# Patient Record
Sex: MALE | Race: BLACK OR AFRICAN AMERICAN | NOT HISPANIC OR LATINO | URBAN - METROPOLITAN AREA
[De-identification: names, ages, dates, MRNs, and addresses within clinical notes are randomized per-mention and may not be internally consistent; named-entity substitution may affect disease eponyms.]

---

## 2018-11-09 ENCOUNTER — INPATIENT (INPATIENT)
Facility: HOSPITAL | Age: 53
LOS: 9 days | Discharge: NURSING, REHAB OR LONG-TERM CARE | DRG: 64 | End: 2018-11-19
Attending: INTERNAL MEDICINE | Admitting: INTERNAL MEDICINE
Payer: MEDICAID

## 2018-11-09 VITALS
HEART RATE: 87 BPM | SYSTOLIC BLOOD PRESSURE: 121 MMHG | DIASTOLIC BLOOD PRESSURE: 76 MMHG | OXYGEN SATURATION: 97 % | TEMPERATURE: 98 F | HEIGHT: 75 IN | WEIGHT: 229.94 LBS | RESPIRATION RATE: 24 BRPM

## 2018-11-09 DIAGNOSIS — E87.1 HYPO-OSMOLALITY AND HYPONATREMIA: ICD-10-CM

## 2018-11-09 DIAGNOSIS — I63.9 CEREBRAL INFARCTION, UNSPECIFIED: ICD-10-CM

## 2018-11-09 DIAGNOSIS — E13.9 OTHER SPECIFIED DIABETES MELLITUS WITHOUT COMPLICATIONS: ICD-10-CM

## 2018-11-09 DIAGNOSIS — Z29.9 ENCOUNTER FOR PROPHYLACTIC MEASURES, UNSPECIFIED: ICD-10-CM

## 2018-11-09 DIAGNOSIS — I10 ESSENTIAL (PRIMARY) HYPERTENSION: ICD-10-CM

## 2018-11-09 DIAGNOSIS — N17.9 ACUTE KIDNEY FAILURE, UNSPECIFIED: ICD-10-CM

## 2018-11-09 DIAGNOSIS — Z90.49 ACQUIRED ABSENCE OF OTHER SPECIFIED PARTS OF DIGESTIVE TRACT: Chronic | ICD-10-CM

## 2018-11-09 DIAGNOSIS — E78.5 HYPERLIPIDEMIA, UNSPECIFIED: ICD-10-CM

## 2018-11-09 DIAGNOSIS — I25.10 ATHEROSCLEROTIC HEART DISEASE OF NATIVE CORONARY ARTERY WITHOUT ANGINA PECTORIS: ICD-10-CM

## 2018-11-09 DIAGNOSIS — Z95.1 PRESENCE OF AORTOCORONARY BYPASS GRAFT: Chronic | ICD-10-CM

## 2018-11-09 LAB
ALBUMIN SERPL ELPH-MCNC: 3.6 G/DL — SIGNIFICANT CHANGE UP (ref 3.5–5)
ALP SERPL-CCNC: 108 U/L — SIGNIFICANT CHANGE UP (ref 40–120)
ALT FLD-CCNC: 24 U/L DA — SIGNIFICANT CHANGE UP (ref 10–60)
ANION GAP SERPL CALC-SCNC: 8 MMOL/L — SIGNIFICANT CHANGE UP (ref 5–17)
ANION GAP SERPL CALC-SCNC: 9 MMOL/L — SIGNIFICANT CHANGE UP (ref 5–17)
APPEARANCE UR: CLEAR — SIGNIFICANT CHANGE UP
APTT BLD: 100.8 SEC — HIGH (ref 27.5–36.3)
APTT BLD: 26.6 SEC — LOW (ref 27.5–36.3)
AST SERPL-CCNC: 18 U/L — SIGNIFICANT CHANGE UP (ref 10–40)
BACTERIA # UR AUTO: ABNORMAL /HPF
BASOPHILS # BLD AUTO: 0.1 K/UL — SIGNIFICANT CHANGE UP (ref 0–0.2)
BASOPHILS NFR BLD AUTO: 1.2 % — SIGNIFICANT CHANGE UP (ref 0–2)
BILIRUB SERPL-MCNC: 0.6 MG/DL — SIGNIFICANT CHANGE UP (ref 0.2–1.2)
BILIRUB UR-MCNC: NEGATIVE — SIGNIFICANT CHANGE UP
BUN SERPL-MCNC: 55 MG/DL — HIGH (ref 7–18)
BUN SERPL-MCNC: 55 MG/DL — HIGH (ref 7–18)
CALCIUM SERPL-MCNC: 9.3 MG/DL — SIGNIFICANT CHANGE UP (ref 8.4–10.5)
CALCIUM SERPL-MCNC: 9.3 MG/DL — SIGNIFICANT CHANGE UP (ref 8.4–10.5)
CHLORIDE SERPL-SCNC: 85 MMOL/L — LOW (ref 96–108)
CHLORIDE SERPL-SCNC: 89 MMOL/L — LOW (ref 96–108)
CO2 SERPL-SCNC: 28 MMOL/L — SIGNIFICANT CHANGE UP (ref 22–31)
CO2 SERPL-SCNC: 29 MMOL/L — SIGNIFICANT CHANGE UP (ref 22–31)
COLOR SPEC: YELLOW — SIGNIFICANT CHANGE UP
CREAT ?TM UR-MCNC: 79 MG/DL — SIGNIFICANT CHANGE UP
CREAT SERPL-MCNC: 2.4 MG/DL — HIGH (ref 0.5–1.3)
CREAT SERPL-MCNC: 2.43 MG/DL — HIGH (ref 0.5–1.3)
DIFF PNL FLD: NEGATIVE — SIGNIFICANT CHANGE UP
EOSINOPHIL # BLD AUTO: 0 K/UL — SIGNIFICANT CHANGE UP (ref 0–0.5)
EOSINOPHIL NFR BLD AUTO: 0.4 % — SIGNIFICANT CHANGE UP (ref 0–6)
GLUCOSE BLDC GLUCOMTR-MCNC: 242 MG/DL — HIGH (ref 70–99)
GLUCOSE BLDC GLUCOMTR-MCNC: 246 MG/DL — HIGH (ref 70–99)
GLUCOSE BLDC GLUCOMTR-MCNC: 266 MG/DL — HIGH (ref 70–99)
GLUCOSE SERPL-MCNC: 345 MG/DL — HIGH (ref 70–99)
GLUCOSE SERPL-MCNC: 390 MG/DL — HIGH (ref 70–99)
GLUCOSE UR QL: 100 MG/DL
HCT VFR BLD CALC: 46.8 % — SIGNIFICANT CHANGE UP (ref 39–50)
HGB BLD-MCNC: 15.9 G/DL — SIGNIFICANT CHANGE UP (ref 13–17)
INR BLD: 1.15 RATIO — SIGNIFICANT CHANGE UP (ref 0.88–1.16)
KETONES UR-MCNC: NEGATIVE — SIGNIFICANT CHANGE UP
LEUKOCYTE ESTERASE UR-ACNC: NEGATIVE — SIGNIFICANT CHANGE UP
LYMPHOCYTES # BLD AUTO: 1.3 K/UL — SIGNIFICANT CHANGE UP (ref 1–3.3)
LYMPHOCYTES # BLD AUTO: 19.3 % — SIGNIFICANT CHANGE UP (ref 13–44)
MAGNESIUM SERPL-MCNC: 2.8 MG/DL — HIGH (ref 1.6–2.6)
MCHC RBC-ENTMCNC: 30 PG — SIGNIFICANT CHANGE UP (ref 27–34)
MCHC RBC-ENTMCNC: 33.9 GM/DL — SIGNIFICANT CHANGE UP (ref 32–36)
MCV RBC AUTO: 88.4 FL — SIGNIFICANT CHANGE UP (ref 80–100)
MONOCYTES # BLD AUTO: 0.6 K/UL — SIGNIFICANT CHANGE UP (ref 0–0.9)
MONOCYTES NFR BLD AUTO: 9.3 % — SIGNIFICANT CHANGE UP (ref 2–14)
NEUTROPHILS # BLD AUTO: 4.8 K/UL — SIGNIFICANT CHANGE UP (ref 1.8–7.4)
NEUTROPHILS NFR BLD AUTO: 69.8 % — SIGNIFICANT CHANGE UP (ref 43–77)
NITRITE UR-MCNC: NEGATIVE — SIGNIFICANT CHANGE UP
OSMOLALITY SERPL: 303 MOS/KG — HIGH (ref 275–300)
OSMOLALITY UR: 461 MOS/KG — SIGNIFICANT CHANGE UP (ref 50–1200)
PH UR: 7 — SIGNIFICANT CHANGE UP (ref 5–8)
PHOSPHATE SERPL-MCNC: 3.4 MG/DL — SIGNIFICANT CHANGE UP (ref 2.5–4.5)
PLATELET # BLD AUTO: 282 K/UL — SIGNIFICANT CHANGE UP (ref 150–400)
POTASSIUM SERPL-MCNC: 5.7 MMOL/L — HIGH (ref 3.5–5.3)
POTASSIUM SERPL-MCNC: 6.2 MMOL/L — CRITICAL HIGH (ref 3.5–5.3)
POTASSIUM SERPL-SCNC: 5.7 MMOL/L — HIGH (ref 3.5–5.3)
POTASSIUM SERPL-SCNC: 6.2 MMOL/L — CRITICAL HIGH (ref 3.5–5.3)
PROT ?TM UR-MCNC: 14 MG/DL — HIGH (ref 0–12)
PROT SERPL-MCNC: 8.8 G/DL — HIGH (ref 6–8.3)
PROT UR-MCNC: 15
PROTHROM AB SERPL-ACNC: 12.8 SEC — SIGNIFICANT CHANGE UP (ref 10–12.9)
RBC # BLD: 5.29 M/UL — SIGNIFICANT CHANGE UP (ref 4.2–5.8)
RBC # FLD: 11.5 % — SIGNIFICANT CHANGE UP (ref 10.3–14.5)
RBC CASTS # UR COMP ASSIST: SIGNIFICANT CHANGE UP /HPF (ref 0–2)
SODIUM SERPL-SCNC: 123 MMOL/L — LOW (ref 135–145)
SODIUM SERPL-SCNC: 125 MMOL/L — LOW (ref 135–145)
SP GR SPEC: 1 — LOW (ref 1.01–1.02)
TROPONIN I SERPL-MCNC: 0.02 NG/ML — SIGNIFICANT CHANGE UP (ref 0–0.04)
TROPONIN I SERPL-MCNC: 0.02 NG/ML — SIGNIFICANT CHANGE UP (ref 0–0.04)
TSH SERPL-MCNC: 0.46 UU/ML — SIGNIFICANT CHANGE UP (ref 0.34–4.82)
UROBILINOGEN FLD QL: NEGATIVE — SIGNIFICANT CHANGE UP
WBC # BLD: 6.9 K/UL — SIGNIFICANT CHANGE UP (ref 3.8–10.5)
WBC # FLD AUTO: 6.9 K/UL — SIGNIFICANT CHANGE UP (ref 3.8–10.5)
WBC UR QL: SIGNIFICANT CHANGE UP /HPF (ref 0–5)

## 2018-11-09 PROCEDURE — 99223 1ST HOSP IP/OBS HIGH 75: CPT

## 2018-11-09 PROCEDURE — 70450 CT HEAD/BRAIN W/O DYE: CPT | Mod: 26

## 2018-11-09 PROCEDURE — 71045 X-RAY EXAM CHEST 1 VIEW: CPT | Mod: 26

## 2018-11-09 PROCEDURE — 99285 EMERGENCY DEPT VISIT HI MDM: CPT

## 2018-11-09 RX ORDER — PANTOPRAZOLE SODIUM 20 MG/1
40 TABLET, DELAYED RELEASE ORAL DAILY
Qty: 0 | Refills: 0 | Status: DISCONTINUED | OUTPATIENT
Start: 2018-11-09 | End: 2018-11-12

## 2018-11-09 RX ORDER — CHLORPROMAZINE HCL 10 MG
10 TABLET ORAL EVERY 8 HOURS
Qty: 0 | Refills: 0 | Status: DISCONTINUED | OUTPATIENT
Start: 2018-11-09 | End: 2018-11-09

## 2018-11-09 RX ORDER — DEXTROSE 50 % IN WATER 50 %
50 SYRINGE (ML) INTRAVENOUS ONCE
Qty: 0 | Refills: 0 | Status: DISCONTINUED | OUTPATIENT
Start: 2018-11-09 | End: 2018-11-10

## 2018-11-09 RX ORDER — INSULIN HUMAN 100 [IU]/ML
10 INJECTION, SOLUTION SUBCUTANEOUS ONCE
Qty: 0 | Refills: 0 | Status: COMPLETED | OUTPATIENT
Start: 2018-11-09 | End: 2018-11-09

## 2018-11-09 RX ORDER — INSULIN LISPRO 100/ML
VIAL (ML) SUBCUTANEOUS
Qty: 0 | Refills: 0 | Status: DISCONTINUED | OUTPATIENT
Start: 2018-11-09 | End: 2018-11-10

## 2018-11-09 RX ORDER — CHLORPROMAZINE HCL 10 MG
10 TABLET ORAL EVERY 8 HOURS
Qty: 0 | Refills: 0 | Status: DISCONTINUED | OUTPATIENT
Start: 2018-11-09 | End: 2018-11-19

## 2018-11-09 RX ORDER — ATORVASTATIN CALCIUM 80 MG/1
40 TABLET, FILM COATED ORAL AT BEDTIME
Qty: 0 | Refills: 0 | Status: DISCONTINUED | OUTPATIENT
Start: 2018-11-09 | End: 2018-11-19

## 2018-11-09 RX ORDER — CALCIUM GLUCONATE 100 MG/ML
1 VIAL (ML) INTRAVENOUS ONCE
Qty: 0 | Refills: 0 | Status: COMPLETED | OUTPATIENT
Start: 2018-11-09 | End: 2018-11-09

## 2018-11-09 RX ORDER — INSULIN GLARGINE 100 [IU]/ML
43 INJECTION, SOLUTION SUBCUTANEOUS AT BEDTIME
Qty: 0 | Refills: 0 | Status: DISCONTINUED | OUTPATIENT
Start: 2018-11-09 | End: 2018-11-18

## 2018-11-09 RX ORDER — HEPARIN SODIUM 5000 [USP'U]/ML
5000 INJECTION INTRAVENOUS; SUBCUTANEOUS EVERY 8 HOURS
Qty: 0 | Refills: 0 | Status: DISCONTINUED | OUTPATIENT
Start: 2018-11-09 | End: 2018-11-09

## 2018-11-09 RX ORDER — HEPARIN SODIUM 5000 [USP'U]/ML
INJECTION INTRAVENOUS; SUBCUTANEOUS
Qty: 25000 | Refills: 0 | Status: DISCONTINUED | OUTPATIENT
Start: 2018-11-09 | End: 2018-11-11

## 2018-11-09 RX ORDER — SODIUM CHLORIDE 9 MG/ML
500 INJECTION INTRAMUSCULAR; INTRAVENOUS; SUBCUTANEOUS ONCE
Qty: 0 | Refills: 0 | Status: COMPLETED | OUTPATIENT
Start: 2018-11-09 | End: 2018-11-09

## 2018-11-09 RX ORDER — SODIUM CHLORIDE 9 MG/ML
1000 INJECTION INTRAMUSCULAR; INTRAVENOUS; SUBCUTANEOUS
Qty: 0 | Refills: 0 | Status: DISCONTINUED | OUTPATIENT
Start: 2018-11-09 | End: 2018-11-10

## 2018-11-09 RX ORDER — METOPROLOL TARTRATE 50 MG
25 TABLET ORAL
Qty: 0 | Refills: 0 | Status: DISCONTINUED | OUTPATIENT
Start: 2018-11-09 | End: 2018-11-19

## 2018-11-09 RX ORDER — ASPIRIN/CALCIUM CARB/MAGNESIUM 324 MG
81 TABLET ORAL DAILY
Qty: 0 | Refills: 0 | Status: DISCONTINUED | OUTPATIENT
Start: 2018-11-09 | End: 2018-11-19

## 2018-11-09 RX ORDER — SODIUM POLYSTYRENE SULFONATE 4.1 MEQ/G
30 POWDER, FOR SUSPENSION ORAL ONCE
Qty: 0 | Refills: 0 | Status: COMPLETED | OUTPATIENT
Start: 2018-11-09 | End: 2018-11-09

## 2018-11-09 RX ADMIN — SODIUM POLYSTYRENE SULFONATE 30 GRAM(S): 4.1 POWDER, FOR SUSPENSION ORAL at 20:31

## 2018-11-09 RX ADMIN — Medication 200 GRAM(S): at 20:25

## 2018-11-09 RX ADMIN — INSULIN GLARGINE 43 UNIT(S): 100 INJECTION, SOLUTION SUBCUTANEOUS at 23:35

## 2018-11-09 RX ADMIN — HEPARIN SODIUM 1800 UNIT(S)/HR: 5000 INJECTION INTRAVENOUS; SUBCUTANEOUS at 16:23

## 2018-11-09 RX ADMIN — ATORVASTATIN CALCIUM 40 MILLIGRAM(S): 80 TABLET, FILM COATED ORAL at 22:10

## 2018-11-09 RX ADMIN — Medication 25 MILLIGRAM(S): at 17:21

## 2018-11-09 RX ADMIN — Medication 3: at 17:21

## 2018-11-09 RX ADMIN — PANTOPRAZOLE SODIUM 40 MILLIGRAM(S): 20 TABLET, DELAYED RELEASE ORAL at 15:22

## 2018-11-09 RX ADMIN — SODIUM CHLORIDE 500 MILLILITER(S): 9 INJECTION INTRAMUSCULAR; INTRAVENOUS; SUBCUTANEOUS at 12:18

## 2018-11-09 RX ADMIN — HEPARIN SODIUM 1600 UNIT(S)/HR: 5000 INJECTION INTRAVENOUS; SUBCUTANEOUS at 23:33

## 2018-11-09 RX ADMIN — Medication 10 MILLIGRAM(S): at 20:26

## 2018-11-09 RX ADMIN — SODIUM CHLORIDE 500 MILLILITER(S): 9 INJECTION INTRAMUSCULAR; INTRAVENOUS; SUBCUTANEOUS at 16:11

## 2018-11-09 RX ADMIN — INSULIN HUMAN 10 UNIT(S): 100 INJECTION, SOLUTION SUBCUTANEOUS at 20:26

## 2018-11-09 NOTE — CONSULT NOTE ADULT - SUBJECTIVE AND OBJECTIVE BOX
Patient is a 53y old  Male who presents with a chief complaint of weakness, slurred speech (09 Nov 2018 15:11)      HPI: Left [facility] where he had been admitted with slurre dspeech and weakness; readmitted because of difficulty walking; reviewed MRI reports from [place] obtained by Dr. Pickett. 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 He has a uncontrollable hiccup.    PAST MEDICAL & SURGICAL HISTORY:  Cerebrovascular accident (CVA), unspecified mechanism  Hyperlipidemia, unspecified hyperlipidemia type  Coronary artery disease involving native heart without angina pectoris, unspecified vessel or lesion type  Hypertension, unspecified type  Diabetes mellitus of other type without complication  History of appendectomy  S/P CABG x 1      FAMILY HISTORY:  No pertinent family history in first degree relatives        Social Hx:  Current every day smoker, no drug or alcohol use    MEDICATIONS  (STANDING):  aspirin enteric coated 81 milliGRAM(s) Oral daily  atorvastatin 40 milliGRAM(s) Oral at bedtime  heparin  Infusion.  Unit(s)/Hr (18 mL/Hr) IV Continuous <Continuous>  insulin glargine Injectable (LANTUS) 43 Unit(s) SubCutaneous at bedtime  insulin lispro (HumaLOG) corrective regimen sliding scale   SubCutaneous three times a day before meals  metoprolol tartrate 25 milliGRAM(s) Oral two times a day  pantoprazole  Injectable 40 milliGRAM(s) IV Push daily  sodium chloride 0.9%. 1000 milliLiter(s) (75 mL/Hr) IV Continuous <Continuous>       Allergies    No Known Allergies    Intolerances        ROS: Pertinent positives in HPI, all other ROS were reviewed and are negative.      Vital Signs Last 24 Hrs  T(C): 36.9 (09 Nov 2018 17:10), Max: 36.9 (09 Nov 2018 17:10)  T(F): 98.5 (09 Nov 2018 17:10), Max: 98.5 (09 Nov 2018 17:10)  HR: 79 (09 Nov 2018 17:10) (77 - 87)  BP: 141/69 (09 Nov 2018 17:10) (121/76 - 154/78)  BP(mean): --  RR: 18 (09 Nov 2018 17:10) (18 - 24)  SpO2: 96% (09 Nov 2018 17:10) (95% - 100%)        Constitutional: awake and alert.  HEENT: PERRLA, EOMI,   Neck: Supple.  Respiratory: Breath sounds are clear bilaterally  Cardiovascular: S1 and S2, regular / irregular rhythm  Gastrointestinal: soft, nontender  Extremities:  no edema  Vascular: Caritid Bruit - no  Musculoskeletal: no joint swelling/tenderness, no abnormal movements  Skin: No rashes    Neurological exam:  HF: A x O x 3. Appropriately interactive, normal affect. Speech fluent, No Aphasia or paraphasic errors. Naming /repetition intact   CN: CHRISTY, left NICHO, VFF, facial sensation normal, no NLFD, tongue midline, Palate moves equally, SCM equal bilaterally  Motor: No pronator drift, Strength 5/5 in all 4 ext, normal bulk and tone, no tremor, rigidity or bradykinesia.    Sens: Intact to light touch / PP/ VS/ JS  upper extremities; lost vibration and position below knees   Reflexes: Symmetric and normal . BJ 2+, BR 2+, KJ 2+, AJ 0, downgoing toes b/l  Coord:  RIGHT  FNFA, dysmetria, RIGHT DYSDIADOKOKINESIA    Gait/Balance:UNABLE TO WALK    NIHSS: 5  1A: Level of consciousness       0= Alert; keenly responsive  1B: Ask month and age       0= Both questions right  1C: "Blink eyes" and "Squeeze Hands"       0= Performs both  2: Horizontal EOMs      +2= Forzed gaze palsy: cannot be overcome  3: Visual fields       0= No visual loss  4: Facial palsy (use grimace if obtunded)       0= Normal symmetry  5A: Left arm motor drift (count out loud and use fingers to show count)       0= No drift x 10 seconds  5B: Right arm motor drift       0= No drift x 10 seconds  6A: Left leg motor drift       0= No drift x 10 seconds  6B: Right leg motor drift     0= No drift x 10 seconds  7: Limb ataxia (FNF/heel-shin)       +2= Ataxia in 2 limbs  8: Sensation       0= Normal, no sensory loss  9: Language/aphasia- describe the scene (on norma); name the items; read the sentences (on norma)       0= Normal, no aphasia  10: Dysarthria- read the words        +1= mild-moderate dysarthria: slurring but can be understood   11: Extinction/inattention       0= No abnormality          MRS 4      Labs:                        15.9   6.9   )-----------( 282      ( 09 Nov 2018 12:24 )             46.8     11-09    x   |  x   |  x   ----------------------------<  x   x    |  x   |  2.40<H>    Ca    9.3      09 Nov 2018 12:24  Phos  3.4     11-09  Mg     2.8     11-09    TPro  8.8<H>  /  Alb  3.6  /  TBili  0.6  /  DBili  x   /  AST  18  /  ALT  24  /  AlkPhos  108  11-09        PT/INR - ( 09 Nov 2018 12:24 )   PT: 12.8 sec;   INR: 1.15 ratio         PTT - ( 09 Nov 2018 12:24 )  PTT:26.6 sec    Radiology report:  - CT head:  < from: CT Head No Cont (11.09.18 @ 12:10) >    EXAM:  CT BRAIN                            PROCEDURE DATE:  11/09/2018      INTERPRETATION:  CLINICAL STATEMENT: Weakness 6 days    TECHNIQUE: CT of the head was performed without IV contrast.    COMPARISON: None.    FINDINGS:  There are areas of low attenuation in the periventricular white matter   likely related to mild chronic microvascular ischemic changes.    There is no acute intracranial hemorrhage, parenchymal mass, mass effect   or midline shift. . There is no hydrocephalus.    Small 1 x 1 cm age-indeterminate infarct left occipital lobe noted    Focal hypodensity inferior left basal ganglia region noted likely   representing focal dilated perivascular space.    The cranium is intact. Left lamina papyracea fracture noted likely  chronic mild soft tissue swelling noted posteriorly.    Small retention cyst/polyp ethmoid sinus.    IMPRESSION:  Small age-indeterminate infarct left occipital lobe. MRI exam could be   obtained for further evaluation as clinically warranted.    No acute intracranial hemorrhage.      ERIKA GARCIA M.D., ATTENDING RADIOLOGIST  This document has been electronically signed. Nov 9 2018 12:23PM          < end of copied text >    - MRI brain  - MRA head/carotids (rEVIEWED)  -

## 2018-11-09 NOTE — H&P ADULT - NSHPLABSRESULTS_GEN_ALL_CORE
CBC Full  -  ( 09 Nov 2018 12:24 )  WBC Count : 6.9 K/uL  Hemoglobin : 15.9 g/dL  Hematocrit : 46.8 %  Platelet Count - Automated : 282 K/uL  Mean Cell Volume : 88.4 fl  Mean Cell Hemoglobin : 30.0 pg  Mean Cell Hemoglobin Concentration : 33.9 gm/dL  Auto Neutrophil # : 4.8 K/uL  Auto Lymphocyte # : 1.3 K/uL  Auto Monocyte # : 0.6 K/uL  Auto Eosinophil # : 0.0 K/uL  Auto Basophil # : 0.1 K/uL  Auto Neutrophil % : 69.8 %  Auto Lymphocyte % : 19.3 %  Auto Monocyte % : 9.3 %  Auto Eosinophil % : 0.4 %  Auto Basophil % : 1.2 %    11-09    123<L>  |  85<L>  |  55<H>  ----------------------------<  390<H>  5.7<H>   |  29  |  2.43<H>    Ca    9.3      09 Nov 2018 12:24    TPro  8.8<H>  /  Alb  3.6  /  TBili  0.6  /  DBili  x   /  AST  18  /  ALT  24  /  AlkPhos  108  11-09    PT/INR - ( 09 Nov 2018 12:24 )   PT: 12.8 sec;   INR: 1.15 ratio       PTT - ( 09 Nov 2018 12:24 )  PTT:26.6 sec    RADIOLOGY & ADDITIONAL STUDIES (The following images were personally reviewed): CBC Full  -  ( 09 Nov 2018 12:24 )  WBC Count : 6.9 K/uL  Hemoglobin : 15.9 g/dL  Hematocrit : 46.8 %  Platelet Count - Automated : 282 K/uL  Mean Cell Volume : 88.4 fl  Mean Cell Hemoglobin : 30.0 pg  Mean Cell Hemoglobin Concentration : 33.9 gm/dL  Auto Neutrophil # : 4.8 K/uL  Auto Lymphocyte # : 1.3 K/uL  Auto Monocyte # : 0.6 K/uL  Auto Eosinophil # : 0.0 K/uL  Auto Basophil # : 0.1 K/uL  Auto Neutrophil % : 69.8 %  Auto Lymphocyte % : 19.3 %  Auto Monocyte % : 9.3 %  Auto Eosinophil % : 0.4 %  Auto Basophil % : 1.2 %    11-09    123<L>  |  85<L>  |  55<H>  ----------------------------<  390<H>  5.7<H>   |  29  |  2.43<H>    Ca    9.3      09 Nov 2018 12:24    TPro  8.8<H>  /  Alb  3.6  /  TBili  0.6  /  DBili  x   /  AST  18  /  ALT  24  /  AlkPhos  108  11-09    PT/INR - ( 09 Nov 2018 12:24 )   PT: 12.8 sec;   INR: 1.15 ratio       PTT - ( 09 Nov 2018 12:24 )  PTT:26.6 sec    RADIOLOGY & ADDITIONAL STUDIES (The following images were personally reviewed):      EXAM:  CT BRAIN                            PROCEDURE DATE:  11/09/2018          INTERPRETATION:  CLINICAL STATEMENT: Weakness 6 days    TECHNIQUE: CT of the head was performed without IV contrast.    COMPARISON: None.    FINDINGS:  There are areas of low attenuation in the periventricular white matter   likely related to mild chronic microvascular ischemic changes.    There is no acute intracranial hemorrhage, parenchymal mass, mass effect   or midline shift. . There is no hydrocephalus.    Small 1 x 1 cm age-indeterminate infarct left occipital lobe noted    Focal hypodensity inferior left basal ganglia region noted likely   representing focal dilated perivascular space.    The cranium is intact. Left lamina papyracea fracture noted likely   chronic mild soft tissue swelling noted posteriorly.    Small retention cyst/polyp ethmoid sinus.    IMPRESSION:  Small age-indeterminate infarct left occipital lobe. MRI exam could be   obtained for further evaluation as clinically warranted.    No acute intracranial hemorrhage.

## 2018-11-09 NOTE — H&P ADULT - PROBLEM SELECTOR PLAN 2
no kidney issues in the past as per patient, unknown creatinine baseline  Cr 2.5 on admission, likely 2/2 hypovolemia given poor PO intake  s/p 500ml NS bolus in ED  c/w light IVF, f/u repeat BMP  f/u nephrology - Dr. Goldberg

## 2018-11-09 NOTE — ED PROVIDER NOTE - CLINICAL SIGNS
Patient non-verbal at time of visit. No family able to provide information. Unable to fully assess at this time.
Brudzinski's sign negative/Kernig's sign negative

## 2018-11-09 NOTE — PROGRESS NOTE ADULT - SUBJECTIVE AND OBJECTIVE BOX
HPI:  Pt is 52 yo M with PMH of HTN, HLD, DM, CABG, s/p stroke 6 days ago at [facility] in [place] at which time he left AMA due to having to deal with rent issues, presents to ED due to worsening weakness. Pt states that at prior hospital admission, he had presented with left sided upper and lower extremity numbness. Pt denies cp, sob, abd pain, fever, chills. Pt admits to nausea and vomiting. Per patient's brother, at baseline patient able to ambulate but noted that patient has been unable to walk unassisted since coming home yesterday night and speech is slurred. (09 Nov 2018 14:06)      Patient is a 53y old  Male who presents with a chief complaint of weakness, slurred speech (09 Nov 2018 14:06)      INTERVAL HPI/OVERNIGHT EVENTS:  T(C): 36.6 (11-09-18 @ 11:20), Max: 36.6 (11-09-18 @ 11:20)  HR: 77 (11-09-18 @ 12:19) (77 - 87)  BP: 128/53 (11-09-18 @ 12:19) (121/76 - 128/53)  RR: 19 (11-09-18 @ 12:19) (19 - 24)  SpO2: 95% (11-09-18 @ 12:19) (95% - 97%)  Wt(kg): --  I&O's Summary      REVIEW OF SYSTEMS: denies fever, chills, SOB, palpitations, chest pain, abdominal pain, nausea, vomitting, diarrhea, constipation, dizziness    MEDICATIONS  (STANDING):  heparin  Injectable 5000 Unit(s) SubCutaneous every 8 hours  insulin glargine Injectable (LANTUS) 43 Unit(s) SubCutaneous at bedtime  insulin lispro (HumaLOG) corrective regimen sliding scale   SubCutaneous three times a day before meals  pantoprazole  Injectable 40 milliGRAM(s) IV Push daily    MEDICATIONS  (PRN):  chlorproMAZINE    Tablet 10 milliGRAM(s) Oral every 8 hours PRN hiccups      PHYSICAL EXAM:  GENERAL: NAD, well-groomed, well-developed  HEAD:  Atraumatic, Normocephalic  EYES: EOMI, PERRLA, conjunctiva and sclera clear  ENMT: No tonsillar erythema, exudates, or enlargement; Moist mucous membranes, Good dentition, No lesions  NECK: Supple, No JVD, Normal thyroid  NERVOUS SYSTEM:  Alert & Oriented X3, Good concentration; Motor Strength 5/5 B/L upper and lower extremities; DTRs 2+ intact and symmetric  CHEST/LUNG: Clear to percussion bilaterally; No rales, rhonchi, wheezing, or rubs  HEART: Regular rate and rhythm; No murmurs, rubs, or gallops  ABDOMEN: Soft, Nontender, Nondistended; Bowel sounds present  EXTREMITIES:  2+ Peripheral Pulses, No clubbing, cyanosis, or edema  LYMPH: No lymphadenopathy noted  SKIN: No rashes or lesions  LABS:                        15.9   6.9   )-----------( 282      ( 09 Nov 2018 12:24 )             46.8     11-09    123<L>  |  85<L>  |  55<H>  ----------------------------<  390<H>  5.7<H>   |  29  |  2.43<H>    Ca    9.3      09 Nov 2018 12:24    TPro  8.8<H>  /  Alb  3.6  /  TBili  0.6  /  DBili  x   /  AST  18  /  ALT  24  /  AlkPhos  108  11-09    PT/INR - ( 09 Nov 2018 12:24 )   PT: 12.8 sec;   INR: 1.15 ratio         PTT - ( 09 Nov 2018 12:24 )  PTT:26.6 sec    CAPILLARY BLOOD GLUCOSE

## 2018-11-09 NOTE — ED ADULT TRIAGE NOTE - CHIEF COMPLAINT QUOTE
Patient states he 'signed out' of a hospital recently because he had business to take care of.  Admits to having a stroke as reason for recent hospitalization

## 2018-11-09 NOTE — H&P ADULT - PROBLEM SELECTOR PLAN 3
measured Na on admission 123, however correcting for hyperglycemia true Na likely around 126, likely 2/2 poor PO intake last 6 days as per patient, with vomiting 2/2 hiccups  c/w thorazine, s/p 500ml NS bolus, c/w light IVF  f/u repeat Na

## 2018-11-09 NOTE — H&P ADULT - ASSESSMENT
Patient admitted for concern of acute CVA Patient admitted for concern of embolic CVA along with MALCOM/ARF, hyponatremia on admission to [facility]. Per previous MRI, MRA head and neck obtained from [place], patient had medullary stroke, however no occipital stroke. On admission, patient found to have new occipital stroke, therefore embolic stroke more likely. Patient to be continued on telemetry and placed on heparin drip for concern for Afib, patient to undergo TTE with bubble study to evaluate for possible PFO.

## 2018-11-09 NOTE — ED ADULT NURSE NOTE - OBJECTIVE STATEMENT
Pt was at a hospital in [place] yesterday dx with a stroke signed out AMA c/o chest burning hiccups for 8 days and vomiting, speech slow and slightly gargled

## 2018-11-09 NOTE — ED PROVIDER NOTE - PMH
Cerebrovascular accident (CVA), unspecified mechanism    Coronary artery disease involving native heart without angina pectoris, unspecified vessel or lesion type    Diabetes mellitus of other type without complication    Hyperlipidemia, unspecified hyperlipidemia type    Hypertension, unspecified type

## 2018-11-09 NOTE — CONSULT NOTE ADULT - SUBJECTIVE AND OBJECTIVE BOX
53 yr old male with H/O HTN, DM2, CHF. recently admitted at in [place] with found to have a medullary stroke. signed out AMA and presented to ED with inability to walk and slurring of speech since last night. Ct head showed a new occipital stroke. He admits to poor intake. He continued to take Lisinopril 20mg a day. Denies any NSSIAD's. He is not aware of having kidney disease  in the past. Renal consulted for acute renal failure, hyponatremia and hyperkalemia. On heparin gtt for concerns of embolic stroke    PAST MEDICAL & SURGICAL HISTORY:  Cerebrovascular accident (CVA), unspecified mechanism  Hyperlipidemia, unspecified hyperlipidemia type  Coronary artery disease involving native heart without angina pectoris, unspecified vessel or lesion type  Hypertension, unspecified type  Diabetes mellitus of other type without complication  History of appendectomy  S/P CABG x 1    No Known Allergies    Home Medications Reviewed  Hospital Medications:   MEDICATIONS  (STANDING):  aspirin enteric coated 81 milliGRAM(s) Oral daily  atorvastatin 40 milliGRAM(s) Oral at bedtime  dextrose 50% Injectable 50 milliLiter(s) IV Push once  heparin  Infusion.  Unit(s)/Hr (18 mL/Hr) IV Continuous <Continuous>  insulin glargine Injectable (LANTUS) 43 Unit(s) SubCutaneous at bedtime  insulin lispro (HumaLOG) corrective regimen sliding scale   SubCutaneous three times a day before meals  metoprolol tartrate 25 milliGRAM(s) Oral two times a day  pantoprazole  Injectable 40 milliGRAM(s) IV Push daily  sodium chloride 0.9%. 1000 milliLiter(s) (75 mL/Hr) IV Continuous <Continuous>    SOCIAL HISTORY:  Denies ETOh,Smoking,   FAMILY HISTORY:  No pertinent family history in first degree relatives        VITALS:  T(F): 98.5 (11-09-18 @ 17:10), Max: 98.5 (11-09-18 @ 17:10)  HR: 79 (11-09-18 @ 17:10)  BP: 141/69 (11-09-18 @ 17:10)  RR: 18 (11-09-18 @ 17:10)  SpO2: 96% (11-09-18 @ 17:10)  Wt(kg): --    11-09 @ 07:01  -  11-09 @ 20:32  --------------------------------------------------------  IN: 240 mL / OUT: 0 mL / NET: 240 mL      Height (cm): 190.5 (11-09 @ 11:20)  Weight (kg): 104.3 (11-09 @ 11:20)  BMI (kg/m2): 28.7 (11-09 @ 11:20)  BSA (m2): 2.33 (11-09 @ 11:20)  PHYSICAL EXAM:  Constitutional: NAD  HEENT: anicteric sclera, oropharynx clear.  Neck: No JVD  Respiratory: CTAB, no wheezes, rales or rhonchi  Cardiovascular: S1, S2, RRR  Gastrointestinal: BS+, soft, NT/ND  Extremities: No cyanosis or clubbing. No peripheral edema  Neurological: no focal deficits    : No CVA tenderness. No mayers.   Skin: No rashes      LABS:  11-09    125<L>  |  89<L>  |  55<H>  ----------------------------<  345<H>  6.2<HH>   |  28  |  2.40<H>    Ca    9.3      09 Nov 2018 18:11  Phos  3.4     11-09  Mg     2.8     11-09    TPro  8.8<H>  /  Alb  3.6  /  TBili  0.6  /  DBili      /  AST  18  /  ALT  24  /  AlkPhos  108  11-09    Creatinine Trend: 2.40 <--, 2.43 <--                        15.9   6.9   )-----------( 282      ( 09 Nov 2018 12:24 )             46.8     Urine Studies:      RADIOLOGY & ADDITIONAL STUDIES:

## 2018-11-09 NOTE — H&P ADULT - PROBLEM SELECTOR PLAN 8
IMPROVE VTE Individual Risk Assessment          RISK                                                          Points  [  ] Previous VTE                                                3  [ x ] Thrombophilia                                             2  [  ] Lower limb paralysis                                   2        (unable to hold up >15 seconds)    [  ] Current Cancer                                             2         (within 6 months)  [ x ] Immobilization > 24 hrs                              1  [  ] ICU/CCU stay > 24 hours                             1  [  ] Age > 60                                                         1    IMPROVE VTE Score: 3    c/w heparin drip for full anticoagulation given above explanation

## 2018-11-09 NOTE — ED PROVIDER NOTE - OBJECTIVE STATEMENT
Patient brought in by his brother s/p stroke 6 days prior to arrive. endorses that he presented at that time with left sided upper and lower extremity numbness. denies cp, sob, abd pain, fever, chills. endorses n, v. Patient endorses that he left the [facility] [place] against medical advice due to having to deal with his rent. Per patient;s brother, at baseline patient able to ambulate but noted that patient has been unable to walk unassisted since coming home yesterday night and speech is slurred.

## 2018-11-09 NOTE — H&P ADULT - PROBLEM SELECTOR PLAN 1
c/w aspirin, statin, thorazine  c/w telemetry monitoring  c/w heparin drip  f/u TTE with bubble study  f/u neurology consult - Dr. Fraire

## 2018-11-09 NOTE — H&P ADULT - NSHPSOCIALHISTORY_GEN_ALL_CORE
denies alcohol use, admits to smoking approx 2.5 packs per day of cigarettes, marijiuana use, denies other illicit drug use

## 2018-11-09 NOTE — H&P ADULT - NSHPPHYSICALEXAM_GEN_ALL_CORE
Vital Signs Last 24 Hrs  T(C): 36.6 (09 Nov 2018 11:20), Max: 36.6 (09 Nov 2018 11:20)  T(F): 97.9 (09 Nov 2018 11:20), Max: 97.9 (09 Nov 2018 11:20)  HR: 77 (09 Nov 2018 12:19) (77 - 87)  BP: 128/53 (09 Nov 2018 12:19) (121/76 - 128/53)  RR: 19 (09 Nov 2018 12:19) (19 - 24)  SpO2: 95% (09 Nov 2018 12:19) (95% - 97%)  _______________________________________  PHYSICAL EXAM:  GENERAL: NAD  HEENT: Normocephalic;  conjunctivae and sclerae clear; moist mucous membranes;   NECK : supple  CHEST/LUNG: Clear to auscultation bilaterally with good air entry   HEART: S1 S2  regular; no murmurs, gallops or rubs  ABDOMEN: Soft, Nontender, Nondistended; Bowel sounds present  EXTREMITIES: no cyanosis; no edema; no calf tenderness  NERVOUS SYSTEM:  Awake and alert; Oriented  to place, person and time Vital Signs Last 24 Hrs  T(C): 36.6 (09 Nov 2018 11:20), Max: 36.6 (09 Nov 2018 11:20)  T(F): 97.9 (09 Nov 2018 11:20), Max: 97.9 (09 Nov 2018 11:20)  HR: 77 (09 Nov 2018 12:19) (77 - 87)  BP: 128/53 (09 Nov 2018 12:19) (121/76 - 128/53)  RR: 19 (09 Nov 2018 12:19) (19 - 24)  SpO2: 95% (09 Nov 2018 12:19) (95% - 97%)  _______________________________________  PHYSICAL EXAM:  GENERAL: NAD, lying on belly with covers placed over him  HEENT: Normocephalic;  conjunctivae and sclerae clear; moist mucous membranes;   NECK : supple  CHEST/LUNG: Clear to auscultation bilaterally with good air entry   HEART: S1 S2  regular; no murmurs, gallops or rubs  ABDOMEN: Soft, Nontender, Nondistended; Bowel sounds present  EXTREMITIES: no cyanosis; no edema; no calf tenderness  NERVOUS SYSTEM:  Awake and alert; Oriented  to place, person and time; bilateral LE weakness, right hand  decreased strength from left, hyperreflexia on bilateral upper and lower extremities, continuous hiccups, no other focal deficits noted

## 2018-11-09 NOTE — SWALLOW BEDSIDE ASSESSMENT ADULT - SLP PERTINENT HISTORY OF CURRENT PROBLEM
54 yo M with PMH of HTN, HLD, DM, CABG, s/p stroke 6 days ago at [facility] in [place] at which time he left AMA due to having to deal with rent issues, presents to ED due to worsening weakness. Pt states that at prior hospital admission, he had presented with left sided upper and lower extremity numbness. Pt denies cp, sob, abd pain, fever, chills. Pt admits to nausea and vomiting. Per patient's brother, at baseline patient able to ambulate but noted that patient has been unable to walk unassisted since coming home yesterday night and speech is slurred.

## 2018-11-09 NOTE — ED PROVIDER NOTE - MEDICAL DECISION MAKING DETAILS
Patient presenting with s/p stroke, left prior hospital against medical advice presenting with weakness and persistent difficulty ambulating. vital normal. will obtain lab, ekg, cxr, ct head. given patient unable to ambulate, likely will need pt/ot eval and rehab. stroke 6 day prior to arrival, outside tpa window,.

## 2018-11-09 NOTE — SWALLOW BEDSIDE ASSESSMENT ADULT - COMMENTS
AA+Ox3, HOB elevated to 45°. Mild facial weakness on right.  Notably, Pt p/w hiccups, which he stated has been occurring for  ~9 days.   PATIENT RECEIVED WRITTEN TEACHING ON: 1. Signs and symptoms of stroke.2. What to do if they are having a stroke.3. Activation of 911.4. Modifying risk factors.5. Discharge medications.6. The importance of compliance and the need for follow up.

## 2018-11-10 LAB
ANION GAP SERPL CALC-SCNC: 6 MMOL/L — SIGNIFICANT CHANGE UP (ref 5–17)
ANION GAP SERPL CALC-SCNC: 7 MMOL/L — SIGNIFICANT CHANGE UP (ref 5–17)
ANION GAP SERPL CALC-SCNC: 8 MMOL/L — SIGNIFICANT CHANGE UP (ref 5–17)
ANION GAP SERPL CALC-SCNC: 9 MMOL/L — SIGNIFICANT CHANGE UP (ref 5–17)
ANION GAP SERPL CALC-SCNC: 9 MMOL/L — SIGNIFICANT CHANGE UP (ref 5–17)
APTT BLD: 124.5 SEC — CRITICAL HIGH (ref 27.5–36.3)
APTT BLD: 185.6 SEC — CRITICAL HIGH (ref 27.5–36.3)
APTT BLD: >200 SEC — CRITICAL HIGH (ref 27.5–36.3)
BUN SERPL-MCNC: 38 MG/DL — HIGH (ref 7–18)
BUN SERPL-MCNC: 41 MG/DL — HIGH (ref 7–18)
BUN SERPL-MCNC: 44 MG/DL — HIGH (ref 7–18)
BUN SERPL-MCNC: 48 MG/DL — HIGH (ref 7–18)
BUN SERPL-MCNC: 48 MG/DL — HIGH (ref 7–18)
CALCIUM SERPL-MCNC: 6.3 MG/DL — CRITICAL LOW (ref 8.4–10.5)
CALCIUM SERPL-MCNC: 7 MG/DL — LOW (ref 8.4–10.5)
CALCIUM SERPL-MCNC: 8.8 MG/DL — SIGNIFICANT CHANGE UP (ref 8.4–10.5)
CALCIUM SERPL-MCNC: 9.1 MG/DL — SIGNIFICANT CHANGE UP (ref 8.4–10.5)
CALCIUM SERPL-MCNC: 9.1 MG/DL — SIGNIFICANT CHANGE UP (ref 8.4–10.5)
CHLORIDE SERPL-SCNC: 104 MMOL/L — SIGNIFICANT CHANGE UP (ref 96–108)
CHLORIDE SERPL-SCNC: 106 MMOL/L — SIGNIFICANT CHANGE UP (ref 96–108)
CHLORIDE SERPL-SCNC: 88 MMOL/L — LOW (ref 96–108)
CHLORIDE SERPL-SCNC: 91 MMOL/L — LOW (ref 96–108)
CHLORIDE SERPL-SCNC: 92 MMOL/L — LOW (ref 96–108)
CHOLEST SERPL-MCNC: 124 MG/DL — SIGNIFICANT CHANGE UP (ref 10–199)
CO2 SERPL-SCNC: 23 MMOL/L — SIGNIFICANT CHANGE UP (ref 22–31)
CO2 SERPL-SCNC: 23 MMOL/L — SIGNIFICANT CHANGE UP (ref 22–31)
CO2 SERPL-SCNC: 30 MMOL/L — SIGNIFICANT CHANGE UP (ref 22–31)
CO2 SERPL-SCNC: 31 MMOL/L — SIGNIFICANT CHANGE UP (ref 22–31)
CO2 SERPL-SCNC: 32 MMOL/L — HIGH (ref 22–31)
CREAT SERPL-MCNC: 1.19 MG/DL — SIGNIFICANT CHANGE UP (ref 0.5–1.3)
CREAT SERPL-MCNC: 1.25 MG/DL — SIGNIFICANT CHANGE UP (ref 0.5–1.3)
CREAT SERPL-MCNC: 1.74 MG/DL — HIGH (ref 0.5–1.3)
CREAT SERPL-MCNC: 1.91 MG/DL — HIGH (ref 0.5–1.3)
CREAT SERPL-MCNC: 1.93 MG/DL — HIGH (ref 0.5–1.3)
GLUCOSE BLDC GLUCOMTR-MCNC: 181 MG/DL — HIGH (ref 70–99)
GLUCOSE BLDC GLUCOMTR-MCNC: 203 MG/DL — HIGH (ref 70–99)
GLUCOSE BLDC GLUCOMTR-MCNC: 323 MG/DL — HIGH (ref 70–99)
GLUCOSE BLDC GLUCOMTR-MCNC: 397 MG/DL — HIGH (ref 70–99)
GLUCOSE SERPL-MCNC: 148 MG/DL — HIGH (ref 70–99)
GLUCOSE SERPL-MCNC: 156 MG/DL — HIGH (ref 70–99)
GLUCOSE SERPL-MCNC: 162 MG/DL — HIGH (ref 70–99)
GLUCOSE SERPL-MCNC: 207 MG/DL — HIGH (ref 70–99)
GLUCOSE SERPL-MCNC: 302 MG/DL — HIGH (ref 70–99)
HBA1C BLD-MCNC: 7.7 % — HIGH (ref 4–5.6)
HCT VFR BLD CALC: 36.2 % — LOW (ref 39–50)
HCT VFR BLD CALC: 47.7 % — SIGNIFICANT CHANGE UP (ref 39–50)
HDLC SERPL-MCNC: 44 MG/DL — SIGNIFICANT CHANGE UP
HGB BLD-MCNC: 12.7 G/DL — LOW (ref 13–17)
HGB BLD-MCNC: 16.4 G/DL — SIGNIFICANT CHANGE UP (ref 13–17)
LIPID PNL WITH DIRECT LDL SERPL: 71 MG/DL — SIGNIFICANT CHANGE UP
MAGNESIUM SERPL-MCNC: 2.5 MG/DL — SIGNIFICANT CHANGE UP (ref 1.6–2.6)
MCHC RBC-ENTMCNC: 30.5 PG — SIGNIFICANT CHANGE UP (ref 27–34)
MCHC RBC-ENTMCNC: 30.9 PG — SIGNIFICANT CHANGE UP (ref 27–34)
MCHC RBC-ENTMCNC: 34.3 GM/DL — SIGNIFICANT CHANGE UP (ref 32–36)
MCHC RBC-ENTMCNC: 35.1 GM/DL — SIGNIFICANT CHANGE UP (ref 32–36)
MCV RBC AUTO: 88.1 FL — SIGNIFICANT CHANGE UP (ref 80–100)
MCV RBC AUTO: 89 FL — SIGNIFICANT CHANGE UP (ref 80–100)
OSMOLALITY UR: 517 MOS/KG — SIGNIFICANT CHANGE UP (ref 50–1200)
PHOSPHATE SERPL-MCNC: 4 MG/DL — SIGNIFICANT CHANGE UP (ref 2.5–4.5)
PLATELET # BLD AUTO: 217 K/UL — SIGNIFICANT CHANGE UP (ref 150–400)
PLATELET # BLD AUTO: 312 K/UL — SIGNIFICANT CHANGE UP (ref 150–400)
POTASSIUM SERPL-MCNC: 3.3 MMOL/L — LOW (ref 3.5–5.3)
POTASSIUM SERPL-MCNC: 3.6 MMOL/L — SIGNIFICANT CHANGE UP (ref 3.5–5.3)
POTASSIUM SERPL-MCNC: 4.6 MMOL/L — SIGNIFICANT CHANGE UP (ref 3.5–5.3)
POTASSIUM SERPL-MCNC: 4.6 MMOL/L — SIGNIFICANT CHANGE UP (ref 3.5–5.3)
POTASSIUM SERPL-MCNC: 5.3 MMOL/L — SIGNIFICANT CHANGE UP (ref 3.5–5.3)
POTASSIUM SERPL-SCNC: 3.3 MMOL/L — LOW (ref 3.5–5.3)
POTASSIUM SERPL-SCNC: 3.6 MMOL/L — SIGNIFICANT CHANGE UP (ref 3.5–5.3)
POTASSIUM SERPL-SCNC: 4.6 MMOL/L — SIGNIFICANT CHANGE UP (ref 3.5–5.3)
POTASSIUM SERPL-SCNC: 4.6 MMOL/L — SIGNIFICANT CHANGE UP (ref 3.5–5.3)
POTASSIUM SERPL-SCNC: 5.3 MMOL/L — SIGNIFICANT CHANGE UP (ref 3.5–5.3)
RBC # BLD: 4.11 M/UL — LOW (ref 4.2–5.8)
RBC # BLD: 5.36 M/UL — SIGNIFICANT CHANGE UP (ref 4.2–5.8)
RBC # FLD: 11.4 % — SIGNIFICANT CHANGE UP (ref 10.3–14.5)
RBC # FLD: 11.4 % — SIGNIFICANT CHANGE UP (ref 10.3–14.5)
SODIUM SERPL-SCNC: 126 MMOL/L — LOW (ref 135–145)
SODIUM SERPL-SCNC: 129 MMOL/L — LOW (ref 135–145)
SODIUM SERPL-SCNC: 130 MMOL/L — LOW (ref 135–145)
SODIUM SERPL-SCNC: 136 MMOL/L — SIGNIFICANT CHANGE UP (ref 135–145)
SODIUM SERPL-SCNC: 138 MMOL/L — SIGNIFICANT CHANGE UP (ref 135–145)
SODIUM UR-SCNC: 75 MMOL/L — SIGNIFICANT CHANGE UP (ref 40–220)
TOTAL CHOLESTEROL/HDL RATIO MEASUREMENT: 2.8 RATIO — LOW (ref 3.4–9.6)
TRIGL SERPL-MCNC: 47 MG/DL — SIGNIFICANT CHANGE UP (ref 10–149)
TSH SERPL-MCNC: 0.55 UU/ML — SIGNIFICANT CHANGE UP (ref 0.34–4.82)
VIT B12 SERPL-MCNC: 703 PG/ML — SIGNIFICANT CHANGE UP (ref 232–1245)
WBC # BLD: 5.2 K/UL — SIGNIFICANT CHANGE UP (ref 3.8–10.5)
WBC # BLD: 6 K/UL — SIGNIFICANT CHANGE UP (ref 3.8–10.5)
WBC # FLD AUTO: 5.2 K/UL — SIGNIFICANT CHANGE UP (ref 3.8–10.5)
WBC # FLD AUTO: 6 K/UL — SIGNIFICANT CHANGE UP (ref 3.8–10.5)

## 2018-11-10 PROCEDURE — 93880 EXTRACRANIAL BILAT STUDY: CPT | Mod: 26

## 2018-11-10 RX ORDER — INSULIN LISPRO 100/ML
VIAL (ML) SUBCUTANEOUS
Qty: 0 | Refills: 0 | Status: DISCONTINUED | OUTPATIENT
Start: 2018-11-10 | End: 2018-11-19

## 2018-11-10 RX ORDER — SODIUM CHLORIDE 9 MG/ML
1000 INJECTION INTRAMUSCULAR; INTRAVENOUS; SUBCUTANEOUS
Qty: 0 | Refills: 0 | Status: DISCONTINUED | OUTPATIENT
Start: 2018-11-10 | End: 2018-11-10

## 2018-11-10 RX ORDER — ACETAMINOPHEN 500 MG
650 TABLET ORAL EVERY 6 HOURS
Qty: 0 | Refills: 0 | Status: DISCONTINUED | OUTPATIENT
Start: 2018-11-10 | End: 2018-11-19

## 2018-11-10 RX ORDER — INSULIN LISPRO 100/ML
4 VIAL (ML) SUBCUTANEOUS
Qty: 0 | Refills: 0 | Status: DISCONTINUED | OUTPATIENT
Start: 2018-11-10 | End: 2018-11-18

## 2018-11-10 RX ORDER — INSULIN LISPRO 100/ML
VIAL (ML) SUBCUTANEOUS AT BEDTIME
Qty: 0 | Refills: 0 | Status: DISCONTINUED | OUTPATIENT
Start: 2018-11-10 | End: 2018-11-19

## 2018-11-10 RX ORDER — SODIUM CHLORIDE 9 MG/ML
500 INJECTION INTRAMUSCULAR; INTRAVENOUS; SUBCUTANEOUS ONCE
Qty: 0 | Refills: 0 | Status: DISCONTINUED | OUTPATIENT
Start: 2018-11-10 | End: 2018-11-10

## 2018-11-10 RX ADMIN — Medication 10: at 17:02

## 2018-11-10 RX ADMIN — Medication 81 MILLIGRAM(S): at 12:10

## 2018-11-10 RX ADMIN — HEPARIN SODIUM 800 UNIT(S)/HR: 5000 INJECTION INTRAVENOUS; SUBCUTANEOUS at 23:18

## 2018-11-10 RX ADMIN — SODIUM CHLORIDE 60 MILLILITER(S): 9 INJECTION INTRAMUSCULAR; INTRAVENOUS; SUBCUTANEOUS at 09:32

## 2018-11-10 RX ADMIN — Medication 10 MILLIGRAM(S): at 14:15

## 2018-11-10 RX ADMIN — ATORVASTATIN CALCIUM 40 MILLIGRAM(S): 80 TABLET, FILM COATED ORAL at 22:02

## 2018-11-10 RX ADMIN — Medication 25 MILLIGRAM(S): at 17:02

## 2018-11-10 RX ADMIN — Medication 650 MILLIGRAM(S): at 23:00

## 2018-11-10 RX ADMIN — INSULIN GLARGINE 43 UNIT(S): 100 INJECTION, SOLUTION SUBCUTANEOUS at 22:01

## 2018-11-10 RX ADMIN — HEPARIN SODIUM 1000 UNIT(S)/HR: 5000 INJECTION INTRAVENOUS; SUBCUTANEOUS at 16:00

## 2018-11-10 RX ADMIN — HEPARIN SODIUM 0 UNIT(S)/HR: 5000 INJECTION INTRAVENOUS; SUBCUTANEOUS at 07:10

## 2018-11-10 RX ADMIN — HEPARIN SODIUM 0 UNIT(S)/HR: 5000 INJECTION INTRAVENOUS; SUBCUTANEOUS at 14:53

## 2018-11-10 RX ADMIN — Medication 4: at 12:10

## 2018-11-10 RX ADMIN — Medication 10 MILLIGRAM(S): at 05:33

## 2018-11-10 RX ADMIN — Medication 650 MILLIGRAM(S): at 22:06

## 2018-11-10 RX ADMIN — Medication 25 MILLIGRAM(S): at 05:33

## 2018-11-10 RX ADMIN — HEPARIN SODIUM 1300 UNIT(S)/HR: 5000 INJECTION INTRAVENOUS; SUBCUTANEOUS at 08:26

## 2018-11-10 RX ADMIN — PANTOPRAZOLE SODIUM 40 MILLIGRAM(S): 20 TABLET, DELAYED RELEASE ORAL at 12:09

## 2018-11-10 RX ADMIN — Medication 4 UNIT(S): at 17:03

## 2018-11-10 RX ADMIN — Medication 1: at 08:29

## 2018-11-10 NOTE — CHART NOTE - NSCHARTNOTEFT_GEN_A_CORE
at the time of admission pt's corrected sodium was 128 (Na was 123 with glucose level of 390 ) and now its 137 (Na 136 with glucose of 148), will stop the fluids and repeat BMP in the morning.

## 2018-11-10 NOTE — PHYSICAL THERAPY INITIAL EVALUATION ADULT - ADDITIONAL COMMENTS
Patient lives in a private house with 3 flight of steps, independent in indoor/outdoor ambulation, stairs negotiation, transfers and ADLs w/o device.

## 2018-11-10 NOTE — PROGRESS NOTE ADULT - SUBJECTIVE AND OBJECTIVE BOX
HPI:  Pt is 52 yo M with PMH of HTN, HLD, DM, CABG, s/p stroke 6 days ago at [facility] in [place] at which time he left AMA due to having to deal with rent issues, presents to ED due to worsening weakness. Pt states that at prior hospital admission, he had presented with left sided upper and lower extremity numbness. Pt denies cp, sob, abd pain, fever, chills. Pt admits to nausea and vomiting. Per patient's brother, at baseline patient able to ambulate but noted that patient has been unable to walk unassisted since coming home yesterday night and speech is slurred. (2018 14:06)      Patient is a 53y old  Male who presents with a chief complaint of weakness, slurred speech (10 Nov 2018 11:48)      INTERVAL HPI/OVERNIGHT EVENTS:  T(C): 36.9 (11-10-18 @ 12:11), Max: 36.9 (18 @ 17:10)  HR: 69 (11-10-18 @ 12:11) (67 - 81)  BP: 140/84 (11-10-18 @ 12:11) (101/69 - 154/78)  RR: 17 (11-10-18 @ 12:11) (17 - 18)  SpO2: 100% (11-10-18 @ 12:11) (95% - 100%)  Wt(kg): --  I&O's Summary    2018 07:01  -  10 Nov 2018 07:00  --------------------------------------------------------  IN: 240 mL / OUT: 0 mL / NET: 240 mL        REVIEW OF SYSTEMS: denies fever, chills, SOB, palpitations, chest pain, abdominal pain, nausea, vomitting, diarrhea, constipation, dizziness    MEDICATIONS  (STANDING):  aspirin enteric coated 81 milliGRAM(s) Oral daily  atorvastatin 40 milliGRAM(s) Oral at bedtime  dextrose 50% Injectable 50 milliLiter(s) IV Push once  heparin  Infusion.  Unit(s)/Hr (18 mL/Hr) IV Continuous <Continuous>  insulin glargine Injectable (LANTUS) 43 Unit(s) SubCutaneous at bedtime  insulin lispro (HumaLOG) corrective regimen sliding scale   SubCutaneous three times a day before meals  metoprolol tartrate 25 milliGRAM(s) Oral two times a day  pantoprazole  Injectable 40 milliGRAM(s) IV Push daily    MEDICATIONS  (PRN):  chlorproMAZINE    Tablet 10 milliGRAM(s) Oral every 8 hours PRN hiccups      PHYSICAL EXAM:  GENERAL: NAD, well-groomed, well-developed  HEAD:  Atraumatic, Normocephalic  EYES: EOMI, PERRLA, conjunctiva and sclera clear  ENMT: No tonsillar erythema, exudates, or enlargement; Moist mucous membranes, Good dentition, No lesions  NECK: Supple, No JVD, Normal thyroid  NERVOUS SYSTEM:  Alert & Oriented X3, Good concentration; Motor Strength 5/5 B/L upper and lower extremities; DTRs 2+ intact and symmetric  CHEST/LUNG: Clear to percussion bilaterally; No rales, rhonchi, wheezing, or rubs  HEART: Regular rate and rhythm; No murmurs, rubs, or gallops  ABDOMEN: Soft, Nontender, Nondistended; Bowel sounds present  EXTREMITIES:  2+ Peripheral Pulses, No clubbing, cyanosis, or edema  LYMPH: No lymphadenopathy noted  SKIN: No rashes or lesions  LABS:                        16.4   6.0   )-----------( 312      ( 10 Nov 2018 06:39 )             47.7     11-10    129<L>  |  91<L>  |  48<H>  ----------------------------<  156<H>  4.6   |  30  |  1.74<H>    Ca    9.1      10 Nov 2018 06:39  Phos  4.0     11-10  Mg     2.5     11-10    TPro  8.8<H>  /  Alb  3.6  /  TBili  0.6  /  DBili  x   /  AST  18  /  ALT  24  /  AlkPhos  108  11-09    PT/INR - ( 2018 12:24 )   PT: 12.8 sec;   INR: 1.15 ratio         PTT - ( 10 Nov 2018 06:39 )  PTT:>200.0 sec  Urinalysis Basic - ( 2018 22:19 )    Color: Yellow / Appearance: Clear / S.005 / pH: x  Gluc: x / Ketone: Negative  / Bili: Negative / Urobili: Negative   Blood: x / Protein: 15 / Nitrite: Negative   Leuk Esterase: Negative / RBC: 0-2 /HPF / WBC 0-2 /HPF   Sq Epi: x / Non Sq Epi: x / Bacteria: Trace /HPF      CAPILLARY BLOOD GLUCOSE      POCT Blood Glucose.: 323 mg/dL (10 Nov 2018 11:58)  POCT Blood Glucose.: 181 mg/dL (10 Nov 2018 08:00)  POCT Blood Glucose.: 242 mg/dL (2018 23:25)  POCT Blood Glucose.: 246 mg/dL (2018 21:25)  POCT Blood Glucose.: 266 mg/dL (2018 16:51)        Urinalysis Basic - ( 2018 22:19 )    Color: Yellow / Appearance: Clear / S.005 / pH: x  Gluc: x / Ketone: Negative  / Bili: Negative / Urobili: Negative   Blood: x / Protein: 15 / Nitrite: Negative   Leuk Esterase: Negative / RBC: 0-2 /HPF / WBC 0-2 /HPF   Sq Epi: x / Non Sq Epi: x / Bacteria: Trace /HPF

## 2018-11-10 NOTE — PROGRESS NOTE ADULT - SUBJECTIVE AND OBJECTIVE BOX
no events overnight    No Known Allergies    Hospital Medications:   MEDICATIONS  (STANDING):  aspirin enteric coated 81 milliGRAM(s) Oral daily  atorvastatin 40 milliGRAM(s) Oral at bedtime  dextrose 50% Injectable 50 milliLiter(s) IV Push once  heparin  Infusion.  Unit(s)/Hr (18 mL/Hr) IV Continuous <Continuous>  insulin glargine Injectable (LANTUS) 43 Unit(s) SubCutaneous at bedtime  insulin lispro (HumaLOG) corrective regimen sliding scale   SubCutaneous three times a day before meals  metoprolol tartrate 25 milliGRAM(s) Oral two times a day  pantoprazole  Injectable 40 milliGRAM(s) IV Push daily  sodium chloride 0.9%. 1000 milliLiter(s) (60 mL/Hr) IV Continuous <Continuous>        VITALS:  T(F): 97.2 (11-10-18 @ 08:28), Max: 98.5 (18 @ 17:10)  HR: 67 (11-10-18 @ 08:28)  BP: 122/74 (11-10-18 @ 08:28)  RR: 17 (11-10-18 @ 08:28)  SpO2: 95% (11-10-18 @ 08:28)  Wt(kg): --     @ 07:01  -  11-10 @ 07:00  --------------------------------------------------------  IN: 240 mL / OUT: 0 mL / NET: 240 mL        PHYSICAL EXAM:  Constitutional: NAD  HEENT: anicteric sclera, oropharynx clear.  Neck: No JVD  Respiratory: CTAB, no wheezes, rales or rhonchi  Cardiovascular: S1, S2, RRR  Gastrointestinal: BS+, soft, NT/ND  Extremities:  No peripheral edema  Neurological: no focal deficits  : No CVA tenderness. No mayers.       LABS:  11-10    129<L>  |  91<L>  |  48<H>  ----------------------------<  156<H>  4.6   |  30  |  1.74<H>    Ca    9.1      10 Nov 2018 06:39  Phos  4.0     11-10  Mg     2.5     11-10    TPro  8.8<H>  /  Alb  3.6  /  TBili  0.6  /  DBili      /  AST  18  /  ALT  24  /  AlkPhos  108      Creatinine Trend: 1.74 <--, 1.25 <--, 1.19 <--, 2.40 <--, 2.43 <--                        16.4   6.0   )-----------( 312      ( 10 Nov 2018 06:39 )             47.7     Urine Studies:  Urinalysis Basic - ( 2018 22:19 )    Color: Yellow / Appearance: Clear / S.005 / pH:   Gluc:  / Ketone: Negative  / Bili: Negative / Urobili: Negative   Blood:  / Protein: 15 / Nitrite: Negative   Leuk Esterase: Negative / RBC: 0-2 /HPF / WBC 0-2 /HPF   Sq Epi:  / Non Sq Epi:  / Bacteria: Trace /HPF      Osmolality, Random Urine: 461 mos/kg ( @ 22:21)  Creatinine, Random Urine: 79 mg/dL ( @ 22:20)  Sodium, Random Urine: 66 mmol/L ( @ 22:20)    RADIOLOGY & ADDITIONAL STUDIES:

## 2018-11-10 NOTE — PHYSICAL THERAPY INITIAL EVALUATION ADULT - CRITERIA FOR SKILLED THERAPEUTIC INTERVENTIONS
risk reduction/prevention/anticipated discharge recommendation/rehab potential/impairments found/functional limitations in following categories

## 2018-11-11 DIAGNOSIS — T14.8XXA OTHER INJURY OF UNSPECIFIED BODY REGION, INITIAL ENCOUNTER: ICD-10-CM

## 2018-11-11 LAB
24R-OH-CALCIDIOL SERPL-MCNC: 9.4 NG/ML — LOW (ref 30–80)
ALBUMIN SERPL ELPH-MCNC: 3.3 G/DL — LOW (ref 3.5–5)
ALP SERPL-CCNC: 100 U/L — SIGNIFICANT CHANGE UP (ref 40–120)
ALT FLD-CCNC: 23 U/L DA — SIGNIFICANT CHANGE UP (ref 10–60)
ANION GAP SERPL CALC-SCNC: 7 MMOL/L — SIGNIFICANT CHANGE UP (ref 5–17)
ANION GAP SERPL CALC-SCNC: 7 MMOL/L — SIGNIFICANT CHANGE UP (ref 5–17)
APTT BLD: 87.1 SEC — HIGH (ref 27.5–36.3)
APTT BLD: 88.5 SEC — HIGH (ref 27.5–36.3)
AST SERPL-CCNC: 16 U/L — SIGNIFICANT CHANGE UP (ref 10–40)
BASOPHILS # BLD AUTO: 0.1 K/UL — SIGNIFICANT CHANGE UP (ref 0–0.2)
BASOPHILS NFR BLD AUTO: 2.1 % — HIGH (ref 0–2)
BILIRUB DIRECT SERPL-MCNC: 0.1 MG/DL — SIGNIFICANT CHANGE UP (ref 0–0.2)
BILIRUB INDIRECT FLD-MCNC: 0.3 MG/DL — SIGNIFICANT CHANGE UP (ref 0.2–1)
BILIRUB SERPL-MCNC: 0.4 MG/DL — SIGNIFICANT CHANGE UP (ref 0.2–1.2)
BUN SERPL-MCNC: 41 MG/DL — HIGH (ref 7–18)
BUN SERPL-MCNC: 46 MG/DL — HIGH (ref 7–18)
CALCIUM SERPL-MCNC: 9 MG/DL — SIGNIFICANT CHANGE UP (ref 8.4–10.5)
CALCIUM SERPL-MCNC: 9.4 MG/DL — SIGNIFICANT CHANGE UP (ref 8.4–10.5)
CHLORIDE SERPL-SCNC: 91 MMOL/L — LOW (ref 96–108)
CHLORIDE SERPL-SCNC: 95 MMOL/L — LOW (ref 96–108)
CO2 SERPL-SCNC: 29 MMOL/L — SIGNIFICANT CHANGE UP (ref 22–31)
CO2 SERPL-SCNC: 30 MMOL/L — SIGNIFICANT CHANGE UP (ref 22–31)
CREAT SERPL-MCNC: 1.73 MG/DL — HIGH (ref 0.5–1.3)
CREAT SERPL-MCNC: 1.8 MG/DL — HIGH (ref 0.5–1.3)
CULTURE RESULTS: SIGNIFICANT CHANGE UP
EOSINOPHIL # BLD AUTO: 0.1 K/UL — SIGNIFICANT CHANGE UP (ref 0–0.5)
EOSINOPHIL NFR BLD AUTO: 1.7 % — SIGNIFICANT CHANGE UP (ref 0–6)
GLUCOSE BLDC GLUCOMTR-MCNC: 154 MG/DL — HIGH (ref 70–99)
GLUCOSE BLDC GLUCOMTR-MCNC: 233 MG/DL — HIGH (ref 70–99)
GLUCOSE BLDC GLUCOMTR-MCNC: 240 MG/DL — HIGH (ref 70–99)
GLUCOSE BLDC GLUCOMTR-MCNC: 316 MG/DL — HIGH (ref 70–99)
GLUCOSE SERPL-MCNC: 114 MG/DL — HIGH (ref 70–99)
GLUCOSE SERPL-MCNC: 248 MG/DL — HIGH (ref 70–99)
HCT VFR BLD CALC: 41.4 % — SIGNIFICANT CHANGE UP (ref 39–50)
HCT VFR BLD CALC: 43.6 % — SIGNIFICANT CHANGE UP (ref 39–50)
HCT VFR BLD CALC: 45.9 % — SIGNIFICANT CHANGE UP (ref 39–50)
HGB BLD-MCNC: 14.2 G/DL — SIGNIFICANT CHANGE UP (ref 13–17)
HGB BLD-MCNC: 15 G/DL — SIGNIFICANT CHANGE UP (ref 13–17)
HGB BLD-MCNC: 16.1 G/DL — SIGNIFICANT CHANGE UP (ref 13–17)
INR BLD: 1.11 RATIO — SIGNIFICANT CHANGE UP (ref 0.88–1.16)
LYMPHOCYTES # BLD AUTO: 1.9 K/UL — SIGNIFICANT CHANGE UP (ref 1–3.3)
LYMPHOCYTES # BLD AUTO: 29.2 % — SIGNIFICANT CHANGE UP (ref 13–44)
MAGNESIUM SERPL-MCNC: 2.2 MG/DL — SIGNIFICANT CHANGE UP (ref 1.6–2.6)
MCHC RBC-ENTMCNC: 30.3 PG — SIGNIFICANT CHANGE UP (ref 27–34)
MCHC RBC-ENTMCNC: 30.5 PG — SIGNIFICANT CHANGE UP (ref 27–34)
MCHC RBC-ENTMCNC: 31 PG — SIGNIFICANT CHANGE UP (ref 27–34)
MCHC RBC-ENTMCNC: 34.2 GM/DL — SIGNIFICANT CHANGE UP (ref 32–36)
MCHC RBC-ENTMCNC: 34.4 GM/DL — SIGNIFICANT CHANGE UP (ref 32–36)
MCHC RBC-ENTMCNC: 35.1 GM/DL — SIGNIFICANT CHANGE UP (ref 32–36)
MCV RBC AUTO: 88.3 FL — SIGNIFICANT CHANGE UP (ref 80–100)
MCV RBC AUTO: 88.6 FL — SIGNIFICANT CHANGE UP (ref 80–100)
MCV RBC AUTO: 88.7 FL — SIGNIFICANT CHANGE UP (ref 80–100)
MONOCYTES # BLD AUTO: 0.7 K/UL — SIGNIFICANT CHANGE UP (ref 0–0.9)
MONOCYTES NFR BLD AUTO: 11.6 % — SIGNIFICANT CHANGE UP (ref 2–14)
NEUTROPHILS # BLD AUTO: 3.6 K/UL — SIGNIFICANT CHANGE UP (ref 1.8–7.4)
NEUTROPHILS NFR BLD AUTO: 55.4 % — SIGNIFICANT CHANGE UP (ref 43–77)
PHOSPHATE SERPL-MCNC: 3.6 MG/DL — SIGNIFICANT CHANGE UP (ref 2.5–4.5)
PLATELET # BLD AUTO: 241 K/UL — SIGNIFICANT CHANGE UP (ref 150–400)
PLATELET # BLD AUTO: 278 K/UL — SIGNIFICANT CHANGE UP (ref 150–400)
PLATELET # BLD AUTO: 281 K/UL — SIGNIFICANT CHANGE UP (ref 150–400)
POTASSIUM SERPL-MCNC: 4.4 MMOL/L — SIGNIFICANT CHANGE UP (ref 3.5–5.3)
POTASSIUM SERPL-MCNC: 5 MMOL/L — SIGNIFICANT CHANGE UP (ref 3.5–5.3)
POTASSIUM SERPL-SCNC: 4.4 MMOL/L — SIGNIFICANT CHANGE UP (ref 3.5–5.3)
POTASSIUM SERPL-SCNC: 5 MMOL/L — SIGNIFICANT CHANGE UP (ref 3.5–5.3)
PROT SERPL-MCNC: 7.8 G/DL — SIGNIFICANT CHANGE UP (ref 6–8.3)
PROTHROM AB SERPL-ACNC: 12.4 SEC — SIGNIFICANT CHANGE UP (ref 10–12.9)
RBC # BLD: 4.68 M/UL — SIGNIFICANT CHANGE UP (ref 4.2–5.8)
RBC # BLD: 4.92 M/UL — SIGNIFICANT CHANGE UP (ref 4.2–5.8)
RBC # BLD: 5.2 M/UL — SIGNIFICANT CHANGE UP (ref 4.2–5.8)
RBC # FLD: 11.5 % — SIGNIFICANT CHANGE UP (ref 10.3–14.5)
RBC # FLD: 11.5 % — SIGNIFICANT CHANGE UP (ref 10.3–14.5)
RBC # FLD: 11.6 % — SIGNIFICANT CHANGE UP (ref 10.3–14.5)
SODIUM SERPL-SCNC: 128 MMOL/L — LOW (ref 135–145)
SODIUM SERPL-SCNC: 131 MMOL/L — LOW (ref 135–145)
SPECIMEN SOURCE: SIGNIFICANT CHANGE UP
WBC # BLD: 6.4 K/UL — SIGNIFICANT CHANGE UP (ref 3.8–10.5)
WBC # BLD: 6.4 K/UL — SIGNIFICANT CHANGE UP (ref 3.8–10.5)
WBC # BLD: 7.1 K/UL — SIGNIFICANT CHANGE UP (ref 3.8–10.5)
WBC # FLD AUTO: 6.4 K/UL — SIGNIFICANT CHANGE UP (ref 3.8–10.5)
WBC # FLD AUTO: 6.4 K/UL — SIGNIFICANT CHANGE UP (ref 3.8–10.5)
WBC # FLD AUTO: 7.1 K/UL — SIGNIFICANT CHANGE UP (ref 3.8–10.5)

## 2018-11-11 PROCEDURE — 73700 CT LOWER EXTREMITY W/O DYE: CPT | Mod: 26,LT,RT

## 2018-11-11 PROCEDURE — 76775 US EXAM ABDO BACK WALL LIM: CPT | Mod: 26

## 2018-11-11 PROCEDURE — 71250 CT THORAX DX C-: CPT | Mod: 26

## 2018-11-11 PROCEDURE — 70450 CT HEAD/BRAIN W/O DYE: CPT | Mod: 26

## 2018-11-11 RX ORDER — SODIUM CHLORIDE 9 MG/ML
1000 INJECTION INTRAMUSCULAR; INTRAVENOUS; SUBCUTANEOUS
Qty: 0 | Refills: 0 | Status: DISCONTINUED | OUTPATIENT
Start: 2018-11-11 | End: 2018-11-11

## 2018-11-11 RX ORDER — OXYCODONE AND ACETAMINOPHEN 5; 325 MG/1; MG/1
1 TABLET ORAL EVERY 8 HOURS
Qty: 0 | Refills: 0 | Status: DISCONTINUED | OUTPATIENT
Start: 2018-11-11 | End: 2018-11-15

## 2018-11-11 RX ADMIN — Medication 25 MILLIGRAM(S): at 05:45

## 2018-11-11 RX ADMIN — ATORVASTATIN CALCIUM 40 MILLIGRAM(S): 80 TABLET, FILM COATED ORAL at 22:09

## 2018-11-11 RX ADMIN — HEPARIN SODIUM 800 UNIT(S)/HR: 5000 INJECTION INTRAVENOUS; SUBCUTANEOUS at 06:34

## 2018-11-11 RX ADMIN — Medication 2: at 17:10

## 2018-11-11 RX ADMIN — Medication 10 MILLIGRAM(S): at 22:11

## 2018-11-11 RX ADMIN — Medication 25 MILLIGRAM(S): at 17:14

## 2018-11-11 RX ADMIN — PANTOPRAZOLE SODIUM 40 MILLIGRAM(S): 20 TABLET, DELAYED RELEASE ORAL at 12:04

## 2018-11-11 RX ADMIN — Medication 10 MILLIGRAM(S): at 05:45

## 2018-11-11 RX ADMIN — Medication 8: at 12:04

## 2018-11-11 RX ADMIN — OXYCODONE AND ACETAMINOPHEN 1 TABLET(S): 5; 325 TABLET ORAL at 09:02

## 2018-11-11 RX ADMIN — Medication 4: at 09:02

## 2018-11-11 RX ADMIN — Medication 81 MILLIGRAM(S): at 12:52

## 2018-11-11 RX ADMIN — INSULIN GLARGINE 43 UNIT(S): 100 INJECTION, SOLUTION SUBCUTANEOUS at 22:09

## 2018-11-11 RX ADMIN — OXYCODONE AND ACETAMINOPHEN 1 TABLET(S): 5; 325 TABLET ORAL at 17:10

## 2018-11-11 RX ADMIN — Medication 4 UNIT(S): at 12:52

## 2018-11-11 RX ADMIN — Medication 4 UNIT(S): at 09:02

## 2018-11-11 NOTE — PROGRESS NOTE ADULT - SUBJECTIVE AND OBJECTIVE BOX
PGY 1 Note discussed with supervising resident and primary attending    Patient is a 53y old  Male who presents with a chief complaint of weakness, slurred speech (2018 11:56)      INTERVAL HPI/OVERNIGHT EVENTS:  Patient seen at the bedside. complaining of acute severe right hip pain.     MEDICATIONS  (STANDING):  aspirin enteric coated 81 milliGRAM(s) Oral daily  atorvastatin 40 milliGRAM(s) Oral at bedtime  insulin glargine Injectable (LANTUS) 43 Unit(s) SubCutaneous at bedtime  insulin lispro (HumaLOG) corrective regimen sliding scale   SubCutaneous three times a day before meals  insulin lispro (HumaLOG) corrective regimen sliding scale   SubCutaneous at bedtime  insulin lispro Injectable (HumaLOG) 4 Unit(s) SubCutaneous three times a day before meals  metoprolol tartrate 25 milliGRAM(s) Oral two times a day  pantoprazole  Injectable 40 milliGRAM(s) IV Push daily    MEDICATIONS  (PRN):  acetaminophen   Tablet .. 650 milliGRAM(s) Oral every 6 hours PRN Mild Pain (1 - 3)  chlorproMAZINE    Tablet 10 milliGRAM(s) Oral every 8 hours PRN hiccups  oxyCODONE    5 mG/acetaminophen 325 mG 1 Tablet(s) Oral every 8 hours PRN Severe Pain (7 - 10)      __________________________________________________  REVIEW OF SYSTEMS:    CONSTITUTIONAL: No fever,   EYES: no acute visual disturbances  NECK: No pain or stiffness  RESPIRATORY: No cough; No shortness of breath  CARDIOVASCULAR: No chest pain, no palpitations  GASTROINTESTINAL: No pain. No nausea or vomiting; No diarrhea   NEUROLOGICAL: No headache or numbness, no tremors  MUSCULOSKELETAL: No joint pain, no muscle pain  GENITOURINARY: no dysuria, no frequency, no hesitancy  PSYCHIATRY: no depression , no anxiety  ALL OTHER  ROS negative        Vital Signs Last 24 Hrs  T(C): 36.4 (2018 08:36), Max: 37.1 (2018 05:24)  T(F): 97.5 (2018 08:36), Max: 98.8 (2018 05:24)  HR: 66 (2018 09:46) (66 - 81)  BP: 136/82 (2018 09:46) (123/79 - 159/71)  BP(mean): --  RR: 17 (2018 09:46) (17 - 19)  SpO2: 100% (2018 09:46) (96% - 100%)    ________________________________________________  PHYSICAL EXAM:  GENERAL: NAD  HEENT: Normocephalic;  conjunctivae and sclerae clear; moist mucous membranes;   NECK : supple  CHEST/LUNG: Clear to auscultation bilaterally with good air entry   HEART: S1 S2  regular; no murmurs, gallops or rubs  ABDOMEN: Soft, Nontender, Nondistended; Bowel sounds present  EXTREMITIES: no cyanosis; no edema; no calf tenderness, tender lump on right hip   SKIN: warm and dry; no rash  NERVOUS SYSTEM:  Awake and alert; Oriented  to place, person and time ; no new deficits    _________________________________________________  LABS:                        16.1   6.4   )-----------( 278      ( 2018 09:43 )             45.9         128<L>  |  91<L>  |  46<H>  ----------------------------<  248<H>  4.4   |  30  |  1.73<H>    Ca    9.0      2018 05:49  Phos  3.6       Mg     2.2         TPro  7.8  /  Alb  3.3<L>  /  TBili  0.4  /  DBili  0.1  /  AST  16  /  ALT  23  /  AlkPhos  100      PT/INR - ( 2018 09:43 )   PT: 12.4 sec;   INR: 1.11 ratio         PTT - ( 2018 09:43 )  PTT:87.1 sec  Urinalysis Basic - ( 2018 22:19 )    Color: Yellow / Appearance: Clear / S.005 / pH: x  Gluc: x / Ketone: Negative  / Bili: Negative / Urobili: Negative   Blood: x / Protein: 15 / Nitrite: Negative   Leuk Esterase: Negative / RBC: 0-2 /HPF / WBC 0-2 /HPF   Sq Epi: x / Non Sq Epi: x / Bacteria: Trace /HPF      CAPILLARY BLOOD GLUCOSE      POCT Blood Glucose.: 316 mg/dL (2018 11:47)  POCT Blood Glucose.: 233 mg/dL (2018 08:06)  POCT Blood Glucose.: 203 mg/dL (10 Nov 2018 21:17)  POCT Blood Glucose.: 397 mg/dL (10 Nov 2018 16:32)        RADIOLOGY & ADDITIONAL TESTS:    Imaging Personally Reviewed:  YES/NO    Consultant(s) Notes Reviewed:   YES/ No    Care Discussed with Consultants :     Plan of care was discussed with patient and /or primary care giver; all questions and concerns were addressed and care was aligned with patient's wishes.

## 2018-11-11 NOTE — CONSULT NOTE ADULT - PROBLEM SELECTOR RECOMMENDATION 9
53yMale with Right gluteus miguel intramuscular hematoma.  - Pain control  - PT-WBAT to RLE  - Orthopedically stable  - Follow-Up with Dr. Mooney in at [phone]

## 2018-11-11 NOTE — PROGRESS NOTE ADULT - PROBLEM SELECTOR PLAN 4
c/o right hip pain  CT hip: intramuscular hematoma  Held heparin drip  CBC repeated: stable HB  Ortho consulted  monitor size of hematoma

## 2018-11-11 NOTE — CONSULT NOTE ADULT - SUBJECTIVE AND OBJECTIVE BOX
M[facility] EPR[facility]  MRN-[id number]    Diagnosis:  R Hip hematoma    Pt is 52 yo M with PMH of HTN, HLD, DM, CABG, s/p stroke 6 days ago at [facility] in [place] at which time he left AMA due to having to deal with rent issues, presents to ED due to worsening weakness. Pt states that at prior hospital admission, he had presented with left sided upper and lower extremity numbness. Pt denies cp, sob, abd pain, fever, chills. Pt admits to nausea and vomiting. Per patient's brother, at baseline patient able to ambulate but noted that patient has been unable to walk unassisted since coming home yesterday night and speech is slurred.    Pt was found to have a new stroke upon admission to [facility]. Today morning pt felt a "swelling and pain" over the right buttock. Pt denies any trauma/falls. Denies any hip pain or pain with Hip motion. Prior to the stroke pt was ambulating independently. Pt denies any numbness/tingling to the R Hip or distally.   Pain is controlled- comes on only when laying on that side.   Denies CP/SOB, dyspnea,N/V/D, palpitations.     Vital Signs Last 24 Hrs  T(C): 36.4 (2018 08:36), Max: 37.1 (2018 05:24)  T(F): 97.5 (2018 08:36), Max: 98.8 (2018 05:24)  HR: 66 (2018 09:46) (66 - 81)  BP: 136/82 (2018 09:46) (123/79 - 159/71)  BP(mean): --  RR: 17 (2018 09:46) (17 - 19)  SpO2: 100% (2018 09:46) (96% - 100%)  I&O's Detail    10 Nov 2018 07:01  -  2018 07:00  --------------------------------------------------------  IN:  Total IN: 0 mL    OUT:    Voided: 500 mL  Total OUT: 500 mL    Total NET: -500 mL          Physical Exam:    General: AAOx3, NAD, resting comfortably in bed.    R Hip/buttock: (+) demarcated swelling noted over R buttock region, Skin intact, (+) TTP,  Skin is pink and warm. Nearly FAROM of the R hip without pain. Able to SLR without pain. Compartments soft/compressible.   Lower extremity:  No calf tenderness, calves are soft. 2+pulses. NVI. (+) EHL/FHL/ADF/APF.  Good capillary refill. Warm, well-perfused.                           16.1   6.4   )-----------( 278      ( 2018 09:43 )             45.9         128<L>  |  91<L>  |  46<H>  ----------------------------<  248<H>  4.4   |  30  |  1.73<H>    Ca    9.0      2018 05:49  Phos  3.6       Mg     2.2         TPro  7.8  /  Alb  3.3<L>  /  TBili  0.4  /  DBili  0.1  /  AST  16  /  ALT  23  /  AlkPhos  100      PT/INR - ( 2018 09:43 )   PT: 12.4 sec;   INR: 1.11 ratio         PTT - ( 2018 09:43 )  PTT:87.1 sec  Urinalysis Basic - ( 2018 22:19 )    Color: Yellow / Appearance: Clear / S.005 / pH: x  Gluc: x / Ketone: Negative  / Bili: Negative / Urobili: Negative   Blood: x / Protein: 15 / Nitrite: Negative   Leuk Esterase: Negative / RBC: 0-2 /HPF / WBC 0-2 /HPF   Sq Epi: x / Non Sq Epi: x / Bacteria: Trace /HPF    CT of B/L Hip: Right gluteus miguel intramuscular hematoma.        Impression:  53yMale with Right gluteus miguel intramuscular hematoma.    Plan:  -  D/w - Stable hip- Hematoma will resorb into the tissue, continue with pain control and medical care  -  Anticoagulation as per Medical team  -  WBAT to RLE  - Follow-Up with Dr. Mooney at [phone]

## 2018-11-11 NOTE — PROGRESS NOTE ADULT - PROBLEM SELECTOR PLAN 1
c/w aspirin, statin, thorazine  c/w telemetry monitoring  D/c heparin drip as patient has developed right Intramuscular hematoma.   f/u TTE with bubble study  f/u neurology   CBC repeat: Hb stable   Ortho consulted for the hematoma  CT head : negative for hemorraghic stroke

## 2018-11-11 NOTE — PROGRESS NOTE ADULT - SUBJECTIVE AND OBJECTIVE BOX
HPI:  Pt is 52 yo M with PMH of HTN, HLD, DM, CABG, s/p stroke 6 days ago at [facility] in [place] at which time he left AMA due to having to deal with rent issues, presents to ED due to worsening weakness. Pt states that at prior hospital admission, he had presented with left sided upper and lower extremity numbness. Pt denies cp, sob, abd pain, fever, chills. Pt admits to nausea and vomiting. Per patient's brother, at baseline patient able to ambulate but noted that patient has been unable to walk unassisted since coming home yesterday night and speech is slurred. (2018 14:06)      Patient is a 53y old  Male who presents with a chief complaint of weakness, slurred speech (10 Nov 2018 13:51)      INTERVAL HPI/OVERNIGHT EVENTS:  T(C): 36.4 (18 @ 08:36), Max: 37.1 (18 @ 05:24)  HR: 66 (18 @ 09:46) (66 - 81)  BP: 136/82 (18 @ 09:46) (123/79 - 159/71)  RR: 17 (18 @ 09:46) (17 - 19)  SpO2: 100% (18 @ 09:46) (96% - 100%)  Wt(kg): --  I&O's Summary    10 Nov 2018 07:01  -  2018 07:00  --------------------------------------------------------  IN: 0 mL / OUT: 500 mL / NET: -500 mL        REVIEW OF SYSTEMS: denies fever, chills, SOB, palpitations, chest pain, abdominal pain, nausea, vomitting, diarrhea, constipation, dizziness    MEDICATIONS  (STANDING):  aspirin enteric coated 81 milliGRAM(s) Oral daily  atorvastatin 40 milliGRAM(s) Oral at bedtime  insulin glargine Injectable (LANTUS) 43 Unit(s) SubCutaneous at bedtime  insulin lispro (HumaLOG) corrective regimen sliding scale   SubCutaneous three times a day before meals  insulin lispro (HumaLOG) corrective regimen sliding scale   SubCutaneous at bedtime  insulin lispro Injectable (HumaLOG) 4 Unit(s) SubCutaneous three times a day before meals  metoprolol tartrate 25 milliGRAM(s) Oral two times a day  pantoprazole  Injectable 40 milliGRAM(s) IV Push daily    MEDICATIONS  (PRN):  acetaminophen   Tablet .. 650 milliGRAM(s) Oral every 6 hours PRN Mild Pain (1 - 3)  chlorproMAZINE    Tablet 10 milliGRAM(s) Oral every 8 hours PRN hiccups  oxyCODONE    5 mG/acetaminophen 325 mG 1 Tablet(s) Oral every 8 hours PRN Severe Pain (7 - 10)      PHYSICAL EXAM:  GENERAL: NAD, well-groomed, well-developed  HEAD:  Atraumatic, Normocephalic  EYES: EOMI, PERRLA, conjunctiva and sclera clear  ENMT: No tonsillar erythema, exudates, or enlargement; Moist mucous membranes, Good dentition, No lesions  NECK: Supple, No JVD, Normal thyroid  NERVOUS SYSTEM:  Alert & Oriented X3, Good concentration; Motor Strength 5/5 B/L upper and lower extremities; DTRs 2+ intact and symmetric  CHEST/LUNG: Clear to percussion bilaterally; No rales, rhonchi, wheezing, or rubs  HEART: Regular rate and rhythm; No murmurs, rubs, or gallops  ABDOMEN: Soft, Nontender, Nondistended; Bowel sounds present  EXTREMITIES:  2+ Peripheral Pulses, No clubbing, cyanosis, or edema  LYMPH: No lymphadenopathy noted  SKIN: No rashes or lesions  LABS:                        16.1   6.4   )-----------( 278      ( 2018 09:43 )             45.9         128<L>  |  91<L>  |  46<H>  ----------------------------<  248<H>  4.4   |  30  |  1.73<H>    Ca    9.0      2018 05:49  Phos  3.6       Mg     2.2         TPro  7.8  /  Alb  3.3<L>  /  TBili  0.4  /  DBili  0.1  /  AST  16  /  ALT  23  /  AlkPhos  100  11-11    PT/INR - ( 2018 09:43 )   PT: 12.4 sec;   INR: 1.11 ratio         PTT - ( 2018 09:43 )  PTT:87.1 sec  Urinalysis Basic - ( 2018 22:19 )    Color: Yellow / Appearance: Clear / S.005 / pH: x  Gluc: x / Ketone: Negative  / Bili: Negative / Urobili: Negative   Blood: x / Protein: 15 / Nitrite: Negative   Leuk Esterase: Negative / RBC: 0-2 /HPF / WBC 0-2 /HPF   Sq Epi: x / Non Sq Epi: x / Bacteria: Trace /HPF      CAPILLARY BLOOD GLUCOSE      POCT Blood Glucose.: 316 mg/dL (2018 11:47)  POCT Blood Glucose.: 233 mg/dL (2018 08:06)  POCT Blood Glucose.: 203 mg/dL (10 Nov 2018 21:17)  POCT Blood Glucose.: 397 mg/dL (10 Nov 2018 16:32)  POCT Blood Glucose.: 323 mg/dL (10 Nov 2018 11:58)        Urinalysis Basic - ( 2018 22:19 )    Color: Yellow / Appearance: Clear / S.005 / pH: x  Gluc: x / Ketone: Negative  / Bili: Negative / Urobili: Negative   Blood: x / Protein: 15 / Nitrite: Negative   Leuk Esterase: Negative / RBC: 0-2 /HPF / WBC 0-2 /HPF   Sq Epi: x / Non Sq Epi: x / Bacteria: Trace /HPF

## 2018-11-12 DIAGNOSIS — I63.9 CEREBRAL INFARCTION, UNSPECIFIED: ICD-10-CM

## 2018-11-12 DIAGNOSIS — I25.10 ATHEROSCLEROTIC HEART DISEASE OF NATIVE CORONARY ARTERY WITHOUT ANGINA PECTORIS: ICD-10-CM

## 2018-11-12 LAB
ALBUMIN SERPL ELPH-MCNC: 3.6 G/DL — SIGNIFICANT CHANGE UP (ref 3.5–5)
ALP SERPL-CCNC: 97 U/L — SIGNIFICANT CHANGE UP (ref 40–120)
ALT FLD-CCNC: 22 U/L DA — SIGNIFICANT CHANGE UP (ref 10–60)
ANION GAP SERPL CALC-SCNC: 8 MMOL/L — SIGNIFICANT CHANGE UP (ref 5–17)
AST SERPL-CCNC: 16 U/L — SIGNIFICANT CHANGE UP (ref 10–40)
BASOPHILS # BLD AUTO: 0.1 K/UL — SIGNIFICANT CHANGE UP (ref 0–0.2)
BASOPHILS NFR BLD AUTO: 1.2 % — SIGNIFICANT CHANGE UP (ref 0–2)
BILIRUB SERPL-MCNC: 0.4 MG/DL — SIGNIFICANT CHANGE UP (ref 0.2–1.2)
BUN SERPL-MCNC: 36 MG/DL — HIGH (ref 7–18)
CALCIUM SERPL-MCNC: 9.5 MG/DL — SIGNIFICANT CHANGE UP (ref 8.4–10.5)
CHLORIDE SERPL-SCNC: 93 MMOL/L — LOW (ref 96–108)
CO2 SERPL-SCNC: 28 MMOL/L — SIGNIFICANT CHANGE UP (ref 22–31)
CREAT SERPL-MCNC: 1.53 MG/DL — HIGH (ref 0.5–1.3)
EOSINOPHIL # BLD AUTO: 0.2 K/UL — SIGNIFICANT CHANGE UP (ref 0–0.5)
EOSINOPHIL NFR BLD AUTO: 2.6 % — SIGNIFICANT CHANGE UP (ref 0–6)
GLUCOSE BLDC GLUCOMTR-MCNC: 163 MG/DL — HIGH (ref 70–99)
GLUCOSE BLDC GLUCOMTR-MCNC: 190 MG/DL — HIGH (ref 70–99)
GLUCOSE BLDC GLUCOMTR-MCNC: 215 MG/DL — HIGH (ref 70–99)
GLUCOSE BLDC GLUCOMTR-MCNC: 361 MG/DL — HIGH (ref 70–99)
GLUCOSE SERPL-MCNC: 181 MG/DL — HIGH (ref 70–99)
HBA1C BLD-MCNC: 8 % — HIGH (ref 4–5.6)
HCT VFR BLD CALC: 41.9 % — SIGNIFICANT CHANGE UP (ref 39–50)
HCT VFR BLD CALC: 42 % — SIGNIFICANT CHANGE UP (ref 39–50)
HGB BLD-MCNC: 14.6 G/DL — SIGNIFICANT CHANGE UP (ref 13–17)
HGB BLD-MCNC: 14.6 G/DL — SIGNIFICANT CHANGE UP (ref 13–17)
LYMPHOCYTES # BLD AUTO: 2.1 K/UL — SIGNIFICANT CHANGE UP (ref 1–3.3)
LYMPHOCYTES # BLD AUTO: 32 % — SIGNIFICANT CHANGE UP (ref 13–44)
MAGNESIUM SERPL-MCNC: 2.1 MG/DL — SIGNIFICANT CHANGE UP (ref 1.6–2.6)
MCHC RBC-ENTMCNC: 30.6 PG — SIGNIFICANT CHANGE UP (ref 27–34)
MCHC RBC-ENTMCNC: 30.8 PG — SIGNIFICANT CHANGE UP (ref 27–34)
MCHC RBC-ENTMCNC: 34.8 GM/DL — SIGNIFICANT CHANGE UP (ref 32–36)
MCHC RBC-ENTMCNC: 35 GM/DL — SIGNIFICANT CHANGE UP (ref 32–36)
MCV RBC AUTO: 88 FL — SIGNIFICANT CHANGE UP (ref 80–100)
MCV RBC AUTO: 88.2 FL — SIGNIFICANT CHANGE UP (ref 80–100)
MONOCYTES # BLD AUTO: 0.8 K/UL — SIGNIFICANT CHANGE UP (ref 0–0.9)
MONOCYTES NFR BLD AUTO: 11.3 % — SIGNIFICANT CHANGE UP (ref 2–14)
NEUTROPHILS # BLD AUTO: 3.5 K/UL — SIGNIFICANT CHANGE UP (ref 1.8–7.4)
NEUTROPHILS NFR BLD AUTO: 52.9 % — SIGNIFICANT CHANGE UP (ref 43–77)
PHOSPHATE SERPL-MCNC: 3.3 MG/DL — SIGNIFICANT CHANGE UP (ref 2.5–4.5)
PLATELET # BLD AUTO: 250 K/UL — SIGNIFICANT CHANGE UP (ref 150–400)
PLATELET # BLD AUTO: 264 K/UL — SIGNIFICANT CHANGE UP (ref 150–400)
POTASSIUM SERPL-MCNC: 4.4 MMOL/L — SIGNIFICANT CHANGE UP (ref 3.5–5.3)
POTASSIUM SERPL-SCNC: 4.4 MMOL/L — SIGNIFICANT CHANGE UP (ref 3.5–5.3)
PROT SERPL-MCNC: 8.3 G/DL — SIGNIFICANT CHANGE UP (ref 6–8.3)
RBC # BLD: 4.76 M/UL — SIGNIFICANT CHANGE UP (ref 4.2–5.8)
RBC # BLD: 4.77 M/UL — SIGNIFICANT CHANGE UP (ref 4.2–5.8)
RBC # FLD: 11.2 % — SIGNIFICANT CHANGE UP (ref 10.3–14.5)
RBC # FLD: 11.4 % — SIGNIFICANT CHANGE UP (ref 10.3–14.5)
SODIUM SERPL-SCNC: 129 MMOL/L — LOW (ref 135–145)
WBC # BLD: 6.7 K/UL — SIGNIFICANT CHANGE UP (ref 3.8–10.5)
WBC # BLD: 8 K/UL — SIGNIFICANT CHANGE UP (ref 3.8–10.5)
WBC # FLD AUTO: 6.7 K/UL — SIGNIFICANT CHANGE UP (ref 3.8–10.5)
WBC # FLD AUTO: 8 K/UL — SIGNIFICANT CHANGE UP (ref 3.8–10.5)

## 2018-11-12 PROCEDURE — 99232 SBSQ HOSP IP/OBS MODERATE 35: CPT

## 2018-11-12 RX ORDER — PANTOPRAZOLE SODIUM 20 MG/1
40 TABLET, DELAYED RELEASE ORAL
Qty: 0 | Refills: 0 | Status: DISCONTINUED | OUTPATIENT
Start: 2018-11-12 | End: 2018-11-19

## 2018-11-12 RX ADMIN — Medication 4 UNIT(S): at 12:09

## 2018-11-12 RX ADMIN — OXYCODONE AND ACETAMINOPHEN 1 TABLET(S): 5; 325 TABLET ORAL at 21:51

## 2018-11-12 RX ADMIN — Medication 4 UNIT(S): at 08:21

## 2018-11-12 RX ADMIN — Medication 2: at 08:20

## 2018-11-12 RX ADMIN — OXYCODONE AND ACETAMINOPHEN 1 TABLET(S): 5; 325 TABLET ORAL at 22:20

## 2018-11-12 RX ADMIN — Medication 25 MILLIGRAM(S): at 17:18

## 2018-11-12 RX ADMIN — Medication 25 MILLIGRAM(S): at 05:47

## 2018-11-12 RX ADMIN — Medication 4 UNIT(S): at 17:18

## 2018-11-12 RX ADMIN — Medication 10: at 17:17

## 2018-11-12 RX ADMIN — Medication 81 MILLIGRAM(S): at 11:27

## 2018-11-12 RX ADMIN — Medication 2: at 12:09

## 2018-11-12 RX ADMIN — INSULIN GLARGINE 43 UNIT(S): 100 INJECTION, SOLUTION SUBCUTANEOUS at 21:44

## 2018-11-12 RX ADMIN — ATORVASTATIN CALCIUM 40 MILLIGRAM(S): 80 TABLET, FILM COATED ORAL at 21:45

## 2018-11-12 NOTE — CONSULT NOTE ADULT - PROBLEM SELECTOR RECOMMENDATION 9
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  concerns of PFO but could also be atherosclerotic disease    EKG NSR 83 w RBBB  no sings of arrythmia on telemetry   will get echo and if needed will do ABRAHAN

## 2018-11-12 NOTE — CONSULT NOTE ADULT - SUBJECTIVE AND OBJECTIVE BOX
HPI:  Pt is 52 yo M with PMH of HTN, HLD, DM, CABG, s/p stroke 6 days ago at [facility] in [place] at which time he left AMA due to having to deal with rent issues, presents to ED due to worsening weakness. Pt states that at prior hospital admission, he had presented with left sided upper and lower extremity numbness. Pt denies cp, sob, abd pain, fever, chills. Pt admits to nausea and vomiting. 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    MEDICATIONS  (STANDING):  aspirin enteric coated 81 milliGRAM(s) Oral daily  atorvastatin 40 milliGRAM(s) Oral at bedtime  insulin glargine Injectable (LANTUS) 43 Unit(s) SubCutaneous at bedtime  insulin lispro (HumaLOG) corrective regimen sliding scale   SubCutaneous three times a day before meals  insulin lispro (HumaLOG) corrective regimen sliding scale   SubCutaneous at bedtime  insulin lispro Injectable (HumaLOG) 4 Unit(s) SubCutaneous three times a day before meals  metoprolol tartrate 25 milliGRAM(s) Oral two times a day  pantoprazole    Tablet 40 milliGRAM(s) Oral before breakfast    MEDICATIONS  (PRN):  acetaminophen   Tablet .. 650 milliGRAM(s) Oral every 6 hours PRN Mild Pain (1 - 3)  chlorproMAZINE    Tablet 10 milliGRAM(s) Oral every 8 hours PRN hiccups  oxyCODONE    5 mG/acetaminophen 325 mG 1 Tablet(s) Oral every 8 hours PRN Severe Pain (7 - 10)    PAST MEDICAL & SURGICAL HISTORY:  Cerebrovascular accident (CVA), unspecified mechanism  Hyperlipidemia, unspecified hyperlipidemia type  Coronary artery disease involving native heart without angina pectoris, unspecified vessel or lesion type  Hypertension, unspecified type  Diabetes mellitus of other type without complication  History of appendectomy  S/P CABG x 1    SOCIAL HISTORY:  Smoker:  2.5 PPD  ETOH use:  NO                Ilicit Drug use:  nilson       REVIEW OF SYSTEMS:    CONSTITUTIONAL: No weakness, fevers or chills  RESPIRATORY: No cough, wheezing, hemoptysis; No shortness of breath  CARDIOVASCULAR: No chest pain or palpitations  GASTROINTESTINAL: No abdominal or epigastric pain. No nausea, vomiting, or hematemesis; No diarrhea or constipation. No melena or hematochezia.  NEUROLOGICAL: right sided weakness       All other review of systems is negative unless indicated above.    Vital Signs Last 24 Hrs  T(C): 36.6 (12 Nov 2018 11:27), Max: 36.6 (12 Nov 2018 00:51)  T(F): 97.9 (12 Nov 2018 11:27), Max: 97.9 (12 Nov 2018 11:27)  HR: 81 (12 Nov 2018 12:39) (66 - 81)  BP: 136/88 (12 Nov 2018 12:39) (120/81 - 136/88)  BP(mean): --  RR: 18 (12 Nov 2018 11:27) (18 - 19)  SpO2: 100% (12 Nov 2018 12:39) (97% - 100%)    General:  No acute Distress  Neck: Supple, NO JVD  Cardiac: S1 S2, No M/R/G  Pulmonary: CTAB, Breathing unlabored, No Rhonchi/Rales/Wheezing  Abdomen: Soft, Non -tender, +BS x 4 quads  Extremities: No Rashes, No edema  Neuro: AAO x 3, No focal deficits        Telemetry: NSR  EKG: NSR @ 83 w/ RBBB  CHADSVAS: N/A                          14.6   6.7   )-----------( 250      ( 12 Nov 2018 05:47 )             41.9     11-12    129<L>  |  93<L>  |  36<H>  ----------------------------<  181<H>  4.4   |  28  |  1.53<H>    Ca    9.5      12 Nov 2018 05:47  Phos  3.3     11-12  Mg     2.1     11-12    TPro  8.3  /  Alb  3.6  /  TBili  0.4  /  DBili  x   /  AST  16  /  ALT  22  /  AlkPhos  97  11-12

## 2018-11-12 NOTE — SPEECH LANGUAGE PATHOLOGY EVALUATION - SLP PERTINENT HISTORY OF CURRENT PROBLEM
54 yo M with PMH of HTN, HLD, DM, CABG, s/p stroke 6 days ago at [facility] in [place] at which time he left AMA due to having to deal with rent issues, presents to ED due to worsening weakness. Pt states that at prior hospital admission, he had presented with left sided upper and lower extremity numbness. Pt denies cp, sob, abd pain, fever, chills. Pt admits to nausea and vomiting. Per patient's brother, at baseline patient able to ambulate but noted that patient has been unable to walk unassisted since coming home yesterday night and speech is slurred.

## 2018-11-12 NOTE — PROGRESS NOTE ADULT - PROBLEM SELECTOR PLAN 1
c/w aspirin, statin, thorazine  c/w telemetry monitoring: non significant   f/u TTE with bubble study  f/u neurology : for AC, DC heparin drip after patient had hematoma and for the risk of hemorrhagic stroke with elevated PTT   Repeat CT head : negative for hemorraghic stroke  Cardio consulted   Pt had MRI and MRA at [facility], report in chart.

## 2018-11-12 NOTE — PROGRESS NOTE ADULT - SUBJECTIVE AND OBJECTIVE BOX
HPI:  Pt is 54 yo M with PMH of HTN, HLD, DM, CABG, s/p stroke 6 days ago at [facility] in [place] at which time he left AMA due to having to deal with rent issues, presents to ED due to worsening weakness. Pt states that at prior hospital admission, he had presented with left sided upper and lower extremity numbness. Pt denies cp, sob, abd pain, fever, chills. Pt admits to nausea and vomiting. Per patient's brother, at baseline patient able to ambulate but noted that patient has been unable to walk unassisted since coming home yesterday night and speech is slurred. (09 Nov 2018 14:06)      Patient is a 53y old  Male who presents with a chief complaint of weakness, slurred speech (11 Nov 2018 12:57)      INTERVAL HPI/OVERNIGHT EVENTS:  T(C): 36.6 (11-12-18 @ 04:35), Max: 36.6 (11-11-18 @ 13:25)  HR: 79 (11-12-18 @ 04:35) (66 - 81)  BP: 123/80 (11-12-18 @ 04:35) (121/81 - 135/87)  RR: 18 (11-12-18 @ 04:35) (18 - 19)  SpO2: 100% (11-12-18 @ 04:35) (97% - 100%)  Wt(kg): --  I&O's Summary    11 Nov 2018 07:01  -  12 Nov 2018 07:00  --------------------------------------------------------  IN: 800 mL / OUT: 700 mL / NET: 100 mL        REVIEW OF SYSTEMS: denies fever, chills, SOB, palpitations, chest pain, abdominal pain, nausea, vomitting, diarrhea, constipation, dizziness    MEDICATIONS  (STANDING):  aspirin enteric coated 81 milliGRAM(s) Oral daily  atorvastatin 40 milliGRAM(s) Oral at bedtime  insulin glargine Injectable (LANTUS) 43 Unit(s) SubCutaneous at bedtime  insulin lispro (HumaLOG) corrective regimen sliding scale   SubCutaneous three times a day before meals  insulin lispro (HumaLOG) corrective regimen sliding scale   SubCutaneous at bedtime  insulin lispro Injectable (HumaLOG) 4 Unit(s) SubCutaneous three times a day before meals  metoprolol tartrate 25 milliGRAM(s) Oral two times a day  pantoprazole    Tablet 40 milliGRAM(s) Oral before breakfast    MEDICATIONS  (PRN):  acetaminophen   Tablet .. 650 milliGRAM(s) Oral every 6 hours PRN Mild Pain (1 - 3)  chlorproMAZINE    Tablet 10 milliGRAM(s) Oral every 8 hours PRN hiccups  oxyCODONE    5 mG/acetaminophen 325 mG 1 Tablet(s) Oral every 8 hours PRN Severe Pain (7 - 10)      PHYSICAL EXAM:  GENERAL: NAD, well-groomed, well-developed  HEAD:  Atraumatic, Normocephalic  EYES: EOMI, PERRLA, conjunctiva and sclera clear  ENMT: No tonsillar erythema, exudates, or enlargement; Moist mucous membranes, Good dentition, No lesions  NECK: Supple, No JVD, Normal thyroid  NERVOUS SYSTEM:  Alert & Oriented X3, Good concentration; Motor Strength 5/5 B/L upper and lower extremities; DTRs 2+ intact and symmetric  CHEST/LUNG: Clear to percussion bilaterally; No rales, rhonchi, wheezing, or rubs  HEART: Regular rate and rhythm; No murmurs, rubs, or gallops  ABDOMEN: Soft, Nontender, Nondistended; Bowel sounds present  EXTREMITIES:  2+ Peripheral Pulses, No clubbing, cyanosis, or edema  LYMPH: No lymphadenopathy noted  SKIN: No rashes or lesions  LABS:                        14.6   6.7   )-----------( 250      ( 12 Nov 2018 05:47 )             41.9     11-12    129<L>  |  93<L>  |  36<H>  ----------------------------<  181<H>  4.4   |  28  |  1.53<H>    Ca    9.5      12 Nov 2018 05:47  Phos  3.3     11-12  Mg     2.1     11-12    TPro  8.3  /  Alb  3.6  /  TBili  0.4  /  DBili  x   /  AST  16  /  ALT  22  /  AlkPhos  97  11-12    PT/INR - ( 11 Nov 2018 09:43 )   PT: 12.4 sec;   INR: 1.11 ratio         PTT - ( 11 Nov 2018 09:43 )  PTT:87.1 sec    CAPILLARY BLOOD GLUCOSE      POCT Blood Glucose.: 163 mg/dL (12 Nov 2018 08:17)  POCT Blood Glucose.: 240 mg/dL (11 Nov 2018 21:23)  POCT Blood Glucose.: 154 mg/dL (11 Nov 2018 16:47)  POCT Blood Glucose.: 316 mg/dL (11 Nov 2018 11:47)

## 2018-11-12 NOTE — PROGRESS NOTE ADULT - SUBJECTIVE AND OBJECTIVE BOX
No events overnight.    No Known Allergies    Hospital Medications:   MEDICATIONS  (STANDING):  aspirin enteric coated 81 milliGRAM(s) Oral daily  atorvastatin 40 milliGRAM(s) Oral at bedtime  insulin glargine Injectable (LANTUS) 43 Unit(s) SubCutaneous at bedtime  insulin lispro (HumaLOG) corrective regimen sliding scale   SubCutaneous three times a day before meals  insulin lispro (HumaLOG) corrective regimen sliding scale   SubCutaneous at bedtime  insulin lispro Injectable (HumaLOG) 4 Unit(s) SubCutaneous three times a day before meals  metoprolol tartrate 25 milliGRAM(s) Oral two times a day  pantoprazole    Tablet 40 milliGRAM(s) Oral before breakfast      VITALS:  T(F): 97.8 (18 @ 14:13), Max: 97.9 (18 @ 11:27)  HR: 92 (18 @ 14:13)  BP: 129/78 (18 @ 14:13)  RR: 18 (18 @ 14:13)  SpO2: 98% (18 @ 14:13)  Wt(kg): --     @ 07:01  -   @ 07:00  --------------------------------------------------------  IN: 800 mL / OUT: 700 mL / NET: 100 mL        PHYSICAL EXAM:  Constitutional: NAD  HEENT: anicteric sclera, oropharynx clear.  Neck: No JVD  Respiratory: CTAB, no wheezes, rales or rhonchi  Cardiovascular: S1, S2, RRR  Gastrointestinal: BS+, soft, NT/ND  Extremities: No peripheral edema  Neurological: A/O x 3, no focal deficits  Psychiatric: Normal mood, normal affect  : No CVA tenderness. No mayers.       LABS:      129<L>  |  93<L>  |  36<H>  ----------------------------<  181<H>  4.4   |  28  |  1.53<H>    Ca    9.5      2018 05:47  Phos  3.3     1112  Mg     2.1         TPro  8.3  /  Alb  3.6  /  TBili  0.4  /  DBili      /  AST  16  /  ALT  22  /  AlkPhos  97      Creatinine Trend: 1.53 <--, 1.80 <--, 1.73 <--, 1.93 <--, 1.91 <--, 1.74 <--, 1.25 <--, 1.19 <--, 2.40 <--, 2.43 <--                        14.6   6.7   )-----------( 250      ( 2018 05:47 )             41.9     Urine Studies:  Urinalysis Basic - ( 2018 22:19 )    Color: Yellow / Appearance: Clear / S.005 / pH:   Gluc:  / Ketone: Negative  / Bili: Negative / Urobili: Negative   Blood:  / Protein: 15 / Nitrite: Negative   Leuk Esterase: Negative / RBC: 0-2 /HPF / WBC 0-2 /HPF   Sq Epi:  / Non Sq Epi:  / Bacteria: Trace /HPF      Osmolality, Random Urine: 517 mos/kg (11-10 @ 13:46)  Sodium, Random Urine: 75 mmol/L (11-10 @ 13:46)  Osmolality, Random Urine: 461 mos/kg ( @ 22:21)  Creatinine, Random Urine: 79 mg/dL ( @ 22:20)  Sodium, Random Urine: 66 mmol/L ( @ 22:20)    RADIOLOGY & ADDITIONAL STUDIES:

## 2018-11-12 NOTE — SPEECH LANGUAGE PATHOLOGY EVALUATION - SLP DIAGNOSIS
Pt p/w acute dysphonia, c/b vocal hoarseness, raspiness, breathiness, & difficulty controlling vocal volume, possibly 2/2 acute CVA. Mild dysarthria noted.

## 2018-11-12 NOTE — PROGRESS NOTE ADULT - PROBLEM SELECTOR PLAN 4
c/o right hip pain: less pain today   CT hip: intramuscular hematoma  Held heparin drip  CBC repeated: stable HB  Ortho consulted: no intervention   monitor size of hematoma: size decreasing

## 2018-11-12 NOTE — PROGRESS NOTE ADULT - SUBJECTIVE AND OBJECTIVE BOX
PGY 1 Note discussed with supervising resident and primary attending    Patient is a 53y old  Male who presents with a chief complaint of weakness, slurred speech (12 Nov 2018 10:40)      INTERVAL HPI/OVERNIGHT EVENTS:   Patient is seen at the bedside. no new complains. feeling better then yesterday     MEDICATIONS  (STANDING):  aspirin enteric coated 81 milliGRAM(s) Oral daily  atorvastatin 40 milliGRAM(s) Oral at bedtime  insulin glargine Injectable (LANTUS) 43 Unit(s) SubCutaneous at bedtime  insulin lispro (HumaLOG) corrective regimen sliding scale   SubCutaneous three times a day before meals  insulin lispro (HumaLOG) corrective regimen sliding scale   SubCutaneous at bedtime  insulin lispro Injectable (HumaLOG) 4 Unit(s) SubCutaneous three times a day before meals  metoprolol tartrate 25 milliGRAM(s) Oral two times a day  pantoprazole    Tablet 40 milliGRAM(s) Oral before breakfast    MEDICATIONS  (PRN):  acetaminophen   Tablet .. 650 milliGRAM(s) Oral every 6 hours PRN Mild Pain (1 - 3)  chlorproMAZINE    Tablet 10 milliGRAM(s) Oral every 8 hours PRN hiccups  oxyCODONE    5 mG/acetaminophen 325 mG 1 Tablet(s) Oral every 8 hours PRN Severe Pain (7 - 10)      __________________________________________________  REVIEW OF SYSTEMS:    CONSTITUTIONAL: No fever,   EYES: no acute visual disturbances  NECK: No pain or stiffness  RESPIRATORY: No cough; No shortness of breath  CARDIOVASCULAR: No chest pain, no palpitations  GASTROINTESTINAL: No pain. No nausea or vomiting; No diarrhea   NEUROLOGICAL: No headache or numbness, no tremors  MUSCULOSKELETAL: No joint pain, no muscle pain  GENITOURINARY: no dysuria, no frequency, no hesitancy  PSYCHIATRY: no depression , no anxiety  ALL OTHER  ROS negative        Vital Signs Last 24 Hrs  T(C): 36.6 (12 Nov 2018 11:27), Max: 36.6 (11 Nov 2018 13:25)  T(F): 97.9 (12 Nov 2018 11:27), Max: 97.9 (12 Nov 2018 11:27)  HR: 74 (12 Nov 2018 11:27) (66 - 81)  BP: 120/81 (12 Nov 2018 11:27) (120/81 - 135/87)  BP(mean): --  RR: 18 (12 Nov 2018 11:27) (18 - 19)  SpO2: 100% (12 Nov 2018 11:27) (97% - 100%)    ________________________________________________  PHYSICAL EXAM:  GENERAL: NAD  HEENT: Normocephalic;  conjunctivae and sclerae clear; moist mucous membranes;   NECK : supple  CHEST/LUNG: Clear to auscultation bilaterally with good air entry   HEART: S1 S2  regular; no murmurs, gallops or rubs  ABDOMEN: Soft, Nontender, Nondistended; Bowel sounds present  EXTREMITIES: no cyanosis; no edema; no calf tenderness  SKIN: warm and dry; no rash  NERVOUS SYSTEM:  Awake and alert; Oriented  to place, person and time ; no new deficits    _________________________________________________  LABS:                        14.6   6.7   )-----------( 250      ( 12 Nov 2018 05:47 )             41.9     11-12    129<L>  |  93<L>  |  36<H>  ----------------------------<  181<H>  4.4   |  28  |  1.53<H>    Ca    9.5      12 Nov 2018 05:47  Phos  3.3     11-12  Mg     2.1     11-12    TPro  8.3  /  Alb  3.6  /  TBili  0.4  /  DBili  x   /  AST  16  /  ALT  22  /  AlkPhos  97  11-12    PT/INR - ( 11 Nov 2018 09:43 )   PT: 12.4 sec;   INR: 1.11 ratio         PTT - ( 11 Nov 2018 09:43 )  PTT:87.1 sec    CAPILLARY BLOOD GLUCOSE      POCT Blood Glucose.: 190 mg/dL (12 Nov 2018 11:38)  POCT Blood Glucose.: 163 mg/dL (12 Nov 2018 08:17)  POCT Blood Glucose.: 240 mg/dL (11 Nov 2018 21:23)  POCT Blood Glucose.: 154 mg/dL (11 Nov 2018 16:47)        RADIOLOGY & ADDITIONAL TESTS:        Care Discussed with Consultants :     Plan of care was discussed with patient and /or primary care giver; all questions and concerns were addressed and care was aligned with patient's wishes.

## 2018-11-12 NOTE — PROGRESS NOTE ADULT - SUBJECTIVE AND OBJECTIVE BOX
Neurology Follow up note    Name  MADISON PERRY    Subjective:  patient found to have intramuscular hematoma, PTT was as high as >200  patient denies any new neurological c/o    Review of Systems:  Constitutional:      no fever  Respiratory:                     no cough           MEDICATIONS  (STANDING):  aspirin enteric coated 81 milliGRAM(s) Oral daily  atorvastatin 40 milliGRAM(s) Oral at bedtime  insulin glargine Injectable (LANTUS) 43 Unit(s) SubCutaneous at bedtime  insulin lispro (HumaLOG) corrective regimen sliding scale   SubCutaneous three times a day before meals  insulin lispro (HumaLOG) corrective regimen sliding scale   SubCutaneous at bedtime  insulin lispro Injectable (HumaLOG) 4 Unit(s) SubCutaneous three times a day before meals  metoprolol tartrate 25 milliGRAM(s) Oral two times a day  pantoprazole    Tablet 40 milliGRAM(s) Oral before breakfast    MEDICATIONS  (PRN):  acetaminophen   Tablet .. 650 milliGRAM(s) Oral every 6 hours PRN Mild Pain (1 - 3)  chlorproMAZINE    Tablet 10 milliGRAM(s) Oral every 8 hours PRN hiccups  oxyCODONE    5 mG/acetaminophen 325 mG 1 Tablet(s) Oral every 8 hours PRN Severe Pain (7 - 10)      Allergies    No Known Allergies    Intolerances        Objective:   Vital Signs Last 24 Hrs  T(C): 36.6 (12 Nov 2018 14:13), Max: 36.6 (12 Nov 2018 00:51)  T(F): 97.8 (12 Nov 2018 14:13), Max: 97.9 (12 Nov 2018 11:27)  HR: 92 (12 Nov 2018 14:13) (66 - 92)  BP: 129/78 (12 Nov 2018 14:13) (120/81 - 136/88)  BP(mean): --  RR: 18 (12 Nov 2018 14:13) (18 - 19)  SpO2: 98% (12 Nov 2018 14:13) (97% - 100%)    General Exam:   General appearance: No acute distress                 Cardiovascular: Pedal dorsalis pulses intact bilaterally    Neurological Exam:  Mental Status: Orientated to self, date and place.  Attention intact.  Mild dysarthria (1). No aphasia or neglect.       Cranial Nerves: CN I - not tested.  PERRL, EOMI, VFF, no nystagmus or diplopia.  No APD.  Fundi not visualized bilaterally.  CN V1-3 intact to light touch.  + NLF (1).  Hearing intact to finger rub bilaterally.      Motor:   Tone: normal.                  Strength: intact throughout                Dysmeria: None to finger-nose-finger or heel-shin-heel    Sensation: intact to light touch    Other: NIHSS 2, MRS 0    11-12    129<L>  |  93<L>  |  36<H>  ----------------------------<  181<H>  4.4   |  28  |  1.53<H>    Ca    9.5      12 Nov 2018 05:47  Phos  3.3     11-12  Mg     2.1     11-12    TPro  8.3  /  Alb  3.6  /  TBili  0.4  /  DBili  x   /  AST  16  /  ALT  22  /  AlkPhos  97  11-12    11-12    129<L>  |  93<L>  |  36<H>  ----------------------------<  181<H>  4.4   |  28  |  1.53<H>    Ca    9.5      12 Nov 2018 05:47  Phos  3.3     11-12  Mg     2.1     11-12    TPro  8.3  /  Alb  3.6  /  TBili  0.4  /  DBili  x   /  AST  16  /  ALT  22  /  AlkPhos  97  11-12    LIVER FUNCTIONS - ( 12 Nov 2018 05:47 )  Alb: 3.6 g/dL / Pro: 8.3 g/dL / ALK PHOS: 97 U/L / ALT: 22 U/L DA / AST: 16 U/L / GGT: x             Radiology    ortho note apprecaited

## 2018-11-13 ENCOUNTER — TRANSCRIPTION ENCOUNTER (OUTPATIENT)
Age: 53
End: 2018-11-13

## 2018-11-13 LAB
ALBUMIN SERPL ELPH-MCNC: 3.3 G/DL — LOW (ref 3.5–5)
ALP SERPL-CCNC: 105 U/L — SIGNIFICANT CHANGE UP (ref 40–120)
ALT FLD-CCNC: 23 U/L DA — SIGNIFICANT CHANGE UP (ref 10–60)
ANION GAP SERPL CALC-SCNC: 8 MMOL/L — SIGNIFICANT CHANGE UP (ref 5–17)
AST SERPL-CCNC: 19 U/L — SIGNIFICANT CHANGE UP (ref 10–40)
BASOPHILS # BLD AUTO: 0.1 K/UL — SIGNIFICANT CHANGE UP (ref 0–0.2)
BASOPHILS NFR BLD AUTO: 1.3 % — SIGNIFICANT CHANGE UP (ref 0–2)
BILIRUB SERPL-MCNC: 0.4 MG/DL — SIGNIFICANT CHANGE UP (ref 0.2–1.2)
BUN SERPL-MCNC: 34 MG/DL — HIGH (ref 7–18)
CALCIUM SERPL-MCNC: 9.5 MG/DL — SIGNIFICANT CHANGE UP (ref 8.4–10.5)
CHLORIDE SERPL-SCNC: 95 MMOL/L — LOW (ref 96–108)
CO2 SERPL-SCNC: 27 MMOL/L — SIGNIFICANT CHANGE UP (ref 22–31)
CREAT SERPL-MCNC: 1.45 MG/DL — HIGH (ref 0.5–1.3)
EOSINOPHIL # BLD AUTO: 0.2 K/UL — SIGNIFICANT CHANGE UP (ref 0–0.5)
EOSINOPHIL NFR BLD AUTO: 2.9 % — SIGNIFICANT CHANGE UP (ref 0–6)
GLUCOSE BLDC GLUCOMTR-MCNC: 214 MG/DL — HIGH (ref 70–99)
GLUCOSE BLDC GLUCOMTR-MCNC: 228 MG/DL — HIGH (ref 70–99)
GLUCOSE BLDC GLUCOMTR-MCNC: 249 MG/DL — HIGH (ref 70–99)
GLUCOSE BLDC GLUCOMTR-MCNC: 330 MG/DL — HIGH (ref 70–99)
GLUCOSE SERPL-MCNC: 231 MG/DL — HIGH (ref 70–99)
HCT VFR BLD CALC: 42.7 % — SIGNIFICANT CHANGE UP (ref 39–50)
HCYS SERPL-MCNC: 14.1 UMOL/L — SIGNIFICANT CHANGE UP (ref 5–15)
HGB BLD-MCNC: 14.5 G/DL — SIGNIFICANT CHANGE UP (ref 13–17)
LYMPHOCYTES # BLD AUTO: 2 K/UL — SIGNIFICANT CHANGE UP (ref 1–3.3)
LYMPHOCYTES # BLD AUTO: 29.8 % — SIGNIFICANT CHANGE UP (ref 13–44)
MAGNESIUM SERPL-MCNC: 2.1 MG/DL — SIGNIFICANT CHANGE UP (ref 1.6–2.6)
MCHC RBC-ENTMCNC: 30.5 PG — SIGNIFICANT CHANGE UP (ref 27–34)
MCHC RBC-ENTMCNC: 34 GM/DL — SIGNIFICANT CHANGE UP (ref 32–36)
MCV RBC AUTO: 89.8 FL — SIGNIFICANT CHANGE UP (ref 80–100)
MONOCYTES # BLD AUTO: 0.7 K/UL — SIGNIFICANT CHANGE UP (ref 0–0.9)
MONOCYTES NFR BLD AUTO: 10.8 % — SIGNIFICANT CHANGE UP (ref 2–14)
NEUTROPHILS # BLD AUTO: 3.8 K/UL — SIGNIFICANT CHANGE UP (ref 1.8–7.4)
NEUTROPHILS NFR BLD AUTO: 55.3 % — SIGNIFICANT CHANGE UP (ref 43–77)
PHOSPHATE SERPL-MCNC: 3.7 MG/DL — SIGNIFICANT CHANGE UP (ref 2.5–4.5)
PLATELET # BLD AUTO: 241 K/UL — SIGNIFICANT CHANGE UP (ref 150–400)
POTASSIUM SERPL-MCNC: 4.8 MMOL/L — SIGNIFICANT CHANGE UP (ref 3.5–5.3)
POTASSIUM SERPL-SCNC: 4.8 MMOL/L — SIGNIFICANT CHANGE UP (ref 3.5–5.3)
PROT SERPL-MCNC: 8.1 G/DL — SIGNIFICANT CHANGE UP (ref 6–8.3)
RBC # BLD: 4.75 M/UL — SIGNIFICANT CHANGE UP (ref 4.2–5.8)
RBC # FLD: 11.5 % — SIGNIFICANT CHANGE UP (ref 10.3–14.5)
SODIUM SERPL-SCNC: 130 MMOL/L — LOW (ref 135–145)
WBC # BLD: 6.8 K/UL — SIGNIFICANT CHANGE UP (ref 3.8–10.5)
WBC # FLD AUTO: 6.8 K/UL — SIGNIFICANT CHANGE UP (ref 3.8–10.5)

## 2018-11-13 PROCEDURE — 93970 EXTREMITY STUDY: CPT | Mod: 26

## 2018-11-13 PROCEDURE — G0452: CPT | Mod: 26

## 2018-11-13 RX ORDER — SENNA PLUS 8.6 MG/1
2 TABLET ORAL AT BEDTIME
Qty: 0 | Refills: 0 | Status: DISCONTINUED | OUTPATIENT
Start: 2018-11-13 | End: 2018-11-19

## 2018-11-13 RX ORDER — POLYETHYLENE GLYCOL 3350 17 G/17G
17 POWDER, FOR SOLUTION ORAL DAILY
Qty: 0 | Refills: 0 | Status: DISCONTINUED | OUTPATIENT
Start: 2018-11-13 | End: 2018-11-19

## 2018-11-13 RX ORDER — ENOXAPARIN SODIUM 100 MG/ML
40 INJECTION SUBCUTANEOUS DAILY
Qty: 0 | Refills: 0 | Status: DISCONTINUED | OUTPATIENT
Start: 2018-11-13 | End: 2018-11-14

## 2018-11-13 RX ORDER — DOCUSATE SODIUM 100 MG
100 CAPSULE ORAL
Qty: 0 | Refills: 0 | Status: DISCONTINUED | OUTPATIENT
Start: 2018-11-13 | End: 2018-11-19

## 2018-11-13 RX ADMIN — ENOXAPARIN SODIUM 40 MILLIGRAM(S): 100 INJECTION SUBCUTANEOUS at 13:01

## 2018-11-13 RX ADMIN — INSULIN GLARGINE 43 UNIT(S): 100 INJECTION, SOLUTION SUBCUTANEOUS at 21:46

## 2018-11-13 RX ADMIN — Medication 100 MILLIGRAM(S): at 17:46

## 2018-11-13 RX ADMIN — ATORVASTATIN CALCIUM 40 MILLIGRAM(S): 80 TABLET, FILM COATED ORAL at 21:33

## 2018-11-13 RX ADMIN — Medication 4 UNIT(S): at 17:42

## 2018-11-13 RX ADMIN — PANTOPRAZOLE SODIUM 40 MILLIGRAM(S): 20 TABLET, DELAYED RELEASE ORAL at 05:48

## 2018-11-13 RX ADMIN — Medication 4: at 17:41

## 2018-11-13 RX ADMIN — Medication 4 UNIT(S): at 08:29

## 2018-11-13 RX ADMIN — Medication 4: at 13:01

## 2018-11-13 RX ADMIN — SENNA PLUS 2 TABLET(S): 8.6 TABLET ORAL at 21:33

## 2018-11-13 RX ADMIN — Medication 25 MILLIGRAM(S): at 17:53

## 2018-11-13 RX ADMIN — Medication 81 MILLIGRAM(S): at 13:00

## 2018-11-13 RX ADMIN — Medication 2: at 21:47

## 2018-11-13 RX ADMIN — Medication 4: at 08:29

## 2018-11-13 RX ADMIN — Medication 4 UNIT(S): at 13:02

## 2018-11-13 RX ADMIN — OXYCODONE AND ACETAMINOPHEN 1 TABLET(S): 5; 325 TABLET ORAL at 21:33

## 2018-11-13 RX ADMIN — POLYETHYLENE GLYCOL 3350 17 GRAM(S): 17 POWDER, FOR SOLUTION ORAL at 13:03

## 2018-11-13 RX ADMIN — Medication 25 MILLIGRAM(S): at 05:48

## 2018-11-13 RX ADMIN — OXYCODONE AND ACETAMINOPHEN 1 TABLET(S): 5; 325 TABLET ORAL at 22:05

## 2018-11-13 NOTE — DISCHARGE NOTE ADULT - CARE PLAN
Principal Discharge DX:	CVA (cerebral vascular accident)  Secondary Diagnosis:	CHF (congestive heart failure)  Secondary Diagnosis:	Diabetes mellitus of other type without complication  Secondary Diagnosis:	Hematoma  Secondary Diagnosis:	Hyponatremia  Secondary Diagnosis:	Hyperlipidemia, unspecified hyperlipidemia type  Secondary Diagnosis:	MALCOM (acute kidney injury) Principal Discharge DX:	CVA (cerebral vascular accident)  Goal:	improve functional quality of life  Assessment and plan of treatment:	you came with weakness and slurred speech . You were diagnosed with CVA. You underwent Transesophageal echocardiogram which didn't showed any heart mass or emboli. You don't need loop recorder as per the cardiologist as your stroke was most likely from atherosclerosis of the brain vessels. You will be discharged on aspirin, plavix and statin. keep taking the prescribed medications   Follow up with the neurologist Dr Fraire  in 1-2 weeks for further recommendations  Secondary Diagnosis:	CHF (congestive heart failure)  Goal:	improve functional quality of life  Assessment and plan of treatment:	You have history of CHF. keep taking the prescribed medications.   Follow up with the cardiologist as outpatient in 1-2 weeks  Secondary Diagnosis:	Diabetes mellitus of other type without complication  Goal:	HbA1c<7%  Assessment and plan of treatment:	You have history of Diabetes. Your HbA1c is 8.2 . 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 Now your hyponatremia has resolved. You are advised to restrict your fluid intake and Follow up with your Primary Care Doctor in 1-2 weeks  Secondary Diagnosis:	Hyperlipidemia, unspecified hyperlipidemia type  Goal:	prevent stroke and other cardiovascular events  Assessment and plan of treatment:	You have history of hyperlipidemia , likely cause of your stroke . keep taking the statins and Follow up with your Primary Care Doctor in 1-2 weeks  Secondary Diagnosis:	MALCOM (acute kidney injury)  Goal:	Keep yourself hydrated  Assessment and plan of treatment:	You were diagnosed with MALCOM. Your Serum creatinine has improved. Keep yourself hydrated. Follow up with your Primary Care Doctor in 1 week for repeat BMP.  Secondary Diagnosis:	Shortness of breath  Goal:	improvement of symptoms  Assessment and plan of treatment:	You have history of shortness of breath on walking likely from COPD from smoking.  Your oxygen saturation on ambulation was down to 84 %.You will be benefitted from oxygen use .Avoid smoking .

## 2018-11-13 NOTE — PROGRESS NOTE ADULT - PROBLEM SELECTOR PLAN 1
c/w aspirin, statin, thorazine  c/w telemetry monitoring: non significant   f/u TTE with bubble study: pending results   f/u neurology   Cardio consulted   Pt had MRI and MRA at [facility], report in chart.  Hypercoagulable work up for possible cause of stroke  LE doppler ordered  Dr Chavira consulted

## 2018-11-13 NOTE — DISCHARGE NOTE ADULT - NS AS DC STROKE ED MATERIALS
Stroke Education Booklet/Prescribed Medications/Stroke Warning Signs and Symptoms/Risk Factors for Stroke/Call 911 for Stroke/Need for Followup After Discharge

## 2018-11-13 NOTE — DISCHARGE NOTE ADULT - ADDITIONAL INSTRUCTIONS
Follow up with neurologist Dr Fraire in 1-2 weeks  Follow up with the cardiologist in 1-2 weeks  Follow up with Pulmonologist in 1-2 weeks  Follow up with your Primary Care Doctor in 1-2 weeks

## 2018-11-13 NOTE — PROGRESS NOTE ADULT - SUBJECTIVE AND OBJECTIVE BOX
no events overnnight    No Known Allergies    Hospital Medications:   MEDICATIONS  (STANDING):  aspirin enteric coated 81 milliGRAM(s) Oral daily  atorvastatin 40 milliGRAM(s) Oral at bedtime  docusate sodium 100 milliGRAM(s) Oral two times a day  enoxaparin Injectable 40 milliGRAM(s) SubCutaneous daily  insulin glargine Injectable (LANTUS) 43 Unit(s) SubCutaneous at bedtime  insulin lispro (HumaLOG) corrective regimen sliding scale   SubCutaneous three times a day before meals  insulin lispro (HumaLOG) corrective regimen sliding scale   SubCutaneous at bedtime  insulin lispro Injectable (HumaLOG) 4 Unit(s) SubCutaneous three times a day before meals  metoprolol tartrate 25 milliGRAM(s) Oral two times a day  pantoprazole    Tablet 40 milliGRAM(s) Oral before breakfast  polyethylene glycol 3350 17 Gram(s) Oral daily  senna 2 Tablet(s) Oral at bedtime        VITALS:  T(F): 98 (18 @ 13:57), Max: 99 (18 @ 18:26)  HR: 77 (18 @ 13:57)  BP: 113/64 (18 @ 13:57)  RR: 18 (18 @ 13:57)  SpO2: 100% (18 @ 13:57)  Wt(kg): --      PHYSICAL EXAM:  Constitutional: NAD  HEENT: anicteric sclera, oropharynx clear, MMM  Neck: No JVD  Respiratory: CTAB, no wheezes, rales or rhonchi  Cardiovascular: S1, S2, RRR  Gastrointestinal: BS+, soft, NT/ND  Extremities: No cyanosis or clubbing. No peripheral edema  Neurological: A/O x 3, no focal deficits  Psychiatric: Normal mood, normal affect  : No CVA tenderness. No mayers.       LABS:      130<L>  |  95<L>  |  34<H>  ----------------------------<  231<H>  4.8   |  27  |  1.45<H>    Ca    9.5      2018 07:15  Phos  3.7       Mg     2.1         TPro  8.1  /  Alb  3.3<L>  /  TBili  0.4  /  DBili      /  AST  19  /  ALT  23  /  AlkPhos  105      Creatinine Trend: 1.45 <--, 1.53 <--, 1.80 <--, 1.73 <--, 1.93 <--, 1.91 <--, 1.74 <--, 1.25 <--, 1.19 <--, 2.40 <--, 2.43 <--                        14.5   6.8   )-----------( 241      ( 2018 07:15 )             42.7     Urine Studies:  Urinalysis Basic - ( 2018 22:19 )    Color: Yellow / Appearance: Clear / S.005 / pH:   Gluc:  / Ketone: Negative  / Bili: Negative / Urobili: Negative   Blood:  / Protein: 15 / Nitrite: Negative   Leuk Esterase: Negative / RBC: 0-2 /HPF / WBC 0-2 /HPF   Sq Epi:  / Non Sq Epi:  / Bacteria: Trace /HPF      Osmolality, Random Urine: 517 mos/kg (11-10 @ 13:46)  Sodium, Random Urine: 75 mmol/L (11-10 @ 13:46)  Osmolality, Random Urine: 461 mos/kg ( @ 22:21)  Creatinine, Random Urine: 79 mg/dL ( @ 22:20)  Sodium, Random Urine: 66 mmol/L ( @ 22:20)    RADIOLOGY & ADDITIONAL STUDIES:

## 2018-11-13 NOTE — DISCHARGE NOTE ADULT - PLAN OF CARE
improve functional quality of life you came with weakness and slurred speech . You were diagnosed with CVA. You underwent Transesophageal echocardiogram which didn't showed any heart mass or emboli. You don't need loop recorder as per the cardiologist as your stroke was most likely from atherosclerosis of the brain vessels. You will be discharged on aspirin, plavix and statin. keep taking the prescribed medications   Follow up with the neurologist Dr Fraire  in 1-2 weeks for further recommendations You have history of CHF. keep taking the prescribed medications.   Follow up with the cardiologist as outpatient in 1-2 weeks HbA1c<7% You have history of Diabetes. Your HbA1c is 8.2 . 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 Now your hyponatremia has resolved. You are advised to restrict your fluid intake and Follow up with your Primary Care Doctor in 1-2 weeks prevent stroke and other cardiovascular events You have history of hyperlipidemia , likely cause of your stroke . keep taking the statins and Follow up with your Primary Care Doctor in 1-2 weeks Keep yourself hydrated You were diagnosed with MALCOM. Your Serum creatinine has improved. Keep yourself hydrated. Follow up with your Primary Care Doctor in 1 week for repeat BMP. improvement of symptoms You have history of shortness of breath on walking likely from COPD from smoking.  Your oxygen saturation on ambulation was down to 84 %.You will be benefitted from oxygen use .Avoid smoking .

## 2018-11-13 NOTE — DISCHARGE NOTE ADULT - CARE PROVIDER_API CALL
Deon Fraire), Clinical Neurophysiology; Neurology  [address] [address] [place] [address]  Phone: [phone]  Fax: [phone]

## 2018-11-13 NOTE — PROGRESS NOTE ADULT - PROBLEM SELECTOR PLAN 1
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  concerns of PFO but could also be atherosclerotic disease    EKG NSR 83 w RBBB  no sings of arrythmia on telemetry   will get echo and if needed will do ABRAHAN.

## 2018-11-13 NOTE — DISCHARGE NOTE ADULT - HOSPITAL COURSE
52 yo M with PMH of HTN, HLD, DM, CABG, s/p stroke 6 days ago at [facility] in [place] at which time he left AMA due to having to deal with rent issues, presents to ED due to worsening weakness. Pt states that at prior hospital admission, he had presented with left sided upper and lower extremity numbness. Pt denies cp, sob, abd pain, fever, chills. Pt admits to nausea and vomiting. Per patient's brother, at baseline patient able to ambulate but noted that patient has been unable to walk unassisted since coming home yesterday night and speech is slurred. 52 yo M with PMH of HTN, HLD, DM, CABG, s/p stroke 6 days ago at [facility] in [place] at which time he left AMA due to having to deal with rent issues, presents to ED due to worsening weakness. Pt states that at prior hospital admission, he had presented with left sided upper and lower extremity numbness. Pt denies cp, sob, abd pain, fever, chills. Pt admits to nausea and vomiting. Per patient's brother, at baseline patient able to ambulate but noted that patient has been unable to walk unassisted since coming home yesterday night and speech is slurred.    For Cerebrovascular accident (CVA), unspecified mechanism,  c/w aspirin, statin, thorazine, c/w telemetry monitoring: non significant, f/u TTE with bubble study: No PFO.   Initially started on heparin drip for multiple strokes, DC later due to elevated PTT, and hematoma. Cardio consulted: stroke most likely due to severe atherosclerotic disease. Hypercoagulable workup was done, due to stroke at young age. Pt had MRI and MRA at [facility], report in chart.  LE doppler: negative.      For  MALCOM (acute kidney injury), BMP monitored, Nephro follow up, US kidney: no hydronephrosis.   For Hyponatremia, fluid intake restricted, Nephro follow up : SIADH, BMP monitored.   For  Hematoma: patient developed hematoma in the setting of heparin drip. CT hip was done which confirmed intramuscular hematoma. Pain meds given, Heparin drip stopped..  Hematoma resolving in size, Pain is decreased.   For Diabetes mellitus of other type without complication.  Plan: c/w lantus 43 at bedtime, sliding scale, A1c: 7.7.   For Hyperlipidemia, unspecified hyperlipidemia type. Plan: c/w statin.  For Hypertension, unspecified type, holding lisinopril for now 2/2 MALCOM, c/w metoprolol with parameters, continue to monitor BP.   For Coronary artery disease involving native heart without angina pectoris, unspecified vessel or lesion type.  Plan: c/w aspirin, statin.       Patient is medically stable to be discharged. Case discussed with the attending doctor 52 yo M with PMH of HTN, HLD, DM, CABG, s/p stroke 6 days ago at [facility] in [place] at which time he left AMA due to having to deal with rent issues, presents to ED due to worsening weakness. Pt states that at prior hospital admission, he had presented with left sided upper and lower extremity numbness. Pt denies cp, sob, abd pain, fever, chills. Pt admits to nausea and vomiting. Per patient's brother, at baseline patient able to ambulate but noted that patient has been unable to walk unassisted since coming home yesterday night and speech is slurred.    For Cerebrovascular accident (CVA), unspecified mechanism,  c/w aspirin, statin, thorazine, c/w telemetry monitoring: non significant, f/u TTE with bubble study: No PFO.   Initially started on heparin drip for multiple strokes, DC later due to elevated PTT, and hematoma. Cardio consulted: stroke most likely due to severe atherosclerotic disease. Hypercoagulable workup was done, due to stroke at young age. It was negative. Pt had MRI and MRA at [facility], report in chart. Patient also had ABRAHAN. and is recommended Loop recorder in OP setting   LE doppler: negative.      For  MALCOM (acute kidney injury), BMP monitored, Nephro follow up, US kidney: no hydronephrosis.   For Hyponatremia, fluid intake restricted, Nephro follow up : SIADH, BMP monitored.   For  Hematoma: patient developed hematoma in the setting of heparin drip. CT hip was done which confirmed intramuscular hematoma. Pain meds given, Heparin drip stopped..  Hematoma resolving in size, Pain is decreased.   For Diabetes mellitus of other type without complication.  Plan: c/w lantus 43 at bedtime, sliding scale, A1c: 7.7.   For Hyperlipidemia, unspecified hyperlipidemia type. Plan: c/w statin.  For Hypertension, unspecified type, holding lisinopril for now 2/2 MALCOM, c/w metoprolol with parameters, continue to monitor BP.   For Coronary artery disease involving native heart without angina pectoris, unspecified vessel or lesion type.  Plan: c/w aspirin, statin.       Patient is medically stable to be discharged. Case discussed with the attending doctor 54 yo M with PMH of HTN, HLD, DM, CABG, s/p stroke 6 days ago at [facility] in [place] at which time he left AMA due to having to deal with rent issues, presents to ED due to worsening weakness. Pt states that at prior hospital admission, he had presented with left sided upper and lower extremity numbness. Pt denies cp, sob, abd pain, fever, chills. Pt admits to nausea and vomiting. Per patient's brother, at baseline patient able to ambulate but noted that patient has been unable to walk unassisted since coming home yesterday night and speech is slurred.    For Cerebrovascular accident (CVA), unspecified mechanism,  c/w aspirin, statin, plavix, thorazine,non significant, f/u TTE with bubble study: No PFO.    Initially started on heparin drip for multiple strokes, DC later due to elevated PTT, and hematoma. Cardio consulted: stroke most likely due to severe atherosclerotic disease. Hypercoagulable workup was done, due to stroke at young age. It was negative. Pt had MRI and MRA at [facility], report in chart. Patient also had ABRAHAN which was negative . No need of loop recorder as per cadiologist. Pt to f/u with neurologist Dr Fraire in 1-2 weeks  LE doppler: negative.      For  MALCOM (acute kidney injury),S. Creatinine trending down . Pt will need to Follow up with your Primary Care Doctor in 1 week for bmp.  Pt oxygen sat on ambulation is around 85%. Pt need to follow up with the Pulmonologist for further recommendations.       Patient is medically stable to be discharged to rehab as per attending Dr Don

## 2018-11-13 NOTE — DISCHARGE NOTE ADULT - MEDICATION SUMMARY - MEDICATIONS TO CHANGE
I will SWITCH the dose or number of times a day I take the medications listed below when I get home from the hospital:    Lipitor 20 mg oral tablet  -- 1 tab(s) by mouth once a day

## 2018-11-13 NOTE — DISCHARGE NOTE ADULT - PATIENT PORTAL LINK FT
You can access the [facility], offered by [facility], by registering with the following website: [email or web address]

## 2018-11-13 NOTE — DISCHARGE NOTE ADULT - SECONDARY DIAGNOSIS.
CHF (congestive heart failure) Diabetes mellitus of other type without complication Hematoma Hyponatremia Hyperlipidemia, unspecified hyperlipidemia type MALCOM (acute kidney injury) Shortness of breath

## 2018-11-13 NOTE — DISCHARGE NOTE ADULT - MEDICATION SUMMARY - MEDICATIONS TO TAKE
I will START or STAY ON the medications listed below when I get home from the hospital:    nasal canula oxygen   -- 1.5 liter(s) into nose once a day   -- Indication: For Shortness of breath    aspirin 81 mg oral delayed release tablet  -- 1 tab(s) by mouth once a day  -- Indication: For CVA (cerebral vascular accident)    acetaminophen 325 mg oral tablet  -- 2 tab(s) by mouth every 6 hours, As needed, Mild Pain (1 - 3)  -- Indication: For pain    lisinopril 2.5 mg oral tablet  -- 1 tab(s) by mouth once a day  -- Indication: For Hypertension, unspecified type    metFORMIN 1000 mg oral tablet  -- 1 tab(s) by mouth 2 times a day  -- Indication: For Diabetes mellitus of other type without complication    chlorproMAZINE 10 mg oral tablet  -- 1 tab(s) by mouth every 8 hours, As needed, hiccups  -- Indication: For Hiccups    atorvastatin 40 mg oral tablet  -- 1 tab(s) by mouth once a day (at bedtime)  -- Indication: For CVA (cerebral vascular accident)    clopidogrel 75 mg oral tablet  -- 1 tab(s) by mouth once a day for 6 weeks  -- Indication: For CVA (cerebral vascular accident)    metoprolol tartrate 25 mg oral tablet  -- 1 tab(s) by mouth 2 times a day  -- Indication: For Hypertension, unspecified type    Symbicort 160 mcg-4.5 mcg/inh inhalation aerosol  -- 1 puff(s) inhaled once a day   -- Check with your doctor before becoming pregnant.  For inhalation only.  Rinse mouth thoroughly after use.    -- Indication: For Shortness of breath    polyethylene glycol 3350 oral powder for reconstitution  -- 17 gram(s) by mouth once a day, As Needed  -- Indication: For Constipation    docusate sodium 100 mg oral capsule  -- 1 cap(s) by mouth 2 times a day  -- Indication: For Constipation    senna oral tablet  -- 2 tab(s) by mouth once a day (at bedtime)  -- Indication: For Constipation    pantoprazole 40 mg oral delayed release tablet  -- 1 tab(s) by mouth once a day (before a meal)  -- Indication: For GERD    nicotine 21 mg/24 hr transdermal film, extended release  -- 1 patch by transdermal patch once a day  -- Indication: For Smoking cessation

## 2018-11-13 NOTE — PROGRESS NOTE ADULT - SUBJECTIVE AND OBJECTIVE BOX
PGY 1 Note discussed with supervising resident and primary attending    Patient is a 53y old  Male who presents with a chief complaint of weakness, slurred speech (13 Nov 2018 11:33)      INTERVAL HPI/OVERNIGHT EVENTS:   Patient is seen at the bedside.. No new complains     MEDICATIONS  (STANDING):  aspirin enteric coated 81 milliGRAM(s) Oral daily  atorvastatin 40 milliGRAM(s) Oral at bedtime  docusate sodium 100 milliGRAM(s) Oral two times a day  enoxaparin Injectable 40 milliGRAM(s) SubCutaneous daily  insulin glargine Injectable (LANTUS) 43 Unit(s) SubCutaneous at bedtime  insulin lispro (HumaLOG) corrective regimen sliding scale   SubCutaneous three times a day before meals  insulin lispro (HumaLOG) corrective regimen sliding scale   SubCutaneous at bedtime  insulin lispro Injectable (HumaLOG) 4 Unit(s) SubCutaneous three times a day before meals  metoprolol tartrate 25 milliGRAM(s) Oral two times a day  pantoprazole    Tablet 40 milliGRAM(s) Oral before breakfast  polyethylene glycol 3350 17 Gram(s) Oral daily  senna 2 Tablet(s) Oral at bedtime    MEDICATIONS  (PRN):  acetaminophen   Tablet .. 650 milliGRAM(s) Oral every 6 hours PRN Mild Pain (1 - 3)  chlorproMAZINE    Tablet 10 milliGRAM(s) Oral every 8 hours PRN hiccups  oxyCODONE    5 mG/acetaminophen 325 mG 1 Tablet(s) Oral every 8 hours PRN Severe Pain (7 - 10)      __________________________________________________  REVIEW OF SYSTEMS:    CONSTITUTIONAL: No fever,   EYES: no acute visual disturbances  NECK: No pain or stiffness  RESPIRATORY: No cough; No shortness of breath  CARDIOVASCULAR: No chest pain, no palpitations  GASTROINTESTINAL: No pain. No nausea or vomiting; No diarrhea   NEUROLOGICAL: No headache or numbness, no tremors  MUSCULOSKELETAL: No joint pain, no muscle pain  GENITOURINARY: no dysuria, no frequency, no hesitancy  PSYCHIATRY: no depression , no anxiety  ALL OTHER  ROS negative        Vital Signs Last 24 Hrs  T(C): 36.6 (13 Nov 2018 11:15), Max: 37.2 (12 Nov 2018 18:26)  T(F): 97.8 (13 Nov 2018 11:15), Max: 99 (12 Nov 2018 18:26)  HR: 85 (13 Nov 2018 11:15) (73 - 92)  BP: 133/78 (13 Nov 2018 11:15) (111/89 - 133/78)  BP(mean): --  RR: 18 (13 Nov 2018 11:15) (17 - 18)  SpO2: 100% (13 Nov 2018 11:15) (98% - 100%)    ________________________________________________  PHYSICAL EXAM:  GENERAL: NAD  HEENT: Normocephalic;  conjunctivae and sclerae clear; moist mucous membranes;   NECK : supple  CHEST/LUNG: Clear to auscultation bilaterally with good air entry   HEART: S1 S2  regular; no murmurs, gallops or rubs  ABDOMEN: Soft, Nontender, Nondistended; Bowel sounds present  EXTREMITIES: no cyanosis; no edema; no calf tenderness, right sided weakness   SKIN: warm and dry; no rash  NERVOUS SYSTEM:  Awake and alert; Oriented  to place, person and time ; no new deficits    _________________________________________________  LABS:                        14.5   6.8   )-----------( 241      ( 13 Nov 2018 07:15 )             42.7     11-13    130<L>  |  95<L>  |  34<H>  ----------------------------<  231<H>  4.8   |  27  |  1.45<H>    Ca    9.5      13 Nov 2018 07:15  Phos  3.7     11-13  Mg     2.1     11-13    TPro  8.1  /  Alb  3.3<L>  /  TBili  0.4  /  DBili  x   /  AST  19  /  ALT  23  /  AlkPhos  105  11-13        CAPILLARY BLOOD GLUCOSE      POCT Blood Glucose.: 249 mg/dL (13 Nov 2018 12:03)  POCT Blood Glucose.: 214 mg/dL (13 Nov 2018 08:08)  POCT Blood Glucose.: 215 mg/dL (12 Nov 2018 21:24)  POCT Blood Glucose.: 361 mg/dL (12 Nov 2018 16:40)        RADIOLOGY & ADDITIONAL TESTS:    Imaging Personally Reviewed:  YES/NO    Consultant(s) Notes Reviewed:   YES/ No    Care Discussed with Consultants :     Plan of care was discussed with patient and /or primary care giver; all questions and concerns were addressed and care was aligned with patient's wishes.

## 2018-11-14 DIAGNOSIS — I50.9 HEART FAILURE, UNSPECIFIED: ICD-10-CM

## 2018-11-14 LAB
ALBUMIN SERPL ELPH-MCNC: 3.2 G/DL — LOW (ref 3.5–5)
ALP SERPL-CCNC: 118 U/L — SIGNIFICANT CHANGE UP (ref 40–120)
ALT FLD-CCNC: 26 U/L DA — SIGNIFICANT CHANGE UP (ref 10–60)
ANA TITR SER: NEGATIVE — SIGNIFICANT CHANGE UP
ANION GAP SERPL CALC-SCNC: 8 MMOL/L — SIGNIFICANT CHANGE UP (ref 5–17)
AST SERPL-CCNC: 20 U/L — SIGNIFICANT CHANGE UP (ref 10–40)
BASOPHILS # BLD AUTO: 0.1 K/UL — SIGNIFICANT CHANGE UP (ref 0–0.2)
BASOPHILS NFR BLD AUTO: 1.3 % — SIGNIFICANT CHANGE UP (ref 0–2)
BILIRUB SERPL-MCNC: 0.3 MG/DL — SIGNIFICANT CHANGE UP (ref 0.2–1.2)
BUN SERPL-MCNC: 32 MG/DL — HIGH (ref 7–18)
CALCIUM SERPL-MCNC: 9.2 MG/DL — SIGNIFICANT CHANGE UP (ref 8.4–10.5)
CHLORIDE SERPL-SCNC: 93 MMOL/L — LOW (ref 96–108)
CO2 SERPL-SCNC: 29 MMOL/L — SIGNIFICANT CHANGE UP (ref 22–31)
CREAT SERPL-MCNC: 1.33 MG/DL — HIGH (ref 0.5–1.3)
DRVVT SCREEN TO CONFIRM RATIO: SIGNIFICANT CHANGE UP
EOSINOPHIL # BLD AUTO: 0.2 K/UL — SIGNIFICANT CHANGE UP (ref 0–0.5)
EOSINOPHIL NFR BLD AUTO: 2.8 % — SIGNIFICANT CHANGE UP (ref 0–6)
GLUCOSE BLDC GLUCOMTR-MCNC: 188 MG/DL — HIGH (ref 70–99)
GLUCOSE BLDC GLUCOMTR-MCNC: 220 MG/DL — HIGH (ref 70–99)
GLUCOSE BLDC GLUCOMTR-MCNC: 249 MG/DL — HIGH (ref 70–99)
GLUCOSE BLDC GLUCOMTR-MCNC: 281 MG/DL — HIGH (ref 70–99)
GLUCOSE SERPL-MCNC: 239 MG/DL — HIGH (ref 70–99)
HCT VFR BLD CALC: 38.5 % — LOW (ref 39–50)
HGB BLD-MCNC: 13.6 G/DL — SIGNIFICANT CHANGE UP (ref 13–17)
LA NT DPL PPP QL: 31 SEC — SIGNIFICANT CHANGE UP
LYMPHOCYTES # BLD AUTO: 1.9 K/UL — SIGNIFICANT CHANGE UP (ref 1–3.3)
LYMPHOCYTES # BLD AUTO: 27.7 % — SIGNIFICANT CHANGE UP (ref 13–44)
MAGNESIUM SERPL-MCNC: 2 MG/DL — SIGNIFICANT CHANGE UP (ref 1.6–2.6)
MCHC RBC-ENTMCNC: 31.2 PG — SIGNIFICANT CHANGE UP (ref 27–34)
MCHC RBC-ENTMCNC: 35.4 GM/DL — SIGNIFICANT CHANGE UP (ref 32–36)
MCV RBC AUTO: 88.1 FL — SIGNIFICANT CHANGE UP (ref 80–100)
MONOCYTES # BLD AUTO: 0.7 K/UL — SIGNIFICANT CHANGE UP (ref 0–0.9)
MONOCYTES NFR BLD AUTO: 9.8 % — SIGNIFICANT CHANGE UP (ref 2–14)
NEUTROPHILS # BLD AUTO: 4 K/UL — SIGNIFICANT CHANGE UP (ref 1.8–7.4)
NEUTROPHILS NFR BLD AUTO: 58.4 % — SIGNIFICANT CHANGE UP (ref 43–77)
NORMALIZED SCT PPP-RTO: 0.99 RATIO — SIGNIFICANT CHANGE UP (ref 0–1.16)
NORMALIZED SCT PPP-RTO: SIGNIFICANT CHANGE UP
PHOSPHATE SERPL-MCNC: 3.4 MG/DL — SIGNIFICANT CHANGE UP (ref 2.5–4.5)
PLATELET # BLD AUTO: 235 K/UL — SIGNIFICANT CHANGE UP (ref 150–400)
POTASSIUM SERPL-MCNC: 4.5 MMOL/L — SIGNIFICANT CHANGE UP (ref 3.5–5.3)
POTASSIUM SERPL-SCNC: 4.5 MMOL/L — SIGNIFICANT CHANGE UP (ref 3.5–5.3)
PROT SERPL-MCNC: 7.9 G/DL — SIGNIFICANT CHANGE UP (ref 6–8.3)
RBC # BLD: 4.37 M/UL — SIGNIFICANT CHANGE UP (ref 4.2–5.8)
RBC # FLD: 11.1 % — SIGNIFICANT CHANGE UP (ref 10.3–14.5)
SODIUM SERPL-SCNC: 130 MMOL/L — LOW (ref 135–145)
WBC # BLD: 6.9 K/UL — SIGNIFICANT CHANGE UP (ref 3.8–10.5)
WBC # FLD AUTO: 6.9 K/UL — SIGNIFICANT CHANGE UP (ref 3.8–10.5)

## 2018-11-14 RX ORDER — LISINOPRIL 2.5 MG/1
2.5 TABLET ORAL DAILY
Qty: 0 | Refills: 0 | Status: DISCONTINUED | OUTPATIENT
Start: 2018-11-14 | End: 2018-11-19

## 2018-11-14 RX ORDER — APIXABAN 2.5 MG/1
5 TABLET, FILM COATED ORAL EVERY 12 HOURS
Qty: 0 | Refills: 0 | Status: DISCONTINUED | OUTPATIENT
Start: 2018-11-14 | End: 2018-11-15

## 2018-11-14 RX ADMIN — Medication 100 MILLIGRAM(S): at 17:11

## 2018-11-14 RX ADMIN — POLYETHYLENE GLYCOL 3350 17 GRAM(S): 17 POWDER, FOR SOLUTION ORAL at 11:20

## 2018-11-14 RX ADMIN — OXYCODONE AND ACETAMINOPHEN 1 TABLET(S): 5; 325 TABLET ORAL at 21:23

## 2018-11-14 RX ADMIN — Medication 4 UNIT(S): at 08:54

## 2018-11-14 RX ADMIN — INSULIN GLARGINE 43 UNIT(S): 100 INJECTION, SOLUTION SUBCUTANEOUS at 21:25

## 2018-11-14 RX ADMIN — Medication 25 MILLIGRAM(S): at 06:00

## 2018-11-14 RX ADMIN — Medication 81 MILLIGRAM(S): at 11:20

## 2018-11-14 RX ADMIN — PANTOPRAZOLE SODIUM 40 MILLIGRAM(S): 20 TABLET, DELAYED RELEASE ORAL at 06:01

## 2018-11-14 RX ADMIN — Medication 4: at 08:54

## 2018-11-14 RX ADMIN — SENNA PLUS 2 TABLET(S): 8.6 TABLET ORAL at 21:23

## 2018-11-14 RX ADMIN — Medication 2: at 12:28

## 2018-11-14 RX ADMIN — APIXABAN 5 MILLIGRAM(S): 2.5 TABLET, FILM COATED ORAL at 18:47

## 2018-11-14 RX ADMIN — ENOXAPARIN SODIUM 40 MILLIGRAM(S): 100 INJECTION SUBCUTANEOUS at 11:20

## 2018-11-14 RX ADMIN — Medication 25 MILLIGRAM(S): at 17:11

## 2018-11-14 RX ADMIN — Medication 4 UNIT(S): at 12:28

## 2018-11-14 RX ADMIN — Medication 6: at 17:10

## 2018-11-14 RX ADMIN — ATORVASTATIN CALCIUM 40 MILLIGRAM(S): 80 TABLET, FILM COATED ORAL at 21:23

## 2018-11-14 RX ADMIN — Medication 4 UNIT(S): at 17:10

## 2018-11-14 RX ADMIN — Medication 100 MILLIGRAM(S): at 06:00

## 2018-11-14 NOTE — PROGRESS NOTE ADULT - PROBLEM SELECTOR PLAN 1
new HF with EF: 30-35% and G2DD with no significant valvular disease, no PFO  c/w metoprolol   recommend ACE-I, monitor Cr if increase more than 30% of baseline, discontinue   pt might need cath 4-6 weeks after CVA   repeat echo in 3-6 after medical optimization to asses EF

## 2018-11-14 NOTE — PROGRESS NOTE ADULT - SUBJECTIVE AND OBJECTIVE BOX
No events overnight    No Known Allergies    Hospital Medications:   MEDICATIONS  (STANDING):  apixaban 5 milliGRAM(s) Oral every 12 hours  aspirin enteric coated 81 milliGRAM(s) Oral daily  atorvastatin 40 milliGRAM(s) Oral at bedtime  docusate sodium 100 milliGRAM(s) Oral two times a day  insulin glargine Injectable (LANTUS) 43 Unit(s) SubCutaneous at bedtime  insulin lispro (HumaLOG) corrective regimen sliding scale   SubCutaneous three times a day before meals  insulin lispro (HumaLOG) corrective regimen sliding scale   SubCutaneous at bedtime  insulin lispro Injectable (HumaLOG) 4 Unit(s) SubCutaneous three times a day before meals  lisinopril 2.5 milliGRAM(s) Oral daily  metoprolol tartrate 25 milliGRAM(s) Oral two times a day  pantoprazole    Tablet 40 milliGRAM(s) Oral before breakfast  polyethylene glycol 3350 17 Gram(s) Oral daily  senna 2 Tablet(s) Oral at bedtime        VITALS:  T(F): 98.1 (18 @ 14:24), Max: 98.8 (18 @ 02:11)  HR: 80 (18 @ 14:24)  BP: 117/77 (18 @ 14:24)  RR: 18 (18 @ 14:24)  SpO2: 100% (18 @ 14:24)  Wt(kg): --     @ 07:01  -   @ 07:00  --------------------------------------------------------  IN: 240 mL / OUT: 850 mL / NET: -610 mL        PHYSICAL EXAM:  Constitutional: NAD  HEENT: anicteric sclera, oropharynx clear.  Neck: No JVD  Respiratory: CTAB, no wheezes, rales or rhonchi  Cardiovascular: S1, S2, RRR  Gastrointestinal: BS+, soft, NT/ND  Extremities: No cyanosis or clubbing. No peripheral edema  Neurological:  no focal deficits  : No CVA tenderness. No mayers.       LABS:      130<L>  |  93<L>  |  32<H>  ----------------------------<  239<H>  4.5   |  29  |  1.33<H>    Ca    9.2      2018 06:16  Phos  3.4       Mg     2.0         TPro  7.9  /  Alb  3.2<L>  /  TBili  0.3  /  DBili      /  AST  20  /  ALT  26  /  AlkPhos  118      Creatinine Trend: 1.33 <--, 1.45 <--, 1.53 <--, 1.80 <--, 1.73 <--, 1.93 <--, 1.91 <--, 1.74 <--, 1.25 <--, 1.19 <--, 2.40 <--, 2.43 <--                        13.6   6.9   )-----------( 235      ( 2018 06:16 )             38.5     Urine Studies:  Urinalysis Basic - ( 2018 22:19 )    Color: Yellow / Appearance: Clear / S.005 / pH:   Gluc:  / Ketone: Negative  / Bili: Negative / Urobili: Negative   Blood:  / Protein: 15 / Nitrite: Negative   Leuk Esterase: Negative / RBC: 0-2 /HPF / WBC 0-2 /HPF   Sq Epi:  / Non Sq Epi:  / Bacteria: Trace /HPF      Osmolality, Random Urine: 517 mos/kg (11-10 @ 13:46)  Sodium, Random Urine: 75 mmol/L (11-10 @ 13:46)  Osmolality, Random Urine: 461 mos/kg ( @ 22:21)  Creatinine, Random Urine: 79 mg/dL ( @ 22:20)  Sodium, Random Urine: 66 mmol/L ( @ 22:20)    RADIOLOGY & ADDITIONAL STUDIES:

## 2018-11-14 NOTE — PROGRESS NOTE ADULT - PROBLEM SELECTOR PLAN 2
New diagnosis, not in acute exacerbation   ECHO: severe LV dysfunction, grade II dysfunction   Lisinopril 2.5 mg started , c/w metoprolol   Monitor Creatinine : if elevated more then 30 % will DC ACEI   Cardio follow up New diagnosis, not in acute exacerbation   ECHO: severe LV dysfunction, grade II dysfunction   Lisinopril 2.5 mg started , c/w metoprolol   Monitor Creatinine : if elevated more then 30 % will DC ACEI   Cardio follow up  cannot get CAth untill 4 weeks due to acute stroke

## 2018-11-14 NOTE — PROGRESS NOTE ADULT - PROBLEM SELECTOR PLAN 1
c/w aspirin, statin, thorazine  DC tele   f/u TTE with bubble study: No PFO, severe LV dysfunction, with grade II DD.   f/u neurology : AC started: Eliquis   Cardio follow up   Pt had MRI and MRA at [facility], report in chart.  Hypercoagulable work up for possible cause of stroke  LE doppler : negative   Dr Chavira consulted

## 2018-11-14 NOTE — PROGRESS NOTE ADULT - SUBJECTIVE AND OBJECTIVE BOX
PGY 1 Note discussed with supervising resident and primary attending    Patient is a 53y old  Male who presents with a chief complaint of weakness, slurred speech (14 Nov 2018 12:36)      INTERVAL HPI/OVERNIGHT EVENTS:     Patient is seen at the bedside. No new complains     MEDICATIONS  (STANDING):  apixaban 5 milliGRAM(s) Oral every 12 hours  aspirin enteric coated 81 milliGRAM(s) Oral daily  atorvastatin 40 milliGRAM(s) Oral at bedtime  docusate sodium 100 milliGRAM(s) Oral two times a day  insulin glargine Injectable (LANTUS) 43 Unit(s) SubCutaneous at bedtime  insulin lispro (HumaLOG) corrective regimen sliding scale   SubCutaneous three times a day before meals  insulin lispro (HumaLOG) corrective regimen sliding scale   SubCutaneous at bedtime  insulin lispro Injectable (HumaLOG) 4 Unit(s) SubCutaneous three times a day before meals  lisinopril 2.5 milliGRAM(s) Oral daily  metoprolol tartrate 25 milliGRAM(s) Oral two times a day  pantoprazole    Tablet 40 milliGRAM(s) Oral before breakfast  polyethylene glycol 3350 17 Gram(s) Oral daily  senna 2 Tablet(s) Oral at bedtime    MEDICATIONS  (PRN):  acetaminophen   Tablet .. 650 milliGRAM(s) Oral every 6 hours PRN Mild Pain (1 - 3)  chlorproMAZINE    Tablet 10 milliGRAM(s) Oral every 8 hours PRN hiccups  oxyCODONE    5 mG/acetaminophen 325 mG 1 Tablet(s) Oral every 8 hours PRN Severe Pain (7 - 10)      __________________________________________________  REVIEW OF SYSTEMS:    CONSTITUTIONAL: No fever,   EYES: no acute visual disturbances  NECK: No pain or stiffness  RESPIRATORY: No cough; No shortness of breath  CARDIOVASCULAR: No chest pain, no palpitations  GASTROINTESTINAL: No pain. No nausea or vomiting; No diarrhea   NEUROLOGICAL: No headache or numbness, no tremors  MUSCULOSKELETAL: No joint pain, no muscle pain  GENITOURINARY: no dysuria, no frequency, no hesitancy  PSYCHIATRY: no depression , no anxiety  ALL OTHER  ROS negative        Vital Signs Last 24 Hrs  T(C): 36.7 (14 Nov 2018 14:24), Max: 37.1 (14 Nov 2018 02:11)  T(F): 98.1 (14 Nov 2018 14:24), Max: 98.8 (14 Nov 2018 02:11)  HR: 80 (14 Nov 2018 14:24) (80 - 86)  BP: 117/77 (14 Nov 2018 14:24) (116/77 - 132/85)  BP(mean): --  RR: 18 (14 Nov 2018 14:24) (18 - 18)  SpO2: 100% (14 Nov 2018 14:24) (98% - 100%)    ________________________________________________  PHYSICAL EXAM:  GENERAL: NAD  HEENT: Normocephalic;  conjunctivae and sclerae clear; moist mucous membranes;   NECK : supple  CHEST/LUNG: Clear to auscultation bilaterally with good air entry   HEART: S1 S2  regular; no murmurs, gallops or rubs  ABDOMEN: Soft, Nontender, Nondistended; Bowel sounds present  EXTREMITIES: Right side weakness   SKIN: warm and dry; no rash  NERVOUS SYSTEM:  Awake and alert; Oriented  to place, person and time ; no new deficits    _________________________________________________  LABS:                        13.6   6.9   )-----------( 235      ( 14 Nov 2018 06:16 )             38.5     11-14    130<L>  |  93<L>  |  32<H>  ----------------------------<  239<H>  4.5   |  29  |  1.33<H>    Ca    9.2      14 Nov 2018 06:16  Phos  3.4     11-14  Mg     2.0     11-14    TPro  7.9  /  Alb  3.2<L>  /  TBili  0.3  /  DBili  x   /  AST  20  /  ALT  26  /  AlkPhos  118  11-14        CAPILLARY BLOOD GLUCOSE      POCT Blood Glucose.: 188 mg/dL (14 Nov 2018 12:17)  POCT Blood Glucose.: 220 mg/dL (14 Nov 2018 08:47)  POCT Blood Glucose.: 330 mg/dL (13 Nov 2018 21:14)  POCT Blood Glucose.: 228 mg/dL (13 Nov 2018 16:45)        RADIOLOGY & ADDITIONAL TESTS:    Imaging Personally Reviewed:  YES/NO    Consultant(s) Notes Reviewed:   YES/ No    Care Discussed with Consultants :     Plan of care was discussed with patient and /or primary care giver; all questions and concerns were addressed and care was aligned with patient's wishes.

## 2018-11-14 NOTE — PROGRESS NOTE ADULT - PROBLEM SELECTOR PLAN 8
lisinopril restarted in the presence of CHF   c/w metoprolol with parameters  continue to monitor BP

## 2018-11-14 NOTE — PROGRESS NOTE ADULT - SUBJECTIVE AND OBJECTIVE BOX
HPI:  Pt is 54 yo M with PMH of HTN, HLD, DM, CABG, s/p stroke 6 days ago at [facility] in [place] at which time he left AMA due to having to deal with rent issues, presents to ED due to worsening weakness. Pt states that at prior hospital admission, he had presented with left sided upper and lower extremity numbness. Pt denies cp, sob, abd pain, fever, chills. Pt admits to nausea and vomiting. Per patient's brother, at baseline patient able to ambulate but noted that patient has been unable to walk unassisted since coming home yesterday night and speech is slurred. (09 Nov 2018 14:06)      Patient is a 53y old  Male who presents with a chief complaint of weakness, slurred speech (13 Nov 2018 15:00)      INTERVAL HPI/OVERNIGHT EVENTS:  T(C): 37 (11-14-18 @ 05:13), Max: 37.1 (11-14-18 @ 02:11)  HR: 86 (11-14-18 @ 05:13) (77 - 86)  BP: 119/92 (11-14-18 @ 05:13) (113/64 - 132/85)  RR: 18 (11-14-18 @ 05:13) (18 - 18)  SpO2: 100% (11-14-18 @ 05:13) (98% - 100%)  Wt(kg): --  I&O's Summary    13 Nov 2018 07:01  -  14 Nov 2018 07:00  --------------------------------------------------------  IN: 240 mL / OUT: 850 mL / NET: -610 mL        REVIEW OF SYSTEMS: denies fever, chills, SOB, palpitations, chest pain, abdominal pain, nausea, vomitting, diarrhea, constipation, dizziness    MEDICATIONS  (STANDING):  aspirin enteric coated 81 milliGRAM(s) Oral daily  atorvastatin 40 milliGRAM(s) Oral at bedtime  docusate sodium 100 milliGRAM(s) Oral two times a day  enoxaparin Injectable 40 milliGRAM(s) SubCutaneous daily  insulin glargine Injectable (LANTUS) 43 Unit(s) SubCutaneous at bedtime  insulin lispro (HumaLOG) corrective regimen sliding scale   SubCutaneous three times a day before meals  insulin lispro (HumaLOG) corrective regimen sliding scale   SubCutaneous at bedtime  insulin lispro Injectable (HumaLOG) 4 Unit(s) SubCutaneous three times a day before meals  lisinopril 2.5 milliGRAM(s) Oral daily  metoprolol tartrate 25 milliGRAM(s) Oral two times a day  pantoprazole    Tablet 40 milliGRAM(s) Oral before breakfast  polyethylene glycol 3350 17 Gram(s) Oral daily  senna 2 Tablet(s) Oral at bedtime    MEDICATIONS  (PRN):  acetaminophen   Tablet .. 650 milliGRAM(s) Oral every 6 hours PRN Mild Pain (1 - 3)  chlorproMAZINE    Tablet 10 milliGRAM(s) Oral every 8 hours PRN hiccups  oxyCODONE    5 mG/acetaminophen 325 mG 1 Tablet(s) Oral every 8 hours PRN Severe Pain (7 - 10)      PHYSICAL EXAM:  GENERAL: NAD, well-groomed, well-developed  HEAD:  Atraumatic, Normocephalic  EYES: EOMI, PERRLA, conjunctiva and sclera clear  ENMT: No tonsillar erythema, exudates, or enlargement; Moist mucous membranes, Good dentition, No lesions  NECK: Supple, No JVD, Normal thyroid  NERVOUS SYSTEM:  Alert & Oriented X3, Good concentration; Motor Strength 5/5 B/L upper and lower extremities; DTRs 2+ intact and symmetric  CHEST/LUNG: Clear to percussion bilaterally; No rales, rhonchi, wheezing, or rubs  HEART: Regular rate and rhythm; No murmurs, rubs, or gallops  ABDOMEN: Soft, Nontender, Nondistended; Bowel sounds present  EXTREMITIES:  2+ Peripheral Pulses, No clubbing, cyanosis, or edema  LYMPH: No lymphadenopathy noted  SKIN: No rashes or lesions  LABS:                        13.6   6.9   )-----------( 235      ( 14 Nov 2018 06:16 )             38.5     11-14    130<L>  |  93<L>  |  32<H>  ----------------------------<  239<H>  4.5   |  29  |  1.33<H>    Ca    9.2      14 Nov 2018 06:16  Phos  3.4     11-14  Mg     2.0     11-14    TPro  7.9  /  Alb  3.2<L>  /  TBili  0.3  /  DBili  x   /  AST  20  /  ALT  26  /  AlkPhos  118  11-14        CAPILLARY BLOOD GLUCOSE      POCT Blood Glucose.: 188 mg/dL (14 Nov 2018 12:17)  POCT Blood Glucose.: 220 mg/dL (14 Nov 2018 08:47)  POCT Blood Glucose.: 330 mg/dL (13 Nov 2018 21:14)  POCT Blood Glucose.: 228 mg/dL (13 Nov 2018 16:45)

## 2018-11-14 NOTE — PROGRESS NOTE ADULT - PROBLEM SELECTOR PLAN 2
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

## 2018-11-15 LAB
ALBUMIN SERPL ELPH-MCNC: 3.1 G/DL — LOW (ref 3.5–5)
ALP SERPL-CCNC: 126 U/L — HIGH (ref 40–120)
ALT FLD-CCNC: 27 U/L DA — SIGNIFICANT CHANGE UP (ref 10–60)
ANION GAP SERPL CALC-SCNC: 8 MMOL/L — SIGNIFICANT CHANGE UP (ref 5–17)
AST SERPL-CCNC: 18 U/L — SIGNIFICANT CHANGE UP (ref 10–40)
B2 GLYCOPROT1 AB SER QL: POSITIVE
BASOPHILS # BLD AUTO: 0.1 K/UL — SIGNIFICANT CHANGE UP (ref 0–0.2)
BASOPHILS NFR BLD AUTO: 1.5 % — SIGNIFICANT CHANGE UP (ref 0–2)
BILIRUB SERPL-MCNC: 0.3 MG/DL — SIGNIFICANT CHANGE UP (ref 0.2–1.2)
BUN SERPL-MCNC: 31 MG/DL — HIGH (ref 7–18)
CALCIUM SERPL-MCNC: 9.2 MG/DL — SIGNIFICANT CHANGE UP (ref 8.4–10.5)
CARDIOLIPIN AB SER-ACNC: NEGATIVE — SIGNIFICANT CHANGE UP
CHLORIDE SERPL-SCNC: 97 MMOL/L — SIGNIFICANT CHANGE UP (ref 96–108)
CO2 SERPL-SCNC: 26 MMOL/L — SIGNIFICANT CHANGE UP (ref 22–31)
CREAT SERPL-MCNC: 1.48 MG/DL — HIGH (ref 0.5–1.3)
EOSINOPHIL # BLD AUTO: 0.2 K/UL — SIGNIFICANT CHANGE UP (ref 0–0.5)
EOSINOPHIL NFR BLD AUTO: 3 % — SIGNIFICANT CHANGE UP (ref 0–6)
GLUCOSE BLDC GLUCOMTR-MCNC: 159 MG/DL — HIGH (ref 70–99)
GLUCOSE BLDC GLUCOMTR-MCNC: 205 MG/DL — HIGH (ref 70–99)
GLUCOSE BLDC GLUCOMTR-MCNC: 263 MG/DL — HIGH (ref 70–99)
GLUCOSE BLDC GLUCOMTR-MCNC: 282 MG/DL — HIGH (ref 70–99)
GLUCOSE SERPL-MCNC: 260 MG/DL — HIGH (ref 70–99)
HBA1C BLD-MCNC: 8.2 % — HIGH (ref 4–5.6)
HCT VFR BLD CALC: 36.3 % — LOW (ref 39–50)
HGB BLD-MCNC: 12.7 G/DL — LOW (ref 13–17)
LYMPHOCYTES # BLD AUTO: 2.5 K/UL — SIGNIFICANT CHANGE UP (ref 1–3.3)
LYMPHOCYTES # BLD AUTO: 38.3 % — SIGNIFICANT CHANGE UP (ref 13–44)
MAGNESIUM SERPL-MCNC: 2 MG/DL — SIGNIFICANT CHANGE UP (ref 1.6–2.6)
MCHC RBC-ENTMCNC: 31.3 PG — SIGNIFICANT CHANGE UP (ref 27–34)
MCHC RBC-ENTMCNC: 35 GM/DL — SIGNIFICANT CHANGE UP (ref 32–36)
MCV RBC AUTO: 89.3 FL — SIGNIFICANT CHANGE UP (ref 80–100)
MONOCYTES # BLD AUTO: 0.7 K/UL — SIGNIFICANT CHANGE UP (ref 0–0.9)
MONOCYTES NFR BLD AUTO: 11.5 % — SIGNIFICANT CHANGE UP (ref 2–14)
NEUTROPHILS # BLD AUTO: 2.9 K/UL — SIGNIFICANT CHANGE UP (ref 1.8–7.4)
NEUTROPHILS NFR BLD AUTO: 45.7 % — SIGNIFICANT CHANGE UP (ref 43–77)
PHOSPHATE SERPL-MCNC: 3.4 MG/DL — SIGNIFICANT CHANGE UP (ref 2.5–4.5)
PLATELET # BLD AUTO: 218 K/UL — SIGNIFICANT CHANGE UP (ref 150–400)
POTASSIUM SERPL-MCNC: 4.7 MMOL/L — SIGNIFICANT CHANGE UP (ref 3.5–5.3)
POTASSIUM SERPL-SCNC: 4.7 MMOL/L — SIGNIFICANT CHANGE UP (ref 3.5–5.3)
PROT SERPL-MCNC: 7.7 G/DL — SIGNIFICANT CHANGE UP (ref 6–8.3)
RBC # BLD: 4.07 M/UL — LOW (ref 4.2–5.8)
RBC # FLD: 11.5 % — SIGNIFICANT CHANGE UP (ref 10.3–14.5)
SODIUM SERPL-SCNC: 131 MMOL/L — LOW (ref 135–145)
WBC # BLD: 6.4 K/UL — SIGNIFICANT CHANGE UP (ref 3.8–10.5)
WBC # FLD AUTO: 6.4 K/UL — SIGNIFICANT CHANGE UP (ref 3.8–10.5)

## 2018-11-15 PROCEDURE — 99232 SBSQ HOSP IP/OBS MODERATE 35: CPT

## 2018-11-15 RX ORDER — TRAMADOL HYDROCHLORIDE 50 MG/1
25 TABLET ORAL ONCE
Qty: 0 | Refills: 0 | Status: DISCONTINUED | OUTPATIENT
Start: 2018-11-15 | End: 2018-11-15

## 2018-11-15 RX ORDER — MULTIVIT WITH MIN/MFOLATE/K2 340-15/3 G
150 POWDER (GRAM) ORAL ONCE
Qty: 0 | Refills: 0 | Status: COMPLETED | OUTPATIENT
Start: 2018-11-15 | End: 2018-11-15

## 2018-11-15 RX ADMIN — Medication 25 MILLIGRAM(S): at 18:00

## 2018-11-15 RX ADMIN — Medication 4 UNIT(S): at 08:35

## 2018-11-15 RX ADMIN — INSULIN GLARGINE 43 UNIT(S): 100 INJECTION, SOLUTION SUBCUTANEOUS at 21:17

## 2018-11-15 RX ADMIN — LISINOPRIL 2.5 MILLIGRAM(S): 2.5 TABLET ORAL at 05:50

## 2018-11-15 RX ADMIN — Medication 81 MILLIGRAM(S): at 12:33

## 2018-11-15 RX ADMIN — Medication 100 MILLIGRAM(S): at 05:50

## 2018-11-15 RX ADMIN — Medication 4: at 12:33

## 2018-11-15 RX ADMIN — APIXABAN 5 MILLIGRAM(S): 2.5 TABLET, FILM COATED ORAL at 05:50

## 2018-11-15 RX ADMIN — PANTOPRAZOLE SODIUM 40 MILLIGRAM(S): 20 TABLET, DELAYED RELEASE ORAL at 05:51

## 2018-11-15 RX ADMIN — Medication 25 MILLIGRAM(S): at 05:50

## 2018-11-15 RX ADMIN — Medication 4 UNIT(S): at 18:00

## 2018-11-15 RX ADMIN — Medication 100 MILLIGRAM(S): at 18:01

## 2018-11-15 RX ADMIN — TRAMADOL HYDROCHLORIDE 25 MILLIGRAM(S): 50 TABLET ORAL at 22:51

## 2018-11-15 RX ADMIN — Medication 6: at 08:34

## 2018-11-15 RX ADMIN — SENNA PLUS 2 TABLET(S): 8.6 TABLET ORAL at 21:17

## 2018-11-15 RX ADMIN — TRAMADOL HYDROCHLORIDE 25 MILLIGRAM(S): 50 TABLET ORAL at 23:20

## 2018-11-15 RX ADMIN — Medication 6: at 17:59

## 2018-11-15 RX ADMIN — Medication 4 UNIT(S): at 12:34

## 2018-11-15 RX ADMIN — ATORVASTATIN CALCIUM 40 MILLIGRAM(S): 80 TABLET, FILM COATED ORAL at 21:17

## 2018-11-15 RX ADMIN — POLYETHYLENE GLYCOL 3350 17 GRAM(S): 17 POWDER, FOR SOLUTION ORAL at 12:34

## 2018-11-15 RX ADMIN — Medication 150 MILLILITER(S): at 14:43

## 2018-11-15 NOTE — PROGRESS NOTE ADULT - SUBJECTIVE AND OBJECTIVE BOX
HPI:  Pt is 54 yo M with PMH of HTN, HLD, DM, CABG, s/p stroke 6 days ago at [facility] in [place] at which time he left AMA due to having to deal with rent issues, presents to ED due to worsening weakness. Pt states that at prior hospital admission, he had presented with left sided upper and lower extremity numbness. Pt denies cp, sob, abd pain, fever, chills. Pt admits to nausea and vomiting. Per patient's brother, at baseline patient able to ambulate but noted that patient has been unable to walk unassisted since coming home yesterday night and speech is slurred. (09 Nov 2018 14:06)      Patient is a 53y old  Male who presents with a chief complaint of weakness, slurred speech (15 Nov 2018 11:59)      INTERVAL HPI/OVERNIGHT EVENTS:  T(C): 36.4 (11-15-18 @ 05:19), Max: 36.8 (11-14-18 @ 21:25)  HR: 79 (11-15-18 @ 10:23) (77 - 88)  BP: 120/69 (11-15-18 @ 10:23) (117/72 - 140/75)  RR: 17 (11-15-18 @ 05:19) (17 - 18)  SpO2: 100% (11-15-18 @ 10:23) (100% - 100%)  Wt(kg): --  I&O's Summary    14 Nov 2018 07:01  -  15 Nov 2018 07:00  --------------------------------------------------------  IN: 0 mL / OUT: 1600 mL / NET: -1600 mL        REVIEW OF SYSTEMS: denies fever, chills, SOB, palpitations, chest pain, abdominal pain, nausea, vomitting, diarrhea, constipation, dizziness    MEDICATIONS  (STANDING):  apixaban 5 milliGRAM(s) Oral every 12 hours  aspirin enteric coated 81 milliGRAM(s) Oral daily  atorvastatin 40 milliGRAM(s) Oral at bedtime  docusate sodium 100 milliGRAM(s) Oral two times a day  insulin glargine Injectable (LANTUS) 43 Unit(s) SubCutaneous at bedtime  insulin lispro (HumaLOG) corrective regimen sliding scale   SubCutaneous three times a day before meals  insulin lispro (HumaLOG) corrective regimen sliding scale   SubCutaneous at bedtime  insulin lispro Injectable (HumaLOG) 4 Unit(s) SubCutaneous three times a day before meals  lisinopril 2.5 milliGRAM(s) Oral daily  magnesium citrate Solution 150 milliLiter(s) Oral once  metoprolol tartrate 25 milliGRAM(s) Oral two times a day  pantoprazole    Tablet 40 milliGRAM(s) Oral before breakfast  polyethylene glycol 3350 17 Gram(s) Oral daily  senna 2 Tablet(s) Oral at bedtime    MEDICATIONS  (PRN):  acetaminophen   Tablet .. 650 milliGRAM(s) Oral every 6 hours PRN Mild Pain (1 - 3)  chlorproMAZINE    Tablet 10 milliGRAM(s) Oral every 8 hours PRN hiccups  oxyCODONE    5 mG/acetaminophen 325 mG 1 Tablet(s) Oral every 8 hours PRN Severe Pain (7 - 10)      PHYSICAL EXAM:  GENERAL: NAD, well-groomed, well-developed  HEAD:  Atraumatic, Normocephalic  EYES: EOMI, PERRLA, conjunctiva and sclera clear  ENMT: No tonsillar erythema, exudates, or enlargement; Moist mucous membranes, Good dentition, No lesions  NECK: Supple, No JVD, Normal thyroid  NERVOUS SYSTEM:  Alert & Oriented X3, Good concentration; Motor Strength 5/5 B/L upper and lower extremities; DTRs 2+ intact and symmetric  CHEST/LUNG: Clear to percussion bilaterally; No rales, rhonchi, wheezing, or rubs  HEART: Regular rate and rhythm; No murmurs, rubs, or gallops  ABDOMEN: Soft, Nontender, Nondistended; Bowel sounds present  EXTREMITIES:  2+ Peripheral Pulses, No clubbing, cyanosis, or edema  LYMPH: No lymphadenopathy noted  SKIN: No rashes or lesions  LABS:                        12.7   6.4   )-----------( 218      ( 15 Nov 2018 06:14 )             36.3     11-15    131<L>  |  97  |  31<H>  ----------------------------<  260<H>  4.7   |  26  |  1.48<H>    Ca    9.2      15 Nov 2018 06:14  Phos  3.4     11-15  Mg     2.0     11-15    TPro  7.7  /  Alb  3.1<L>  /  TBili  0.3  /  DBili  x   /  AST  18  /  ALT  27  /  AlkPhos  126<H>  11-15        CAPILLARY BLOOD GLUCOSE      POCT Blood Glucose.: 205 mg/dL (15 Nov 2018 11:56)  POCT Blood Glucose.: 263 mg/dL (15 Nov 2018 08:10)  POCT Blood Glucose.: 249 mg/dL (14 Nov 2018 21:15)  POCT Blood Glucose.: 281 mg/dL (14 Nov 2018 16:58)

## 2018-11-15 NOTE — CONSULT NOTE ADULT - SUBJECTIVE AND OBJECTIVE BOX
Patient is a 53y old  Male who presents with a chief complaint of weakness, slurred speech (15 Nov 2018 15:07)      HPI:  Pt is 54 yo M with PMH of HTN, HLD, DM, CABG, s/p stroke 6 days ago at [facility] in [place] at which time he left AMA due to having to deal with rent issues, presents to ED due to worsening weakness. Pt states that at prior hospital admission, he had presented with left sided upper and lower extremity numbness. Pt denies cp, sob, abd pain, fever, chills. Pt admits to nausea and vomiting. Per patient's brother, at baseline patient able to ambulate but noted that patient has been unable to walk unassisted since coming home yesterday night and speech is slurred. (09 Nov 2018 14:06) Echo showed EF 30-35%.  No Afib, no PFO.  he was put on heparin and developed hematoma at gluteus with supertherapeutic PTT.       ROS:  Negative except for:    PAST MEDICAL & SURGICAL HISTORY:  Cerebrovascular accident (CVA), unspecified mechanism  Hyperlipidemia, unspecified hyperlipidemia type  Coronary artery disease involving native heart without angina pectoris, unspecified vessel or lesion type  Hypertension, unspecified type  Diabetes mellitus of other type without complication  History of appendectomy  S/P CABG x 1      SOCIAL HISTORY:    FAMILY HISTORY:  No pertinent family history in first degree relatives      MEDICATIONS  (STANDING):  aspirin enteric coated 81 milliGRAM(s) Oral daily  atorvastatin 40 milliGRAM(s) Oral at bedtime  docusate sodium 100 milliGRAM(s) Oral two times a day  insulin glargine Injectable (LANTUS) 43 Unit(s) SubCutaneous at bedtime  insulin lispro (HumaLOG) corrective regimen sliding scale   SubCutaneous three times a day before meals  insulin lispro (HumaLOG) corrective regimen sliding scale   SubCutaneous at bedtime  insulin lispro Injectable (HumaLOG) 4 Unit(s) SubCutaneous three times a day before meals  lisinopril 2.5 milliGRAM(s) Oral daily  metoprolol tartrate 25 milliGRAM(s) Oral two times a day  pantoprazole    Tablet 40 milliGRAM(s) Oral before breakfast  polyethylene glycol 3350 17 Gram(s) Oral daily  senna 2 Tablet(s) Oral at bedtime    MEDICATIONS  (PRN):  acetaminophen   Tablet .. 650 milliGRAM(s) Oral every 6 hours PRN Mild Pain (1 - 3)  chlorproMAZINE    Tablet 10 milliGRAM(s) Oral every 8 hours PRN hiccups  oxyCODONE    5 mG/acetaminophen 325 mG 1 Tablet(s) Oral every 8 hours PRN Severe Pain (7 - 10)      Allergies    No Known Allergies    Intolerances        Vital Signs Last 24 Hrs  T(C): 36.4 (15 Nov 2018 05:19), Max: 36.8 (14 Nov 2018 21:25)  T(F): 97.6 (15 Nov 2018 05:19), Max: 98.2 (14 Nov 2018 21:25)  HR: 89 (15 Nov 2018 18:03) (77 - 89)  BP: 118/68 (15 Nov 2018 18:03) (117/72 - 132/74)  BP(mean): --  RR: 17 (15 Nov 2018 05:19) (17 - 18)  SpO2: 100% (15 Nov 2018 10:23) (100% - 100%)    PHYSICAL EXAM  General: adult in NAD  HEENT: clear oropharynx, anicteric sclera, pink conjunctiva  Neck: supple  CV: normal S1/S2 with no murmur rubs or gallops  Lungs: positive air movement b/l ant lungs,clear to auscultation, no wheezes, no rales  Abdomen: soft non-tender non-distended, no hepatosplenomegaly  Ext: no clubbing cyanosis or edema  Skin: no rashes and no petechiae  Neuro: alert and oriented X 4, no focal deficits      LABS:                          12.7   6.4   )-----------( 218      ( 15 Nov 2018 06:14 )             36.3         Mean Cell Volume : 89.3 fl  Mean Cell Hemoglobin : 31.3 pg  Mean Cell Hemoglobin Concentration : 35.0 gm/dL  Auto Neutrophil # : 2.9 K/uL  Auto Lymphocyte # : 2.5 K/uL  Auto Monocyte # : 0.7 K/uL  Auto Eosinophil # : 0.2 K/uL  Auto Basophil # : 0.1 K/uL  Auto Neutrophil % : 45.7 %  Auto Lymphocyte % : 38.3 %  Auto Monocyte % : 11.5 %  Auto Eosinophil % : 3.0 %  Auto Basophil % : 1.5 %      Serial CBC's  11-15 @ 06:14  Hct-36.3 / Hgb-12.7 / Plat-218 / RBC-4.07 / WBC-6.4  Serial CBC's  11-14 @ 06:16  Hct-38.5 / Hgb-13.6 / Plat-235 / RBC-4.37 / WBC-6.9  Serial CBC's  11-13 @ 07:15  Hct-42.7 / Hgb-14.5 / Plat-241 / RBC-4.75 / WBC-6.8  Serial CBC's  11-12 @ 17:00  Hct-42.0 / Hgb-14.6 / Plat-264 / RBC-4.77 / WBC-8.0  Serial CBC's  11-12 @ 05:47  Hct-41.9 / Hgb-14.6 / Plat-250 / RBC-4.76 / WBC-6.7      11-15    131<L>  |  97  |  31<H>  ----------------------------<  260<H>  4.7   |  26  |  1.48<H>    Ca    9.2      15 Nov 2018 06:14  Phos  3.4     11-15  Mg     2.0     11-15    TPro  7.7  /  Alb  3.1<L>  /  TBili  0.3  /  DBili  x   /  AST  18  /  ALT  27  /  AlkPhos  126<H>  11-15          Vitamin B12, Serum: 703 pg/mL (11-10 @ 09:17)              BLOOD SMEAR INTERPRETATION:       RADIOLOGY & ADDITIONAL STUDIES:< from: CT Head No Cont (11.09.18 @ 12:10) >    TECHNIQUE: CT of the head was performed without IV contrast.    COMPARISON: None.    FINDINGS:  There are areas of low attenuation in the periventricular white matter   likely related to mild chronic microvascular ischemic changes.    There is no acute intracranial hemorrhage, parenchymal mass, mass effect   or midline shift. . There is no hydrocephalus.    Small 1 x 1 cm age-indeterminate infarct left occipital lobe noted    Focal hypodensity inferior left basal ganglia region noted likely   representing focal dilated perivascular space.    The cranium is intact. Left lamina papyracea fracture noted likely  chronic mild soft tissue swelling noted posteriorly.    Small retention cyst/polyp ethmoid sinus.    IMPRESSION:  Small age-indeterminate infarct left occipital lobe. MRI exam could be   obtained for further evaluation as clinically warranted.    No acute intracranial hemorrhage.    < end of copied text >

## 2018-11-15 NOTE — DIETITIAN INITIAL EVALUATION ADULT. - OTHER INFO
Pt visited. Pt seen for LOS= 7 days. Pt Reports Good appetite. H/O DM x ~30 yrs. Pt takes insulin at Home. Per  pt he is compliant  with  insulin and  Checking blood sugars  and  Diet. Weight is stable.  Pt   agreed to try  Extra snacks d/t  increased Nutrient Needs Pt is 6 feet  3 inches.. NKFA

## 2018-11-15 NOTE — PROGRESS NOTE ADULT - SUBJECTIVE AND OBJECTIVE BOX
no events overnight    No Known Allergies    Hospital Medications:   MEDICATIONS  (STANDING):  apixaban 5 milliGRAM(s) Oral every 12 hours  aspirin enteric coated 81 milliGRAM(s) Oral daily  atorvastatin 40 milliGRAM(s) Oral at bedtime  docusate sodium 100 milliGRAM(s) Oral two times a day  insulin glargine Injectable (LANTUS) 43 Unit(s) SubCutaneous at bedtime  insulin lispro (HumaLOG) corrective regimen sliding scale   SubCutaneous three times a day before meals  insulin lispro (HumaLOG) corrective regimen sliding scale   SubCutaneous at bedtime  insulin lispro Injectable (HumaLOG) 4 Unit(s) SubCutaneous three times a day before meals  lisinopril 2.5 milliGRAM(s) Oral daily  metoprolol tartrate 25 milliGRAM(s) Oral two times a day  pantoprazole    Tablet 40 milliGRAM(s) Oral before breakfast  polyethylene glycol 3350 17 Gram(s) Oral daily  senna 2 Tablet(s) Oral at bedtime        VITALS:  T(F): 97.6 (11-15-18 @ 05:19), Max: 98.2 (18 @ 21:25)  HR: 79 (11-15-18 @ 10:23)  BP: 120/69 (11-15-18 @ 10:23)  RR: 17 (11-15-18 @ 05:19)  SpO2: 100% (11-15-18 @ 10:23)  Wt(kg): --     @ 07:01  -  11-15 @ 07:00  --------------------------------------------------------  IN: 0 mL / OUT: 1600 mL / NET: -1600 mL        PHYSICAL EXAM:  Constitutional: NAD  HEENT: anicteric sclera, oropharynx clear.  Neck: No JVD  Respiratory: CTAB, no wheezes, rales or rhonchi  Cardiovascular: S1, S2, RRR  Gastrointestinal: BS+, soft, NT/ND  Extremities: No peripheral edema  Neurological: A/O x 3, no focal deficits  Psychiatric: Normal mood, normal affect  : No CVA tenderness. No mayers.       LABS:  11-15    131<L>  |  97  |  31<H>  ----------------------------<  260<H>  4.7   |  26  |  1.48<H>    Ca    9.2      15 Nov 2018 06:14  Phos  3.4     11-15  Mg     2.0     11-15    TPro  7.7  /  Alb  3.1<L>  /  TBili  0.3  /  DBili      /  AST  18  /  ALT  27  /  AlkPhos  126<H>  11-15    Creatinine Trend: 1.48 <--, 1.33 <--, 1.45 <--, 1.53 <--, 1.80 <--, 1.73 <--, 1.93 <--, 1.91 <--, 1.74 <--, 1.25 <--, 1.19 <--, 2.40 <--, 2.43 <--                        12.7   6.4   )-----------( 218      ( 15 Nov 2018 06:14 )             36.3     Urine Studies:  Urinalysis Basic - ( 2018 22:19 )    Color: Yellow / Appearance: Clear / S.005 / pH:   Gluc:  / Ketone: Negative  / Bili: Negative / Urobili: Negative   Blood:  / Protein: 15 / Nitrite: Negative   Leuk Esterase: Negative / RBC: 0-2 /HPF / WBC 0-2 /HPF   Sq Epi:  / Non Sq Epi:  / Bacteria: Trace /HPF      Osmolality, Random Urine: 517 mos/kg (11-10 @ 13:46)  Sodium, Random Urine: 75 mmol/L (11-10 @ 13:46)  Osmolality, Random Urine: 461 mos/kg ( @ 22:21)  Creatinine, Random Urine: 79 mg/dL ( @ 22:20)  Sodium, Random Urine: 66 mmol/L ( @ 22:20)    RADIOLOGY & ADDITIONAL STUDIES:

## 2018-11-15 NOTE — PROGRESS NOTE ADULT - SUBJECTIVE AND OBJECTIVE BOX
PGY 1 Note discussed with supervising resident and primary attending    Patient is a 53y old  Male who presents with a chief complaint of weakness, slurred speech (15 Nov 2018 11:45)      INTERVAL HPI/OVERNIGHT EVENTS:  Patient seen at  the bedside. No new complains.     MEDICATIONS  (STANDING):  apixaban 5 milliGRAM(s) Oral every 12 hours  aspirin enteric coated 81 milliGRAM(s) Oral daily  atorvastatin 40 milliGRAM(s) Oral at bedtime  docusate sodium 100 milliGRAM(s) Oral two times a day  insulin glargine Injectable (LANTUS) 43 Unit(s) SubCutaneous at bedtime  insulin lispro (HumaLOG) corrective regimen sliding scale   SubCutaneous three times a day before meals  insulin lispro (HumaLOG) corrective regimen sliding scale   SubCutaneous at bedtime  insulin lispro Injectable (HumaLOG) 4 Unit(s) SubCutaneous three times a day before meals  lisinopril 2.5 milliGRAM(s) Oral daily  magnesium citrate Solution 150 milliLiter(s) Oral once  metoprolol tartrate 25 milliGRAM(s) Oral two times a day  pantoprazole    Tablet 40 milliGRAM(s) Oral before breakfast  polyethylene glycol 3350 17 Gram(s) Oral daily  senna 2 Tablet(s) Oral at bedtime    MEDICATIONS  (PRN):  acetaminophen   Tablet .. 650 milliGRAM(s) Oral every 6 hours PRN Mild Pain (1 - 3)  chlorproMAZINE    Tablet 10 milliGRAM(s) Oral every 8 hours PRN hiccups  oxyCODONE    5 mG/acetaminophen 325 mG 1 Tablet(s) Oral every 8 hours PRN Severe Pain (7 - 10)      __________________________________________________  REVIEW OF SYSTEMS:    CONSTITUTIONAL: No fever,   EYES: no acute visual disturbances  NECK: No pain or stiffness  RESPIRATORY: No cough; No shortness of breath  CARDIOVASCULAR: No chest pain, no palpitations  GASTROINTESTINAL: No pain. No nausea or vomiting; No diarrhea   NEUROLOGICAL: No headache or numbness, no tremors  MUSCULOSKELETAL: No joint pain, no muscle pain  GENITOURINARY: no dysuria, no frequency, no hesitancy  PSYCHIATRY: no depression , no anxiety  ALL OTHER  ROS negative        Vital Signs Last 24 Hrs  T(C): 36.4 (15 Nov 2018 05:19), Max: 36.8 (14 Nov 2018 21:25)  T(F): 97.6 (15 Nov 2018 05:19), Max: 98.2 (14 Nov 2018 21:25)  HR: 79 (15 Nov 2018 10:23) (77 - 88)  BP: 120/69 (15 Nov 2018 10:23) (117/72 - 140/75)  BP(mean): --  RR: 17 (15 Nov 2018 05:19) (17 - 18)  SpO2: 100% (15 Nov 2018 10:23) (100% - 100%)    ________________________________________________  PHYSICAL EXAM:  GENERAL: NAD  HEENT: Normocephalic;  conjunctivae and sclerae clear; moist mucous membranes;   NECK : supple  CHEST/LUNG: Clear to auscultation bilaterally with good air entry   HEART: S1 S2  regular; no murmurs, gallops or rubs  ABDOMEN: Soft, Nontender, Nondistended; Bowel sounds present  EXTREMITIEs: right side weakness   SKIN: warm and dry; no rash  NERVOUS SYSTEM:  Awake and alert; Oriented  to place, person and time ; no new deficits    _________________________________________________  LABS:                        12.7   6.4   )-----------( 218      ( 15 Nov 2018 06:14 )             36.3     11-15    131<L>  |  97  |  31<H>  ----------------------------<  260<H>  4.7   |  26  |  1.48<H>    Ca    9.2      15 Nov 2018 06:14  Phos  3.4     11-15  Mg     2.0     11-15    TPro  7.7  /  Alb  3.1<L>  /  TBili  0.3  /  DBili  x   /  AST  18  /  ALT  27  /  AlkPhos  126<H>  11-15        CAPILLARY BLOOD GLUCOSE      POCT Blood Glucose.: 205 mg/dL (15 Nov 2018 11:56)  POCT Blood Glucose.: 263 mg/dL (15 Nov 2018 08:10)  POCT Blood Glucose.: 249 mg/dL (14 Nov 2018 21:15)  POCT Blood Glucose.: 281 mg/dL (14 Nov 2018 16:58)  POCT Blood Glucose.: 188 mg/dL (14 Nov 2018 12:17)        RADIOLOGY & ADDITIONAL TESTS:    Care Discussed with Consultants :     Plan of care was discussed with patient and /or primary care giver; all questions and concerns were addressed and care was aligned with patient's wishes.

## 2018-11-15 NOTE — PROGRESS NOTE ADULT - SUBJECTIVE AND OBJECTIVE BOX
Neurology Follow up note    Name  MADISON PERRY    Subjective:  right hip pain improved  no headache  no new neuro c/o    Review of Systems:  Constitutional:      no fever  Respiratory:   no cough                           MEDICATIONS  (STANDING):  aspirin enteric coated 81 milliGRAM(s) Oral daily  atorvastatin 40 milliGRAM(s) Oral at bedtime  docusate sodium 100 milliGRAM(s) Oral two times a day  insulin glargine Injectable (LANTUS) 43 Unit(s) SubCutaneous at bedtime  insulin lispro (HumaLOG) corrective regimen sliding scale   SubCutaneous three times a day before meals  insulin lispro (HumaLOG) corrective regimen sliding scale   SubCutaneous at bedtime  insulin lispro Injectable (HumaLOG) 4 Unit(s) SubCutaneous three times a day before meals  lisinopril 2.5 milliGRAM(s) Oral daily  metoprolol tartrate 25 milliGRAM(s) Oral two times a day  pantoprazole    Tablet 40 milliGRAM(s) Oral before breakfast  polyethylene glycol 3350 17 Gram(s) Oral daily  senna 2 Tablet(s) Oral at bedtime    MEDICATIONS  (PRN):  acetaminophen   Tablet .. 650 milliGRAM(s) Oral every 6 hours PRN Mild Pain (1 - 3)  chlorproMAZINE    Tablet 10 milliGRAM(s) Oral every 8 hours PRN hiccups  oxyCODONE    5 mG/acetaminophen 325 mG 1 Tablet(s) Oral every 8 hours PRN Severe Pain (7 - 10)      Allergies    No Known Allergies    Intolerances        Objective:   Vital Signs Last 24 Hrs  T(C): 36.4 (15 Nov 2018 05:19), Max: 36.8 (14 Nov 2018 21:25)  T(F): 97.6 (15 Nov 2018 05:19), Max: 98.2 (14 Nov 2018 21:25)  HR: 79 (15 Nov 2018 10:23) (77 - 88)  BP: 120/69 (15 Nov 2018 10:23) (117/72 - 140/75)  BP(mean): --  RR: 17 (15 Nov 2018 05:19) (17 - 18)  SpO2: 100% (15 Nov 2018 10:23) (100% - 100%)    General Exam:   General appearance: No acute distress                 Cardiovascular: Pedal dorsalis pulses intact bilaterally    Neurological Exam:  Mental Status: Orientated to self, date and place.  Attention intact.  No dysarthria, aphasia or neglect.     Cranial Nerves: CN I - not tested.  PERRL, EOMI, VFF, no nystagmus or diplopia.  No APD.  Fundi not visualized bilaterally.  CN V1-3 intact to light touch.  No facial asymmetry.      Motor:   Tone: normal.                  Strength: intact throughout  Pronator drift: none                 Dysmeria: None to finger-nose-finger or heel-shin-heel    Sensation: intact to light touch,    Other: NIHSS 0    11-15    131<L>  |  97  |  31<H>  ----------------------------<  260<H>  4.7   |  26  |  1.48<H>    Ca    9.2      15 Nov 2018 06:14  Phos  3.4     11-15  Mg     2.0     11-15    TPro  7.7  /  Alb  3.1<L>  /  TBili  0.3  /  DBili  x   /  AST  18  /  ALT  27  /  AlkPhos  126<H>  11-15    11-15    131<L>  |  97  |  31<H>  ----------------------------<  260<H>  4.7   |  26  |  1.48<H>    Ca    9.2      15 Nov 2018 06:14  Phos  3.4     11-15  Mg     2.0     11-15    TPro  7.7  /  Alb  3.1<L>  /  TBili  0.3  /  DBili  x   /  AST  18  /  ALT  27  /  AlkPhos  126<H>  11-15    LIVER FUNCTIONS - ( 15 Nov 2018 06:14 )  Alb: 3.1 g/dL / Pro: 7.7 g/dL / ALK PHOS: 126 U/L / ALT: 27 U/L DA / AST: 18 U/L / GGT: x             Radiology      tele: NSR  MRA head and neck : intercranial artherosclerosis

## 2018-11-15 NOTE — PROGRESS NOTE ADULT - PROBLEM SELECTOR PLAN 2
New diagnosis, not in acute exacerbation   ECHO: severe LV dysfunction, grade II dysfunction   Lisinopril 2.5 mg started , c/w metoprolol   Monitor Creatinine : if elevated more then 30 % will DC ACEI   Creatinine: 1.33--> 1.48  Cardio follow up  cannot get CAth untill 4 weeks due to acute stroke

## 2018-11-15 NOTE — CONSULT NOTE ADULT - PROBLEM SELECTOR RECOMMENDATION 9
embolic  cryptogenic? 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  there is no intracardiac thrombus either in echo   he does not have afib.  so far there is no indication for AC  will put him on ASA and plavix for 3 weeks, followed by ASA alone.  agree to have ABRAHAN, which can show any atheroma in aorta.  Loop recorder also can be done.

## 2018-11-15 NOTE — PROGRESS NOTE ADULT - PROBLEM SELECTOR PLAN 1
c/w aspirin, statin, thorazine  DC tele   f/u TTE with bubble study: No PFO, severe LV dysfunction, with grade II DD.   AC as per attending.   Cardio follow up   Pt had MRI and MRA at [facility], report in chart.  Hypercoagulable work : negative, B2 glucoprotein positive due to Kidney injury   LE doppler : negative   Dr Chavira consulted    Stroke is most likely due to multiple risk factors, HTN, DM, smoking c/w aspirin, statin, thorazine  DC tele   f/u TTE with bubble study: No PFO, severe LV dysfunction, with grade II DD.   AC discontinued   Neuro follow up: ABRAHAN, recommended if negative, loop recorder  to find cause of cardio embolic cause   Cardio follow up   Pt had MRI and MRA at [facility], report in chart.  Hypercoagulable work : negative, B2 glucoprotein positive due to Kidney injury   LE doppler : negative   Dr Chavira consulted    Stroke is most likely due to multiple risk factors, HTN, DM, smoking

## 2018-11-15 NOTE — PROGRESS NOTE ADULT - SUBJECTIVE AND OBJECTIVE BOX
INTERVAL HPI/OVERNIGHT EVENTS:    no acute events     MEDICATIONS  (STANDING):  apixaban 5 milliGRAM(s) Oral every 12 hours  aspirin enteric coated 81 milliGRAM(s) Oral daily  atorvastatin 40 milliGRAM(s) Oral at bedtime  docusate sodium 100 milliGRAM(s) Oral two times a day  insulin glargine Injectable (LANTUS) 43 Unit(s) SubCutaneous at bedtime  insulin lispro (HumaLOG) corrective regimen sliding scale   SubCutaneous three times a day before meals  insulin lispro (HumaLOG) corrective regimen sliding scale   SubCutaneous at bedtime  insulin lispro Injectable (HumaLOG) 4 Unit(s) SubCutaneous three times a day before meals  lisinopril 2.5 milliGRAM(s) Oral daily  metoprolol tartrate 25 milliGRAM(s) Oral two times a day  pantoprazole    Tablet 40 milliGRAM(s) Oral before breakfast  polyethylene glycol 3350 17 Gram(s) Oral daily  senna 2 Tablet(s) Oral at bedtime    MEDICATIONS  (PRN):  acetaminophen   Tablet .. 650 milliGRAM(s) Oral every 6 hours PRN Mild Pain (1 - 3)  chlorproMAZINE    Tablet 10 milliGRAM(s) Oral every 8 hours PRN hiccups  oxyCODONE    5 mG/acetaminophen 325 mG 1 Tablet(s) Oral every 8 hours PRN Severe Pain (7 - 10)      Allergies    No Known Allergies    Intolerances        Vital Signs Last 24 Hrs  T(C): 36.4 (15 Nov 2018 05:19), Max: 36.8 (14 Nov 2018 21:25)  T(F): 97.6 (15 Nov 2018 05:19), Max: 98.2 (14 Nov 2018 21:25)  HR: 77 (15 Nov 2018 05:19) (77 - 88)  BP: 132/74 (15 Nov 2018 05:19) (117/77 - 140/75)  BP(mean): --  RR: 17 (15 Nov 2018 05:19) (17 - 18)  SpO2: 100% (15 Nov 2018 05:19) (100% - 100%)    PHYSICAL EXAM:    GENERAL: NAD, well-groomed, well-developed  HEENT: Supple, No JVD, Normal thyroid  NERVOUS SYSTEM:  Alert & Oriented X3  CHEST/LUNG: Clear to percussion bilaterally; No rales, rhonchi, wheezing, or rubs  HEART: Regular rate and rhythm; No murmurs, rubs, or gallops  ABDOMEN: Soft, Nontender, Nondistended; Bowel sounds present  EXTREMITIES:  2+ Peripheral Pulses, No clubbing, cyanosis, or edema  SKIN: no rashes      LABS:                        12.7   6.4   )-----------( 218      ( 15 Nov 2018 06:14 )             36.3     11-15    131<L>  |  97  |  31<H>  ----------------------------<  260<H>  4.7   |  26  |  1.48<H>    Ca    9.2      15 Nov 2018 06:14  Phos  3.4     11-15  Mg     2.0     11-15    TPro  7.7  /  Alb  3.1<L>  /  TBili  0.3  /  DBili  x   /  AST  18  /  ALT  27  /  AlkPhos  126<H>  11-15        CAPILLARY BLOOD GLUCOSE      POCT Blood Glucose.: 263 mg/dL (15 Nov 2018 08:10)  POCT Blood Glucose.: 249 mg/dL (14 Nov 2018 21:15)  POCT Blood Glucose.: 281 mg/dL (14 Nov 2018 16:58)  POCT Blood Glucose.: 188 mg/dL (14 Nov 2018 12:17)      RADIOLOGY & ADDITIONAL TESTS:

## 2018-11-16 DIAGNOSIS — F32.4 MAJOR DEPRESSIVE DISORDER, SINGLE EPISODE, IN PARTIAL REMISSION: ICD-10-CM

## 2018-11-16 DIAGNOSIS — F32.9 MAJOR DEPRESSIVE DISORDER, SINGLE EPISODE, UNSPECIFIED: ICD-10-CM

## 2018-11-16 LAB
ALBUMIN SERPL ELPH-MCNC: 3.1 G/DL — LOW (ref 3.5–5)
ALP SERPL-CCNC: 120 U/L — SIGNIFICANT CHANGE UP (ref 40–120)
ALT FLD-CCNC: 30 U/L DA — SIGNIFICANT CHANGE UP (ref 10–60)
ANION GAP SERPL CALC-SCNC: 5 MMOL/L — SIGNIFICANT CHANGE UP (ref 5–17)
APTT BLD: 25.4 SEC — LOW (ref 27.5–36.3)
AST SERPL-CCNC: 18 U/L — SIGNIFICANT CHANGE UP (ref 10–40)
BASOPHILS # BLD AUTO: 0.1 K/UL — SIGNIFICANT CHANGE UP (ref 0–0.2)
BASOPHILS NFR BLD AUTO: 1.4 % — SIGNIFICANT CHANGE UP (ref 0–2)
BILIRUB SERPL-MCNC: 0.2 MG/DL — SIGNIFICANT CHANGE UP (ref 0.2–1.2)
BUN SERPL-MCNC: 29 MG/DL — HIGH (ref 7–18)
CALCIUM SERPL-MCNC: 9.1 MG/DL — SIGNIFICANT CHANGE UP (ref 8.4–10.5)
CHLORIDE SERPL-SCNC: 97 MMOL/L — SIGNIFICANT CHANGE UP (ref 96–108)
CO2 SERPL-SCNC: 30 MMOL/L — SIGNIFICANT CHANGE UP (ref 22–31)
CREAT SERPL-MCNC: 1.27 MG/DL — SIGNIFICANT CHANGE UP (ref 0.5–1.3)
DNA PLOIDY SPEC FC-IMP: SIGNIFICANT CHANGE UP
EOSINOPHIL # BLD AUTO: 0.1 K/UL — SIGNIFICANT CHANGE UP (ref 0–0.5)
EOSINOPHIL NFR BLD AUTO: 2.5 % — SIGNIFICANT CHANGE UP (ref 0–6)
GLUCOSE BLDC GLUCOMTR-MCNC: 238 MG/DL — HIGH (ref 70–99)
GLUCOSE BLDC GLUCOMTR-MCNC: 252 MG/DL — HIGH (ref 70–99)
GLUCOSE BLDC GLUCOMTR-MCNC: 256 MG/DL — HIGH (ref 70–99)
GLUCOSE BLDC GLUCOMTR-MCNC: 274 MG/DL — HIGH (ref 70–99)
GLUCOSE SERPL-MCNC: 271 MG/DL — HIGH (ref 70–99)
HCT VFR BLD CALC: 35.8 % — LOW (ref 39–50)
HGB BLD-MCNC: 12.3 G/DL — LOW (ref 13–17)
INR BLD: 0.98 RATIO — SIGNIFICANT CHANGE UP (ref 0.88–1.16)
LYMPHOCYTES # BLD AUTO: 1.9 K/UL — SIGNIFICANT CHANGE UP (ref 1–3.3)
LYMPHOCYTES # BLD AUTO: 33 % — SIGNIFICANT CHANGE UP (ref 13–44)
MAGNESIUM SERPL-MCNC: 2.2 MG/DL — SIGNIFICANT CHANGE UP (ref 1.6–2.6)
MCHC RBC-ENTMCNC: 30.4 PG — SIGNIFICANT CHANGE UP (ref 27–34)
MCHC RBC-ENTMCNC: 34.4 GM/DL — SIGNIFICANT CHANGE UP (ref 32–36)
MCV RBC AUTO: 88.3 FL — SIGNIFICANT CHANGE UP (ref 80–100)
MONOCYTES # BLD AUTO: 0.5 K/UL — SIGNIFICANT CHANGE UP (ref 0–0.9)
MONOCYTES NFR BLD AUTO: 9.5 % — SIGNIFICANT CHANGE UP (ref 2–14)
MTHFR GENE INTERPRETATION: SIGNIFICANT CHANGE UP
NEUTROPHILS # BLD AUTO: 3.1 K/UL — SIGNIFICANT CHANGE UP (ref 1.8–7.4)
NEUTROPHILS NFR BLD AUTO: 53.5 % — SIGNIFICANT CHANGE UP (ref 43–77)
PHOSPHATE SERPL-MCNC: 2.7 MG/DL — SIGNIFICANT CHANGE UP (ref 2.5–4.5)
PLATELET # BLD AUTO: 220 K/UL — SIGNIFICANT CHANGE UP (ref 150–400)
POTASSIUM SERPL-MCNC: 4.9 MMOL/L — SIGNIFICANT CHANGE UP (ref 3.5–5.3)
POTASSIUM SERPL-SCNC: 4.9 MMOL/L — SIGNIFICANT CHANGE UP (ref 3.5–5.3)
PROT SERPL-MCNC: 7.4 G/DL — SIGNIFICANT CHANGE UP (ref 6–8.3)
PROTHROM AB SERPL-ACNC: 10.9 SEC — SIGNIFICANT CHANGE UP (ref 10–12.9)
PTR INTERPRETATION: SIGNIFICANT CHANGE UP
RBC # BLD: 4.05 M/UL — LOW (ref 4.2–5.8)
RBC # FLD: 11.5 % — SIGNIFICANT CHANGE UP (ref 10.3–14.5)
SODIUM SERPL-SCNC: 132 MMOL/L — LOW (ref 135–145)
WBC # BLD: 5.8 K/UL — SIGNIFICANT CHANGE UP (ref 3.8–10.5)
WBC # FLD AUTO: 5.8 K/UL — SIGNIFICANT CHANGE UP (ref 3.8–10.5)

## 2018-11-16 PROCEDURE — 90792 PSYCH DIAG EVAL W/MED SRVCS: CPT

## 2018-11-16 PROCEDURE — 74018 RADEX ABDOMEN 1 VIEW: CPT | Mod: 26

## 2018-11-16 RX ORDER — NICOTINE POLACRILEX 2 MG
1 GUM BUCCAL DAILY
Qty: 0 | Refills: 0 | Status: DISCONTINUED | OUTPATIENT
Start: 2018-11-16 | End: 2018-11-18

## 2018-11-16 RX ORDER — HEPARIN SODIUM 5000 [USP'U]/ML
5000 INJECTION INTRAVENOUS; SUBCUTANEOUS EVERY 8 HOURS
Qty: 0 | Refills: 0 | Status: DISCONTINUED | OUTPATIENT
Start: 2018-11-16 | End: 2018-11-19

## 2018-11-16 RX ADMIN — Medication 4 UNIT(S): at 12:45

## 2018-11-16 RX ADMIN — Medication 4 UNIT(S): at 17:50

## 2018-11-16 RX ADMIN — HEPARIN SODIUM 5000 UNIT(S): 5000 INJECTION INTRAVENOUS; SUBCUTANEOUS at 14:10

## 2018-11-16 RX ADMIN — Medication 1: at 21:36

## 2018-11-16 RX ADMIN — ATORVASTATIN CALCIUM 40 MILLIGRAM(S): 80 TABLET, FILM COATED ORAL at 21:36

## 2018-11-16 RX ADMIN — SENNA PLUS 2 TABLET(S): 8.6 TABLET ORAL at 21:37

## 2018-11-16 RX ADMIN — INSULIN GLARGINE 43 UNIT(S): 100 INJECTION, SOLUTION SUBCUTANEOUS at 21:36

## 2018-11-16 RX ADMIN — Medication 4: at 17:49

## 2018-11-16 RX ADMIN — POLYETHYLENE GLYCOL 3350 17 GRAM(S): 17 POWDER, FOR SOLUTION ORAL at 12:45

## 2018-11-16 RX ADMIN — Medication 1 PATCH: at 12:48

## 2018-11-16 RX ADMIN — LISINOPRIL 2.5 MILLIGRAM(S): 2.5 TABLET ORAL at 05:46

## 2018-11-16 RX ADMIN — Medication 6: at 12:45

## 2018-11-16 RX ADMIN — PANTOPRAZOLE SODIUM 40 MILLIGRAM(S): 20 TABLET, DELAYED RELEASE ORAL at 05:47

## 2018-11-16 RX ADMIN — Medication 4 UNIT(S): at 08:53

## 2018-11-16 RX ADMIN — Medication 1 PATCH: at 19:56

## 2018-11-16 RX ADMIN — HEPARIN SODIUM 5000 UNIT(S): 5000 INJECTION INTRAVENOUS; SUBCUTANEOUS at 21:37

## 2018-11-16 RX ADMIN — Medication 100 MILLIGRAM(S): at 17:58

## 2018-11-16 RX ADMIN — Medication 100 MILLIGRAM(S): at 05:46

## 2018-11-16 RX ADMIN — Medication 25 MILLIGRAM(S): at 05:46

## 2018-11-16 RX ADMIN — Medication 25 MILLIGRAM(S): at 17:58

## 2018-11-16 RX ADMIN — Medication 81 MILLIGRAM(S): at 12:45

## 2018-11-16 RX ADMIN — Medication 6: at 08:53

## 2018-11-16 NOTE — BEHAVIORAL HEALTH ASSESSMENT NOTE - NSBHCONSULTMEDS_PSY_A_CORE FT
waiting for callback to figure out prior regimen. Pt does not want to start new one since that used to work well waiting for callback to figure out prior regimen. Pt does not want to start new one since that used to work well  will see pt again if callback is received with useful info

## 2018-11-16 NOTE — BEHAVIORAL HEALTH ASSESSMENT NOTE - SUMMARY
Pt is 54 yo M with PMH of HTN, HLD, DM, CABG, s/p stroke 6 days before admission at [facility] in [place] at which time he left AMA due to having to deal with rent issues, presented to this ED due to worsening weakness consulted for depression. Pt with labile affect tearful at times somewhat dramatic in his presentation. Would benefit from being back to his meds that he reports used to keep him stable Pt is 52 yo M with PMH of HTN, HLD, DM, CABG, s/p stroke 6 days before admission at [facility] in [place] at which time he left AMA due to having to deal with rent issues, presented to this ED due to worsening weakness consulted for depression. Pt with labile affect tearful at times somewhat dramatic in his presentation. Would benefit from being back to his meds that he reports used to keep him stable. He gave phone # for prior clinic which was called on [phone] on 11/16 7pm, on call person was paged however no callback received so far about prior regimen. Pt said he preferred waiting to figure out what it used to be given that the regimen was helpful, as opposed to starting a new one. Pt denied Si/Hi was not noted to be a risk to self/others

## 2018-11-16 NOTE — PROGRESS NOTE ADULT - PROBLEM SELECTOR PLAN 1
c/w aspirin, statin, thorazine  DC tele   f/u TTE with bubble study: No PFO, severe LV dysfunction, with grade II DD.   AC discontinued   Neuro follow up: ABRAHAN, recommended if negative, loop recorder  to find cause of cardio embolic cause   ABRAHAN on Monday then DC plan to acute rehab   Cardio follow up   Pt had MRI and MRA at [facility], report in chart.  Hypercoagulable work : negative, B2 glucoprotein positive due to Kidney injury   LE doppler : negative     Aspirin and Plavix for 3 weeks after ABRAHAN and then continue with Aspirin     Stroke is most likely due to multiple risk factors, HTN, DM, smoking

## 2018-11-16 NOTE — PROGRESS NOTE ADULT - SUBJECTIVE AND OBJECTIVE BOX
PGY 1 Note discussed with supervising resident and primary attending    Patient is a 53y old  Male who presents with a chief complaint of weakness, slurred speech (16 Nov 2018 11:13)      INTERVAL HPI/OVERNIGHT EVENTS:     Patient is seen at the bedside. Patient was scheduled for ABRAHAN today but it is cancelled as he ate this morning.   Also he had a BM .     MEDICATIONS  (STANDING):  aspirin enteric coated 81 milliGRAM(s) Oral daily  atorvastatin 40 milliGRAM(s) Oral at bedtime  docusate sodium 100 milliGRAM(s) Oral two times a day  heparin  Injectable 5000 Unit(s) SubCutaneous every 8 hours  insulin glargine Injectable (LANTUS) 43 Unit(s) SubCutaneous at bedtime  insulin lispro (HumaLOG) corrective regimen sliding scale   SubCutaneous three times a day before meals  insulin lispro (HumaLOG) corrective regimen sliding scale   SubCutaneous at bedtime  insulin lispro Injectable (HumaLOG) 4 Unit(s) SubCutaneous three times a day before meals  lisinopril 2.5 milliGRAM(s) Oral daily  metoprolol tartrate 25 milliGRAM(s) Oral two times a day  nicotine - 21 mG/24Hr(s) Patch 1 patch Transdermal daily  pantoprazole    Tablet 40 milliGRAM(s) Oral before breakfast  polyethylene glycol 3350 17 Gram(s) Oral daily  senna 2 Tablet(s) Oral at bedtime    MEDICATIONS  (PRN):  acetaminophen   Tablet .. 650 milliGRAM(s) Oral every 6 hours PRN Mild Pain (1 - 3)  chlorproMAZINE    Tablet 10 milliGRAM(s) Oral every 8 hours PRN hiccups      __________________________________________________  REVIEW OF SYSTEMS:    CONSTITUTIONAL: No fever,   EYES: no acute visual disturbances  NECK: No pain or stiffness  RESPIRATORY: No cough; No shortness of breath  CARDIOVASCULAR: No chest pain, no palpitations  GASTROINTESTINAL: No pain. No nausea or vomiting; No diarrhea   NEUROLOGICAL: No headache or numbness, no tremors  MUSCULOSKELETAL: No joint pain, no muscle pain  GENITOURINARY: no dysuria, no frequency, no hesitancy  PSYCHIATRY: no depression , no anxiety  ALL OTHER  ROS negative        Vital Signs Last 24 Hrs  T(C): 37.1 (16 Nov 2018 04:54), Max: 37.1 (16 Nov 2018 04:54)  T(F): 98.7 (16 Nov 2018 04:54), Max: 98.7 (16 Nov 2018 04:54)  HR: 72 (16 Nov 2018 10:17) (70 - 89)  BP: 131/71 (16 Nov 2018 10:17) (113/68 - 141/80)  BP(mean): --  RR: 17 (16 Nov 2018 04:54) (16 - 17)  SpO2: 100% (16 Nov 2018 10:17) (100% - 100%)    ________________________________________________  PHYSICAL EXAM:  GENERAL: NAD  HEENT: Normocephalic;  conjunctivae and sclerae clear; moist mucous membranes;   NECK : supple  CHEST/LUNG: Clear to auscultation bilaterally with good air entry   HEART: S1 S2  regular; no murmurs, gallops or rubs  ABDOMEN: Soft, Nontender, Nondistended; Bowel sounds present  EXTREMITIES: no cyanosis; no edema; no calf tenderness  SKIN: warm and dry; no rash  NERVOUS SYSTEM:  Awake and alert; Oriented  to place, person and time ; no new deficits    _________________________________________________  LABS:                        12.3   5.8   )-----------( 220      ( 16 Nov 2018 05:49 )             35.8     11-16    132<L>  |  97  |  29<H>  ----------------------------<  271<H>  4.9   |  30  |  1.27    Ca    9.1      16 Nov 2018 05:49  Phos  2.7     11-16  Mg     2.2     11-16    TPro  7.4  /  Alb  3.1<L>  /  TBili  0.2  /  DBili  x   /  AST  18  /  ALT  30  /  AlkPhos  120  11-16    PT/INR - ( 16 Nov 2018 05:49 )   PT: 10.9 sec;   INR: 0.98 ratio         PTT - ( 16 Nov 2018 05:49 )  PTT:25.4 sec    CAPILLARY BLOOD GLUCOSE      POCT Blood Glucose.: 274 mg/dL (16 Nov 2018 11:55)  POCT Blood Glucose.: 256 mg/dL (16 Nov 2018 08:18)  POCT Blood Glucose.: 159 mg/dL (15 Nov 2018 21:11)  POCT Blood Glucose.: 282 mg/dL (15 Nov 2018 17:16)              Care Discussed with Consultants :     Plan of care was discussed with patient and /or primary care giver; all questions and concerns were addressed and care was aligned with patient's wishes.

## 2018-11-16 NOTE — BEHAVIORAL HEALTH ASSESSMENT NOTE - HPI (INCLUDE ILLNESS QUALITY, SEVERITY, DURATION, TIMING, CONTEXT, MODIFYING FACTORS, ASSOCIATED SIGNS AND SYMPTOMS)
Pt is 54 yo M with PMH of HTN, HLD, DM, CABG, s/p stroke 6 days before admission at [facility] in [place] at which time he left AMA due to having to deal with rent issues, presented to this ED due to worsening weakness consulted for depression Pt is 54 yo M with PMH of HTN, HLD, DM, CABG, s/p stroke 6 days before admission at [facility] in [place] at which time he left AMA due to having to deal with rent issues, presented to this ED due to worsening weakness consulted for depression.  Patient seen in private room, Pt is 52 yo M with PMH of HTN, HLD, DM, CABG, s/p stroke 6 days before admission at [facility] in [place] at which time he left AMA due to having to deal with rent issues, presented to this ED due to worsening weakness consulted for depression.  Patient seen in private room, reports feeling weak, tired and bothered that he can't walk. feels well taken care of, PT staff member was "the best person I've ever met....he should be put on a pedestal!" no issues with sleep. Feels depressed earnest about having the stroke. Denies SI/HI/significant anxiety/sxs of porfirio/psychosis. starts crying and reports he is a chronic shoplifter and to feel ashamed of himself. Wants to change and this may be the opportunity to do so.     Estranged from family, denies availability of collateral.

## 2018-11-16 NOTE — BEHAVIORAL HEALTH ASSESSMENT NOTE - RISK ASSESSMENT
chronically elevated given h/o psychiatric illness and being on meds chronically elevated given h/o psychiatric illness and being on meds, little family support, mx arrests for shoplifting, chronic marihuana  acute risk is medical problems  protective factors are spiritual, future oriented "I want to become a better person", denies access to guns, denies active legal issues

## 2018-11-16 NOTE — BEHAVIORAL HEALTH ASSESSMENT NOTE - NSBHCONSULTRECOMMENDOTHER_PSY_A_CORE FT
consider switching metoprolol - known to cause depression consider switching metoprolol - known to cause depression  clergy consult - pt would benefit

## 2018-11-16 NOTE — PROGRESS NOTE ADULT - SUBJECTIVE AND OBJECTIVE BOX
HPI:  Pt is 52 yo M with PMH of HTN, HLD, DM, CABG, s/p stroke 6 days ago at [facility] in [place] at which time he left AMA due to having to deal with rent issues, presents to ED due to worsening weakness. Pt states that at prior hospital admission, he had presented with left sided upper and lower extremity numbness. Pt denies cp, sob, abd pain, fever, chills. Pt admits to nausea and vomiting. Per patient's brother, at baseline patient able to ambulate but noted that patient has been unable to walk unassisted since coming home yesterday night and speech is slurred. (09 Nov 2018 14:06)      Patient is a 53y old  Male who presents with a chief complaint of weakness, slurred speech (16 Nov 2018 12:50)      INTERVAL HPI/OVERNIGHT EVENTS:  T(C): 37.1 (11-16-18 @ 04:54), Max: 37.1 (11-16-18 @ 04:54)  HR: 72 (11-16-18 @ 10:17) (70 - 89)  BP: 131/71 (11-16-18 @ 10:17) (113/68 - 141/80)  RR: 17 (11-16-18 @ 04:54) (16 - 17)  SpO2: 100% (11-16-18 @ 10:17) (100% - 100%)  Wt(kg): --  I&O's Summary      REVIEW OF SYSTEMS: denies fever, chills, SOB, palpitations, chest pain, abdominal pain, nausea, vomitting, diarrhea, constipation, dizziness    MEDICATIONS  (STANDING):  aspirin enteric coated 81 milliGRAM(s) Oral daily  atorvastatin 40 milliGRAM(s) Oral at bedtime  docusate sodium 100 milliGRAM(s) Oral two times a day  heparin  Injectable 5000 Unit(s) SubCutaneous every 8 hours  insulin glargine Injectable (LANTUS) 43 Unit(s) SubCutaneous at bedtime  insulin lispro (HumaLOG) corrective regimen sliding scale   SubCutaneous three times a day before meals  insulin lispro (HumaLOG) corrective regimen sliding scale   SubCutaneous at bedtime  insulin lispro Injectable (HumaLOG) 4 Unit(s) SubCutaneous three times a day before meals  lisinopril 2.5 milliGRAM(s) Oral daily  metoprolol tartrate 25 milliGRAM(s) Oral two times a day  nicotine - 21 mG/24Hr(s) Patch 1 patch Transdermal daily  pantoprazole    Tablet 40 milliGRAM(s) Oral before breakfast  polyethylene glycol 3350 17 Gram(s) Oral daily  senna 2 Tablet(s) Oral at bedtime  sodium biphosphate Rectal Enema 1 Enema Rectal once    MEDICATIONS  (PRN):  acetaminophen   Tablet .. 650 milliGRAM(s) Oral every 6 hours PRN Mild Pain (1 - 3)  chlorproMAZINE    Tablet 10 milliGRAM(s) Oral every 8 hours PRN hiccups      PHYSICAL EXAM:  GENERAL: NAD, well-groomed, well-developed  HEAD:  Atraumatic, Normocephalic  EYES: EOMI, PERRLA, conjunctiva and sclera clear  ENMT: No tonsillar erythema, exudates, or enlargement; Moist mucous membranes, Good dentition, No lesions  NECK: Supple, No JVD, Normal thyroid  NERVOUS SYSTEM:  Alert & Oriented X3, Good concentration; Motor Strength 5/5 B/L upper and lower extremities; DTRs 2+ intact and symmetric  CHEST/LUNG: Clear to percussion bilaterally; No rales, rhonchi, wheezing, or rubs  HEART: Regular rate and rhythm; No murmurs, rubs, or gallops  ABDOMEN: Soft, Nontender, Nondistended; Bowel sounds present  EXTREMITIES:  2+ Peripheral Pulses, No clubbing, cyanosis, or edema  LYMPH: No lymphadenopathy noted  SKIN: No rashes or lesions  LABS:                        12.3   5.8   )-----------( 220      ( 16 Nov 2018 05:49 )             35.8     11-16    132<L>  |  97  |  29<H>  ----------------------------<  271<H>  4.9   |  30  |  1.27    Ca    9.1      16 Nov 2018 05:49  Phos  2.7     11-16  Mg     2.2     11-16    TPro  7.4  /  Alb  3.1<L>  /  TBili  0.2  /  DBili  x   /  AST  18  /  ALT  30  /  AlkPhos  120  11-16    PT/INR - ( 16 Nov 2018 05:49 )   PT: 10.9 sec;   INR: 0.98 ratio         PTT - ( 16 Nov 2018 05:49 )  PTT:25.4 sec    CAPILLARY BLOOD GLUCOSE      POCT Blood Glucose.: 274 mg/dL (16 Nov 2018 11:55)  POCT Blood Glucose.: 256 mg/dL (16 Nov 2018 08:18)  POCT Blood Glucose.: 159 mg/dL (15 Nov 2018 21:11)  POCT Blood Glucose.: 282 mg/dL (15 Nov 2018 17:16)

## 2018-11-16 NOTE — PROGRESS NOTE ADULT - PROBLEM SELECTOR PLAN 2
New diagnosis, not in acute exacerbation   ECHO: severe LV dysfunction, grade II dysfunction   Lisinopril 2.5 mg started , c/w metoprolol   Monitor Creatinine : if elevated more then 30 % will DC ACEI   Creatinine: 1.33--> 1.48-->1.27  Cardio follow up  Cardiac cath in 4 weeks ( cannot happen in 4 weeks of stroke )

## 2018-11-16 NOTE — PROGRESS NOTE ADULT - SUBJECTIVE AND OBJECTIVE BOX
Patient is a 53y Male with   MALCOM    RESOLVED     NO  ACUTE  EVENTS  OVERNIGHT   FEELS  OK     No Known Allergies    Hospital Medications:   MEDICATIONS  (STANDING):  aspirin enteric coated 81 milliGRAM(s) Oral daily  atorvastatin 40 milliGRAM(s) Oral at bedtime  docusate sodium 100 milliGRAM(s) Oral two times a day  heparin  Injectable 5000 Unit(s) SubCutaneous every 8 hours  insulin glargine Injectable (LANTUS) 43 Unit(s) SubCutaneous at bedtime  insulin lispro (HumaLOG) corrective regimen sliding scale   SubCutaneous three times a day before meals  insulin lispro (HumaLOG) corrective regimen sliding scale   SubCutaneous at bedtime  insulin lispro Injectable (HumaLOG) 4 Unit(s) SubCutaneous three times a day before meals  lisinopril 2.5 milliGRAM(s) Oral daily  metoprolol tartrate 25 milliGRAM(s) Oral two times a day  pantoprazole    Tablet 40 milliGRAM(s) Oral before breakfast  polyethylene glycol 3350 17 Gram(s) Oral daily  senna 2 Tablet(s) Oral at bedtime    SYSTEM   REVIEW  HAS NO   FEVER  CHILLS  GETS  SOB  EASILY   NO CH  PAIN  / PALPITATIONS   APPETITE IS  GOOD, NO  N/V  OR  ABD  PAIN  VOIDING  WELL   NO LEG  SWELLING  FEELS  WEAK  ON  HIS   R  SIDE   LEG  MORE  THAN   ARM    VITALS:  T(F): 98.7 (18 @ 04:54), Max: 98.7 (18 @ 04:54)  HR: 72 (18 @ 10:17)  BP: 131/71 (18 @ 10:17)  RR: 17 (18 @ 04:54)  SpO2: 100% (18 @ 10:17)  Wt(kg): --      PHYSICAL EXAM:  Constitutional: NAD  HEENT: CONJ  PINK  Neck: No JVD  Respiratory: CTAB, no wheezes, rales or rhonchi  Cardiovascular: S1, S2, RRR  Gastrointestinal: BS+, soft, NT/ND  Extremities:  No peripheral edema  Neurological: A/O x 3,   : . No mayers.         LABS:      132<L>  |  97  |  29<H>  ----------------------------<  271<H>  4.9   |  30  |  1.27    Ca    9.1      2018 05:49  Phos  2.7       Mg     2.2         TPro  7.4  /  Alb  3.1<L>  /  TBili  0.2  /  DBili      /  AST  18  /  ALT  30  /  AlkPhos  120      Creatinine Trend: 1.27 <--, 1.48 <--, 1.33 <--, 1.45 <--, 1.53 <--, 1.80 <--, 1.73 <--, 1.93 <--, 1.91 <--, 1.74 <--, 1.25 <--, 1.19 <--, 2.40 <--, 2.43 <--                        12.3   5.8   )-----------( 220      ( 2018 05:49 )             35.8     Urine Studies:  Urinalysis Basic - ( 2018 22:19 )    Color: Yellow / Appearance: Clear / S.005 / pH:   Gluc:  / Ketone: Negative  / Bili: Negative / Urobili: Negative   Blood:  / Protein: 15 / Nitrite: Negative   Leuk Esterase: Negative / RBC: 0-2 /HPF / WBC 0-2 /HPF   Sq Epi:  / Non Sq Epi:  / Bacteria: Trace /HPF      Osmolality, Random Urine: 517 mos/kg (11-10 @ 13:46)  Sodium, Random Urine: 75 mmol/L (11-10 @ 13:46)  Osmolality, Random Urine: 461 mos/kg ( @ 22:21)  Creatinine, Random Urine: 79 mg/dL ( @ 22:20)  Sodium, Random Urine: 66 mmol/L ( @ 22:20)    RADIOLOGY & ADDITIONAL STUDIES:

## 2018-11-17 DIAGNOSIS — F12.21 CANNABIS DEPENDENCE, IN REMISSION: ICD-10-CM

## 2018-11-17 LAB
ALBUMIN SERPL ELPH-MCNC: 3.1 G/DL — LOW (ref 3.5–5)
ALP SERPL-CCNC: 138 U/L — HIGH (ref 40–120)
ALT FLD-CCNC: 33 U/L DA — SIGNIFICANT CHANGE UP (ref 10–60)
ANION GAP SERPL CALC-SCNC: 7 MMOL/L — SIGNIFICANT CHANGE UP (ref 5–17)
AST SERPL-CCNC: 20 U/L — SIGNIFICANT CHANGE UP (ref 10–40)
BASOPHILS # BLD AUTO: 0.1 K/UL — SIGNIFICANT CHANGE UP (ref 0–0.2)
BASOPHILS NFR BLD AUTO: 1.1 % — SIGNIFICANT CHANGE UP (ref 0–2)
BILIRUB SERPL-MCNC: 0.3 MG/DL — SIGNIFICANT CHANGE UP (ref 0.2–1.2)
BUN SERPL-MCNC: 34 MG/DL — HIGH (ref 7–18)
CALCIUM SERPL-MCNC: 9.1 MG/DL — SIGNIFICANT CHANGE UP (ref 8.4–10.5)
CHLORIDE SERPL-SCNC: 100 MMOL/L — SIGNIFICANT CHANGE UP (ref 96–108)
CO2 SERPL-SCNC: 28 MMOL/L — SIGNIFICANT CHANGE UP (ref 22–31)
CREAT SERPL-MCNC: 1.43 MG/DL — HIGH (ref 0.5–1.3)
EOSINOPHIL # BLD AUTO: 0.1 K/UL — SIGNIFICANT CHANGE UP (ref 0–0.5)
EOSINOPHIL NFR BLD AUTO: 1.5 % — SIGNIFICANT CHANGE UP (ref 0–6)
GLUCOSE BLDC GLUCOMTR-MCNC: 199 MG/DL — HIGH (ref 70–99)
GLUCOSE BLDC GLUCOMTR-MCNC: 214 MG/DL — HIGH (ref 70–99)
GLUCOSE BLDC GLUCOMTR-MCNC: 222 MG/DL — HIGH (ref 70–99)
GLUCOSE BLDC GLUCOMTR-MCNC: 263 MG/DL — HIGH (ref 70–99)
GLUCOSE SERPL-MCNC: 288 MG/DL — HIGH (ref 70–99)
HCT VFR BLD CALC: 33.8 % — LOW (ref 39–50)
HGB BLD-MCNC: 11.8 G/DL — LOW (ref 13–17)
LYMPHOCYTES # BLD AUTO: 1.8 K/UL — SIGNIFICANT CHANGE UP (ref 1–3.3)
LYMPHOCYTES # BLD AUTO: 28.7 % — SIGNIFICANT CHANGE UP (ref 13–44)
MAGNESIUM SERPL-MCNC: 2 MG/DL — SIGNIFICANT CHANGE UP (ref 1.6–2.6)
MCHC RBC-ENTMCNC: 30.9 PG — SIGNIFICANT CHANGE UP (ref 27–34)
MCHC RBC-ENTMCNC: 34.7 GM/DL — SIGNIFICANT CHANGE UP (ref 32–36)
MCV RBC AUTO: 89.1 FL — SIGNIFICANT CHANGE UP (ref 80–100)
MONOCYTES # BLD AUTO: 0.6 K/UL — SIGNIFICANT CHANGE UP (ref 0–0.9)
MONOCYTES NFR BLD AUTO: 9.1 % — SIGNIFICANT CHANGE UP (ref 2–14)
NEUTROPHILS # BLD AUTO: 3.7 K/UL — SIGNIFICANT CHANGE UP (ref 1.8–7.4)
NEUTROPHILS NFR BLD AUTO: 59.6 % — SIGNIFICANT CHANGE UP (ref 43–77)
PHOSPHATE SERPL-MCNC: 3.1 MG/DL — SIGNIFICANT CHANGE UP (ref 2.5–4.5)
PLATELET # BLD AUTO: 210 K/UL — SIGNIFICANT CHANGE UP (ref 150–400)
POTASSIUM SERPL-MCNC: 4.6 MMOL/L — SIGNIFICANT CHANGE UP (ref 3.5–5.3)
POTASSIUM SERPL-SCNC: 4.6 MMOL/L — SIGNIFICANT CHANGE UP (ref 3.5–5.3)
PROT SERPL-MCNC: 7.3 G/DL — SIGNIFICANT CHANGE UP (ref 6–8.3)
RBC # BLD: 3.8 M/UL — LOW (ref 4.2–5.8)
RBC # FLD: 11.6 % — SIGNIFICANT CHANGE UP (ref 10.3–14.5)
SODIUM SERPL-SCNC: 135 MMOL/L — SIGNIFICANT CHANGE UP (ref 135–145)
WBC # BLD: 6.2 K/UL — SIGNIFICANT CHANGE UP (ref 3.8–10.5)
WBC # FLD AUTO: 6.2 K/UL — SIGNIFICANT CHANGE UP (ref 3.8–10.5)

## 2018-11-17 RX ADMIN — Medication 1 PATCH: at 12:13

## 2018-11-17 RX ADMIN — Medication 1 PATCH: at 07:03

## 2018-11-17 RX ADMIN — Medication 2: at 12:12

## 2018-11-17 RX ADMIN — HEPARIN SODIUM 5000 UNIT(S): 5000 INJECTION INTRAVENOUS; SUBCUTANEOUS at 15:10

## 2018-11-17 RX ADMIN — HEPARIN SODIUM 5000 UNIT(S): 5000 INJECTION INTRAVENOUS; SUBCUTANEOUS at 05:30

## 2018-11-17 RX ADMIN — INSULIN GLARGINE 43 UNIT(S): 100 INJECTION, SOLUTION SUBCUTANEOUS at 22:19

## 2018-11-17 RX ADMIN — Medication 6: at 08:32

## 2018-11-17 RX ADMIN — ATORVASTATIN CALCIUM 40 MILLIGRAM(S): 80 TABLET, FILM COATED ORAL at 22:21

## 2018-11-17 RX ADMIN — Medication 1 PATCH: at 12:11

## 2018-11-17 RX ADMIN — HEPARIN SODIUM 5000 UNIT(S): 5000 INJECTION INTRAVENOUS; SUBCUTANEOUS at 22:21

## 2018-11-17 RX ADMIN — PANTOPRAZOLE SODIUM 40 MILLIGRAM(S): 20 TABLET, DELAYED RELEASE ORAL at 05:31

## 2018-11-17 RX ADMIN — Medication 1 PATCH: at 19:20

## 2018-11-17 RX ADMIN — Medication 25 MILLIGRAM(S): at 05:30

## 2018-11-17 RX ADMIN — LISINOPRIL 2.5 MILLIGRAM(S): 2.5 TABLET ORAL at 05:30

## 2018-11-17 RX ADMIN — Medication 4 UNIT(S): at 08:32

## 2018-11-17 RX ADMIN — Medication 4: at 17:18

## 2018-11-17 RX ADMIN — Medication 25 MILLIGRAM(S): at 17:18

## 2018-11-17 RX ADMIN — POLYETHYLENE GLYCOL 3350 17 GRAM(S): 17 POWDER, FOR SOLUTION ORAL at 12:13

## 2018-11-17 RX ADMIN — Medication 4 UNIT(S): at 17:18

## 2018-11-17 RX ADMIN — Medication 81 MILLIGRAM(S): at 12:11

## 2018-11-17 RX ADMIN — Medication 4 UNIT(S): at 12:13

## 2018-11-17 RX ADMIN — SENNA PLUS 2 TABLET(S): 8.6 TABLET ORAL at 22:21

## 2018-11-17 NOTE — PROGRESS NOTE ADULT - SUBJECTIVE AND OBJECTIVE BOX
Patient is a 53y Male with  MALCOM  ON  CKD     FEELS  WELL   NO  ACUTE  EVENTS  OVERNIGHT    No Known Allergies    Hospital Medications:   MEDICATIONS  (STANDING):  aspirin enteric coated 81 milliGRAM(s) Oral daily  atorvastatin 40 milliGRAM(s) Oral at bedtime  docusate sodium 100 milliGRAM(s) Oral two times a day  heparin  Injectable 5000 Unit(s) SubCutaneous every 8 hours  insulin glargine Injectable (LANTUS) 43 Unit(s) SubCutaneous at bedtime  insulin lispro (HumaLOG) corrective regimen sliding scale   SubCutaneous three times a day before meals  insulin lispro (HumaLOG) corrective regimen sliding scale   SubCutaneous at bedtime  insulin lispro Injectable (HumaLOG) 4 Unit(s) SubCutaneous three times a day before meals  lisinopril 2.5 milliGRAM(s) Oral daily  metoprolol tartrate 25 milliGRAM(s) Oral two times a day  nicotine - 21 mG/24Hr(s) Patch 1 patch Transdermal daily  pantoprazole    Tablet 40 milliGRAM(s) Oral before breakfast  polyethylene glycol 3350 17 Gram(s) Oral daily  senna 2 Tablet(s) Oral at bedtime  sodium biphosphate Rectal Enema 1 Enema Rectal once    SYSTEM  REVIEW  FEELS  WEAK  HAS NO   FEVER  CHILLS   NO   SOB  OR  COUGH   NO CH  PAIN  / PALPITATIONS   APPETITE IS  GOOD, NO  N/V  OR  ABD  PAIN  VOIDING  WELL   NO LEG  SWELLING    VITALS:  T(F): 98.9 (11-17-18 @ 05:08), Max: 99.6 (11-16-18 @ 21:20)  HR: 81 (11-17-18 @ 05:08)  BP: 126/70 (11-17-18 @ 05:08)  RR: 17 (11-17-18 @ 05:08)  SpO2: 100% (11-17-18 @ 05:08)  Wt(kg): --    11-16 @ 07:01  -  11-17 @ 07:00  --------------------------------------------------------  IN: 400 mL / OUT: 700 mL / NET: -300 mL        PHYSICAL EXAM:  Constitutional: NAD  HEENT: anicteric sclera,CONJ  PINK  Neck: No JVD  Respiratory: CTAB, no wheezes, rales or rhonchi  Cardiovascular: S1, S2, RRR  Gastrointestinal: BS+, soft, NT/ND  Extremities:  No peripheral edema  Neurological: A/O x 3, HAS  R  SIDED  WEAKNESS  : . No mayers.         LABS:  11-17    135  |  100  |  34<H>  ----------------------------<  288<H>  4.6   |  28  |  1.43<H>    Ca    9.1      17 Nov 2018 05:46  Phos  3.1     11-17  Mg     2.0     11-17    TPro  7.3  /  Alb  3.1<L>  /  TBili  0.3  /  DBili      /  AST  20  /  ALT  33  /  AlkPhos  138<H>  11-17    Creatinine Trend: 1.43 <--, 1.27 <--, 1.48 <--, 1.33 <--, 1.45 <--, 1.53 <--, 1.80 <--, 1.73 <--, 1.93 <--, 1.91 <--                        11.8   6.2   )-----------( 210      ( 17 Nov 2018 05:46 )             33.8     Urine Studies:    Osmolality, Random Urine: 517 mos/kg (11-10 @ 13:46)  Sodium, Random Urine: 75 mmol/L (11-10 @ 13:46)    RADIOLOGY & ADDITIONAL STUDIES:

## 2018-11-17 NOTE — PROGRESS NOTE ADULT - SUBJECTIVE AND OBJECTIVE BOX
HPI:  Pt is 52 yo M with PMH of HTN, HLD, DM, CABG, s/p stroke 6 days ago at [facility] in [place] at which time he left AMA due to having to deal with rent issues, presents to ED due to worsening weakness. Pt states that at prior hospital admission, he had presented with left sided upper and lower extremity numbness. Pt denies cp, sob, abd pain, fever, chills. Pt admits to nausea and vomiting. Per patient's brother, at baseline patient able to ambulate but noted that patient has been unable to walk unassisted since coming home yesterday night and speech is slurred. (09 Nov 2018 14:06)      Patient is a 53y old  Male who presents with a chief complaint of weakness, slurred speech (17 Nov 2018 13:37)      INTERVAL HPI/OVERNIGHT EVENTS:  T(C): 37 (11-17-18 @ 13:58), Max: 37.6 (11-16-18 @ 21:20)  HR: 71 (11-17-18 @ 13:58) (71 - 88)  BP: 118/73 (11-17-18 @ 13:58) (115/73 - 144/73)  RR: 18 (11-17-18 @ 13:58) (17 - 18)  SpO2: 100% (11-17-18 @ 13:58) (100% - 100%)  Wt(kg): --  I&O's Summary    16 Nov 2018 07:01  -  17 Nov 2018 07:00  --------------------------------------------------------  IN: 400 mL / OUT: 700 mL / NET: -300 mL        REVIEW OF SYSTEMS: denies fever, chills, SOB, palpitations, chest pain, abdominal pain, nausea, vomitting, diarrhea, constipation, dizziness    MEDICATIONS  (STANDING):  aspirin enteric coated 81 milliGRAM(s) Oral daily  atorvastatin 40 milliGRAM(s) Oral at bedtime  docusate sodium 100 milliGRAM(s) Oral two times a day  heparin  Injectable 5000 Unit(s) SubCutaneous every 8 hours  insulin glargine Injectable (LANTUS) 43 Unit(s) SubCutaneous at bedtime  insulin lispro (HumaLOG) corrective regimen sliding scale   SubCutaneous three times a day before meals  insulin lispro (HumaLOG) corrective regimen sliding scale   SubCutaneous at bedtime  insulin lispro Injectable (HumaLOG) 4 Unit(s) SubCutaneous three times a day before meals  lisinopril 2.5 milliGRAM(s) Oral daily  metoprolol tartrate 25 milliGRAM(s) Oral two times a day  nicotine - 21 mG/24Hr(s) Patch 1 patch Transdermal daily  pantoprazole    Tablet 40 milliGRAM(s) Oral before breakfast  polyethylene glycol 3350 17 Gram(s) Oral daily  senna 2 Tablet(s) Oral at bedtime  sodium biphosphate Rectal Enema 1 Enema Rectal once    MEDICATIONS  (PRN):  acetaminophen   Tablet .. 650 milliGRAM(s) Oral every 6 hours PRN Mild Pain (1 - 3)  chlorproMAZINE    Tablet 10 milliGRAM(s) Oral every 8 hours PRN hiccups      PHYSICAL EXAM:  GENERAL: NAD, well-groomed, well-developed  HEAD:  Atraumatic, Normocephalic  EYES: EOMI, PERRLA, conjunctiva and sclera clear  ENMT: No tonsillar erythema, exudates, or enlargement; Moist mucous membranes, Good dentition, No lesions  NECK: Supple, No JVD, Normal thyroid  NERVOUS SYSTEM:  Alert & Oriented X3, Good concentration; Motor Strength 5/5 B/L upper and lower extremities; DTRs 2+ intact and symmetric  CHEST/LUNG: Clear to percussion bilaterally; No rales, rhonchi, wheezing, or rubs  HEART: Regular rate and rhythm; No murmurs, rubs, or gallops  ABDOMEN: Soft, Nontender, Nondistended; Bowel sounds present  EXTREMITIES:  2+ Peripheral Pulses, No clubbing, cyanosis, or edema  LYMPH: No lymphadenopathy noted  SKIN: No rashes or lesions  LABS:                        11.8   6.2   )-----------( 210      ( 17 Nov 2018 05:46 )             33.8     11-17    135  |  100  |  34<H>  ----------------------------<  288<H>  4.6   |  28  |  1.43<H>    Ca    9.1      17 Nov 2018 05:46  Phos  3.1     11-17  Mg     2.0     11-17    TPro  7.3  /  Alb  3.1<L>  /  TBili  0.3  /  DBili  x   /  AST  20  /  ALT  33  /  AlkPhos  138<H>  11-17    PT/INR - ( 16 Nov 2018 05:49 )   PT: 10.9 sec;   INR: 0.98 ratio         PTT - ( 16 Nov 2018 05:49 )  PTT:25.4 sec    CAPILLARY BLOOD GLUCOSE      POCT Blood Glucose.: 214 mg/dL (17 Nov 2018 16:46)  POCT Blood Glucose.: 199 mg/dL (17 Nov 2018 12:10)  POCT Blood Glucose.: 263 mg/dL (17 Nov 2018 08:11)  POCT Blood Glucose.: 252 mg/dL (16 Nov 2018 21:07)

## 2018-11-18 LAB
ALBUMIN SERPL ELPH-MCNC: 3.2 G/DL — LOW (ref 3.5–5)
ALP SERPL-CCNC: 118 U/L — SIGNIFICANT CHANGE UP (ref 40–120)
ALT FLD-CCNC: 41 U/L DA — SIGNIFICANT CHANGE UP (ref 10–60)
ANION GAP SERPL CALC-SCNC: 6 MMOL/L — SIGNIFICANT CHANGE UP (ref 5–17)
AST SERPL-CCNC: 25 U/L — SIGNIFICANT CHANGE UP (ref 10–40)
BASOPHILS # BLD AUTO: 0.1 K/UL — SIGNIFICANT CHANGE UP (ref 0–0.2)
BASOPHILS NFR BLD AUTO: 1.3 % — SIGNIFICANT CHANGE UP (ref 0–2)
BILIRUB SERPL-MCNC: 0.3 MG/DL — SIGNIFICANT CHANGE UP (ref 0.2–1.2)
BUN SERPL-MCNC: 33 MG/DL — HIGH (ref 7–18)
CALCIUM SERPL-MCNC: 9.4 MG/DL — SIGNIFICANT CHANGE UP (ref 8.4–10.5)
CHLORIDE SERPL-SCNC: 100 MMOL/L — SIGNIFICANT CHANGE UP (ref 96–108)
CO2 SERPL-SCNC: 29 MMOL/L — SIGNIFICANT CHANGE UP (ref 22–31)
CREAT SERPL-MCNC: 1.34 MG/DL — HIGH (ref 0.5–1.3)
EOSINOPHIL # BLD AUTO: 0.2 K/UL — SIGNIFICANT CHANGE UP (ref 0–0.5)
EOSINOPHIL NFR BLD AUTO: 3.1 % — SIGNIFICANT CHANGE UP (ref 0–6)
GLUCOSE BLDC GLUCOMTR-MCNC: 196 MG/DL — HIGH (ref 70–99)
GLUCOSE BLDC GLUCOMTR-MCNC: 219 MG/DL — HIGH (ref 70–99)
GLUCOSE BLDC GLUCOMTR-MCNC: 230 MG/DL — HIGH (ref 70–99)
GLUCOSE BLDC GLUCOMTR-MCNC: 267 MG/DL — HIGH (ref 70–99)
GLUCOSE SERPL-MCNC: 251 MG/DL — HIGH (ref 70–99)
HCT VFR BLD CALC: 36.5 % — LOW (ref 39–50)
HGB BLD-MCNC: 12.3 G/DL — LOW (ref 13–17)
LYMPHOCYTES # BLD AUTO: 1.8 K/UL — SIGNIFICANT CHANGE UP (ref 1–3.3)
LYMPHOCYTES # BLD AUTO: 35.4 % — SIGNIFICANT CHANGE UP (ref 13–44)
MAGNESIUM SERPL-MCNC: 2.1 MG/DL — SIGNIFICANT CHANGE UP (ref 1.6–2.6)
MCHC RBC-ENTMCNC: 30.6 PG — SIGNIFICANT CHANGE UP (ref 27–34)
MCHC RBC-ENTMCNC: 33.7 GM/DL — SIGNIFICANT CHANGE UP (ref 32–36)
MCV RBC AUTO: 90.6 FL — SIGNIFICANT CHANGE UP (ref 80–100)
MONOCYTES # BLD AUTO: 0.4 K/UL — SIGNIFICANT CHANGE UP (ref 0–0.9)
MONOCYTES NFR BLD AUTO: 8.5 % — SIGNIFICANT CHANGE UP (ref 2–14)
NEUTROPHILS # BLD AUTO: 2.7 K/UL — SIGNIFICANT CHANGE UP (ref 1.8–7.4)
NEUTROPHILS NFR BLD AUTO: 51.8 % — SIGNIFICANT CHANGE UP (ref 43–77)
PHOSPHATE SERPL-MCNC: 3.8 MG/DL — SIGNIFICANT CHANGE UP (ref 2.5–4.5)
PLATELET # BLD AUTO: 205 K/UL — SIGNIFICANT CHANGE UP (ref 150–400)
POTASSIUM SERPL-MCNC: 4.5 MMOL/L — SIGNIFICANT CHANGE UP (ref 3.5–5.3)
POTASSIUM SERPL-SCNC: 4.5 MMOL/L — SIGNIFICANT CHANGE UP (ref 3.5–5.3)
PROT SERPL-MCNC: 7.6 G/DL — SIGNIFICANT CHANGE UP (ref 6–8.3)
RBC # BLD: 4.03 M/UL — LOW (ref 4.2–5.8)
RBC # FLD: 11.4 % — SIGNIFICANT CHANGE UP (ref 10.3–14.5)
SODIUM SERPL-SCNC: 135 MMOL/L — SIGNIFICANT CHANGE UP (ref 135–145)
WBC # BLD: 5.2 K/UL — SIGNIFICANT CHANGE UP (ref 3.8–10.5)
WBC # FLD AUTO: 5.2 K/UL — SIGNIFICANT CHANGE UP (ref 3.8–10.5)

## 2018-11-18 RX ORDER — INSULIN GLARGINE 100 [IU]/ML
24 INJECTION, SOLUTION SUBCUTANEOUS ONCE
Qty: 0 | Refills: 0 | Status: COMPLETED | OUTPATIENT
Start: 2018-11-18 | End: 2018-11-18

## 2018-11-18 RX ORDER — NICOTINE POLACRILEX 2 MG
1 GUM BUCCAL DAILY
Qty: 0 | Refills: 0 | Status: DISCONTINUED | OUTPATIENT
Start: 2018-11-18 | End: 2018-11-19

## 2018-11-18 RX ORDER — INSULIN GLARGINE 100 [IU]/ML
48 INJECTION, SOLUTION SUBCUTANEOUS AT BEDTIME
Qty: 0 | Refills: 0 | Status: DISCONTINUED | OUTPATIENT
Start: 2018-11-18 | End: 2018-11-19

## 2018-11-18 RX ORDER — INSULIN LISPRO 100/ML
6 VIAL (ML) SUBCUTANEOUS
Qty: 0 | Refills: 0 | Status: DISCONTINUED | OUTPATIENT
Start: 2018-11-18 | End: 2018-11-19

## 2018-11-18 RX ADMIN — Medication 4 UNIT(S): at 08:30

## 2018-11-18 RX ADMIN — Medication 4 UNIT(S): at 11:55

## 2018-11-18 RX ADMIN — Medication 25 MILLIGRAM(S): at 17:37

## 2018-11-18 RX ADMIN — Medication 1 PATCH: at 20:01

## 2018-11-18 RX ADMIN — Medication 6: at 08:30

## 2018-11-18 RX ADMIN — Medication 6 UNIT(S): at 17:35

## 2018-11-18 RX ADMIN — HEPARIN SODIUM 5000 UNIT(S): 5000 INJECTION INTRAVENOUS; SUBCUTANEOUS at 13:10

## 2018-11-18 RX ADMIN — POLYETHYLENE GLYCOL 3350 17 GRAM(S): 17 POWDER, FOR SOLUTION ORAL at 11:56

## 2018-11-18 RX ADMIN — Medication 25 MILLIGRAM(S): at 06:31

## 2018-11-18 RX ADMIN — Medication 1 PATCH: at 07:34

## 2018-11-18 RX ADMIN — Medication 1 PATCH: at 11:55

## 2018-11-18 RX ADMIN — HEPARIN SODIUM 5000 UNIT(S): 5000 INJECTION INTRAVENOUS; SUBCUTANEOUS at 22:07

## 2018-11-18 RX ADMIN — Medication 4: at 11:54

## 2018-11-18 RX ADMIN — HEPARIN SODIUM 5000 UNIT(S): 5000 INJECTION INTRAVENOUS; SUBCUTANEOUS at 06:31

## 2018-11-18 RX ADMIN — PANTOPRAZOLE SODIUM 40 MILLIGRAM(S): 20 TABLET, DELAYED RELEASE ORAL at 06:31

## 2018-11-18 RX ADMIN — Medication 1 PATCH: at 20:45

## 2018-11-18 RX ADMIN — Medication 1 PATCH: at 11:56

## 2018-11-18 RX ADMIN — Medication 81 MILLIGRAM(S): at 11:55

## 2018-11-18 RX ADMIN — INSULIN GLARGINE 24 UNIT(S): 100 INJECTION, SOLUTION SUBCUTANEOUS at 22:07

## 2018-11-18 RX ADMIN — Medication 100 MILLIGRAM(S): at 06:31

## 2018-11-18 RX ADMIN — ATORVASTATIN CALCIUM 40 MILLIGRAM(S): 80 TABLET, FILM COATED ORAL at 22:07

## 2018-11-18 RX ADMIN — LISINOPRIL 2.5 MILLIGRAM(S): 2.5 TABLET ORAL at 06:31

## 2018-11-18 RX ADMIN — Medication 2: at 17:35

## 2018-11-18 NOTE — PROGRESS NOTE ADULT - PROBLEM SELECTOR PROBLEM 1
Cerebrovascular accident (CVA), unspecified mechanism
Heart failure
Heart failure
CVA (cerebral vascular accident)

## 2018-11-18 NOTE — PROGRESS NOTE ADULT - SUBJECTIVE AND OBJECTIVE BOX
Patient is a 53y Male with  ACUTE ON  CHR  RENAL  FAILURE  NO  ACUTE  EVENTS OVERNIGHT    No Known Allergies    Hospital Medications:   MEDICATIONS  (STANDING):  aspirin enteric coated 81 milliGRAM(s) Oral daily  atorvastatin 40 milliGRAM(s) Oral at bedtime  docusate sodium 100 milliGRAM(s) Oral two times a day  heparin  Injectable 5000 Unit(s) SubCutaneous every 8 hours  insulin glargine Injectable (LANTUS) 43 Unit(s) SubCutaneous at bedtime  insulin lispro (HumaLOG) corrective regimen sliding scale   SubCutaneous three times a day before meals  insulin lispro (HumaLOG) corrective regimen sliding scale   SubCutaneous at bedtime  insulin lispro Injectable (HumaLOG) 4 Unit(s) SubCutaneous three times a day before meals  lisinopril 2.5 milliGRAM(s) Oral daily  metoprolol tartrate 25 milliGRAM(s) Oral two times a day  nicotine - 21 mG/24Hr(s) Patch 1 patch Transdermal daily  pantoprazole    Tablet 40 milliGRAM(s) Oral before breakfast  polyethylene glycol 3350 17 Gram(s) Oral daily  senna 2 Tablet(s) Oral at bedtime    SYSTEM  REVIEW  WEAKNESS IS  BETTER  TODAY  HAS NO   FEVER  CHILLS   NO   SOB  OR  COUGH   NO CH  PAIN  / PALPITATIONS   APPETITE IS  GOOD, NO  N/V  OR  ABD  PAIN  VOIDING  WELL   NO LEG  SWELLING      VITALS:  T(F): 98 (11-18-18 @ 05:55), Max: 98.6 (11-17-18 @ 13:58)  HR: 73 (11-18-18 @ 05:55)  BP: 124/71 (11-18-18 @ 05:55)  RR: 18 (11-18-18 @ 05:55)  SpO2: 100% (11-18-18 @ 05:55)  Wt(kg): --    11-17 @ 07:01  -  11-18 @ 07:00  --------------------------------------------------------  IN: 0 mL / OUT: 750 mL / NET: -750 mL        PHYSICAL EXAM:  Constitutional: NAD  HEENT: anicteric sclera, CONJ PINK  Neck: No JVD  Respiratory: CTAB, no wheezes, rales or rhonchi  Cardiovascular: S1, S2, RRR  Gastrointestinal: BS+, soft, NT/ND  Extremities: No peripheral edema  Neurological: A/O x 3,  MILD  WEAKNESS  R  UPPER  AND LOWER EXT  : No mayers.     LABS:  11-18    135  |  100  |  33<H>  ----------------------------<  251<H>  4.5   |  29  |  1.34<H>    Ca    9.4      18 Nov 2018 05:53  Phos  3.8     11-18  Mg     2.1     11-18    TPro  7.6  /  Alb  3.2<L>  /  TBili  0.3  /  DBili      /  AST  25  /  ALT  41  /  AlkPhos  118  11-18    Creatinine Trend: 1.34 <--, 1.43 <--, 1.27 <--, 1.48 <--, 1.33 <--, 1.45 <--, 1.53 <--, 1.80 <--                        12.3   5.2   )-----------( 205      ( 18 Nov 2018 05:53 )             36.5     Urine Studies:      RADIOLOGY & ADDITIONAL STUDIES:

## 2018-11-18 NOTE — PROGRESS NOTE ADULT - PROBLEM SELECTOR PROBLEM 3
Hypertension, unspecified type
Hypertension, unspecified type
Hyponatremia
MALCOM (acute kidney injury)
CAD (coronary artery disease)

## 2018-11-18 NOTE — PROGRESS NOTE ADULT - PROBLEM SELECTOR PLAN 6
c/w lantus 43 at bedtime, sliding scale  A1c: 8.2 c/w lantus 43 at bedtime, sliding scale  A1c: 8.2  Dr Jurado consulted c/w lantus increased from 43 to 48  at bedtime, sliding scale  Humalog increased from 4 to 6 TID   A1c: 8.2  Dr Jurado consulted

## 2018-11-18 NOTE — PROGRESS NOTE ADULT - SUBJECTIVE AND OBJECTIVE BOX
HPI:  Pt is 54 yo M with PMH of HTN, HLD, DM, CABG, s/p stroke 6 days ago at [facility] in [place] at which time he left AMA due to having to deal with rent issues, presents to ED due to worsening weakness. Pt states that at prior hospital admission, he had presented with left sided upper and lower extremity numbness. Pt denies cp, sob, abd pain, fever, chills. Pt admits to nausea and vomiting. Per patient's brother, at baseline patient able to ambulate but noted that patient has been unable to walk unassisted since coming home yesterday night and speech is slurred. (09 Nov 2018 14:06)      Patient is a 53y old  Male who presents with a chief complaint of weakness, slurred speech (17 Nov 2018 17:46)      INTERVAL HPI/OVERNIGHT EVENTS:  T(C): 36.7 (11-18-18 @ 05:55), Max: 37 (11-17-18 @ 13:58)  HR: 73 (11-18-18 @ 05:55) (71 - 85)  BP: 124/71 (11-18-18 @ 05:55) (109/65 - 124/71)  RR: 18 (11-18-18 @ 05:55) (18 - 18)  SpO2: 100% (11-18-18 @ 05:55) (100% - 100%)  Wt(kg): --  I&O's Summary    17 Nov 2018 07:01  -  18 Nov 2018 07:00  --------------------------------------------------------  IN: 0 mL / OUT: 750 mL / NET: -750 mL        REVIEW OF SYSTEMS: denies fever, chills, SOB, palpitations, chest pain, abdominal pain, nausea, vomitting, diarrhea, constipation, dizziness    MEDICATIONS  (STANDING):  aspirin enteric coated 81 milliGRAM(s) Oral daily  atorvastatin 40 milliGRAM(s) Oral at bedtime  docusate sodium 100 milliGRAM(s) Oral two times a day  heparin  Injectable 5000 Unit(s) SubCutaneous every 8 hours  insulin glargine Injectable (LANTUS) 43 Unit(s) SubCutaneous at bedtime  insulin lispro (HumaLOG) corrective regimen sliding scale   SubCutaneous three times a day before meals  insulin lispro (HumaLOG) corrective regimen sliding scale   SubCutaneous at bedtime  insulin lispro Injectable (HumaLOG) 4 Unit(s) SubCutaneous three times a day before meals  lisinopril 2.5 milliGRAM(s) Oral daily  metoprolol tartrate 25 milliGRAM(s) Oral two times a day  nicotine - 21 mG/24Hr(s) Patch 1 patch Transdermal daily  pantoprazole    Tablet 40 milliGRAM(s) Oral before breakfast  polyethylene glycol 3350 17 Gram(s) Oral daily  senna 2 Tablet(s) Oral at bedtime    MEDICATIONS  (PRN):  acetaminophen   Tablet .. 650 milliGRAM(s) Oral every 6 hours PRN Mild Pain (1 - 3)  chlorproMAZINE    Tablet 10 milliGRAM(s) Oral every 8 hours PRN hiccups      PHYSICAL EXAM:  GENERAL: NAD, well-groomed, well-developed  HEAD:  Atraumatic, Normocephalic  EYES: EOMI, PERRLA, conjunctiva and sclera clear  ENMT: No tonsillar erythema, exudates, or enlargement; Moist mucous membranes, Good dentition, No lesions  NECK: Supple, No JVD, Normal thyroid  NERVOUS SYSTEM:  Alert & Oriented X3, Good concentration; Motor Strength 5/5 B/L upper and lower extremities; DTRs 2+ intact and symmetric  CHEST/LUNG: Clear to percussion bilaterally; No rales, rhonchi, wheezing, or rubs  HEART: Regular rate and rhythm; No murmurs, rubs, or gallops  ABDOMEN: Soft, Nontender, Nondistended; Bowel sounds present  EXTREMITIES:  2+ Peripheral Pulses, No clubbing, cyanosis, or edema  LYMPH: No lymphadenopathy noted  SKIN: No rashes or lesions  LABS:                        12.3   5.2   )-----------( 205      ( 18 Nov 2018 05:53 )             36.5     11-18    135  |  100  |  33<H>  ----------------------------<  251<H>  4.5   |  29  |  1.34<H>    Ca    9.4      18 Nov 2018 05:53  Phos  3.8     11-18  Mg     2.1     11-18    TPro  7.6  /  Alb  3.2<L>  /  TBili  0.3  /  DBili  x   /  AST  25  /  ALT  41  /  AlkPhos  118  11-18        CAPILLARY BLOOD GLUCOSE      POCT Blood Glucose.: 267 mg/dL (18 Nov 2018 08:08)  POCT Blood Glucose.: 222 mg/dL (17 Nov 2018 21:50)  POCT Blood Glucose.: 214 mg/dL (17 Nov 2018 16:46)  POCT Blood Glucose.: 199 mg/dL (17 Nov 2018 12:10)

## 2018-11-18 NOTE — PROGRESS NOTE ADULT - PROBLEM SELECTOR PLAN 2
New diagnosis, not in acute exacerbation   ECHO: severe LV dysfunction, grade II dysfunction   Lisinopril 2.5 mg started , c/w metoprolol   Monitor Creatinine : if elevated more then 30 % will DC ACEI   Cardio follow up  Cardiac cath in 4 weeks ( cannot happen in 4 weeks of stroke )

## 2018-11-18 NOTE — CONSULT NOTE ADULT - ASSESSMENT
#MALCOM ( sec to poor oral intake) likely has underlying CKD  # hyperkalemia sec to ARF, uncontrolled diabetes and Lisinopril in setting of poor intake  # hyponatremia ( component hyponatremia- elevated blood sugars.0 corrected . improving  # multiple strokes? embolic on heparin gtt  #HTN  # uncontrolled DM2    recs:  gentle hydration 75cc/hr  permissive HTN  currently getting a cocktail for hyperkalemia- rpt BMP later tonight  obtain renal US, urinalysis and urine prot/creatinine ratio    many thanks for the consult. Will follow closely with you
1.diabetes mellitus  uncontrolled aic 8.2  issues with dietary compliance and adequate f/up  now motivated to improve  continue lantus/humalog  may have to reduce lantus and increase humalog  pt for procedure in am  will be npo  give 30 units lantus tonight  start iv dextrose in am if am sugar less than 150  follow trend  education  op optimisation
52 yo M with PMH of HTN, HLD, DM, CABG, s/p stroke 6 days ago at [facility] in [place] at which time he left AMA due to having to deal with rent issues, presents to ED due to worsening weakness. Pt states that at prior hospital admission, he had presented with left sided upper and lower extremity numbness. Pt denies cp, sob, abd pain, fever, chills. Pt admits to nausea and vomiting. 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
53yMale with Right gluteus miguel intramuscular hematoma.
A/P:    - No IV tpa given because BEYOND WINDOW  - HEPARIN TO PREVENT RECURRENT EMBOLIC CVA AS IT APPEARS HE HAS SUFFERED TWO SEPARATE TERRITORIAL ISCHEMIC INFARCTIONS LIKELY FROM CARDIOGENIC EMBOLI  - Statin  - Dysphagia screen  - DVT prophylaxis not needed as he is on heparin infusion  - MRI/A brain-carotids done  - PT eval  - Speech/swallow eval    Total Critical Care Time spent: 60 minutes
53 year old male with DM and HTN presented with left sided weakness and numbness and slurred speech.  CT and MRI showed separate infarcts.  Echo showed LVEF 30-35%.  No PFO.

## 2018-11-18 NOTE — PROGRESS NOTE ADULT - PROBLEM SELECTOR PLAN 1
c/w aspirin, statin, thorazine  f/u TTE with bubble study: No PFO, severe LV dysfunction, with grade II DD.   AC discontinued   Neuro follow up: ABRAHAN, recommended if negative, loop recorder  to find cause of cardio embolic cause   ABRAHAN on Monday then DC plan to acute rehab , if ABRAHAN is negative patient will need Loop recorder on patient basis as per neuro recommendation   Cardio follow up   Pt had MRI and MRA at [facility], report in chart.  Hypercoagulable work : negative, B2 glucoprotein positive due to Kidney injury   LE doppler : negative     Aspirin and Plavix for 3 weeks after ABRAHAN and then continue with Aspirin     Stroke is most likely due to multiple risk factors, HTN, DM, smoking

## 2018-11-18 NOTE — PROGRESS NOTE ADULT - PROBLEM SELECTOR PLAN 3
BP controlled   c/w metoprolol and  ACE-I due to new HF
BP controlled   c/w metoprolol, consider adding ACE-I due to new HF
Creatinine:  1.27  Resolved   Monitor BMP  Nephro follow up
Creatinine:  1.33--> 1.48(most likely due to starting lisinopril)   Monitor BMP  Nephro follow up   US kidney: no hydronephrosis
Creatinine: 1.53--> 1.45--> 1.33  Monitor BMP  Nephro follow up   US kidney: no hydronephrosis
Na: 128  -fluid intake restricted,  Nephro follow up   BMP monitored
Na: 129  -fluid intake restricted,  Nephro follow up   BMP monitored
Na: 130  -fluid intake restricted,  Nephro follow up : SIADH  BMP monitored
Resolved   Monitor BMP  Nephro follow up
c/w ASA, statins and metoprolol.

## 2018-11-18 NOTE — CONSULT NOTE ADULT - SUBJECTIVE AND OBJECTIVE BOX
53 yr b/m with h/o diabetes/admitted with left sided weakness/uncontrolled diabetes  h/o diabetes for 25 years on insulin lantus/humalog  claims compliance  ?recent wt change  active smoker  HPI:  Pt is 54 yo M with PMH of HTN, HLD, DM, CABG, s/p stroke 6 days ago at [facility] in [place] at which time he left AMA due to having to deal with rent issues, presents to ED due to worsening weakness. Pt states that at prior hospital admission, he had presented with left sided upper and lower extremity numbness. Pt denies cp, sob, abd pain, fever, chills. Pt admits to nausea and vomiting. Per patient's brother, at baseline patient able to ambulate but noted that patient has been unable to walk unassisted since coming home yesterday night and speech is slurred. (09 Nov 2018 14:06)      PAST MEDICAL & SURGICAL HISTORY:  Cerebrovascular accident (CVA), unspecified mechanism  Hyperlipidemia, unspecified hyperlipidemia type  Coronary artery disease involving native heart without angina pectoris, unspecified vessel or lesion type  Hypertension, unspecified type  Diabetes mellitus of other type without complication  History of appendectomy  S/P CABG x 1      No Known Allergies      acetaminophen   Tablet .. 650 milliGRAM(s) Oral every 6 hours PRN  aspirin enteric coated 81 milliGRAM(s) Oral daily  atorvastatin 40 milliGRAM(s) Oral at bedtime  chlorproMAZINE    Tablet 10 milliGRAM(s) Oral every 8 hours PRN  docusate sodium 100 milliGRAM(s) Oral two times a day  heparin  Injectable 5000 Unit(s) SubCutaneous every 8 hours  insulin glargine Injectable (LANTUS) 48 Unit(s) SubCutaneous at bedtime  insulin lispro (HumaLOG) corrective regimen sliding scale   SubCutaneous three times a day before meals  insulin lispro (HumaLOG) corrective regimen sliding scale   SubCutaneous at bedtime  insulin lispro Injectable (HumaLOG) 6 Unit(s) SubCutaneous three times a day before meals  lisinopril 2.5 milliGRAM(s) Oral daily  metoprolol tartrate 25 milliGRAM(s) Oral two times a day  nicotine - 21 mG/24Hr(s) Patch 1 patch Transdermal daily  pantoprazole    Tablet 40 milliGRAM(s) Oral before breakfast  polyethylene glycol 3350 17 Gram(s) Oral daily  senna 2 Tablet(s) Oral at bedtime      Social Hx:    FAMILY HISTORY:  No pertinent family history in first degree relatives      REVIEW OF SYSTEMS:  bouchra diet  no abd pain  no vomiting  no cp    CONSTITUTIONAL: No weakness, fevers or chills  EYES/ENT: No visual changes;  No vertigo or throat pain   NECK: No pain or stiffness  RESPIRATORY: No cough, wheezing, hemoptysis; No shortness of breath  CARDIOVASCULAR: No chest pain or palpitations  GASTROINTESTINAL: No abdominal or epigastric pain. No nausea, vomiting, or hematemesis; No diarrhea or constipation. No melena or hematochezia.  GENITOURINARY: No dysuria, frequency or hematuria  NEUROLOGICAL: No numbness or weakness  SKIN: No itching, burning, rashes, or lesions   All other review of systems is negative unless indicated above.  PHYSICAL EXAM:    Vital Signs Last 24 Hrs  T(C): 36.7 (18 Nov 2018 05:55), Max: 36.7 (18 Nov 2018 05:55)  T(F): 98 (18 Nov 2018 05:55), Max: 98 (18 Nov 2018 05:55)  HR: 73 (18 Nov 2018 05:55) (73 - 85)  BP: 124/71 (18 Nov 2018 05:55) (109/65 - 124/71)  BP(mean): --  RR: 18 (18 Nov 2018 05:55) (18 - 18)  SpO2: 100% (18 Nov 2018 05:55) (100% - 100%)    Constitutional:    HEENT:  awake alert/chronically ill  evidence of wt loss  lungs ae reduced  cardia reg  abd soft  neuro moves all limbs  details as per neuro    Neck:  [  ] Supple  [  ]Lymphadenopathy  [  ] JVD   [  ]No JVD  [  ] Masses   [  ] WNL    CHEST/Respiratory:  [  ] Rales      [  ] Rhonchi      [  ] Wheezing       [  ] Chest Tenderness  [  ]Clear to auscultation    Cardiovascular:  [  ]S1 S2  [  ] Reg  [  ] Irreg   [  ] Murmurs  [  ]Systolic [  ]Diastolic  [  ]No Murmur    Abdomen:  [  ]  Bowel Sounds   [  ] Soft           [  ] ABD Distention  [  ] Tenderness  [  ] Organomegaly                        [  ] Guarding Rigidity  [  ] No Guarding Rigidity  [  ] Rebound Tenderness [  ] No Rebound Tenderness    Extremities: [  ] Edema  [  ] No edema  [  ] Clubbing   [  ] Cyanosis  [  ] Palpable peripheral pulses                        [  ] No Tender Calf muscles  [  ] Tender Calf muscles    Neurological:  [  ] Alert  [  ] Awake  [  ] Oriented  x                              [  ] Confused    Skin:  [  ] Thrombophlebitis  [  ] Rashes  [  ] Dry  [  ] Ulcers    Ortho:  [  ] Joint Swelling  [  ] Joint erythema [  ] DJD [  ] Increased temp. to touch      LABS/DIAGNOSTIC TESTS                          12.3   5.2   )-----------( 205      ( 18 Nov 2018 05:53 )             36.5     WBC Count: 5.2 K/uL (11-18 @ 05:53)  WBC Count: 6.2 K/uL (11-17 @ 05:46)  WBC Count: 5.8 K/uL (11-16 @ 05:49)      11-18    135  |  100  |  33<H>  ----------------------------<  251<H>  4.5   |  29  |  1.34<H>    Ca    9.4      18 Nov 2018 05:53  Phos  3.8     11-18  Mg     2.1     11-18    TPro  7.6  /  Alb  3.2<L>  /  TBili  0.3  /  DBili  x   /  AST  25  /  ALT  41  /  AlkPhos  118  11-18          LIVER FUNCTIONS - ( 18 Nov 2018 05:53 )  Alb: 3.2 g/dL / Pro: 7.6 g/dL / ALK PHOS: 118 U/L / ALT: 41 U/L DA / AST: 25 U/L / GGT: x                 LACTATE:      CULTURES:   acetaminophen   Tablet .. 650 milliGRAM(s) Oral every 6 hours PRN  aspirin enteric coated 81 milliGRAM(s) Oral daily  atorvastatin 40 milliGRAM(s) Oral at bedtime  chlorproMAZINE    Tablet 10 milliGRAM(s) Oral every 8 hours PRN  docusate sodium 100 milliGRAM(s) Oral two times a day  heparin  Injectable 5000 Unit(s) SubCutaneous every 8 hours  insulin glargine Injectable (LANTUS) 48 Unit(s) SubCutaneous at bedtime  insulin lispro (HumaLOG) corrective regimen sliding scale   SubCutaneous three times a day before meals  insulin lispro (HumaLOG) corrective regimen sliding scale   SubCutaneous at bedtime  insulin lispro Injectable (HumaLOG) 6 Unit(s) SubCutaneous three times a day before meals  lisinopril 2.5 milliGRAM(s) Oral daily  metoprolol tartrate 25 milliGRAM(s) Oral two times a day  nicotine - 21 mG/24Hr(s) Patch 1 patch Transdermal daily  pantoprazole    Tablet 40 milliGRAM(s) Oral before breakfast  polyethylene glycol 3350 17 Gram(s) Oral daily  senna 2 Tablet(s) Oral at bedtime      RADIOLOGY    CXR:

## 2018-11-18 NOTE — PROGRESS NOTE ADULT - PROBLEM SELECTOR PROBLEM 2
CHF (congestive heart failure)
CVA (cerebral vascular accident)
CVA (cerebral vascular accident)
MALCOM (acute kidney injury)
Hypertension, unspecified type

## 2018-11-18 NOTE — PROGRESS NOTE ADULT - SUBJECTIVE AND OBJECTIVE BOX
PGY 1 Note discussed with supervising resident and primary attending    Patient is a 53y old  Male who presents with a chief complaint of weakness, slurred speech (18 Nov 2018 11:17)      INTERVAL HPI/OVERNIGHT EVENTS:   Patient is seen at the bedside, No new complains, explained the treatment plan to the patient     MEDICATIONS  (STANDING):  aspirin enteric coated 81 milliGRAM(s) Oral daily  atorvastatin 40 milliGRAM(s) Oral at bedtime  docusate sodium 100 milliGRAM(s) Oral two times a day  heparin  Injectable 5000 Unit(s) SubCutaneous every 8 hours  insulin glargine Injectable (LANTUS) 43 Unit(s) SubCutaneous at bedtime  insulin lispro (HumaLOG) corrective regimen sliding scale   SubCutaneous three times a day before meals  insulin lispro (HumaLOG) corrective regimen sliding scale   SubCutaneous at bedtime  insulin lispro Injectable (HumaLOG) 4 Unit(s) SubCutaneous three times a day before meals  lisinopril 2.5 milliGRAM(s) Oral daily  metoprolol tartrate 25 milliGRAM(s) Oral two times a day  nicotine - 21 mG/24Hr(s) Patch 1 patch Transdermal daily  pantoprazole    Tablet 40 milliGRAM(s) Oral before breakfast  polyethylene glycol 3350 17 Gram(s) Oral daily  senna 2 Tablet(s) Oral at bedtime    MEDICATIONS  (PRN):  acetaminophen   Tablet .. 650 milliGRAM(s) Oral every 6 hours PRN Mild Pain (1 - 3)  chlorproMAZINE    Tablet 10 milliGRAM(s) Oral every 8 hours PRN hiccups      __________________________________________________  REVIEW OF SYSTEMS:    CONSTITUTIONAL: No fever,   EYES: no acute visual disturbances  NECK: No pain or stiffness  RESPIRATORY: No cough; No shortness of breath  CARDIOVASCULAR: No chest pain, no palpitations  GASTROINTESTINAL: No pain. No nausea or vomiting; No diarrhea   NEUROLOGICAL: No headache or numbness, no tremors  MUSCULOSKELETAL: No joint pain, no muscle pain  GENITOURINARY: no dysuria, no frequency, no hesitancy  PSYCHIATRY: no depression , no anxiety  ALL OTHER  ROS negative        Vital Signs Last 24 Hrs  T(C): 36.7 (18 Nov 2018 05:55), Max: 37 (17 Nov 2018 13:58)  T(F): 98 (18 Nov 2018 05:55), Max: 98.6 (17 Nov 2018 13:58)  HR: 73 (18 Nov 2018 05:55) (71 - 85)  BP: 124/71 (18 Nov 2018 05:55) (109/65 - 124/71)  BP(mean): --  RR: 18 (18 Nov 2018 05:55) (18 - 18)  SpO2: 100% (18 Nov 2018 05:55) (100% - 100%)    ________________________________________________  PHYSICAL EXAM:  GENERAL: NAD  HEENT: Normocephalic;  conjunctivae and sclerae clear; moist mucous membranes;   NECK : supple  CHEST/LUNG: Clear to auscultation bilaterally with good air entry   HEART: S1 S2  regular; no murmurs, gallops or rubs  ABDOMEN: Soft, Nontender, Nondistended; Bowel sounds present  EXTREMITIES: no cyanosis; no edema; no calf tenderness, right sided weakness   SKIN: warm and dry; no rash  NERVOUS SYSTEM:  Awake and alert; Oriented  to place, person and time ; no new deficits    _________________________________________________  LABS:                        12.3   5.2   )-----------( 205      ( 18 Nov 2018 05:53 )             36.5     11-18    135  |  100  |  33<H>  ----------------------------<  251<H>  4.5   |  29  |  1.34<H>    Ca    9.4      18 Nov 2018 05:53  Phos  3.8     11-18  Mg     2.1     11-18    TPro  7.6  /  Alb  3.2<L>  /  TBili  0.3  /  DBili  x   /  AST  25  /  ALT  41  /  AlkPhos  118  11-18        CAPILLARY BLOOD GLUCOSE      POCT Blood Glucose.: 267 mg/dL (18 Nov 2018 08:08)  POCT Blood Glucose.: 222 mg/dL (17 Nov 2018 21:50)  POCT Blood Glucose.: 214 mg/dL (17 Nov 2018 16:46)  POCT Blood Glucose.: 199 mg/dL (17 Nov 2018 12:10)        RADIOLOGY & ADDITIONAL TESTS:    Care Discussed with Consultants :     Plan of care was discussed with patient and /or primary care giver; all questions and concerns were addressed and care was aligned with patient's wishes.

## 2018-11-19 VITALS
HEART RATE: 78 BPM | RESPIRATION RATE: 18 BRPM | DIASTOLIC BLOOD PRESSURE: 78 MMHG | TEMPERATURE: 98 F | OXYGEN SATURATION: 100 % | SYSTOLIC BLOOD PRESSURE: 148 MMHG

## 2018-11-19 LAB
ALBUMIN SERPL ELPH-MCNC: 3 G/DL — LOW (ref 3.5–5)
ALP SERPL-CCNC: 125 U/L — HIGH (ref 40–120)
ALT FLD-CCNC: 44 U/L DA — SIGNIFICANT CHANGE UP (ref 10–60)
ANION GAP SERPL CALC-SCNC: 7 MMOL/L — SIGNIFICANT CHANGE UP (ref 5–17)
APTT BLD: 26 SEC — LOW (ref 27.5–36.3)
AST SERPL-CCNC: 21 U/L — SIGNIFICANT CHANGE UP (ref 10–40)
BASOPHILS # BLD AUTO: 0 K/UL — SIGNIFICANT CHANGE UP (ref 0–0.2)
BASOPHILS NFR BLD AUTO: 0.7 % — SIGNIFICANT CHANGE UP (ref 0–2)
BILIRUB SERPL-MCNC: 0.3 MG/DL — SIGNIFICANT CHANGE UP (ref 0.2–1.2)
BUN SERPL-MCNC: 37 MG/DL — HIGH (ref 7–18)
CALCIUM SERPL-MCNC: 9 MG/DL — SIGNIFICANT CHANGE UP (ref 8.4–10.5)
CHLORIDE SERPL-SCNC: 103 MMOL/L — SIGNIFICANT CHANGE UP (ref 96–108)
CO2 SERPL-SCNC: 25 MMOL/L — SIGNIFICANT CHANGE UP (ref 22–31)
CREAT SERPL-MCNC: 1.39 MG/DL — HIGH (ref 0.5–1.3)
EOSINOPHIL # BLD AUTO: 0.2 K/UL — SIGNIFICANT CHANGE UP (ref 0–0.5)
EOSINOPHIL NFR BLD AUTO: 3.3 % — SIGNIFICANT CHANGE UP (ref 0–6)
GLUCOSE BLDC GLUCOMTR-MCNC: 184 MG/DL — HIGH (ref 70–99)
GLUCOSE BLDC GLUCOMTR-MCNC: 206 MG/DL — HIGH (ref 70–99)
GLUCOSE SERPL-MCNC: 228 MG/DL — HIGH (ref 70–99)
HCT VFR BLD CALC: 33.4 % — LOW (ref 39–50)
HGB BLD-MCNC: 11.4 G/DL — LOW (ref 13–17)
INR BLD: 0.97 RATIO — SIGNIFICANT CHANGE UP (ref 0.88–1.16)
LYMPHOCYTES # BLD AUTO: 1.7 K/UL — SIGNIFICANT CHANGE UP (ref 1–3.3)
LYMPHOCYTES # BLD AUTO: 28.2 % — SIGNIFICANT CHANGE UP (ref 13–44)
MAGNESIUM SERPL-MCNC: 2 MG/DL — SIGNIFICANT CHANGE UP (ref 1.6–2.6)
MCHC RBC-ENTMCNC: 30.6 PG — SIGNIFICANT CHANGE UP (ref 27–34)
MCHC RBC-ENTMCNC: 34.2 GM/DL — SIGNIFICANT CHANGE UP (ref 32–36)
MCV RBC AUTO: 89.4 FL — SIGNIFICANT CHANGE UP (ref 80–100)
MONOCYTES # BLD AUTO: 0.6 K/UL — SIGNIFICANT CHANGE UP (ref 0–0.9)
MONOCYTES NFR BLD AUTO: 10 % — SIGNIFICANT CHANGE UP (ref 2–14)
NEUTROPHILS # BLD AUTO: 3.4 K/UL — SIGNIFICANT CHANGE UP (ref 1.8–7.4)
NEUTROPHILS NFR BLD AUTO: 57.8 % — SIGNIFICANT CHANGE UP (ref 43–77)
PHOSPHATE SERPL-MCNC: 4.1 MG/DL — SIGNIFICANT CHANGE UP (ref 2.5–4.5)
PLATELET # BLD AUTO: 193 K/UL — SIGNIFICANT CHANGE UP (ref 150–400)
POTASSIUM SERPL-MCNC: 4.4 MMOL/L — SIGNIFICANT CHANGE UP (ref 3.5–5.3)
POTASSIUM SERPL-SCNC: 4.4 MMOL/L — SIGNIFICANT CHANGE UP (ref 3.5–5.3)
PROT SERPL-MCNC: 7.3 G/DL — SIGNIFICANT CHANGE UP (ref 6–8.3)
PROTHROM AB SERPL-ACNC: 10.8 SEC — SIGNIFICANT CHANGE UP (ref 10–12.9)
RBC # BLD: 3.73 M/UL — LOW (ref 4.2–5.8)
RBC # FLD: 11.5 % — SIGNIFICANT CHANGE UP (ref 10.3–14.5)
SODIUM SERPL-SCNC: 135 MMOL/L — SIGNIFICANT CHANGE UP (ref 135–145)
WBC # BLD: 5.9 K/UL — SIGNIFICANT CHANGE UP (ref 3.8–10.5)
WBC # FLD AUTO: 5.9 K/UL — SIGNIFICANT CHANGE UP (ref 3.8–10.5)

## 2018-11-19 PROCEDURE — 93320 DOPPLER ECHO COMPLETE: CPT | Mod: 26

## 2018-11-19 PROCEDURE — 93325 DOPPLER ECHO COLOR FLOW MAPG: CPT | Mod: 26

## 2018-11-19 PROCEDURE — 93312 ECHO TRANSESOPHAGEAL: CPT | Mod: 26

## 2018-11-19 RX ORDER — DOCUSATE SODIUM 100 MG
1 CAPSULE ORAL
Qty: 0 | Refills: 0 | DISCHARGE
Start: 2018-11-19

## 2018-11-19 RX ORDER — METOPROLOL TARTRATE 50 MG
1 TABLET ORAL
Qty: 0 | Refills: 0 | COMMUNITY
Start: 2018-11-19

## 2018-11-19 RX ORDER — METOPROLOL TARTRATE 50 MG
1 TABLET ORAL
Qty: 0 | Refills: 0 | COMMUNITY

## 2018-11-19 RX ORDER — LISINOPRIL 2.5 MG/1
1 TABLET ORAL
Qty: 0 | Refills: 0 | COMMUNITY
Start: 2018-11-19

## 2018-11-19 RX ORDER — SENNA PLUS 8.6 MG/1
2 TABLET ORAL
Qty: 0 | Refills: 0 | DISCHARGE
Start: 2018-11-19

## 2018-11-19 RX ORDER — ENOXAPARIN SODIUM 100 MG/ML
43 INJECTION SUBCUTANEOUS
Qty: 0 | Refills: 0 | COMMUNITY

## 2018-11-19 RX ORDER — POLYETHYLENE GLYCOL 3350 17 G/17G
17 POWDER, FOR SOLUTION ORAL
Qty: 0 | Refills: 0 | DISCHARGE
Start: 2018-11-19

## 2018-11-19 RX ORDER — CHLORPROMAZINE HCL 10 MG
1 TABLET ORAL
Qty: 0 | Refills: 0 | DISCHARGE
Start: 2018-11-19

## 2018-11-19 RX ORDER — CLOPIDOGREL BISULFATE 75 MG/1
1 TABLET, FILM COATED ORAL
Qty: 0 | Refills: 0 | COMMUNITY
Start: 2018-11-19

## 2018-11-19 RX ORDER — ASPIRIN/CALCIUM CARB/MAGNESIUM 324 MG
1 TABLET ORAL
Qty: 0 | Refills: 0 | DISCHARGE
Start: 2018-11-19

## 2018-11-19 RX ORDER — CLOPIDOGREL BISULFATE 75 MG/1
75 TABLET, FILM COATED ORAL DAILY
Qty: 0 | Refills: 0 | Status: DISCONTINUED | OUTPATIENT
Start: 2018-11-19 | End: 2018-11-19

## 2018-11-19 RX ORDER — ATORVASTATIN CALCIUM 80 MG/1
1 TABLET, FILM COATED ORAL
Qty: 0 | Refills: 0 | COMMUNITY
Start: 2018-11-19

## 2018-11-19 RX ORDER — ACETAMINOPHEN 500 MG
2 TABLET ORAL
Qty: 0 | Refills: 0 | DISCHARGE
Start: 2018-11-19

## 2018-11-19 RX ORDER — ATORVASTATIN CALCIUM 80 MG/1
1 TABLET, FILM COATED ORAL
Qty: 0 | Refills: 0 | COMMUNITY

## 2018-11-19 RX ORDER — ASPIRIN/CALCIUM CARB/MAGNESIUM 324 MG
1 TABLET ORAL
Qty: 0 | Refills: 0 | COMMUNITY

## 2018-11-19 RX ORDER — LISINOPRIL 2.5 MG/1
1 TABLET ORAL
Qty: 0 | Refills: 0 | COMMUNITY

## 2018-11-19 RX ORDER — BUDESONIDE AND FORMOTEROL FUMARATE DIHYDRATE 160; 4.5 UG/1; UG/1
1 AEROSOL RESPIRATORY (INHALATION)
Qty: 1 | Refills: 0
Start: 2018-11-19

## 2018-11-19 RX ORDER — PANTOPRAZOLE SODIUM 20 MG/1
1 TABLET, DELAYED RELEASE ORAL
Qty: 0 | Refills: 0 | DISCHARGE
Start: 2018-11-19

## 2018-11-19 RX ORDER — POLYETHYLENE GLYCOL 3350 17 G/17G
17 POWDER, FOR SOLUTION ORAL
Qty: 0 | Refills: 0 | COMMUNITY
Start: 2018-11-19

## 2018-11-19 RX ADMIN — Medication 25 MILLIGRAM(S): at 05:27

## 2018-11-19 RX ADMIN — Medication 81 MILLIGRAM(S): at 11:45

## 2018-11-19 RX ADMIN — Medication 2: at 11:45

## 2018-11-19 RX ADMIN — Medication 1 PATCH: at 11:45

## 2018-11-19 RX ADMIN — Medication 1 PATCH: at 07:47

## 2018-11-19 RX ADMIN — HEPARIN SODIUM 5000 UNIT(S): 5000 INJECTION INTRAVENOUS; SUBCUTANEOUS at 13:28

## 2018-11-19 RX ADMIN — PANTOPRAZOLE SODIUM 40 MILLIGRAM(S): 20 TABLET, DELAYED RELEASE ORAL at 06:09

## 2018-11-19 RX ADMIN — CLOPIDOGREL BISULFATE 75 MILLIGRAM(S): 75 TABLET, FILM COATED ORAL at 11:44

## 2018-11-19 RX ADMIN — Medication 6 UNIT(S): at 11:44

## 2018-11-19 RX ADMIN — LISINOPRIL 2.5 MILLIGRAM(S): 2.5 TABLET ORAL at 05:26

## 2018-11-19 NOTE — PROGRESS NOTE ADULT - REASON FOR ADMISSION
weakness, slurred speech

## 2018-11-19 NOTE — PROGRESS NOTE ADULT - ASSESSMENT
#MALCOM ( sec to poor oral intake) likely has underlying CKD  # hyperkalemia- resolved.  # hyponatremia  urine chemistries suggest SIADH.( UOSM 517, ). continue free water restriction.  # multiple strokes? embolic on heparin gtt  #HTN  # uncontrolled DM2    recs  renal US unremarkable.
_________________________________________________________________________________________  ========>>  M E D I C A L   A T T E N D I N G  (covering today)  F O L L O W  U P  N O T E  <<=========  -----------------------------------------------------------------------------------------------------    - Patient seen and examined by me approximately thirty minutes ago.  - In summary,  MADISON PERRY is a 53y year old man who originally presented with weakness  - Patient today overall doing ok, comfortable, eating OK.  feels weak, + mild dizziness     ==================>> REVIEW OF SYSTEM <<=================    GEN: no fever, no chills, no pain  RESP: no SOB, no cough, no sputum  CVS: no chest pain, no palpitations, no edema  GI: no abdominal pain, no nausea, no constipation, no diarrhea  : no dysuria, no frequency, no hematuria  Neuro: no headache, mild dizziness and generalized weakness   Derm : no itching, no rash    ==================>> PHYSICAL EXAM <<=================    GEN: A&O X 3 , NAD , comfortable  HEENT: NCAT, PERRL, MMM, hearing intact  Neck: supple , no JVD  CVS: S1S2 , regular , No M/R/G appreciated  PULM: CTA B/L,  no W/R/R appreciated  ABD.: soft. non tender, non distended,  bowel sounds present  Extrem: intact pulses , no edema   PSYCH : normal mood,  not anxious      ==================>> MEDICATIONS <<====================    MEDICATIONS  (STANDING):  aspirin enteric coated 81 milliGRAM(s) Oral daily  atorvastatin 40 milliGRAM(s) Oral at bedtime  docusate sodium 100 milliGRAM(s) Oral two times a day  enoxaparin Injectable 40 milliGRAM(s) SubCutaneous daily  insulin glargine Injectable (LANTUS) 43 Unit(s) SubCutaneous at bedtime  insulin lispro (HumaLOG) corrective regimen sliding scale   SubCutaneous three times a day before meals  insulin lispro (HumaLOG) corrective regimen sliding scale   SubCutaneous at bedtime  insulin lispro Injectable (HumaLOG) 4 Unit(s) SubCutaneous three times a day before meals  metoprolol tartrate 25 milliGRAM(s) Oral two times a day  pantoprazole    Tablet 40 milliGRAM(s) Oral before breakfast  polyethylene glycol 3350 17 Gram(s) Oral daily  senna 2 Tablet(s) Oral at bedtime    MEDICATIONS  (PRN):  acetaminophen   Tablet .. 650 milliGRAM(s) Oral every 6 hours PRN Mild Pain (1 - 3)  chlorproMAZINE    Tablet 10 milliGRAM(s) Oral every 8 hours PRN hiccups  oxyCODONE    5 mG/acetaminophen 325 mG 1 Tablet(s) Oral every 8 hours PRN Severe Pain (7 - 10)      ==================>> VITAL SIGNS <<==================    T(C): 36.7 (11-13-18 @ 13:57), Max: 37.2 (11-12-18 @ 18:26)  HR: 77 (11-13-18 @ 13:57) (73 - 92)  BP: 113/64 (11-13-18 @ 13:57) (111/89 - 133/78)  RR: 18 (11-13-18 @ 13:57) (17 - 18)  SpO2: 100% (11-13-18 @ 13:57) (98% - 100%)    POCT Blood Glucose.: 249 mg/dL (13 Nov 2018 12:03)  POCT Blood Glucose.: 214 mg/dL (13 Nov 2018 08:08)  POCT Blood Glucose.: 215 mg/dL (12 Nov 2018 21:24)  POCT Blood Glucose.: 361 mg/dL (12 Nov 2018 16:40)     ==================>> LAB AND IMAGING <<==================                        14.5   6.8   )-----------( 241      ( 13 Nov 2018 07:15 )             42.7        130<L>  |  95<L>  |  34<H>  ----------------------------<  231<H>  4.8   |  27  |  1.45<H>    Sodium:   130  <==, 129  <==, 131  <==, 128  <==, 130  <==, 126  <==, 129  <==    Creatinine:  1.45  <<==, 1.53  <<==, 1.80  <<==, 1.73  <<==, 1.93  <<==, 1.91  <<==    Ca    9.5      13 Nov 2018 07:15  Phos  3.7     11-13  Mg     2.1     11-13    TPro  8.1  /  Alb  3.3<L>  /  TBili  0.4  /  DBili  x   /  AST  19  /  ALT  23  /  AlkPhos  105  11-13     TSH:      0.55   (11-10-18)       ,     0.46   (11-09-18)           Lipid profile:  (11-10-18)     Total: 124     LDL  : 71     HDL  :44     TG   :47     HgA1C: 8.0  (11-12-18)        , 7.7  (11-10-18)            ___________________________________________________________________________________  ===============>>  A S S E S S M E N T   A N D   P L A N <<===============  ------------------------------------------------------------------------------------------    · Assessment		  Patient admitted for concern of embolic CVA along with MALCOM/ARF, hyponatremia on admission to [facility]. Per previous MRI, MRA head and neck obtained from [place], patient had medullary stroke, however no occipital stroke. On admission, patient found to have new occipital stroke, therefore embolic stroke more likely.     Problem/Plan - 1:  ·  Problem: Cerebrovascular accident (CVA), unspecified mechanism.  Plan: c/w aspirin, statin  c/w telemetry monitoring: non significant   f/u TTE with bubble study: pending results   f/u neurology   Cardio consulted and noted   Pt had MRI and MRA at [facility], report in chart.  Hypercoagulable work up for possible cause of stroke, pending   LE doppler ordered, to be done today   Dr Chavira consulted: to follow     Problem/Plan - 2:  ·  Problem: MALCOM   Creatinine: 1.53--> 1.45  Monitor BMP  Nephro follow up   US kidney: no hydronephrosis.   improving     Problem/Plan - 3:  ·  Problem: Hyponatremia, chronic   -fluid intake restricted,  Nephro follow up : SIADH  BMP monitored.   encouraged increased pO intake     Problem/Plan - 4:  ·  Problem: Diabetes mellitus of other type without complication.  Plan: c/w lantus 43 at bedtime, sliding scale  A1c: 7.7.     Problem/Plan - 5:  Problem: Hyperlipidemia, unspecified hyperlipidemia type. Plan: c/w statin.    Problem/Plan - 6:  ·  Problem: Hypertension, unspecified type.  Plan: holding lisinopril for now 2/2 MALCOM  c/w metoprolol with parameters  continue to monitor BP.     Problem/Plan - 7:  ·  Problem: Coronary artery disease involving native heart without angina pectoris, unspecified vessel or lesion type.  Plan: c/w aspirin, statin.   cardio f/u    -GI/DVT Prophylaxis.    --------------------------------------------  Case discussed with pt, staff  Education given on findings and plan of care  ___________________________  H. PRINCE Castellanos (covering today)  Pager: [phone]
#MALCOM ( sec to poor oral intake) likely has underlying CKD  # hyperkalemia sec to ARF, uncontrolled diabetes and Lisinopril in setting of poor intake. resolved post medical management  # hyponatremia ( component hyponatremia- stable  # multiple strokes? embolic on heparin gtt  #HTN  # uncontrolled DM2    recs  renal US pending   urinalysis with minimal proteinuria.  check urine NA and osmolality  free water restriction 800cc/day
#MALCOM ( sec to poor oral intake) likely has underlying CKD. stable renal function  # hyperkalemia- resolved.  # hyponatremia  urine chemistries suggest SIADH.( UOSM 517, ). continue free water restriction 800cc/hr. SNA improved 130>.131  # multiple strokes? hypercoagulable negative so far  #HTN-controlled.  # uncontrolled DM2  renal US unremarkable.
#MALCOM ( sec to poor oral intake) likely has underlying CKD. stable renal function  # hyperkalemia- resolved.  # hyponatremia  urine chemistries suggest SIADH.( UOSM 517, ). continue free water restriction.  # multiple strokes? hypercoagulable work-up ongoing  #HTN  # uncontrolled DM2  renal US unremarkable.
#MALCOM ( sec to poor oral intake) likely has underlying CKD. stable renal function  # hyperkalemia- resolved.  # hyponatremia  urine chemistries suggest SIADH.( UOSM 517, ). continue free water restriction. SNA improved.  # multiple strokes? hypercoagulable negative so far  #HTN-controlled.  # uncontrolled DM2  renal US unremarkable.
52 yo M with PMH of HTN, HLD, DM, CABG, s/p stroke 6 days ago at [facility] in [place] at which time he left AMA due to having to deal with rent issues, presents to ED due to worsening weakness. Pt states that at prior hospital admission, he had presented with left sided upper and lower extremity numbness. Pt denies cp, sob, abd pain, fever, chills. Pt admits to nausea and vomiting. 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
52 yo M with PMH of HTN, HLD, DM, CABG, s/p stroke 6 days ago at [facility] in [place] at which time he left AMA due to having to deal with rent issues, presents to ED due to worsening weakness. Pt states that at prior hospital admission, he had presented with left sided upper and lower extremity numbness. Pt denies cp, sob, abd pain, fever, chills. Pt admits to nausea and vomiting. 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
A/P     PT   WITH  ACUTE ON   CHR  RENAL  FAILURE    CR   IS  AT  1.3  TODAY     K  AND  BICARB  OK     BP IS  WELL  CONTROLLED   HAS NO  VOL  EXCESS    GOING  FOR ABRAHAN  TOMORROW   S/P  CVA     WILL  SIGN OFF THE  CASE  AS  NO  FURTHER  RECOMMENDATIONS   THANKS  FOR THE  CONSULT
A/P    PT  WITH  ACUTE  ON   CHR  RENAL  FAILURE-  RESOLVED    CR    AT HIS  BASELINE    S  NA ALSO  BETTER  135  TODAY  K  ALSO  GOOD  4.6   BP   IS  WELL CONTROLLED   HAS NO  VOL  EXCESS     ADMITTED   WITH   CVA   AWAITING   D/C  TO   REHAB
A/P    PT  WITH  MALCOM ,  SEC TO  PRERENAL  AZOTEMIA    CR  NORMAL   TODAY    S  NA ALSO  IMPROVING  SLOWLY   K   NORMAL  BP  IS  WELL  CONTROLLED   HAS NO  VOL  EXCESS    S/P   CVA  ,  H/O  MULTIPLE STROKES    FOR  D/C  TO  REHAB
Embolic stroke  neurologically ok to restart AC, pls make sure that the PTT<70 to minimize risk of intercranial hemorrhage  cw statin
Ischemic stroke  as per conversation with PMD, there is no concern for cardiac thrombus    Will recommend:  ABRAHAN, if -husam place loop recorder  asa 81 and lipitor 40  If there is no cardiac thrombus, then there is no neurological indication for AC
Patient admitted for concern of embolic CVA along with MALCOM/ARF, hyponatremia on admission to [facility]. Per previous MRI, MRA head and neck obtained from [place], patient had medullary stroke, however no occipital stroke. On admission, patient found to have new occipital stroke, therefore embolic stroke more likely.   Patient admitted with stroke
Patient admitted for concern of embolic CVA along with MALCOM/ARF, hyponatremia on admission to [facility]. Per previous MRI, MRA head and neck obtained from [place], patient had medullary stroke, however no occipital stroke. On admission, patient found to have new occipital stroke, therefore embolic stroke more likely.   Patient admitted with stroke
Patient admitted for concern of embolic CVA along with MALCOM/ARF, hyponatremia on admission to [facility]. Per previous MRI, MRA head and neck obtained from [place], patient had medullary stroke, however no occipital stroke. On admission, patient found to have new occipital stroke, therefore embolic stroke more likely.   Patient admitted with stroke
Patient admitted for concern of embolic CVA along with MALCOM/ARF, hyponatremia on admission to [facility]. Per previous MRI, MRA head and neck obtained from [place], patient had medullary stroke, however no occipital stroke. On admission, patient found to have new occipital stroke, therefore embolic stroke more likely. Patient to be continued on telemetry and placed on heparin drip for concern for Afib, patient to undergo TTE with bubble study to evaluate for possible PFO.
Patient admitted for concern of embolic CVA along with MALCOM/ARF, hyponatremia on admission to [facility]. Per previous MRI, MRA head and neck obtained from [place], patient had medullary stroke, however no occipital stroke. On admission, patient found to have new occipital stroke, therefore embolic stroke more likely. Patient to be continued on telemetry and placed on heparin drip for concern for Afib, patient to undergo TTE with bubble study to evaluate for possible PFO.
Patient admitted for concern of embolic CVA along with MALCOM/ARF, hyponatremia on admission to [facility]. Per previous MRI, MRA head and neck obtained from [place], patient had medullary stroke, however no occipital stroke. On admission, patient found to have new occipital stroke, therefore embolic stroke more likely.   Patient admitted with stroke
Patient admitted for concern of embolic CVA along with MALCOM/ARF, hyponatremia on admission to [facility]. Per previous MRI, MRA head and neck obtained from [place], patient had medullary stroke, however no occipital stroke. On admission, patient found to have new occipital stroke, therefore embolic stroke more likely. Patient to be continued on telemetry and placed on heparin drip for concern for Afib, patient to undergo TTE with bubble study to evaluate for possible PFO.
events noted pt started c/o pain in rt hip since early am.  no fall  pt was on iv heparin , i told resident to stop heparin, get ct hip , and ortho, cbc watch   pt seen and examined  vs stable afebrile  physical unchanged   pt denies any headache  no nausea or vomiting, denies weakness of side of body.  perrla  eomi  lungs clear, heart nml exam,  abd soft bs nml, non tender.  cns power faie  reflex ok  rt hip large hematoma  labs noted   na 128   creat 1.73.  ct hip hematoma,  ct chest acceptable  a/p as pts ms okay but was on iv heparin  and recently had cva  will get ct head urgent  d/w resident  off heparin.  neurology on case.  rt hip hematoma  i directly contacted dr sun ortho attending  watch NA  cad s/p cabg  texted cardiology
meghan nd examined   vs stable afebrile  not in any distress.  still sob no cp or palp.  physical unchanged  afebrile  d/w heme  and neurologist about AC  , they asked me , cardiology opinion will be ideal  i called card and as per him d/c AC  so will d/c eliquis  rehab  card, pulm , neuro outpt, rehab.
meghan nd examined  vs stable afebrile on bed comfortable  awake not in any distress except hiccups are still on.  no sob, no cp or palp  lungs a/e fair no added sounds   heart abd  etc benign.  no epigastric tenderness  creat better  na 129,  wbc nml, hgb went up   seen by nephro  cns  power reflexes nml  a/p s/p cva  ? medul  seen by neuro , was started on iv heparin thought of possible afib   ptt 2oo and as per protocol heparin was held for 1 hour then started on 13units from 16)  s/p cad s/p cabg  echo cardiology.  dm cont same   cxr abnormal h/o smoking  pulmonary   ct chest  na better    hiccups on clopromazine   will watch   pts apetite is good
meghan nd examined vs stable afebrile physical unchanged   on bed comfortable.  hip pain better  seen by orto attending  no intervention   hematoma has shrinking already  denies cp or sob or palp  na 129, creat 1.53   a/p medulla cva, echo carotid pending   had MRI and MRA at [facility], report in chart.  NEUROLOGY to f/u for cva and anticoagulation as pt has gluteal hematoma.  CT HEAD was negative.  mild tropnemia  Cardiology to f/u  h/o heavy smoking , DM, cad s/p cabg, cva   posiible  has severe atherosclerotic disease  as per PT  [facility] REHAB
seen and eaxmined vs stable afebrile physical unchanged   no complaints except chronic SOB.  no cp or palp  abd benign  labs noted   awaiting for ABRAHAN  axr fecal retention  on senna and colace   will give one dose of fleet enema.  more water for a few days
seen and examined  alert awake on bed no complaints , no cp or sob or palp.     cont same  awaiting for santana
seen and examined  feeling better still has sob on exertion  h/o copd, severe systolic dysfunction  pulse ox was don by resident drops to 82-84   needs O2  pulmonary to f/u   santana negative for clot  asa and plavix  on nicotin patch  out pt  cardiology/angiogram after 4 weeks, assess for aicd.  pulmonary out pt for copd/ PFT
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  has severe systolic dysfunction,   now has cva.  i d/w Cardiology for any angio and AICD  as per card as pt has acute cva, 4-6 weeks wait for angio.  and defibrillater/aicd no until he becomes compliant to meds.    Na 130   hematoma for hip is better.  cva  as per neuro needs anticoagulation, eliquis or xarelto  no iv drip.  pt to go for acute rehab, now problem , pt barely can walk , become sob with  a little walk. danilozina has copd and Syst CHF.  will d/w 
seen and examined in ER  was in [facility] [place] for not feelin anything on lt side ( upper and lower extre)last week  was in hosp was dx Acute CVA was going for tests later on he signed ama  then pt was home , not eating much cant get out from bed  seen by his brother and taken to hosp  no cp, no palp, no headche  dm over 25 years, htn, hld, cad s/p stent had walter( /penta) coronary surgery.  active smoker 2 1/2 ppd, no etoh or drugs  labs na 123  creat over 2,  ct head lt sided infarct  cxr neg  a/p cva, renal insuff, ckd vs luanne nephrologist, neurology.  pt is high risks for mi .sudden death, copd, cancer ( smoker)  watch Na
seen and examined vs stable afebrile physical unchanged   no complaints   labs noted    a/p cont same  out of bed as much as possible.  npo midnight  going for ABRAHAN in  am
54 yo M with PMH of HTN, HLD, DM, CABG, s/p stroke 6 days ago at [facility] in [place] at which time he left AMA due to having to deal with rent issues, presents to ED due to worsening weakness. Pt states that at prior hospital admission, he had presented with left sided upper and lower extremity numbness. Pt denies cp, sob, abd pain, fever, chills. Pt admits to nausea and vomiting. 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

## 2018-11-19 NOTE — PROGRESS NOTE ADULT - PROVIDER SPECIALTY LIST ADULT
Cardiology
Internal Medicine
Nephrology
Neurology
Neurology
Nephrology

## 2018-11-19 NOTE — CONSULT NOTE ADULT - REASON FOR ADMISSION
weakness, slurred speech

## 2018-11-19 NOTE — CHART NOTE - NSCHARTNOTEFT_GEN_A_CORE
Paged by RN about patient who was discharged to [facility] - NP at facility notified me that patient reports a Hx of allergy to Plavix including HA and rash - patient was given Plavix on day of discharge w/out event. At rehab facility patient c/o HA - no rash. Facility advised to discontinue Plavix and increase ASA to 325 mg daily and to monitor closely for allergic reaction. Attending notified.

## 2018-11-19 NOTE — PROGRESS NOTE ADULT - SUBJECTIVE AND OBJECTIVE BOX
HPI:  Pt is 54 yo M with PMH of HTN, HLD, DM, CABG, s/p stroke 6 days ago at [facility] in [place] at which time he left AMA due to having to deal with rent issues, presents to ED due to worsening weakness. Pt states that at prior hospital admission, he had presented with left sided upper and lower extremity numbness. Pt denies cp, sob, abd pain, fever, chills. Pt admits to nausea and vomiting. Per patient's brother, at baseline patient able to ambulate but noted that patient has been unable to walk unassisted since coming home yesterday night and speech is slurred. (09 Nov 2018 14:06)      Patient is a 53y old  Male who presents with a chief complaint of weakness, slurred speech (19 Nov 2018 11:33)      INTERVAL HPI/OVERNIGHT EVENTS:  T(C): 36.9 (11-19-18 @ 05:16), Max: 37 (11-18-18 @ 22:04)  HR: 83 (11-19-18 @ 05:16) (83 - 83)  BP: 115/70 (11-19-18 @ 05:16) (113/60 - 115/70)  RR: 18 (11-19-18 @ 05:16) (18 - 18)  SpO2: 100% (11-19-18 @ 05:16) (100% - 100%)  Wt(kg): --  I&O's Summary    18 Nov 2018 07:01  -  19 Nov 2018 07:00  --------------------------------------------------------  IN: 0 mL / OUT: 675 mL / NET: -675 mL        REVIEW OF SYSTEMS: denies fever, chills, SOB, palpitations, chest pain, abdominal pain, nausea, vomitting, diarrhea, constipation, dizziness    MEDICATIONS  (STANDING):  aspirin enteric coated 81 milliGRAM(s) Oral daily  atorvastatin 40 milliGRAM(s) Oral at bedtime  clopidogrel Tablet 75 milliGRAM(s) Oral daily  docusate sodium 100 milliGRAM(s) Oral two times a day  heparin  Injectable 5000 Unit(s) SubCutaneous every 8 hours  insulin glargine Injectable (LANTUS) 48 Unit(s) SubCutaneous at bedtime  insulin lispro (HumaLOG) corrective regimen sliding scale   SubCutaneous three times a day before meals  insulin lispro (HumaLOG) corrective regimen sliding scale   SubCutaneous at bedtime  insulin lispro Injectable (HumaLOG) 6 Unit(s) SubCutaneous three times a day before meals  lisinopril 2.5 milliGRAM(s) Oral daily  metoprolol tartrate 25 milliGRAM(s) Oral two times a day  nicotine - 21 mG/24Hr(s) Patch 1 patch Transdermal daily  pantoprazole    Tablet 40 milliGRAM(s) Oral before breakfast  polyethylene glycol 3350 17 Gram(s) Oral daily  senna 2 Tablet(s) Oral at bedtime    MEDICATIONS  (PRN):  acetaminophen   Tablet .. 650 milliGRAM(s) Oral every 6 hours PRN Mild Pain (1 - 3)  chlorproMAZINE    Tablet 10 milliGRAM(s) Oral every 8 hours PRN hiccups      PHYSICAL EXAM:  GENERAL: NAD, well-groomed, well-developed  HEAD:  Atraumatic, Normocephalic  EYES: EOMI, PERRLA, conjunctiva and sclera clear  ENMT: No tonsillar erythema, exudates, or enlargement; Moist mucous membranes, Good dentition, No lesions  NECK: Supple, No JVD, Normal thyroid  NERVOUS SYSTEM:  Alert & Oriented X3, Good concentration; Motor Strength 5/5 B/L upper and lower extremities; DTRs 2+ intact and symmetric  CHEST/LUNG: Clear to percussion bilaterally; No rales, rhonchi, wheezing, or rubs  HEART: Regular rate and rhythm; No murmurs, rubs, or gallops  ABDOMEN: Soft, Nontender, Nondistended; Bowel sounds present  EXTREMITIES:  2+ Peripheral Pulses, No clubbing, cyanosis, or edema  LYMPH: No lymphadenopathy noted  SKIN: No rashes or lesions  LABS:                        11.4   5.9   )-----------( 193      ( 19 Nov 2018 06:49 )             33.4     11-19    135  |  103  |  37<H>  ----------------------------<  228<H>  4.4   |  25  |  1.39<H>    Ca    9.0      19 Nov 2018 06:49  Phos  4.1     11-19  Mg     2.0     11-19    TPro  7.3  /  Alb  3.0<L>  /  TBili  0.3  /  DBili  x   /  AST  21  /  ALT  44  /  AlkPhos  125<H>  11-19    PT/INR - ( 19 Nov 2018 06:49 )   PT: 10.8 sec;   INR: 0.97 ratio         PTT - ( 19 Nov 2018 06:49 )  PTT:26.0 sec    CAPILLARY BLOOD GLUCOSE      POCT Blood Glucose.: 184 mg/dL (19 Nov 2018 11:28)  POCT Blood Glucose.: 206 mg/dL (19 Nov 2018 07:54)  POCT Blood Glucose.: 219 mg/dL (18 Nov 2018 21:33)  POCT Blood Glucose.: 196 mg/dL (18 Nov 2018 16:52)

## 2018-11-19 NOTE — CONSULT NOTE ADULT - SUBJECTIVE AND OBJECTIVE BOX
CHIEF COMPLAINT: Patient is a 53y old  Male who presents with a chief complaint of weakness, slurred speech (18 Nov 2018 18:25)      HPI:  Pt is 54 yo M with PMH of HTN, HLD, DM, CABG, s/p stroke 6 days ago at [facility] in [place] at which time he left AMA due to having to deal with rent issues, presents to ED due to worsening weakness. Pt states that at prior hospital admission, he had presented with left sided upper and lower extremity numbness. Pt denies cp, sob, abd pain, fever, chills. Pt admits to nausea and vomiting. Per patient's brother, at baseline patient able to ambulate but noted that patient has been unable to walk unassisted since coming home yesterday night and speech is slurred. (09 Nov 2018 14:06)   Patient seen and examined.     PAST MEDICAL & SURGICAL HISTORY:  Cerebrovascular accident (CVA), unspecified mechanism  Hyperlipidemia, unspecified hyperlipidemia type  Coronary artery disease involving native heart without angina pectoris, unspecified vessel or lesion type  Hypertension, unspecified type  Diabetes mellitus of other type without complication  History of appendectomy  S/P CABG x 1      Allergies    No Known Allergies    Intolerances        MEDICATIONS  (STANDING):  aspirin enteric coated 81 milliGRAM(s) Oral daily  atorvastatin 40 milliGRAM(s) Oral at bedtime  clopidogrel Tablet 75 milliGRAM(s) Oral daily  docusate sodium 100 milliGRAM(s) Oral two times a day  heparin  Injectable 5000 Unit(s) SubCutaneous every 8 hours  insulin glargine Injectable (LANTUS) 48 Unit(s) SubCutaneous at bedtime  insulin lispro (HumaLOG) corrective regimen sliding scale   SubCutaneous three times a day before meals  insulin lispro (HumaLOG) corrective regimen sliding scale   SubCutaneous at bedtime  insulin lispro Injectable (HumaLOG) 6 Unit(s) SubCutaneous three times a day before meals  lisinopril 2.5 milliGRAM(s) Oral daily  metoprolol tartrate 25 milliGRAM(s) Oral two times a day  nicotine - 21 mG/24Hr(s) Patch 1 patch Transdermal daily  pantoprazole    Tablet 40 milliGRAM(s) Oral before breakfast  polyethylene glycol 3350 17 Gram(s) Oral daily  senna 2 Tablet(s) Oral at bedtime      MEDICATIONS  (PRN):  acetaminophen   Tablet .. 650 milliGRAM(s) Oral every 6 hours PRN Mild Pain (1 - 3)  chlorproMAZINE    Tablet 10 milliGRAM(s) Oral every 8 hours PRN hiccups   Medications up to date at time of exam.    FAMILY HISTORY:  No pertinent family history in first degree relatives      SOCIAL HISTORY  Smoking History: [x   ] smoking/smoke exposure, [   ] former smoker, [  ] denies smoking  Living Condition: [x   ] apartment, [   ] private house  Work History:   Travel History: denies recent travel  Illicit Substance Use: denies  Alcohol Use: denies    REVIEW OF SYSTEMS:    CONSTITUTIONAL:  denies fevers, chills, sweats, weight loss    HEENT:  denies diplopia or blurred vision, sore throat or runny nose.    CARDIOVASCULAR:  denies pressure, squeezing, tightness, or heaviness about the chest; no palpitations.    RESPIRATORY:  denies SOB, cough, BRANCH, wheezing.    GASTROINTESTINAL:  denies abdominal pain, nausea, vomiting or diarrhea.    GENITOURINARY: denies dysuria, frequency or urgency.    NEUROLOGIC:  denies numbness, tingling, seizures or weakness.    PSYCHIATRIC:  denies disorder of thought or mood.    MSK: denies swelling, redness      PHYSICAL EXAMINATION:    GENERAL: The patient is a well-developed, well-nourished, in no apparent distress.     Vital Signs Last 24 Hrs  T(C): 36.9 (19 Nov 2018 05:16), Max: 37 (18 Nov 2018 22:04)  T(F): 98.5 (19 Nov 2018 05:16), Max: 98.6 (18 Nov 2018 22:04)  HR: 83 (19 Nov 2018 05:16) (83 - 83)  BP: 115/70 (19 Nov 2018 05:16) (113/60 - 115/70)  BP(mean): --  RR: 18 (19 Nov 2018 05:16) (18 - 18)  SpO2: 100% (19 Nov 2018 05:16) (100% - 100%)   (if applicable)    Chest Tube (if applicable)    HEENT: Head is normocephalic and atraumatic. .    NECK: Supple, no palpable adenopathy.    LUNGS: Clear to auscultation, no wheezing, rales, or rhonchi.    HEART: Regular rate and rhythm without murmur.    ABDOMEN: Soft, nontender, and nondistended.  No hepatosplenomegaly is noted.    EXTREMITIES: Without any cyanosis, clubbing, rash, lesions or edema.    NEUROLOGIC: Awake, alert.    SKIN: Warm, dry, good turgor.      LABS:                        11.4   5.9   )-----------( 193      ( 19 Nov 2018 06:49 )             33.4     11-19    135  |  103  |  37<H>  ----------------------------<  228<H>  4.4   |  25  |  1.39<H>    Ca    9.0      19 Nov 2018 06:49  Phos  4.1     11-19  Mg     2.0     11-19    TPro  7.3  /  Alb  3.0<L>  /  TBili  0.3  /  DBili  x   /  AST  21  /  ALT  44  /  AlkPhos  125<H>  11-19    PT/INR - ( 19 Nov 2018 06:49 )   PT: 10.8 sec;   INR: 0.97 ratio         PTT - ( 19 Nov 2018 06:49 )  PTT:26.0 sec        MICROBIOLOGY: (if applicable)    RADIOLOGY & ADDITIONAL STUDIES:  EKG:   CXR:  < from: CT Chest No Cont (11.11.18 @ 10:06) >    EXAM:  CT CHEST                            PROCEDURE DATE:  11/11/2018          INTERPRETATION:  Clinical Information: Smoking history. Dyspnea. Rule out   pneumonia. Rule out mass.    Comparison: None available    Procedure: Noncontrast CT of the chest with axial, sagittal, coronal, and   axial MIP reconstructions.    Findings:     Airways, pleura, lungs: Central airways patent. No pleural effusions. No   nodules or masses. No consolidation    Vasculature: Coronary artery calcification.  Mediastinum and vernon: Status post CABG.   Chest wall and lower neck: Unremarkable  Imaged upper abdomen: Unremarkable  Musculoskeletal: Degenerative disc disease    Impression: Clear lungs.                PANCHO VOGEL M.D., ATTENDING RADIOLOGIST  This document has been electronically signed. Nov 11 2018 10:12AM                < end of copied text >    ECHO:    IMPRESSION: 53y Male PAST MEDICAL & SURGICAL HISTORY:  Cerebrovascular accident (CVA), unspecified mechanism  Hyperlipidemia, unspecified hyperlipidemia type  Coronary artery disease involving native heart without angina pectoris, unspecified vessel or lesion type  Hypertension, unspecified type  Diabetes mellitus of other type without complication  History of appendectomy  S/P CABG x 1     Pt is 54 yo M with PMH of HTN, HLD, DM, CABG, s/p stroke 6 days ago at [facility] in [place] at which time he left AMA due to having to deal with rent issues, presents to ED due to worsening weakness. Pt states that at prior hospital admission, he had presented with left sided upper and lower extremity numbness. Pt denies cp, sob, abd pain, fever, chills. Pt admits to nausea and vomiting. Per patient's brother, at baseline patient able to ambulate but noted that patient has been unable to walk unassisted since coming home yesterday night and speech is slurred. (09 Nov 2018 14:06)    --    54 y/o male with above pmhx presents with weakness, after being partially worked up for CVA at [facility]. After CVA work up, was deemed to not have cardioembolic stroke, however was started on eliquis for presence of beta 2 glycoprotein antibody. As per documentation, telemetry monitoring was unrevealing and patient was in NSR. He is currently off telemetry. At present time he is stable for discharge with the exception that patient c/o SOB while ambulating. 3 PPD x 30 years, .    Patient reports SOB after CABG as his physical activity significantly declined after, w/ decreased exercise tolerance    +CVA  +3PPD x 30 years    SUGGESTION:     - clear documentation of patient's o2 saturation during activity, at present time patient is lying down comfortably in bed, not in any acute distress, not SOB.    - documentation in chart shows o2 saturation consistently 100% on RA.   - con't with bronchodilators, start symbicort.   - DVT and GI prophylaxis.    - out patient f/u with pulmonary CHIEF COMPLAINT: Patient is a 53y old  Male who presents with a chief complaint of weakness, slurred speech (18 Nov 2018 18:25)      HPI:  Pt is 52 yo M with PMH of HTN, HLD, DM, CABG, s/p stroke 6 days ago at [facility] in [place] at which time he left AMA due to having to deal with rent issues, presents to ED due to worsening weakness. Pt states that at prior hospital admission, he had presented with left sided upper and lower extremity numbness. Pt denies cp, sob, abd pain, fever, chills. Pt admits to nausea and vomiting. Per patient's brother, at baseline patient able to ambulate but noted that patient has been unable to walk unassisted since coming home yesterday night and speech is slurred. (09 Nov 2018 14:06)   Patient seen and examined.     PAST MEDICAL & SURGICAL HISTORY:  Cerebrovascular accident (CVA), unspecified mechanism  Hyperlipidemia, unspecified hyperlipidemia type  Coronary artery disease involving native heart without angina pectoris, unspecified vessel or lesion type  Hypertension, unspecified type  Diabetes mellitus of other type without complication  History of appendectomy  S/P CABG x 1      Allergies    No Known Allergies    Intolerances        MEDICATIONS  (STANDING):  aspirin enteric coated 81 milliGRAM(s) Oral daily  atorvastatin 40 milliGRAM(s) Oral at bedtime  clopidogrel Tablet 75 milliGRAM(s) Oral daily  docusate sodium 100 milliGRAM(s) Oral two times a day  heparin  Injectable 5000 Unit(s) SubCutaneous every 8 hours  insulin glargine Injectable (LANTUS) 48 Unit(s) SubCutaneous at bedtime  insulin lispro (HumaLOG) corrective regimen sliding scale   SubCutaneous three times a day before meals  insulin lispro (HumaLOG) corrective regimen sliding scale   SubCutaneous at bedtime  insulin lispro Injectable (HumaLOG) 6 Unit(s) SubCutaneous three times a day before meals  lisinopril 2.5 milliGRAM(s) Oral daily  metoprolol tartrate 25 milliGRAM(s) Oral two times a day  nicotine - 21 mG/24Hr(s) Patch 1 patch Transdermal daily  pantoprazole    Tablet 40 milliGRAM(s) Oral before breakfast  polyethylene glycol 3350 17 Gram(s) Oral daily  senna 2 Tablet(s) Oral at bedtime      MEDICATIONS  (PRN):  acetaminophen   Tablet .. 650 milliGRAM(s) Oral every 6 hours PRN Mild Pain (1 - 3)  chlorproMAZINE    Tablet 10 milliGRAM(s) Oral every 8 hours PRN hiccups   Medications up to date at time of exam.    FAMILY HISTORY:  No pertinent family history in first degree relatives      SOCIAL HISTORY  Smoking History: [x   ] smoking/smoke exposure, [   ] former smoker, [  ] denies smoking  Living Condition: [x   ] apartment, [   ] private house  Work History:   Travel History: denies recent travel  Illicit Substance Use: denies  Alcohol Use: denies    REVIEW OF SYSTEMS:    CONSTITUTIONAL:  denies fevers, chills, sweats, weight loss    HEENT:  denies diplopia or blurred vision, sore throat or runny nose.    CARDIOVASCULAR:  denies pressure, squeezing, tightness, or heaviness about the chest; no palpitations.    RESPIRATORY:  denies SOB, cough, BRANCH, wheezing.    GASTROINTESTINAL:  denies abdominal pain, nausea, vomiting or diarrhea.    GENITOURINARY: denies dysuria, frequency or urgency.    NEUROLOGIC:  denies numbness, tingling, seizures or weakness.    PSYCHIATRIC:  denies disorder of thought or mood.    MSK: denies swelling, redness      PHYSICAL EXAMINATION:    GENERAL: The patient is a well-developed, well-nourished, in no apparent distress.     Vital Signs Last 24 Hrs  T(C): 36.9 (19 Nov 2018 05:16), Max: 37 (18 Nov 2018 22:04)  T(F): 98.5 (19 Nov 2018 05:16), Max: 98.6 (18 Nov 2018 22:04)  HR: 83 (19 Nov 2018 05:16) (83 - 83)  BP: 115/70 (19 Nov 2018 05:16) (113/60 - 115/70)  BP(mean): --  RR: 18 (19 Nov 2018 05:16) (18 - 18)  SpO2: 100% (19 Nov 2018 05:16) (100% - 100%)   (if applicable)    Chest Tube (if applicable)    HEENT: Head is normocephalic and atraumatic. .    NECK: Supple, no palpable adenopathy.    LUNGS: Clear to auscultation, no wheezing, rales, or rhonchi.    HEART: Regular rate and rhythm without murmur.    ABDOMEN: Soft, nontender, and nondistended.  No hepatosplenomegaly is noted.    EXTREMITIES: Without any cyanosis, clubbing, rash, lesions or edema.    NEUROLOGIC: Awake, alert.    SKIN: Warm, dry, good turgor.      LABS:                        11.4   5.9   )-----------( 193      ( 19 Nov 2018 06:49 )             33.4     11-19    135  |  103  |  37<H>  ----------------------------<  228<H>  4.4   |  25  |  1.39<H>    Ca    9.0      19 Nov 2018 06:49  Phos  4.1     11-19  Mg     2.0     11-19    TPro  7.3  /  Alb  3.0<L>  /  TBili  0.3  /  DBili  x   /  AST  21  /  ALT  44  /  AlkPhos  125<H>  11-19    PT/INR - ( 19 Nov 2018 06:49 )   PT: 10.8 sec;   INR: 0.97 ratio         PTT - ( 19 Nov 2018 06:49 )  PTT:26.0 sec        MICROBIOLOGY: (if applicable)    RADIOLOGY & ADDITIONAL STUDIES:  EKG:   CXR:  < from: CT Chest No Cont (11.11.18 @ 10:06) >    EXAM:  CT CHEST                            PROCEDURE DATE:  11/11/2018          INTERPRETATION:  Clinical Information: Smoking history. Dyspnea. Rule out   pneumonia. Rule out mass.    Comparison: None available    Procedure: Noncontrast CT of the chest with axial, sagittal, coronal, and   axial MIP reconstructions.    Findings:     Airways, pleura, lungs: Central airways patent. No pleural effusions. No   nodules or masses. No consolidation    Vasculature: Coronary artery calcification.  Mediastinum and vernon: Status post CABG.   Chest wall and lower neck: Unremarkable  Imaged upper abdomen: Unremarkable  Musculoskeletal: Degenerative disc disease    Impression: Clear lungs.                PANCHO VOGEL M.D., ATTENDING RADIOLOGIST  This document has been electronically signed. Nov 11 2018 10:12AM                < end of copied text >    ECHO:    IMPRESSION: 53y Male PAST MEDICAL & SURGICAL HISTORY:  Cerebrovascular accident (CVA), unspecified mechanism  Hyperlipidemia, unspecified hyperlipidemia type  Coronary artery disease involving native heart without angina pectoris, unspecified vessel or lesion type  Hypertension, unspecified type  Diabetes mellitus of other type without complication  History of appendectomy  S/P CABG x 1     Pt is 52 yo M with PMH of HTN, HLD, DM, CABG, s/p stroke 6 days ago at [facility] in [place] at which time he left AMA due to having to deal with rent issues, presents to ED due to worsening weakness. Pt states that at prior hospital admission, he had presented with left sided upper and lower extremity numbness. Pt denies cp, sob, abd pain, fever, chills. Pt admits to nausea and vomiting. Per patient's brother, at baseline patient able to ambulate but noted that patient has been unable to walk unassisted since coming home yesterday night and speech is slurred. (09 Nov 2018 14:06)    --    52 y/o male with above pmhx presents with weakness, after being partially worked up for CVA at [facility]. After CVA work up, was deemed to not have cardioembolic stroke, however was started on eliquis for presence of beta 2 glycoprotein antibody. As per documentation, telemetry monitoring was unrevealing and patient was in NSR. He is currently off telemetry. At present time he is stable for discharge with the exception that patient c/o SOB while ambulating. 3 PPD x 30 years, .    Patient reports SOB after CABG as his physical activity significantly declined after, w/ decreased exercise tolerance    +CVA  +3PPD x 30 years    SUGGESTION:     - patient w/ hypoxia when ambulating, with o2 saturation at 82%, he has clinical COPD, would benefit from home o2, would start at 1.5 L and titrate up as needed.    - con't with bronchodilators, start symbicort.   - DVT and GI prophylaxis.    - out patient f/u with pulmonary    Agree with above assessment and plan as transcribed.

## 2018-11-21 LAB
CORTICOSTEROID BINDING GLOBULIN RESULT: 2.7 MG/DL — SIGNIFICANT CHANGE UP
CORTIS F/TOTAL MFR SERPL: 7.2 % — SIGNIFICANT CHANGE UP
CORTIS SERPL-MCNC: 15 UG/DL — SIGNIFICANT CHANGE UP
CORTISOL, FREE RESULT: 1.1 UG/DL — SIGNIFICANT CHANGE UP

## 2019-01-09 PROCEDURE — 80061 LIPID PANEL: CPT

## 2019-01-09 PROCEDURE — 86850 RBC ANTIBODY SCREEN: CPT

## 2019-01-09 PROCEDURE — 84484 ASSAY OF TROPONIN QUANT: CPT

## 2019-01-09 PROCEDURE — 83735 ASSAY OF MAGNESIUM: CPT

## 2019-01-09 PROCEDURE — 73700 CT LOWER EXTREMITY W/O DYE: CPT

## 2019-01-09 PROCEDURE — 93306 TTE W/DOPPLER COMPLETE: CPT

## 2019-01-09 PROCEDURE — 92610 EVALUATE SWALLOWING FUNCTION: CPT

## 2019-01-09 PROCEDURE — 84300 ASSAY OF URINE SODIUM: CPT

## 2019-01-09 PROCEDURE — 93880 EXTRACRANIAL BILAT STUDY: CPT

## 2019-01-09 PROCEDURE — 97530 THERAPEUTIC ACTIVITIES: CPT

## 2019-01-09 PROCEDURE — 84156 ASSAY OF PROTEIN URINE: CPT

## 2019-01-09 PROCEDURE — 86901 BLOOD TYPING SEROLOGIC RH(D): CPT

## 2019-01-09 PROCEDURE — 86146 BETA-2 GLYCOPROTEIN ANTIBODY: CPT

## 2019-01-09 PROCEDURE — 83090 ASSAY OF HOMOCYSTEINE: CPT

## 2019-01-09 PROCEDURE — 83036 HEMOGLOBIN GLYCOSYLATED A1C: CPT

## 2019-01-09 PROCEDURE — 81241 F5 GENE: CPT

## 2019-01-09 PROCEDURE — 80076 HEPATIC FUNCTION PANEL: CPT

## 2019-01-09 PROCEDURE — 71045 X-RAY EXAM CHEST 1 VIEW: CPT

## 2019-01-09 PROCEDURE — 92523 SPEECH SOUND LANG COMPREHEN: CPT

## 2019-01-09 PROCEDURE — 83880 ASSAY OF NATRIURETIC PEPTIDE: CPT

## 2019-01-09 PROCEDURE — 85610 PROTHROMBIN TIME: CPT

## 2019-01-09 PROCEDURE — 99285 EMERGENCY DEPT VISIT HI MDM: CPT | Mod: 25

## 2019-01-09 PROCEDURE — 82607 VITAMIN B-12: CPT

## 2019-01-09 PROCEDURE — 97162 PT EVAL MOD COMPLEX 30 MIN: CPT

## 2019-01-09 PROCEDURE — 81291 MTHFR GENE: CPT

## 2019-01-09 PROCEDURE — 93005 ELECTROCARDIOGRAM TRACING: CPT

## 2019-01-09 PROCEDURE — 71250 CT THORAX DX C-: CPT

## 2019-01-09 PROCEDURE — 83930 ASSAY OF BLOOD OSMOLALITY: CPT

## 2019-01-09 PROCEDURE — 85730 THROMBOPLASTIN TIME PARTIAL: CPT

## 2019-01-09 PROCEDURE — 74018 RADEX ABDOMEN 1 VIEW: CPT

## 2019-01-09 PROCEDURE — 87086 URINE CULTURE/COLONY COUNT: CPT

## 2019-01-09 PROCEDURE — 84100 ASSAY OF PHOSPHORUS: CPT

## 2019-01-09 PROCEDURE — 82570 ASSAY OF URINE CREATININE: CPT

## 2019-01-09 PROCEDURE — 92507 TX SP LANG VOICE COMM INDIV: CPT

## 2019-01-09 PROCEDURE — 80053 COMPREHEN METABOLIC PANEL: CPT

## 2019-01-09 PROCEDURE — 82306 VITAMIN D 25 HYDROXY: CPT

## 2019-01-09 PROCEDURE — 84443 ASSAY THYROID STIM HORMONE: CPT

## 2019-01-09 PROCEDURE — 83935 ASSAY OF URINE OSMOLALITY: CPT

## 2019-01-09 PROCEDURE — 85027 COMPLETE CBC AUTOMATED: CPT

## 2019-01-09 PROCEDURE — 81240 F2 GENE: CPT

## 2019-01-09 PROCEDURE — 93970 EXTREMITY STUDY: CPT

## 2019-01-09 PROCEDURE — 86038 ANTINUCLEAR ANTIBODIES: CPT

## 2019-01-09 PROCEDURE — 86900 BLOOD TYPING SEROLOGIC ABO: CPT

## 2019-01-09 PROCEDURE — 70450 CT HEAD/BRAIN W/O DYE: CPT

## 2019-01-09 PROCEDURE — 76775 US EXAM ABDO BACK WALL LIM: CPT

## 2019-01-09 PROCEDURE — 93312 ECHO TRANSESOPHAGEAL: CPT

## 2019-01-09 PROCEDURE — 93325 DOPPLER ECHO COLOR FLOW MAPG: CPT

## 2019-01-09 PROCEDURE — 97116 GAIT TRAINING THERAPY: CPT

## 2019-01-09 PROCEDURE — 81001 URINALYSIS AUTO W/SCOPE: CPT

## 2019-01-09 PROCEDURE — 80048 BASIC METABOLIC PNL TOTAL CA: CPT

## 2019-01-09 PROCEDURE — 93320 DOPPLER ECHO COMPLETE: CPT

## 2019-01-09 PROCEDURE — 86147 CARDIOLIPIN ANTIBODY EA IG: CPT

## 2019-01-09 PROCEDURE — 82962 GLUCOSE BLOOD TEST: CPT

## 2019-02-06 ENCOUNTER — INPATIENT (INPATIENT)
Facility: HOSPITAL | Age: 54
LOS: 5 days | Discharge: HOME | DRG: 65 | End: 2019-02-12
Attending: HOSPITALIST | Admitting: HOSPITALIST
Payer: MEDICAID

## 2019-02-06 VITALS
SYSTOLIC BLOOD PRESSURE: 118 MMHG | DIASTOLIC BLOOD PRESSURE: 77 MMHG | HEIGHT: 75 IN | RESPIRATION RATE: 18 BRPM | OXYGEN SATURATION: 97 % | TEMPERATURE: 98 F | HEART RATE: 77 BPM | WEIGHT: 210.1 LBS

## 2019-02-06 DIAGNOSIS — Z95.1 PRESENCE OF AORTOCORONARY BYPASS GRAFT: Chronic | ICD-10-CM

## 2019-02-06 DIAGNOSIS — Z90.49 ACQUIRED ABSENCE OF OTHER SPECIFIED PARTS OF DIGESTIVE TRACT: Chronic | ICD-10-CM

## 2019-02-06 LAB
ALBUMIN SERPL ELPH-MCNC: 3.3 G/DL — LOW (ref 3.5–5)
ALP SERPL-CCNC: 98 U/L — SIGNIFICANT CHANGE UP (ref 40–120)
ALT FLD-CCNC: 15 U/L DA — SIGNIFICANT CHANGE UP (ref 10–60)
ANION GAP SERPL CALC-SCNC: 6 MMOL/L — SIGNIFICANT CHANGE UP (ref 5–17)
AST SERPL-CCNC: 12 U/L — SIGNIFICANT CHANGE UP (ref 10–40)
BASE EXCESS BLDV CALC-SCNC: 2.9 MMOL/L — HIGH (ref -2–2)
BASOPHILS # BLD AUTO: 0 K/UL — SIGNIFICANT CHANGE UP (ref 0–0.2)
BASOPHILS NFR BLD AUTO: 0.9 % — SIGNIFICANT CHANGE UP (ref 0–2)
BILIRUB SERPL-MCNC: 0.3 MG/DL — SIGNIFICANT CHANGE UP (ref 0.2–1.2)
BUN SERPL-MCNC: 19 MG/DL — HIGH (ref 7–18)
CALCIUM SERPL-MCNC: 9.4 MG/DL — SIGNIFICANT CHANGE UP (ref 8.4–10.5)
CHLORIDE SERPL-SCNC: 104 MMOL/L — SIGNIFICANT CHANGE UP (ref 96–108)
CO2 SERPL-SCNC: 28 MMOL/L — SIGNIFICANT CHANGE UP (ref 22–31)
CREAT SERPL-MCNC: 2.03 MG/DL — HIGH (ref 0.5–1.3)
EOSINOPHIL # BLD AUTO: 0.1 K/UL — SIGNIFICANT CHANGE UP (ref 0–0.5)
EOSINOPHIL NFR BLD AUTO: 1.8 % — SIGNIFICANT CHANGE UP (ref 0–6)
GLUCOSE SERPL-MCNC: 291 MG/DL — HIGH (ref 70–99)
HCO3 BLDV-SCNC: 30 MMOL/L — HIGH (ref 21–29)
HCT VFR BLD CALC: 44.4 % — SIGNIFICANT CHANGE UP (ref 39–50)
HGB BLD-MCNC: 14.4 G/DL — SIGNIFICANT CHANGE UP (ref 13–17)
HOROWITZ INDEX BLDV+IHG-RTO: 21 — SIGNIFICANT CHANGE UP
LACTATE SERPL-SCNC: 2.3 MMOL/L — HIGH (ref 0.7–2)
LYMPHOCYTES # BLD AUTO: 1.5 K/UL — SIGNIFICANT CHANGE UP (ref 1–3.3)
LYMPHOCYTES # BLD AUTO: 31.8 % — SIGNIFICANT CHANGE UP (ref 13–44)
MCHC RBC-ENTMCNC: 29.5 PG — SIGNIFICANT CHANGE UP (ref 27–34)
MCHC RBC-ENTMCNC: 32.5 GM/DL — SIGNIFICANT CHANGE UP (ref 32–36)
MCV RBC AUTO: 90.7 FL — SIGNIFICANT CHANGE UP (ref 80–100)
MONOCYTES # BLD AUTO: 0.3 K/UL — SIGNIFICANT CHANGE UP (ref 0–0.9)
MONOCYTES NFR BLD AUTO: 7.2 % — SIGNIFICANT CHANGE UP (ref 2–14)
NEUTROPHILS # BLD AUTO: 2.7 K/UL — SIGNIFICANT CHANGE UP (ref 1.8–7.4)
NEUTROPHILS NFR BLD AUTO: 58.2 % — SIGNIFICANT CHANGE UP (ref 43–77)
PCO2 BLDV: 56 MMHG — HIGH (ref 35–50)
PH BLDV: 7.34 — LOW (ref 7.35–7.45)
PLATELET # BLD AUTO: 249 K/UL — SIGNIFICANT CHANGE UP (ref 150–400)
PO2 BLDV: 13 MMHG — LOW (ref 25–45)
POTASSIUM SERPL-MCNC: 4.7 MMOL/L — SIGNIFICANT CHANGE UP (ref 3.5–5.3)
POTASSIUM SERPL-SCNC: 4.7 MMOL/L — SIGNIFICANT CHANGE UP (ref 3.5–5.3)
PROT SERPL-MCNC: 8.5 G/DL — HIGH (ref 6–8.3)
RBC # BLD: 4.9 M/UL — SIGNIFICANT CHANGE UP (ref 4.2–5.8)
RBC # FLD: 12.2 % — SIGNIFICANT CHANGE UP (ref 10.3–14.5)
SAO2 % BLDV: 12 % — LOW (ref 67–88)
SODIUM SERPL-SCNC: 138 MMOL/L — SIGNIFICANT CHANGE UP (ref 135–145)
TROPONIN I SERPL-MCNC: 0.06 NG/ML — HIGH (ref 0–0.04)
WBC # BLD: 4.6 K/UL — SIGNIFICANT CHANGE UP (ref 3.8–10.5)
WBC # FLD AUTO: 4.6 K/UL — SIGNIFICANT CHANGE UP (ref 3.8–10.5)

## 2019-02-06 PROCEDURE — 71045 X-RAY EXAM CHEST 1 VIEW: CPT | Mod: 26

## 2019-02-06 PROCEDURE — 99223 1ST HOSP IP/OBS HIGH 75: CPT

## 2019-02-06 PROCEDURE — 99285 EMERGENCY DEPT VISIT HI MDM: CPT

## 2019-02-06 PROCEDURE — 74176 CT ABD & PELVIS W/O CONTRAST: CPT | Mod: 26

## 2019-02-06 PROCEDURE — 70450 CT HEAD/BRAIN W/O DYE: CPT | Mod: 26

## 2019-02-06 RX ORDER — CHLORPROMAZINE HCL 10 MG
10 TABLET ORAL EVERY 8 HOURS
Qty: 0 | Refills: 0 | Status: DISCONTINUED | OUTPATIENT
Start: 2019-02-06 | End: 2019-02-07

## 2019-02-06 RX ORDER — NICOTINE POLACRILEX 2 MG
1 GUM BUCCAL DAILY
Qty: 0 | Refills: 0 | Status: DISCONTINUED | OUTPATIENT
Start: 2019-02-06 | End: 2019-02-12

## 2019-02-06 RX ORDER — HEPARIN SODIUM 5000 [USP'U]/ML
5000 INJECTION INTRAVENOUS; SUBCUTANEOUS EVERY 8 HOURS
Qty: 0 | Refills: 0 | Status: DISCONTINUED | OUTPATIENT
Start: 2019-02-06 | End: 2019-02-12

## 2019-02-06 RX ORDER — POLYETHYLENE GLYCOL 3350 17 G/17G
17 POWDER, FOR SOLUTION ORAL DAILY
Qty: 0 | Refills: 0 | Status: DISCONTINUED | OUTPATIENT
Start: 2019-02-06 | End: 2019-02-12

## 2019-02-06 RX ORDER — ASPIRIN/CALCIUM CARB/MAGNESIUM 324 MG
81 TABLET ORAL DAILY
Qty: 0 | Refills: 0 | Status: DISCONTINUED | OUTPATIENT
Start: 2019-02-06 | End: 2019-02-09

## 2019-02-06 RX ORDER — ACETAMINOPHEN 500 MG
650 TABLET ORAL EVERY 6 HOURS
Qty: 0 | Refills: 0 | Status: DISCONTINUED | OUTPATIENT
Start: 2019-02-06 | End: 2019-02-12

## 2019-02-06 RX ORDER — LACTULOSE 10 G/15ML
10 SOLUTION ORAL ONCE
Qty: 0 | Refills: 0 | Status: COMPLETED | OUTPATIENT
Start: 2019-02-06 | End: 2019-02-06

## 2019-02-06 RX ORDER — SENNA PLUS 8.6 MG/1
2 TABLET ORAL AT BEDTIME
Qty: 0 | Refills: 0 | Status: DISCONTINUED | OUTPATIENT
Start: 2019-02-06 | End: 2019-02-12

## 2019-02-06 RX ORDER — ASPIRIN/CALCIUM CARB/MAGNESIUM 324 MG
325 TABLET ORAL ONCE
Qty: 0 | Refills: 0 | Status: COMPLETED | OUTPATIENT
Start: 2019-02-06 | End: 2019-02-06

## 2019-02-06 RX ORDER — IOHEXOL 300 MG/ML
30 INJECTION, SOLUTION INTRAVENOUS ONCE
Qty: 0 | Refills: 0 | Status: COMPLETED | OUTPATIENT
Start: 2019-02-06 | End: 2019-02-06

## 2019-02-06 RX ORDER — PANTOPRAZOLE SODIUM 20 MG/1
40 TABLET, DELAYED RELEASE ORAL
Qty: 0 | Refills: 0 | Status: DISCONTINUED | OUTPATIENT
Start: 2019-02-06 | End: 2019-02-12

## 2019-02-06 RX ORDER — SODIUM CHLORIDE 9 MG/ML
1000 INJECTION INTRAMUSCULAR; INTRAVENOUS; SUBCUTANEOUS ONCE
Qty: 0 | Refills: 0 | Status: COMPLETED | OUTPATIENT
Start: 2019-02-06 | End: 2019-02-06

## 2019-02-06 RX ORDER — METOPROLOL TARTRATE 50 MG
25 TABLET ORAL
Qty: 0 | Refills: 0 | Status: DISCONTINUED | OUTPATIENT
Start: 2019-02-06 | End: 2019-02-07

## 2019-02-06 RX ORDER — MULTIVIT WITH MIN/MFOLATE/K2 340-15/3 G
1 POWDER (GRAM) ORAL ONCE
Qty: 0 | Refills: 0 | Status: COMPLETED | OUTPATIENT
Start: 2019-02-06 | End: 2019-02-06

## 2019-02-06 RX ORDER — DOCUSATE SODIUM 100 MG
100 CAPSULE ORAL
Qty: 0 | Refills: 0 | Status: DISCONTINUED | OUTPATIENT
Start: 2019-02-06 | End: 2019-02-06

## 2019-02-06 RX ORDER — SENNA PLUS 8.6 MG/1
2 TABLET ORAL AT BEDTIME
Qty: 0 | Refills: 0 | Status: DISCONTINUED | OUTPATIENT
Start: 2019-02-06 | End: 2019-02-06

## 2019-02-06 RX ORDER — BUDESONIDE AND FORMOTEROL FUMARATE DIHYDRATE 160; 4.5 UG/1; UG/1
2 AEROSOL RESPIRATORY (INHALATION)
Qty: 0 | Refills: 0 | Status: DISCONTINUED | OUTPATIENT
Start: 2019-02-06 | End: 2019-02-12

## 2019-02-06 RX ORDER — INSULIN LISPRO 100/ML
VIAL (ML) SUBCUTANEOUS
Qty: 0 | Refills: 0 | Status: DISCONTINUED | OUTPATIENT
Start: 2019-02-06 | End: 2019-02-12

## 2019-02-06 RX ORDER — ATORVASTATIN CALCIUM 80 MG/1
40 TABLET, FILM COATED ORAL AT BEDTIME
Qty: 0 | Refills: 0 | Status: DISCONTINUED | OUTPATIENT
Start: 2019-02-06 | End: 2019-02-07

## 2019-02-06 RX ORDER — DOCUSATE SODIUM 100 MG
100 CAPSULE ORAL
Qty: 0 | Refills: 0 | Status: DISCONTINUED | OUTPATIENT
Start: 2019-02-06 | End: 2019-02-12

## 2019-02-06 RX ADMIN — Medication 1 BOTTLE: at 21:52

## 2019-02-06 RX ADMIN — Medication 325 MILLIGRAM(S): at 19:47

## 2019-02-06 RX ADMIN — IOHEXOL 30 MILLILITER(S): 300 INJECTION, SOLUTION INTRAVENOUS at 17:51

## 2019-02-06 RX ADMIN — SODIUM CHLORIDE 1000 MILLILITER(S): 9 INJECTION INTRAMUSCULAR; INTRAVENOUS; SUBCUTANEOUS at 17:51

## 2019-02-06 NOTE — H&P ADULT - NSHPPHYSICALEXAM_GEN_ALL_CORE
Vital Signs Last 24 Hrs  T(C): 37.2 (06 Feb 2019 20:10), Max: 37.2 (06 Feb 2019 20:10)  T(F): 99 (06 Feb 2019 20:10), Max: 99 (06 Feb 2019 20:10)  HR: 81 (06 Feb 2019 20:10) (77 - 81)  BP: 119/74 (06 Feb 2019 20:10) (118/77 - 119/74)  RR: 18 (06 Feb 2019 20:10) (18 - 18)  SpO2: 98% (06 Feb 2019 20:10) (97% - 98%)  ________________________________________________  PHYSICAL EXAM:  GENERAL: NAD  HEENT: Normocephalic;  conjunctivae and sclerae clear; moist mucous membranes;   NECK : supple, no JVD  CHEST/LUNG: Clear to auscultation bilaterally with good air entry   HEART: S1 S2  regular; no murmurs, gallops or rubs  ABDOMEN: Soft, Nontender, Nondistended; Bowel sounds present  EXTREMITIES: no cyanosis; no edema; no calf tenderness  NERVOUS SYSTEM:  Awake and alert; Oriented  to place, person and time

## 2019-02-06 NOTE — ED PROVIDER NOTE - GASTROINTESTINAL, MLM
Abdomen soft, non-tender, no guarding. Abdomen soft, non-tender, no guarding. no mass or hernia.  rlq surgical scar

## 2019-02-06 NOTE — H&P ADULT - PROBLEM SELECTOR PLAN 3
EF 30-35% on previous echo in November 2018  f/u repeat echo given weakness, some concern for worsening heart failure  c/w telemetry monitoring  f/u cardiology - Dr. Knowles

## 2019-02-06 NOTE — H&P ADULT - ATTENDING COMMENTS
Patient seen and examined ; case was discussed with the admitting resident    ROS: as in the HPI; all other ROS negative    SH and family history as above    Vital Signs Last 24 Hrs  T(C): 37 (06 Feb 2019 23:46), Max: 37.2 (06 Feb 2019 20:10)  T(F): 98.6 (06 Feb 2019 23:46), Max: 99 (06 Feb 2019 20:10)  HR: 70 (06 Feb 2019 23:46) (70 - 81)  BP: 132/89 (06 Feb 2019 23:46) (118/77 - 132/89)  BP(mean): --  RR: 17 (06 Feb 2019 23:46) (17 - 18)  SpO2: 99% (06 Feb 2019 23:46) (97% - 99%)    GEN: NAD  HEENT- normocephalic; mouth dry, b/l horizontal nystagmus   CVS- S1S2+  LUNGS- clear to auscultation; no wheezing  ABD: Soft , nontender, nondistended, Bowel sounds are present  EXTREMITY: no calf tenderness, no cyanosis, no edema  NEURO: AAOx3; mild impaired heel to shin on R, ok on L, impaired sensation to pin prick over LUE/LLE, decreased over C5/C6 on RUE. MST 5/5 in UE/LE except RLE 4/5 in hip flex.   PSYCH: normal affect and behavior  BACK: no swelling or mass;   VASCULAR: ++ distal peripheral pulses  SKIN: warm and dry.       Labs Reviewed:                         14.4   4.6   )-----------( 249      ( 06 Feb 2019 17:58 )             44.4     02-06    138  |  104  |  19<H>  ----------------------------<  291<H>  4.7   |  28  |  2.03<H>    Ca    9.4      06 Feb 2019 17:58    TPro  8.5<H>  /  Alb  3.3<L>  /  TBili  0.3  /  DBili  x   /  AST  12  /  ALT  15  /  AlkPhos  98  02-06    CARDIAC MARKERS ( 07 Feb 2019 00:08 )  0.069 ng/mL / x     / 95 U/L / x     / 1.4 ng/mL  CARDIAC MARKERS ( 06 Feb 2019 17:58 )  0.055 ng/mL / x     / x     / x     / x              BNP:   MEDICATIONS  (STANDING):  aspirin enteric coated 81 milliGRAM(s) Oral daily  atorvastatin 40 milliGRAM(s) Oral at bedtime  buDESOnide 160 MICROgram(s)/formoterol 4.5 MICROgram(s) Inhaler 2 Puff(s) Inhalation two times a day  docusate sodium 100 milliGRAM(s) Oral two times a day  heparin  Injectable 5000 Unit(s) SubCutaneous every 8 hours  insulin lispro (HumaLOG) corrective regimen sliding scale   SubCutaneous three times a day before meals  metoprolol tartrate 25 milliGRAM(s) Oral two times a day  nicotine - 21 mG/24Hr(s) Patch 1 patch Transdermal daily  pantoprazole    Tablet 40 milliGRAM(s) Oral before breakfast  senna 2 Tablet(s) Oral at bedtime    MEDICATIONS  (PRN):  acetaminophen   Tablet .. 650 milliGRAM(s) Oral every 6 hours PRN Moderate Pain (4 - 6)  chlorproMAZINE    Tablet 10 milliGRAM(s) Oral every 8 hours PRN hiccups  polyethylene glycol 3350 17 Gram(s) Oral daily PRN Constipation      CXR reviewed    EKG Reviewed    NIHSS 5  52 y/o M with sys/huber CHF, h/o multiple CVAs, COPD, admitted with generalized weakness, MALCOM, and abd pain likely 2/2 constipation.           Plan of care discussed with patient ;  all questions and concerns were addressed.  Discussed with Patient's family Patient seen and examined ; case was discussed with the admitting resident    ROS: as in the HPI; all other ROS negative    SH and family history as above    Vital Signs Last 24 Hrs  T(C): 37 (06 Feb 2019 23:46), Max: 37.2 (06 Feb 2019 20:10)  T(F): 98.6 (06 Feb 2019 23:46), Max: 99 (06 Feb 2019 20:10)  HR: 70 (06 Feb 2019 23:46) (70 - 81)  BP: 132/89 (06 Feb 2019 23:46) (118/77 - 132/89)  BP(mean): --  RR: 17 (06 Feb 2019 23:46) (17 - 18)  SpO2: 99% (06 Feb 2019 23:46) (97% - 99%)    GEN: NAD  HEENT- normocephalic; mouth dry, b/l horizontal nystagmus   CVS- S1S2+  LUNGS- clear to auscultation; no wheezing  ABD: Soft , nontender, nondistended, Bowel sounds are present  EXTREMITY: no calf tenderness, no cyanosis, no edema  NEURO: AAOx3; mild impaired heel to shin on R, ok on L, impaired sensation to pin prick over LUE/LLE, decreased over C5/C6 on RUE. MST 5/5 in UE/LE except RLE 4/5 in hip flex.   PSYCH: normal affect and behavior  BACK: no swelling or mass;   VASCULAR: ++ distal peripheral pulses  SKIN: warm and dry.       Labs Reviewed:                         14.4   4.6   )-----------( 249      ( 06 Feb 2019 17:58 )             44.4     02-06    138  |  104  |  19<H>  ----------------------------<  291<H>  4.7   |  28  |  2.03<H>    Ca    9.4      06 Feb 2019 17:58    TPro  8.5<H>  /  Alb  3.3<L>  /  TBili  0.3  /  DBili  x   /  AST  12  /  ALT  15  /  AlkPhos  98  02-06    CARDIAC MARKERS ( 07 Feb 2019 00:08 )  0.069 ng/mL / x     / 95 U/L / x     / 1.4 ng/mL  CARDIAC MARKERS ( 06 Feb 2019 17:58 )  0.055 ng/mL / x     / x     / x     / x        CXR reviewed    EKG Reviewed    NIHSS 5  52 y/o M with sys/huber CHF, h/o multiple CVAs, COPD, admitted with generalized weakness, MALCOM, and abd pain likely 2/2 constipation.     1. NSTEMI- patient with elevated troponin, gen weakness, EKG without ischemic changes and patient denies chest pain. Howevere patient had newly diagnosed LV dysfn on last visit with LHC deferred in setting of acute CVA. ASA, BB, trend troponin, atorvastatin. Monitor on tele, recheck Echo, request cardiology evaluation.    2. 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 Check orthostatics as patient feels light headed and has blurry vision when standing up. PT consult, request neurology consultation. Patient recommended to have ILR as an outpatient but he has not followed up.   3. Near syncope- generalized weakness, suspect some component of orthostasis, check orthostatic vs, hydrate   4. MALCOM on CKD- UA pending, suspect prerenal   5, Chronic sys/huber HF with EF 35%- holding ACEi in setting of MALCOM, cont BB, asa, statin, consider further optimization with hydralazine/isosorbide if applicable and BP allows.  6. constipation- bowel regimen   7. Lactic acidosis- no obvious source of infection but UA pending., repeat   8. Medical noncompliance- pt counseled   9. COPD- cont inhalers, patient had home O2 eval last time but is not on oxygen currently.     Plan of care discussed with patient ;  all questions and concerns were addressed.

## 2019-02-06 NOTE — ED PROVIDER NOTE - NEUROLOGICAL, MLM
Alert and oriented, no focal deficits, no motor or sensory deficits. attenuated sensory perception at left arm and leg. cn ii- xii intact, walking with walker states feeling off balance.

## 2019-02-06 NOTE — H&P ADULT - NSHPLABSRESULTS_GEN_ALL_CORE
CBC Full  -  ( 06 Feb 2019 17:58 )  WBC Count : 4.6 K/uL  Hemoglobin : 14.4 g/dL  Hematocrit : 44.4 %  Platelet Count - Automated : 249 K/uL  Mean Cell Volume : 90.7 fl  Mean Cell Hemoglobin : 29.5 pg  Mean Cell Hemoglobin Concentration : 32.5 gm/dL  Auto Neutrophil # : 2.7 K/uL  Auto Lymphocyte # : 1.5 K/uL  Auto Monocyte # : 0.3 K/uL  Auto Eosinophil # : 0.1 K/uL  Auto Basophil # : 0.0 K/uL  Auto Neutrophil % : 58.2 %  Auto Lymphocyte % : 31.8 %  Auto Monocyte % : 7.2 %  Auto Eosinophil % : 1.8 %  Auto Basophil % : 0.9 %    02-06    138  |  104  |  19<H>  ----------------------------<  291<H>  4.7   |  28  |  2.03<H>    Ca    9.4      06 Feb 2019 17:58    TPro  8.5<H>  /  Alb  3.3<L>  /  TBili  0.3  /  DBili  x   /  AST  12  /  ALT  15  /  AlkPhos  98  02-06    RADIOLOGY & ADDITIONAL STUDIES (The following images were personally reviewed):    CT Head: No acute intracranial hemorrhage. Small age indeterminate 1.0 cm lacunar infarct in the left occipital cortex. Small age indeterminate lacunar infarct in the left basal ganglia. If clinically indicated, brain MR may be pursued for further evaluation. Chronic small vessel ischemic disease.    CXR: No evidence for pulmonary consolidation, pleural effusion or pneumothorax. The trachea is midline. The cardiac silhouette is within normal limits. Stable median sternotomy wires.    CT Abdomen/Pelvis with oral contrast: no acute pathology, calcified gallstones in gallbladder

## 2019-02-06 NOTE — H&P ADULT - HISTORY OF PRESENT ILLNESS
54 yo M with PMH of HTN, HLD, DM, CABG, s/p stroke 3 months ago, presents to ED with complaints of constipation and worsening weakness for the past 7 days. Pt says he's able to eat, denies fever, chills, nausea, vomding diarrhea, palpitations, dysuria. Patient states he has had chronic weakness, dizziness, blurred vision, and has had balance problems since having the stroke. Patient is currently active smoker, lives alone. 52 yo M with PMH of HTN, HLD, DM, CABG, s/p stroke 3 months ago, presents to ED with complaints of constipation for the past 7 days along with worsening weakness. Pt states he has an abdominal discomfort secondary to bloating due to constipation. Otherwise, patient notes that he has worsening weakness, states he was at rehab after discharge, however signed out against medical advice due to legal issues that arose. Patient therefore did therapy on his own, however feels that it was not a good idea and not successful, stating his weakness has been gradually getting worse. Patient denies any acute worsening of weakness.  Patient states he's able to eat, denies fever, chills, nausea, vomtiing, diarrhea, dysuria, all other ROS negative. Patient still smokes cigarettes

## 2019-02-06 NOTE — H&P ADULT - PMH
Cerebrovascular accident (CVA), unspecified mechanism    Coronary artery disease involving native heart without angina pectoris, unspecified vessel or lesion type    Diabetes mellitus of other type without complication    Hyperlipidemia, unspecified hyperlipidemia type    Hypertension, unspecified type Cerebrovascular accident (CVA), unspecified mechanism    Congestive heart failure, NYHA class 3, chronic, combined    Coronary artery disease involving native heart without angina pectoris, unspecified vessel or lesion type    Diabetes mellitus of other type without complication    Hyperlipidemia, unspecified hyperlipidemia type    Hypertension, unspecified type

## 2019-02-06 NOTE — ED PROVIDER NOTE - MEDICAL DECISION MAKING DETAILS
53 yr old male with hx of HTN, HLD, DM, CABG, s/p stroke ~ 6 month ago presents to ed c/o constipation x 7 days.  pt states able to eat, no fever/chills/n/v/d/cp/palpitations/dysuria.  pt also states chronic weakness, dizziness, blurred vision and off balance since having cva.  active smoker, lives alone.    pt with constipation and hx of open appy will r/o sbo vs hernia.  weakness/blurred vision and off balance  likely from residual cva- will need rehab placement.  rpt ct head to r/o cva.  dysphagia pass.

## 2019-02-06 NOTE — H&P ADULT - PROBLEM SELECTOR PLAN 2
holding ACE given MALCOM  c/w very light IVF overnight  not currently in fluid overload  f/u BMP in AM  f/u nephrology - Dr. Phoenix

## 2019-02-06 NOTE — H&P ADULT - PROBLEM SELECTOR PLAN 1
2/2 another CVA event vs worsening weakness due to incomplete rehabilitation therapy  f/u neurology consult - Dr. Fraire  c/w telemetry monitoring  f/u repeat echo, cardiac enzymes  f/u cardiology consult - Dr. Knowles  f/u physical therapy consult

## 2019-02-06 NOTE — ED PROVIDER NOTE - PROGRESS NOTE DETAILS
[place]: aspirin given. ct abd/pelvis shows no sbo.  ct head indeterminate lacunar infarct.  spoke with [facility] and based on prior appears new but time line not consistent with acute cva. lab shows luanne increase from prior.  ekg RBBB unchanged.  admit to med for weakness will need rehab and luanne. stable- will sent ua

## 2019-02-06 NOTE — ED ADULT NURSE NOTE - OBJECTIVE STATEMENT
pt complains of lower back pain due to constipation which is a recurrent problem for him. pt has left sided weakness and stats that his last fall wa 2 days ago. pt denies loc.

## 2019-02-06 NOTE — H&P ADULT - PROBLEM SELECTOR PLAN 9
IMPROVE VTE Individual Risk Assessment          RISK                                                          Points  [  ] Previous VTE                                                3  [  ] Thrombophilia                                             2  [ x ] Lower limb paralysis                                   2        (unable to hold up >15 seconds)    [  ] Current Cancer                                             2         (within 6 months)  [ x ] Immobilization > 24 hrs                              1  [  ] ICU/CCU stay > 24 hours                             1  [  ] Age > 60                                                         1    IMPROVE VTE Score: 3    c/w heparin for vte prophylaxis

## 2019-02-06 NOTE — ED PROVIDER NOTE - OBJECTIVE STATEMENT
53 yr old male with hx of HTN, HLD, DM, CABG, s/p stroke 53 yr old male with hx of HTN, HLD, DM, CABG, s/p stroke ~ 6 month ago presents to ed c/o constipation x 7 days.  pt states able to eat, no fever/chills/n/v/d/cp/palpitations/dysuria.  pt also states chronic weakness, dizziness, blurred vision and off balance.  active smoker 53 yr old male with hx of HTN, HLD, DM, CABG, s/p stroke ~ 6 month ago presents to ed c/o constipation x 7 days.  pt states able to eat, no fever/chills/n/v/d/cp/palpitations/dysuria.  pt also states chronic weakness, dizziness, blurred vision and off balance since having cva.  active smoker, lives alone.

## 2019-02-07 ENCOUNTER — TRANSCRIPTION ENCOUNTER (OUTPATIENT)
Age: 54
End: 2019-02-07

## 2019-02-07 DIAGNOSIS — N17.9 ACUTE KIDNEY FAILURE, UNSPECIFIED: ICD-10-CM

## 2019-02-07 DIAGNOSIS — I63.9 CEREBRAL INFARCTION, UNSPECIFIED: ICD-10-CM

## 2019-02-07 DIAGNOSIS — E78.5 HYPERLIPIDEMIA, UNSPECIFIED: ICD-10-CM

## 2019-02-07 DIAGNOSIS — R53.1 WEAKNESS: ICD-10-CM

## 2019-02-07 DIAGNOSIS — I50.42 CHRONIC COMBINED SYSTOLIC (CONGESTIVE) AND DIASTOLIC (CONGESTIVE) HEART FAILURE: ICD-10-CM

## 2019-02-07 DIAGNOSIS — J44.9 CHRONIC OBSTRUCTIVE PULMONARY DISEASE, UNSPECIFIED: ICD-10-CM

## 2019-02-07 DIAGNOSIS — I10 ESSENTIAL (PRIMARY) HYPERTENSION: ICD-10-CM

## 2019-02-07 DIAGNOSIS — I25.10 ATHEROSCLEROTIC HEART DISEASE OF NATIVE CORONARY ARTERY WITHOUT ANGINA PECTORIS: ICD-10-CM

## 2019-02-07 DIAGNOSIS — E13.9 OTHER SPECIFIED DIABETES MELLITUS WITHOUT COMPLICATIONS: ICD-10-CM

## 2019-02-07 DIAGNOSIS — E55.9 VITAMIN D DEFICIENCY, UNSPECIFIED: ICD-10-CM

## 2019-02-07 DIAGNOSIS — Z29.9 ENCOUNTER FOR PROPHYLACTIC MEASURES, UNSPECIFIED: ICD-10-CM

## 2019-02-07 LAB
24R-OH-CALCIDIOL SERPL-MCNC: 9.4 NG/ML — LOW (ref 30–80)
ANION GAP SERPL CALC-SCNC: 5 MMOL/L — SIGNIFICANT CHANGE UP (ref 5–17)
BASOPHILS # BLD AUTO: 0 K/UL — SIGNIFICANT CHANGE UP (ref 0–0.2)
BASOPHILS NFR BLD AUTO: 0.8 % — SIGNIFICANT CHANGE UP (ref 0–2)
BUN SERPL-MCNC: 21 MG/DL — HIGH (ref 7–18)
CALCIUM SERPL-MCNC: 9.1 MG/DL — SIGNIFICANT CHANGE UP (ref 8.4–10.5)
CHLORIDE SERPL-SCNC: 104 MMOL/L — SIGNIFICANT CHANGE UP (ref 96–108)
CHOLEST SERPL-MCNC: 178 MG/DL — SIGNIFICANT CHANGE UP (ref 10–199)
CK MB BLD-MCNC: 1.4 % — SIGNIFICANT CHANGE UP (ref 0–3.5)
CK MB BLD-MCNC: 1.5 % — SIGNIFICANT CHANGE UP (ref 0–3.5)
CK MB CFR SERPL CALC: 1.2 NG/ML — SIGNIFICANT CHANGE UP (ref 0–3.6)
CK MB CFR SERPL CALC: 1.4 NG/ML — SIGNIFICANT CHANGE UP (ref 0–3.6)
CK SERPL-CCNC: 85 U/L — SIGNIFICANT CHANGE UP (ref 35–232)
CK SERPL-CCNC: 95 U/L — SIGNIFICANT CHANGE UP (ref 35–232)
CO2 SERPL-SCNC: 29 MMOL/L — SIGNIFICANT CHANGE UP (ref 22–31)
CREAT SERPL-MCNC: 1.58 MG/DL — HIGH (ref 0.5–1.3)
EOSINOPHIL # BLD AUTO: 0.1 K/UL — SIGNIFICANT CHANGE UP (ref 0–0.5)
EOSINOPHIL NFR BLD AUTO: 1.7 % — SIGNIFICANT CHANGE UP (ref 0–6)
GLUCOSE SERPL-MCNC: 228 MG/DL — HIGH (ref 70–99)
HBA1C BLD-MCNC: 12.2 % — HIGH (ref 4–5.6)
HCT VFR BLD CALC: 42.6 % — SIGNIFICANT CHANGE UP (ref 39–50)
HCV AB S/CO SERPL IA: 0.11 S/CO — SIGNIFICANT CHANGE UP
HCV AB SERPL-IMP: SIGNIFICANT CHANGE UP
HDLC SERPL-MCNC: 34 MG/DL — LOW
HGB BLD-MCNC: 14 G/DL — SIGNIFICANT CHANGE UP (ref 13–17)
LACTATE SERPL-SCNC: 1.7 MMOL/L — SIGNIFICANT CHANGE UP (ref 0.7–2)
LIPID PNL WITH DIRECT LDL SERPL: 115 MG/DL — SIGNIFICANT CHANGE UP
LYMPHOCYTES # BLD AUTO: 1.7 K/UL — SIGNIFICANT CHANGE UP (ref 1–3.3)
LYMPHOCYTES # BLD AUTO: 31.7 % — SIGNIFICANT CHANGE UP (ref 13–44)
MAGNESIUM SERPL-MCNC: 2.2 MG/DL — SIGNIFICANT CHANGE UP (ref 1.6–2.6)
MCHC RBC-ENTMCNC: 29.9 PG — SIGNIFICANT CHANGE UP (ref 27–34)
MCHC RBC-ENTMCNC: 32.8 GM/DL — SIGNIFICANT CHANGE UP (ref 32–36)
MCV RBC AUTO: 91.4 FL — SIGNIFICANT CHANGE UP (ref 80–100)
MONOCYTES # BLD AUTO: 0.3 K/UL — SIGNIFICANT CHANGE UP (ref 0–0.9)
MONOCYTES NFR BLD AUTO: 6.5 % — SIGNIFICANT CHANGE UP (ref 2–14)
NEUTROPHILS # BLD AUTO: 3.2 K/UL — SIGNIFICANT CHANGE UP (ref 1.8–7.4)
NEUTROPHILS NFR BLD AUTO: 59.3 % — SIGNIFICANT CHANGE UP (ref 43–77)
PHOSPHATE SERPL-MCNC: 3.1 MG/DL — SIGNIFICANT CHANGE UP (ref 2.5–4.5)
PLATELET # BLD AUTO: 212 K/UL — SIGNIFICANT CHANGE UP (ref 150–400)
POTASSIUM SERPL-MCNC: 4.4 MMOL/L — SIGNIFICANT CHANGE UP (ref 3.5–5.3)
POTASSIUM SERPL-SCNC: 4.4 MMOL/L — SIGNIFICANT CHANGE UP (ref 3.5–5.3)
RBC # BLD: 4.66 M/UL — SIGNIFICANT CHANGE UP (ref 4.2–5.8)
RBC # FLD: 12 % — SIGNIFICANT CHANGE UP (ref 10.3–14.5)
SODIUM SERPL-SCNC: 138 MMOL/L — SIGNIFICANT CHANGE UP (ref 135–145)
TOTAL CHOLESTEROL/HDL RATIO MEASUREMENT: 5.2 RATIO — SIGNIFICANT CHANGE UP (ref 3.4–9.6)
TRIGL SERPL-MCNC: 144 MG/DL — SIGNIFICANT CHANGE UP (ref 10–149)
TROPONIN I SERPL-MCNC: 0.05 NG/ML — HIGH (ref 0–0.04)
TROPONIN I SERPL-MCNC: 0.07 NG/ML — HIGH (ref 0–0.04)
TSH SERPL-MCNC: 0.54 UU/ML — SIGNIFICANT CHANGE UP (ref 0.34–4.82)
VIT B12 SERPL-MCNC: 440 PG/ML — SIGNIFICANT CHANGE UP (ref 232–1245)
WBC # BLD: 5.4 K/UL — SIGNIFICANT CHANGE UP (ref 3.8–10.5)
WBC # FLD AUTO: 5.4 K/UL — SIGNIFICANT CHANGE UP (ref 3.8–10.5)

## 2019-02-07 PROCEDURE — 70544 MR ANGIOGRAPHY HEAD W/O DYE: CPT | Mod: 26,59

## 2019-02-07 PROCEDURE — 70551 MRI BRAIN STEM W/O DYE: CPT | Mod: 26

## 2019-02-07 PROCEDURE — 99233 SBSQ HOSP IP/OBS HIGH 50: CPT | Mod: GC

## 2019-02-07 PROCEDURE — 99223 1ST HOSP IP/OBS HIGH 75: CPT

## 2019-02-07 RX ORDER — ERGOCALCIFEROL 1.25 MG/1
50000 CAPSULE ORAL
Qty: 0 | Refills: 0 | Status: DISCONTINUED | OUTPATIENT
Start: 2019-02-07 | End: 2019-02-12

## 2019-02-07 RX ORDER — INSULIN GLARGINE 100 [IU]/ML
20 INJECTION, SOLUTION SUBCUTANEOUS AT BEDTIME
Qty: 0 | Refills: 0 | Status: DISCONTINUED | OUTPATIENT
Start: 2019-02-07 | End: 2019-02-08

## 2019-02-07 RX ORDER — ACETAMINOPHEN 500 MG
1000 TABLET ORAL ONCE
Qty: 0 | Refills: 0 | Status: COMPLETED | OUTPATIENT
Start: 2019-02-07 | End: 2019-02-07

## 2019-02-07 RX ORDER — ATORVASTATIN CALCIUM 80 MG/1
80 TABLET, FILM COATED ORAL AT BEDTIME
Qty: 0 | Refills: 0 | Status: DISCONTINUED | OUTPATIENT
Start: 2019-02-07 | End: 2019-02-12

## 2019-02-07 RX ADMIN — Medication 650 MILLIGRAM(S): at 14:37

## 2019-02-07 RX ADMIN — Medication 81 MILLIGRAM(S): at 14:35

## 2019-02-07 RX ADMIN — PANTOPRAZOLE SODIUM 40 MILLIGRAM(S): 20 TABLET, DELAYED RELEASE ORAL at 06:18

## 2019-02-07 RX ADMIN — INSULIN GLARGINE 20 UNIT(S): 100 INJECTION, SOLUTION SUBCUTANEOUS at 21:13

## 2019-02-07 RX ADMIN — Medication 25 MILLIGRAM(S): at 17:07

## 2019-02-07 RX ADMIN — Medication 1000 MILLIGRAM(S): at 20:56

## 2019-02-07 RX ADMIN — Medication 100 MILLIGRAM(S): at 17:07

## 2019-02-07 RX ADMIN — SENNA PLUS 2 TABLET(S): 8.6 TABLET ORAL at 21:11

## 2019-02-07 RX ADMIN — ATORVASTATIN CALCIUM 80 MILLIGRAM(S): 80 TABLET, FILM COATED ORAL at 21:12

## 2019-02-07 RX ADMIN — BUDESONIDE AND FORMOTEROL FUMARATE DIHYDRATE 2 PUFF(S): 160; 4.5 AEROSOL RESPIRATORY (INHALATION) at 21:11

## 2019-02-07 RX ADMIN — HEPARIN SODIUM 5000 UNIT(S): 5000 INJECTION INTRAVENOUS; SUBCUTANEOUS at 21:12

## 2019-02-07 RX ADMIN — ERGOCALCIFEROL 50000 UNIT(S): 1.25 CAPSULE ORAL at 17:07

## 2019-02-07 RX ADMIN — Medication 4: at 13:30

## 2019-02-07 RX ADMIN — Medication 400 MILLIGRAM(S): at 20:26

## 2019-02-07 RX ADMIN — HEPARIN SODIUM 5000 UNIT(S): 5000 INJECTION INTRAVENOUS; SUBCUTANEOUS at 14:35

## 2019-02-07 RX ADMIN — Medication 2: at 08:24

## 2019-02-07 RX ADMIN — Medication 2: at 17:07

## 2019-02-07 RX ADMIN — Medication 650 MILLIGRAM(S): at 15:40

## 2019-02-07 RX ADMIN — HEPARIN SODIUM 5000 UNIT(S): 5000 INJECTION INTRAVENOUS; SUBCUTANEOUS at 06:19

## 2019-02-07 RX ADMIN — Medication 25 MILLIGRAM(S): at 06:19

## 2019-02-07 RX ADMIN — Medication 1 PATCH: at 14:34

## 2019-02-07 RX ADMIN — Medication 100 MILLIGRAM(S): at 06:18

## 2019-02-07 RX ADMIN — Medication 1 PATCH: at 20:20

## 2019-02-07 RX ADMIN — BUDESONIDE AND FORMOTEROL FUMARATE DIHYDRATE 2 PUFF(S): 160; 4.5 AEROSOL RESPIRATORY (INHALATION) at 14:30

## 2019-02-07 NOTE — PROGRESS NOTE ADULT - PROBLEM SELECTOR PLAN 2
trending down  holding ACE given MALCOM  not currently in fluid overload  f/u BMP  nephrology - Dr. Phoenix trending down  holding ACE given MALCOM  not currently in fluid overload  f/u BMP, UA and urine lytes  nephrology - Dr. Phoenix

## 2019-02-07 NOTE — PHYSICAL THERAPY INITIAL EVALUATION ADULT - ADDITIONAL COMMENTS
Prior to CVA patient IND with ambulation with no AD. Post ambulation has been ambulating with a RW (no longer has one), has a cane with him but states he cannot ambulate with the cane.

## 2019-02-07 NOTE — DISCHARGE NOTE ADULT - SECONDARY DIAGNOSIS.
MALCOM (acute kidney injury) Congestive heart failure, NYHA class 3, chronic, combined Coronary artery disease involving native heart without angina pectoris, unspecified vessel or lesion type COPD (chronic obstructive pulmonary disease) Diabetes mellitus of other type without complication Hyperlipidemia, unspecified hyperlipidemia type

## 2019-02-07 NOTE — PROGRESS NOTE ADULT - SUBJECTIVE AND OBJECTIVE BOX
PGY 1 Note discussed with supervising resident and primary attending    Patient is a 53y old  Male who presents with a chief complaint of constipation, worsening weakness (07 Feb 2019 10:47)      INTERVAL HPI/OVERNIGHT EVENTS: transferred to floor in AM, he complaints of weakness, blurring of vision on and off since 3 months and constipation.    MEDICATIONS  (STANDING):  aspirin enteric coated 81 milliGRAM(s) Oral daily  atorvastatin 40 milliGRAM(s) Oral at bedtime  buDESOnide 160 MICROgram(s)/formoterol 4.5 MICROgram(s) Inhaler 2 Puff(s) Inhalation two times a day  docusate sodium 100 milliGRAM(s) Oral two times a day  heparin  Injectable 5000 Unit(s) SubCutaneous every 8 hours  insulin lispro (HumaLOG) corrective regimen sliding scale   SubCutaneous three times a day before meals  metoprolol tartrate 25 milliGRAM(s) Oral two times a day  nicotine - 21 mG/24Hr(s) Patch 1 patch Transdermal daily  pantoprazole    Tablet 40 milliGRAM(s) Oral before breakfast  senna 2 Tablet(s) Oral at bedtime    MEDICATIONS  (PRN):  acetaminophen   Tablet .. 650 milliGRAM(s) Oral every 6 hours PRN Moderate Pain (4 - 6)  polyethylene glycol 3350 17 Gram(s) Oral daily PRN Constipation      __________________________________________________  REVIEW OF SYSTEMS:    CONSTITUTIONAL: No fever, weakness  EYES: no acute visual disturbances, + blurring of vision on and off  NECK: No pain or stiffness  RESPIRATORY: No cough; No shortness of breath  CARDIOVASCULAR: No chest pain, no palpitations  GASTROINTESTINAL: No pain. No nausea or vomiting; No diarrhea   NEUROLOGICAL: No headache or numbness, no tremors  MUSCULOSKELETAL: No joint pain, no muscle pain  GENITOURINARY: no dysuria, no frequency, no hesitancy  PSYCHIATRY: no depression , no anxiety  ALL OTHER  ROS negative        Vital Signs Last 24 Hrs  T(C): 36.9 (07 Feb 2019 12:07), Max: 37.2 (06 Feb 2019 20:10)  T(F): 98.4 (07 Feb 2019 12:07), Max: 99 (06 Feb 2019 20:10)  HR: 70 (07 Feb 2019 15:15) (67 - 82)  BP: 161/88 (07 Feb 2019 15:15) (118/77 - 162/77)  BP(mean): --  RR: 18 (07 Feb 2019 12:07) (17 - 18)  SpO2: 100% (07 Feb 2019 15:15) (97% - 100%)    ________________________________________________  PHYSICAL EXAM:  GENERAL: NAD  HEENT: Normocephalic;  conjunctivae and sclerae clear; moist mucous membranes;   NECK : supple  CHEST/LUNG: Clear to auscultation bilaterally with good air entry   HEART: S1 S2  regular; no murmurs, gallops or rubs  ABDOMEN: Soft, Nontender, Nondistended; Bowel sounds present  EXTREMITIES: no cyanosis; no edema; no calf tenderness  SKIN: warm and dry; no rash  NERVOUS SYSTEM:  Awake and alert; Oriented  to place, person and time ; no new deficits    _________________________________________________  LABS:                        14.0   5.4   )-----------( 212      ( 07 Feb 2019 07:19 )             42.6     02-07    138  |  104  |  21<H>  ----------------------------<  228<H>  4.4   |  29  |  1.58<H>    Ca    9.1      07 Feb 2019 07:19  Phos  3.1     02-07  Mg     2.2     02-07    TPro  8.5<H>  /  Alb  3.3<L>  /  TBili  0.3  /  DBili  x   /  AST  12  /  ALT  15  /  AlkPhos  98  02-06        CAPILLARY BLOOD GLUCOSE      POCT Blood Glucose.: 324 mg/dL (07 Feb 2019 12:53)  POCT Blood Glucose.: 248 mg/dL (07 Feb 2019 08:12)  POCT Blood Glucose.: 174 mg/dL (06 Feb 2019 23:15)        RADIOLOGY & ADDITIONAL TESTS:    < from: CT Head No Cont (02.06.19 @ 17:41) >  Impression: No acute intracranial hemorrhage.     Small age indeterminate 1.0 cm lacunar infarct in the left occipital   cortex. Small age indeterminate lacunar infarct in the left basal   ganglia. If clinically indicated, brain MR may be pursued for further   evaluation.    Chronic small vessel ischemic disease.      < from: MR Angio Head No Cont (02.07.19 @ 13:42) >    INTERPRETATION:  CLINICAL STATEMENT: Evaluate for stenosis. CVA    TECHNIQUE: MRA of the head was performed without gadolinium. 3-D MIP   images were obtained.    COMPARISON: None.    FINDINGS:  Limited exam due to motion.    Diffuse irregularity noted most compatible with atherosclerotic disease.    Suggestion of narrowing cavernous segments of bilateral internal carotid   arteries evaluation limited due to motion    The proximal anterior, middle and posterior cerebral arteries are patent.     Hypoplastic A1 segment left anterior cerebral artery.    Attenuation of distal left posterior cerebral artery noted.    The intracranial vertebral and basilar arteries are patent. Dominant left   vertebral artery. Focal stenosis distal right vertebral artery noted.    Evaluation for intracranial aneurysm limited due to motion.    IMPRESSION:  Limited exam due to motion.    Multiple intracranial stenoses as described above          Imaging Personally Reviewed:  YES    Consultant(s) Notes Reviewed:   YES    Care Discussed with Consultants : YES    Plan of care was discussed with patient and /or primary care giver; all questions and concerns were addressed and care was aligned with patient's wishes.

## 2019-02-07 NOTE — DISCHARGE NOTE ADULT - NS AS DC FOLLOWUP STROKE INST
Stroke (includes: TIA/SAH/ICH/Ischemic Stroke) Stroke (includes: TIA/SAH/ICH/Ischemic Stroke)/Heart Failure

## 2019-02-07 NOTE — CONSULT NOTE ADULT - SUBJECTIVE AND OBJECTIVE BOX
HPI:  54 yo M with PMH of HTN, HLD, DM, CABG, s/p stroke 3 months ago, presents to ED with complaints of constipation for the past 7 days along with worsening weakness. Pt states he has an abdominal discomfort secondary to bloating due to constipation. Otherwise, patient notes that he has worsening weakness, states he was at rehab after discharge, however signed out against medical advice due to legal issues that arose. Patient therefore did therapy on his own, however feels that it was not a good idea and not successful, stating his weakness has been gradually getting worse. Patient denies any acute worsening of weakness.  Patient states he's able to eat, denies fever, chills, nausea, vomtiing, diarrhea, dysuria, all other ROS negative. Patient still smokes cigarettes (06 Feb 2019 21:06)    Interval History:     MEDICATIONS  (STANDING):  aspirin enteric coated 81 milliGRAM(s) Oral daily  atorvastatin 40 milliGRAM(s) Oral at bedtime  buDESOnide 160 MICROgram(s)/formoterol 4.5 MICROgram(s) Inhaler 2 Puff(s) Inhalation two times a day  docusate sodium 100 milliGRAM(s) Oral two times a day  heparin  Injectable 5000 Unit(s) SubCutaneous every 8 hours  insulin lispro (HumaLOG) corrective regimen sliding scale   SubCutaneous three times a day before meals  metoprolol tartrate 25 milliGRAM(s) Oral two times a day  nicotine - 21 mG/24Hr(s) Patch 1 patch Transdermal daily  pantoprazole    Tablet 40 milliGRAM(s) Oral before breakfast  senna 2 Tablet(s) Oral at bedtime    MEDICATIONS  (PRN):  acetaminophen   Tablet .. 650 milliGRAM(s) Oral every 6 hours PRN Moderate Pain (4 - 6)  polyethylene glycol 3350 17 Gram(s) Oral daily PRN Constipation    PAST MEDICAL & SURGICAL HISTORY:  Congestive heart failure, NYHA class 3, chronic, combined  Cerebrovascular accident (CVA), unspecified mechanism  Hyperlipidemia, unspecified hyperlipidemia type  Coronary artery disease involving native heart without angina pectoris, unspecified vessel or lesion type  Hypertension, unspecified type  Diabetes mellitus of other type without complication  History of appendectomy  S/P CABG x 1    SOCIAL HISTORY:  Smoker:  YES / NO        PACK YEARS:                         WHEN QUIT?  ETOH use:  YES / NO               FREQUENCY / QUANTITY:  Ilicit Drug use:  YES / NO      REVIEW OF SYSTEMS:    CONSTITUTIONAL: No weakness, fevers or chills  RESPIRATORY: No cough, wheezing, hemoptysis; No shortness of breath  CARDIOVASCULAR: No chest pain or palpitations  GASTROINTESTINAL: No abdominal or epigastric pain. No nausea, vomiting, or hematemesis; No diarrhea or constipation. No melena or hematochezia.  NEUROLOGICAL: No numbness or weakness  All other review of systems is negative unless indicated above.    Vital Signs Last 24 Hrs  T(C): 37.1 (07 Feb 2019 10:08), Max: 37.2 (06 Feb 2019 20:10)  T(F): 98.7 (07 Feb 2019 10:08), Max: 99 (06 Feb 2019 20:10)  HR: 67 (07 Feb 2019 10:08) (67 - 82)  BP: 129/91 (07 Feb 2019 10:08) (118/77 - 155/88)  BP(mean): --  RR: 18 (07 Feb 2019 10:08) (17 - 18)  SpO2: 100% (07 Feb 2019 10:08) (97% - 100%)  GENERAL: NAD , weakness  HEENT: Normocephalic;  conjunctivae and sclerae clear; moist mucous membranes;   NECK : supple, no JVD  CHEST/LUNG: Clear to auscultation bilaterally with good air entry   HEART: S1 S2  regular; no murmurs, gallops or rubs  ABDOMEN: Soft, Nontender, Nondistended; Bowel sounds present  EXTREMITIES: no cyanosis; no edema; no calf tenderness  NERVOUS SYSTEM:  Awake and alert; Oriented  to place, person and time      Telemetry:  EKG:  CHADSVASC:                          14.0   5.4   )-----------( 212      ( 07 Feb 2019 07:19 )             42.6     02-07    138  |  104  |  21<H>  ----------------------------<  228<H>  4.4   |  29  |  1.58<H>    Ca    9.1      07 Feb 2019 07:19  Phos  3.1     02-07  Mg     2.2     02-07    TPro  8.5<H>  /  Alb  3.3<L>  /  TBili  0.3  /  DBili  x   /  AST  12  /  ALT  15  /  AlkPhos  98  02-06      Assessment/Plan    1) Congestive heart failure, NYHA class 3, chronic, combined.    - EF 30-35% on previous echo in November 2018  - c/w telemetry monitoring  - f/u echo (given weakness, some concern for worsening heart failure)      2) Coronary artery disease involving native heart without angina pectoris, unspecified vessel or lesion type.    - c/w aspirin.       3) Hypertension, unspecified type.    - c/w lopressor with parameters,   - hold ACE given MALCOM  - continue to monitor BP. HPI:  52 yo M with PMH of HTN, HLD, DM, CABG, s/p stroke 3 months ago, presents to ED with complaints of constipation for the past 7 days along with worsening weakness. Pt states he has an abdominal discomfort secondary to bloating due to constipation. Otherwise, patient notes that he has worsening weakness, states he was at rehab after discharge, however signed out against medical advice due to legal issues that arose. Patient therefore did therapy on his own, however feels that it was not a good idea and not successful, stating his weakness has been gradually getting worse. Patient denies any acute worsening of weakness.  Patient states he's able to eat, denies fever, chills, nausea, vomtiing, diarrhea, dysuria, all other ROS negative. Patient still smokes cigarettes (06 Feb 2019 21:06)    Interval History:     MEDICATIONS  (STANDING):  aspirin enteric coated 81 milliGRAM(s) Oral daily  atorvastatin 40 milliGRAM(s) Oral at bedtime  buDESOnide 160 MICROgram(s)/formoterol 4.5 MICROgram(s) Inhaler 2 Puff(s) Inhalation two times a day  docusate sodium 100 milliGRAM(s) Oral two times a day  heparin  Injectable 5000 Unit(s) SubCutaneous every 8 hours  insulin lispro (HumaLOG) corrective regimen sliding scale   SubCutaneous three times a day before meals  metoprolol tartrate 25 milliGRAM(s) Oral two times a day  nicotine - 21 mG/24Hr(s) Patch 1 patch Transdermal daily  pantoprazole    Tablet 40 milliGRAM(s) Oral before breakfast  senna 2 Tablet(s) Oral at bedtime    MEDICATIONS  (PRN):  acetaminophen   Tablet .. 650 milliGRAM(s) Oral every 6 hours PRN Moderate Pain (4 - 6)  polyethylene glycol 3350 17 Gram(s) Oral daily PRN Constipation    PAST MEDICAL & SURGICAL HISTORY:  Congestive heart failure, NYHA class 3, chronic, combined  Cerebrovascular accident (CVA), unspecified mechanism  Hyperlipidemia, unspecified hyperlipidemia type  Coronary artery disease involving native heart without angina pectoris, unspecified vessel or lesion type  Hypertension, unspecified type  Diabetes mellitus of other type without complication  History of appendectomy  S/P CABG x 1    SOCIAL HISTORY:  Smoker:  YES / NO        PACK YEARS:                         WHEN QUIT?  ETOH use:  YES / NO               FREQUENCY / QUANTITY:  Ilicit Drug use:  YES / NO      REVIEW OF SYSTEMS:    CONSTITUTIONAL: No weakness, fevers or chills  RESPIRATORY: No cough, wheezing, hemoptysis; No shortness of breath  CARDIOVASCULAR: No chest pain or palpitations  GASTROINTESTINAL: No abdominal or epigastric pain. No nausea, vomiting, or hematemesis; No diarrhea or constipation. No melena or hematochezia.  NEUROLOGICAL: No numbness or weakness  All other review of systems is negative unless indicated above.    Vital Signs Last 24 Hrs  T(C): 37.1 (07 Feb 2019 10:08), Max: 37.2 (06 Feb 2019 20:10)  T(F): 98.7 (07 Feb 2019 10:08), Max: 99 (06 Feb 2019 20:10)  HR: 67 (07 Feb 2019 10:08) (67 - 82)  BP: 129/91 (07 Feb 2019 10:08) (118/77 - 155/88)  BP(mean): --  RR: 18 (07 Feb 2019 10:08) (17 - 18)  SpO2: 100% (07 Feb 2019 10:08) (97% - 100%)  GENERAL: NAD , weakness  HEENT: Normocephalic;  conjunctivae and sclerae clear; moist mucous membranes;   NECK : supple, no JVD  CHEST/LUNG: Clear to auscultation bilaterally with good air entry   HEART: S1 S2  regular; no murmurs, gallops or rubs  ABDOMEN: Soft, Nontender, Nondistended; Bowel sounds present  EXTREMITIES: no cyanosis; no edema; no calf tenderness  NERVOUS SYSTEM:  Awake and alert; Oriented  to place, person and time      Telemetry:  EKG:  CHADSVASC:                          14.0   5.4   )-----------( 212      ( 07 Feb 2019 07:19 )             42.6     02-07    138  |  104  |  21<H>  ----------------------------<  228<H>  4.4   |  29  |  1.58<H>    Ca    9.1      07 Feb 2019 07:19  Phos  3.1     02-07  Mg     2.2     02-07    TPro  8.5<H>  /  Alb  3.3<L>  /  TBili  0.3  /  DBili  x   /  AST  12  /  ALT  15  /  AlkPhos  98  02-06      Assessment/Plan    1) Congestive heart failure, NYHA class 3, chronic, combined.    - EF 30-35% on previous echo in November 2018  - c/w telemetry monitoring  - no need for echo (pt had recent echo in Nov 2018)  - pt is non compliant with medications       2) Coronary artery disease involving native heart without angina pectoris, unspecified vessel or lesion type.    - c/w aspirin.       3) Hypertension, unspecified type.    - c/w lopressor with parameters,   - hold ACE given MALCOM  - continue to monitor BP.

## 2019-02-07 NOTE — DISCHARGE NOTE ADULT - NSTOBACCOHOTLINE_GEN_A_CS
[facility] Smokers Quitline ([id number]) [facility] Smokers Quitline ([id number])

## 2019-02-07 NOTE — CONSULT NOTE ADULT - SUBJECTIVE AND OBJECTIVE BOX
++++++++++++++++NOTE NOT COMPLETED++++++++++++++++++++++++++      Patient is a 53y old  Male who presents with a chief complaint of constipation, worsening weakness (06 Feb 2019 21:06)      HPI:  52 yo M with PMH of HTN, HLD, DM, CABG, s/p stroke 3 months ago, presents to ED with complaints of constipation for the past 7 days along with worsening weakness. Pt states he has an abdominal discomfort secondary to bloating due to constipation. Otherwise, patient notes that he has worsening weakness, states he was at rehab after discharge, however signed out against medical advice due to legal issues that arose. Patient therefore did therapy on his own, however feels that it was not a good idea and not successful, stating his weakness has been gradually getting worse. Patient denies any acute worsening of weakness.  Patient states he's able to eat, denies fever, chills, nausea, vomtiing, diarrhea, dysuria, all other ROS negative. Patient still smokes cigarettes (06 Feb 2019 21:06)           The patient was last know well at  The patient lives at home  The patient walks with walker    Neurological Review of Systems:  No difficulty with language.  No vision loss or double vision.  No dizziness, vertigo or new hearing loss.  No difficulty with speech or swallowing.  No focal weakness.  No focal sensory changes.  No numbness or tingling in the bilateral lower extremities.  No difficulty with balance.  No difficulty with ambulation.      MEDICATIONS  (STANDING):  aspirin enteric coated 81 milliGRAM(s) Oral daily  atorvastatin 40 milliGRAM(s) Oral at bedtime  buDESOnide 160 MICROgram(s)/formoterol 4.5 MICROgram(s) Inhaler 2 Puff(s) Inhalation two times a day  docusate sodium 100 milliGRAM(s) Oral two times a day  heparin  Injectable 5000 Unit(s) SubCutaneous every 8 hours  insulin lispro (HumaLOG) corrective regimen sliding scale   SubCutaneous three times a day before meals  metoprolol tartrate 25 milliGRAM(s) Oral two times a day  nicotine - 21 mG/24Hr(s) Patch 1 patch Transdermal daily  pantoprazole    Tablet 40 milliGRAM(s) Oral before breakfast  senna 2 Tablet(s) Oral at bedtime    MEDICATIONS  (PRN):  acetaminophen   Tablet .. 650 milliGRAM(s) Oral every 6 hours PRN Moderate Pain (4 - 6)  polyethylene glycol 3350 17 Gram(s) Oral daily PRN Constipation    Allergies    Plavix (Other)    Intolerances      PAST MEDICAL & SURGICAL HISTORY:  Congestive heart failure, NYHA class 3, chronic, combined  Cerebrovascular accident (CVA), unspecified mechanism  Hyperlipidemia, unspecified hyperlipidemia type  Coronary artery disease involving native heart without angina pectoris, unspecified vessel or lesion type  Hypertension, unspecified type  Diabetes mellitus of other type without complication  History of appendectomy  S/P CABG x 1    FAMILY HISTORY:  No pertinent family history in first degree relatives    SOCIAL HISTORY: non smoker/ former smoker/ active smoker    Review of Systems:  Constitutional: No generalized weakness. No fevers or chills.                    Eyes, Ears, Mouth, Throat: No vision loss   Respiratory: No shortness of breath or cough.                                Cardiovascular: No chest pain or palpitations  Gastrointestinal: No nausea or vomiting.                                         Genitourinary: No urinary incontinence or burning on urination.  Musculoskeletal: No joint pain.                                                           Dermatologic: No rash.  Neurological: as per HPI                                                                      Psychiatric: No behavioral problems.  Endocrine: No known hypoglycemia.               Hematologic/Lymphatic: No easy bleeding.    O:  Vital Signs Last 24 Hrs  T(C): 37 (07 Feb 2019 08:01), Max: 37.2 (06 Feb 2019 20:10)  T(F): 98.6 (07 Feb 2019 08:01), Max: 99 (06 Feb 2019 20:10)  HR: 72 (07 Feb 2019 08:01) (70 - 82)  BP: 155/88 (07 Feb 2019 08:01) (118/77 - 155/88)  BP(mean): --  RR: 18 (07 Feb 2019 08:01) (17 - 18)  SpO2: 100% (07 Feb 2019 08:01) (97% - 100%)    General Exam:   General appearance: No acute distress                 Cardiovascular: Pedal dorsalis pulses intact bilaterally    Neurological Exam:  NIH Stroke Scale (NIHSS):   1a. LOConscious:  0-alert 1-lethargy 2-obtund 3-coma:    _____  1b. LOC Questions:  0-both 1-one 2-none                       _____  1c. LOC Commands:  0-both 1-one 2-none                     _____  2.   Gaze:  0-nl 1-partial 2-conjugate                                _____  3.   Visual:  0-nl 1-part monserrat 2-full monserrat 3-bilat monserrat         _____  4.   Facial Palsy:  0-nl 1-minor 2-part 3-complete             _____  5.   Motor Arm:  0-nl 1-drift 2-effort 3-no effort         Left             _____                              4-no move UN-amputated                     Right  _____  6.   Motor Leg:                                                                 Left   _____                                                                                   Right _____  7.   Ataxia:  0-nl 1-one limb 2-two UN-amp                      _____  8.   Sensory:  0-nl 1-mild 2-severe                                  _____  9.   Language:  0-nl 1-mild 2-severe 3-mute                     _____  10.  Dysarthria:  0-nl 1-mild 2-severe 3-barrier                  _____  11.  Extinction/Inattention:  0-nl 1-mild 2-deep                 _____         TOTAL NIHSS       ________    Mental Status: Orientated to self, date and place.  Attention intact.  No dysarthria, aphasia or neglect.  Knowledge intact.  Registration intact.  Short and long term memory grossly intact.      Cranial Nerves: CN I - not tested.  PERRL, EOMI, VFF, no nystagmus or diplopia.  No APD.  Fundi not visualized bilaterally.  CN V1-3 intact to light touch and pinprick.  No facial asymmetry.  Hearing intact to finger rub bilaterally.  Tongue, uvula and palate midline.  Sternocleidomastoid and Trapezius intact bilaterally.    Motor:   Tone: normal.                  Strength intact throughout  No pronator drift bilaterally                      No dysmetria on finger-nose-finger or heel-shin-heel  No truncal ataxia.  No resting, postural or action tremor.  No myoclonus.    Sensation: intact to light touch, pinprick, vibration and proprioception    Deep Tendon Reflexes: 1+ bilateral biceps, triceps, brachioradialis, knee and ankle  Toes flexor bilaterally    Gait: normal and stable.  Rhomberg -husam.    Other:      LABS:                        14.0   5.4   )-----------( 212      ( 07 Feb 2019 07:19 )             42.6     02-07    138  |  104  |  21<H>  ----------------------------<  228<H>  4.4   |  29  |  1.58<H>    Ca    9.1      07 Feb 2019 07:19  Phos  3.1     02-07  Mg     2.2     02-07    TPro  8.5<H>  /  Alb  3.3<L>  /  TBili  0.3  /  DBili  x   /  AST  12  /  ALT  15  /  AlkPhos  98  02-06        LDL  HbA1C    RADIOLOGY & ADDITIONAL STUDIES:    < from: CT Head No Cont (02.06.19 @ 17:41) >  EXAM:  CT BRAIN                            PROCEDURE DATE:  02/06/2019          INTERPRETATION:  Head CT without IV contrast     Clinical Indication:Weakness and blurred vision     Technique: Axial CT images of the head are obtained from the skull base   to the vertex without IV contrast.    Comparison: .    Findings: There is no acute intracranial hemorrhage. There is a small age   indeterminate 1.0 cm lacunar infarct in the left occipital cortex. Small   age indeterminate lacunar infarct in the left basal ganglia. Mild patchy   hypodensities in the periventricular and subcortical white matter   bilaterally, compatible with chronic small vessel ischemic disease. There   is no space-occupying lesion, midline shift, or herniation. The   ventricles maintain normal size, shape, and location. The sulci are   symmetric and normal for age bilaterally. No extra-axial fluid   collection.  The visualized paranasal sinuses and orbits appear grossly   unremarkable.    Impression: No acute intracranial hemorrhage.     Small age indeterminate 1.0 cm lacunar infarct in the left occipital   cortex. Small age indeterminate lacunar infarct in the left basal   ganglia. If clinically indicated, brain MR may be pursued for further   evaluation.    Chronic small vessel ischemic disease.                ALEKS MINOR M.D., ATTENDING RADIOLOGIST  This document has been electronically signed. Feb 6 2019  5:56PM                < end of copied text > ++++++++++++++++NOTE NOT COMPLETED++++++++++++++++++++++++++      Patient is a 53y old  Male who presents with a chief complaint of constipation, worsening weakness (06 Feb 2019 21:06)      HPI:  54 yo M with PMH of HTN, HLD, DM, CABG, s/p stroke 3 months ago, presents to ED with complaints of constipation for the past 7 days along with worsening weakness. Pt states he has an abdominal discomfort secondary to bloating due to constipation. Otherwise, patient notes that he has worsening weakness, states he was at rehab after discharge, however signed out against medical advice due to legal issues that arose. Patient therefore did therapy on his own, however feels that it was not a good idea and not successful, stating his weakness has been gradually getting worse. Patient denies any acute worsening of weakness.  Patient states he's able to eat, denies fever, chills, nausea, vomtiing, diarrhea, dysuria, all other ROS negative. Patient still smokes cigarettes (06 Feb 2019 21:06)           The patient was last know well 10/27/18.  Reported stroke 10/28/19.  The patient lives at home  The patient walks with walker    Neurological Review of Systems:  No difficulty with language.  No vision loss.  (+) double vision.  No dizziness, vertigo or new hearing loss.  No difficulty with speech or swallowing.  No focal weakness.  No focal sensory changes.  No numbness or tingling in the bilateral lower extremities.  No difficulty with balance.  No difficulty with ambulation.      MEDICATIONS  (STANDING):  aspirin enteric coated 81 milliGRAM(s) Oral daily  atorvastatin 40 milliGRAM(s) Oral at bedtime  buDESOnide 160 MICROgram(s)/formoterol 4.5 MICROgram(s) Inhaler 2 Puff(s) Inhalation two times a day  docusate sodium 100 milliGRAM(s) Oral two times a day  heparin  Injectable 5000 Unit(s) SubCutaneous every 8 hours  insulin lispro (HumaLOG) corrective regimen sliding scale   SubCutaneous three times a day before meals  metoprolol tartrate 25 milliGRAM(s) Oral two times a day  nicotine - 21 mG/24Hr(s) Patch 1 patch Transdermal daily  pantoprazole    Tablet 40 milliGRAM(s) Oral before breakfast  senna 2 Tablet(s) Oral at bedtime    MEDICATIONS  (PRN):  acetaminophen   Tablet .. 650 milliGRAM(s) Oral every 6 hours PRN Moderate Pain (4 - 6)  polyethylene glycol 3350 17 Gram(s) Oral daily PRN Constipation    Allergies    Plavix (Other)    Intolerances      PAST MEDICAL & SURGICAL HISTORY:  Congestive heart failure, NYHA class 3, chronic, combined  Cerebrovascular accident (CVA), unspecified mechanism  Hyperlipidemia, unspecified hyperlipidemia type  Coronary artery disease involving native heart without angina pectoris, unspecified vessel or lesion type  Hypertension, unspecified type  Diabetes mellitus of other type without complication  History of appendectomy  S/P CABG x 1    FAMILY HISTORY:  No pertinent family history in first degree relatives    SOCIAL HISTORY: non smoker/ former smoker/ active smoker    Review of Systems:  Constitutional: No generalized weakness. No fevers or chills.                    Eyes, Ears, Mouth, Throat: No vision loss   Respiratory: No shortness of breath or cough.                                Cardiovascular: No chest pain or palpitations  Gastrointestinal: No nausea or vomiting.                                         Genitourinary: No urinary incontinence or burning on urination.  Musculoskeletal: No joint pain.                                                           Dermatologic: No rash.  Neurological: as per HPI                                                                      Psychiatric: No behavioral problems.  Endocrine: No known hypoglycemia.               Hematologic/Lymphatic: No easy bleeding.    O:  Vital Signs Last 24 Hrs  T(C): 37 (07 Feb 2019 08:01), Max: 37.2 (06 Feb 2019 20:10)  T(F): 98.6 (07 Feb 2019 08:01), Max: 99 (06 Feb 2019 20:10)  HR: 72 (07 Feb 2019 08:01) (70 - 82)  BP: 155/88 (07 Feb 2019 08:01) (118/77 - 155/88)  BP(mean): --  RR: 18 (07 Feb 2019 08:01) (17 - 18)  SpO2: 100% (07 Feb 2019 08:01) (97% - 100%)    General Exam:   General appearance: No acute distress                 Cardiovascular: Pedal dorsalis pulses intact bilaterally    Neurological Exam:  NIH Stroke Scale (NIHSS):   1a. LOConscious:  0-alert 1-lethargy 2-obtund 3-coma:    _____  1b. LOC Questions:  0-both 1-one 2-none                       _____  1c. LOC Commands:  0-both 1-one 2-none                     _____  2.   Gaze:  0-nl 1-partial 2-conjugate                                _____  3.   Visual:  0-nl 1-part monserrat 2-full monserrat 3-bilat monserrat         _____  4.   Facial Palsy:  0-nl 1-minor 2-part 3-complete             _____  5.   Motor Arm:  0-nl 1-drift 2-effort 3-no effort         Left             _____                              4-no move UN-amputated                     Right  _____  6.   Motor Leg:                                                                 Left   _____                                                                                   Right _____  7.   Ataxia:  0-nl 1-one limb 2-two UN-amp                      _____  8.   Sensory:  0-nl 1-mild 2-severe                                  _____  9.   Language:  0-nl 1-mild 2-severe 3-mute                     _____  10.  Dysarthria:  0-nl 1-mild 2-severe 3-barrier                  _____  11.  Extinction/Inattention:  0-nl 1-mild 2-deep                 _____         TOTAL NIHSS       ________    Mental Status: Orientated to self, date and place.  Attention intact.  No dysarthria, aphasia or neglect.  Knowledge intact.  Registration intact.  Short and long term memory grossly intact.      Cranial Nerves: CN I - not tested.  PERRL, EOMI, VFF, no nystagmus or diplopia.  No APD.  Fundi not visualized bilaterally.  CN V1-3 intact to light touch and pinprick.  No facial asymmetry.  Hearing intact to finger rub bilaterally.  Tongue, uvula and palate midline.  Sternocleidomastoid and Trapezius intact bilaterally.    Motor:   Tone: normal.                  Strength intact throughout  No pronator drift bilaterally                      No dysmetria on finger-nose-finger or heel-shin-heel  No truncal ataxia.  No resting, postural or action tremor.  No myoclonus.    Sensation: intact to light touch, pinprick, vibration and proprioception    Deep Tendon Reflexes: 1+ bilateral biceps, triceps, brachioradialis, knee and ankle  Toes flexor bilaterally    Gait: normal and stable.  Rhomberg -husam.    Other:      LABS:                        14.0   5.4   )-----------( 212      ( 07 Feb 2019 07:19 )             42.6     02-07    138  |  104  |  21<H>  ----------------------------<  228<H>  4.4   |  29  |  1.58<H>    Ca    9.1      07 Feb 2019 07:19  Phos  3.1     02-07  Mg     2.2     02-07    TPro  8.5<H>  /  Alb  3.3<L>  /  TBili  0.3  /  DBili  x   /  AST  12  /  ALT  15  /  AlkPhos  98  02-06        LDL  HbA1C    RADIOLOGY & ADDITIONAL STUDIES:    < from: CT Head No Cont (02.06.19 @ 17:41) >  EXAM:  CT BRAIN                            PROCEDURE DATE:  02/06/2019          INTERPRETATION:  Head CT without IV contrast     Clinical Indication:Weakness and blurred vision     Technique: Axial CT images of the head are obtained from the skull base   to the vertex without IV contrast.    Comparison: .    Findings: There is no acute intracranial hemorrhage. There is a small age   indeterminate 1.0 cm lacunar infarct in the left occipital cortex. Small   age indeterminate lacunar infarct in the left basal ganglia. Mild patchy   hypodensities in the periventricular and subcortical white matter   bilaterally, compatible with chronic small vessel ischemic disease. There   is no space-occupying lesion, midline shift, or herniation. The   ventricles maintain normal size, shape, and location. The sulci are   symmetric and normal for age bilaterally. No extra-axial fluid   collection.  The visualized paranasal sinuses and orbits appear grossly   unremarkable.    Impression: No acute intracranial hemorrhage.     Small age indeterminate 1.0 cm lacunar infarct in the left occipital   cortex. Small age indeterminate lacunar infarct in the left basal   ganglia. If clinically indicated, brain MR may be pursued for further   evaluation.    Chronic small vessel ischemic disease.                ALEKS MINOR M.D., ATTENDING RADIOLOGIST  This document has been electronically signed. Feb 6 2019  5:56PM                < end of copied text > ++++++++++++++++NOTE NOT COMPLETED++++++++++++++++++++++++++      Patient is a 53y old  Male who presents with a chief complaint of constipation, worsening weakness (06 Feb 2019 21:06)      HPI:  52 yo M with PMH of HTN, HLD, DM, CABG, s/p stroke 3 months ago, presents to ED with complaints of constipation for the past 7 days along with worsening weakness. Pt states he has an abdominal discomfort secondary to bloating due to constipation. Otherwise, patient notes that he has worsening weakness, states he was at rehab after discharge, however signed out against medical advice due to legal issues that arose. Patient therefore did therapy on his own, however feels that it was not a good idea and not successful, stating his weakness has been gradually getting worse. Patient denies any acute worsening of weakness.  Patient states he's able to eat, denies fever, chills, nausea, vomtiing, diarrhea, dysuria, all other ROS negative. Patient still smokes cigarettes (06 Feb 2019 21:06).  States to be "terrible" b/c "I'm not walking right."  States to have had this symptom of not walking right/unbalanced as well as dizziness and blurry vision since his stroke 10/28/19.  States that he came to the hospital yesterday b/c he could not take these symptoms any more.            The patient was last know well 10/27/18.    The patient lives at home  The patient walks with walker    Neurological Review of Systems:  No difficulty with language.  No vision loss.  (+) double vision.  (+) dizziness, described as environment spinning.  Denies new hearing loss.  No difficulty with speech or swallowing.  No focal weakness.  No focal sensory changes.  Intermittent numbness and tingling in the bilateral lower extremities.  (+) difficulty with balance.  (+) difficulty with ambulation.      MEDICATIONS  (STANDING):  aspirin enteric coated 81 milliGRAM(s) Oral daily  atorvastatin 40 milliGRAM(s) Oral at bedtime  buDESOnide 160 MICROgram(s)/formoterol 4.5 MICROgram(s) Inhaler 2 Puff(s) Inhalation two times a day  docusate sodium 100 milliGRAM(s) Oral two times a day  heparin  Injectable 5000 Unit(s) SubCutaneous every 8 hours  insulin lispro (HumaLOG) corrective regimen sliding scale   SubCutaneous three times a day before meals  metoprolol tartrate 25 milliGRAM(s) Oral two times a day  nicotine - 21 mG/24Hr(s) Patch 1 patch Transdermal daily  pantoprazole    Tablet 40 milliGRAM(s) Oral before breakfast  senna 2 Tablet(s) Oral at bedtime    MEDICATIONS  (PRN):  acetaminophen   Tablet .. 650 milliGRAM(s) Oral every 6 hours PRN Moderate Pain (4 - 6)  polyethylene glycol 3350 17 Gram(s) Oral daily PRN Constipation    Allergies    Plavix (Other)    Intolerances      PAST MEDICAL & SURGICAL HISTORY:  Congestive heart failure, NYHA class 3, chronic, combined  Cerebrovascular accident (CVA), unspecified mechanism  Hyperlipidemia, unspecified hyperlipidemia type  Coronary artery disease involving native heart without angina pectoris, unspecified vessel or lesion type  Hypertension, unspecified type  Diabetes mellitus of other type without complication  History of appendectomy  S/P CABG x 1    FAMILY HISTORY:  No pertinent family history in first degree relatives    SOCIAL HISTORY: active smoker    Review of Systems:  Constitutional: No generalized weakness. No fevers or chills                 Eyes, Ears, Mouth, Throat: No vision loss   Respiratory: Reports shortness of breath.  Denies cough                               Cardiovascular: No chest pain or palpitations  Gastrointestinal: No nausea or vomiting                                         Genitourinary: No urinary incontinence or burning on urination  Musculoskeletal: No joint pain                                                           Dermatologic: No rash  Neurological: as per HPI                                                                      Psychiatric: No behavioral problems  Endocrine: No known hypoglycemia              Hematologic/Lymphatic: No easy bleeding    O:  Vital Signs Last 24 Hrs  T(C): 37 (07 Feb 2019 08:01), Max: 37.2 (06 Feb 2019 20:10)  T(F): 98.6 (07 Feb 2019 08:01), Max: 99 (06 Feb 2019 20:10)  HR: 72 (07 Feb 2019 08:01) (70 - 82)  BP: 155/88 (07 Feb 2019 08:01) (118/77 - 155/88)  RR: 18 (07 Feb 2019 08:01) (17 - 18)  SpO2: 100% (07 Feb 2019 08:01) (97% - 100%)    General Exam:   General appearance: No acute distress                 Cardiovascular: Pedal dorsalis pulses intact bilaterally    Neurological Exam:  NIH Stroke Scale (NIHSS):   1a. LOConscious:  0-alert 1-lethargy 2-obtund 3-coma:    _0____  1b. LOC Questions:  0-both 1-one 2-none                       ____0_  1c. LOC Commands:  0-both 1-one 2-none                     __0___  2.   Gaze:  0-nl 1-partial 2-conjugate                                __0___  3.   Visual:  0-nl 1-part monserrat 2-full monserrat 3-bilat monserrat         __0___  4.   Facial Palsy:  0-nl 1-minor 2-part 3-complete             __1___  5.   Motor Arm:  0-nl 1-drift 2-effort 3-no effort         Left             __1___                              4-no move UN-amputated                     Right  __0___  6.   Motor Leg:                                                                 Left   __1___                                                                                   Right __0__  7.   Ataxia:  0-nl 1-one limb 2-two UN-amp                      _0____  8.   Sensory:  0-nl 1-mild 2-severe                                  __0___  9.   Language:  0-nl 1-mild 2-severe 3-mute                     __0___  10.  Dysarthria:  0-nl 1-mild 2-severe 3-barrier                  __0___  11.  Extinction/Inattention:  0-nl 1-mild 2-deep                 __0_         TOTAL NIHSS       __3____  MRS=3    Mental Status: Orientated to self, date and place.  Attention intact.  No dysarthria, aphasia or neglect.  Knowledge intact.  Registration intact.  Short and long term memory grossly intact.      Cranial Nerves: CN I - not tested.  PERRL, EOMI, VFF.  (+) nystagmus on right downward gaze.  No diplopia.  CN V1-3 intact to light touch and pinprick.  (+) mild right facial asymmetry.  Hearing intact to finger rub bilaterally.  Tongue, uvula and palate midline.  Sternocleidomastoid and Trapezius intact bilaterally.    Motor:   Tone: Normal and normal bulk                  Strength 3/5 in RUE,  4/5 in LUE, and b/l LE  (+) Mild right pronator drift                     No dysmetria on finger-nose-finger or heel-shin-heel  No truncal ataxia.  No resting, postural or action tremor.  No myoclonus.    Sensation: intact to light touch, pinprick, vibration and proprioception    Deep Tendon Reflexes: 1+ bilateral biceps, triceps, brachioradialis, knee and ankle  Toes flexor bilaterally    Gait: Unstable leaning to right    Other:      LABS:                        14.0   5.4   )-----------( 212      ( 07 Feb 2019 07:19 )             42.6     02-07    138  |  104  |  21<H>  ----------------------------<  228<H>  4.4   |  29  |  1.58<H>    Ca    9.1      07 Feb 2019 07:19  Phos  3.1     02-07  Mg     2.2     02-07    TPro  8.5<H>  /  Alb  3.3<L>  /  TBili  0.3  /  DBili  x   /  AST  12  /  ALT  15  /  AlkPhos  98  02-06        LDL  HbA1C    RADIOLOGY & ADDITIONAL STUDIES:    < from: CT Head No Cont (02.06.19 @ 17:41) >  EXAM:  CT BRAIN                            PROCEDURE DATE:  02/06/2019          INTERPRETATION:  Head CT without IV contrast     Clinical Indication: Weakness and blurred vision     Technique: Axial CT images of the head are obtained from the skull base   to the vertex without IV contrast.    Comparison: .    Findings: There is no acute intracranial hemorrhage. There is a small age   indeterminate 1.0 cm lacunar infarct in the left occipital cortex. Small   age indeterminate lacunar infarct in the left basal ganglia. Mild patchy   hypodensities in the periventricular and subcortical white matter   bilaterally, compatible with chronic small vessel ischemic disease. There   is no space-occupying lesion, midline shift, or herniation. The   ventricles maintain normal size, shape, and location. The sulci are   symmetric and normal for age bilaterally. No extra-axial fluid   collection.  The visualized paranasal sinuses and orbits appear grossly   unremarkable.    Impression: No acute intracranial hemorrhage.     Small age indeterminate 1.0 cm lacunar infarct in the left occipital   cortex. Small age indeterminate lacunar infarct in the left basal   ganglia. If clinically indicated, brain MR may be pursued for further   evaluation.    Chronic small vessel ischemic disease.                ALEKS MINOR M.D., ATTENDING RADIOLOGIST  This document has been electronically signed. Feb 6 2019  5:56PM                < end of copied text > ++++++++++++++++NOTE NOT COMPLETED++++++++++++++++++++++++++      Patient is a 53y old  Male who presents with a chief complaint of constipation, worsening weakness (06 Feb 2019 21:06)      HPI:  52 yo M with PMH of HTN, HLD, DM, CABG, s/p stroke 3 months ago, presents to ED with complaints of constipation for the past 7 days along with worsening weakness. Pt states he has an abdominal discomfort secondary to bloating due to constipation. Otherwise, patient notes that he has worsening weakness, states he was at rehab after discharge, however signed out against medical advice due to legal issues that arose. Patient therefore did therapy on his own, however feels that it was not a good idea and not successful, stating his weakness has been gradually getting worse. Patient denies any acute worsening of weakness.  Patient states he's able to eat, denies fever, chills, nausea, vomtiing, diarrhea, dysuria, all other ROS negative. Patient still smokes cigarettes (06 Feb 2019 21:06).  States to be "terrible" b/c "I'm not walking right."  States to have had this symptom of not walking right/unbalanced as well as dizziness and blurry vision since his stroke 10/28/19.  States that he came to the hospital yesterday b/c he could not take these symptoms any more.  Denies blindness, flashing light, or hallucinations.           The patient was last know well 10/27/18.    The patient lives at home  The patient walks with walker    Neurological Review of Systems:  No difficulty with language.  No vision loss.  (+) double vision.  (+) dizziness, described as environment spinning.  Denies new hearing loss.  No difficulty with speech or swallowing.  No focal weakness.  No focal sensory changes.  Intermittent numbness and tingling in the bilateral lower extremities.  (+) difficulty with balance.  (+) difficulty with ambulation.      MEDICATIONS  (STANDING):  aspirin enteric coated 81 milliGRAM(s) Oral daily  atorvastatin 40 milliGRAM(s) Oral at bedtime  buDESOnide 160 MICROgram(s)/formoterol 4.5 MICROgram(s) Inhaler 2 Puff(s) Inhalation two times a day  docusate sodium 100 milliGRAM(s) Oral two times a day  heparin  Injectable 5000 Unit(s) SubCutaneous every 8 hours  insulin lispro (HumaLOG) corrective regimen sliding scale   SubCutaneous three times a day before meals  metoprolol tartrate 25 milliGRAM(s) Oral two times a day  nicotine - 21 mG/24Hr(s) Patch 1 patch Transdermal daily  pantoprazole    Tablet 40 milliGRAM(s) Oral before breakfast  senna 2 Tablet(s) Oral at bedtime    MEDICATIONS  (PRN):  acetaminophen   Tablet .. 650 milliGRAM(s) Oral every 6 hours PRN Moderate Pain (4 - 6)  polyethylene glycol 3350 17 Gram(s) Oral daily PRN Constipation    Allergies    Plavix (Other)    Intolerances      PAST MEDICAL & SURGICAL HISTORY:  Congestive heart failure, NYHA class 3, chronic, combined  Cerebrovascular accident (CVA), unspecified mechanism  Hyperlipidemia, unspecified hyperlipidemia type  Coronary artery disease involving native heart without angina pectoris, unspecified vessel or lesion type  Hypertension, unspecified type  Diabetes mellitus of other type without complication  History of appendectomy  S/P CABG x 1    FAMILY HISTORY:  No pertinent family history in first degree relatives    SOCIAL HISTORY: active smoker    Review of Systems:  Constitutional: No generalized weakness. No fevers or chills                 Eyes, Ears, Mouth, Throat: No vision loss   Respiratory: Reports shortness of breath.  Denies cough                               Cardiovascular: No chest pain or palpitations  Gastrointestinal: No nausea or vomiting                                         Genitourinary: No urinary incontinence or burning on urination  Musculoskeletal: No joint pain                                                           Dermatologic: No rash  Neurological: as per HPI                                                                      Psychiatric: No behavioral problems  Endocrine: No known hypoglycemia              Hematologic/Lymphatic: No easy bleeding    O:  Vital Signs Last 24 Hrs  T(C): 37 (07 Feb 2019 08:01), Max: 37.2 (06 Feb 2019 20:10)  T(F): 98.6 (07 Feb 2019 08:01), Max: 99 (06 Feb 2019 20:10)  HR: 72 (07 Feb 2019 08:01) (70 - 82)  BP: 155/88 (07 Feb 2019 08:01) (118/77 - 155/88)  RR: 18 (07 Feb 2019 08:01) (17 - 18)  SpO2: 100% (07 Feb 2019 08:01) (97% - 100%)    General Exam:   General appearance: No acute distress                 Cardiovascular: Pedal dorsalis pulses intact bilaterally    Neurological Exam:  NIH Stroke Scale (NIHSS):   1a. LOConscious:  0-alert 1-lethargy 2-obtund 3-coma:    _0____  1b. LOC Questions:  0-both 1-one 2-none                       ____0_  1c. LOC Commands:  0-both 1-one 2-none                     __0___  2.   Gaze:  0-nl 1-partial 2-conjugate                                __0___  3.   Visual:  0-nl 1-part monserrat 2-full monserrat 3-bilat monserrat         __0___  4.   Facial Palsy:  0-nl 1-minor 2-part 3-complete             __1___  5.   Motor Arm:  0-nl 1-drift 2-effort 3-no effort         Left             __1___                              4-no move UN-amputated                     Right  __0___  6.   Motor Leg:                                                                 Left   __1___                                                                                   Right __0__  7.   Ataxia:  0-nl 1-one limb 2-two UN-amp                      _0____  8.   Sensory:  0-nl 1-mild 2-severe                                  __0___  9.   Language:  0-nl 1-mild 2-severe 3-mute                     __0___  10.  Dysarthria:  0-nl 1-mild 2-severe 3-barrier                  __0___  11.  Extinction/Inattention:  0-nl 1-mild 2-deep                 __0_         TOTAL NIHSS       __3____  MRS=3    Mental Status: Orientated to self, date and place.  Attention intact.  No dysarthria, aphasia or neglect.  Knowledge intact.  Registration intact.  Short and long term memory grossly intact.      Cranial Nerves: CN I - not tested.  PERRL, EOMI, VFF.  (+) nystagmus on right downward gaze.  No diplopia.  CN V1-3 intact to light touch and pinprick.  (+) mild right facial asymmetry.  Hearing intact to finger rub bilaterally.  Tongue, uvula and palate midline.  Sternocleidomastoid and Trapezius intact bilaterally.    Motor:   Tone: Normal and normal bulk                  Strength 3/5 in RUE,  4/5 in LUE, and b/l LE  (+) Mild right pronator drift                     No dysmetria on finger-nose-finger or heel-shin-heel  No truncal ataxia.  No resting, postural or action tremor.  No myoclonus.    Sensation: intact to light touch, pinprick, vibration and proprioception    Deep Tendon Reflexes: 1+ bilateral biceps, triceps, brachioradialis, knee and ankle  Toes flexor bilaterally    Gait: Unstable leaning to right    Other:      LABS:                        14.0   5.4   )-----------( 212      ( 07 Feb 2019 07:19 )             42.6     02-07    138  |  104  |  21<H>  ----------------------------<  228<H>  4.4   |  29  |  1.58<H>    Ca    9.1      07 Feb 2019 07:19  Phos  3.1     02-07  Mg     2.2     02-07    TPro  8.5<H>  /  Alb  3.3<L>  /  TBili  0.3  /  DBili  x   /  AST  12  /  ALT  15  /  AlkPhos  98  02-06        LDL  HbA1C    RADIOLOGY & ADDITIONAL STUDIES:    < from: CT Head No Cont (02.06.19 @ 17:41) >  EXAM:  CT BRAIN                            PROCEDURE DATE:  02/06/2019          INTERPRETATION:  Head CT without IV contrast     Clinical Indication: Weakness and blurred vision     Technique: Axial CT images of the head are obtained from the skull base   to the vertex without IV contrast.    Comparison: .    Findings: There is no acute intracranial hemorrhage. There is a small age   indeterminate 1.0 cm lacunar infarct in the left occipital cortex. Small   age indeterminate lacunar infarct in the left basal ganglia. Mild patchy   hypodensities in the periventricular and subcortical white matter   bilaterally, compatible with chronic small vessel ischemic disease. There   is no space-occupying lesion, midline shift, or herniation. The   ventricles maintain normal size, shape, and location. The sulci are   symmetric and normal for age bilaterally. No extra-axial fluid   collection.  The visualized paranasal sinuses and orbits appear grossly   unremarkable.    Impression: No acute intracranial hemorrhage.     Small age indeterminate 1.0 cm lacunar infarct in the left occipital   cortex. Small age indeterminate lacunar infarct in the left basal   ganglia. If clinically indicated, brain MR may be pursued for further   evaluation.    Chronic small vessel ischemic disease.                ALEKS MINOR M.D., ATTENDING RADIOLOGIST  This document has been electronically signed. Feb 6 2019  5:56PM                < end of copied text > ++++++++++++++++NOTE NOT COMPLETED++++++++++++++++++++++++++      Patient is a 53y old  Male who presents with a chief complaint of constipation, worsening weakness (06 Feb 2019 21:06)      HPI:  52 yo M with PMH of HTN, HLD, DM, CABG, s/p stroke 3 months ago, presents to ED with complaints of constipation for the past 7 days along with worsening weakness. Pt states he has an abdominal discomfort secondary to bloating due to constipation. Otherwise, patient notes that he has worsening weakness, states he was at rehab after discharge, however signed out against medical advice due to legal issues that arose. Patient therefore did therapy on his own, however feels that it was not a good idea and not successful, stating his weakness has been gradually getting worse. Patient denies any acute worsening of weakness.  Patient states he's able to eat, denies fever, chills, nausea, vomtiing, diarrhea, dysuria, all other ROS negative. Patient still smokes cigarettes (06 Feb 2019 21:06).  States to be "terrible" b/c "I'm not walking right."  States to have had this symptom of not walking right/unbalanced as well as dizziness and blurry vision since his stroke 10/28/18.  States that he came to the hospital yesterday b/c he could not take these symptoms any more.  Denies blindness, flashing light, or hallucinations.           The patient was last know well 10/27/18.    The patient lives at home  The patient walks with walker    Neurological Review of Systems:  No difficulty with language.  No vision loss.  (+) double vision.  (+) dizziness, described as environment spinning.  Denies new hearing loss.  No difficulty with speech or swallowing.  No focal weakness.  No focal sensory changes.  Intermittent numbness and tingling in the bilateral lower extremities.  (+) difficulty with balance.  (+) difficulty with ambulation.      MEDICATIONS  (STANDING):  aspirin enteric coated 81 milliGRAM(s) Oral daily  atorvastatin 40 milliGRAM(s) Oral at bedtime  buDESOnide 160 MICROgram(s)/formoterol 4.5 MICROgram(s) Inhaler 2 Puff(s) Inhalation two times a day  docusate sodium 100 milliGRAM(s) Oral two times a day  heparin  Injectable 5000 Unit(s) SubCutaneous every 8 hours  insulin lispro (HumaLOG) corrective regimen sliding scale   SubCutaneous three times a day before meals  metoprolol tartrate 25 milliGRAM(s) Oral two times a day  nicotine - 21 mG/24Hr(s) Patch 1 patch Transdermal daily  pantoprazole    Tablet 40 milliGRAM(s) Oral before breakfast  senna 2 Tablet(s) Oral at bedtime    MEDICATIONS  (PRN):  acetaminophen   Tablet .. 650 milliGRAM(s) Oral every 6 hours PRN Moderate Pain (4 - 6)  polyethylene glycol 3350 17 Gram(s) Oral daily PRN Constipation    Allergies    Plavix (Other)    Intolerances      PAST MEDICAL & SURGICAL HISTORY:  Congestive heart failure, NYHA class 3, chronic, combined  Cerebrovascular accident (CVA), unspecified mechanism  Hyperlipidemia, unspecified hyperlipidemia type  Coronary artery disease involving native heart without angina pectoris, unspecified vessel or lesion type  Hypertension, unspecified type  Diabetes mellitus of other type without complication  History of appendectomy  S/P CABG x 1    FAMILY HISTORY:  No pertinent family history in first degree relatives    SOCIAL HISTORY: active smoker    Review of Systems:  Constitutional: No generalized weakness. No fevers or chills                 Eyes, Ears, Mouth, Throat: No vision loss   Respiratory: Reports shortness of breath.  Denies cough                               Cardiovascular: No chest pain or palpitations  Gastrointestinal: No nausea or vomiting                                         Genitourinary: No urinary incontinence or burning on urination  Musculoskeletal: No joint pain                                                           Dermatologic: No rash  Neurological: as per HPI                                                                      Psychiatric: No behavioral problems  Endocrine: No known hypoglycemia              Hematologic/Lymphatic: No easy bleeding    O:  Vital Signs Last 24 Hrs  T(C): 37 (07 Feb 2019 08:01), Max: 37.2 (06 Feb 2019 20:10)  T(F): 98.6 (07 Feb 2019 08:01), Max: 99 (06 Feb 2019 20:10)  HR: 72 (07 Feb 2019 08:01) (70 - 82)  BP: 155/88 (07 Feb 2019 08:01) (118/77 - 155/88)  RR: 18 (07 Feb 2019 08:01) (17 - 18)  SpO2: 100% (07 Feb 2019 08:01) (97% - 100%)    General Exam:   General appearance: No acute distress                 Cardiovascular: Pedal dorsalis pulses intact bilaterally    Neurological Exam:  NIH Stroke Scale (NIHSS):   1a. LOConscious:  0-alert 1-lethargy 2-obtund 3-coma:    _0____  1b. LOC Questions:  0-both 1-one 2-none                       ____0_  1c. LOC Commands:  0-both 1-one 2-none                     __0___  2.   Gaze:  0-nl 1-partial 2-conjugate                                __0___  3.   Visual:  0-nl 1-part monserrat 2-full monserrat 3-bilat monserrat         __0___  4.   Facial Palsy:  0-nl 1-minor 2-part 3-complete             __1___  5.   Motor Arm:  0-nl 1-drift 2-effort 3-no effort         Left             __1___                              4-no move UN-amputated                     Right  __0___  6.   Motor Leg:                                                                 Left   __1___                                                                                   Right __0__  7.   Ataxia:  0-nl 1-one limb 2-two UN-amp                      _0____  8.   Sensory:  0-nl 1-mild 2-severe                                  __0___  9.   Language:  0-nl 1-mild 2-severe 3-mute                     __0___  10.  Dysarthria:  0-nl 1-mild 2-severe 3-barrier                  __0___  11.  Extinction/Inattention:  0-nl 1-mild 2-deep                 __0_         TOTAL NIHSS       __3____  MRS=3    Mental Status: Orientated to self, date and place.  Attention intact.  No dysarthria, aphasia or neglect.  Knowledge intact.  Registration intact.  Short and long term memory grossly intact.      Cranial Nerves: CN I - not tested.  PERRL, EOMI, VFF.  (+) nystagmus on right downward gaze.  No diplopia.  CN V1-3 intact to light touch and pinprick.  (+) mild right facial asymmetry.  Hearing intact to finger rub bilaterally.  Tongue, uvula and palate midline.  Sternocleidomastoid and Trapezius intact bilaterally.    Motor:   Tone: Normal and normal bulk                  Strength 3/5 in RUE,  4/5 in LUE, and b/l LE  (+) Mild right pronator drift                     No dysmetria on finger-nose-finger or heel-shin-heel  No truncal ataxia.  No resting, postural or action tremor.  No myoclonus.    Sensation: intact to light touch, pinprick, vibration and proprioception    Deep Tendon Reflexes: 1+ bilateral biceps, triceps, brachioradialis, knee and ankle  Toes flexor bilaterally    Gait: Unstable leaning to right    Other:      LABS:                        14.0   5.4   )-----------( 212      ( 07 Feb 2019 07:19 )             42.6     02-07    138  |  104  |  21<H>  ----------------------------<  228<H>  4.4   |  29  |  1.58<H>    Ca    9.1      07 Feb 2019 07:19  Phos  3.1     02-07  Mg     2.2     02-07    TPro  8.5<H>  /  Alb  3.3<L>  /  TBili  0.3  /  DBili  x   /  AST  12  /  ALT  15  /  AlkPhos  98  02-06        LDL  HbA1C    RADIOLOGY & ADDITIONAL STUDIES:    < from: CT Head No Cont (02.06.19 @ 17:41) >  EXAM:  CT BRAIN                            PROCEDURE DATE:  02/06/2019          INTERPRETATION:  Head CT without IV contrast     Clinical Indication: Weakness and blurred vision     Technique: Axial CT images of the head are obtained from the skull base   to the vertex without IV contrast.    Comparison: .    Findings: There is no acute intracranial hemorrhage. There is a small age   indeterminate 1.0 cm lacunar infarct in the left occipital cortex. Small   age indeterminate lacunar infarct in the left basal ganglia. Mild patchy   hypodensities in the periventricular and subcortical white matter   bilaterally, compatible with chronic small vessel ischemic disease. There   is no space-occupying lesion, midline shift, or herniation. The   ventricles maintain normal size, shape, and location. The sulci are   symmetric and normal for age bilaterally. No extra-axial fluid   collection.  The visualized paranasal sinuses and orbits appear grossly   unremarkable.    Impression: No acute intracranial hemorrhage.     Small age indeterminate 1.0 cm lacunar infarct in the left occipital   cortex. Small age indeterminate lacunar infarct in the left basal   ganglia. If clinically indicated, brain MR may be pursued for further   evaluation.    Chronic small vessel ischemic disease.                ALEKS MINOR M.D., ATTENDING RADIOLOGIST  This document has been electronically signed. Feb 6 2019  5:56PM                < end of copied text > ++++++++++++++++NOTE NOT COMPLETED++++++++++++++++++++++++++      Patient is a 53y old  Male who presents with a chief complaint of constipation, worsening weakness (06 Feb 2019 21:06)      HPI:  54 yo M with PMH of HTN, HLD, DM, CABG, s/p stroke 3 months ago, presents to ED with complaints of constipation for the past 7 days along with worsening weakness. Pt states he has an abdominal discomfort secondary to bloating due to constipation. Otherwise, patient notes that he has worsening weakness, states he was at rehab after discharge, however signed out against medical advice due to legal issues that arose. Patient therefore did therapy on his own, however feels that it was not a good idea and not successful, stating his weakness has been gradually getting worse. Patient denies any acute worsening of weakness.  Patient states he's able to eat, denies fever, chills, nausea, vomtiing, diarrhea, dysuria, all other ROS negative. Patient still smokes cigarettes (06 Feb 2019 21:06).  States to be "terrible" b/c "I'm not walking right."  States to have had this symptom of not walking right/unbalanced as well as dizziness and blurry vision since his stroke 10/28/18.  States that he came to the hospital yesterday b/c he could not take these symptoms any more.  Denies blindness, flashing light, or hallucinations.           The patient was last know well 10/27/18.    The patient lives at home  The patient walks with walker    Neurological Review of Systems:  No difficulty with language.  No vision loss.  (+) double vision.  (+) dizziness, described as environment spinning.  Denies new hearing loss.  No difficulty with speech or swallowing.  No focal weakness.  No focal sensory changes.  Intermittent numbness and tingling in the bilateral lower extremities.  (+) difficulty with balance.  (+) difficulty with ambulation.      MEDICATIONS  (STANDING):  aspirin enteric coated 81 milliGRAM(s) Oral daily  atorvastatin 40 milliGRAM(s) Oral at bedtime  buDESOnide 160 MICROgram(s)/formoterol 4.5 MICROgram(s) Inhaler 2 Puff(s) Inhalation two times a day  docusate sodium 100 milliGRAM(s) Oral two times a day  heparin  Injectable 5000 Unit(s) SubCutaneous every 8 hours  insulin lispro (HumaLOG) corrective regimen sliding scale   SubCutaneous three times a day before meals  metoprolol tartrate 25 milliGRAM(s) Oral two times a day  nicotine - 21 mG/24Hr(s) Patch 1 patch Transdermal daily  pantoprazole    Tablet 40 milliGRAM(s) Oral before breakfast  senna 2 Tablet(s) Oral at bedtime    MEDICATIONS  (PRN):  acetaminophen   Tablet .. 650 milliGRAM(s) Oral every 6 hours PRN Moderate Pain (4 - 6)  polyethylene glycol 3350 17 Gram(s) Oral daily PRN Constipation    Allergies    Plavix (Other)    Intolerances      PAST MEDICAL & SURGICAL HISTORY:  Congestive heart failure, NYHA class 3, chronic, combined  Cerebrovascular accident (CVA), unspecified mechanism  Hyperlipidemia, unspecified hyperlipidemia type  Coronary artery disease involving native heart without angina pectoris, unspecified vessel or lesion type  Hypertension, unspecified type  Diabetes mellitus of other type without complication  History of appendectomy  S/P CABG x 1    FAMILY HISTORY:  No pertinent family history in first degree relatives    SOCIAL HISTORY: active smoker    Review of Systems:  Constitutional: No generalized weakness. No fevers or chills                 Eyes, Ears, Mouth, Throat: No vision loss   Respiratory: Reports shortness of breath.  Denies cough                               Cardiovascular: No chest pain or palpitations  Gastrointestinal: No nausea or vomiting                                         Genitourinary: No urinary incontinence or burning on urination  Musculoskeletal: No joint pain                                                           Dermatologic: No rash  Neurological: as per HPI                                                                      Psychiatric: No behavioral problems  Endocrine: No known hypoglycemia              Hematologic/Lymphatic: No easy bleeding    O:  Vital Signs Last 24 Hrs  T(C): 37 (07 Feb 2019 08:01), Max: 37.2 (06 Feb 2019 20:10)  T(F): 98.6 (07 Feb 2019 08:01), Max: 99 (06 Feb 2019 20:10)  HR: 72 (07 Feb 2019 08:01) (70 - 82)  BP: 155/88 (07 Feb 2019 08:01) (118/77 - 155/88)  RR: 18 (07 Feb 2019 08:01) (17 - 18)  SpO2: 100% (07 Feb 2019 08:01) (97% - 100%)    General Exam:   General appearance: No acute distress                 Cardiovascular: Pedal dorsalis pulses intact bilaterally    Neurological Exam:  NIH Stroke Scale (NIHSS):   1a. LOConscious:  0-alert 1-lethargy 2-obtund 3-coma:    _0____  1b. LOC Questions:  0-both 1-one 2-none                       ____0_  1c. LOC Commands:  0-both 1-one 2-none                     __0___  2.   Gaze:  0-nl 1-partial 2-conjugate                                __0___  3.   Visual:  0-nl 1-part monserrat 2-full monserrat 3-bilat monserrat         __0___  4.   Facial Palsy:  0-nl 1-minor 2-part 3-complete             __1___  5.   Motor Arm:  0-nl 1-drift 2-effort 3-no effort         Left             __0__                              4-no move UN-amputated                     Right  __1__  6.   Motor Leg:                                                                 Left   __0__                                                                                   Right __1_  7.   Ataxia:  0-nl 1-one limb 2-two UN-amp                      _0____  8.   Sensory:  0-nl 1-mild 2-severe                                  __0___  9.   Language:  0-nl 1-mild 2-severe 3-mute                     __0___  10.  Dysarthria:  0-nl 1-mild 2-severe 3-barrier                  __0___  11.  Extinction/Inattention:  0-nl 1-mild 2-deep                 __0_         TOTAL NIHSS       __3____  MRS=3    Mental Status: Orientated to self, date and place.  Attention intact.  No dysarthria, aphasia or neglect.  Knowledge intact.  Registration intact.  Short and long term memory grossly intact.      Cranial Nerves: CN I - not tested.  PERRL, EOMI, VFF.  (+) nystagmus on right downward gaze.  No diplopia.  CN V1-3 intact to light touch and pinprick.  (+) mild right facial asymmetry.  Hearing intact to finger rub bilaterally.  Tongue, uvula and palate midline.  Sternocleidomastoid and Trapezius intact bilaterally.    Motor:   Tone: Normal and normal bulk                  Strength 3/5 in RUE,  4/5 in LUE, and b/l LE  (+) Mild right pronator drift                     No dysmetria on finger-nose-finger or heel-shin-heel  No truncal ataxia.  No resting, postural or action tremor.  No myoclonus.    Sensation: intact to light touch, pinprick, vibration and proprioception    Deep Tendon Reflexes: 1+ bilateral biceps, triceps, brachioradialis, knee and ankle  Toes flexor bilaterally    Gait: Unstable leaning to right    Other:      LABS:                        14.0   5.4   )-----------( 212      ( 07 Feb 2019 07:19 )             42.6     02-07    138  |  104  |  21<H>  ----------------------------<  228<H>  4.4   |  29  |  1.58<H>    Ca    9.1      07 Feb 2019 07:19  Phos  3.1     02-07  Mg     2.2     02-07    TPro  8.5<H>  /  Alb  3.3<L>  /  TBili  0.3  /  DBili  x   /  AST  12  /  ALT  15  /  AlkPhos  98  02-06        LDL  HbA1C    RADIOLOGY & ADDITIONAL STUDIES:    < from: CT Head No Cont (02.06.19 @ 17:41) >  EXAM:  CT BRAIN                            PROCEDURE DATE:  02/06/2019          INTERPRETATION:  Head CT without IV contrast     Clinical Indication: Weakness and blurred vision     Technique: Axial CT images of the head are obtained from the skull base   to the vertex without IV contrast.    Comparison: .    Findings: There is no acute intracranial hemorrhage. There is a small age   indeterminate 1.0 cm lacunar infarct in the left occipital cortex. Small   age indeterminate lacunar infarct in the left basal ganglia. Mild patchy   hypodensities in the periventricular and subcortical white matter   bilaterally, compatible with chronic small vessel ischemic disease. There   is no space-occupying lesion, midline shift, or herniation. The   ventricles maintain normal size, shape, and location. The sulci are   symmetric and normal for age bilaterally. No extra-axial fluid   collection.  The visualized paranasal sinuses and orbits appear grossly   unremarkable.    Impression: No acute intracranial hemorrhage.     Small age indeterminate 1.0 cm lacunar infarct in the left occipital   cortex. Small age indeterminate lacunar infarct in the left basal   ganglia. If clinically indicated, brain MR may be pursued for further   evaluation.    Chronic small vessel ischemic disease.                ALEKS MINOR M.D., ATTENDING RADIOLOGIST  This document has been electronically signed. Feb 6 2019  5:56PM                < end of copied text > Patient is a 53y old  Male who presents with a chief complaint of constipation, worsening weakness (06 Feb 2019 21:06)      HPI:  54 yo M with PMH of HTN, HLD, DM, CABG, s/p stroke 3 months ago, presents to ED with complaints of constipation for the past 7 days along with worsening weakness. Pt states he has an abdominal discomfort secondary to bloating due to constipation. Otherwise, patient notes that he has worsening weakness, states he was at rehab after discharge, however signed out against medical advice due to legal issues that arose. Patient therefore did therapy on his own, however feels that it was not a good idea and not successful, stating his weakness has been gradually getting worse. Patient denies any acute worsening of weakness.  Patient states he's able to eat, denies fever, chills, nausea, vomtiing, diarrhea, dysuria, all other ROS negative. Patient still smokes cigarettes (06 Feb 2019 21:06).  States to be "terrible" b/c "I'm not walking right."  States to have had this symptom of not walking right/unbalanced as well as dizziness and blurry vision since his stroke 10/28/18 w/ residual left side weakness.  States that he came to the hospital yesterday b/c he could not take these symptoms any more.  Denies blindness, flashing light, or hallucinations.           The patient was last know well 10/27/18.    The patient lives at home  The patient walks with walker    Neurological Review of Systems:  No difficulty with language.  No vision loss.  (+) double vision.  (+) dizziness, described as environment spinning.  Denies new hearing loss.  No difficulty with speech or swallowing.  No focal weakness.  No focal sensory changes.  Intermittent numbness and tingling in the bilateral lower extremities.  (+) difficulty with balance.  (+) difficulty with ambulation.      MEDICATIONS  (STANDING):  aspirin enteric coated 81 milliGRAM(s) Oral daily  atorvastatin 40 milliGRAM(s) Oral at bedtime  buDESOnide 160 MICROgram(s)/formoterol 4.5 MICROgram(s) Inhaler 2 Puff(s) Inhalation two times a day  docusate sodium 100 milliGRAM(s) Oral two times a day  heparin  Injectable 5000 Unit(s) SubCutaneous every 8 hours  insulin lispro (HumaLOG) corrective regimen sliding scale   SubCutaneous three times a day before meals  metoprolol tartrate 25 milliGRAM(s) Oral two times a day  nicotine - 21 mG/24Hr(s) Patch 1 patch Transdermal daily  pantoprazole    Tablet 40 milliGRAM(s) Oral before breakfast  senna 2 Tablet(s) Oral at bedtime    MEDICATIONS  (PRN):  acetaminophen   Tablet .. 650 milliGRAM(s) Oral every 6 hours PRN Moderate Pain (4 - 6)  polyethylene glycol 3350 17 Gram(s) Oral daily PRN Constipation    Allergies    Plavix (Other)    Intolerances      PAST MEDICAL & SURGICAL HISTORY:  Congestive heart failure, NYHA class 3, chronic, combined  Cerebrovascular accident (CVA), unspecified mechanism  Hyperlipidemia, unspecified hyperlipidemia type  Coronary artery disease involving native heart without angina pectoris, unspecified vessel or lesion type  Hypertension, unspecified type  Diabetes mellitus of other type without complication  History of appendectomy  S/P CABG x 1    FAMILY HISTORY:  No pertinent family history in first degree relatives    SOCIAL HISTORY: active smoker    Review of Systems:  Constitutional: No generalized weakness. No fevers or chills                 Eyes, Ears, Mouth, Throat: No vision loss   Respiratory: Reports shortness of breath.  Denies cough                               Cardiovascular: No chest pain or palpitations  Gastrointestinal: No nausea or vomiting                                         Genitourinary: No urinary incontinence or burning on urination  Musculoskeletal: No joint pain                                                           Dermatologic: No rash  Neurological: as per HPI                                                                      Psychiatric: No behavioral problems  Endocrine: No known hypoglycemia              Hematologic/Lymphatic: No easy bleeding    O:  Vital Signs Last 24 Hrs  T(C): 37 (07 Feb 2019 08:01), Max: 37.2 (06 Feb 2019 20:10)  T(F): 98.6 (07 Feb 2019 08:01), Max: 99 (06 Feb 2019 20:10)  HR: 72 (07 Feb 2019 08:01) (70 - 82)  BP: 155/88 (07 Feb 2019 08:01) (118/77 - 155/88)  RR: 18 (07 Feb 2019 08:01) (17 - 18)  SpO2: 100% (07 Feb 2019 08:01) (97% - 100%)    General Exam:   General appearance: No acute distress                 Cardiovascular: Pedal dorsalis pulses intact bilaterally    Neurological Exam:  NIH Stroke Scale (NIHSS):   1a. LOConscious:  0-alert 1-lethargy 2-obtund 3-coma:    _0____  1b. LOC Questions:  0-both 1-one 2-none                       ____0_  1c. LOC Commands:  0-both 1-one 2-none                     __0___  2.   Gaze:  0-nl 1-partial 2-conjugate                                __0___  3.   Visual:  0-nl 1-part monserrat 2-full monserrat 3-bilat monserrat         __0___  4.   Facial Palsy:  0-nl 1-minor 2-part 3-complete             __1___  5.   Motor Arm:  0-nl 1-drift 2-effort 3-no effort         Left             __0__                              4-no move UN-amputated                     Right  __1__  6.   Motor Leg:                                                                 Left   __0__                                                                                   Right __1_  7.   Ataxia:  0-nl 1-one limb 2-two UN-amp                      _0____  8.   Sensory:  0-nl 1-mild 2-severe                                  __0___  9.   Language:  0-nl 1-mild 2-severe 3-mute                     __0___  10.  Dysarthria:  0-nl 1-mild 2-severe 3-barrier                  __0___  11.  Extinction/Inattention:  0-nl 1-mild 2-deep                 __0_         TOTAL NIHSS       __3____  MRS=3    Mental Status: Orientated to self, date and place.  Attention intact.  No dysarthria, aphasia or neglect.  Knowledge intact.  Registration intact.  Short and long term memory grossly intact.      Cranial Nerves: CN I - not tested.  PERRL, EOMI, VFF.  (+) nystagmus on right downward gaze.  No diplopia.  CN V1-3 intact to light touch and pinprick.  (+) mild right facial asymmetry.  Hearing intact to finger rub bilaterally.  Tongue, uvula and palate midline.  Sternocleidomastoid and Trapezius intact bilaterally.    Motor:   Tone: Normal and normal bulk                  Strength 3/5 in RUE,  4/5 in LUE, and b/l LE  (+) Mild right pronator drift                     No dysmetria on finger-nose-finger or heel-shin-heel  No truncal ataxia.  No resting, postural or action tremor.  No myoclonus.    Sensation: intact to light touch, pinprick, vibration and proprioception    Deep Tendon Reflexes: 1+ bilateral biceps, triceps, brachioradialis, knee and ankle  Toes flexor bilaterally    Gait: Unstable leaning to right    Other:      LABS:                        14.0   5.4   )-----------( 212      ( 07 Feb 2019 07:19 )             42.6     02-07    138  |  104  |  21<H>  ----------------------------<  228<H>  4.4   |  29  |  1.58<H>    Ca    9.1      07 Feb 2019 07:19  Phos  3.1     02-07  Mg     2.2     02-07    TPro  8.5<H>  /  Alb  3.3<L>  /  TBili  0.3  /  DBili  x   /  AST  12  /  ALT  15  /  AlkPhos  98  02-06        LDL  HbA1C    RADIOLOGY & ADDITIONAL STUDIES:    < from: CT Head No Cont (02.06.19 @ 17:41) >  EXAM:  CT BRAIN                            PROCEDURE DATE:  02/06/2019          INTERPRETATION:  Head CT without IV contrast     Clinical Indication: Weakness and blurred vision     Technique: Axial CT images of the head are obtained from the skull base   to the vertex without IV contrast.    Comparison: .    Findings: There is no acute intracranial hemorrhage. There is a small age   indeterminate 1.0 cm lacunar infarct in the left occipital cortex. Small   age indeterminate lacunar infarct in the left basal ganglia. Mild patchy   hypodensities in the periventricular and subcortical white matter   bilaterally, compatible with chronic small vessel ischemic disease. There   is no space-occupying lesion, midline shift, or herniation. The   ventricles maintain normal size, shape, and location. The sulci are   symmetric and normal for age bilaterally. No extra-axial fluid   collection.  The visualized paranasal sinuses and orbits appear grossly   unremarkable.    Impression: No acute intracranial hemorrhage.     Small age indeterminate 1.0 cm lacunar infarct in the left occipital   cortex. Small age indeterminate lacunar infarct in the left basal   ganglia. If clinically indicated, brain MR may be pursued for further   evaluation.    Chronic small vessel ischemic disease.                ALEKS MINOR M.D., ATTENDING RADIOLOGIST  This document has been electronically signed. Feb 6 2019  5:56PM                < end of copied text > Patient is a 53y old  Male who presents with a chief complaint of constipation, worsening weakness (06 Feb 2019 21:06)      HPI:  54 yo M with PMH of HTN, HLD, DM, CABG, s/p stroke 3 months ago, presents to ED with complaints of constipation for the past 7 days along with worsening weakness. Pt states he has an abdominal discomfort secondary to bloating due to constipation. Otherwise, patient notes that he has worsening weakness, states he was at rehab after discharge, however signed out against medical advice due to legal issues that arose. Patient therefore did therapy on his own, however feels that it was not a good idea and not successful, stating his weakness has been gradually getting worse. Patient denies any acute worsening of weakness.  Patient states he's able to eat, denies fever, chills, nausea, vomtiing, diarrhea, dysuria, all other ROS negative. Patient still smokes cigarettes (06 Feb 2019 21:06).  States to be "terrible" b/c "I'm not walking right."  States to have had this symptom of not walking right/unbalanced as well as dizziness and blurry vision since his stroke 10/28/18 w/ residual left side weakness.  States that he came to the hospital yesterday b/c he could not take these symptoms any more.  Denies blindness, flashing light, or hallucinations.           The patient was last know well 10/27/18.    The patient lives at home  The patient walks with walker    Neurological Review of Systems:  No difficulty with language.  No vision loss.  (+) double vision.  (+) dizziness, described as environment spinning.  Denies new hearing loss.  No difficulty with speech or swallowing.  No focal weakness.  No focal sensory changes.  Intermittent numbness and tingling in the bilateral lower extremities.  (+) difficulty with balance.  (+) difficulty with ambulation.      MEDICATIONS  (STANDING):  aspirin enteric coated 81 milliGRAM(s) Oral daily  atorvastatin 40 milliGRAM(s) Oral at bedtime  buDESOnide 160 MICROgram(s)/formoterol 4.5 MICROgram(s) Inhaler 2 Puff(s) Inhalation two times a day  docusate sodium 100 milliGRAM(s) Oral two times a day  heparin  Injectable 5000 Unit(s) SubCutaneous every 8 hours  insulin lispro (HumaLOG) corrective regimen sliding scale   SubCutaneous three times a day before meals  metoprolol tartrate 25 milliGRAM(s) Oral two times a day  nicotine - 21 mG/24Hr(s) Patch 1 patch Transdermal daily  pantoprazole    Tablet 40 milliGRAM(s) Oral before breakfast  senna 2 Tablet(s) Oral at bedtime    MEDICATIONS  (PRN):  acetaminophen   Tablet .. 650 milliGRAM(s) Oral every 6 hours PRN Moderate Pain (4 - 6)  polyethylene glycol 3350 17 Gram(s) Oral daily PRN Constipation    Allergies    Plavix (Other)    Intolerances      PAST MEDICAL & SURGICAL HISTORY:  Congestive heart failure, NYHA class 3, chronic, combined  Cerebrovascular accident (CVA), unspecified mechanism  Hyperlipidemia, unspecified hyperlipidemia type  Coronary artery disease involving native heart without angina pectoris, unspecified vessel or lesion type  Hypertension, unspecified type  Diabetes mellitus of other type without complication  History of appendectomy  S/P CABG x 1    FAMILY HISTORY:  No pertinent family history in first degree relatives    SOCIAL HISTORY: active smoker    Review of Systems:  Constitutional: No generalized weakness. No fevers or chills                 Eyes, Ears, Mouth, Throat: No vision loss   Respiratory: Reports shortness of breath.  Denies cough                               Cardiovascular: No chest pain or palpitations  Gastrointestinal: No nausea or vomiting                                         Genitourinary: No urinary incontinence or burning on urination  Musculoskeletal: No joint pain                                                           Dermatologic: No rash  Neurological: as per HPI                                                                      Psychiatric: No behavioral problems  Endocrine: No known hypoglycemia              Hematologic/Lymphatic: No easy bleeding    O:  Vital Signs Last 24 Hrs  T(C): 37 (07 Feb 2019 08:01), Max: 37.2 (06 Feb 2019 20:10)  T(F): 98.6 (07 Feb 2019 08:01), Max: 99 (06 Feb 2019 20:10)  HR: 72 (07 Feb 2019 08:01) (70 - 82)  BP: 155/88 (07 Feb 2019 08:01) (118/77 - 155/88)  RR: 18 (07 Feb 2019 08:01) (17 - 18)  SpO2: 100% (07 Feb 2019 08:01) (97% - 100%)    General Exam:   General appearance: No acute distress                 Cardiovascular: Pedal dorsalis pulses intact bilaterally    Neurological Exam:  NIH Stroke Scale (NIHSS):   1a. LOConscious:  0-alert 1-lethargy 2-obtund 3-coma:    _0____  1b. LOC Questions:  0-both 1-one 2-none                       ____0_  1c. LOC Commands:  0-both 1-one 2-none                     __0___  2.   Gaze:  0-nl 1-partial 2-conjugate                                __0___  3.   Visual:  0-nl 1-part monserrat 2-full monserrat 3-bilat monserrat         __0___  4.   Facial Palsy:  0-nl 1-minor 2-part 3-complete             __1___  5.   Motor Arm:  0-nl 1-drift 2-effort 3-no effort         Left             __0__                              4-no move UN-amputated                     Right  __1__  6.   Motor Leg:                                                                 Left   __0__                                                                                   Right __1_  7.   Ataxia:  0-nl 1-one limb 2-two UN-amp                      _0____  8.   Sensory:  0-nl 1-mild 2-severe                                  __0___  9.   Language:  0-nl 1-mild 2-severe 3-mute                     __0___  10.  Dysarthria:  0-nl 1-mild 2-severe 3-barrier                  __0___  11.  Extinction/Inattention:  0-nl 1-mild 2-deep                 __0_         TOTAL NIHSS       __3____  MRS=3    Mental Status: Orientated to self, date and place.  Attention intact.  No dysarthria, aphasia or neglect.  Knowledge intact.  Registration intact.  Short and long term memory grossly intact.      Cranial Nerves: CN I - not tested.  PERRL, EOMI, VFF.  (+) nystagmus on right downward gaze.  No diplopia.  CN V1-3 intact to light touch and pinprick.  (+) mild right facial asymmetry.  Hearing intact to finger rub bilaterally.  Tongue, uvula and palate midline.  Sternocleidomastoid and Trapezius intact bilaterally.    Motor:   Tone: Normal and normal bulk                  Strength 3/5 in RUE,  4/5 in LUE, and b/l LE  (+) Mild right pronator drift                     No dysmetria on finger-nose-finger or heel-shin-heel  No truncal ataxia.  No resting, postural or action tremor.  No myoclonus.    Sensation: intact to light touch, pinprick and proprioception    Deep Tendon Reflexes: 1+ bilateral biceps, triceps, brachioradialis, knee and ankle  Toes flexor bilaterally    Gait: Unstable leaning to right    Other:      LABS:                        14.0   5.4   )-----------( 212      ( 07 Feb 2019 07:19 )             42.6     02-07    138  |  104  |  21<H>  ----------------------------<  228<H>  4.4   |  29  |  1.58<H>    Ca    9.1      07 Feb 2019 07:19  Phos  3.1     02-07  Mg     2.2     02-07    TPro  8.5<H>  /  Alb  3.3<L>  /  TBili  0.3  /  DBili  x   /  AST  12  /  ALT  15  /  AlkPhos  98  02-06        LDL  HbA1C    RADIOLOGY & ADDITIONAL STUDIES:    < from: CT Head No Cont (02.06.19 @ 17:41) >  EXAM:  CT BRAIN                            PROCEDURE DATE:  02/06/2019          INTERPRETATION:  Head CT without IV contrast     Clinical Indication: Weakness and blurred vision     Technique: Axial CT images of the head are obtained from the skull base   to the vertex without IV contrast.    Comparison: .    Findings: There is no acute intracranial hemorrhage. There is a small age   indeterminate 1.0 cm lacunar infarct in the left occipital cortex. Small   age indeterminate lacunar infarct in the left basal ganglia. Mild patchy   hypodensities in the periventricular and subcortical white matter   bilaterally, compatible with chronic small vessel ischemic disease. There   is no space-occupying lesion, midline shift, or herniation. The   ventricles maintain normal size, shape, and location. The sulci are   symmetric and normal for age bilaterally. No extra-axial fluid   collection.  The visualized paranasal sinuses and orbits appear grossly   unremarkable.    Impression: No acute intracranial hemorrhage.     Small age indeterminate 1.0 cm lacunar infarct in the left occipital   cortex. Small age indeterminate lacunar infarct in the left basal   ganglia. If clinically indicated, brain MR may be pursued for further   evaluation.    Chronic small vessel ischemic disease.                ALEKS MINOR M.D., ATTENDING RADIOLOGIST  This document has been electronically signed. Feb 6 2019  5:56PM                < end of copied text >

## 2019-02-07 NOTE — PROGRESS NOTE ADULT - PROBLEM SELECTOR PLAN 8
c/w statin  lipid profile- low HDL c/w lopressor with parameters, holding ACE given MALCOM  continue to monitor BP

## 2019-02-07 NOTE — PHYSICAL THERAPY INITIAL EVALUATION ADULT - CRITERIA FOR SKILLED THERAPEUTIC INTERVENTIONS
impairments found/therapy frequency/predicted duration of therapy intervention/rehab potential/anticipated discharge recommendation/anticipated equipment needs at discharge/functional limitations in following categories/risk reduction/prevention

## 2019-02-07 NOTE — DISCHARGE NOTE ADULT - CARE PLAN
Principal Discharge DX:	Cerebrovascular accident (CVA), unspecified mechanism  Goal:	Resolution of symptoms  Secondary Diagnosis:	MALCOM (acute kidney injury)  Goal:	prevent complications  Secondary Diagnosis:	Congestive heart failure, NYHA class 3, chronic, combined  Goal:	prevent complications  Assessment and plan of treatment:	Continue with blood pressure medications and Beta Blocker medication as indicated in the medication reconciliation. Maintain healthy diet and exercise and lose weight.  If you eat too much salt or drink too much fluid, your body's water content may increase and make your heart work harder. This can worsen your CHF.  Follow up with your primary care physician in one week after discharge to inform of your hospitalization.  Secondary Diagnosis:	Coronary artery disease involving native heart without angina pectoris, unspecified vessel or lesion type  Goal:	prevent complications  Assessment and plan of treatment:	Continue with your blood pressure medication and statin as instructed. Maintain a healthy diet and exercise frequently if possible. Lose weight. Follow up with your primary care physician in one week after discharge.  Secondary Diagnosis:	COPD (chronic obstructive pulmonary disease)  Goal:	prevent complications  Assessment and plan of treatment:	Continue with your nebulizers as intrusted by your primary care physician. Avoid triggers. If you're allergic to dust mites, pollens or molds, they can make your copd symptoms get worse. Cold air, exercise, fumes from chemicals or perfume, tobacco or wood smoke, and weather changes can also make asthma symptoms worse. So can common colds and sinus infections. Vaccinate with influenza vaccine yearly. Follow up with primary care physician one week after discharge.  Secondary Diagnosis:	Diabetes mellitus of other type without complication  Goal:	prevent complications  Assessment and plan of treatment:	Continue with your blood sugar medication. HbAIC was 12,2, You must maintain a healthy diet that consist of low sugar, low fat, low sodium diet. Exercise frequently if possible. Consider repeating your Hemoglobin A1c within 3 months after discharge to monitor your average blood glucose control. Follow up with primary care physician in one week after discharge.  Secondary Diagnosis:	Hyperlipidemia, unspecified hyperlipidemia type  Goal:	prevent complications  Assessment and plan of treatment:	Continue with cholesterol medications. Maintain a healthy diet that consist of low sugar, low fat, low sodium diet. Exercise frequently if possible.  Follow up with primary care physician in one week after discharge. Principal Discharge DX:	Cerebrovascular accident (CVA), unspecified mechanism  Goal:	Resolution of symptoms  Assessment and plan of treatment:	You presented with worsening weakness. CT showed Small age indeterminate 1.0 cm lacunar infarct in the left occipital cortex. Small age indeterminate lacunar infarct in the left basal ganglia. MRA shows Multiple intracranial stenoses. MRI shows Small chronic infarct left occipital lobe, Small acute infarct right occipital lobe noted measuring 9 x 8 mm. Additional punctate acute/subacute infarcts noted in the bilateral posterior temporal and left occipital region. He is on aspirin and statin. cardiology consulted Dr. Knowles and neurology consult - Dr. Fraire. Since you are allergic to plavix, we started Aggrenox with aspirin. We did ABRAHAN to rule out cardiac cause of embolus which is negative. Pt was involved in care. Initially they recommended DENI then re-evaluation they recommended home with home PT.   you are stable for discharge per primary attending.  Secondary Diagnosis:	MALCOM (acute kidney injury)  Goal:	prevent complications  Secondary Diagnosis:	Congestive heart failure, NYHA class 3, chronic, combined  Goal:	prevent complications  Assessment and plan of treatment:	Continue with blood pressure medications and Beta Blocker medication as indicated in the medication reconciliation. Maintain healthy diet and exercise and lose weight.  If you eat too much salt or drink too much fluid, your body's water content may increase and make your heart work harder. This can worsen your CHF.  Follow up with your primary care physician in one week after discharge to inform of your hospitalization.  Secondary Diagnosis:	Coronary artery disease involving native heart without angina pectoris, unspecified vessel or lesion type  Goal:	prevent complications  Assessment and plan of treatment:	Continue with your blood pressure medication and statin as instructed. Maintain a healthy diet and exercise frequently if possible. Lose weight. Follow up with your primary care physician in one week after discharge.  Secondary Diagnosis:	COPD (chronic obstructive pulmonary disease)  Goal:	prevent complications  Assessment and plan of treatment:	Continue with your nebulizers as intrusted by your primary care physician. Avoid triggers. If you're allergic to dust mites, pollens or molds, they can make your copd symptoms get worse. Cold air, exercise, fumes from chemicals or perfume, tobacco or wood smoke, and weather changes can also make asthma symptoms worse. So can common colds and sinus infections. Vaccinate with influenza vaccine yearly. Follow up with primary care physician one week after discharge.  Secondary Diagnosis:	Diabetes mellitus of other type without complication  Goal:	prevent complications  Assessment and plan of treatment:	Continue with your blood sugar medication. HbAIC was 12,2, You must maintain a healthy diet that consist of low sugar, low fat, low sodium diet. Exercise frequently if possible. Consider repeating your Hemoglobin A1c within 3 months after discharge to monitor your average blood glucose control. Follow up with primary care physician in one week after discharge.  You will stop metformin and will take Lantus 48 units at bedtime and insulin Humalog 8 units before meals.  Secondary Diagnosis:	Hyperlipidemia, unspecified hyperlipidemia type  Goal:	prevent complications  Assessment and plan of treatment:	Continue with cholesterol medications. Maintain a healthy diet that consist of low sugar, low fat, low sodium diet. Exercise frequently if possible.  Follow up with primary care physician in one week after discharge. Principal Discharge DX:	Cerebrovascular accident (CVA), unspecified mechanism  Goal:	Resolution of symptoms  Assessment and plan of treatment:	You presented with worsening weakness. CT showed Small age indeterminate 1.0 cm lacunar infarct in the left occipital cortex. Small age indeterminate lacunar infarct in the left basal ganglia. MRA shows Multiple intracranial stenoses. MRI shows Small chronic infarct left occipital lobe, Small acute infarct right occipital lobe noted measuring 9 x 8 mm. Additional punctate acute/subacute infarcts noted in the bilateral posterior temporal and left occipital region. He is on aspirin and statin. cardiology consulted Dr. Knowles and neurology consult - Dr. Fraire. 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 Since you are allergic to plavix, we started Aggrenox with aspirin. We did ABRAHAN to rule out cardiac cause of embolus which was negative for any thrombus. Physical therapy was involved in care. Initially they recommended DENI but with significant improvement with gait, then re-evaluation they recommended home with home PT.   you are stable for discharge per primary attending.  Secondary Diagnosis:	MALCOM (acute kidney injury)  Goal:	prevent complications  Assessment and plan of treatment:	Resolved  Secondary Diagnosis:	Congestive heart failure, NYHA class 3, chronic, combined  Goal:	prevent complications  Assessment and plan of treatment:	Continue with blood pressure medications and Beta Blocker medication as indicated in the medication reconciliation. Maintain healthy diet and exercise and lose weight.  If you eat too much salt or drink too much fluid, your body's water content may increase and make your heart work harder. This can worsen your CHF.  Follow up with your primary care physician in one week after discharge to inform of your hospitalization.  Secondary Diagnosis:	Coronary artery disease involving native heart without angina pectoris, unspecified vessel or lesion type  Goal:	prevent complications  Assessment and plan of treatment:	Continue with your blood pressure medication and statin as instructed. Maintain a healthy diet and exercise frequently if possible. Lose weight. Follow up with your primary care physician in one week after discharge.  Secondary Diagnosis:	COPD (chronic obstructive pulmonary disease)  Goal:	prevent complications  Assessment and plan of treatment:	Continue with your nebulizers as intrusted by your primary care physician. Avoid triggers. If you're allergic to dust mites, pollens or molds, they can make your copd symptoms get worse. Cold air, exercise, fumes from chemicals or perfume, tobacco or wood smoke, and weather changes can also make asthma symptoms worse. So can common colds and sinus infections. Vaccinate with influenza vaccine yearly. Follow up with primary care physician one week after discharge.  You have counseled on smoking cessation. You are interested to quit smoking and will be discharged on Nicotine patch.  Secondary Diagnosis:	Diabetes mellitus of other type without complication  Goal:	prevent complications  Assessment and plan of treatment:	Continue with your blood sugar medication. HgbA1C was 12,2, You was seen by Endocrinologist Dr. Yao and now better sugar control with Basal and Bolus Insulin with Lantus at bedtine and Humalog with each meals. You must maintain a healthy diet that consist of low sugar, low fat, low sodium diet. Exercise frequently if possible. Consider repeating your Hemoglobin A1c within 3 months after discharge to monitor your average blood glucose control. Follow up with primary care physician in one week after discharge.  You will stop metformin and will take Lantus 48 units at bedtime and insulin Humalog 8 units before meals.  Secondary Diagnosis:	Hyperlipidemia, unspecified hyperlipidemia type  Goal:	prevent complications  Assessment and plan of treatment:	Continue with cholesterol medications. Maintain a healthy diet that consist of low sugar, low fat, low sodium diet. Exercise frequently if possible.  Follow up with primary care physician in one week after discharge. Principal Discharge DX:	Cerebrovascular accident (CVA), unspecified mechanism  Goal:	Resolution of symptoms  Assessment and plan of treatment:	You presented with worsening weakness. CT showed Small age indeterminate 1.0 cm lacunar infarct in the left occipital cortex. Small age indeterminate lacunar infarct in the left basal ganglia. MRA shows Multiple intracranial stenoses. MRI shows Small chronic infarct left occipital lobe, Small acute infarct right occipital lobe noted measuring 9 x 8 mm. Additional punctate acute/subacute infarcts noted in the bilateral posterior temporal and left occipital region. He is on aspirin and statin. cardiology consulted Dr. Knowles and neurology consult - Dr. Fraire. 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 Since you are allergic to plavix, we started Aggrenox with aspirin. We did ABRAHAN to rule out cardiac cause of embolus which was negative for any thrombus. Physical therapy was involved in care. Initially they recommended DENI but with significant improvement with gait, then re-evaluation they recommended home with home PT.   you are stable for discharge per primary attending.  Secondary Diagnosis:	MALCOM (acute kidney injury)  Goal:	prevent complications  Assessment and plan of treatment:	Resolved  Secondary Diagnosis:	Congestive heart failure, NYHA class 3, chronic, combined  Goal:	prevent complications  Assessment and plan of treatment:	Continue with blood pressure medications and Beta Blocker medication as indicated in the medication reconciliation. Maintain healthy diet and exercise and lose weight.  If you eat too much salt or drink too much fluid, your body's water content may increase and make your heart work harder. This can worsen your CHF.  Follow up with your primary care physician in one week after discharge to inform of your hospitalization.  Secondary Diagnosis:	Coronary artery disease involving native heart without angina pectoris, unspecified vessel or lesion type  Goal:	prevent complications  Assessment and plan of treatment:	Continue with your blood pressure medication and statin as instructed. Maintain a healthy diet and exercise frequently if possible. Lose weight. Follow up with your primary care physician in one week after discharge.  Secondary Diagnosis:	COPD (chronic obstructive pulmonary disease)  Goal:	prevent complications  Assessment and plan of treatment:	Continue with your nebulizers as intrusted by your primary care physician. Avoid triggers. If you're allergic to dust mites, pollens or molds, they can make your copd symptoms get worse. Cold air, exercise, fumes from chemicals or perfume, tobacco or wood smoke, and weather changes can also make asthma symptoms worse. So can common colds and sinus infections. Vaccinate with influenza vaccine yearly. Follow up with primary care physician one week after discharge.  You have counseled on smoking cessation. You are interested to quit smoking and will be discharged on Nicotine patch.  Secondary Diagnosis:	Diabetes mellitus of other type without complication  Goal:	prevent complications  Assessment and plan of treatment:	Continue with your blood sugar medication. HgbA1C was 12,2, You was seen by Endocrinologist Dr. Yao and now better sugar control with Basal and Bolus Insulin with Lantus at bedtine and Humalog with each meals. You must maintain a healthy diet that consist of low sugar, low fat, low sodium diet. Exercise frequently if possible. Consider repeating your Hemoglobin A1c within 3 months after discharge to monitor your average blood glucose control. Follow up with primary care physician in one week after discharge.  You will stop metformin for now and will take Lantus 48 units at bedtime and insulin Humalog 8 units before meals.  please follow up outpatient with endocrinologist regarding restarting metformin and adjustment of insulin regime.  Secondary Diagnosis:	Hyperlipidemia, unspecified hyperlipidemia type  Goal:	prevent complications  Assessment and plan of treatment:	Continue with cholesterol medications. Maintain a healthy diet that consist of low sugar, low fat, low sodium diet. Exercise frequently if possible.  Follow up with primary care physician in one week after discharge. Principal Discharge DX:	Cerebrovascular accident (CVA), unspecified mechanism  Goal:	Resolution of symptoms  Assessment and plan of treatment:	You presented with worsening weakness. CT showed Small age indeterminate 1.0 cm lacunar infarct in the left occipital cortex. Small age indeterminate lacunar infarct in the left basal ganglia. MRA shows Multiple intracranial stenoses. MRI shows Small chronic infarct left occipital lobe, Small acute infarct right occipital lobe noted measuring 9 x 8 mm. Additional punctate acute/subacute infarcts noted in the bilateral posterior temporal and left occipital region. He is on aspirin and statin. cardiology consulted Dr. Knowles and neurology consult - Dr. Fraire. 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 Since you are allergic to plavix, we started Aggrenox with aspirin. We did ABRAHAN to rule out cardiac cause of embolus which was negative for any thrombus. Physical therapy was involved in care. Initially they recommended DENI but with significant improvement with gait, then re-evaluation they recommended home with home PT.   CT angio of neck shows no significant ICA disease. and some stenosis of vertebral retries.  you are stable for discharge per primary attending.  Secondary Diagnosis:	MALCOM (acute kidney injury)  Goal:	prevent complications  Assessment and plan of treatment:	Resolved  Secondary Diagnosis:	Congestive heart failure, NYHA class 3, chronic, combined  Goal:	prevent complications  Assessment and plan of treatment:	Continue with blood pressure medications and Beta Blocker medication as indicated in the medication reconciliation. Maintain healthy diet and exercise and lose weight.  If you eat too much salt or drink too much fluid, your body's water content may increase and make your heart work harder. This can worsen your CHF.  Follow up with your primary care physician in one week after discharge to inform of your hospitalization.  Secondary Diagnosis:	Coronary artery disease involving native heart without angina pectoris, unspecified vessel or lesion type  Goal:	prevent complications  Assessment and plan of treatment:	Continue with your blood pressure medication and statin as instructed. Maintain a healthy diet and exercise frequently if possible. Lose weight. Follow up with your primary care physician in one week after discharge.  Secondary Diagnosis:	COPD (chronic obstructive pulmonary disease)  Goal:	prevent complications  Assessment and plan of treatment:	Continue with your nebulizers as intrusted by your primary care physician. Avoid triggers. If you're allergic to dust mites, pollens or molds, they can make your copd symptoms get worse. Cold air, exercise, fumes from chemicals or perfume, tobacco or wood smoke, and weather changes can also make asthma symptoms worse. So can common colds and sinus infections. Vaccinate with influenza vaccine yearly. Follow up with primary care physician one week after discharge.  You have counseled on smoking cessation. You are interested to quit smoking and will be discharged on Nicotine patch.  Secondary Diagnosis:	Diabetes mellitus of other type without complication  Goal:	prevent complications  Assessment and plan of treatment:	Continue with your blood sugar medication. HgbA1C was 12,2, You was seen by Endocrinologist Dr. Yao and now better sugar control with Basal and Bolus Insulin with Lantus at bedtine and Humalog with each meals. You must maintain a healthy diet that consist of low sugar, low fat, low sodium diet. Exercise frequently if possible. Consider repeating your Hemoglobin A1c within 3 months after discharge to monitor your average blood glucose control. Follow up with primary care physician in one week after discharge.  You will stop metformin for now and will take Lantus 48 units at bedtime and insulin Humalog 8 units before meals.  please follow up outpatient with endocrinologist regarding restarting metformin and adjustment of insulin regime.  Secondary Diagnosis:	Hyperlipidemia, unspecified hyperlipidemia type  Goal:	prevent complications  Assessment and plan of treatment:	Continue with cholesterol medications. Maintain a healthy diet that consist of low sugar, low fat, low sodium diet. Exercise frequently if possible.  Follow up with primary care physician in one week after discharge.

## 2019-02-07 NOTE — DISCHARGE NOTE ADULT - PLAN OF CARE
Resolution of symptoms prevent complications Continue with blood pressure medications and Beta Blocker medication as indicated in the medication reconciliation. Maintain healthy diet and exercise and lose weight.  If you eat too much salt or drink too much fluid, your body's water content may increase and make your heart work harder. This can worsen your CHF.  Follow up with your primary care physician in one week after discharge to inform of your hospitalization. Continue with your blood pressure medication and statin as instructed. Maintain a healthy diet and exercise frequently if possible. Lose weight. Follow up with your primary care physician in one week after discharge. Continue with your nebulizers as intrusted by your primary care physician. Avoid triggers. If you're allergic to dust mites, pollens or molds, they can make your copd symptoms get worse. Cold air, exercise, fumes from chemicals or perfume, tobacco or wood smoke, and weather changes can also make asthma symptoms worse. So can common colds and sinus infections. Vaccinate with influenza vaccine yearly. Follow up with primary care physician one week after discharge. Continue with your blood sugar medication. HbAIC was 12,2, You must maintain a healthy diet that consist of low sugar, low fat, low sodium diet. Exercise frequently if possible. Consider repeating your Hemoglobin A1c within 3 months after discharge to monitor your average blood glucose control. Follow up with primary care physician in one week after discharge. Continue with cholesterol medications. Maintain a healthy diet that consist of low sugar, low fat, low sodium diet. Exercise frequently if possible.  Follow up with primary care physician in one week after discharge. You presented with worsening weakness. CT showed Small age indeterminate 1.0 cm lacunar infarct in the left occipital cortex. Small age indeterminate lacunar infarct in the left basal ganglia. MRA shows Multiple intracranial stenoses. MRI shows Small chronic infarct left occipital lobe, Small acute infarct right occipital lobe noted measuring 9 x 8 mm. Additional punctate acute/subacute infarcts noted in the bilateral posterior temporal and left occipital region. He is on aspirin and statin. cardiology consulted Dr. Knowles and neurology consult - Dr. Fraire. Since you are allergic to plavix, we started Aggrenox with aspirin. We did ABRAHAN to rule out cardiac cause of embolus which is negative. Pt was involved in care. Initially they recommended DENI then re-evaluation they recommended home with home PT.   you are stable for discharge per primary attending. Continue with your blood sugar medication. HbAIC was 12,2, You must maintain a healthy diet that consist of low sugar, low fat, low sodium diet. Exercise frequently if possible. Consider repeating your Hemoglobin A1c within 3 months after discharge to monitor your average blood glucose control. Follow up with primary care physician in one week after discharge.  You will stop metformin and will take Lantus 48 units at bedtime and insulin Humalog 8 units before meals. You presented with worsening weakness. CT showed Small age indeterminate 1.0 cm lacunar infarct in the left occipital cortex. Small age indeterminate lacunar infarct in the left basal ganglia. MRA shows Multiple intracranial stenoses. MRI shows Small chronic infarct left occipital lobe, Small acute infarct right occipital lobe noted measuring 9 x 8 mm. Additional punctate acute/subacute infarcts noted in the bilateral posterior temporal and left occipital region. He is on aspirin and statin. cardiology consulted Dr. Knowles and neurology consult - Dr. Fraire. 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 Since you are allergic to plavix, we started Aggrenox with aspirin. We did ABRAHAN to rule out cardiac cause of embolus which was negative for any thrombus. Physical therapy was involved in care. Initially they recommended DENI but with significant improvement with gait, then re-evaluation they recommended home with home PT.   you are stable for discharge per primary attending. Resolved Continue with your nebulizers as intrusted by your primary care physician. Avoid triggers. If you're allergic to dust mites, pollens or molds, they can make your copd symptoms get worse. Cold air, exercise, fumes from chemicals or perfume, tobacco or wood smoke, and weather changes can also make asthma symptoms worse. So can common colds and sinus infections. Vaccinate with influenza vaccine yearly. Follow up with primary care physician one week after discharge.  You have counseled on smoking cessation. You are interested to quit smoking and will be discharged on Nicotine patch. Continue with your blood sugar medication. HgbA1C was 12,2, You was seen by Endocrinologist Dr. Yao and now better sugar control with Basal and Bolus Insulin with Lantus at bedtine and Humalog with each meals. You must maintain a healthy diet that consist of low sugar, low fat, low sodium diet. Exercise frequently if possible. Consider repeating your Hemoglobin A1c within 3 months after discharge to monitor your average blood glucose control. Follow up with primary care physician in one week after discharge.  You will stop metformin and will take Lantus 48 units at bedtime and insulin Humalog 8 units before meals. Continue with your blood sugar medication. HgbA1C was 12,2, You was seen by Endocrinologist Dr. Yao and now better sugar control with Basal and Bolus Insulin with Lantus at bedtine and Humalog with each meals. You must maintain a healthy diet that consist of low sugar, low fat, low sodium diet. Exercise frequently if possible. Consider repeating your Hemoglobin A1c within 3 months after discharge to monitor your average blood glucose control. Follow up with primary care physician in one week after discharge.  You will stop metformin for now and will take Lantus 48 units at bedtime and insulin Humalog 8 units before meals.  please follow up outpatient with endocrinologist regarding restarting metformin and adjustment of insulin regime. You presented with worsening weakness. CT showed Small age indeterminate 1.0 cm lacunar infarct in the left occipital cortex. Small age indeterminate lacunar infarct in the left basal ganglia. MRA shows Multiple intracranial stenoses. MRI shows Small chronic infarct left occipital lobe, Small acute infarct right occipital lobe noted measuring 9 x 8 mm. Additional punctate acute/subacute infarcts noted in the bilateral posterior temporal and left occipital region. He is on aspirin and statin. cardiology consulted Dr. Knowles and neurology consult - Dr. Fraire. 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 Since you are allergic to plavix, we started Aggrenox with aspirin. We did ABRAHAN to rule out cardiac cause of embolus which was negative for any thrombus. Physical therapy was involved in care. Initially they recommended DENI but with significant improvement with gait, then re-evaluation they recommended home with home PT.   CT angio of neck shows no significant ICA disease. and some stenosis of vertebral retries.  you are stable for discharge per primary attending.

## 2019-02-07 NOTE — DISCHARGE NOTE ADULT - MEDICATION SUMMARY - MEDICATIONS TO STOP TAKING
I will STOP taking the medications listed below when I get home from the hospital:    metFORMIN 1000 mg oral tablet  -- 1 tab(s) by mouth 2 times a day    nasal canula oxygen   -- 1.5 liter(s) into nose once a day

## 2019-02-07 NOTE — DISCHARGE NOTE ADULT - HOSPITAL COURSE
52 yo M with PMH of HTN, HLD, DM, CABG, s/p stroke 3 months ago, presents to ED with complaints of constipation for the past 7 days along with worsening weakness. Pt states he has an abdominal discomfort secondary to bloating due to constipation. Otherwise, patient notes that he has worsening weakness, states he was at rehab after discharge, however signed out against medical advice due to legal issues that arose. Patient therefore did therapy on his own, however feels that it was not a good idea and not successful, stating his weakness has been gradually getting worse. Patient denies any acute worsening of weakness.  Patient states he's able to eat, denies fever, chills, nausea, vomtiing, diarrhea, dysuria, all other ROS negative. Patient still smokes cigarettes.   His CT showed Small age indeterminate 1.0 cm lacunar infarct in the left occipital cortex. Small age indeterminate lacunar infarct in the left basal ganglia. MRA shows Multiple intracranial stenoses. MRI shows Small chronic infarct left occipital lobe, Small acute infarct right occipital lobe noted measuring 9 x 8 mm. Additional punctate acute/subacute infarcts noted in the bilateral posterior temporal and left occipital region. He is on aspirin and statin. cardiology consulted Dr. Knowles and neurology consult - Dr. Fraire.   He had MALCOM (acute kidney injury), we were holding ACE given MALCOM.  For Congestive heart failure, NYHA class 3, chronic, with EF 30-35% on previous echo in November 2018. He is on asa, statin and lopressor and ACEI on hold due to MALCOM, cardiology - Dr. Knowles consulted.  For Cerebrovascular accident (CVA), he is on aspirin, statin.   For Coronary artery disease, he is on aspirin.   For Diabetes mellitus, his 12.2 HbA1c and was on lantus 40 and lispro TID. Dr. Yao was consulted.  For Hypertension, he is on lopressor and we were holding ACE given MALCOM.  For Hyperlipidemia, he is on statin and lipid profile shows low HDL.  PT recommended DENI  Patient is stable for discharge per attending and is advised to follow up with PCP as outpatient  Please refer to patient's complete medical chart with documents for a full hospital course, for this is only a brief summary. 54 yo M with PMH of HTN, HLD, DM, CABG, s/p stroke 3 months ago, presents to ED with complaints of constipation for the past 7 days along with worsening weakness. Pt states he has an abdominal discomfort secondary to bloating due to constipation. Otherwise, patient notes that he has worsening weakness, states he was at rehab after discharge, however signed out against medical advice due to legal issues that arose. Patient therefore did therapy on his own, however feels that it was not a good idea and not successful, stating his weakness has been gradually getting worse. Patient denies any acute worsening of weakness.  Patient states he's able to eat, denies fever, chills, nausea, vomtiing, diarrhea, dysuria, all other ROS negative. Patient still smokes cigarettes.   His CT showed Small age indeterminate 1.0 cm lacunar infarct in the left occipital cortex. Small age indeterminate lacunar infarct in the left basal ganglia. MRA shows Multiple intracranial stenoses. MRI shows Small chronic infarct left occipital lobe, Small acute infarct right occipital lobe noted measuring 9 x 8 mm. Additional punctate acute/subacute infarcts noted in the bilateral posterior temporal and left occipital region. He is on aspirin and statin. cardiology consulted Dr. Knowles and neurology consult - Dr. Fraire.   He had MALCOM (acute kidney injury), we were holding ACE given MALCOM.  For Congestive heart failure, NYHA class 3, chronic, with EF 30-35% on previous echo in November 2018. He is on asa, statin and lopressor and ACEI on hold due to MALCOM, cardiology - Dr. Knowles consulted.  For Cerebrovascular accident (CVA), he is on aspirin, statin.   For Coronary artery disease, he is on aspirin.   For Diabetes mellitus, his 12.2 HbA1c and was on lantus 40 and lispro TID. Dr. Yao was consulted.  For Hypertension, he is on lopressor and we were holding ACE given MALCOM.  For Hyperlipidemia, he is on statin and lipid profile shows low HDL.  PT recommended home with home PT.  Patient is stable for discharge per attending and is advised to follow up with PCP as outpatient  Please refer to patient's complete medical chart with documents for a full hospital course, for this is only a brief summary.

## 2019-02-07 NOTE — PROGRESS NOTE ADULT - PROBLEM SELECTOR PLAN 9
IMPROVE VTE Individual Risk Assessment          RISK                                                          Points  [  ] Previous VTE                                                3  [  ] Thrombophilia                                             2  [ x ] Lower limb paralysis                                   2        (unable to hold up >15 seconds)    [  ] Current Cancer                                             2         (within 6 months)  [ x ] Immobilization > 24 hrs                              1  [  ] ICU/CCU stay > 24 hours                             1  [  ] Age > 60                                                         1    IMPROVE VTE Score: 3    c/w heparin for vte prophylaxis c/w statin  lipid profile- low HDL

## 2019-02-07 NOTE — DISCHARGE NOTE ADULT - MEDICATION SUMMARY - MEDICATIONS TO TAKE
I will START or STAY ON the medications listed below when I get home from the hospital:    rollator walker  -- 1 application  once a day   -- Indication: For Assissting with walking    acetaminophen 325 mg oral tablet  -- 2 tab(s) by mouth every 6 hours, As needed, Mild Pain (1 - 3)  -- Indication: For pain managment    aspirin 81 mg oral delayed release tablet  -- 1 tab(s) by mouth once a day  -- Indication: For prophylactic measures    lisinopril 2.5 mg oral tablet  -- 1 tab(s) by mouth once a day  -- Indication: For Hypertension.    Lantus 100 units/mL subcutaneous solution  -- 48 unit(s) subcutaneous once a day (at bedtime)   -- Do not drink alcoholic beverages when taking this medication.  It is very important that you take or use this exactly as directed.  Do not skip doses or discontinue unless directed by your doctor.  Keep in refrigerator.  Do not freeze.    -- Indication: For Diabetes mellitus    HumaLOG KwikPen 200 units/mL (Concentrated) subcutaneous solution  -- 8 unit(s) subcutaneous 3 times a day (before meals)   -- Do not drink alcoholic beverages when taking this medication.  It is very important that you take or use this exactly as directed.  Do not skip doses or discontinue unless directed by your doctor.  Keep in refrigerator.  Do not freeze.    -- Indication: For Diabetes mellitus    chlorproMAZINE 10 mg oral tablet  -- 1 tab(s) by mouth every 8 hours, As needed, hiccups  -- Indication: For Antiemetic.    atorvastatin 80 mg oral tablet  -- 1 tab(s) by mouth once a day (at bedtime)  -- Indication: For Hyperlipidemia, unspecified hyperlipidemia type    aspirin-dipyridamole 25 mg-200 mg oral capsule, extended release  -- 1 cap(s) by mouth 2 times a day  -- Indication: For Cerebrovascular accident (CVA) due to bilateral embolism of posterior cerebral arteries    metoprolol tartrate 50 mg oral tablet  -- 1 tab(s) by mouth 2 times a day  -- Indication: For Hypertension.    Symbicort 160 mcg-4.5 mcg/inh inhalation aerosol  -- 1 puff(s) inhaled once a day   -- Check with your doctor before becoming pregnant.  For inhalation only.  Rinse mouth thoroughly after use.    -- Indication: For COPD (chronic obstructive pulmonary disease)    polyethylene glycol 3350 oral powder for reconstitution  -- 17 gram(s) by mouth once a day, As Needed  -- Indication: For Constipation.    docusate sodium 100 mg oral capsule  -- 1 cap(s) by mouth 2 times a day  -- Indication: For Constipation.    senna oral tablet  -- 2 tab(s) by mouth once a day (at bedtime)  -- Indication: For Constipation    pantoprazole 40 mg oral delayed release tablet  -- 1 tab(s) by mouth once a day (before a meal)  -- Indication: For Anti acid    nicotine 21 mg/24 hr transdermal film, extended release  -- 1 application by transdermal patch once a day   -- Indication: For smoking I will START or STAY ON the medications listed below when I get home from the hospital:    rollator walker  -- 1 application  once a day   -- Indication: For Assissting with walking    lancet needles  -- Apply on skin to affected area 3 times a day   -- Indication: For Diabetes mellitus    syringes  -- Apply on skin to affected area 4 times a day   -- Indication: For Diabetes mellitus    acetaminophen 325 mg oral tablet  -- 2 tab(s) by mouth every 6 hours, As needed, Mild Pain (1 - 3)  -- Indication: For pain managment    aspirin 81 mg oral delayed release tablet  -- 1 tab(s) by mouth once a day  -- Indication: For prophylactic measures    lisinopril 2.5 mg oral tablet  -- 1 tab(s) by mouth once a day  -- Indication: For Hypertension.    Lantus 100 units/mL subcutaneous solution  -- 48 unit(s) subcutaneous once a day (at bedtime)   -- Do not drink alcoholic beverages when taking this medication.  It is very important that you take or use this exactly as directed.  Do not skip doses or discontinue unless directed by your doctor.  Keep in refrigerator.  Do not freeze.    -- Indication: For Diabetes mellitus    HumaLOG KwikPen 200 units/mL (Concentrated) subcutaneous solution  -- 8 unit(s) subcutaneous 3 times a day (before meals)   -- Do not drink alcoholic beverages when taking this medication.  It is very important that you take or use this exactly as directed.  Do not skip doses or discontinue unless directed by your doctor.  Keep in refrigerator.  Do not freeze.    -- Indication: For Diabetes mellitus    chlorproMAZINE 10 mg oral tablet  -- 1 tab(s) by mouth every 8 hours, As needed, hiccups  -- Indication: For Antiemetic.    atorvastatin 80 mg oral tablet  -- 1 tab(s) by mouth once a day (at bedtime)  -- Indication: For Hyperlipidemia, unspecified hyperlipidemia type    aspirin-dipyridamole 25 mg-200 mg oral capsule, extended release  -- 1 cap(s) by mouth 2 times a day  -- Indication: For Cerebrovascular accident (CVA) due to bilateral embolism of posterior cerebral arteries    metoprolol tartrate 50 mg oral tablet  -- 1 tab(s) by mouth 2 times a day  -- Indication: For Hypertension.    Symbicort 160 mcg-4.5 mcg/inh inhalation aerosol  -- 1 puff(s) inhaled once a day   -- Check with your doctor before becoming pregnant.  For inhalation only.  Rinse mouth thoroughly after use.    -- Indication: For COPD (chronic obstructive pulmonary disease)    polyethylene glycol 3350 oral powder for reconstitution  -- 17 gram(s) by mouth once a day, As Needed  -- Indication: For Constipation.    docusate sodium 100 mg oral capsule  -- 1 cap(s) by mouth 2 times a day  -- Indication: For Constipation.    senna oral tablet  -- 2 tab(s) by mouth once a day (at bedtime)  -- Indication: For Constipation    pantoprazole 40 mg oral delayed release tablet  -- 1 tab(s) by mouth once a day (before a meal)  -- Indication: For Anti acid    nicotine 21 mg/24 hr transdermal film, extended release  -- 1 application by transdermal patch once a day   -- Indication: For smoking

## 2019-02-07 NOTE — DISCHARGE NOTE ADULT - ADDITIONAL INSTRUCTIONS
Follow up with your PCP in one week  You have counseled at length on compliance with medication, smoking cessation Follow up with your PCP in one week  You have counseled at length on compliance with medication, smoking cessation  follow up with endocrinologist regarding restarting metformin.

## 2019-02-07 NOTE — PROGRESS NOTE ADULT - PROBLEM SELECTOR PLAN 6
c/w sliding scale for now  12.2 HbA1c  on metformin 1000mg BID at home  non compliant with meds  nutrition consult on inhaler

## 2019-02-07 NOTE — PROGRESS NOTE ADULT - PROBLEM SELECTOR PLAN 3
EF 30-35% on previous echo in November 2018  c/w telemetry monitoring  f/u cardiology - Dr. Knowles EF 30-35% on previous echo in November 2018  on asa,statin and lopressor  c/w telemetry monitoring  f/u cardiology - Dr. Knowles

## 2019-02-07 NOTE — PROGRESS NOTE ADULT - PROBLEM SELECTOR PLAN 1
2/2 another CVA event vs worsening weakness due to incomplete rehabilitation therapy  CT showed Small age indeterminate 1.0 cm lacunar infarct in the left occipital   cortex. Small age indeterminate lacunar infarct in the left basal ganglia.   MRI shows Multiple intracranial stenoses  on aspirin and statin  c/w telemetry monitoring  cardiology consult - Dr. Knowles  physical therapy consulted  neurology consult - Dr. Fraire has acute infarct   CT showed Small age indeterminate 1.0 cm lacunar infarct in the left occipital   cortex. Small age indeterminate lacunar infarct in the left basal ganglia.   MRA shows Multiple intracranial stenoses  MRI shows Small chronic infarct left occipital lobe, Small acute infarct right occipital lobe noted measuring 9 x 8 mm. Additional punctate acute/subacute infarcts noted in the bilateral posterior temporal and left occipital region.  Patient recommended to have ILR as an outpatient but he has not followed up.   on aspirin and statin  c/w telemetry monitoring  cardiology consult - Dr. Knowles  physical therapy consulted  neurology consult - Dr. Fraire

## 2019-02-07 NOTE — DISCHARGE NOTE ADULT - CARE PROVIDER_API CALL
Deon Fraire (MD)  Clinical Neurophysiology; Neurology  [address] [facility], [address] [place] [address]  Phone: [phone]  Fax: [phone]  Follow Up Time:

## 2019-02-07 NOTE — PROGRESS NOTE ADULT - PROBLEM SELECTOR PLAN 7
c/w lopressor with parameters, holding ACE given MALCOM  continue to monitor BP c/w sliding scale for now  12.2 HbA1c  on metformin 1000mg BID at home  non compliant with meds  nutrition consult

## 2019-02-07 NOTE — CONSULT NOTE ADULT - PROBLEM SELECTOR RECOMMENDATION 9
Patient A&O x3, denies cp/sob. patient asymptomatic. /63, HR 54, SB low 50's on tele monitor all night. patient asleep at time of occurrence. Pt. AxOx4. Patient has no complaints of pain, distress or discomfort. Asymptomatic, no complaints of dizziness, lightheadedness. Will continue to monitor patient and maintain patient safety. 1.             Admit to telemetry   2.           MRI brain.  If unable to get MR imaging, please consider CTA head and neck in 24hours (no need for CD in this case).  If the patient is unable to get MR and unable to get IV contrast please repeat the CTH in 24hours and get a CD.  3.           TTE  4.           Please check HbA1C and fasting lipid profile  5.           Start ASA 81mg,  Lipitor 40mg HS and DVT PPx  6.           BP goal of normal  7.           Frequent neurochecks  8.           Formal speech and swallow evaluation  9.           PT evaluation  10.         STAT CTH IF the patient has sudden change in mental status or neurological exam      Thank you for the courtesy of this consult. 1.           Admit to telemetry   2.           MRI brain.  If unable to get MR imaging, please consider CTA head and neck in 24hours (no need for CD in this case).  If the patient is unable to get MR and unable to get IV contrast please repeat the CTH in 24hours and get a CD.  3.           TTE  4.           Please check HbA1C and fasting lipid profile  5.           Start ASA 81mg,  Lipitor 40mg HS and DVT PPx  6.           BP goal of normal  7.           Frequent neurochecks  8.           Formal speech and swallow evaluation  9.           PT evaluation  10.         STAT CTH IF the patient has sudden change in mental status or neurological exam      Thank you for the courtesy of this consult. 1.           Admit to telemetry   2.           MRI & MRA brain.  If unable to get MR imaging, please consider CTA head and neck in 24hours (no need for CD in this case).  If the patient is unable to get MR and unable to get IV contrast please repeat the CTH in 24hours and get a CD.  3.           TTE  4.           Please check HbA1C and fasting lipid profile  5.           Start ASA 81mg,  Lipitor 40mg HS and DVT PPx  6.           BP goal of normal  7.           Frequent neurochecks  8.           Formal speech and swallow evaluation  9.           PT evaluation  10.         STAT CTH IF the patient has sudden change in mental status or neurological exam      Thank you for the courtesy of this consult.

## 2019-02-08 DIAGNOSIS — I63.433 CEREBRAL INFARCTION DUE TO EMBOLISM OF BILATERAL POSTERIOR CEREBRAL ARTERIES: ICD-10-CM

## 2019-02-08 LAB
ANION GAP SERPL CALC-SCNC: 7 MMOL/L — SIGNIFICANT CHANGE UP (ref 5–17)
BUN SERPL-MCNC: 17 MG/DL — SIGNIFICANT CHANGE UP (ref 7–18)
CALCIUM SERPL-MCNC: 9.1 MG/DL — SIGNIFICANT CHANGE UP (ref 8.4–10.5)
CHLORIDE SERPL-SCNC: 102 MMOL/L — SIGNIFICANT CHANGE UP (ref 96–108)
CO2 SERPL-SCNC: 26 MMOL/L — SIGNIFICANT CHANGE UP (ref 22–31)
CREAT SERPL-MCNC: 1.33 MG/DL — HIGH (ref 0.5–1.3)
GLUCOSE SERPL-MCNC: 236 MG/DL — HIGH (ref 70–99)
POTASSIUM SERPL-MCNC: 4.2 MMOL/L — SIGNIFICANT CHANGE UP (ref 3.5–5.3)
POTASSIUM SERPL-SCNC: 4.2 MMOL/L — SIGNIFICANT CHANGE UP (ref 3.5–5.3)
SODIUM SERPL-SCNC: 135 MMOL/L — SIGNIFICANT CHANGE UP (ref 135–145)

## 2019-02-08 PROCEDURE — 99233 SBSQ HOSP IP/OBS HIGH 50: CPT

## 2019-02-08 PROCEDURE — 99233 SBSQ HOSP IP/OBS HIGH 50: CPT | Mod: GC

## 2019-02-08 RX ORDER — INSULIN LISPRO 100/ML
6 VIAL (ML) SUBCUTANEOUS
Qty: 0 | Refills: 0 | Status: DISCONTINUED | OUTPATIENT
Start: 2019-02-08 | End: 2019-02-09

## 2019-02-08 RX ORDER — INSULIN LISPRO 100/ML
3 VIAL (ML) SUBCUTANEOUS
Qty: 0 | Refills: 0 | Status: DISCONTINUED | OUTPATIENT
Start: 2019-02-08 | End: 2019-02-08

## 2019-02-08 RX ORDER — ACETAMINOPHEN 500 MG
650 TABLET ORAL EVERY 6 HOURS
Qty: 0 | Refills: 0 | Status: DISCONTINUED | OUTPATIENT
Start: 2019-02-08 | End: 2019-02-08

## 2019-02-08 RX ORDER — INSULIN GLARGINE 100 [IU]/ML
40 INJECTION, SOLUTION SUBCUTANEOUS AT BEDTIME
Qty: 0 | Refills: 0 | Status: DISCONTINUED | OUTPATIENT
Start: 2019-02-08 | End: 2019-02-11

## 2019-02-08 RX ORDER — ACETAMINOPHEN 500 MG
1000 TABLET ORAL ONCE
Qty: 0 | Refills: 0 | Status: COMPLETED | OUTPATIENT
Start: 2019-02-08 | End: 2019-02-08

## 2019-02-08 RX ORDER — METOPROLOL TARTRATE 50 MG
25 TABLET ORAL
Qty: 0 | Refills: 0 | Status: DISCONTINUED | OUTPATIENT
Start: 2019-02-08 | End: 2019-02-08

## 2019-02-08 RX ORDER — ONDANSETRON 8 MG/1
4 TABLET, FILM COATED ORAL ONCE
Qty: 0 | Refills: 0 | Status: COMPLETED | OUTPATIENT
Start: 2019-02-08 | End: 2019-02-08

## 2019-02-08 RX ORDER — MULTIVIT WITH MIN/MFOLATE/K2 340-15/3 G
1 POWDER (GRAM) ORAL ONCE
Qty: 0 | Refills: 0 | Status: COMPLETED | OUTPATIENT
Start: 2019-02-08 | End: 2019-02-08

## 2019-02-08 RX ADMIN — Medication 100 MILLIGRAM(S): at 05:16

## 2019-02-08 RX ADMIN — Medication 81 MILLIGRAM(S): at 11:02

## 2019-02-08 RX ADMIN — ONDANSETRON 4 MILLIGRAM(S): 8 TABLET, FILM COATED ORAL at 03:20

## 2019-02-08 RX ADMIN — Medication 6 UNIT(S): at 17:26

## 2019-02-08 RX ADMIN — ATORVASTATIN CALCIUM 80 MILLIGRAM(S): 80 TABLET, FILM COATED ORAL at 21:20

## 2019-02-08 RX ADMIN — Medication 2: at 08:25

## 2019-02-08 RX ADMIN — Medication 1 PATCH: at 20:16

## 2019-02-08 RX ADMIN — BUDESONIDE AND FORMOTEROL FUMARATE DIHYDRATE 2 PUFF(S): 160; 4.5 AEROSOL RESPIRATORY (INHALATION) at 11:02

## 2019-02-08 RX ADMIN — Medication 1: at 17:26

## 2019-02-08 RX ADMIN — HEPARIN SODIUM 5000 UNIT(S): 5000 INJECTION INTRAVENOUS; SUBCUTANEOUS at 21:20

## 2019-02-08 RX ADMIN — INSULIN GLARGINE 40 UNIT(S): 100 INJECTION, SOLUTION SUBCUTANEOUS at 21:20

## 2019-02-08 RX ADMIN — Medication 400 MILLIGRAM(S): at 21:21

## 2019-02-08 RX ADMIN — Medication 1 PATCH: at 07:18

## 2019-02-08 RX ADMIN — PANTOPRAZOLE SODIUM 40 MILLIGRAM(S): 20 TABLET, DELAYED RELEASE ORAL at 06:57

## 2019-02-08 RX ADMIN — Medication 1000 MILLIGRAM(S): at 21:51

## 2019-02-08 RX ADMIN — Medication 1 PATCH: at 11:03

## 2019-02-08 RX ADMIN — BUDESONIDE AND FORMOTEROL FUMARATE DIHYDRATE 2 PUFF(S): 160; 4.5 AEROSOL RESPIRATORY (INHALATION) at 21:20

## 2019-02-08 RX ADMIN — Medication 1 BOTTLE: at 18:12

## 2019-02-08 RX ADMIN — Medication 100 MILLIGRAM(S): at 17:26

## 2019-02-08 RX ADMIN — Medication 3: at 11:03

## 2019-02-08 RX ADMIN — Medication 3 UNIT(S): at 11:02

## 2019-02-08 RX ADMIN — HEPARIN SODIUM 5000 UNIT(S): 5000 INJECTION INTRAVENOUS; SUBCUTANEOUS at 05:16

## 2019-02-08 RX ADMIN — HEPARIN SODIUM 5000 UNIT(S): 5000 INJECTION INTRAVENOUS; SUBCUTANEOUS at 13:04

## 2019-02-08 RX ADMIN — Medication 1 PATCH: at 13:04

## 2019-02-08 RX ADMIN — SENNA PLUS 2 TABLET(S): 8.6 TABLET ORAL at 21:20

## 2019-02-08 NOTE — PROGRESS NOTE ADULT - PROBLEM SELECTOR PLAN 6
c/w sliding scale for now  f/u HbA1c  continue to monitor fingersticks c/w sliding scale for now  12.2 HbA1c  continue to monitor fingersticks  started on lantus 40 and lispro TID  Endo consulted Dr. Yao

## 2019-02-08 NOTE — PROGRESS NOTE ADULT - PROBLEM SELECTOR PLAN 2
with vascular complications, likely renal complication.  A1c: 12.2 SEND UA  Extensive counselling given on importance of medication compliance, morbidities and mortality of diabetes, patient agrees and shows understanding.   Diabetic teaching for insulin injection  Nutritionist consult  Endo consult.

## 2019-02-08 NOTE — PROGRESS NOTE ADULT - PROBLEM SELECTOR PLAN 1
MRI/ MRA showing multiple bilateral strokes, suspicious for Embolic in origin  Also Multiple bilateral stenotic points.   ECHO to rule out cardiogenic causes of emboli  continue TEle monitor  Neuro f.u   Continue asa, statin, heparin subQ  DC metoprolol. Patient is still in the permissive hypertension window  Cardio input appreciated.

## 2019-02-08 NOTE — PROGRESS NOTE ADULT - PROBLEM SELECTOR PLAN 3
EF 30-35% on previous echo in November 2018  on asa, statin and lopressor  Cardio input appreciated  Extensive counseling given to the patient, he promises to be compliant and to quit smoking.  No plan for ICD at this point, patient is active smoker and unlikely to be compliant with medications

## 2019-02-08 NOTE — PROGRESS NOTE ADULT - PROBLEM SELECTOR PLAN 3
EF 30-35% on previous echo in November 2018  on asa,statin and lopressor  c/w telemetry monitoring  f/u cardiology - Dr. Knowles. EF 30-35% on previous echo in November 2018  on asa, statin and lopressor  c/w telemetry monitoring  f/u cardiology - Dr. Knowles.

## 2019-02-08 NOTE — PROGRESS NOTE ADULT - SUBJECTIVE AND OBJECTIVE BOX
Patient is a 53y old  Male who presents with a chief complaint of constipation, worsening weakness (07 Feb 2019 16:33)    INTERVAL HPI/OVERNIGHT EVENTS:  Patient seen and examined at bedside, feels ok  Discussed with him at length the findings on the MRI, the possibility of risk factor modifications for secondary prevention of strokes  Patient shows understanding, was crying, he promises to change his life in order to avoid a debilitating stroke.       Allergies  Plavix (Other)  Intolerances    REVIEW OF SYSTEMS:  CONSTITUTIONAL: No fever, weight loss, or fatigue  EYES: No eye pain, visual disturbances, or discharge  RESPIRATORY: No cough, wheezing or shortness of breath  CARDIOVASCULAR: No chest pain, feeling of heart beats  GASTROINTESTINAL: No abdominal or epigastric pain. No nausea, vomiting; No diarrhea or constipation.  GENITOURINARY: No dysuria, frequency, hematuria  NEUROLOGICAL: Back pain.   MUSCULOSKELETAL: No joint pain  PSYCHIATRIC: No depression or anxiety  HEME/LYMPH: No easy bruising, or bleeding gums  ALLERGY AND IMMUNOLOGIC: No hives or eczema    Medications:  acetaminophen   Tablet .. 650 milliGRAM(s) Oral every 6 hours PRN Moderate Pain (4 - 6)  aspirin enteric coated 81 milliGRAM(s) Oral daily  atorvastatin 80 milliGRAM(s) Oral at bedtime  buDESOnide 160 MICROgram(s)/formoterol 4.5 MICROgram(s) Inhaler 2 Puff(s) Inhalation two times a day  docusate sodium 100 milliGRAM(s) Oral two times a day  ergocalciferol 95691 Unit(s) Oral <User Schedule>  heparin  Injectable 5000 Unit(s) SubCutaneous every 8 hours  insulin glargine Injectable (LANTUS) 40 Unit(s) SubCutaneous at bedtime  insulin lispro (HumaLOG) corrective regimen sliding scale   SubCutaneous three times a day before meals  insulin lispro Injectable (HumaLOG) 3 Unit(s) SubCutaneous three times a day before meals  pantoprazole    Tablet 40 milliGRAM(s) Oral before breakfast  polyethylene glycol 3350 17 Gram(s) Oral daily PRN Constipation  senna 2 Tablet(s) Oral at bedtime      PHYSICAL EXAM:  Vital Signs Last 24 Hrs  T(C): 36.7 (08 Feb 2019 10:07), Max: 37 (07 Feb 2019 19:43)  T(F): 98 (08 Feb 2019 10:07), Max: 98.6 (07 Feb 2019 19:43)  HR: 73 (08 Feb 2019 07:26) (65 - 73)  BP: 138/82 (08 Feb 2019 07:26) (138/82 - 161/88)  BP(mean): --  RR: 18 (08 Feb 2019 10:07) (17 - 18)  SpO2: 100% (08 Feb 2019 10:07) (100% - 100%)    GENERAL: NAD  HEAD:  Atraumatic, Normocephalic  EYES: EOMI, PERRLA, conjunctiva and sclera clear  ENT: moist mucous membranes  NECK: Supple, No JVD, Normal thyroid  NERVOUS SYSTEM:  Alert & Oriented X3, Good concentration;   CHEST/LUNG: No rales, rhonchi, wheezing, or rubs  HEART: Regular rate and rhythm; No murmurs, rubs, or gallops  ABDOMEN: Soft, Nontender, Nondistended; Bowel sounds present  EXTREMITIES:  2+ Peripheral Pulses, No clubbing, cyanosis, or edema  SKIN: No rashes or lesions    LABS:                        14.0   5.4   )-----------( 212      ( 07 Feb 2019 07:19 )             42.6     02-08    135  |  102  |  17  ----------------------------<  236<H>  4.2   |  26  |  1.33<H>    Ca    9.1      08 Feb 2019 08:47  Phos  3.1     02-07  Mg     2.2     02-07    TPro  8.5<H>  /  Alb  3.3<L>  /  TBili  0.3  /  DBili  x   /  AST  12  /  ALT  15  /  AlkPhos  98  02-06    LIVER FUNCTIONS - ( 06 Feb 2019 17:58 )  Alb: 3.3 g/dL / Pro: 8.5 g/dL / ALK PHOS: 98 U/L / ALT: 15 U/L DA / AST: 12 U/L / GGT: x               CARDIAC MARKERS ( 07 Feb 2019 07:19 )  0.052 ng/mL / x     / 85 U/L / x     / 1.2 ng/mL  CARDIAC MARKERS ( 07 Feb 2019 00:08 )  0.069 ng/mL / x     / 95 U/L / x     / 1.4 ng/mL  CARDIAC MARKERS ( 06 Feb 2019 17:58 )  0.055 ng/mL / x     / x     / x     / x            Culture & Sensitivity:   CAPILLARY BLOOD GLUCOSE      POCT Blood Glucose.: 264 mg/dL (08 Feb 2019 10:56)  POCT Blood Glucose.: 228 mg/dL (08 Feb 2019 07:48)  POCT Blood Glucose.: 285 mg/dL (07 Feb 2019 20:42)  POCT Blood Glucose.: 247 mg/dL (07 Feb 2019 17:04)      02-07 @ 07:01  -  02-08 @ 07:00  --------------------------------------------------------  IN: 236 mL / OUT: 0 mL / NET: 236 mL    02-08 @ 07:01 - 02-08 @ 14:04  --------------------------------------------------------  IN: 460 mL / OUT: 0 mL / NET: 460 mL        RADIOLOGY & ADDITIONAL TESTS:  Radiology testing independently reviewed:     Consultant(s) Notes Reviewed:  [ x] YES  [ ] NO    Care Discussed with Consultants/Other Providers [ x] YES  [ ] NO

## 2019-02-08 NOTE — PROGRESS NOTE ADULT - PROBLEM SELECTOR PLAN 2
rending down  holding ACE given MALCOM  not currently in fluid overload  f/u BMP, UA and urine lytes  nephrology - Dr. Phoenix. trending down  holding ACE given MALCOM  not currently in fluid overload  f/u BMP, UA and urine lytes  nephrology - Dr. Phoenix.

## 2019-02-08 NOTE — PROGRESS NOTE ADULT - PROBLEM SELECTOR PLAN 1
has acute infarct   CT showed Small age indeterminate 1.0 cm lacunar infarct in the left occipital   cortex. Small age indeterminate lacunar infarct in the left basal ganglia.   MRA shows Multiple intracranial stenoses  MRI shows Small chronic infarct left occipital lobe, Small acute infarct right occipital lobe noted measuring 9 x 8 mm. Additional punctate acute/subacute infarcts noted in the bilateral posterior temporal and left occipital region.  Patient recommended to have ILR as an outpatient but he has not followed up.   on aspirin and statin  c/w telemetry monitoring  cardiology consulted Dr. Knowles  physical therapy consulted- rehab  neurology consult - Dr. Fraire.

## 2019-02-08 NOTE — PROGRESS NOTE ADULT - SUBJECTIVE AND OBJECTIVE BOX
INTERVAL HPI/OVERNIGHT EVENTS:  Mid back pain that exacerbated and resolved.    HEALTH ISSUES - PROBLEM Dx:  Cerebrovascular accident (CVA) due to bilateral embolism of posterior cerebral arteries: Cerebrovascular accident (CVA) due to bilateral embolism of posterior cerebral arteries  COPD (chronic obstructive pulmonary disease): COPD (chronic obstructive pulmonary disease)  Vitamin D deficiency: Vitamin D deficiency  Need for prophylactic measure: Need for prophylactic measure  Hyperlipidemia, unspecified hyperlipidemia type: Hyperlipidemia, unspecified hyperlipidemia type  Hypertension, unspecified type: Hypertension, unspecified type  Diabetes mellitus of other type without complication: Diabetes mellitus of other type without complication  Coronary artery disease involving native heart without angina pectoris, unspecified vessel or lesion type: Coronary artery disease involving native heart without angina pectoris, unspecified vessel or lesion type  Cerebrovascular accident (CVA), unspecified mechanism: Cerebrovascular accident (CVA), unspecified mechanism  Congestive heart failure, NYHA class 3, chronic, combined: Congestive heart failure, NYHA class 3, chronic, combined  MALCOM (acute kidney injury): MALCOM (acute kidney injury)  Weakness: Weakness      MEDICATIONS  (STANDING):  acetaminophen  IVPB .. 1000 milliGRAM(s) IV Intermittent once  aspirin enteric coated 81 milliGRAM(s) Oral daily  atorvastatin 80 milliGRAM(s) Oral at bedtime  buDESOnide 160 MICROgram(s)/formoterol 4.5 MICROgram(s) Inhaler 2 Puff(s) Inhalation two times a day  docusate sodium 100 milliGRAM(s) Oral two times a day  ergocalciferol 67295 Unit(s) Oral <User Schedule>  heparin  Injectable 5000 Unit(s) SubCutaneous every 8 hours  insulin glargine Injectable (LANTUS) 40 Unit(s) SubCutaneous at bedtime  insulin lispro (HumaLOG) corrective regimen sliding scale   SubCutaneous three times a day before meals  insulin lispro Injectable (HumaLOG) 6 Unit(s) SubCutaneous three times a day before meals  nicotine - 21 mG/24Hr(s) Patch 1 patch Transdermal daily  pantoprazole    Tablet 40 milliGRAM(s) Oral before breakfast  senna 2 Tablet(s) Oral at bedtime    MEDICATIONS  (PRN):  acetaminophen   Tablet .. 650 milliGRAM(s) Oral every 6 hours PRN Moderate Pain (4 - 6)  polyethylene glycol 3350 17 Gram(s) Oral daily PRN Constipation      Allergies    Plavix (Other)    Intolerances    REVIEW OF SYSTEMS    Neurological Review of Systems:  No difficulty with language.  No vision loss.  (+) double vision.  (+) dizziness, described as environment spinning.  Denies new hearing loss.  No difficulty with speech or swallowing.  No focal weakness.  No focal sensory changes.  Intermittent numbness and tingling in the bilateral lower extremities.  (+) difficulty with balance.  (+) difficulty with ambulation.    Review of Systems:  Constitutional: No generalized weakness. No fevers or chills                 Eyes, Ears, Mouth, Throat: No vision loss   Respiratory: Reports shortness of breath.  Denies cough                               Cardiovascular: No chest pain or palpitations  Gastrointestinal: No nausea or vomiting                                         Genitourinary: No urinary incontinence or burning on urination  Musculoskeletal: No joint pain                                                           Dermatologic: No rash  Neurological: as per HPI                                                                      Psychiatric: No behavioral problems  Endocrine: No known hypoglycemia              Hematologic/Lymphatic: No easy bleeding    Vital Signs Last 24 Hrs  T(C): 36.9 (08 Feb 2019 16:06), Max: 37 (07 Feb 2019 19:43)  T(F): 98.5 (08 Feb 2019 16:06), Max: 98.6 (07 Feb 2019 19:43)  HR: 74 (08 Feb 2019 16:06) (65 - 74)  BP: 146/78 (08 Feb 2019 16:06) (138/82 - 156/94)  BP(mean): --  RR: 18 (08 Feb 2019 16:06) (17 - 18)  SpO2: 100% (08 Feb 2019 16:06) (100% - 100%)    PHYSICAL EXAM:  General Exam:   General appearance: No acute distress                 Cardiovascular: Pedal dorsalis pulses intact bilaterally    Neurological Exam:  NIH Stroke Scale (NIHSS):   1a. LOConscious:  0-alert 1-lethargy 2-obtund 3-coma:    _0____  1b. LOC Questions:  0-both 1-one 2-none                       ____0_  1c. LOC Commands:  0-both 1-one 2-none                     __0___  2.   Gaze:  0-nl 1-partial 2-conjugate                                __0___  3.   Visual:  0-nl 1-part monserrat 2-full monserrat 3-bilat monserrat         __0___  4.   Facial Palsy:  0-nl 1-minor 2-part 3-complete             __1___  5.   Motor Arm:  0-nl 1-drift 2-effort 3-no effort         Left             __1__                              4-no move UN-amputated                     Right  __0__  6.   Motor Leg:                                                                 Left   __1__                                                                                   Right __1_  7.   Ataxia:  0-nl 1-one limb 2-two UN-amp                      _0____  8.   Sensory:  0-nl 1-mild 2-severe                                  __0___  9.   Language:  0-nl 1-mild 2-severe 3-mute                     __0___  10.  Dysarthria:  0-nl 1-mild 2-severe 3-barrier                  __0___  11.  Extinction/Inattention:  0-nl 1-mild 2-deep                 __0_         TOTAL NIHSS       __4___  MRS=3    Mental Status: Orientated to self, date and place.  Attention intact.  No dysarthria, aphasia or neglect.  Knowledge intact.  Registration intact.  Short and long term memory grossly intact.      Cranial Nerves: CN I - not tested.  PERRL, EOMI, VFF.  (+) nystagmus on right downward gaze.  No diplopia.  CN V1-3 intact to light touch and pinprick.  (+) mild right facial asymmetry.  Hearing intact to finger rub bilaterally.  Tongue, uvula and palate midline.  Sternocleidomastoid and Trapezius intact bilaterally.    Motor:   Tone: Normal and normal bulk                  Strength 4+/5 in RUE,  3+/5 in LUE, and 4/5 b/l LE  (+) Mild left pronator drift                     No dysmetria on finger-nose-finger or heel-shin-heel  No truncal ataxia.  No resting, postural or action tremor.  No myoclonus.    Sensation: intact to light touch, pinprick and proprioception    Deep Tendon Reflexes: 1+ bilateral biceps, triceps, brachioradialis, knee and ankle  Toes flexor bilaterally    Gait: Unstable leaning to right    LABS:                        14.0   5.4   )-----------( 212      ( 07 Feb 2019 07:19 )             42.6     02-08    135  |  102  |  17  ----------------------------<  236<H>  4.2   |  26  |  1.33<H>    Ca    9.1      08 Feb 2019 08:47  Phos  3.1     02-07  Mg     2.2     02-07            RADIOLOGY & ADDITIONAL TESTS:  < from: MR Head No Cont (02.07.19 @ 12:40) >  EXAM:  MR BRAIN                            PROCEDURE DATE:  02/07/2019          INTERPRETATION:  CLINICAL STATEMENT: Weakness    TECHNIQUE: MRI of the brain was performed without gadolinium.    COMPARISON: CT head 2/6/2019    FINDINGS:  There is mild diffuse parenchymal volume loss. There are T2 prolongation   signal abnormalities in the periventricular subcortical white matter   likely related to mild chronic microvascular ischemic changes.    There is no acute parenchymal hemorrhage, parenchymal mass, or midline   shift. There is no extra-axial fluid collection.  There is no   hydrocephalus.    Small chronic infarct left occipital lobe    Small acute infarct right occipital lobe noted measuring 9 x 8 mm.   Additional punctate acute/subacute infarcts noted in the bilateral   posterior temporal and left occipital region.    Chronic fracture left lamina papyracea.    IMPRESSION:  Bilateral posterior acute and acute/subacute infarcts as described above.    No acute intracranial hemorrhage.    ERIKA GARCIA M.D., ATTENDING RADIOLOGIST  This document has been electronically signed. Feb 7 2019  1:01PM      < from: MR Angio Head No Cont (02.07.19 @ 13:42) >  EXAM:  MR ANGIO BRAIN                            PROCEDURE DATE:  02/07/2019          INTERPRETATION:  CLINICAL STATEMENT: Evaluate for stenosis. CVA    TECHNIQUE: MRA of the head was performed without gadolinium. 3-D MIP   images were obtained.    COMPARISON: None.    FINDINGS:  Limited exam due to motion.    Diffuse irregularity noted most compatible with atherosclerotic disease.    Suggestion of narrowing cavernous segments of bilateral internal carotid   arteries evaluation limited due to motion    The proximal anterior, middle and posterior cerebral arteries are patent.     Hypoplastic A1 segment left anterior cerebral artery.    Attenuation of distal left posterior cerebral artery noted.    The intracranial vertebral and basilar arteries are patent. Dominant left   vertebral artery. Focal stenosis distal right vertebral artery noted.    Evaluation for intracranial aneurysm limited due to motion.    IMPRESSION:  Limited exam due to motion.    Multiple intracranial stenoses as described above      ERIKA GARCIA M.D., ATTENDING RADIOLOGIST    < end of copied text >

## 2019-02-08 NOTE — PROGRESS NOTE ADULT - SUBJECTIVE AND OBJECTIVE BOX
PGY 1 Note discussed with supervising resident and primary attending    Patient is a 53y old  Male who presents with a chief complaint of worsening weakness (08 Feb 2019 14:04)      INTERVAL HPI/OVERNIGHT EVENTS: no acute overnight events, still complaint of weakness, PT evaluated and advised rehab    MEDICATIONS  (STANDING):  aspirin enteric coated 81 milliGRAM(s) Oral daily  atorvastatin 80 milliGRAM(s) Oral at bedtime  buDESOnide 160 MICROgram(s)/formoterol 4.5 MICROgram(s) Inhaler 2 Puff(s) Inhalation two times a day  docusate sodium 100 milliGRAM(s) Oral two times a day  ergocalciferol 17197 Unit(s) Oral <User Schedule>  heparin  Injectable 5000 Unit(s) SubCutaneous every 8 hours  insulin glargine Injectable (LANTUS) 40 Unit(s) SubCutaneous at bedtime  insulin lispro (HumaLOG) corrective regimen sliding scale   SubCutaneous three times a day before meals  insulin lispro Injectable (HumaLOG) 6 Unit(s) SubCutaneous three times a day before meals  nicotine - 21 mG/24Hr(s) Patch 1 patch Transdermal daily  pantoprazole    Tablet 40 milliGRAM(s) Oral before breakfast  senna 2 Tablet(s) Oral at bedtime    MEDICATIONS  (PRN):  acetaminophen   Tablet .. 650 milliGRAM(s) Oral every 6 hours PRN Moderate Pain (4 - 6)  polyethylene glycol 3350 17 Gram(s) Oral daily PRN Constipation      __________________________________________________  REVIEW OF SYSTEMS:    CONSTITUTIONAL: No fever,   EYES: no acute visual disturbances  NECK: No pain or stiffness  RESPIRATORY: No cough; No shortness of breath  CARDIOVASCULAR: No chest pain, no palpitations  GASTROINTESTINAL: No pain. No nausea or vomiting; No diarrhea   NEUROLOGICAL: No headache or numbness, no tremors  MUSCULOSKELETAL: No joint pain, no muscle pain  GENITOURINARY: no dysuria, no frequency, no hesitancy  PSYCHIATRY: no depression , no anxiety  ALL OTHER  ROS negative        Vital Signs Last 24 Hrs  T(C): 36.7 (08 Feb 2019 10:07), Max: 37 (07 Feb 2019 19:43)  T(F): 98 (08 Feb 2019 10:07), Max: 98.6 (07 Feb 2019 19:43)  HR: 73 (08 Feb 2019 07:26) (65 - 73)  BP: 138/82 (08 Feb 2019 07:26) (138/82 - 156/94)  BP(mean): --  RR: 18 (08 Feb 2019 10:07) (17 - 18)  SpO2: 100% (08 Feb 2019 10:07) (100% - 100%)    ________________________________________________  PHYSICAL EXAM:  GENERAL: NAD  HEENT: Normocephalic;  conjunctivae and sclerae clear; moist mucous membranes;   NECK : supple  CHEST/LUNG: Clear to auscultation bilaterally with good air entry   HEART: S1 S2  regular; no murmurs, gallops or rubs  ABDOMEN: Soft, Nontender, Nondistended; Bowel sounds present  EXTREMITIES: no cyanosis; no edema; no calf tenderness  SKIN: warm and dry; no rash  NERVOUS SYSTEM:  Awake and alert; Oriented  to place, person and time ; no new deficits    _________________________________________________  LABS:                        14.0   5.4   )-----------( 212      ( 07 Feb 2019 07:19 )             42.6     02-08    135  |  102  |  17  ----------------------------<  236<H>  4.2   |  26  |  1.33<H>    Ca    9.1      08 Feb 2019 08:47  Phos  3.1     02-07  Mg     2.2     02-07    TPro  8.5<H>  /  Alb  3.3<L>  /  TBili  0.3  /  DBili  x   /  AST  12  /  ALT  15  /  AlkPhos  98  02-06        CAPILLARY BLOOD GLUCOSE      POCT Blood Glucose.: 264 mg/dL (08 Feb 2019 10:56)  POCT Blood Glucose.: 228 mg/dL (08 Feb 2019 07:48)  POCT Blood Glucose.: 285 mg/dL (07 Feb 2019 20:42)  POCT Blood Glucose.: 247 mg/dL (07 Feb 2019 17:04)          RADIOLOGY & ADDITIONAL TESTS:    < from: CT Head No Cont (02.06.19 @ 17:41) >  Impression: No acute intracranial hemorrhage.     Small age indeterminate 1.0 cm lacunar infarct in the left occipital   cortex. Small age indeterminate lacunar infarct in the left basal   ganglia. If clinically indicated, brain MR may be pursued for further   evaluation.    Chronic small vessel ischemic disease.      < from: MR Angio Head No Cont (02.07.19 @ 13:42) >    INTERPRETATION:  CLINICAL STATEMENT: Evaluate for stenosis. CVA    TECHNIQUE: MRA of the head was performed without gadolinium. 3-D MIP   images were obtained.    COMPARISON: None.    FINDINGS:  Limited exam due to motion.    Diffuse irregularity noted most compatible with atherosclerotic disease.    Suggestion of narrowing cavernous segments of bilateral internal carotid   arteries evaluation limited due to motion    The proximal anterior, middle and posterior cerebral arteries are patent.     Hypoplastic A1 segment left anterior cerebral artery.    Attenuation of distal left posterior cerebral artery noted.    The intracranial vertebral and basilar arteries are patent. Dominant left   vertebral artery. Focal stenosis distal right vertebral artery noted.    Evaluation for intracranial aneurysm limited due to motion.    IMPRESSION:  Limited exam due to motion.    Multiple intracranial stenoses as described above          Imaging Personally Reviewed:  YES    Consultant(s) Notes Reviewed:   YES    Care Discussed with Consultants : YES      Plan of care was discussed with patient and /or primary care giver; all questions and concerns were addressed and care was aligned with patient's wishes.

## 2019-02-09 LAB
ANION GAP SERPL CALC-SCNC: 6 MMOL/L — SIGNIFICANT CHANGE UP (ref 5–17)
BUN SERPL-MCNC: 14 MG/DL — SIGNIFICANT CHANGE UP (ref 7–18)
CALCIUM SERPL-MCNC: 9 MG/DL — SIGNIFICANT CHANGE UP (ref 8.4–10.5)
CHLORIDE SERPL-SCNC: 103 MMOL/L — SIGNIFICANT CHANGE UP (ref 96–108)
CO2 SERPL-SCNC: 27 MMOL/L — SIGNIFICANT CHANGE UP (ref 22–31)
CREAT SERPL-MCNC: 1.35 MG/DL — HIGH (ref 0.5–1.3)
GLUCOSE SERPL-MCNC: 205 MG/DL — HIGH (ref 70–99)
POTASSIUM SERPL-MCNC: 4.1 MMOL/L — SIGNIFICANT CHANGE UP (ref 3.5–5.3)
POTASSIUM SERPL-SCNC: 4.1 MMOL/L — SIGNIFICANT CHANGE UP (ref 3.5–5.3)
SODIUM SERPL-SCNC: 136 MMOL/L — SIGNIFICANT CHANGE UP (ref 135–145)

## 2019-02-09 PROCEDURE — 99233 SBSQ HOSP IP/OBS HIGH 50: CPT

## 2019-02-09 RX ORDER — ACETAMINOPHEN 500 MG
1000 TABLET ORAL ONCE
Qty: 0 | Refills: 0 | Status: COMPLETED | OUTPATIENT
Start: 2019-02-09 | End: 2019-02-09

## 2019-02-09 RX ORDER — LISINOPRIL 2.5 MG/1
2.5 TABLET ORAL DAILY
Qty: 0 | Refills: 0 | Status: DISCONTINUED | OUTPATIENT
Start: 2019-02-09 | End: 2019-02-12

## 2019-02-09 RX ORDER — ASPIRIN AND DIPYRIDAMOLE 25; 200 MG/1; MG/1
1 CAPSULE, EXTENDED RELEASE ORAL
Qty: 0 | Refills: 0 | Status: DISCONTINUED | OUTPATIENT
Start: 2019-02-09 | End: 2019-02-09

## 2019-02-09 RX ORDER — INSULIN LISPRO 100/ML
8 VIAL (ML) SUBCUTANEOUS
Qty: 0 | Refills: 0 | Status: DISCONTINUED | OUTPATIENT
Start: 2019-02-09 | End: 2019-02-11

## 2019-02-09 RX ORDER — METOPROLOL TARTRATE 50 MG
50 TABLET ORAL
Qty: 0 | Refills: 0 | Status: DISCONTINUED | OUTPATIENT
Start: 2019-02-09 | End: 2019-02-12

## 2019-02-09 RX ORDER — ASPIRIN AND DIPYRIDAMOLE 25; 200 MG/1; MG/1
1 CAPSULE, EXTENDED RELEASE ORAL
Qty: 0 | Refills: 0 | Status: ACTIVE | OUTPATIENT
Start: 2019-02-09 | End: 2020-01-08

## 2019-02-09 RX ORDER — LACTULOSE 10 G/15ML
10 SOLUTION ORAL
Qty: 0 | Refills: 0 | Status: DISCONTINUED | OUTPATIENT
Start: 2019-02-09 | End: 2019-02-12

## 2019-02-09 RX ORDER — ASPIRIN/CALCIUM CARB/MAGNESIUM 324 MG
81 TABLET ORAL DAILY
Qty: 0 | Refills: 0 | Status: DISCONTINUED | OUTPATIENT
Start: 2019-02-09 | End: 2019-02-12

## 2019-02-09 RX ADMIN — Medication 81 MILLIGRAM(S): at 21:50

## 2019-02-09 RX ADMIN — SENNA PLUS 2 TABLET(S): 8.6 TABLET ORAL at 21:49

## 2019-02-09 RX ADMIN — Medication 1 PATCH: at 11:37

## 2019-02-09 RX ADMIN — Medication 1 PATCH: at 19:45

## 2019-02-09 RX ADMIN — INSULIN GLARGINE 40 UNIT(S): 100 INJECTION, SOLUTION SUBCUTANEOUS at 21:48

## 2019-02-09 RX ADMIN — Medication 50 MILLIGRAM(S): at 18:42

## 2019-02-09 RX ADMIN — Medication 81 MILLIGRAM(S): at 12:05

## 2019-02-09 RX ADMIN — Medication 8 UNIT(S): at 21:49

## 2019-02-09 RX ADMIN — Medication 400 MILLIGRAM(S): at 21:47

## 2019-02-09 RX ADMIN — Medication 4: at 12:04

## 2019-02-09 RX ADMIN — ASPIRIN AND DIPYRIDAMOLE 1 CAPSULE(S): 25; 200 CAPSULE, EXTENDED RELEASE ORAL at 21:46

## 2019-02-09 RX ADMIN — Medication 100 MILLIGRAM(S): at 18:42

## 2019-02-09 RX ADMIN — PANTOPRAZOLE SODIUM 40 MILLIGRAM(S): 20 TABLET, DELAYED RELEASE ORAL at 06:25

## 2019-02-09 RX ADMIN — Medication 1 PATCH: at 07:30

## 2019-02-09 RX ADMIN — Medication 1: at 07:59

## 2019-02-09 RX ADMIN — Medication 1 PATCH: at 12:35

## 2019-02-09 RX ADMIN — Medication 6 UNIT(S): at 12:06

## 2019-02-09 RX ADMIN — Medication 6 UNIT(S): at 17:00

## 2019-02-09 RX ADMIN — Medication 1000 MILLIGRAM(S): at 23:26

## 2019-02-09 RX ADMIN — HEPARIN SODIUM 5000 UNIT(S): 5000 INJECTION INTRAVENOUS; SUBCUTANEOUS at 06:24

## 2019-02-09 RX ADMIN — Medication 2: at 17:00

## 2019-02-09 RX ADMIN — Medication 100 MILLIGRAM(S): at 06:25

## 2019-02-09 RX ADMIN — BUDESONIDE AND FORMOTEROL FUMARATE DIHYDRATE 2 PUFF(S): 160; 4.5 AEROSOL RESPIRATORY (INHALATION) at 21:48

## 2019-02-09 RX ADMIN — LISINOPRIL 2.5 MILLIGRAM(S): 2.5 TABLET ORAL at 22:24

## 2019-02-09 RX ADMIN — ATORVASTATIN CALCIUM 80 MILLIGRAM(S): 80 TABLET, FILM COATED ORAL at 21:47

## 2019-02-09 RX ADMIN — LACTULOSE 10 GRAM(S): 10 SOLUTION ORAL at 18:42

## 2019-02-09 RX ADMIN — Medication 6 UNIT(S): at 12:04

## 2019-02-09 RX ADMIN — BUDESONIDE AND FORMOTEROL FUMARATE DIHYDRATE 2 PUFF(S): 160; 4.5 AEROSOL RESPIRATORY (INHALATION) at 12:08

## 2019-02-09 RX ADMIN — HEPARIN SODIUM 5000 UNIT(S): 5000 INJECTION INTRAVENOUS; SUBCUTANEOUS at 14:30

## 2019-02-09 RX ADMIN — HEPARIN SODIUM 5000 UNIT(S): 5000 INJECTION INTRAVENOUS; SUBCUTANEOUS at 21:49

## 2019-02-09 NOTE — CONSULT NOTE ADULT - SUBJECTIVE AND OBJECTIVE BOX
Patient is a 53y old  Male who presents with a chief complaint of constipation, worsening weakness (09 Feb 2019 14:36)      HPI:  54 yo M with PMH of HTN, HLD, DM, CABG, s/p stroke 3 months ago, presents to ED with complaints of constipation for the past 7 days along with worsening weakness. Pt states he has an abdominal discomfort secondary to bloating due to constipation. Otherwise, patient notes that he has worsening weakness, states he was at rehab after discharge, however signed out against medical advice due to legal issues that arose. Patient therefore did therapy on his own, however feels that it was not a good idea and not successful, stating his weakness has been gradually getting worse. Patient denies any acute worsening of weakness.  Patient states he's able to eat, denies fever, chills, nausea, vomtiing, diarrhea, dysuria, all other ROS negative. Patient still smokes cigarettes (06 Feb 2019 21:06)      PAST MEDICAL & SURGICAL HISTORY:  Congestive heart failure, NYHA class 3, chronic, combined  Cerebrovascular accident (CVA), unspecified mechanism  Hyperlipidemia, unspecified hyperlipidemia type  Coronary artery disease involving native heart without angina pectoris, unspecified vessel or lesion type  Hypertension, unspecified type  Diabetes mellitus of other type without complication  History of appendectomy  S/P CABG x 1         MEDICATIONS  (STANDING):  aspirin enteric coated 81 milliGRAM(s) Oral daily  atorvastatin 80 milliGRAM(s) Oral at bedtime  buDESOnide 160 MICROgram(s)/formoterol 4.5 MICROgram(s) Inhaler 2 Puff(s) Inhalation two times a day  docusate sodium 100 milliGRAM(s) Oral two times a day  ergocalciferol 44375 Unit(s) Oral <User Schedule>  heparin  Injectable 5000 Unit(s) SubCutaneous every 8 hours  insulin glargine Injectable (LANTUS) 40 Unit(s) SubCutaneous at bedtime  insulin lispro (HumaLOG) corrective regimen sliding scale   SubCutaneous three times a day before meals  insulin lispro Injectable (HumaLOG) 6 Unit(s) SubCutaneous three times a day before meals  lactulose Syrup 10 Gram(s) Oral two times a day  lisinopril 2.5 milliGRAM(s) Oral daily  metoprolol tartrate 50 milliGRAM(s) Oral two times a day  nicotine - 21 mG/24Hr(s) Patch 1 patch Transdermal daily  pantoprazole    Tablet 40 milliGRAM(s) Oral before breakfast  senna 2 Tablet(s) Oral at bedtime    MEDICATIONS  (PRN):  acetaminophen   Tablet .. 650 milliGRAM(s) Oral every 6 hours PRN Moderate Pain (4 - 6)  polyethylene glycol 3350 17 Gram(s) Oral daily PRN Constipation      FAMILY HISTORY:  No pertinent family history in first degree relatives      SOCIAL HISTORY:      REVIEW OF SYSTEMS:  CONSTITUTIONAL: No fever, weight loss, or fatigue  EYES: No eye pain, visual disturbances, or discharge  ENT:  No difficulty hearing, tinnitus, vertigo; No sinus or throat pain  NECK: No pain or stiffness  RESPIRATORY: No cough, wheezing, chills or hemoptysis; No Shortness of Breath  CARDIOVASCULAR: No chest pain, palpitations, passing out, dizziness, or leg swelling  GASTROINTESTINAL: No abdominal or epigastric pain. No nausea, vomiting, or hematemesis; No diarrhea or constipation. No melena or hematochezia.  GENITOURINARY: No dysuria, frequency, hematuria, or incontinence  NEUROLOGICAL: No headaches, memory loss, loss of strength, numbness, or tremors  SKIN: No itching, burning, rashes, or lesions   LYMPH Nodes: No enlarged glands  ENDOCRINE: No heat or cold intolerance; No hair loss  MUSCULOSKELETAL: No joint pain or swelling; No muscle, back, or extremity pain  PSYCHIATRIC: No depression, anxiety, mood swings, or difficulty sleeping  HEME/LYMPH: No easy bruising, or bleeding gums  ALLERGY AND IMMUNOLOGIC: No hives or eczema	        Vital Signs Last 24 Hrs  T(C): 36.7 (09 Feb 2019 11:15), Max: 37.1 (08 Feb 2019 20:19)  T(F): 98.1 (09 Feb 2019 11:15), Max: 98.7 (08 Feb 2019 20:19)  HR: 94 (09 Feb 2019 11:15) (74 - 94)  BP: 167/89 (09 Feb 2019 11:15) (134/77 - 168/90)  BP(mean): --  RR: 18 (09 Feb 2019 11:15) (18 - 20)  SpO2: 100% (09 Feb 2019 11:15) (100% - 100%)      Constitutional:    NC/AT:    HEENT:    Neck:  No JVD, bruits or thyromegaly    Respiratory:  Clear without rales or rhonchi    Cardiovascular:  RR without murmur, rub or gallop.    Gastrointestinal: Soft without hepatosplenomegaly.    Extremities: without cyanosis, clubbing or edema.    Neurological:  Oriented   x      . No gross sensory or motor defects.        LABS:    02-09    136  |  103  |  14  ----------------------------<  205<H>  4.1   |  27  |  1.35<H>    Ca    9.0      09 Feb 2019 06:38              CAPILLARY BLOOD GLUCOSE      POCT Blood Glucose.: 317 mg/dL (09 Feb 2019 11:52)  POCT Blood Glucose.: 179 mg/dL (09 Feb 2019 07:53)  POCT Blood Glucose.: 187 mg/dL (08 Feb 2019 21:00)  POCT Blood Glucose.: 184 mg/dL (08 Feb 2019 17:17)      RADIOLOGY & ADDITIONAL STUDIES: Patient is a 53y old  Male who presents with a chief complaint of constipation, worsening weakness (09 Feb 2019 14:36)      HPI:  52 yo M with PMH of HTN, HLD, DM, CABG, s/p stroke 3 months ago, presents to ED with complaints of constipation for the past 7 days along with worsening weakness. Pt states he has an abdominal discomfort secondary to bloating due to constipation. Otherwise, patient notes that he has worsening weakness, states he was at rehab after discharge, however signed out against medical advice due to legal issues that arose. Patient therefore did therapy on his own, however feels that it was not a good idea and not successful, stating his weakness has been gradually getting worse. Patient denies any acute worsening of weakness.  Patient states he's able to eat, denies fever, chills, nausea, vomtiing, diarrhea, dysuria, all other ROS negative. Patient still smokes cigarettes (06 Feb 2019 21:06). Found to have un cont dm. pt admits to taking lantus 40 units and regular insulin on a scale       PAST MEDICAL & SURGICAL HISTORY:  Congestive heart failure, NYHA class 3, chronic, combined  Cerebrovascular accident (CVA), unspecified mechanism  Hyperlipidemia, unspecified hyperlipidemia type  Coronary artery disease involving native heart without angina pectoris, unspecified vessel or lesion type  Hypertension, unspecified type  Diabetes mellitus of other type without complication  History of appendectomy  S/P CABG x 1         MEDICATIONS  (STANDING):  aspirin enteric coated 81 milliGRAM(s) Oral daily  atorvastatin 80 milliGRAM(s) Oral at bedtime  buDESOnide 160 MICROgram(s)/formoterol 4.5 MICROgram(s) Inhaler 2 Puff(s) Inhalation two times a day  docusate sodium 100 milliGRAM(s) Oral two times a day  ergocalciferol 81447 Unit(s) Oral <User Schedule>  heparin  Injectable 5000 Unit(s) SubCutaneous every 8 hours  insulin glargine Injectable (LANTUS) 40 Unit(s) SubCutaneous at bedtime  insulin lispro (HumaLOG) corrective regimen sliding scale   SubCutaneous three times a day before meals  insulin lispro Injectable (HumaLOG) 6 Unit(s) SubCutaneous three times a day before meals  lactulose Syrup 10 Gram(s) Oral two times a day  lisinopril 2.5 milliGRAM(s) Oral daily  metoprolol tartrate 50 milliGRAM(s) Oral two times a day  nicotine - 21 mG/24Hr(s) Patch 1 patch Transdermal daily  pantoprazole    Tablet 40 milliGRAM(s) Oral before breakfast  senna 2 Tablet(s) Oral at bedtime    MEDICATIONS  (PRN):  acetaminophen   Tablet .. 650 milliGRAM(s) Oral every 6 hours PRN Moderate Pain (4 - 6)  polyethylene glycol 3350 17 Gram(s) Oral daily PRN Constipation      FAMILY HISTORY:  No pertinent family history in first degree relatives      SOCIAL HISTORY:      REVIEW OF SYSTEMS:  CONSTITUTIONAL: No fever, weight loss, or fatigue  EYES: No eye pain, visual disturbances, or discharge  ENT:  No difficulty hearing, tinnitus, vertigo; No sinus or throat pain  NECK: No pain or stiffness  RESPIRATORY: No cough, wheezing, chills or hemoptysis; No Shortness of Breath  CARDIOVASCULAR: No chest pain, palpitations, passing out, dizziness, or leg swelling  GASTROINTESTINAL: No abdominal or epigastric pain. No nausea, vomiting, or hematemesis; No diarrhea or constipation. No melena or hematochezia.  GENITOURINARY: No dysuria, frequency, hematuria, or incontinence  NEUROLOGICAL: No headaches, memory loss, loss of strength, numbness, or tremors  SKIN: No itching, burning, rashes, or lesions   LYMPH Nodes: No enlarged glands  ENDOCRINE: No heat or cold intolerance; No hair loss  MUSCULOSKELETAL: No joint pain or swelling; No muscle, back, or extremity pain  PSYCHIATRIC: No depression, anxiety, mood swings, or difficulty sleeping  HEME/LYMPH: No easy bruising, or bleeding gums  ALLERGY AND IMMUNOLOGIC: No hives or eczema	        Vital Signs Last 24 Hrs  T(C): 36.7 (09 Feb 2019 11:15), Max: 37.1 (08 Feb 2019 20:19)  T(F): 98.1 (09 Feb 2019 11:15), Max: 98.7 (08 Feb 2019 20:19)  HR: 94 (09 Feb 2019 11:15) (74 - 94)  BP: 167/89 (09 Feb 2019 11:15) (134/77 - 168/90)  BP(mean): --  RR: 18 (09 Feb 2019 11:15) (18 - 20)  SpO2: 100% (09 Feb 2019 11:15) (100% - 100%)      Constitutional:    HEENT: nad    Neck:  No JVD, bruits or thyromegaly    Respiratory:  Clear without rales or rhonchi    Cardiovascular:  RR without murmur, rub or gallop.    Gastrointestinal: Soft without hepatosplenomegaly.    Extremities: without cyanosis, clubbing or edema.    Neurological:  Oriented   x   3   . No gross sensory or motor defects.        LABS:    02-09    136  |  103  |  14  ----------------------------<  205<H>  4.1   |  27  |  1.35<H>    Ca    9.0      09 Feb 2019 06:38              CAPILLARY BLOOD GLUCOSE      POCT Blood Glucose.: 317 mg/dL (09 Feb 2019 11:52)  POCT Blood Glucose.: 179 mg/dL (09 Feb 2019 07:53)  POCT Blood Glucose.: 187 mg/dL (08 Feb 2019 21:00)  POCT Blood Glucose.: 184 mg/dL (08 Feb 2019 17:17)      RADIOLOGY & ADDITIONAL STUDIES:

## 2019-02-09 NOTE — PROGRESS NOTE ADULT - PROBLEM SELECTOR PLAN 3
EF 30-35% on previous echo in November 2018  on asa, statin and lopressor  Cardio input appreciated  Extensive counseling given to the patient, he promises to be compliant and to quit smoking.  No plan for ICD at this point, patient is active smoker and unlikely to be compliant with medications EF 40-45% on current echo  on asa, statin and lopressor  Cardio input appreciated

## 2019-02-09 NOTE — PROGRESS NOTE ADULT - PROBLEM SELECTOR PLAN 4
c/w statin  lipid panel showing elevated LDL and TG. c/w statin  lipid panel showing elevated LDL and TG

## 2019-02-09 NOTE — CONSULT NOTE ADULT - ASSESSMENT
54 yo M with PMH of HTN, HLD, DM, CABG, s/p stroke 3 months ago, presents to ED with complaints of constipation for the past 7 days along with worsening weakness. Found to have cva, chf exac and un cont dm. pt admits to taking lantus 40 units and regular insulin on a scale

## 2019-02-09 NOTE — DIETITIAN INITIAL EVALUATION ADULT. - OTHER INFO
Nutrition consult requested for education. Patient from home. Visited pt. alert, report appetite fair with "unable to go bathroom & bloating" but usually eats well, denies chewing/swallowing difficulty or nausea/vomiting PTA, stated presently unsure of UBW. Per pt. diabetic >20 yrs & on home take insulin but unsure of dosage prepare his own meals but do not follow any type of diet. Pt. accepted diet education on My Plate [employer]anner with carbohydrate counting, receptive & reinforced information given, answer nutrition questions & concerns, as appropriate. Pt. consuming 50% of meals & provided food preferences, update kitchen, Nephro & Endo consult noted  d/w RN.

## 2019-02-09 NOTE — DIETITIAN INITIAL EVALUATION ADULT. - ADHERENCE
poor/A1C=12.2, non-complaint w/meds, eats 3 meals daily with snacks, do not follow any type of diet at home

## 2019-02-09 NOTE — PROGRESS NOTE ADULT - PROBLEM SELECTOR PLAN 2
with vascular complications, likely renal complication.  A1c: 12.2 SEND UA  Extensive counselling given on importance of medication compliance, morbidities and mortality of diabetes, patient agrees and shows understanding.   Diabetic teaching for insulin injection  Nutritionist consult  Endo consult. with vascular complications, likely renal complication.  A1c: 12.2   CW Lantus 50

## 2019-02-09 NOTE — DIETITIAN INITIAL EVALUATION ADULT. - NUTRITION INTERVENTION
Feeding Assistance/Meals and Snack/Nutrition Education/Collaboration and Referral of Nutrition Care/Vitamin Meals and Snack/Feeding Assistance/Collaboration and Referral of Nutrition Care/Vitamin/Nutrition Education

## 2019-02-09 NOTE — DIETITIAN INITIAL EVALUATION ADULT. - DIET TYPE
no concentrated phosphorus/consistent carbohydrate (evening snack)/no concentrated potassium/DASH/TLC (sodium and cholesterol restricted diet)

## 2019-02-09 NOTE — DIETITIAN INITIAL EVALUATION ADULT. - NS AS NUTRI INTERV MEALS SNACK
Mineral - modified diet/Other (specify)/Fat - modified diet/no need for K+/Phos restriction (renal status improving)/Carbohydrate - modified diet

## 2019-02-09 NOTE — DIETITIAN INITIAL EVALUATION ADULT. - PERTINENT LABORATORY DATA
02-09 Na136 mmol/L Glu 205 mg/dL<H> K+ 4.1 mmol/L Cr  1.35 mg/dL<H> BUN 14 mg/dL 02-07 Phos 3.1 mg/dL 02-06 Alb 3.3 g/dL<L> 02-07 [id number] 12.2 %<H> 02-07 Chol 178 mg/dL  mg/dL HDL 34 mg/dL<L> Trig 144 mg/dL

## 2019-02-09 NOTE — DIETITIAN INITIAL EVALUATION ADULT. - FACTORS AFF FOOD INTAKE
persistent constipation/weakness, abdominal pain, CVA, CHF, diabetes, COPD/difficulty with food procurement/preparation/pain/other (specify)

## 2019-02-09 NOTE — PROGRESS NOTE ADULT - PROBLEM SELECTOR PLAN 1
MRI/ MRA showing multiple bilateral strokes, suspicious for Embolic in origin  Also Multiple bilateral stenotic points.   ABRAHAN in November was negative for intracardiac shunt  continue TEle monitor  Neuro f.u   Continue asa, statin, heparin subQ  DC metoprolol. Patient is still in the permissive hypertension window  Cardio input appreciated. MRI/ MRA showing multiple bilateral strokes  Also Multiple bilateral stenotic points.   ABRAHAN in November was negative for intracardiac shunt  CW statin, Aggrenox as pt is allergic to plavix  Neuro and Cardio input appreciated. MRI/ MRA showing multiple bilateral strokes  Also Multiple bilateral stenotic points.   ABRAHAN in November was negative for intracardiac shunt  CW statin, Start Aggrenox as pt is allergic to plavix (start 1 cap qhs + low dose Asa as can cause severe headache, then titrate up to 1 cap BID and D/C low dose Asa)  Neuro and Cardio input appreciated.

## 2019-02-09 NOTE — CONSULT NOTE ADULT - PROBLEM SELECTOR PROBLEM 1
Cerebrovascular accident (CVA), unspecified mechanism
Diabetes mellitus of other type without complication

## 2019-02-09 NOTE — DIETITIAN INITIAL EVALUATION ADULT. - SIGNS/SYMPTOMS
Inadequate intake 1 wk, in house intake 50%, unsure of wt. loss/gain Lack of prior exposure to information on diabetes/meal planning

## 2019-02-09 NOTE — PROGRESS NOTE ADULT - SUBJECTIVE AND OBJECTIVE BOX
MEDICAL ATTENDING NOTE    Patient is a 53y old  Male who presents with a chief complaint of constipation, worsening weakness (08 Feb 2019 18:59)      INTERVAL HPI/OVERNIGHT EVENTS: Pt states he has been constipated for several days. no abdominal pain. passing flatus. Reports loss of sensation to pain and temperature on entire left side. Reports generalized weakness.     MEDICATIONS  (STANDING):  aspirin enteric coated 81 milliGRAM(s) Oral daily  atorvastatin 80 milliGRAM(s) Oral at bedtime  buDESOnide 160 MICROgram(s)/formoterol 4.5 MICROgram(s) Inhaler 2 Puff(s) Inhalation two times a day  docusate sodium 100 milliGRAM(s) Oral two times a day  ergocalciferol 59692 Unit(s) Oral <User Schedule>  heparin  Injectable 5000 Unit(s) SubCutaneous every 8 hours  insulin glargine Injectable (LANTUS) 40 Unit(s) SubCutaneous at bedtime  insulin lispro (HumaLOG) corrective regimen sliding scale   SubCutaneous three times a day before meals  insulin lispro Injectable (HumaLOG) 6 Unit(s) SubCutaneous three times a day before meals  lactulose Syrup 10 Gram(s) Oral two times a day  lisinopril 2.5 milliGRAM(s) Oral daily  metoprolol tartrate 50 milliGRAM(s) Oral two times a day  nicotine - 21 mG/24Hr(s) Patch 1 patch Transdermal daily  pantoprazole    Tablet 40 milliGRAM(s) Oral before breakfast  senna 2 Tablet(s) Oral at bedtime    MEDICATIONS  (PRN):  acetaminophen   Tablet .. 650 milliGRAM(s) Oral every 6 hours PRN Moderate Pain (4 - 6)  polyethylene glycol 3350 17 Gram(s) Oral daily PRN Constipation      __________________________________________________  REVIEW OF SYSTEMS:    CONSTITUTIONAL: No fever,   EYES: no acute visual disturbances  NECK: No pain or stiffness  RESPIRATORY: No cough; No shortness of breath  CARDIOVASCULAR: No chest pain, no palpitations  GASTROINTESTINAL: No pain. No nausea or vomiting; No diarrhea   NEUROLOGICAL: + loss of sensation, left side        Vital Signs Last 24 Hrs  T(C): 36.7 (09 Feb 2019 11:15), Max: 37.1 (08 Feb 2019 20:19)  T(F): 98.1 (09 Feb 2019 11:15), Max: 98.7 (08 Feb 2019 20:19)  HR: 94 (09 Feb 2019 11:15) (74 - 94)  BP: 167/89 (09 Feb 2019 11:15) (134/77 - 168/90)  BP(mean): --  RR: 18 (09 Feb 2019 11:15) (18 - 20)  SpO2: 100% (09 Feb 2019 11:15) (100% - 100%)    ________________________________________________  PHYSICAL EXAM:  GENERAL: NAD  HEENT: Normocephalic;  conjunctivae and sclerae clear; moist mucous membranes;   CHEST/LUNG: Clear to auscultation bilaterally with good air entry   HEART: S1 S2  regular; no murmurs, gallops or rubs  ABDOMEN: Soft, Nontender, Nondistended; Bowel sounds present  EXTREMITIES: no cyanosis; no edema; no calf tenderness  SKIN: warm and dry; no rash  NERVOUS SYSTEM:  Awake and alert; Oriented  to place, person and time     _________________________________________________  LABS:    02-09    136  |  103  |  14  ----------------------------<  205<H>  4.1   |  27  |  1.35<H>    Ca    9.0      09 Feb 2019 06:38          CAPILLARY BLOOD GLUCOSE      POCT Blood Glucose.: 317 mg/dL (09 Feb 2019 11:52)  POCT Blood Glucose.: 179 mg/dL (09 Feb 2019 07:53)  POCT Blood Glucose.: 187 mg/dL (08 Feb 2019 21:00)  POCT Blood Glucose.: 184 mg/dL (08 Feb 2019 17:17)        RADIOLOGY & ADDITIONAL TESTS:    Imaging Personally Reviewed:  Yes    Consultant(s) Notes Reviewed:   Yes        Plan of care was discussed with patient and /or primary care giver; all questions and concerns were addressed and care was aligned with patient's wishes.

## 2019-02-10 LAB
ANION GAP SERPL CALC-SCNC: 5 MMOL/L — SIGNIFICANT CHANGE UP (ref 5–17)
BUN SERPL-MCNC: 18 MG/DL — SIGNIFICANT CHANGE UP (ref 7–18)
CALCIUM SERPL-MCNC: 9.1 MG/DL — SIGNIFICANT CHANGE UP (ref 8.4–10.5)
CHLORIDE SERPL-SCNC: 104 MMOL/L — SIGNIFICANT CHANGE UP (ref 96–108)
CO2 SERPL-SCNC: 27 MMOL/L — SIGNIFICANT CHANGE UP (ref 22–31)
CREAT SERPL-MCNC: 1.41 MG/DL — HIGH (ref 0.5–1.3)
GLUCOSE SERPL-MCNC: 180 MG/DL — HIGH (ref 70–99)
POTASSIUM SERPL-MCNC: 4.3 MMOL/L — SIGNIFICANT CHANGE UP (ref 3.5–5.3)
POTASSIUM SERPL-SCNC: 4.3 MMOL/L — SIGNIFICANT CHANGE UP (ref 3.5–5.3)
SODIUM SERPL-SCNC: 136 MMOL/L — SIGNIFICANT CHANGE UP (ref 135–145)

## 2019-02-10 PROCEDURE — 99233 SBSQ HOSP IP/OBS HIGH 50: CPT | Mod: GC

## 2019-02-10 RX ORDER — ACETAMINOPHEN 500 MG
1000 TABLET ORAL ONCE
Qty: 0 | Refills: 0 | Status: COMPLETED | OUTPATIENT
Start: 2019-02-10 | End: 2019-02-10

## 2019-02-10 RX ADMIN — LACTULOSE 10 GRAM(S): 10 SOLUTION ORAL at 17:17

## 2019-02-10 RX ADMIN — LISINOPRIL 2.5 MILLIGRAM(S): 2.5 TABLET ORAL at 06:44

## 2019-02-10 RX ADMIN — BUDESONIDE AND FORMOTEROL FUMARATE DIHYDRATE 2 PUFF(S): 160; 4.5 AEROSOL RESPIRATORY (INHALATION) at 21:55

## 2019-02-10 RX ADMIN — HEPARIN SODIUM 5000 UNIT(S): 5000 INJECTION INTRAVENOUS; SUBCUTANEOUS at 12:00

## 2019-02-10 RX ADMIN — Medication 400 MILLIGRAM(S): at 23:22

## 2019-02-10 RX ADMIN — Medication 81 MILLIGRAM(S): at 12:11

## 2019-02-10 RX ADMIN — Medication 2: at 17:16

## 2019-02-10 RX ADMIN — Medication 1 PATCH: at 12:00

## 2019-02-10 RX ADMIN — BUDESONIDE AND FORMOTEROL FUMARATE DIHYDRATE 2 PUFF(S): 160; 4.5 AEROSOL RESPIRATORY (INHALATION) at 11:00

## 2019-02-10 RX ADMIN — HEPARIN SODIUM 5000 UNIT(S): 5000 INJECTION INTRAVENOUS; SUBCUTANEOUS at 21:54

## 2019-02-10 RX ADMIN — Medication 1 PATCH: at 21:38

## 2019-02-10 RX ADMIN — Medication 8 UNIT(S): at 12:11

## 2019-02-10 RX ADMIN — Medication 1 PATCH: at 07:00

## 2019-02-10 RX ADMIN — INSULIN GLARGINE 40 UNIT(S): 100 INJECTION, SOLUTION SUBCUTANEOUS at 21:55

## 2019-02-10 RX ADMIN — Medication 100 MILLIGRAM(S): at 06:43

## 2019-02-10 RX ADMIN — ATORVASTATIN CALCIUM 80 MILLIGRAM(S): 80 TABLET, FILM COATED ORAL at 21:55

## 2019-02-10 RX ADMIN — Medication 100 MILLIGRAM(S): at 17:18

## 2019-02-10 RX ADMIN — PANTOPRAZOLE SODIUM 40 MILLIGRAM(S): 20 TABLET, DELAYED RELEASE ORAL at 06:43

## 2019-02-10 RX ADMIN — LACTULOSE 10 GRAM(S): 10 SOLUTION ORAL at 06:44

## 2019-02-10 RX ADMIN — Medication 8 UNIT(S): at 17:17

## 2019-02-10 RX ADMIN — Medication 1: at 12:09

## 2019-02-10 RX ADMIN — SENNA PLUS 2 TABLET(S): 8.6 TABLET ORAL at 21:55

## 2019-02-10 RX ADMIN — HEPARIN SODIUM 5000 UNIT(S): 5000 INJECTION INTRAVENOUS; SUBCUTANEOUS at 06:43

## 2019-02-10 RX ADMIN — Medication 50 MILLIGRAM(S): at 17:26

## 2019-02-10 RX ADMIN — Medication 1: at 08:28

## 2019-02-10 RX ADMIN — ASPIRIN AND DIPYRIDAMOLE 1 CAPSULE(S): 25; 200 CAPSULE, EXTENDED RELEASE ORAL at 21:55

## 2019-02-10 RX ADMIN — Medication 8 UNIT(S): at 08:28

## 2019-02-10 NOTE — PROGRESS NOTE ADULT - SUBJECTIVE AND OBJECTIVE BOX
PGY 1 Note discussed with supervising resident and primary attending    Patient is a 53y old  Male who presents with a chief complaint of constipation, worsening weakness (09 Feb 2019 15:12)      INTERVAL HPI/OVERNIGHT EVENTS: no acute overnight events, he complaints of back pain    MEDICATIONS  (STANDING):  aspirin  chewable 81 milliGRAM(s) Oral daily  atorvastatin 80 milliGRAM(s) Oral at bedtime  buDESOnide 160 MICROgram(s)/formoterol 4.5 MICROgram(s) Inhaler 2 Puff(s) Inhalation two times a day  dipyridamole 200 mG/aspirin 25 mG 1 Capsule(s) Oral <User Schedule>  docusate sodium 100 milliGRAM(s) Oral two times a day  ergocalciferol 68345 Unit(s) Oral <User Schedule>  heparin  Injectable 5000 Unit(s) SubCutaneous every 8 hours  insulin glargine Injectable (LANTUS) 40 Unit(s) SubCutaneous at bedtime  insulin lispro (HumaLOG) corrective regimen sliding scale   SubCutaneous three times a day before meals  insulin lispro Injectable (HumaLOG) 8 Unit(s) SubCutaneous three times a day before meals  lactulose Syrup 10 Gram(s) Oral two times a day  lisinopril 2.5 milliGRAM(s) Oral daily  metoprolol tartrate 50 milliGRAM(s) Oral two times a day  nicotine - 21 mG/24Hr(s) Patch 1 patch Transdermal daily  pantoprazole    Tablet 40 milliGRAM(s) Oral before breakfast  senna 2 Tablet(s) Oral at bedtime    MEDICATIONS  (PRN):  acetaminophen   Tablet .. 650 milliGRAM(s) Oral every 6 hours PRN Moderate Pain (4 - 6)  polyethylene glycol 3350 17 Gram(s) Oral daily PRN Constipation      __________________________________________________  REVIEW OF SYSTEMS:    CONSTITUTIONAL: No fever,   EYES: no acute visual disturbances  NECK: No pain or stiffness  RESPIRATORY: No cough; No shortness of breath  CARDIOVASCULAR: No chest pain, no palpitations  GASTROINTESTINAL: No pain. No nausea or vomiting; No diarrhea   NEUROLOGICAL: No headache or numbness, no tremors  MUSCULOSKELETAL: No joint pain, no muscle pain  GENITOURINARY: no dysuria, no frequency, no hesitancy  PSYCHIATRY: no depression , no anxiety  ALL OTHER  ROS negative        Vital Signs Last 24 Hrs  T(C): 36.9 (09 Feb 2019 21:18), Max: 37 (09 Feb 2019 15:10)  T(F): 98.4 (09 Feb 2019 21:18), Max: 98.6 (09 Feb 2019 15:10)  HR: 78 (09 Feb 2019 21:18) (76 - 94)  BP: 158/91 (09 Feb 2019 21:18) (134/77 - 168/90)  BP(mean): --  RR: 18 (09 Feb 2019 21:18) (17 - 20)  SpO2: 100% (09 Feb 2019 21:18) (100% - 100%)    ________________________________________________  PHYSICAL EXAM:  GENERAL: NAD  HEENT: Normocephalic;  conjunctivae and sclerae clear; moist mucous membranes;   NECK : supple  CHEST/LUNG: Clear to auscultation bilaterally with good air entry   HEART: S1 S2  regular; no murmurs, gallops or rubs  ABDOMEN: Soft, Nontender, Nondistended; Bowel sounds present  EXTREMITIES: no cyanosis; no edema; no calf tenderness  SKIN: warm and dry; no rash  NERVOUS SYSTEM:  Awake and alert; Oriented  to place, person and time ; no new deficits    _________________________________________________  LABS:    02-09    136  |  103  |  14  ----------------------------<  205<H>  4.1   |  27  |  1.35<H>    Ca    9.0      09 Feb 2019 06:38          CAPILLARY BLOOD GLUCOSE      POCT Blood Glucose.: 290 mg/dL (09 Feb 2019 21:13)  POCT Blood Glucose.: 226 mg/dL (09 Feb 2019 16:39)  POCT Blood Glucose.: 317 mg/dL (09 Feb 2019 11:52)  POCT Blood Glucose.: 179 mg/dL (09 Feb 2019 07:53)        RADIOLOGY & ADDITIONAL TESTS:    Imaging Personally Reviewed:  YES    Consultant(s) Notes Reviewed:   YES    Care Discussed with Consultants : YES    Plan of care was discussed with patient and /or primary care giver; all questions and concerns were addressed and care was aligned with patient's wishes.

## 2019-02-10 NOTE — PROGRESS NOTE ADULT - PROBLEM SELECTOR PLAN 6
c/w sliding scale for now  12.2 HbA1c  continue to monitor fingersticks  started on lantus 40 and lispro 6 TID  Endo consulted Dr. Yao

## 2019-02-10 NOTE — PROGRESS NOTE ADULT - PROBLEM SELECTOR PLAN 1
has acute infarct   CT showed Small age indeterminate 1.0 cm lacunar infarct in the left occipital   cortex. Small age indeterminate lacunar infarct in the left basal ganglia.   MRA shows Multiple intracranial stenoses  MRI shows Small chronic infarct left occipital lobe, Small acute infarct right occipital lobe noted measuring 9 x 8 mm. Additional punctate acute/subacute infarcts noted in the bilateral posterior temporal and left occipital region.  Patient recommended to have ILR as an outpatient but he has not followed up.   on aspirin and statin, Aggrenox added yesterday  c/w telemetry monitoring  cardiology consulted Dr. Knowles  physical therapy consulted- rehab  neurology consult - Dr. Fraire.

## 2019-02-10 NOTE — PROGRESS NOTE ADULT - PROBLEM SELECTOR PLAN 3
EF 30-35% on previous echo in November 2018  on asa, statin and lopressor, lisinopril  c/w telemetry monitoring  f/u cardiology - Dr. Knowles.

## 2019-02-11 DIAGNOSIS — E11.9 TYPE 2 DIABETES MELLITUS WITHOUT COMPLICATIONS: ICD-10-CM

## 2019-02-11 LAB
ANION GAP SERPL CALC-SCNC: 5 MMOL/L — SIGNIFICANT CHANGE UP (ref 5–17)
BUN SERPL-MCNC: 24 MG/DL — HIGH (ref 7–18)
CALCIUM SERPL-MCNC: 9.3 MG/DL — SIGNIFICANT CHANGE UP (ref 8.4–10.5)
CHLORIDE SERPL-SCNC: 101 MMOL/L — SIGNIFICANT CHANGE UP (ref 96–108)
CO2 SERPL-SCNC: 28 MMOL/L — SIGNIFICANT CHANGE UP (ref 22–31)
CREAT SERPL-MCNC: 1.5 MG/DL — HIGH (ref 0.5–1.3)
GLUCOSE SERPL-MCNC: 263 MG/DL — HIGH (ref 70–99)
POTASSIUM SERPL-MCNC: 4.6 MMOL/L — SIGNIFICANT CHANGE UP (ref 3.5–5.3)
POTASSIUM SERPL-SCNC: 4.6 MMOL/L — SIGNIFICANT CHANGE UP (ref 3.5–5.3)
SODIUM SERPL-SCNC: 134 MMOL/L — LOW (ref 135–145)

## 2019-02-11 PROCEDURE — 99233 SBSQ HOSP IP/OBS HIGH 50: CPT | Mod: GC

## 2019-02-11 PROCEDURE — 99233 SBSQ HOSP IP/OBS HIGH 50: CPT

## 2019-02-11 RX ORDER — INSULIN LISPRO 100/ML
12 VIAL (ML) SUBCUTANEOUS
Qty: 0 | Refills: 0 | Status: DISCONTINUED | OUTPATIENT
Start: 2019-02-11 | End: 2019-02-12

## 2019-02-11 RX ORDER — ACETAMINOPHEN 500 MG
1000 TABLET ORAL ONCE
Qty: 0 | Refills: 0 | Status: COMPLETED | OUTPATIENT
Start: 2019-02-11 | End: 2019-02-11

## 2019-02-11 RX ORDER — INSULIN GLARGINE 100 [IU]/ML
44 INJECTION, SOLUTION SUBCUTANEOUS AT BEDTIME
Qty: 0 | Refills: 0 | Status: DISCONTINUED | OUTPATIENT
Start: 2019-02-11 | End: 2019-02-12

## 2019-02-11 RX ORDER — ASPIRIN AND DIPYRIDAMOLE 25; 200 MG/1; MG/1
1 CAPSULE, EXTENDED RELEASE ORAL
Refills: 0 | Status: DISCONTINUED | OUTPATIENT
Start: 2019-02-11 | End: 2019-02-12

## 2019-02-11 RX ADMIN — Medication 8 UNIT(S): at 08:20

## 2019-02-11 RX ADMIN — Medication 50 MILLIGRAM(S): at 05:20

## 2019-02-11 RX ADMIN — HEPARIN SODIUM 5000 UNIT(S): 5000 INJECTION INTRAVENOUS; SUBCUTANEOUS at 05:20

## 2019-02-11 RX ADMIN — Medication 1 PATCH: at 06:32

## 2019-02-11 RX ADMIN — HEPARIN SODIUM 5000 UNIT(S): 5000 INJECTION INTRAVENOUS; SUBCUTANEOUS at 21:20

## 2019-02-11 RX ADMIN — LISINOPRIL 2.5 MILLIGRAM(S): 2.5 TABLET ORAL at 05:20

## 2019-02-11 RX ADMIN — Medication 2: at 12:06

## 2019-02-11 RX ADMIN — Medication 1000 MILLIGRAM(S): at 23:17

## 2019-02-11 RX ADMIN — ATORVASTATIN CALCIUM 80 MILLIGRAM(S): 80 TABLET, FILM COATED ORAL at 21:20

## 2019-02-11 RX ADMIN — Medication 100 MILLIGRAM(S): at 05:20

## 2019-02-11 RX ADMIN — Medication 1000 MILLIGRAM(S): at 00:36

## 2019-02-11 RX ADMIN — Medication 1 PATCH: at 11:40

## 2019-02-11 RX ADMIN — ASPIRIN AND DIPYRIDAMOLE 1 CAPSULE(S): 25; 200 CAPSULE, EXTENDED RELEASE ORAL at 17:32

## 2019-02-11 RX ADMIN — SENNA PLUS 2 TABLET(S): 8.6 TABLET ORAL at 21:20

## 2019-02-11 RX ADMIN — BUDESONIDE AND FORMOTEROL FUMARATE DIHYDRATE 2 PUFF(S): 160; 4.5 AEROSOL RESPIRATORY (INHALATION) at 11:38

## 2019-02-11 RX ADMIN — INSULIN GLARGINE 44 UNIT(S): 100 INJECTION, SOLUTION SUBCUTANEOUS at 21:21

## 2019-02-11 RX ADMIN — Medication 400 MILLIGRAM(S): at 22:17

## 2019-02-11 RX ADMIN — BUDESONIDE AND FORMOTEROL FUMARATE DIHYDRATE 2 PUFF(S): 160; 4.5 AEROSOL RESPIRATORY (INHALATION) at 21:20

## 2019-02-11 RX ADMIN — PANTOPRAZOLE SODIUM 40 MILLIGRAM(S): 20 TABLET, DELAYED RELEASE ORAL at 05:22

## 2019-02-11 RX ADMIN — Medication 1 PATCH: at 18:31

## 2019-02-11 RX ADMIN — Medication 1 PATCH: at 11:38

## 2019-02-11 RX ADMIN — Medication 2: at 08:20

## 2019-02-11 RX ADMIN — Medication 81 MILLIGRAM(S): at 11:38

## 2019-02-11 RX ADMIN — HEPARIN SODIUM 5000 UNIT(S): 5000 INJECTION INTRAVENOUS; SUBCUTANEOUS at 13:14

## 2019-02-11 NOTE — PROGRESS NOTE ADULT - PROBLEM SELECTOR PROBLEM 5
Coronary artery disease involving native heart without angina pectoris, unspecified vessel or lesion type
Hypertension, unspecified type
Coronary artery disease involving native heart without angina pectoris, unspecified vessel or lesion type
Coronary artery disease involving native heart without angina pectoris, unspecified vessel or lesion type
Hypertension, unspecified type
Coronary artery disease involving native heart without angina pectoris, unspecified vessel or lesion type

## 2019-02-11 NOTE — PROGRESS NOTE ADULT - SUBJECTIVE AND OBJECTIVE BOX
PGY1 Note discussed with supervising resident and primary attending.    Patient is a 53y old  Male who presents with a chief complaint of constipation, worsening weakness (11 Feb 2019 11:25)      INTERVAL HPI/OVERNIGHT EVENTS:    MEDICATIONS  (STANDING):  aspirin  chewable 81 milliGRAM(s) Oral daily  atorvastatin 80 milliGRAM(s) Oral at bedtime  bisacodyl Suppository 10 milliGRAM(s) Rectal once  buDESOnide 160 MICROgram(s)/formoterol 4.5 MICROgram(s) Inhaler 2 Puff(s) Inhalation two times a day  dipyridamole 200 mG/aspirin 25 mG 1 Capsule(s) Oral <User Schedule>  docusate sodium 100 milliGRAM(s) Oral two times a day  ergocalciferol 19011 Unit(s) Oral <User Schedule>  heparin  Injectable 5000 Unit(s) SubCutaneous every 8 hours  insulin glargine Injectable (LANTUS) 44 Unit(s) SubCutaneous at bedtime  insulin lispro (HumaLOG) corrective regimen sliding scale   SubCutaneous three times a day before meals  insulin lispro Injectable (HumaLOG) 12 Unit(s) SubCutaneous three times a day before meals  lactulose Syrup 10 Gram(s) Oral two times a day  lisinopril 2.5 milliGRAM(s) Oral daily  metoprolol tartrate 50 milliGRAM(s) Oral two times a day  nicotine - 21 mG/24Hr(s) Patch 1 patch Transdermal daily  pantoprazole    Tablet 40 milliGRAM(s) Oral before breakfast  senna 2 Tablet(s) Oral at bedtime    MEDICATIONS  (PRN):  acetaminophen   Tablet .. 650 milliGRAM(s) Oral every 6 hours PRN Moderate Pain (4 - 6)  polyethylene glycol 3350 17 Gram(s) Oral daily PRN Constipation      Allergies    Plavix (Other)    Intolerances        REVIEW OF SYSTEMS:  CONSTITUTIONAL: No fever, weight loss, or fatigue  RESPIRATORY: No cough, wheezing, chills or hemoptysis; No shortness of breath  CARDIOVASCULAR: No chest pain, palpitations, dizziness, or leg swelling  GASTROINTESTINAL: No abdominal or epigastric pain. No nausea, vomiting, or hematemesis; No diarrhea or constipation. No melena or hematochezia.  NEUROLOGICAL: No headaches, memory loss, loss of strength, numbness, or tremors  SKIN: No itching, burning, rashes, or lesions     Vital Signs Last 24 Hrs  T(C): 36.7 (11 Feb 2019 11:20), Max: 36.8 (11 Feb 2019 00:13)  T(F): 98 (11 Feb 2019 11:20), Max: 98.3 (11 Feb 2019 00:13)  HR: 68 (11 Feb 2019 11:20) (68 - 83)  BP: 124/78 (11 Feb 2019 11:20) (101/59 - 144/85)  BP(mean): --  RR: 19 (11 Feb 2019 11:20) (17 - 19)  SpO2: 100% (11 Feb 2019 11:20) (95% - 100%)    PHYSICAL EXAM:  GENERAL: NAD, well-groomed, well-developed  HEAD:  Atraumatic, Normocephalic  EYES: EOMI, PERRLA, conjunctiva and sclera clear  NECK: Supple, No JVD, Normal thyroid  CHEST/LUNG: Clear to percussion bilaterally; No rales, rhonchi, wheezing, or rubs  HEART: Regular rate and rhythm; No murmurs, rubs, or gallops  ABDOMEN: Soft, Nontender, Nondistended; Bowel sounds present  NERVOUS SYSTEM:  Alert & Oriented X3, Good concentration; Motor Strength 5/5 B/L   EXTREMITIES:  2+ Peripheral Pulses, No clubbing, cyanosis, or edema  SKIN;    LABS:    02-11    134<L>  |  101  |  24<H>  ----------------------------<  263<H>  4.6   |  28  |  1.50<H>    Ca    9.3      11 Feb 2019 07:01          CAPILLARY BLOOD GLUCOSE      POCT Blood Glucose.: 231 mg/dL (11 Feb 2019 11:53)  POCT Blood Glucose.: 221 mg/dL (11 Feb 2019 08:08)  POCT Blood Glucose.: 350 mg/dL (10 Feb 2019 21:52)  POCT Blood Glucose.: 243 mg/dL (10 Feb 2019 16:44)      RADIOLOGY & ADDITIONAL TESTS:    Imaging Personally Reviewed:  [ ] YES  [ ] NO    Consultant(s) Notes Reviewed:  [ ] YES  [ ] NO PGY1 Note discussed with supervising resident and primary attending.    Patient is a 53y old  Male who presents with a chief complaint of constipation, worsening weakness (11 Feb 2019 11:25)      INTERVAL HPI/OVERNIGHT EVENTS: Pt seen and examined at bedside. Patient blood pressure is running fine. Blood sugar levels are on higher side. We have increased bedtime Lantus coverage to 44 units and pre-meal coverage to 12 units. Pt denies any overnight event.  Plan is to do ABRAHAN tomorrow to look for embolic source.  Pt reevaluation done today. recommended home with home PT.    MEDICATIONS  (STANDING):  aspirin  chewable 81 milliGRAM(s) Oral daily  atorvastatin 80 milliGRAM(s) Oral at bedtime  bisacodyl Suppository 10 milliGRAM(s) Rectal once  buDESOnide 160 MICROgram(s)/formoterol 4.5 MICROgram(s) Inhaler 2 Puff(s) Inhalation two times a day  dipyridamole 200 mG/aspirin 25 mG 1 Capsule(s) Oral <User Schedule>  docusate sodium 100 milliGRAM(s) Oral two times a day  ergocalciferol 64454 Unit(s) Oral <User Schedule>  heparin  Injectable 5000 Unit(s) SubCutaneous every 8 hours  insulin glargine Injectable (LANTUS) 44 Unit(s) SubCutaneous at bedtime  insulin lispro (HumaLOG) corrective regimen sliding scale   SubCutaneous three times a day before meals  insulin lispro Injectable (HumaLOG) 12 Unit(s) SubCutaneous three times a day before meals  lactulose Syrup 10 Gram(s) Oral two times a day  lisinopril 2.5 milliGRAM(s) Oral daily  metoprolol tartrate 50 milliGRAM(s) Oral two times a day  nicotine - 21 mG/24Hr(s) Patch 1 patch Transdermal daily  pantoprazole    Tablet 40 milliGRAM(s) Oral before breakfast  senna 2 Tablet(s) Oral at bedtime    MEDICATIONS  (PRN):  acetaminophen   Tablet .. 650 milliGRAM(s) Oral every 6 hours PRN Moderate Pain (4 - 6)  polyethylene glycol 3350 17 Gram(s) Oral daily PRN Constipation      Allergies    Plavix (Other)    Intolerances        REVIEW OF SYSTEMS:  CONSTITUTIONAL: No fever, weight loss, or fatigue  RESPIRATORY: No cough, wheezing, chills or hemoptysis; No shortness of breath  CARDIOVASCULAR: No chest pain, palpitations, dizziness, or leg swelling  GASTROINTESTINAL: No abdominal or epigastric pain. No nausea, vomiting, or hematemesis; No diarrhea or constipation. No melena or hematochezia.  NEUROLOGICAL: No headaches, memory loss, loss of strength, numbness, or tremors  SKIN: No itching, burning, rashes, or lesions     Vital Signs Last 24 Hrs  T(C): 36.7 (11 Feb 2019 11:20), Max: 36.8 (11 Feb 2019 00:13)  T(F): 98 (11 Feb 2019 11:20), Max: 98.3 (11 Feb 2019 00:13)  HR: 68 (11 Feb 2019 11:20) (68 - 83)  BP: 124/78 (11 Feb 2019 11:20) (101/59 - 144/85)  BP(mean): --  RR: 19 (11 Feb 2019 11:20) (17 - 19)  SpO2: 100% (11 Feb 2019 11:20) (95% - 100%)    PHYSICAL EXAM:  GENERAL: NAD, well-groomed, well-developed  HEAD:  Atraumatic, Normocephalic  EYES: EOMI, PERRLA, conjunctiva and sclera clear  NECK: Supple, No JVD, Normal thyroid  CHEST/LUNG: Clear to percussion bilaterally; No rales, rhonchi, wheezing, or rubs  HEART: Regular rate and rhythm; No murmurs, rubs, or gallops  ABDOMEN: Soft, Nontender, Nondistended; Bowel sounds present  NERVOUS SYSTEM:  Alert & Oriented X3, Good concentration; Motor Strength 5/5 B/L   EXTREMITIES:  2+ Peripheral Pulses, No clubbing, cyanosis, or edema  SKIN;    LABS:    02-11    134<L>  |  101  |  24<H>  ----------------------------<  263<H>  4.6   |  28  |  1.50<H>    Ca    9.3      11 Feb 2019 07:01          CAPILLARY BLOOD GLUCOSE      POCT Blood Glucose.: 231 mg/dL (11 Feb 2019 11:53)  POCT Blood Glucose.: 221 mg/dL (11 Feb 2019 08:08)  POCT Blood Glucose.: 350 mg/dL (10 Feb 2019 21:52)  POCT Blood Glucose.: 243 mg/dL (10 Feb 2019 16:44)      Consultant(s) Notes Reviewed:  [x] YES  [ ] NO

## 2019-02-11 NOTE — PROGRESS NOTE ADULT - PROBLEM SELECTOR PROBLEM 2
Diabetes mellitus of other type without complication
Diabetes mellitus of other type without complication
MALCOM (acute kidney injury)

## 2019-02-11 NOTE — PROGRESS NOTE ADULT - PROBLEM SELECTOR PROBLEM 1
Cerebrovascular accident (CVA) due to bilateral embolism of posterior cerebral arteries
Diabetes mellitus
Weakness
Cerebrovascular accident (CVA) due to bilateral embolism of posterior cerebral arteries

## 2019-02-11 NOTE — PROGRESS NOTE ADULT - ATTENDING COMMENTS
Pt seen and examined at bedside. No overnight events. Pt is feeling well. Had one very small watery bowel movement yesterday. Agreeable to suppository. No abdominal pain and good appetite. Passing flatus.     Vital Signs Last 24 Hrs  T(C): 36.7 (10 Feb 2019 11:02), Max: 37 (09 Feb 2019 15:10)  T(F): 98 (10 Feb 2019 11:02), Max: 98.6 (09 Feb 2019 15:10)  HR: 75 (10 Feb 2019 11:02) (70 - 80)  BP: 128/70 (10 Feb 2019 11:02) (113/75 - 158/91)  BP(mean): --  RR: 18 (10 Feb 2019 11:02) (17 - 18)  SpO2: 100% (10 Feb 2019 11:02) (100% - 100%)    CAPILLARY BLOOD GLUCOSE      POCT Blood Glucose.: 196 mg/dL (10 Feb 2019 11:23)  POCT Blood Glucose.: 177 mg/dL (10 Feb 2019 07:59)  POCT Blood Glucose.: 290 mg/dL (09 Feb 2019 21:13)  POCT Blood Glucose.: 226 mg/dL (09 Feb 2019 16:39)      A/P:    CVA with multiple infarcts: Diffuse atherosclerosis of intracranial vessels; CW Aggrenox 1 cap daily +Asa 81mg, can titrate up to BID and stop the aspirin;  Atorvastatin; TTE from November negative for intracardiac shunt  Constipation - Suppository today, no abdominal pain and passing flatus, will hold off on A-xray; CW Stool softeners    Rest as above.  Pending Rehab placement.
Patient seen and examined at bedside, feels ok  ECHO negative for intracardiac source of emboli, discussed with dr. Knowles and Dr. Jimenez, plan for ABRAHAN in am.  Discussed with dr. Pimentel from Neurology, will order CTA neck to evaluate the posterior circulation.  Tele: uneventful  Physical exam:  Vital Signs Last 24 Hrs  T(C): 36.6 (11 Feb 2019 15:36), Max: 36.8 (11 Feb 2019 00:13)  T(F): 97.9 (11 Feb 2019 15:36), Max: 98.3 (11 Feb 2019 00:13)  HR: 66 (11 Feb 2019 15:36) (66 - 83)  BP: 120/72 (11 Feb 2019 15:36) (101/59 - 144/85)  BP(mean): --  RR: 20 (11 Feb 2019 15:36) (17 - 20)  SpO2: 100% (11 Feb 2019 15:36) (95% - 100%)    A/P  1- Bilateral acute and subacute strokes:   rule out embolic source: ABRAHAN in am  CTA of the neck  continue asa, dipyridamole, patient is allergic to Plavix.   continue statin    2- Severe atherosclerotic disease: risk factor modification:  continue nicotine patch: smoking cessation counselling given at length.    3- Type 2 diabetes:   Endo input appreciated  continue Lantus and lispro  sliding scale as needed    4- Hyperlipidemia  5-Hypertension.  6- chronic systolic heart failure: not in decompensation.
Patient seen and examined at bedside, states feeling new weakness at his right LE, also bilateral upper extremities sensation defects with feeling burning pain as well as in his right LE.   MRI of the brain shwoing bilateral acute infarcts in the occipital and posterior temporal lobes, this is suspicious of Embolic strokes.   patient has poorly controlled diabetes, his A1c was 7.7 in November 2018, now 12.2.   He states he takes his medications but cannot recall them.  Physical eaxm:  Vital Signs Last 24 Hrs  T(C): 36.9 (07 Feb 2019 16:16), Max: 37.2 (06 Feb 2019 20:10)  T(F): 98.5 (07 Feb 2019 16:16), Max: 99 (06 Feb 2019 20:10)  HR: 72 (07 Feb 2019 16:16) (67 - 82)  BP: 145/49 (07 Feb 2019 16:16) (119/74 - 162/77)  BP(mean): --  RR: 18 (07 Feb 2019 16:16) (17 - 18)  SpO2: 100% (07 Feb 2019 16:16) (98% - 100%)  HEENT: Neck supple  heart: S1 S2 normal  Abdomen: NT< ND  CHest: Clear  LE: no Le edema  Neurological: Oriented x3.   equal bilateral weakness: 4/5  decreased sensation over the right LE  also states bilateral numbness in bilateral feet.  A/P  1- Acute bilateral occipital lobes strokes:   Most likely embolic, will get TTE and if negative will proceed with ABRAHAN.   Neuro input appreciated  Permissive hypertension, continue asa, as per notes, patient stated an allergy to Plavix on his last discharge.   Lipid profile showing high LDL and TG. Increase lipitor to 80   Heparin sub Q  Needs risk factor modification: active smoking, htn, uncontrolled diabetes    2- Type 2 diabetes: most likely patient is not compliant  Start with Lantus 40 units at bedtime as per his last discharge.  Endo consult  Patient was only on Metformin at home.     3- Hyperlipidemia: increase statin to 80 mg    4- HTN: still in window for permissive hypertension  symptoms started yesterday.  hold bp meds    5- chronic systolic CHF:   EF: 35%, NYHA class 3.

## 2019-02-11 NOTE — PROGRESS NOTE ADULT - SUBJECTIVE AND OBJECTIVE BOX
Continue dual antiplatelet treatment: ASA 81 and Aggrenox (allergic to clopidogrel) due to intracranial stenoses  Check CTA H/N for evidence of neck vasculature stenoses    Neurology Follow up note    Name  MADISON PERRY    HPI:  54 yo M with PMH of HTN, HLD, DM, CABG, s/p stroke 3 months ago, presents to ED with complaints of constipation for the past 7 days along with worsening weakness. Pt states he has an abdominal discomfort secondary to bloating due to constipation. Otherwise, patient notes that he has worsening weakness, states he was at rehab after discharge, however signed out against medical advice due to legal issues that arose. Patient therefore did therapy on his own, however feels that it was not a good idea and not successful, stating his weakness has been gradually getting worse. Patient denies any acute worsening of weakness.  Patient states he's able to eat, denies fever, chills, nausea, vomtiing, diarrhea, dysuria, all other ROS negative. Patient still smokes cigarettes (06 Feb 2019 21:06)      Interval History -        Subjective:        MEDICATIONS  (STANDING):  aspirin  chewable 81 milliGRAM(s) Oral daily  atorvastatin 80 milliGRAM(s) Oral at bedtime  bisacodyl Suppository 10 milliGRAM(s) Rectal once  buDESOnide 160 MICROgram(s)/formoterol 4.5 MICROgram(s) Inhaler 2 Puff(s) Inhalation two times a day  dipyridamole 200 mG/aspirin 25 mG 1 Capsule(s) Oral two times a day  docusate sodium 100 milliGRAM(s) Oral two times a day  ergocalciferol 03487 Unit(s) Oral <User Schedule>  heparin  Injectable 5000 Unit(s) SubCutaneous every 8 hours  insulin glargine Injectable (LANTUS) 44 Unit(s) SubCutaneous at bedtime  insulin lispro (HumaLOG) corrective regimen sliding scale   SubCutaneous three times a day before meals  insulin lispro Injectable (HumaLOG) 12 Unit(s) SubCutaneous three times a day before meals  lactulose Syrup 10 Gram(s) Oral two times a day  lisinopril 2.5 milliGRAM(s) Oral daily  metoprolol tartrate 50 milliGRAM(s) Oral two times a day  nicotine - 21 mG/24Hr(s) Patch 1 patch Transdermal daily  pantoprazole    Tablet 40 milliGRAM(s) Oral before breakfast  senna 2 Tablet(s) Oral at bedtime    MEDICATIONS  (PRN):  acetaminophen   Tablet .. 650 milliGRAM(s) Oral every 6 hours PRN Moderate Pain (4 - 6)  polyethylene glycol 3350 17 Gram(s) Oral daily PRN Constipation      Allergies    Plavix (Other)    Intolerances        Review of Systems:  General: [ ] None, [ ] chills, [ ]fatigue, [ ] fevers  Skin: [ ] None, [ ] rash   HEENT: [ ] None, [ ] head injury, [ ] blurred vision, [ ] double vision, [ ] eye pain, [ ] visual loss, [ ] hearing loss, [ ] deafness, [ ] ear pain, [ ] ringing in the ears, [ ] vertigo, [ ] sinus pain, [ ] voice changes  Neck: [ ] None, [ ] neck stiffness  Respiratory: [ ] None, [ ] cough, [ ] difficulty breathing  Cardiovascular: [ ] None, [ ] calf cramps, [ ] chest pain, [ ] leg pain, [ ] swelling, [ ] rapid heart rate, [ ] shortness of breath  Gastrointestinal: [ ] None, [ ] abdominal pain, [ ] nausea, [ ] vomiting  Musculoskeletal: [ ] None, [ ] back pain, [ ] joint pain, [ ] joint stiffness, [ ] leg cramps, [ ] muscle atrophy, [ ] muscle cramps, [ ] muscle weakness, [ ] swelling of extremities  Neurological: [ ] None, [ ] Dizziness, [ ] decreased memory, [ ] fainting, [ ] focal neurological symptoms, [ ] headaches, [ ] incontinence of stool, [ ] incontinence of urine, [ ] loss of consciousness, [ ] numbness, [ ] seizures, [ ] spinning sensation, [ ] stroke, [ ] trouble walking, [ ] unsteadiness, [ ] visual changes, [ ] weakness  Psychiatric: [ ] None,  [ ] depression, [ ] anxiety, [ ] hallucinations, [ ] inability to concentrate, [ ] mood changes, [ ] panic attacks  Hematology: [ ] None,  [ ] blood clots, [ ] spontaneous bleeding      Objective:   Vital Signs Last 24 Hrs  T(C): 36.6 (11 Feb 2019 15:36), Max: 36.8 (11 Feb 2019 00:13)  T(F): 97.9 (11 Feb 2019 15:36), Max: 98.3 (11 Feb 2019 00:13)  HR: 66 (11 Feb 2019 15:36) (66 - 82)  BP: 120/72 (11 Feb 2019 15:36) (101/59 - 144/85)  BP(mean): --  RR: 20 (11 Feb 2019 15:36) (17 - 20)  SpO2: 100% (11 Feb 2019 15:36) (95% - 100%)    General Exam:   General appearance: No acute distress                 Cardiovascular: Pedal dorsalis pulses intact bilaterally    Neurological Exam:  Mental Status: Orientated to self, date and place.  Attention intact.  No dysarthria, aphasia or neglect.  Knowledge intact.  Registration intact.  Short and long term memory grossly intact.      Cranial Nerves: CN I - not tested.  PERRL, EOMI, VFF, no nystagmus or diplopia.  No APD.  Fundi not visualized bilaterally.  CN V1-3 intact to light touch and pinprick.  No facial asymmetry.  Hearing intact to finger rub bilaterally.  Tongue, uvula and palate midline.  Sternocleidomastoid and Trapezius intact bilaterally.    Motor:   Tone: normal.                  Strength: intact throughout  Pronator drift: none                 Dysmeria: None to finger-nose-finger or heel-shin-heel  No truncal ataxia.    Tremor: No resting, postural or action tremor.  No myoclonus.    Sensation: intact to light touch, pinprick, vibration and proprioception    Deep Tendon Reflexes: 1+ bilateral biceps, triceps, brachioradialis, knee and ankle  Toes flexor bilaterally    Gait: normal and stable.      Other:    02-11    134<L>  |  101  |  24<H>  ----------------------------<  263<H>  4.6   |  28  |  1.50<H>    Ca    9.3      11 Feb 2019 07:01      02-11    134<L>  |  101  |  24<H>  ----------------------------<  263<H>  4.6   |  28  |  1.50<H>    Ca    9.3      11 Feb 2019 07:01          Radiology    EKG:  tele:  TTE:  EEG:                 Please contact the Neurology consult service with any questions.    Jignesh Pimentel MD  Neurology Attending  [facility] Neurology Follow up note    Name  MADISON PERRY    HPI:  52 yo M with PMH of HTN, HLD, DM, CABG, s/p stroke 3 months ago, presents to ED with complaints of constipation for the past 7 days along with worsening weakness. Pt states he has an abdominal discomfort secondary to bloating due to constipation. Otherwise, patient notes that he has worsening weakness, states he was at rehab after discharge, however signed out against medical advice due to legal issues that arose. Patient therefore did therapy on his own, however feels that it was not a good idea and not successful, stating his weakness has been gradually getting worse. Patient denies any acute worsening of weakness.  Patient states he's able to eat, denies fever, chills, nausea, vomtiing, diarrhea, dysuria, all other ROS negative. Patient still smokes cigarettes (06 Feb 2019 21:06)    Interval History - No acute neurological events have occurred overnight.    MEDICATIONS  (STANDING):  aspirin  chewable 81 milliGRAM(s) Oral daily  atorvastatin 80 milliGRAM(s) Oral at bedtime  bisacodyl Suppository 10 milliGRAM(s) Rectal once  buDESOnide 160 MICROgram(s)/formoterol 4.5 MICROgram(s) Inhaler 2 Puff(s) Inhalation two times a day  dipyridamole 200 mG/aspirin 25 mG 1 Capsule(s) Oral two times a day  docusate sodium 100 milliGRAM(s) Oral two times a day  ergocalciferol 77754 Unit(s) Oral <User Schedule>  heparin  Injectable 5000 Unit(s) SubCutaneous every 8 hours  insulin glargine Injectable (LANTUS) 44 Unit(s) SubCutaneous at bedtime  insulin lispro (HumaLOG) corrective regimen sliding scale   SubCutaneous three times a day before meals  insulin lispro Injectable (HumaLOG) 12 Unit(s) SubCutaneous three times a day before meals  lactulose Syrup 10 Gram(s) Oral two times a day  lisinopril 2.5 milliGRAM(s) Oral daily  metoprolol tartrate 50 milliGRAM(s) Oral two times a day  nicotine - 21 mG/24Hr(s) Patch 1 patch Transdermal daily  pantoprazole    Tablet 40 milliGRAM(s) Oral before breakfast  senna 2 Tablet(s) Oral at bedtime    MEDICATIONS  (PRN):  acetaminophen   Tablet .. 650 milliGRAM(s) Oral every 6 hours PRN Moderate Pain (4 - 6)  polyethylene glycol 3350 17 Gram(s) Oral daily PRN Constipation    Allergies  Plavix (Other)    Review of Systems: Fourteen systems reviewed and negative except as in Interval History.      Objective:   Vital Signs Last 24 Hrs  T(C): 36.6 (11 Feb 2019 15:36), Max: 36.8 (11 Feb 2019 00:13)  T(F): 97.9 (11 Feb 2019 15:36), Max: 98.3 (11 Feb 2019 00:13)  HR: 66 (11 Feb 2019 15:36) (66 - 82)  BP: 120/72 (11 Feb 2019 15:36) (101/59 - 144/85)  RR: 20 (11 Feb 2019 15:36) (17 - 20)  SpO2: 100% (11 Feb 2019 15:36) (95% - 100%)    General Exam:  General: No acute distress  Respiratory: CTAB/l.  No crackles, rhonchi, or wheezes.  Cardiovascular: RRR, No murmurs, Full b/l radial and pedal pulses    Neurological Exam:  General / Mental Status: Oriented to person, place, and time.  No dysarthria or aphasia present.  Naming and repetition intact.  Cranial Nerves: PERRLA, EOMI x 2, VFF x 4, No nystagmus or diplopia, Optic discs normal b/l.  B/l V1-V3 equal to light touch and pinprick.  Right nasolabial fold flattening present.  B/l hearing equal to finger rub.  Symmetric palate movement. 5/5 strength with b/l sternocleidomastoid and trapezius.  Midline tongue protrusion with no atrophy or fasciculations.  Motor: Normal bulk and tone in all four extremities.  4/5 strength throughout right upper and lower extremities, without downward drift.  5/5 strength throughout left upper and lower extremities, without downward drift.  No rigidity, spasticity, or tremors in any of the four extremities.  Sensation: Intact to light touch and pinprick in all four extremities.  Difficulty with Romberg test due to right-sided hemiparesis.  Coordination: No dysmetria with b/l finger-to-nose and heel-to-shin tests.  Normal rapid alternating movements b/l.  Reflexes: 2+ and symmetric at b/l biceps, triceps, brachioradialis, patellae, and ankles.  Toes flexor b/l.  Gait: Narrow-based and normal. Difficulty initiating tiptoe, heel, and tandem gaits due to right-sided hemiparesis.    NIHSS 1 (for right nasolabial fold flattening)  MRS 1      Labs:    02-11    134<L>  |  101  |  24<H>  ----------------------------<  263<H>  4.6   |  28  |  1.50<H>    Ca    9.3      11 Feb 2019 07:01      CT BRAIN (2/6/19): Left occipital lacunar infarct    MRI BRAIN (2/7/19): B/l occipital and temporal acute and subacute infarcts    MR ANGIO BRAIN (2/7/19): Right vertebral artery stenosis, with irregular intracranial vessels throughout b/l cerebral hemispheres      Assessment:  53 RHM with b/l posterior circulation ischemic strokes, likely secondary to multiple intracranial stenoses, HLD, and HTN.      Recommendations:    1. Stroke workup  - CT Angiogram Neck to evaluate for neck vessel abnormalities  - Follow up Transthoracic Echocardiogram    2. Secondary stroke prevention  - Aspirin 81mg daily  - Dipyrimadole/Aspirin 200mg/25mg daily as second antiplatelet agent in the setting of intracranial stenoses (patient is allergic to clopidogrel)  - Atorvastatin 40mg QHS  - Goal BP: Treat if over 120/80  - Diabetes management  - PT/OT      Please contact the Neurology consult service with any questions.    Jignesh Pimentel MD  Neurology Attending  [facility]

## 2019-02-11 NOTE — MEDICAL STUDENT PROGRESS NOTE(EDUCATION) - NS MD HP STUD ASPLAN PLAN FT
Assessment and Plan:   · Assessment		  Patient admitted for weakness with MALCOM and CVA.     Problem/Plan - 1:  CVA   Plan: has acute infarct   CT showed Small age indeterminate 1.0 cm lacunar infarct in the left occipital   cortex. Small age indeterminate lacunar infarct in the left basal ganglia.   MRA shows Multiple intracranial stenoses  MRI shows Small chronic infarct left occipital lobe, Small acute infarct right occipital lobe noted measuring 9 x 8 mm. Additional punctate acute/subacute infarcts noted in the bilateral posterior temporal and left occipital region.  Patient recommended to have ILR as an outpatient but he has not followed up.     physical therapy consulted- changed from rehab to home.  neurology consult - Dr. Fraire on case. ABRAHAN w/doppler in the AM to find embolic cause.   C/W ASA, statin, Aggrenox ( pt has a plavix allergy)   Cardiology consulted, Dr. Knowles     Problem/Plan - 2:  ·  Problem: MALCOM (acute kidney injury).  Plan: trending down.      Problem/Plan - 3:  ·  Problem: Congestive heart failure, NYHA class 3, chronic, combined.  Plan: EF 30-35% on previous echo in November 2018  on asa, statin and lopressor, lisinopril  c/w telemetry monitoring  f/u cardiology - Dr. Knowles.            Problem/Plan - 5:  ·  Problem: Coronary artery disease involving native heart without angina pectoris, unspecified vessel or lesion type.  Plan: c/w aspirin.      Problem/Plan - 6:  Problem: Diabetes mellitus of other type without complication. Plan: c/w sliding scale for now  12.2 HbA1c  continue to monitor fingersticks  Increased lispro to 12 units TID  Increased Lantus to 44 units at bedtime  Endo consulted Dr. Yao.     Problem/Plan - 7:  ·  Problem: Hypertension, unspecified type.  Plan: c/w lopressor and lisinopril with parameters,      Problem/Plan - 8:  ·  Problem: Hyperlipidemia, unspecified hyperlipidemia type.  Plan: c/w statin  lipid profile- low HDL.     Problem/Plan -9  ·	 Weakness: Plan: has acute infarct  -physical therapy consulted  -changed from rehab to home     Problem/Plan - 10:  ·  Problem: Need for prophylactic measure.  Plan: IMPROVE VTE Individual Risk Assessment          RISK                                                          Points  [  ] Previous VTE                                                3  [  ] Thrombophilia                                             2  [ x ] Lower limb paralysis                                   2        (unable to hold up >15 seconds)    [  ] Current Cancer                                             2         (within 6 months)  [ x ] Immobilization > 24 hrs                              1  [  ] ICU/CCU stay > 24 hours                             1  [  ] Age > 60                                                         1    IMPROVE VTE Score: 3    c/w heparin for vte prophylaxis.      Problem/Plan - 10:  Problem: Need for prophylactic measure. Plan; IMPROVE VTE Individual Risk Assessment          RISK                                                          Points  [  ] Previous VTE                                                3  [  ] Thrombophilia                                             2  [ x ] Lower limb paralysis                                   2        (unable to hold up >15 seconds)    [  ] Current Cancer                                             2         (within 6 months)  [ x ] Immobilization > 24 hrs                              1  [  ] ICU/CCU stay > 24 hours                             1  [  ] Age > 60                                                         1    IMPROVE VTE Score: 3    c/w heparin for vte prophylaxis.

## 2019-02-11 NOTE — MEDICAL STUDENT PROGRESS NOTE(EDUCATION) - SUBJECTIVE AND OBJECTIVE BOX
INTERVAL HPI: 53 year old male presented to the ED with c/o worsening weakness and abdominal discomfort secondary to constipation    OVERNIGHT EVENTS: ABRAHAN w/doppler planned for tomorrow morning to find embolic source for small acute infarct RIGHT occipital lobe (9.8mm) additional acute/subacute infarcts noted in the BILATERAL posterior temporal and LEFT occipital region, evidenced by MRI 2/7/18. BP well controlled with lisinopril 2.5mg daily and lopressor 50mg BID. Hyperglycemic events noted, pre-meal lispro and lantus dose increased.       REVIEW OF SYSTEMS:  CONSTITUTIONAL: No fever, weight loss  EYES: No eye pain, visual disturbances, or discharge  ENMT:  No difficulty hearing, tinnitus, vertigo; No sinus or throat pain  NECK: No pain or stiffness  BREASTS: No pain, masses, or nipple discharge  RESPIRATORY: No cough, wheezing, chills or hemoptysis; No shortness of breath  CARDIOVASCULAR: No chest pain, palpitations, dizziness, or leg swelling  GASTROINTESTINAL: No abdominal or epigastric pain. No nausea, vomiting, or hematemesis; No diarrhea or constipation. No melena or hematochezia.  GENITOURINARY: No dysuria, frequency, hematuria, or incontinence  NEUROLOGICAL: No headaches, memory loss, loss of strength, numbness, or tremors  SKIN: No itching, burning, rashes, or lesions   LYMPH NODES: No enlarged glands  ENDOCRINE: No heat or cold intolerance; No hair loss  MUSCULOSKELETAL: No joint pain or swelling; No muscle, back, or extremity pain  PSYCHIATRIC: No depression, anxiety, mood swings, or difficulty sleeping  HEME/LYMPH: No easy bruising, or bleeding gums  ALLERY AND IMMUNOLOGIC: No hives or eczema    PHYSICAL EXAM:  T(C): 36.6 (02-11-19 @ 15:36), Max: 36.8 (02-11-19 @ 00:13)  HR: 66 (02-11-19 @ 15:36) (66 - 83)  BP: 120/72 (02-11-19 @ 15:36) (101/59 - 144/85)  RR: 20 (02-11-19 @ 15:36) (17 - 20)  SpO2: 100% (02-11-19 @ 15:36) (95% - 100%)  Wt(kg): --  I&O's Summary    10 Feb 2019 07:01  -  11 Feb 2019 07:00  --------------------------------------------------------  IN: 950 mL / OUT: 0 mL / NET: 950 mL      GENERAL: NAD, well-groomed, well-developed  HEAD:  Atraumatic, Normocephalic  EYES: EOMI, PERRLA, conjunctiva and sclera clear  ENMT: No tonsillar erythema, exudates, or enlargement; Moist mucous membranes, Good dentition, No lesions  NECK: Supple, No JVD, Normal thyroid  NERVOUS SYSTEM:  Alert & Oriented, sleeping, but answering questions, following commands during assessment  CHEST/LUNG: Clear to percussion bilaterally; No rales, rhonchi, wheezing, or rubs  HEART: Regular rate and rhythm; No murmurs, rubs, or gallops  ABDOMEN: Soft, Nontender, Nondistended; Bowel sounds present  EXTREMITIES:  2+ Peripheral Pulses, No clubbing, cyanosis, or edema  LYMPH: No lymphadenopathy noted  SKIN: No rashes or lesions    LABS:      Diet:    RADIOLOGY & ADDITIONAL TESTS:    Imaging Personally Reviewed:  [ ] YES     Consultant(s) Notes Reviewed:                  MEDICATIONS  (STANDING):      MEDICATIONS  (STANDING):  aspirin  chewable 81 milliGRAM(s) Oral daily  atorvastatin 80 milliGRAM(s) Oral at bedtime  bisacodyl Suppository 10 milliGRAM(s) Rectal once  buDESOnide 160 MICROgram(s)/formoterol 4.5 MICROgram(s) Inhaler 2 Puff(s) Inhalation two times a day  dipyridamole 200 mG/aspirin 25 mG 1 Capsule(s) Oral <User Schedule>  docusate sodium 100 milliGRAM(s) Oral two times a day  ergocalciferol 34416 Unit(s) Oral <User Schedule>  heparin  Injectable 5000 Unit(s) SubCutaneous every 8 hours  insulin glargine Injectable (LANTUS) 44 Unit(s) SubCutaneous at bedtime  insulin lispro (HumaLOG) corrective regimen sliding scale   SubCutaneous three times a day before meals  insulin lispro Injectable (HumaLOG) 12 Unit(s) SubCutaneous three times a day before meals  lactulose Syrup 10 Gram(s) Oral two times a day  lisinopril 2.5 milliGRAM(s) Oral daily  metoprolol tartrate 50 milliGRAM(s) Oral two times a day  nicotine - 21 mG/24Hr(s) Patch 1 patch Transdermal daily  pantoprazole    Tablet 40 milliGRAM(s) Oral before breakfast  senna 2 Tablet(s) Oral at bedtime    MEDICATIONS  (PRN):  acetaminophen   Tablet .. 650 milliGRAM(s) Oral every 6 hours PRN Moderate Pain (4 - 6)  polyethylene glycol 3350 17 Gram(s) Oral daily PRN Constipation            Disposition:    DVT Prophylaxis:  Subcu Heparin [  ]     LMWH [ ]     Coumadin [ ]    Xaeralto [ ]    Eliquis [ ]   Venodyne pumps [ ]    Discussed with Patient [ ]     Family [ ]          [ ]   RN[ ]      [ ]    Advance Directives:      Palliative Care:    Care Discussed with Consultants/Other Providers [ ] YES  [ ] NO    [  ] Teaching Attending Addendum: [  ] Present with Resident      I saw and evaluated the patient. Discussed with Resident,  _____________________ and agree with the resident's findings and plan as documented in the resident's note, with the following revision made as necessary.     Attending Comments:          Time spent:

## 2019-02-11 NOTE — PROGRESS NOTE ADULT - ASSESSMENT
acute small and  ischemic infarct and right occipital lobe and bilateral acute/subacute  infarcts in both posterior temporal lobes and left occipital lobe with normal punctate in quality. The MR angiogram provides  evidence of diffuse atherosclerotic disease of the intracranial vessels that might explain his predisposition to recurrent stroke. He probably also suffers from microvascular ischemic disease due to light full hasn't doses of medium-sized arteries caused by hypertension and hypercholesterolemia. It is appropriate to investigate cardiac embolization because of bilateral strokes but,  there is sufficient  justification for bilateral strokes  due to intracranial artery disease. aggressive control of intracranial artery disease by high-dose atorvastatin (80 mg), ACE inhibitor or  angiotensinogen receptor blocker, hydrochlorothiazide,, aspirin and clopidogrel  are recommended.
52 yo M with PMH of HTN, HLD, DM, CABG, s/p stroke 3 months ago, presents to ED with complaints of constipation for the past 7 days along with worsening weakness. Found to have CVA, CHF exac and un cont dm. Patient admits to taking Lantus 40 units, regular insulin on a scale and metformin 1000 mg.
Patient admitted for weakness with MALCOM and CVA.
Patient admitted for weakness with MALCOM, found to have CVA of multiple areas.
Patient admitted for weakness with MALCOM, possible CVA.
Patient admitted for weakness with MALCOM and CVA.
Patient admitted for weakness with MALCOM, possible CVA
Patient admitted for weakness with MALCOM, possible CVA.

## 2019-02-11 NOTE — MEDICAL STUDENT PROGRESS NOTE(EDUCATION) - NS MD HP STUD ASPLAN ASSES FT
HPI:  Pt is 52 yo M with PMH of HTN, HLD, DM, CABG, s/p stroke November 2018 ago at [facility] in [facility] where he had presented with left sided upper and lower extremity numbness. He left AMA due to having to deal with rent issues, then presented to [facility] ED  a week later due to worsening weakness and slurred speech, patient found to have new occipital stroke. 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  Was started on eliquis for presence of beta 2 glycoprotein antibody. Pt was discharged to [facility], however signed out against medical advise due to legal issues that arose. He returned to the ED 2/6/19 with complaints of constipation for the past 7 days along with worsening weakness and vision changes. Pt states he had abdominal discomfort secondary to bloating, due to constipation. CT head 2/6/19 revealed small indeterminate age,  Lacunar infarct in LEFT occipital and LEFT basal ganglia. Patient has recent right sided weakness, MRI reveals small acute infarct of RIGHT occipital lobe (9.8mm) and additional punctuate acute/subacute infarcts noted in BILATERAL posterior temporal and LEFT occipital region. Discussed results with Neurology,  ABRAHAN w/doppler planned for the AM to find embolic source.

## 2019-02-11 NOTE — PROGRESS NOTE ADULT - SUBJECTIVE AND OBJECTIVE BOX
Follow-up on: Diabetes mellitus    Subjective:  Patient denies any complaints , says feeling better and was eating ok.       MEDICATIONS  (STANDING):  aspirin  chewable 81 milliGRAM(s) Oral daily  atorvastatin 80 milliGRAM(s) Oral at bedtime  bisacodyl Suppository 10 milliGRAM(s) Rectal once  buDESOnide 160 MICROgram(s)/formoterol 4.5 MICROgram(s) Inhaler 2 Puff(s) Inhalation two times a day  dipyridamole 200 mG/aspirin 25 mG 1 Capsule(s) Oral <User Schedule>  docusate sodium 100 milliGRAM(s) Oral two times a day  ergocalciferol 91991 Unit(s) Oral <User Schedule>  heparin  Injectable 5000 Unit(s) SubCutaneous every 8 hours  insulin glargine Injectable (LANTUS) 40 Unit(s) SubCutaneous at bedtime  insulin lispro (HumaLOG) corrective regimen sliding scale   SubCutaneous three times a day before meals  insulin lispro Injectable (HumaLOG) 8 Unit(s) SubCutaneous three times a day before meals  lactulose Syrup 10 Gram(s) Oral two times a day  lisinopril 2.5 milliGRAM(s) Oral daily  metoprolol tartrate 50 milliGRAM(s) Oral two times a day  nicotine - 21 mG/24Hr(s) Patch 1 patch Transdermal daily  pantoprazole    Tablet 40 milliGRAM(s) Oral before breakfast  senna 2 Tablet(s) Oral at bedtime    MEDICATIONS  (PRN):  acetaminophen   Tablet .. 650 milliGRAM(s) Oral every 6 hours PRN Moderate Pain (4 - 6)  polyethylene glycol 3350 17 Gram(s) Oral daily PRN Constipation      PHYSICAL EXAM:  VITALS: T(C): 36.8 (02-11-19 @ 07:49)  T(F): 98.3 (02-11-19 @ 07:49), Max: 98.3 (02-11-19 @ 00:13)  HR: 68 (02-11-19 @ 10:09) (68 - 83)  BP: 101/59 (02-11-19 @ 10:09) (101/59 - 144/85)  RR:  (17 - 19)  SpO2:  (95% - 100%)      GENERAL: NAD, well-groomed, well-developed  HEENT:  Atraumatic, Normocephalic,NAD  RESPIRATORY: Clear to auscultation bilaterally; No rales, rhonchi, wheezing, or rubs  CARDIOVASCULAR: Regular rate and rhythm; No murmurs; no peripheral edema  GI: Soft, nontender, non distended, normal bowel sounds  SKIN: normal      POCT Blood Glucose.: 221 mg/dL (02-11-19 @ 08:08)  POCT Blood Glucose.: 350 mg/dL (02-10-19 @ 21:52)  POCT Blood Glucose.: 243 mg/dL (02-10-19 @ 16:44)  POCT Blood Glucose.: 196 mg/dL (02-10-19 @ 11:23)  POCT Blood Glucose.: 177 mg/dL (02-10-19 @ 07:59)  POCT Blood Glucose.: 290 mg/dL (02-09-19 @ 21:13)  POCT Blood Glucose.: 226 mg/dL (02-09-19 @ 16:39)  POCT Blood Glucose.: 317 mg/dL (02-09-19 @ 11:52)  POCT Blood Glucose.: 179 mg/dL (02-09-19 @ 07:53)  POCT Blood Glucose.: 187 mg/dL (02-08-19 @ 21:00)  POCT Blood Glucose.: 184 mg/dL (02-08-19 @ 17:17)    02-11    134<L>  |  101  |  24<H>  ----------------------------<  263<H>  4.6   |  28  |  1.50<H>    EGFR if : 60  EGFR if non : 52<L>    Ca    9.3      02-11            Thyroid Function Tests:  02-07 @ 07:19 TSH 0.54       Hemoglobin A1C, Whole Blood: 12.2 % <H> [4.0 - 5.6] (02-07-19 @ 09:52)  Hemoglobin A1C, Whole Blood: 8.2 % <H> [4.0 - 5.6] (11-15-18 @ 19:02)

## 2019-02-11 NOTE — PROGRESS NOTE ADULT - PROBLEM SELECTOR PLAN 1
has acute infarct   CT showed Small age indeterminate 1.0 cm lacunar infarct in the left occipital   cortex. Small age indeterminate lacunar infarct in the left basal ganglia.   MRA shows Multiple intracranial stenoses  MRI shows Small chronic infarct left occipital lobe, Small acute infarct right occipital lobe noted measuring 9 x 8 mm. Additional punctate acute/subacute infarcts noted in the bilateral posterior temporal and left occipital region.  Patient recommended to have ILR as an outpatient but he has not followed up.   on aspirin and statin, Aggrenox added.  cardiology consulted Dr. Knowles  physical therapy consulted- changed from rehab to home.  neurology consult - Dr. Fraire on case

## 2019-02-11 NOTE — PROGRESS NOTE ADULT - PROBLEM SELECTOR PROBLEM 3
Congestive heart failure, NYHA class 3, chronic, combined

## 2019-02-11 NOTE — PROGRESS NOTE ADULT - PROBLEM SELECTOR PLAN 1
- uncontrolled, Hba1c 12.2  - Increase Lantus to 44 units and Humalog to 12 ac tid  - Continue with mild HSS  - fsg ac and hs  - Patient will need basal / bolus on discharge, discussed with patient. - uncontrolled, Hba1c 12.2  - Increase Lantus to 44 units and Humalog to 12 ac tid  - Continue with mild HSS  - fsg ac and hs  - Patient will need basal / bolus on discharge, discussed with patient.  d/w hs

## 2019-02-12 VITALS
DIASTOLIC BLOOD PRESSURE: 80 MMHG | HEART RATE: 75 BPM | SYSTOLIC BLOOD PRESSURE: 142 MMHG | TEMPERATURE: 98 F | OXYGEN SATURATION: 98 % | RESPIRATION RATE: 18 BRPM

## 2019-02-12 LAB
ANION GAP SERPL CALC-SCNC: 7 MMOL/L — SIGNIFICANT CHANGE UP (ref 5–17)
BUN SERPL-MCNC: 24 MG/DL — HIGH (ref 7–18)
CALCIUM SERPL-MCNC: 9.3 MG/DL — SIGNIFICANT CHANGE UP (ref 8.4–10.5)
CHLORIDE SERPL-SCNC: 105 MMOL/L — SIGNIFICANT CHANGE UP (ref 96–108)
CO2 SERPL-SCNC: 24 MMOL/L — SIGNIFICANT CHANGE UP (ref 22–31)
CREAT SERPL-MCNC: 1.35 MG/DL — HIGH (ref 0.5–1.3)
GLUCOSE SERPL-MCNC: 204 MG/DL — HIGH (ref 70–99)
HCT VFR BLD CALC: 42.3 % — SIGNIFICANT CHANGE UP (ref 39–50)
HGB BLD-MCNC: 14.4 G/DL — SIGNIFICANT CHANGE UP (ref 13–17)
MCHC RBC-ENTMCNC: 30.5 PG — SIGNIFICANT CHANGE UP (ref 27–34)
MCHC RBC-ENTMCNC: 34 GM/DL — SIGNIFICANT CHANGE UP (ref 32–36)
MCV RBC AUTO: 89.7 FL — SIGNIFICANT CHANGE UP (ref 80–100)
PLATELET # BLD AUTO: 198 K/UL — SIGNIFICANT CHANGE UP (ref 150–400)
POTASSIUM SERPL-MCNC: 4.2 MMOL/L — SIGNIFICANT CHANGE UP (ref 3.5–5.3)
POTASSIUM SERPL-SCNC: 4.2 MMOL/L — SIGNIFICANT CHANGE UP (ref 3.5–5.3)
RBC # BLD: 4.71 M/UL — SIGNIFICANT CHANGE UP (ref 4.2–5.8)
RBC # FLD: 11.5 % — SIGNIFICANT CHANGE UP (ref 10.3–14.5)
SODIUM SERPL-SCNC: 136 MMOL/L — SIGNIFICANT CHANGE UP (ref 135–145)
WBC # BLD: 4.2 K/UL — SIGNIFICANT CHANGE UP (ref 3.8–10.5)
WBC # FLD AUTO: 4.2 K/UL — SIGNIFICANT CHANGE UP (ref 3.8–10.5)

## 2019-02-12 PROCEDURE — 82607 VITAMIN B-12: CPT

## 2019-02-12 PROCEDURE — 93320 DOPPLER ECHO COMPLETE: CPT | Mod: 26

## 2019-02-12 PROCEDURE — 93320 DOPPLER ECHO COMPLETE: CPT

## 2019-02-12 PROCEDURE — 93005 ELECTROCARDIOGRAM TRACING: CPT

## 2019-02-12 PROCEDURE — 97116 GAIT TRAINING THERAPY: CPT

## 2019-02-12 PROCEDURE — 82553 CREATINE MB FRACTION: CPT

## 2019-02-12 PROCEDURE — 99285 EMERGENCY DEPT VISIT HI MDM: CPT | Mod: 25

## 2019-02-12 PROCEDURE — 74176 CT ABD & PELVIS W/O CONTRAST: CPT

## 2019-02-12 PROCEDURE — 82962 GLUCOSE BLOOD TEST: CPT

## 2019-02-12 PROCEDURE — 93312 ECHO TRANSESOPHAGEAL: CPT | Mod: 26

## 2019-02-12 PROCEDURE — 97161 PT EVAL LOW COMPLEX 20 MIN: CPT

## 2019-02-12 PROCEDURE — 82550 ASSAY OF CK (CPK): CPT

## 2019-02-12 PROCEDURE — 85027 COMPLETE CBC AUTOMATED: CPT

## 2019-02-12 PROCEDURE — 70450 CT HEAD/BRAIN W/O DYE: CPT

## 2019-02-12 PROCEDURE — 84443 ASSAY THYROID STIM HORMONE: CPT

## 2019-02-12 PROCEDURE — 93325 DOPPLER ECHO COLOR FLOW MAPG: CPT

## 2019-02-12 PROCEDURE — 84100 ASSAY OF PHOSPHORUS: CPT

## 2019-02-12 PROCEDURE — 83036 HEMOGLOBIN GLYCOSYLATED A1C: CPT

## 2019-02-12 PROCEDURE — 84484 ASSAY OF TROPONIN QUANT: CPT

## 2019-02-12 PROCEDURE — 70498 CT ANGIOGRAPHY NECK: CPT

## 2019-02-12 PROCEDURE — 93325 DOPPLER ECHO COLOR FLOW MAPG: CPT | Mod: 26

## 2019-02-12 PROCEDURE — 80048 BASIC METABOLIC PNL TOTAL CA: CPT

## 2019-02-12 PROCEDURE — 80053 COMPREHEN METABOLIC PANEL: CPT

## 2019-02-12 PROCEDURE — 94640 AIRWAY INHALATION TREATMENT: CPT

## 2019-02-12 PROCEDURE — 36415 COLL VENOUS BLD VENIPUNCTURE: CPT

## 2019-02-12 PROCEDURE — 82306 VITAMIN D 25 HYDROXY: CPT

## 2019-02-12 PROCEDURE — 93312 ECHO TRANSESOPHAGEAL: CPT

## 2019-02-12 PROCEDURE — 99233 SBSQ HOSP IP/OBS HIGH 50: CPT

## 2019-02-12 PROCEDURE — 86803 HEPATITIS C AB TEST: CPT

## 2019-02-12 PROCEDURE — 97530 THERAPEUTIC ACTIVITIES: CPT

## 2019-02-12 PROCEDURE — 70551 MRI BRAIN STEM W/O DYE: CPT

## 2019-02-12 PROCEDURE — 70498 CT ANGIOGRAPHY NECK: CPT | Mod: 26

## 2019-02-12 PROCEDURE — 83735 ASSAY OF MAGNESIUM: CPT

## 2019-02-12 PROCEDURE — 83605 ASSAY OF LACTIC ACID: CPT

## 2019-02-12 PROCEDURE — 99239 HOSP IP/OBS DSCHRG MGMT >30: CPT

## 2019-02-12 PROCEDURE — 80061 LIPID PANEL: CPT

## 2019-02-12 PROCEDURE — 71045 X-RAY EXAM CHEST 1 VIEW: CPT

## 2019-02-12 PROCEDURE — 82803 BLOOD GASES ANY COMBINATION: CPT

## 2019-02-12 PROCEDURE — 93306 TTE W/DOPPLER COMPLETE: CPT

## 2019-02-12 PROCEDURE — 70544 MR ANGIOGRAPHY HEAD W/O DYE: CPT

## 2019-02-12 RX ORDER — NICOTINE POLACRILEX 2 MG
1 GUM BUCCAL
Qty: 0 | Refills: 0 | COMMUNITY

## 2019-02-12 RX ORDER — LISINOPRIL 2.5 MG/1
1 TABLET ORAL
Qty: 0 | Refills: 0 | DISCHARGE
Start: 2019-02-12

## 2019-02-12 RX ORDER — METOPROLOL TARTRATE 50 MG
0.5 TABLET ORAL
Qty: 0 | Refills: 0 | DISCHARGE
Start: 2019-02-12

## 2019-02-12 RX ORDER — NICOTINE POLACRILEX 2 MG
1 GUM BUCCAL
Qty: 14 | Refills: 0
Start: 2019-02-12 | End: 2019-02-25

## 2019-02-12 RX ORDER — INSULIN LISPRO 100/ML
8 VIAL (ML) SUBCUTANEOUS
Qty: 1 | Refills: 0 | OUTPATIENT
Start: 2019-02-12 | End: 2019-03-13

## 2019-02-12 RX ORDER — ATORVASTATIN CALCIUM 80 MG/1
1 TABLET, FILM COATED ORAL
Qty: 0 | Refills: 0 | DISCHARGE
Start: 2019-02-12

## 2019-02-12 RX ORDER — METFORMIN HYDROCHLORIDE 850 MG/1
1 TABLET ORAL
Qty: 0 | Refills: 0 | COMMUNITY

## 2019-02-12 RX ORDER — INSULIN GLARGINE 100 [IU]/ML
48 INJECTION, SOLUTION SUBCUTANEOUS AT BEDTIME
Qty: 0 | Refills: 0 | Status: DISCONTINUED | OUTPATIENT
Start: 2019-02-12 | End: 2019-02-12

## 2019-02-12 RX ORDER — METOPROLOL TARTRATE 50 MG
1 TABLET ORAL
Qty: 0 | Refills: 0 | DISCHARGE
Start: 2019-02-12

## 2019-02-12 RX ORDER — INSULIN GLARGINE 100 [IU]/ML
48 INJECTION, SOLUTION SUBCUTANEOUS
Qty: 1 | Refills: 0 | OUTPATIENT
Start: 2019-02-12 | End: 2019-03-13

## 2019-02-12 RX ORDER — INSULIN LISPRO 100/ML
8 VIAL (ML) SUBCUTANEOUS
Qty: 0 | Refills: 0 | Status: DISCONTINUED | OUTPATIENT
Start: 2019-02-12 | End: 2019-02-12

## 2019-02-12 RX ORDER — ASPIRIN AND DIPYRIDAMOLE 25; 200 MG/1; MG/1
1 CAPSULE, EXTENDED RELEASE ORAL
Qty: 60 | Refills: 0
Start: 2019-02-12 | End: 2019-03-13

## 2019-02-12 RX ADMIN — ASPIRIN AND DIPYRIDAMOLE 1 CAPSULE(S): 25; 200 CAPSULE, EXTENDED RELEASE ORAL at 18:02

## 2019-02-12 RX ADMIN — Medication 1 PATCH: at 06:20

## 2019-02-12 RX ADMIN — Medication 100 MILLIGRAM(S): at 18:02

## 2019-02-12 RX ADMIN — Medication 1 PATCH: at 12:38

## 2019-02-12 RX ADMIN — Medication 81 MILLIGRAM(S): at 12:38

## 2019-02-12 RX ADMIN — Medication 2: at 08:28

## 2019-02-12 RX ADMIN — Medication 1 PATCH: at 17:59

## 2019-02-12 RX ADMIN — BUDESONIDE AND FORMOTEROL FUMARATE DIHYDRATE 2 PUFF(S): 160; 4.5 AEROSOL RESPIRATORY (INHALATION) at 18:00

## 2019-02-12 RX ADMIN — Medication 12 UNIT(S): at 08:29

## 2019-02-12 NOTE — PROGRESS NOTE ADULT - SUBJECTIVE AND OBJECTIVE BOX
Interval Events:  pt in nad    Allergies    Plavix (Other)    Intolerances      Endocrine/Metabolic Medications:  atorvastatin 80 milliGRAM(s) Oral at bedtime  insulin glargine Injectable (LANTUS) 48 Unit(s) SubCutaneous at bedtime  insulin lispro (HumaLOG) corrective regimen sliding scale   SubCutaneous three times a day before meals  insulin lispro Injectable (HumaLOG) 8 Unit(s) SubCutaneous three times a day before meals      Vital Signs Last 24 Hrs  T(C): 36.7 (12 Feb 2019 20:13), Max: 36.9 (11 Feb 2019 23:44)  T(F): 98 (12 Feb 2019 20:13), Max: 98.4 (11 Feb 2019 23:44)  HR: 75 (12 Feb 2019 20:13) (69 - 75)  BP: 142/80 (12 Feb 2019 20:13) (100/80 - 142/80)  BP(mean): --  RR: 18 (12 Feb 2019 20:13) (17 - 20)  SpO2: 98% (12 Feb 2019 20:13) (98% - 100%)      PHYSICAL EXAM  All physical exam findings normal, except those marked:  General:	Alert, active, cooperative, NAD, well hydrated  .		[] Abnormal:  Neck		Normal: supple, no cervical adenopathy, no palpable thyroid  .		[] Abnormal:  Cardiovascular	Normal: regular rate, normal S1, S2, no murmurs  .		[] Abnormal:  Respiratory	Normal: no chest wall deformity, normal respiratory pattern, CTA B/L  .		[] Abnormal:  Abdominal	Normal: soft, ND, NT, bowel sounds present, no masses, no organomegaly  .		[] Abnormal:  		Normal normal genitalia, testes descended, circumcised/uncircumcised  .		Lupe stage:			Breast lupe:  .		Menstrual history:  .		[] Abnormal:  Extremities	Normal: FROM x4  .		[] Abnormal:  Skin		Normal: intact and not indurated, no rash, no acanthosis nigricans  .		[] Abnormal:  Neurologic	Normal: grossly intact  .		[] Abnormal:    LABS                        14.4   4.2   )-----------( 198      ( 12 Feb 2019 08:48 )             42.3                               136    |  105    |  24                  Calcium: 9.3   / iCa: x      (02-12 @ 08:48)    ----------------------------<  204       Magnesium: x                                4.2     |  24     |  1.35             Phosphorous: x          CAPILLARY BLOOD GLUCOSE      POCT Blood Glucose.: 87 mg/dL (12 Feb 2019 11:47)  POCT Blood Glucose.: 203 mg/dL (12 Feb 2019 08:06)        Assesment/plan    Dm- with swings  change lantus to 48   change humalog to 8 ac tid  fsg ac and hs  hold metformin  d/w hs

## 2019-02-12 NOTE — PROGRESS NOTE ADULT - SUBJECTIVE AND OBJECTIVE BOX
Interval Events:      Allergies    Plavix (Other)    Intolerances      Endocrine/Metabolic Medications:  atorvastatin 80 milliGRAM(s) Oral at bedtime  insulin glargine Injectable (LANTUS) 44 Unit(s) SubCutaneous at bedtime  insulin lispro (HumaLOG) corrective regimen sliding scale   SubCutaneous three times a day before meals  insulin lispro Injectable (HumaLOG) 12 Unit(s) SubCutaneous three times a day before meals      Vital Signs Last 24 Hrs  T(C): 36.7 (12 Feb 2019 07:11), Max: 36.9 (11 Feb 2019 23:44)  T(F): 98.1 (12 Feb 2019 07:11), Max: 98.4 (11 Feb 2019 23:44)  HR: 69 (12 Feb 2019 07:11) (66 - 76)  BP: 123/85 (12 Feb 2019 07:11) (101/59 - 128/83)  BP(mean): --  RR: 17 (12 Feb 2019 07:11) (17 - 20)  SpO2: 100% (12 Feb 2019 07:11) (100% - 100%)      PHYSICAL EXAM  All physical exam findings normal, except those marked:  General:	Alert, active, cooperative, NAD, well hydrated  .		[] Abnormal:  Neck		Normal: supple, no cervical adenopathy, no palpable thyroid  .		[] Abnormal:  Cardiovascular	Normal: regular rate, normal S1, S2, no murmurs  .		[] Abnormal:  Respiratory	Normal: no chest wall deformity, normal respiratory pattern, CTA B/L  .		[] Abnormal:  Abdominal	Normal: soft, ND, NT, bowel sounds present, no masses, no organomegaly  .		[] Abnormal:  		Normal normal genitalia, testes descended, circumcised/uncircumcised  .		Lupe stage:			Breast lupe:  .		Menstrual history:  .		[] Abnormal:  Extremities	Normal: FROM x4  .		[] Abnormal:  Skin		Normal: intact and not indurated, no rash, no acanthosis nigricans  .		[] Abnormal:  Neurologic	Normal: grossly intact  .		[] Abnormal:    LABS                        14.4   4.2   )-----------( 198      ( 12 Feb 2019 08:48 )             42.3                               136    |  105    |  24                  Calcium: 9.3   / iCa: x      (02-12 @ 08:48)    ----------------------------<  204       Magnesium: x                                4.2     |  24     |  1.35             Phosphorous: x          CAPILLARY BLOOD GLUCOSE      POCT Blood Glucose.: 203 mg/dL (12 Feb 2019 08:06)  POCT Blood Glucose.: 323 mg/dL (11 Feb 2019 21:12)  POCT Blood Glucose.: 101 mg/dL (11 Feb 2019 16:45)  POCT Blood Glucose.: 231 mg/dL (11 Feb 2019 11:53)        Assesment/plan Neurology Follow up note    Name  MADISON PERRY    HPI / INTERVAL HISTORY:  53 RHM with HLD, HTN, DM, CABG, and previous stroke, who presented with worsening weakness while at an inpatient rehabilitation facility. Since admission, the patient reports improvement in strength. He has not had any fever, headache, or difficulty with eating.    MEDICATIONS  (STANDING):  aspirin  chewable 81 milliGRAM(s) Oral daily  atorvastatin 80 milliGRAM(s) Oral at bedtime  bisacodyl Suppository 10 milliGRAM(s) Rectal once  buDESOnide 160 MICROgram(s)/formoterol 4.5 MICROgram(s) Inhaler 2 Puff(s) Inhalation two times a day  dipyridamole 200 mG/aspirin 25 mG 1 Capsule(s) Oral two times a day  docusate sodium 100 milliGRAM(s) Oral two times a day  ergocalciferol 41142 Unit(s) Oral <User Schedule>  heparin  Injectable 5000 Unit(s) SubCutaneous every 8 hours  insulin glargine Injectable (LANTUS) 48 Unit(s) SubCutaneous at bedtime  insulin lispro (HumaLOG) corrective regimen sliding scale   SubCutaneous three times a day before meals  insulin lispro Injectable (HumaLOG) 8 Unit(s) SubCutaneous three times a day before meals  lactulose Syrup 10 Gram(s) Oral two times a day  lisinopril 2.5 milliGRAM(s) Oral daily  metoprolol tartrate 50 milliGRAM(s) Oral two times a day  nicotine - 21 mG/24Hr(s) Patch 1 patch Transdermal daily  pantoprazole    Tablet 40 milliGRAM(s) Oral before breakfast  senna 2 Tablet(s) Oral at bedtime    MEDICATIONS  (PRN):  acetaminophen   Tablet .. 650 milliGRAM(s) Oral every 6 hours PRN Moderate Pain (4 - 6)  polyethylene glycol 3350 17 Gram(s) Oral daily PRN Constipation    Allergies  Plavix (Other)    Review of Systems: Fourteen systems reviewed and negative except as in HPI / Interval History.    Objective:   Vital Signs Last 24 Hrs  T(C): 36.7 (12 Feb 2019 07:11), Max: 36.9 (11 Feb 2019 23:44)  T(F): 98.1 (12 Feb 2019 07:11), Max: 98.4 (11 Feb 2019 23:44)  HR: 69 (12 Feb 2019 07:11) (66 - 70)  BP: 123/85 (12 Feb 2019 07:11) (120/72 - 128/83)  RR: 17 (12 Feb 2019 07:11) (17 - 20)  SpO2: 100% (12 Feb 2019 07:11) (100% - 100%)    General Exam:  General: No acute distress  Respiratory: CTAB/l.  No crackles, rhonchi, or wheezes.  Cardiovascular: RRR, No murmurs, Full b/l radial and pedal pulses    Neurological Exam:  General / Mental Status: Oriented to person, place, and time.  No dysarthria or aphasia present.  Naming and repetition intact.  Cranial Nerves: PERRLA, EOMI x 2, VFF x 4, No nystagmus or diplopia, Optic discs normal b/l.  B/l V1-V3 equal to light touch and pinprick.  Symmetric facial movement and palate elevation.  B/l hearing equal to finger rub.  5/5 strength with b/l sternocleidomastoid and trapezius.  Midline tongue protrusion with no atrophy or fasciculations.  Motor: Normal bulk and tone in all four extremities.  5/5 strength throughout all four extremities.  No downward drift, rigidity, spasticity, or tremors in any of the four extremities.  Sensation: Intact to light touch and pinprick in all four extremities.  Negative Romberg.  Coordination: No dysmetria with b/l finger-to-nose and heel-to-shin tests.  Normal rapid alternating movements b/l.  Reflexes: 2+ and symmetric at b/l biceps, triceps, brachioradialis, patellae, and ankles.  Toes flexor b/l.  Gait: Narrow-based and normal. No difficulty with tiptoe, heel, and tandem gaits.    General Exam:   General appearance: No acute distress                 Cardiovascular: Pedal dorsalis pulses intact bilaterally    Neurological Exam:  NIH Stroke Scale (NIHSS):   1a. LOConscious:  0-alert 1-lethargy 2-obtund 3-coma:    _0____  1b. LOC Questions:  0-both 1-one 2-none                       ____0_  1c. LOC Commands:  0-both 1-one 2-none                     __0___  2.   Gaze:  0-nl 1-partial 2-conjugate                                __0___  3.   Visual:  0-nl 1-part monserrat 2-full monserrat 3-bilat monserrat         __0___  4.   Facial Palsy:  0-nl 1-minor 2-part 3-complete             __1___  5.   Motor Arm:  0-nl 1-drift 2-effort 3-no effort         Left             __0__                              4-no move UN-amputated                     Right  __1__  6.   Motor Leg:                                                                 Left   __0__                                                                                   Right __1_  7.   Ataxia:  0-nl 1-one limb 2-two UN-amp                      _0____  8.   Sensory:  0-nl 1-mild 2-severe                                  __0___  9.   Language:  0-nl 1-mild 2-severe 3-mute                     __0___  10.  Dysarthria:  0-nl 1-mild 2-severe 3-barrier                  __0___  11.  Extinction/Inattention:  0-nl 1-mild 2-deep                 __0_         TOTAL NIHSS       __3____  MRS=3    Other:    02-12    136  |  105  |  24<H>  ----------------------------<  204<H>  4.2   |  24  |  1.35<H>    Ca    9.3      12 Feb 2019 08:48      02-12    136  |  105  |  24<H>  ----------------------------<  204<H>  4.2   |  24  |  1.35<H>    Ca    9.3      12 Feb 2019 08:48          Radiology    EKG:  tele:  TTE:  EEG:                 Please contact the Neurology consult service with any questions.    Jignesh Pimentel MD  Neurology Attending  [facility] Neurology Follow up note    Name  MADISON PERRY    HPI / INTERVAL HISTORY:  53 RHM with HLD, HTN, DM, CABG, and previous stroke, who presented with worsening weakness while at an inpatient rehabilitation facility. Since admission, the patient reports improvement in strength. He has not had any fever, headache, or difficulty with eating.    MEDICATIONS  (STANDING):  aspirin  chewable 81 milliGRAM(s) Oral daily  atorvastatin 80 milliGRAM(s) Oral at bedtime  bisacodyl Suppository 10 milliGRAM(s) Rectal once  buDESOnide 160 MICROgram(s)/formoterol 4.5 MICROgram(s) Inhaler 2 Puff(s) Inhalation two times a day  dipyridamole 200 mG/aspirin 25 mG 1 Capsule(s) Oral two times a day  docusate sodium 100 milliGRAM(s) Oral two times a day  ergocalciferol 63105 Unit(s) Oral <User Schedule>  heparin  Injectable 5000 Unit(s) SubCutaneous every 8 hours  insulin glargine Injectable (LANTUS) 48 Unit(s) SubCutaneous at bedtime  insulin lispro (HumaLOG) corrective regimen sliding scale   SubCutaneous three times a day before meals  insulin lispro Injectable (HumaLOG) 8 Unit(s) SubCutaneous three times a day before meals  lactulose Syrup 10 Gram(s) Oral two times a day  lisinopril 2.5 milliGRAM(s) Oral daily  metoprolol tartrate 50 milliGRAM(s) Oral two times a day  nicotine - 21 mG/24Hr(s) Patch 1 patch Transdermal daily  pantoprazole    Tablet 40 milliGRAM(s) Oral before breakfast  senna 2 Tablet(s) Oral at bedtime    MEDICATIONS  (PRN):  acetaminophen   Tablet .. 650 milliGRAM(s) Oral every 6 hours PRN Moderate Pain (4 - 6)  polyethylene glycol 3350 17 Gram(s) Oral daily PRN Constipation    Allergies  Plavix (Other)    Review of Systems: Fourteen systems reviewed and negative except as in HPI / Interval History.    Objective:   Vital Signs Last 24 Hrs  T(C): 36.7 (12 Feb 2019 07:11), Max: 36.9 (11 Feb 2019 23:44)  T(F): 98.1 (12 Feb 2019 07:11), Max: 98.4 (11 Feb 2019 23:44)  HR: 69 (12 Feb 2019 07:11) (66 - 70)  BP: 123/85 (12 Feb 2019 07:11) (120/72 - 128/83)  RR: 17 (12 Feb 2019 07:11) (17 - 20)  SpO2: 100% (12 Feb 2019 07:11) (100% - 100%)    General Exam:  General: No acute distress  Respiratory: CTAB/l.  No crackles, rhonchi, or wheezes.  Cardiovascular: RRR, No murmurs, Full b/l radial and pedal pulses    Neurological Exam:  General / Mental Status: Oriented to person, place, and time.  No dysarthria or aphasia present.  Naming and repetition intact.  Cranial Nerves: PERRLA, EOMI x 2, VFF x 4, No nystagmus or diplopia, Optic discs normal b/l.  B/l V1-V3 equal to light touch and pinprick.  Right nasolabial fold flattening present.  B/l hearing equal to finger rub.  Symmetric palate movement. 5/5 strength with b/l sternocleidomastoid and trapezius.  Midline tongue protrusion with no atrophy or fasciculations.  Motor: Normal bulk and tone in all four extremities.  4/5 strength throughout right upper and lower extremities, without downward drift.  5/5 strength throughout left upper and lower extremities, without downward drift.  No rigidity, spasticity, or tremors in any of the four extremities.  Sensation: Intact to light touch and pinprick in all four extremities.  Difficulty with Romberg test due to right-sided hemiparesis.  Coordination: No dysmetria with b/l finger-to-nose and heel-to-shin tests.  Normal rapid alternating movements b/l.  Reflexes: 2+ and symmetric at b/l biceps, triceps, brachioradialis, patellae, and ankles.  Toes flexor b/l.  Gait: Narrow-based and normal. Difficulty initiating tiptoe, heel, and tandem gaits due to right-sided hemiparesis.    NIHSS 1 (for right nasolabial fold flattening)  MRS 1      Labs:    02-12    136  |  105  |  24<H>  ----------------------------<  204<H>  4.2   |  24  |  1.35<H>    Ca    9.3      12 Feb 2019 08:48    HbA1c  12.2% <H>  FLP  178 / 144 / 34 <L> / 115      Neuroimaging:    CT BRAIN (2/6/19): Left occipital lacunar infarct    MRI BRAIN (2/7/19): B/l occipital and temporal acute and subacute infarcts    MR ANGIO BRAIN (2/7/19): Right vertebral artery stenosis, with irregular intracranial vessels throughout b/l cerebral hemispheres    ECHOCARDIOGRAM (2/8/19): Mild diastolic dysfunction, Moderate concentric LVH      Assessment:  53 RHM with b/l posterior circulation ischemic strokes, likely secondary to multiple intracranial stenoses, HLD, and HTN, but cannot rule out cardiac etiology.      Recommendations:    1. Stroke workup  - CT Angiogram Neck to evaluate for neck vessel abnormalities  - Follow up Transthoracic Echocardiogram    2. Secondary stroke prevention  - Aspirin 81mg daily  - Dipyrimadole/Aspirin 200mg/25mg daily as second antiplatelet agent  - Atorvastatin 40mg QHS  - Goal BP: Treat if over 120/80  - Diabetes management  - PT/OT      Please contact the Neurology consult service with any questions.    Jignesh Pimentel MD  Neurology Attending  [facility]

## 2019-02-12 NOTE — CHART NOTE - NSCHARTNOTEFT_GEN_A_CORE
Patient seen and examined with PGy1 Dr. Saucedo and ANNA Dc at bedside.    Vital Signs Last 24 Hrs  T(C): 36.7 (12 Feb 2019 11:35), Max: 36.9 (11 Feb 2019 23:44)  T(F): 98 (12 Feb 2019 11:35), Max: 98.4 (11 Feb 2019 23:44)  HR: 73 (12 Feb 2019 11:35) (66 - 73)  BP: 100/80 (12 Feb 2019 11:35) (100/80 - 128/83)  RR: 18 (12 Feb 2019 11:35) (17 - 20)  SpO2: 98% (12 Feb 2019 11:35) (98% - 100%)    P/E:  Neuro: AAO x3; No new  focal deficits  CVS: S1S2 present, Regular  Resp: BLAE+, No wheeze or Rhonchi  GI: Soft, BS+, NT, ND  Extr: No edema or calf tenderness  Gait: Mild imbalance without walker     Labs: Attending Medicine Covering Dr. Stef Bustillos      Patient seen and examined with PGy1 Dr. Saucedo and ANNA Dc at bedside.    Vital Signs Last 24 Hrs  T(C): 36.7 (12 Feb 2019 11:35), Max: 36.9 (11 Feb 2019 23:44)  T(F): 98 (12 Feb 2019 11:35), Max: 98.4 (11 Feb 2019 23:44)  HR: 73 (12 Feb 2019 11:35) (66 - 73)  BP: 100/80 (12 Feb 2019 11:35) (100/80 - 128/83)  RR: 18 (12 Feb 2019 11:35) (17 - 20)  SpO2: 98% (12 Feb 2019 11:35) (98% - 100%)    P/E:  Neuro: AAO x3; No new  focal deficits  CVS: S1S2 present, Regular  Resp: BLAE+, No wheeze or Rhonchi  GI: Soft, BS+, NT, ND  Extr: No edema or calf tenderness  Gait: Mild imbalance without walker     Labs:                        14.4   4.2   )-----------( 198      ( 12 Feb 2019 08:48 )             42.3   02-12    136  |  105  |  24<H>  ----------------------------<  204<H>  4.2   |  24  |  1.35<H>    Ca    9.3      12 Feb 2019 08:48    CT Angio Neck w/ IV Cont (02.12.19 @ 14:25)    IMPRESSION: Patent cervical internal carotid arteries without significant stenosis or dissection.  Multifocal moderate to severe stenoses of the cervical vertebral arteries.    < from: ABRAHAN w/Doppler (11.19.18 @ 08:02) >    CONCLUSIONS:  1. Normal mitral valve. Trace mitral regurgitation.  2. Normal trileaflet aortic valve. No aortic stenosis. No aortic valve regurgitation seen.  3. Normal aortic root. No atheroma is seenin the descending aorta and aortic arch.  4. No left atrial or left atrial appendage thrombus.  5. Severe global left ventricular systolic dysfunction.  6. No clot seen in right atrium.  7. Normal right ventricular size and function.  8. Normal tricuspid valve. Trace tricuspid regurgitation.  9. Normal pulmonic valve. Trace pulmonic insufficiency is noted.  10. Atrial septum appears intact on 2D echo and color Doppler. Agitated saline injection was negative for intracardiac shunt.  11. No pericardial effusion.  12. No cardiac source of embolus is identified.    D/D:  Acute CVA multiple intracranial stenosis likely due to severe Atherosclerosis  No evidence of vegetation  Continue ASA and Aggrenox and high dose Statin  Smoking cessation counseled at length, willing to quit; refer for outpatient cessation clinic and Nicotine patch which he has also from PCP  CT Angio reviewed with Dr. Pimentel, Neuro recommended no further mgmt. change at this time    Uncontrolled DM: Counseled at length; Diet and Exercise counseled  lantus plus Humalog with meals at least with two large meals stressed  Hold metformin for now as patient got IV contrast dye; May resume as outpatient by PCP if sugars elevated    Discussed with ANNA Dc  Discussed with PGY1 Dr. Saucedo    See discharge note updated for details    (Spend 38 mins with >50% spend counseling and cordination of care and d/w Patient and providers)

## 2019-02-12 NOTE — PROGRESS NOTE ADULT - REASON FOR ADMISSION
constipation, worsening weakness
worsening weakness
constipation, worsening weakness

## 2019-02-19 ENCOUNTER — EMERGENCY (EMERGENCY)
Facility: HOSPITAL | Age: 54
LOS: 1 days | Discharge: LEFT BEFORE DISCHARGE | End: 2019-02-19
Payer: MEDICAID

## 2019-02-19 VITALS
RESPIRATION RATE: 18 BRPM | SYSTOLIC BLOOD PRESSURE: 150 MMHG | HEART RATE: 70 BPM | DIASTOLIC BLOOD PRESSURE: 80 MMHG | HEIGHT: 75 IN | OXYGEN SATURATION: 99 % | TEMPERATURE: 98 F | WEIGHT: 210.1 LBS

## 2019-02-19 DIAGNOSIS — Z90.49 ACQUIRED ABSENCE OF OTHER SPECIFIED PARTS OF DIGESTIVE TRACT: Chronic | ICD-10-CM

## 2019-02-19 DIAGNOSIS — Z95.1 PRESENCE OF AORTOCORONARY BYPASS GRAFT: Chronic | ICD-10-CM

## 2019-02-19 PROBLEM — I50.42 CHRONIC COMBINED SYSTOLIC (CONGESTIVE) AND DIASTOLIC (CONGESTIVE) HEART FAILURE: Chronic | Status: ACTIVE | Noted: 2019-02-07

## 2019-02-19 PROCEDURE — 82962 GLUCOSE BLOOD TEST: CPT

## 2019-03-20 ENCOUNTER — INPATIENT (INPATIENT)
Facility: HOSPITAL | Age: 54
LOS: 1 days | Discharge: HOME | DRG: 641 | End: 2019-03-22
Attending: INTERNAL MEDICINE | Admitting: INTERNAL MEDICINE
Payer: MEDICAID

## 2019-03-20 VITALS
OXYGEN SATURATION: 100 % | HEIGHT: 75 IN | RESPIRATION RATE: 16 BRPM | SYSTOLIC BLOOD PRESSURE: 154 MMHG | DIASTOLIC BLOOD PRESSURE: 92 MMHG | WEIGHT: 218.04 LBS | HEART RATE: 68 BPM | TEMPERATURE: 98 F

## 2019-03-20 DIAGNOSIS — R53.1 WEAKNESS: ICD-10-CM

## 2019-03-20 DIAGNOSIS — R55 SYNCOPE AND COLLAPSE: ICD-10-CM

## 2019-03-20 DIAGNOSIS — Z95.5 PRESENCE OF CORONARY ANGIOPLASTY IMPLANT AND GRAFT: Chronic | ICD-10-CM

## 2019-03-20 DIAGNOSIS — Z29.9 ENCOUNTER FOR PROPHYLACTIC MEASURES, UNSPECIFIED: ICD-10-CM

## 2019-03-20 DIAGNOSIS — E13.9 OTHER SPECIFIED DIABETES MELLITUS WITHOUT COMPLICATIONS: ICD-10-CM

## 2019-03-20 DIAGNOSIS — J44.9 CHRONIC OBSTRUCTIVE PULMONARY DISEASE, UNSPECIFIED: ICD-10-CM

## 2019-03-20 DIAGNOSIS — E78.5 HYPERLIPIDEMIA, UNSPECIFIED: ICD-10-CM

## 2019-03-20 DIAGNOSIS — I50.42 CHRONIC COMBINED SYSTOLIC (CONGESTIVE) AND DIASTOLIC (CONGESTIVE) HEART FAILURE: ICD-10-CM

## 2019-03-20 DIAGNOSIS — N18.3 CHRONIC KIDNEY DISEASE, STAGE 3 (MODERATE): ICD-10-CM

## 2019-03-20 DIAGNOSIS — I25.10 ATHEROSCLEROTIC HEART DISEASE OF NATIVE CORONARY ARTERY WITHOUT ANGINA PECTORIS: ICD-10-CM

## 2019-03-20 DIAGNOSIS — I10 ESSENTIAL (PRIMARY) HYPERTENSION: ICD-10-CM

## 2019-03-20 DIAGNOSIS — Z95.1 PRESENCE OF AORTOCORONARY BYPASS GRAFT: Chronic | ICD-10-CM

## 2019-03-20 DIAGNOSIS — Z90.49 ACQUIRED ABSENCE OF OTHER SPECIFIED PARTS OF DIGESTIVE TRACT: Chronic | ICD-10-CM

## 2019-03-20 DIAGNOSIS — I63.9 CEREBRAL INFARCTION, UNSPECIFIED: ICD-10-CM

## 2019-03-20 LAB
ALBUMIN SERPL ELPH-MCNC: 3.3 G/DL — LOW (ref 3.5–5)
ALP SERPL-CCNC: 137 U/L — HIGH (ref 40–120)
ALT FLD-CCNC: 40 U/L DA — SIGNIFICANT CHANGE UP (ref 10–60)
ANION GAP SERPL CALC-SCNC: 5 MMOL/L — SIGNIFICANT CHANGE UP (ref 5–17)
APTT BLD: 27.3 SEC — LOW (ref 27.5–36.3)
AST SERPL-CCNC: 25 U/L — SIGNIFICANT CHANGE UP (ref 10–40)
BASE EXCESS BLDV CALC-SCNC: 2.1 MMOL/L — HIGH (ref -2–2)
BILIRUB SERPL-MCNC: 0.2 MG/DL — SIGNIFICANT CHANGE UP (ref 0.2–1.2)
BLOOD GAS COMMENTS, VENOUS: SIGNIFICANT CHANGE UP
BUN SERPL-MCNC: 16 MG/DL — SIGNIFICANT CHANGE UP (ref 7–18)
CALCIUM SERPL-MCNC: 8.4 MG/DL — SIGNIFICANT CHANGE UP (ref 8.4–10.5)
CHLORIDE SERPL-SCNC: 106 MMOL/L — SIGNIFICANT CHANGE UP (ref 96–108)
CO2 SERPL-SCNC: 26 MMOL/L — SIGNIFICANT CHANGE UP (ref 22–31)
CREAT SERPL-MCNC: 1.41 MG/DL — HIGH (ref 0.5–1.3)
GLUCOSE SERPL-MCNC: 342 MG/DL — HIGH (ref 70–99)
HCO3 BLDV-SCNC: 26 MMOL/L — SIGNIFICANT CHANGE UP (ref 21–29)
HCT VFR BLD CALC: 38 % — LOW (ref 39–50)
HGB BLD-MCNC: 12.5 G/DL — LOW (ref 13–17)
HOROWITZ INDEX BLDV+IHG-RTO: 21 — SIGNIFICANT CHANGE UP
INR BLD: 0.99 RATIO — SIGNIFICANT CHANGE UP (ref 0.88–1.16)
MCHC RBC-ENTMCNC: 29.8 PG — SIGNIFICANT CHANGE UP (ref 27–34)
MCHC RBC-ENTMCNC: 32.9 GM/DL — SIGNIFICANT CHANGE UP (ref 32–36)
MCV RBC AUTO: 90.7 FL — SIGNIFICANT CHANGE UP (ref 80–100)
NRBC # BLD: 0 /100 WBCS — SIGNIFICANT CHANGE UP (ref 0–0)
NT-PROBNP SERPL-SCNC: 723 PG/ML — HIGH (ref 0–125)
PCO2 BLDV: 42 MMHG — SIGNIFICANT CHANGE UP (ref 35–50)
PH BLDV: 7.41 — SIGNIFICANT CHANGE UP (ref 7.35–7.45)
PLATELET # BLD AUTO: 197 K/UL — SIGNIFICANT CHANGE UP (ref 150–400)
PO2 BLDV: 75 MMHG — HIGH (ref 25–45)
POTASSIUM SERPL-MCNC: 4.4 MMOL/L — SIGNIFICANT CHANGE UP (ref 3.5–5.3)
POTASSIUM SERPL-SCNC: 4.4 MMOL/L — SIGNIFICANT CHANGE UP (ref 3.5–5.3)
PROT SERPL-MCNC: 7.3 G/DL — SIGNIFICANT CHANGE UP (ref 6–8.3)
PROTHROM AB SERPL-ACNC: 11 SEC — SIGNIFICANT CHANGE UP (ref 10–12.9)
RBC # BLD: 4.19 M/UL — LOW (ref 4.2–5.8)
RBC # FLD: 13.4 % — SIGNIFICANT CHANGE UP (ref 10.3–14.5)
SAO2 % BLDV: 95 % — HIGH (ref 67–88)
SODIUM SERPL-SCNC: 137 MMOL/L — SIGNIFICANT CHANGE UP (ref 135–145)
TROPONIN I SERPL-MCNC: 0.05 NG/ML — HIGH (ref 0–0.04)
WBC # BLD: 5.15 K/UL — SIGNIFICANT CHANGE UP (ref 3.8–10.5)
WBC # FLD AUTO: 5.15 K/UL — SIGNIFICANT CHANGE UP (ref 3.8–10.5)

## 2019-03-20 PROCEDURE — 99285 EMERGENCY DEPT VISIT HI MDM: CPT | Mod: 25

## 2019-03-20 PROCEDURE — 99223 1ST HOSP IP/OBS HIGH 75: CPT | Mod: AI,GC

## 2019-03-20 PROCEDURE — 71046 X-RAY EXAM CHEST 2 VIEWS: CPT | Mod: 26

## 2019-03-20 RX ORDER — SODIUM CHLORIDE 9 MG/ML
1000 INJECTION INTRAMUSCULAR; INTRAVENOUS; SUBCUTANEOUS ONCE
Qty: 0 | Refills: 0 | Status: COMPLETED | OUTPATIENT
Start: 2019-03-20 | End: 2019-03-20

## 2019-03-20 RX ORDER — HEPARIN SODIUM 5000 [USP'U]/ML
5000 INJECTION INTRAVENOUS; SUBCUTANEOUS EVERY 8 HOURS
Qty: 0 | Refills: 0 | Status: DISCONTINUED | OUTPATIENT
Start: 2019-03-20 | End: 2019-03-22

## 2019-03-20 RX ORDER — DEXTROSE 50 % IN WATER 50 %
12.5 SYRINGE (ML) INTRAVENOUS ONCE
Qty: 0 | Refills: 0 | Status: DISCONTINUED | OUTPATIENT
Start: 2019-03-20 | End: 2019-03-22

## 2019-03-20 RX ORDER — ATORVASTATIN CALCIUM 80 MG/1
80 TABLET, FILM COATED ORAL AT BEDTIME
Qty: 0 | Refills: 0 | Status: DISCONTINUED | OUTPATIENT
Start: 2019-03-20 | End: 2019-03-22

## 2019-03-20 RX ORDER — DEXTROSE 50 % IN WATER 50 %
25 SYRINGE (ML) INTRAVENOUS ONCE
Qty: 0 | Refills: 0 | Status: DISCONTINUED | OUTPATIENT
Start: 2019-03-20 | End: 2019-03-22

## 2019-03-20 RX ORDER — GLUCAGON INJECTION, SOLUTION 0.5 MG/.1ML
1 INJECTION, SOLUTION SUBCUTANEOUS ONCE
Qty: 0 | Refills: 0 | Status: DISCONTINUED | OUTPATIENT
Start: 2019-03-20 | End: 2019-03-22

## 2019-03-20 RX ORDER — LISINOPRIL 2.5 MG/1
2.5 TABLET ORAL DAILY
Qty: 0 | Refills: 0 | Status: DISCONTINUED | OUTPATIENT
Start: 2019-03-20 | End: 2019-03-20

## 2019-03-20 RX ORDER — INSULIN GLARGINE 100 [IU]/ML
48 INJECTION, SOLUTION SUBCUTANEOUS AT BEDTIME
Qty: 0 | Refills: 0 | Status: DISCONTINUED | OUTPATIENT
Start: 2019-03-20 | End: 2019-03-22

## 2019-03-20 RX ORDER — IPRATROPIUM/ALBUTEROL SULFATE 18-103MCG
3 AEROSOL WITH ADAPTER (GRAM) INHALATION EVERY 6 HOURS
Qty: 0 | Refills: 0 | Status: DISCONTINUED | OUTPATIENT
Start: 2019-03-20 | End: 2019-03-22

## 2019-03-20 RX ORDER — METOPROLOL TARTRATE 50 MG
50 TABLET ORAL
Qty: 0 | Refills: 0 | Status: DISCONTINUED | OUTPATIENT
Start: 2019-03-20 | End: 2019-03-20

## 2019-03-20 RX ORDER — ASPIRIN AND DIPYRIDAMOLE 25; 200 MG/1; MG/1
1 CAPSULE, EXTENDED RELEASE ORAL
Qty: 0 | Refills: 0 | Status: DISCONTINUED | OUTPATIENT
Start: 2019-03-20 | End: 2019-03-22

## 2019-03-20 RX ORDER — INSULIN LISPRO 100/ML
VIAL (ML) SUBCUTANEOUS
Qty: 0 | Refills: 0 | Status: DISCONTINUED | OUTPATIENT
Start: 2019-03-20 | End: 2019-03-22

## 2019-03-20 RX ORDER — METOPROLOL TARTRATE 50 MG
50 TABLET ORAL
Qty: 0 | Refills: 0 | Status: DISCONTINUED | OUTPATIENT
Start: 2019-03-20 | End: 2019-03-22

## 2019-03-20 RX ORDER — SODIUM CHLORIDE 9 MG/ML
1000 INJECTION, SOLUTION INTRAVENOUS
Qty: 0 | Refills: 0 | Status: DISCONTINUED | OUTPATIENT
Start: 2019-03-20 | End: 2019-03-22

## 2019-03-20 RX ORDER — DEXTROSE 50 % IN WATER 50 %
15 SYRINGE (ML) INTRAVENOUS ONCE
Qty: 0 | Refills: 0 | Status: DISCONTINUED | OUTPATIENT
Start: 2019-03-20 | End: 2019-03-22

## 2019-03-20 RX ORDER — PANTOPRAZOLE SODIUM 20 MG/1
40 TABLET, DELAYED RELEASE ORAL
Qty: 0 | Refills: 0 | Status: DISCONTINUED | OUTPATIENT
Start: 2019-03-20 | End: 2019-03-22

## 2019-03-20 RX ORDER — INSULIN LISPRO 100/ML
8 VIAL (ML) SUBCUTANEOUS
Qty: 0 | Refills: 0 | Status: DISCONTINUED | OUTPATIENT
Start: 2019-03-20 | End: 2019-03-22

## 2019-03-20 RX ORDER — ASPIRIN/CALCIUM CARB/MAGNESIUM 324 MG
81 TABLET ORAL DAILY
Qty: 0 | Refills: 0 | Status: DISCONTINUED | OUTPATIENT
Start: 2019-03-20 | End: 2019-03-22

## 2019-03-20 RX ORDER — LISINOPRIL 2.5 MG/1
2.5 TABLET ORAL DAILY
Qty: 0 | Refills: 0 | Status: DISCONTINUED | OUTPATIENT
Start: 2019-03-20 | End: 2019-03-22

## 2019-03-20 RX ADMIN — SODIUM CHLORIDE 2000 MILLILITER(S): 9 INJECTION INTRAMUSCULAR; INTRAVENOUS; SUBCUTANEOUS at 14:29

## 2019-03-20 RX ADMIN — ATORVASTATIN CALCIUM 80 MILLIGRAM(S): 80 TABLET, FILM COATED ORAL at 21:27

## 2019-03-20 RX ADMIN — INSULIN GLARGINE 48 UNIT(S): 100 INJECTION, SOLUTION SUBCUTANEOUS at 22:23

## 2019-03-20 RX ADMIN — HEPARIN SODIUM 5000 UNIT(S): 5000 INJECTION INTRAVENOUS; SUBCUTANEOUS at 21:28

## 2019-03-20 RX ADMIN — ASPIRIN AND DIPYRIDAMOLE 1 CAPSULE(S): 25; 200 CAPSULE, EXTENDED RELEASE ORAL at 21:28

## 2019-03-20 RX ADMIN — Medication 3 MILLILITER(S): at 19:29

## 2019-03-20 RX ADMIN — Medication 50 MILLIGRAM(S): at 19:29

## 2019-03-20 NOTE — H&P ADULT - ASSESSMENT
53M from home, walks with walker with PMHx of CVA X2, systolic HF(30-35%), CAD(s/p 1 stent and CABG), COPD, HTN, DM, HLD presented to ED c/o generalized weakness and blurring of vision. He was admitted to [facility] on feb. for CVA. He states since 3 weeks, he has been having generalized weakness to the point that he cannot perform his ADL. He also states he cannot walk without his walker and feels off balance. He also noticed that his vision worsened after he was diagnosed stroke on feb. He has been using glasses for myopia since 18 yrs. He also states he did not get PT service that he was supposed to get at home. He has been taking his meds regularly but says his blood glucose runs very high(200-300) everytime. He denies any numbness, tingling, headache, dysphagia, urinary or bowel complaint.      ED course: Vitals stable  Labs significant for hyperglycemia of 342, Cr 1.41, , T1 0.046    He will be admitted to tele for work up of stroke. 53M from home, walks with walker with PMHx of CVA X2, systolic HF(30-35%), CAD(s/p 1 stent and CABG), COPD, HTN, DM, HLD presented to ED c/o generalized weakness and blurring of vision. [facility]. He states since 3 weeks, he has been having generalized weakness to the point that he cannot perform his ADL. He also states he cannot walk without his walker and feels off balance. He also noticed that his vision worsened after he was diagnosed stroke on feb. He has been using glasses for myopia since 20 yrs. He also states he did not get PT service that he was supposed to get at home. He has been taking his meds regularly but says his blood glucose runs very high(200-300) everytime. He denies any numbness, tingling, headache, dysphagia, urinary or bowel complaint.      ED course: Vitals stable  Labs significant for hyperglycemia of 342, Cr 1.41, , T1 0.046    He will be admitted to tele for evaluation of weakness.

## 2019-03-20 NOTE — H&P ADULT - NSICDXPASTSURGICALHX_GEN_ALL_CORE_FT
PAST SURGICAL HISTORY:  History of appendectomy     S/P CABG x 1     S/P coronary artery stent placement

## 2019-03-20 NOTE — H&P ADULT - NSICDXPASTMEDICALHX_GEN_ALL_CORE_FT
PAST MEDICAL HISTORY:  Cerebrovascular accident (CVA), unspecified mechanism     Congestive heart failure, NYHA class 3, chronic, combined     Coronary artery disease involving native heart without angina pectoris, unspecified vessel or lesion type     Diabetes mellitus of other type without complication     Hyperlipidemia, unspecified hyperlipidemia type     Hypertension, unspecified type

## 2019-03-20 NOTE — H&P ADULT - PROBLEM SELECTOR PLAN 10
IMPROVE VTE Individual Risk Assessment    RISK                                                                Points    [  ] Previous VTE                                                  3  [  ] Thrombophilia                                               2  [  ] Lower limb paralysis                                      2        (unable to hold up >15 seconds)    [  ] Current Cancer                                              2         (within 6 months)  [  ] Immobilization > 24 hrs                                1  [  ] ICU/CCU stay > 24 hours                              1  [  ] Age > 60                                                      1  IMPROVE VTE Score 0  He will be bed bound mostly, so will start on heparin SC for DVT PPx

## 2019-03-20 NOTE — ED PROVIDER NOTE - CLINICAL SUMMARY MEDICAL DECISION MAKING FREE TEXT BOX
Pt w/ aforementioned presentation concerning for but not limited to infection versus electrolyte abnormality versus ACS versus other causes of weakness. Pt admitted to tele

## 2019-03-20 NOTE — H&P ADULT - HISTORY OF PRESENT ILLNESS
53M from home, walks with walker with PMHx of CVA X2, systolic HF(30-35%), CAD(s/p 1 stent and CABG), COPD, HTN, DM, HLD presented to ED c/o generalized weakness and blurring of vision. He was admitted to [facility] on feb. for CVA. He states since 3 weeks, he has been having generalized weakness to the point that he cannot perform his ADL. He also states he cannot walk without his walker and feels off balance. He also noticed that his vision worsened after he was diagnosed stroke on feb. He has been using glasses for myopia since 20 yrs. He also states he did not get PT service that he was supposed to get at home. He denies any numbness, tingling, headache, dysphagia, urinary or bowel complaint.

## 2019-03-20 NOTE — ED PROVIDER NOTE - OBJECTIVE STATEMENT
52 y/o M pt with PMHx of CVA, CHF, CAD, HLD, HTN and DM (reports A1C is 12) presents to ED c/o generalized weakness x1 week, associated with dizziness (described as lightheadedness) and blurred vision. Pt also reports intermittent shortness of breath. Pt denies ever having chest pain. When specifically asked about EMS report of chest pain, he states that he never c/o chest pain. Patient denies abd pain, nausea, vomiting, diarrhea, fever, chills, night sweats, or any other complaints.

## 2019-03-20 NOTE — H&P ADULT - PROBLEM SELECTOR PLAN 8
- on lisinopril 2.5mg and lopressor 50 BID at home  - will restart BP meds  - monitor BP and adjust meds

## 2019-03-20 NOTE — H&P ADULT - NSHPSOCIALHISTORY_GEN_ALL_CORE
former smoker, 3packs/day since 38 yrs, left since feb, Denies use of alcohol and recreational drugs.

## 2019-03-20 NOTE — H&P ADULT - ATTENDING COMMENTS
Patient was interviewed and examined in the ED and discussed with Dr. Harris.     He came with c/o dizziness.  He also c/o hunger and difficulty accessing his apartment on the third floor.   PMH: DM, ASCVD, s/p stent, CVA x 2    Alert, cooperative man  Vital Signs Last 24 Hrs  T(C): 37.2 (20 Mar 2019 15:46), Max: 37.2 (20 Mar 2019 15:46)  T(F): 99 (20 Mar 2019 15:46), Max: 99 (20 Mar 2019 15:46)  HR: 67 (20 Mar 2019 18:59) (67 - 81)  BP: 153/89 (20 Mar 2019 18:59) (129/80 - 154/92)  BP(mean): --  RR: 18 (20 Mar 2019 15:46) (16 - 18)  SpO2: 98% (20 Mar 2019 15:46) (98% - 100%)  Midline surgical scar  Lungs, clear  Cor, RRR  Neurological, non-focal  Skin, post-inflammatory hyperpigmentation.     IMP:  R/o ACS.  No evidence of new neurological deficit.           DM, poor control          Decreased visual acuity from previous CVA          Suboptimal social support  Plan: Tele/troponin/Cardiology (had recent ABRAHAN, negative)          FSBS, standing and pre-meal insulin.           SW consultation          AM lipids. RPR. HCV

## 2019-03-20 NOTE — H&P ADULT - NSHPPHYSICALEXAM_GEN_ALL_CORE
Vital Signs Last 24 Hrs  T(C): 37.2 (20 Mar 2019 15:46), Max: 37.2 (20 Mar 2019 15:46)  T(F): 99 (20 Mar 2019 15:46), Max: 99 (20 Mar 2019 15:46)  HR: 67 (20 Mar 2019 18:59) (67 - 81)  BP: 153/89 (20 Mar 2019 18:59) (129/80 - 154/92)  BP(mean): --  RR: 18 (20 Mar 2019 15:46) (16 - 18)  SpO2: 98% (20 Mar 2019 15:46) (98% - 100%)

## 2019-03-20 NOTE — ED PROVIDER NOTE - PMH
Cerebrovascular accident (CVA), unspecified mechanism    Congestive heart failure, NYHA class 3, chronic, combined    Coronary artery disease involving native heart without angina pectoris, unspecified vessel or lesion type    Diabetes mellitus of other type without complication    Hyperlipidemia, unspecified hyperlipidemia type    Hypertension, unspecified type

## 2019-03-20 NOTE — H&P ADULT - PROBLEM SELECTOR PLAN 6
- on lantus 48 units at bedtime and lispro 8 TID  - will continue home regimen - on lantus 48 units at bedtime and lispro 8 TID  - p/w hyperglycemia of 342  - will continue home regimen  - monitor blood glucose and adjust meds acc  - FU A1C

## 2019-03-20 NOTE — H&P ADULT - PROBLEM SELECTOR PLAN 1
- p/w weakness with blurring of vision with unstable gait  - likely 2/2 recent stroke vs hypovolemia (orthostatics positive) vs CAD(given intensive cardiac disease)  - afebrile and no leukocytosis so not infective  - Orthostatics positive- lying 153/89,  and standing 122/75, HR 87  - T1 0.046 , FU T2,T3  - EKG shows NSR @69 bpm with RBBB, no changes form prior  - will not give fluids as has systolic HF with EF 30-35%,  - apply compression stockings  - PT eval  - fall precautions - p/w weakness with blurring of vision with unstable gait  - likely 2/2 recent stroke vs hypovolemia (orthostatics positive) vs CAD(given intensive cardiac disease)  - afebrile and no leukocytosis so not infective  - Orthostatics positive- lying 153/89,  and standing 122/75, HR 87  - T1 0.046 , FU T2,T3  - EKG shows NSR @69 bpm with RBBB, no changes form prior  - will not give fluids as has systolic HF with EF 30-35%,  - apply compression stockings  - PT eval  - fall precautions  - NEURO consulted

## 2019-03-20 NOTE — ED ADULT NURSE NOTE - NSIMPLEMENTINTERV_GEN_ALL_ED
Implemented All Fall Risk Interventions:  [place] to call system. Call bell, personal items and telephone within reach. Instruct patient to call for assistance. Room bathroom lighting operational. Non-slip footwear when patient is off stretcher. Physically safe environment: no spills, clutter or unnecessary equipment. Stretcher in lowest position, wheels locked, appropriate side rails in place. Provide visual cue, wrist band, yellow gown, etc. Monitor gait and stability. Monitor for mental status changes and reorient to person, place, and time. Review medications for side effects contributing to fall risk. Reinforce activity limits and safety measures with patient and family.

## 2019-03-20 NOTE — ED PROVIDER NOTE - PHYSICAL EXAMINATION
General: pt lying in stretcher, appears stated age and is not in distress  HEENT: AT/NC, pink conjunctiva, anicteric sclerae, EOMI, PERRLA, TMs smooth, grey, intact, with normal landmarks, nasal mucosa pink, no discharge, turbinates not enlarged; moist mucus membranes, tongue well-papillated, good dentition; posterior pharynx shows no erythema or exudates;   Neck: supple, full ROM, trachea midline, no JVD, no cervical LAD, no midline ttp or stepoffs  Lungs: symmetric excursion, b/l clear vesicular breath sounds with no wheezes, crackles, or rhonchi  Heart: rrr, S1, S2 normal; no S3 or S4; no murmurs or rubs  Abd: normal bowel sounds; soft, nontender; negative McBurney's point tenderness, negative Solomon's sign, no rebound, no guarding, spleen non-palpable; no hepatomegaly, no masses  Back: no midline spinal tenderness or stepoffs, no costovertebral angle tenderness  Extremities: no clubbing, cyanosis, or edema; no palpable deformities or fractures  Skin: good turgor; no rashes, petechiae, ecchymoses, or jaundice  Pulses: radial, posterior tibialis (PT), dorsalis pedis (DP) all 2+ & symmetric  Neuro: awake, alert, responsive; oriented to person, place and time; cranial nerves intact, EOMI, intact jaw movement, intact facial sensation, no facial asymmetry, hearing intact; no nystagmus, tongue midline; Motor: Normal tone in upper and lower extremities bilaterally strength 5/5; Sensory: intact to pinprick and light touch; Cerebellar: finger-to-nose intact; normal steady gait; negative Romberg’s sign; DTR: biceps, triceps, patellar, 2+, no pronator drift General: pt lying in stretcher, appears stated age and is not in distress, speaking in full clear sentences  HEENT: AT/NC, pink conjunctiva, anicteric sclerae, EOMI, PERRLA, mmm  Neck: supple, full ROM, trachea midline, no JVD, no cervical LAD, no midline ttp or stepoffs  Lungs: symmetric excursion, b/l clear vesicular breath sounds with no wheezes, crackles, or rhonchi  Heart: rrr, S1, S2 normal; no S3 or S4; no murmurs or rubs  Abd: normal bowel sounds; soft, nontender; negative McBurney's point tenderness, negative Solomon's sign, no rebound, no guarding, spleen non-palpable; no hepatomegaly, no masses  Back: no midline spinal tenderness or stepoffs, no costovertebral angle tenderness  Extremities: no clubbing, cyanosis, or edema; no palpable deformities or fractures  Skin: good turgor; no rashes, petechiae, ecchymoses, or jaundice  Pulses: radial, posterior tibialis (PT), dorsalis pedis (DP) all 2+ & symmetric  Neuro: awake, alert, responsive; oriented to person, place and time; cranial nerves intact, EOMI, intact jaw movement, intact facial sensation, no facial asymmetry, hearing intact; no nystagmus, tongue midline; Motor: Normal tone in upper and lower extremities bilaterally; Sensory: intact, unsteady gait, ambulates w/ a walker at baseline

## 2019-03-21 ENCOUNTER — TRANSCRIPTION ENCOUNTER (OUTPATIENT)
Age: 54
End: 2019-03-21

## 2019-03-21 LAB
24R-OH-CALCIDIOL SERPL-MCNC: 15.8 NG/ML — LOW (ref 30–80)
ANION GAP SERPL CALC-SCNC: 5 MMOL/L — SIGNIFICANT CHANGE UP (ref 5–17)
BASOPHILS # BLD AUTO: 0.03 K/UL — SIGNIFICANT CHANGE UP (ref 0–0.2)
BASOPHILS NFR BLD AUTO: 0.6 % — SIGNIFICANT CHANGE UP (ref 0–2)
BUN SERPL-MCNC: 20 MG/DL — HIGH (ref 7–18)
CALCIUM SERPL-MCNC: 8.6 MG/DL — SIGNIFICANT CHANGE UP (ref 8.4–10.5)
CHLORIDE SERPL-SCNC: 106 MMOL/L — SIGNIFICANT CHANGE UP (ref 96–108)
CHOLEST SERPL-MCNC: 146 MG/DL — SIGNIFICANT CHANGE UP (ref 10–199)
CK MB BLD-MCNC: 1.3 % — SIGNIFICANT CHANGE UP (ref 0–3.5)
CK MB BLD-MCNC: 1.5 % — SIGNIFICANT CHANGE UP (ref 0–3.5)
CK MB CFR SERPL CALC: 1.9 NG/ML — SIGNIFICANT CHANGE UP (ref 0–3.6)
CK MB CFR SERPL CALC: 2 NG/ML — SIGNIFICANT CHANGE UP (ref 0–3.6)
CK SERPL-CCNC: 136 U/L — SIGNIFICANT CHANGE UP (ref 35–232)
CK SERPL-CCNC: 141 U/L — SIGNIFICANT CHANGE UP (ref 35–232)
CO2 SERPL-SCNC: 26 MMOL/L — SIGNIFICANT CHANGE UP (ref 22–31)
CREAT SERPL-MCNC: 1.53 MG/DL — HIGH (ref 0.5–1.3)
EOSINOPHIL # BLD AUTO: 0.21 K/UL — SIGNIFICANT CHANGE UP (ref 0–0.5)
EOSINOPHIL NFR BLD AUTO: 4.2 % — SIGNIFICANT CHANGE UP (ref 0–6)
FOLATE SERPL-MCNC: 9.2 NG/ML — SIGNIFICANT CHANGE UP
GLUCOSE SERPL-MCNC: 270 MG/DL — HIGH (ref 70–99)
HBA1C BLD-MCNC: 11.7 % — HIGH (ref 4–5.6)
HCT VFR BLD CALC: 39.9 % — SIGNIFICANT CHANGE UP (ref 39–50)
HDLC SERPL-MCNC: 45 MG/DL — SIGNIFICANT CHANGE UP
HGB BLD-MCNC: 13.2 G/DL — SIGNIFICANT CHANGE UP (ref 13–17)
IMM GRANULOCYTES NFR BLD AUTO: 0 % — SIGNIFICANT CHANGE UP (ref 0–1.5)
LIPID PNL WITH DIRECT LDL SERPL: 83 MG/DL — SIGNIFICANT CHANGE UP
LYMPHOCYTES # BLD AUTO: 1.77 K/UL — SIGNIFICANT CHANGE UP (ref 1–3.3)
LYMPHOCYTES # BLD AUTO: 35 % — SIGNIFICANT CHANGE UP (ref 13–44)
MAGNESIUM SERPL-MCNC: 2 MG/DL — SIGNIFICANT CHANGE UP (ref 1.6–2.6)
MCHC RBC-ENTMCNC: 29.6 PG — SIGNIFICANT CHANGE UP (ref 27–34)
MCHC RBC-ENTMCNC: 33.1 GM/DL — SIGNIFICANT CHANGE UP (ref 32–36)
MCV RBC AUTO: 89.5 FL — SIGNIFICANT CHANGE UP (ref 80–100)
MONOCYTES # BLD AUTO: 0.36 K/UL — SIGNIFICANT CHANGE UP (ref 0–0.9)
MONOCYTES NFR BLD AUTO: 7.1 % — SIGNIFICANT CHANGE UP (ref 2–14)
NEUTROPHILS # BLD AUTO: 2.69 K/UL — SIGNIFICANT CHANGE UP (ref 1.8–7.4)
NEUTROPHILS NFR BLD AUTO: 53.1 % — SIGNIFICANT CHANGE UP (ref 43–77)
NRBC # BLD: 0 /100 WBCS — SIGNIFICANT CHANGE UP (ref 0–0)
PHOSPHATE SERPL-MCNC: 3.3 MG/DL — SIGNIFICANT CHANGE UP (ref 2.5–4.5)
PLATELET # BLD AUTO: 181 K/UL — SIGNIFICANT CHANGE UP (ref 150–400)
POTASSIUM SERPL-MCNC: 4.1 MMOL/L — SIGNIFICANT CHANGE UP (ref 3.5–5.3)
POTASSIUM SERPL-SCNC: 4.1 MMOL/L — SIGNIFICANT CHANGE UP (ref 3.5–5.3)
RBC # BLD: 4.46 M/UL — SIGNIFICANT CHANGE UP (ref 4.2–5.8)
RBC # FLD: 13.5 % — SIGNIFICANT CHANGE UP (ref 10.3–14.5)
SODIUM SERPL-SCNC: 137 MMOL/L — SIGNIFICANT CHANGE UP (ref 135–145)
TOTAL CHOLESTEROL/HDL RATIO MEASUREMENT: 3.2 RATIO — LOW (ref 3.4–9.6)
TRIGL SERPL-MCNC: 92 MG/DL — SIGNIFICANT CHANGE UP (ref 10–149)
TROPONIN I SERPL-MCNC: 0.04 NG/ML — SIGNIFICANT CHANGE UP (ref 0–0.04)
TROPONIN I SERPL-MCNC: 0.04 NG/ML — SIGNIFICANT CHANGE UP (ref 0–0.04)
TSH SERPL-MCNC: 0.48 UU/ML — SIGNIFICANT CHANGE UP (ref 0.34–4.82)
VIT B12 SERPL-MCNC: 391 PG/ML — SIGNIFICANT CHANGE UP (ref 232–1245)
WBC # BLD: 5.06 K/UL — SIGNIFICANT CHANGE UP (ref 3.8–10.5)
WBC # FLD AUTO: 5.06 K/UL — SIGNIFICANT CHANGE UP (ref 3.8–10.5)

## 2019-03-21 PROCEDURE — 99233 SBSQ HOSP IP/OBS HIGH 50: CPT | Mod: GC

## 2019-03-21 PROCEDURE — 70450 CT HEAD/BRAIN W/O DYE: CPT | Mod: 26

## 2019-03-21 PROCEDURE — 99222 1ST HOSP IP/OBS MODERATE 55: CPT

## 2019-03-21 RX ORDER — HYDRALAZINE HCL 50 MG
10 TABLET ORAL ONCE
Qty: 0 | Refills: 0 | Status: COMPLETED | OUTPATIENT
Start: 2019-03-21 | End: 2019-03-21

## 2019-03-21 RX ADMIN — Medication 8 UNIT(S): at 08:55

## 2019-03-21 RX ADMIN — Medication 8 UNIT(S): at 16:53

## 2019-03-21 RX ADMIN — Medication 3 MILLILITER(S): at 20:50

## 2019-03-21 RX ADMIN — Medication 2: at 16:53

## 2019-03-21 RX ADMIN — Medication 3 MILLILITER(S): at 13:34

## 2019-03-21 RX ADMIN — Medication 4: at 11:51

## 2019-03-21 RX ADMIN — Medication 50 MILLIGRAM(S): at 06:50

## 2019-03-21 RX ADMIN — LISINOPRIL 2.5 MILLIGRAM(S): 2.5 TABLET ORAL at 06:50

## 2019-03-21 RX ADMIN — INSULIN GLARGINE 48 UNIT(S): 100 INJECTION, SOLUTION SUBCUTANEOUS at 22:24

## 2019-03-21 RX ADMIN — Medication 50 MILLIGRAM(S): at 22:31

## 2019-03-21 RX ADMIN — Medication 8 UNIT(S): at 11:51

## 2019-03-21 RX ADMIN — PANTOPRAZOLE SODIUM 40 MILLIGRAM(S): 20 TABLET, DELAYED RELEASE ORAL at 06:50

## 2019-03-21 RX ADMIN — Medication 81 MILLIGRAM(S): at 11:54

## 2019-03-21 RX ADMIN — ASPIRIN AND DIPYRIDAMOLE 1 CAPSULE(S): 25; 200 CAPSULE, EXTENDED RELEASE ORAL at 06:51

## 2019-03-21 RX ADMIN — Medication 10 MILLIGRAM(S): at 20:50

## 2019-03-21 RX ADMIN — Medication 3: at 08:55

## 2019-03-21 RX ADMIN — Medication 3 MILLILITER(S): at 10:15

## 2019-03-21 RX ADMIN — HEPARIN SODIUM 5000 UNIT(S): 5000 INJECTION INTRAVENOUS; SUBCUTANEOUS at 06:50

## 2019-03-21 RX ADMIN — ATORVASTATIN CALCIUM 80 MILLIGRAM(S): 80 TABLET, FILM COATED ORAL at 22:31

## 2019-03-21 NOTE — CONSULT NOTE ADULT - ASSESSMENT
A/P:    - No IV tpa given because no new /recent focal deficit  not a new CVA; Generalized weakness is likely related to metabolic abnormalities and possibly sleep disorder  - ASA/ PLavix  - Statin  - DVT prophylaxis  - Speech/swallow eval    Total Critical Care Time spent:

## 2019-03-21 NOTE — DISCHARGE NOTE PROVIDER - HOSPITAL COURSE
53M from home, walks with walker with PMHx of CVA X2, systolic HF(30-35%), CAD(s/p 1 stent and CABG), COPD, HTN, DM, HLD presented to ED c/o generalized weakness and blurring of vision. He was admitted to [facility] on feb. for CVA. He states since 3 weeks, he has been having generalized weakness to the point that he cannot perform his ADL. He also states he cannot walk without his walker and feels off balance. He also noticed that his vision worsened after he was diagnosed stroke on feb. He has been using glasses for myopia since 20yrs. He also states he did not get PT service that he was supposed to get at home. He denies any numbness, tingling, headache, dysphagia, urinary or bowel complaint.    Pt had MRI and head CT. Both were negative for acute CVA. Dr Fraire wished for pt to stay until tomorrow but he does not wish to stay. Dr. Cooney made aware    Pt expressing wish for AMA. SW saw pt for social issues . Pt requesting transportation but cannot provide when AMA 53M from home, walks with walker with PMHx of CVA X2, systolic HF(30-35%), CAD(s/p 1 stent and CABG), COPD, HTN, DM, HLD presented to ED c/o generalized weakness and blurring of vision. He was admitted to [facility] on feb. for CVA. He states since 3 weeks, he has been having generalized weakness to the point that he cannot perform his ADL. He also states he cannot walk without his walker and feels off balance. He also noticed that his vision worsened after he was diagnosed stroke on feb. He has been using glasses for myopia since 20yrs. He also states he did not get PT service that he was supposed to get at home. He denies any numbness, tingling, headache, dysphagia, urinary or bowel complaint.    EKG showed normal sinus rhythm with left axis deviation. Serial cardiac enzymes were normal. CT head was normal. Neurology was consulted. Patient's symptom improved.     pt is stable for discharge. Case has been discussed with the attending. This is just a summary of the case. For further information please refer to pt. chart document. 53M from home, walks with walker with PMHx of CVA X2, systolic HF(30-35%), CAD(s/p 1 stent and CABG), COPD, HTN, DM, HLD presented to ED c/o generalized weakness and blurring of vision. He was admitted to [facility] on feb. for CVA. He states since 3 weeks, he has been having generalized weakness to the point that he cannot perform his ADL. He also states he cannot walk without his walker and feels off balance. He also noticed that his vision worsened after he was diagnosed stroke on feb. He has been using glasses for myopia since 20yrs. He also states he did not get PT service that he was supposed to get at home. He denies any numbness, tingling, headache, dysphagia, urinary or bowel complaint.    EKG showed normal sinus rhythm with left axis deviation. Serial cardiac enzymes were normal. CT head was normal. Neurology was consulted. Patient's symptom improved. His blood Glucose was found to be uncontrolled and HBA1 C was elevated. We explained him the risk on not being compliant with medication. Patient agrees to take insulin as prescribed. His PCP Dr. Cheng [phone] was contacted and informed. He wants to see patient next week. Pt has been informed.     pt is stable for discharge. Case has been discussed with the attending. This is just a summary of the case. For further information please refer to pt. chart document.

## 2019-03-21 NOTE — CONSULT NOTE ADULT - SUBJECTIVE AND OBJECTIVE BOX
Patient is a 53y old  Male who presents with a chief complaint of Weakness and blurry vision (21 Mar 2019 16:25)      HPI:    PAST MEDICAL & SURGICAL HISTORY:  Congestive heart failure, NYHA class 3, chronic, combined  Cerebrovascular accident (CVA), unspecified mechanism  Hyperlipidemia, unspecified hyperlipidemia type  Coronary artery disease involving native heart without angina pectoris, unspecified vessel or lesion type  Hypertension, unspecified type  Diabetes mellitus of other type without complication  S/P coronary artery stent placement  History of appendectomy  S/P CABG x 1      FAMILY HISTORY:  FH: CAD (coronary artery disease)        Social Hx:  Nonsmoker, no drug or alcohol use    MEDICATIONS  (STANDING):  ALBUTerol/ipratropium for Nebulization 3 milliLiter(s) Nebulizer every 6 hours  aspirin enteric coated 81 milliGRAM(s) Oral daily  atorvastatin 80 milliGRAM(s) Oral at bedtime  dextrose 5%. 1000 milliLiter(s) (50 mL/Hr) IV Continuous <Continuous>  dextrose 50% Injectable 12.5 Gram(s) IV Push once  dextrose 50% Injectable 25 Gram(s) IV Push once  dextrose 50% Injectable 25 Gram(s) IV Push once  dipyridamole 200 mG/aspirin 25 mG 1 Capsule(s) Oral two times a day  heparin  Injectable 5000 Unit(s) SubCutaneous every 8 hours  insulin glargine Injectable (LANTUS) 48 Unit(s) SubCutaneous at bedtime  insulin lispro (HumaLOG) corrective regimen sliding scale   SubCutaneous three times a day before meals  insulin lispro Injectable (HumaLOG) 8 Unit(s) SubCutaneous three times a day before meals  lisinopril 2.5 milliGRAM(s) Oral daily  metoprolol tartrate 50 milliGRAM(s) Oral two times a day  pantoprazole    Tablet 40 milliGRAM(s) Oral before breakfast       Allergies    Plavix (Other)    Intolerances        ROS: Pertinent positives in HPI, all other ROS were reviewed and are negative.      Vital Signs Last 24 Hrs  T(C): 37.2 (21 Mar 2019 15:22), Max: 37.2 (21 Mar 2019 15:22)  T(F): 98.9 (21 Mar 2019 15:22), Max: 98.9 (21 Mar 2019 15:22)  HR: 70 (21 Mar 2019 15:22) (65 - 70)  BP: 134/71 (21 Mar 2019 15:22) (134/71 - 164/86)  BP(mean): --  RR: 18 (21 Mar 2019 15:22) (17 - 18)  SpO2: 100% (21 Mar 2019 15:22) (99% - 100%)        Constitutional: awake and alert.  HEENT: PERRLA, EOMI,   Neck: Supple.  Respiratory: Breath sounds are clear bilaterally  Cardiovascular: S1 and S2, regular / irregular rhythm  Gastrointestinal: soft, nontender  Extremities:  no edema  Vascular: Caritid Bruit - no  Musculoskeletal: no joint swelling/tenderness, no abnormal movements  Skin: No rashes    Neurological exam:  HF: A x O x 3. Appropriately interactive, normal affect. Speech fluent, No Aphasia or paraphasic errors. Naming /repetition intact   CN: CHRISTY, EOMI, VFF, facial sensation normal, no NLFD, tongue midline, Palate moves equally, SCM equal bilaterally  Motor: Mild right pronator drift, Strength 5/5 in all left ext 4+/5 right extremities, normal bulk and tone, no tremor, rigidity or bradykinesia.    Sens: Intact to light touch / PP/ VS/ JS    Reflexes: Symmetric and normal . BJ 2+, BR 2+, KJ 2+, AJ 2+, downgoing toes b/l  Coord:  No FNFA, dysmetria, ALIDA intact   Gait/Balance: Limps favoring right side  NIHSS:  3          Labs:                        13.2   5.06  )-----------( 181      ( 21 Mar 2019 06:31 )             39.9     03-21    137  |  106  |  20<H>  ----------------------------<  270<H>  4.1   |  26  |  1.53<H>    Ca    8.6      21 Mar 2019 06:31  Phos  3.3     03-21  Mg     2.0     03-21    TPro  7.3  /  Alb  3.3<L>  /  TBili  0.2  /  DBili  x   /  AST  25  /  ALT  40  /  AlkPhos  137<H>  03-20 03-21 [id number] 11.7    03-21 Chol 146 LDL 83 HDL 45 Trig 92  PT/INR - ( 20 Mar 2019 08:29 )   PT: 11.0 sec;   INR: 0.99 ratio         PTT - ( 20 Mar 2019 08:29 )  PTT:27.3 sec    Radiology report:  - CT head:  < from: CT Head No Cont (03.21.19 @ 09:05) >    EXAM:  CT BRAIN                            PROCEDURE DATE:  03/21/2019          INTERPRETATION:  CLINICAL STATEMENT: Weakness and dizziness    TECHNIQUE: CT of the head was performed without IV contrast.    COMPARISON: CT head 2/6/2019. MRI brain 2/7/2019    FINDINGS:  There are areas of low attenuation in the periventricular white matter   likely related to mild chronic microvascular ischemic changes.    There is no acute intracranial hemorrhage, parenchymal mass,  or midline   shift. There isno acute territorial infarct. There is no hydrocephalus.    Stable small chronic infarct left occipital lobe.    Acute infarct seen on the MRI exam are difficult to visualize on the CT   exam.    The cranium is intact. Chronic depressed fracture leftlamina papyracea.   Mild right occipital soft tissue swelling.    IMPRESSION:  No acute intracranial hemorrhage    ERIKA GARCIA M.D., ATTENDING RADIOLOGIST  This document has been electronically signed. Mar 21 2019  9:30AM        < end of copied text >

## 2019-03-21 NOTE — ED ADULT NURSE REASSESSMENT NOTE - NS ED NURSE REASSESS COMMENT FT1
Received pt form [facility], pt is observed laying in bed, breathing room air, in no respiratory distress at time of assessment. Pt is A&O x3, able to make needs known, skin intact, left arm #20Ga in place. Meds administered as ordered, tolerated well. Pt ambulates with his personal walker which is at bedside. Admitted to [facility], on box D awaiting bed, endorsed to [facility]UTOS[facility] Be for holding.
Pt care endorsed to Lori BOYKIN

## 2019-03-21 NOTE — DISCHARGE NOTE PROVIDER - NSDCCPCAREPLAN_GEN_ALL_CORE_FT
PRINCIPAL DISCHARGE DIAGNOSIS  Diagnosis: Near syncope  Assessment and Plan of Treatment: pt remained on telemetry. MRI was negative as was CT      SECONDARY DISCHARGE DIAGNOSES  Diagnosis: Weakness  Assessment and Plan of Treatment: Pt ambulated quickly around the unit . Has mild rt sided weakness. PT ordered but pt signing out AMA PRINCIPAL DISCHARGE DIAGNOSIS  Diagnosis: Generalized weakness  Assessment and Plan of Treatment: You presented with generalized weakness and blurribng of vision. CT scan of chest did not show acute findings. Orthostatic blood pressure was positive. Your symtoms were likelt due to dehydration.  We recommend you to follow up with primary care proivider. PRINCIPAL DISCHARGE DIAGNOSIS  Diagnosis: Generalized weakness  Assessment and Plan of Treatment: You presented with generalized weakness and blurribng of vision. CT scan of chest did not show acute findings. Orthostatic blood pressure was positive. Your symtoms were likelt due to dehydration.  We recommend you to follow up with primary care proivider.      SECONDARY DISCHARGE DIAGNOSES  Diagnosis: Diabetes mellitus  Assessment and Plan of Treatment: Your HBA1C was found to be high 11.7. You should continue your medication as above. You should consume low carb diet. You should monitor your blood glucose and follow up with primary care provider PRINCIPAL DISCHARGE DIAGNOSIS  Diagnosis: Generalized weakness  Assessment and Plan of Treatment: You presented with generalized weakness and blurribng of vision. CT scan of chest did not show acute findings. Orthostatic blood pressure was positive. Your symtoms were likelt due to dehydration.  We recommend you to follow up with primary care proivider.      SECONDARY DISCHARGE DIAGNOSES  Diagnosis: Diabetes mellitus  Assessment and Plan of Treatment: Your HBA1C was found to be high 11.7. You should continue your medication as above. You should consume low carb diet. You should monitor your blood glucose and follow up with primary care provider    Diagnosis: Hypertension, unspecified type  Assessment and Plan of Treatment: You should continue your medication as above. You should consume low salt diet like fruits and vegetables. You should monitor your blood pressure and follow up with primary care provider.    Diagnosis: History of CVA (cerebrovascular accident)  Assessment and Plan of Treatment: You should continue your medications as above and follow up with primary care provider.    Diagnosis: Congestive heart failure, NYHA class 3, chronic, combined  Assessment and Plan of Treatment: Your recent Echocardiography on february showed decreased ejection fraction of 30-35%. You should continue your medications and follow up regularly with cardiology and primary care provider. PRINCIPAL DISCHARGE DIAGNOSIS  Diagnosis: Generalized weakness  Assessment and Plan of Treatment: You presented with generalized weakness and blurribng of vision. CT scan of chest did not show acute findings. Orthostatic blood pressure was positive. Your symtoms were likelt due to dehydration.  We recommend you to follow up with primary care proivider.      SECONDARY DISCHARGE DIAGNOSES  Diagnosis: Diabetes mellitus  Assessment and Plan of Treatment: Your HBA1C was found to be high 11.7. You should continue your medication as above. You should consume low carb diet. You should monitor your blood glucose and follow up with primary care provider    Dr. Cheng [phone] within 1 week.    Diagnosis: Hypertension, unspecified type  Assessment and Plan of Treatment: You should continue your medication as above. You should consume low salt diet like fruits and vegetables. You should monitor your blood pressure and follow up with primary care provider.    Diagnosis: History of CVA (cerebrovascular accident)  Assessment and Plan of Treatment: You should continue your medications as above and follow up with primary care provider.    Diagnosis: Congestive heart failure, NYHA class 3, chronic, combined  Assessment and Plan of Treatment: Your recent Echocardiography on february showed decreased ejection fraction of 30-35%. You should continue your medications and follow up regularly with cardiology and primary care provider.

## 2019-03-21 NOTE — PROGRESS NOTE ADULT - SUBJECTIVE AND OBJECTIVE BOX
53M from home, walks with walker with PMHx of CVA X2, systolic HF(30-35%), CAD(s/p 1 stent and CABG), COPD, HTN, DM, HLD presented to ED c/o generalized weakness and blurring of vision. He was admitted to [facility] on feb. for CVA. He states since 3 weeks, he has been having generalized weakness to the point that he cannot perform his ADL. He also states he cannot walk without his walker and feels off balance. He also noticed that his vision worsened after he was diagnosed stroke on feb. He has been using glasses for myopia since 20 yrs. He also states he did not get PT service that he was supposed to get at home. He denies any numbness, tingling, headache, dysphagia, urinary or bowel complaint.  Pt was   Pt hs right sided weakness but 53 M from home, walks with walker with PMHx of CVA X2, systolic HF(30-35%), CAD(s/p 1 stent and CABG), COPD, HTN, DM, HLD presented to ED c/o generalized weakness and blurring of vision. He was admitted to [facility] on feb. for CVA. He states since 3 weeks, he has been having generalized weakness to the point that he cannot perform his ADL. He also states he cannot walk without his walker and feels off balance. He also noticed that his vision worsened after he was diagnosed stroke on feb. He has been using glasses for myopia since 20 yrs. He also states he did not get PT service that he was supposed to get at home. He denies any numbness, tingling, headache, dysphagia, urinary or bowel complaint.  Pt was complaining of severe weakness this AM.   Pt hs right sided weakness but was noted ambulating at a fast pace around unit with rollator. BS remain elevated > 300. He remains on Lantus 48 units HS and pre meal 8 units with SS. A1C > 11  < from: MR Head No Cont (02.07.19 @ 12:40) >  FINDINGS:  There is mild diffuse parenchymal volume loss. There are T2 prolongation   signal abnormalities in the periventricular subcortical white matter   likely related to mild chronic microvascular ischemic changes.    NP Note discussed with  Primary Attending    Patient is a 53y old  Male who presents with a chief complaint of Weakness and blurry vision (21 Mar 2019 15:08)      INTERVAL HPI/OVERNIGHT EVENTS: no new complaints    MEDICATIONS  (STANDING):  ALBUTerol/ipratropium for Nebulization 3 milliLiter(s) Nebulizer every 6 hours  aspirin enteric coated 81 milliGRAM(s) Oral daily  atorvastatin 80 milliGRAM(s) Oral at bedtime  dextrose 5%. 1000 milliLiter(s) (50 mL/Hr) IV Continuous <Continuous>  dextrose 50% Injectable 12.5 Gram(s) IV Push once  dextrose 50% Injectable 25 Gram(s) IV Push once  dextrose 50% Injectable 25 Gram(s) IV Push once  dipyridamole 200 mG/aspirin 25 mG 1 Capsule(s) Oral two times a day  heparin  Injectable 5000 Unit(s) SubCutaneous every 8 hours  insulin glargine Injectable (LANTUS) 48 Unit(s) SubCutaneous at bedtime  insulin lispro (HumaLOG) corrective regimen sliding scale   SubCutaneous three times a day before meals  insulin lispro Injectable (HumaLOG) 8 Unit(s) SubCutaneous three times a day before meals  lisinopril 2.5 milliGRAM(s) Oral daily  metoprolol tartrate 50 milliGRAM(s) Oral two times a day  pantoprazole    Tablet 40 milliGRAM(s) Oral before breakfast    MEDICATIONS  (PRN):  dextrose 40% Gel 15 Gram(s) Oral once PRN Blood Glucose LESS THAN 70 milliGRAM(s)/deciliter  glucagon  Injectable 1 milliGRAM(s) IntraMuscular once PRN Glucose LESS THAN 70 milligrams/deciliter      __________________________________________________  REVIEW OF SYSTEMS:    CONSTITUTIONAL: No fever,   EYES: c/o worsening vision. No Nystagmus  NECK: No pain or stiffness  RESPIRATORY: No cough; No shortness of breath  CARDIOVASCULAR: No chest pain, no palpitations  GASTROINTESTINAL: No pain. No nausea or vomiting; No diarrhea   NEUROLOGICAL: rt sided mild weakness. + reflexes, mild deficit rt side   MUSCULOSKELETAL: No joint pain, no muscle pain  GENITOURINARY: no dysuria, no frequency, no hesitancy  PSYCHIATRY: no depression , no anxiety  ALL OTHER  ROS negative        Vital Signs Last 24 Hrs  T(C): 37.1 (21 Mar 2019 12:32), Max: 37.2 (20 Mar 2019 15:46)  T(F): 98.8 (21 Mar 2019 12:32), Max: 99 (20 Mar 2019 15:46)  HR: 66 (21 Mar 2019 12:32) (65 - 81)  BP: 164/86 (21 Mar 2019 12:32) (129/80 - 164/86)  BP(mean): --  RR: 18 (21 Mar 2019 12:32) (17 - 18)  SpO2: 99% (21 Mar 2019 12:32) (98% - 100%)    ________________________________________________  PHYSICAL EXAM:  GENERAL: tired appearing  HEENT: Normocephalic;  conjunctivae and sclerae clear; moist mucous membranes;   NECK : supple  CHEST/LUNG: Clear to auscultation bilaterally with good air entry   HEART: S1 S2  regular; no murmurs, gallops or rubs  ABDOMEN: Soft, Nontender, Nondistended; Bowel sounds present  EXTREMITIES: no cyanosis; no edema; no calf tenderness  SKIN: warm and dry; no rash  NERVOUS SYSTEM:  Awake and alert; Oriented  to place, person and time ; no new deficits    _________________________________________________  LABS:                        13.2   5.06  )-----------( 181      ( 21 Mar 2019 06:31 )             39.9     03-21    137  |  106  |  20<H>  ----------------------------<  270<H>  4.1   |  26  |  1.53<H>    Ca    8.6      21 Mar 2019 06:31  Phos  3.3     03-21  Mg     2.0     03-21    TPro  7.3  /  Alb  3.3<L>  /  TBili  0.2  /  DBili  x   /  AST  25  /  ALT  40  /  AlkPhos  137<H>  03-20    PT/INR - ( 20 Mar 2019 08:29 )   PT: 11.0 sec;   INR: 0.99 ratio         PTT - ( 20 Mar 2019 08:29 )  PTT:27.3 sec    CAPILLARY BLOOD GLUCOSE      POCT Blood Glucose.: 314 mg/dL (21 Mar 2019 10:22)  POCT Blood Glucose.: 258 mg/dL (21 Mar 2019 08:06)  POCT Blood Glucose.: 254 mg/dL (20 Mar 2019 21:49)        RADIOLOGY & ADDITIONAL TESTS:    Imaging Personally Reviewed:  YES/NO    Consultant(s) Notes Reviewed:   YES/ No    Care Discussed with Consultants :     Plan of care was discussed with patient and /or primary care giver; all questions and concerns were addressed and care was aligned with patient's wishes.    There is no acute parenchymal hemorrhage, parenchymal mass, or midline   shift. There is no extra-axial fluid collection.  There is no   hydrocephalus.    Small chronic infarct left occipital lobe    Small acute infarct right occipital lobe noted measuring 9 x 8 mm.   Additional punctate acute/subacute infarcts noted in the bilateral   posterior temporal and left occipital region.    Chronic fracture left lamina papyracea.    IMPRESSION:  Bilateral posterior acute and acute/subacute infarcts as described above.    No acute intracranial hemorrhage.    < end of copied text >  < from: CT Head No Cont (03.21.19 @ 09:05) >  FINDINGS:  There are areas of low attenuation in the periventricular white matter   likely related to mild chronic microvascular ischemic changes.    There is no acute intracranial hemorrhage, parenchymal mass,  or midline   shift. There isno acute territorial infarct. There is no hydrocephalus.    Stable small chronic infarct left occipital lobe.    Acute infarct seen on the MRI exam are difficult to visualize on the CT   exam.    The cranium is intact. Chronic depressed fracture leftlamina papyracea.   Mild right occipital soft tissue swelling.    IMPRESSION:  No acute intracranial hemorrhage    < end of copied text >

## 2019-03-22 ENCOUNTER — TRANSCRIPTION ENCOUNTER (OUTPATIENT)
Age: 54
End: 2019-03-22

## 2019-03-22 VITALS
OXYGEN SATURATION: 100 % | SYSTOLIC BLOOD PRESSURE: 118 MMHG | TEMPERATURE: 98 F | DIASTOLIC BLOOD PRESSURE: 68 MMHG | RESPIRATION RATE: 18 BRPM | HEART RATE: 77 BPM

## 2019-03-22 PROCEDURE — 99239 HOSP IP/OBS DSCHRG MGMT >30: CPT

## 2019-03-22 RX ORDER — INSULIN GLARGINE 100 [IU]/ML
40 INJECTION, SOLUTION SUBCUTANEOUS
Qty: 0 | Refills: 0 | DISCHARGE
Start: 2019-03-22 | End: 2019-04-20

## 2019-03-22 RX ORDER — INSULIN LISPRO 100/ML
8 VIAL (ML) SUBCUTANEOUS
Qty: 1 | Refills: 0
Start: 2019-03-22 | End: 2019-04-20

## 2019-03-22 RX ORDER — INSULIN GLARGINE 100 [IU]/ML
43 INJECTION, SOLUTION SUBCUTANEOUS
Qty: 0 | Refills: 0 | DISCHARGE
Start: 2019-03-22 | End: 2019-04-20

## 2019-03-22 RX ORDER — INSULIN LISPRO 100/ML
0 VIAL (ML) SUBCUTANEOUS
Qty: 0 | Refills: 0 | DISCHARGE
Start: 2019-03-22 | End: 2019-04-20

## 2019-03-22 RX ORDER — ACETAMINOPHEN 500 MG
1000 TABLET ORAL ONCE
Qty: 0 | Refills: 0 | Status: COMPLETED | OUTPATIENT
Start: 2019-03-22 | End: 2019-03-22

## 2019-03-22 RX ORDER — INSULIN GLARGINE 100 [IU]/ML
45 INJECTION, SOLUTION SUBCUTANEOUS
Qty: 1500 | Refills: 0
Start: 2019-03-22 | End: 2019-04-20

## 2019-03-22 RX ORDER — INSULIN GLARGINE 100 [IU]/ML
45 INJECTION, SOLUTION SUBCUTANEOUS AT BEDTIME
Qty: 0 | Refills: 0 | Status: DISCONTINUED | OUTPATIENT
Start: 2019-03-22 | End: 2019-03-22

## 2019-03-22 RX ADMIN — Medication 400 MILLIGRAM(S): at 11:38

## 2019-03-22 RX ADMIN — ASPIRIN AND DIPYRIDAMOLE 1 CAPSULE(S): 25; 200 CAPSULE, EXTENDED RELEASE ORAL at 05:37

## 2019-03-22 RX ADMIN — ASPIRIN AND DIPYRIDAMOLE 1 CAPSULE(S): 25; 200 CAPSULE, EXTENDED RELEASE ORAL at 00:33

## 2019-03-22 RX ADMIN — Medication 8 UNIT(S): at 12:57

## 2019-03-22 RX ADMIN — Medication 8 UNIT(S): at 08:58

## 2019-03-22 RX ADMIN — Medication 50 MILLIGRAM(S): at 05:36

## 2019-03-22 RX ADMIN — PANTOPRAZOLE SODIUM 40 MILLIGRAM(S): 20 TABLET, DELAYED RELEASE ORAL at 05:37

## 2019-03-22 RX ADMIN — LISINOPRIL 2.5 MILLIGRAM(S): 2.5 TABLET ORAL at 05:36

## 2019-03-22 RX ADMIN — Medication 81 MILLIGRAM(S): at 12:58

## 2019-03-22 NOTE — PROGRESS NOTE ADULT - PROBLEM SELECTOR PROBLEM 3
Congestive heart failure, NYHA class 3, chronic, combined
Congestive heart failure, NYHA class 3, chronic, combined

## 2019-03-22 NOTE — PROGRESS NOTE ADULT - PROBLEM SELECTOR PLAN 1
A1C  11.7 . Pt to receive 48 units LANTUS HS WITH PRE MEAL FS AND 8 UNITS OF LISPRO ALONG WITH SS
A1C  11.7 . Pt to receive 48 units LANTUS HS WITH PRE MEAL FS AND 8 UNITS OF LISPRO ALONG WITH SS

## 2019-03-22 NOTE — DISCHARGE NOTE NURSING/CASE MANAGEMENT/SOCIAL WORK - NSDCDPATPORTLINK_GEN_ALL_CORE
You can access the [facility] Patient Portal, offered by [facility], by registering with the following website: [email or web address]

## 2019-03-22 NOTE — DISCHARGE NOTE NURSING/CASE MANAGEMENT/SOCIAL WORK - NSDCPEPT PROEDHF_GEN_ALL_CORE
Low salt diet/Monitor weight daily/Report signs and symptoms to primary care provider/Activities as tolerated/Call primary care provider for follow up after discharge

## 2019-03-22 NOTE — PROGRESS NOTE ADULT - ATTENDING COMMENTS
Patient was examined at the bedside and discussed with medical resident.   Patient says that he will follow his long-acting and pre-meal insulin regimen.   Vital Signs Last 24 Hrs  T(C): 36.8 (22 Mar 2019 14:42), Max: 36.9 (22 Mar 2019 05:24)  T(F): 98.3 (22 Mar 2019 14:42), Max: 98.4 (22 Mar 2019 05:24)  HR: 77 (22 Mar 2019 14:42) (71 - 85)  BP: 118/68 (22 Mar 2019 14:42) (118/68 - 153/76)  BP(mean): --  RR: 18 (22 Mar 2019 14:42) (18 - 18)  SpO2: 100% (22 Mar 2019 14:42) (97% - 100%)  IMP:  Dizziness, resolved           No evidence of ACS           DM, better controlled  Plan:  Continue current long-acting and pre-meal insulin.           Discontinue telemetry.            PT consultation.           Discharge home.  f/u PMD.
Patient was examined in the ED and discussed with CATARINA Nicholas.     He is feeling less dizzy and is eager to return home, but has agreed to stay until his blood sugar is better controlled.   I have discussed the importance of better diabetic control with him.   No evidence of new CVA or new ACS.     Plan:  Continue current long-acting and pre-meal insulin.           Discontinue telemetry.            PT consultation.           Discharge in AM, if glucose is better controlled.

## 2019-03-22 NOTE — PROGRESS NOTE ADULT - SUBJECTIVE AND OBJECTIVE BOX
MEDICAL ATTENDING NOTE  Patient is a 53y old  Male who presents with a chief complaint of Weakness and blurry vision (21 Mar 2019 17:51)      HPI:  53M from home, walks with walker with PMHx of CVA X2, systolic HF(30-35%), CAD(s/p 1 stent and CABG), COPD, HTN, DM, HLD presented to ED c/o generalized weakness and blurring of vision. He was admitted to [facility] on feb. for CVA. He states since 3 weeks, he has been having generalized weakness to the point that he cannot perform his ADL. He also states he cannot walk without his walker and feels off balance. He also noticed that his vision worsened after he was diagnosed stroke on feb. He has been using glasses for myopia since 20 yrs. He also states he did not get PT service that he was supposed to get at home. He denies any numbness, tingling, headache, dysphagia, urinary or bowel complaint. (20 Mar 2019 18:22)      INTERVAL HPI/OVERNIGHT EVENTS: no new complaints    MEDICATIONS  (STANDING):  ALBUTerol/ipratropium for Nebulization 3 milliLiter(s) Nebulizer every 6 hours  aspirin enteric coated 81 milliGRAM(s) Oral daily  atorvastatin 80 milliGRAM(s) Oral at bedtime  dipyridamole 200 mG/aspirin 25 mG 1 Capsule(s) Oral two times a day  heparin  Injectable 5000 Unit(s) SubCutaneous every 8 hours  insulin glargine Injectable (LANTUS) 45 Unit(s) SubCutaneous at bedtime  insulin lispro (HumaLOG) corrective regimen sliding scale   SubCutaneous three times a day before meals  insulin lispro Injectable (HumaLOG) 8 Unit(s) SubCutaneous three times a day before meals  lisinopril 2.5 milliGRAM(s) Oral daily  metoprolol tartrate 50 milliGRAM(s) Oral two times a day  pantoprazole    Tablet 40 milliGRAM(s) Oral before breakfast    MEDICATIONS  (PRN):  glucagon  Injectable 1 milliGRAM(s) IntraMuscular once PRN Glucose LESS THAN 70 milligrams/deciliter      __________________________________________________  REVIEW OF SYSTEMS:    CONSTITUTIONAL: No fever,   EYES: no acute visual disturbances  NECK: No pain or stiffness  RESPIRATORY: No cough; No shortness of breath  CARDIOVASCULAR: No chest pain, no palpitations  GASTROINTESTINAL: No pain. No nausea or vomiting; No diarrhea   NEUROLOGICAL: No headache or numbness, no tremors  MUSCULOSKELETAL: No joint pain, no muscle pain  GENITOURINARY: no dysuria, no frequency, no hesitancy  PSYCHIATRY: no depression , no anxiety  ALL OTHER  ROS negative        Vital Signs Last 24 Hrs  T(C): 36.8 (22 Mar 2019 14:42), Max: 36.9 (22 Mar 2019 05:24)  T(F): 98.3 (22 Mar 2019 14:42), Max: 98.4 (22 Mar 2019 05:24)  HR: 77 (22 Mar 2019 14:42) (71 - 85)  BP: 118/68 (22 Mar 2019 14:42) (118/68 - 153/76)  BP(mean): --  RR: 18 (22 Mar 2019 14:42) (18 - 18)  SpO2: 100% (22 Mar 2019 14:42) (97% - 100%)    ________________________________________________  PHYSICAL EXAM:  GENERAL: NAD  HEENT: Normocephalic;  conjunctivae and sclerae clear; moist mucous membranes;   NECK : supple  CHEST/LUNG: Clear to auscultation bilaterally with good air entry   HEART: S1 S2  regular; no murmurs, gallops or rubs  ABDOMEN: Soft, Nontender, Nondistended; Bowel sounds present  EXTREMITIES: no cyanosis; no edema; no calf tenderness  SKIN: warm and dry; no rash  NERVOUS SYSTEM:  Awake and alert; Oriented  to place, person and time ; no new deficits    _________________________________________________  LABS:                        13.2   5.06  )-----------( 181      ( 21 Mar 2019 06:31 )             39.9     03-21    137  |  106  |  20<H>  ----------------------------<  270<H>  4.1   |  26  |  1.53<H>    Ca    8.6      21 Mar 2019 06:31  Phos  3.3     03-21  Mg     2.0     03-21          CAPILLARY BLOOD GLUCOSE      POCT Blood Glucose.: 132 mg/dL (22 Mar 2019 11:59)  POCT Blood Glucose.: 92 mg/dL (22 Mar 2019 08:28)  POCT Blood Glucose.: 244 mg/dL (21 Mar 2019 21:25)

## 2019-03-22 NOTE — PROGRESS NOTE ADULT - PROBLEM SELECTOR PROBLEM 1
Diabetes mellitus of other type without complication
Diabetes mellitus of other type without complication

## 2019-03-28 LAB — T PALLIDUM AB TITR SER: POSITIVE

## 2019-05-16 NOTE — PHYSICAL THERAPY INITIAL EVALUATION ADULT - GENERAL OBSERVATIONS, REHAB EVAL
Pharmacist Admission Medication Reconciliation Pending Note    Prior to Admission Medications were reviewed by the pharmacist and pended for provider review during admission medication reconciliation.    Medications were pended by the pharmacist at this time as follows:    Pended Admission Order Reconciliation Actions - Inessa Duval, [facility] 5/16/2019  1:05 PM     Order Name Action Reordered As    Vitamin D, Ergocalciferol, 78822 UNITS capsule Order for Admission ergocalciferol (DRISDOL) capsule 50,000 Units    pravastatin (PRAVACHOL) 80 MG tablet Order for Admission pravastatin (PRAVACHOL) tablet 80 mg    acetaminophen (TYLENOL) 500 MG tablet Do Not Order for Admission     levothyroxine (SYNTHROID, LEVOTHROID) 75 MCG tablet Order for Admission levothyroxine (SYNTHROID, LEVOTHROID) tablet 225 mcg    febuxostat (ULORIC) 40 MG Tab Order for Admission febuxostat (ULORIC) tablet 40 mg    cloNIDine (CATAPRES) 0.2 MG tablet Order for Admission cloNIDine (CATAPRES) tablet 0.2 mg    cyclobenzaprine (FLEXERIL) 10 MG tablet Order for Admission cyclobenzaprine (FLEXERIL) tablet 10 mg    predniSONE (DELTASONE) 5 MG tablet Order for Admission predniSONE (DELTASONE) tablet 10 mg    oxyCODONE, IMM REL, (ROXICODONE) 15 MG immediate release tablet Order for Admission oxyCODONE (IMM REL) (ROXICODONE) tablet 15 mg    mycophenolate (CELLCEPT) 500 MG tablet Order for Admission mycophenolate (CELLCEPT) tablet 1,500 mg    hydroxychloroquine (PLAQUENIL) 200 MG tablet Order for Admission hydroxychloroquine (PLAQUENIL) tablet 200 mg    allopurinol (ZYLOPRIM) 100 MG tablet Order for Admission allopurinol (ZYLOPRIM) tablet 50 mg            Orders Pended To Continue For Hospital Stay     ID Description Pended By When Reason    [id number] cloNIDine (CATAPRES) tablet 0.2 mg-3 TIMES DAILY Inessa WILBURN Duval, [facility] 05/16/19 1305     [id number] cyclobenzaprine (FLEXERIL) tablet 10 mg-3 TIMES DAILY PRN Inessa Duval, [facility] 05/16/19 1305     [id number] febuxostat  (ULORIC) tablet 40 mg-DAILY [facility] 05/16/19 1305     [id number] hydroxychloroquine (PLAQUENIL) tablet 200 mg-2 TIMES DAILY WITH MEALS [facility] 05/16/19 1305     [id number] allopurinol (ZYLOPRIM) tablet 50 mg-DAILY [facility] 05/16/19 1305     [id number] levothyroxine (SYNTHROID, LEVOTHROID) tablet 225 mcg-DAILY BEFORE BREAKFAST [facility] 05/16/19 1305     [id number] mycophenolate (CELLCEPT) tablet 1,500 mg-2 TIMES DAILY TRANSPLANT [facility] 05/16/19 1305     [id number] pravastatin (PRAVACHOL) tablet 80 mg-NIGHTLY [facility] 05/16/19 1305     [id number] oxyCODONE (IMM REL) (ROXICODONE) tablet 15 mg-EVERY 4 HOURS PRN [facility] 05/16/19 1305     [id number] ergocalciferol (DRISDOL) capsule 50,000 Units-1 DAY A WEEK [facility] 05/16/19 1305     [id number] predniSONE (DELTASONE) tablet 10 mg-DAILY WITH BREAKFAST [facility] 05/16/19 1305             Pharmacist Notations:     Medications left unaddressed:  -Aspirin--Hgb low and IV Protonix given in ED  -Diuretics--KLARISSA on CKD    Last edited by Inessa Duval [facility] on 05/16/19 at 1308          Orders that are ultimately reconciled and signed during admission medication reconciliation may differ from the pended actions above.    Please contact the pharmacist for questions.    Inessa Duval [facility]  5/16/2019 1:08 PM   in NAD, received supine in bed

## 2019-06-23 ENCOUNTER — EMERGENCY (EMERGENCY)
Facility: HOSPITAL | Age: 54
LOS: 1 days | Discharge: HOME | End: 2019-06-23
Attending: EMERGENCY MEDICINE | Admitting: EMERGENCY MEDICINE
Payer: MEDICAID

## 2019-06-23 VITALS
DIASTOLIC BLOOD PRESSURE: 95 MMHG | TEMPERATURE: 98 F | HEART RATE: 76 BPM | OXYGEN SATURATION: 95 % | RESPIRATION RATE: 18 BRPM | SYSTOLIC BLOOD PRESSURE: 168 MMHG

## 2019-06-23 DIAGNOSIS — Z90.49 ACQUIRED ABSENCE OF OTHER SPECIFIED PARTS OF DIGESTIVE TRACT: Chronic | ICD-10-CM

## 2019-06-23 DIAGNOSIS — Z95.1 PRESENCE OF AORTOCORONARY BYPASS GRAFT: Chronic | ICD-10-CM

## 2019-06-23 DIAGNOSIS — Z95.5 PRESENCE OF CORONARY ANGIOPLASTY IMPLANT AND GRAFT: Chronic | ICD-10-CM

## 2019-06-23 PROCEDURE — 99283 EMERGENCY DEPT VISIT LOW MDM: CPT | Mod: 25

## 2019-06-23 NOTE — ED ADULT TRIAGE NOTE - TEMPERATURE IN CELSIUS (DEGREES C)
Visit Note    Aleta Macedo is a 54 year old female here for follow up of chest pain, history also significant for incomplete right bundle branch block/leftward axis, diabetes, obesity.     Patient states that she is under a lot of stress admits to being depressed.  No chest pain or shortness of breath. Has been exercising regularly.  No orthopnea PND palpitations lightheadedness syncope      REVIEW OF SYSTEMS:  CONSTITUTIONAL:  No fever, chills or weight loss.  EYES:  No double vision, recent changes in vision.  HENT:  No headaches, tinnitus, throat irritation.  GASTROINTESTINAL:  No bleeds, nausea, vomiting or diarrhea, abdominal pain.  GENITOURINARY:  No hematuria.  RESPIRATORY:  No cough, wheezing or sputum production.  NEUROLOGIC:  No symptoms compatible with TIA/CVA.  SKIN:  No rashes.  MUSCULOSKELETAL:  No muscle aches or tenderness.  Endocrine:  Denies polyuria or polydipsia.  Lymphatic:  Denies swollen glands.  Psychiatric:  Denies depression or anxiety.    Patient Active Problem List    Diagnosis Date Noted   • Abnormal ECG 12/05/2013     Priority: Medium     Stress test 2/2014: Exercise stress test is significant for fair to average functional capacity at 7.0 METs in a 50 year old , female, Cesar protocol 5.51 minutes, no exercise-induced chest  Pain,no exercise-induced ischemic ECG changes, no significant arrhythmias.study was limited by artifacts leading to  study  Termination. Myocardial perfusion scan reveals no definite areas of ischemia, EF is 83%.        • Chest pain 04/12/2018     Priority: Low     Stress Echo 03/27/2018: Treadmill stress echo. Average exercise tolerance, achieving 8.1 METS, Cesar protocol 6 minutes 46 seconds, 54-year-old female. No exercise-induced chest pain or ischemic ECG changes or significant arrhythmias  No echocardiographic evidence of myocardial ischemia. Study limitation: Digital images did not store, interpretation based on viewing images as they were being obtained      • Acute midline thoracic back pain 06/23/2017     Priority: Low   • Ankle edema 06/23/2017     Priority: Low   • Muscle tension headache 01/31/2017     Priority: Low   • Type 2 diabetes mellitus without complication, without long-term current use of insulin (CMS/HCC) 11/04/2016     Priority: Low   • Acute gastritis without hemorrhage 02/24/2016     Priority: Low   • Benign neoplasm of colon 02/12/2015     Priority: Low   • Right shoulder pain 07/01/2014     Priority: Low   • Hypothyroid 06/23/2014     Priority: Low   • Backache, unspecified 05/01/2014     Priority: Low   • Migraine without aura, without mention of intractable migraine without mention of status migrainosus 10/02/2013     Priority: Low   • Hyperlipidemia 09/26/2013     Priority: Low   • Exogenous obesity 09/26/2013     Priority: Low   • Other abnormal glucose 06/12/2013     Priority: Low   • Other and unspecified hyperlipidemia 06/12/2013     Priority: Low   • Obesity, unspecified 06/12/2013     Priority: Low   • Elevated liver enzymes 06/12/2013     Priority: Low   • Unspecified venous (peripheral) insufficiency 06/12/2013     Priority: Low   • History of hepatitis B 06/12/2013     Priority: Low         I have reviewed the patient's medications and allergies, past medical, surgical, social and family history, updating these as appropriate.  PAST MEDICAL HISTORY:    Hyperlipidemia                                                Hepatitis B surface antigen positive                          Exogenous obesity                                             Thyroid condition                                             Diabetes mellitus (CMS/HCC)                                   Benign neoplasm of colon                        2/12/2015     PAST SURGICAL HISTORY:    MAMMO SCREENING BILATERAL                       12/05/2011    PAP SMEAR,ROUTINE                               12/05/2011    DEXA BONE DENSITY AXIAL SKELETON                12/07/2011    KORTNEY  TOOTH EXTRACTION                                       LASIK                                                           Comment: licha eyes    COLONOSCOPY                                     2/12/15         Comment: REPEAT 5 YEARS    No family history on file.  Review of patient's family status indicates:    Mother                                             Comment: war    Father                                             Comment: war      Social History     Social History   • Marital status:      Spouse name: N/A   • Number of children: N/A   • Years of education: N/A     Occupational History   • Not on file.     Social History Main Topics   • Smoking status: Never Smoker   • Smokeless tobacco: Never Used   • Alcohol use No   • Drug use: No   • Sexual activity: Not on file     Other Topics Concern   • Not on file     Social History Narrative   • No narrative on file       PHYSICAL EXAMINATION:  Visit Vitals  /70   Pulse 84   Ht 5' (1.524 m)   Wt 96.4 kg   BMI 41.50 kg/m²     GENERAL:  Well-developed, well-nourished, no acute distress.  EYE EXAM:  No pallor, no jaundice, no xanthelasma.  ORAL CAVITY:  No cyanosis.  NECK:  No JVD, bruits or thyromegaly.  CHEST WALL:  No tenderness, no deformity.  LUNGS:  Clear to auscultation.  Respiratory effort is normal.  No respiratory distress.  CARDIOVASCULAR:  S1, S2, normal.  No murmurs, gallops or rubs.  No precordial lifts.  ABDOMEN:  Non tender.  Bowel sounds are present.  No bruits.  No hepatosplenomegaly.  EXTREMITIES:  No edema, cyanosis or clubbing.  MUSCULOSKELETAL:  Gait is normal.  SKIN:  No rashes noted.  NEUROLOGICAL EXAM:  Patient is oriented x3.  Mood and affect are normal.  PSYCHIATRIC:  Speech and behavior appropriate.        IMPRESSION/PLAN:    Chest pain: Stable, last stress report reviewed, symptoms to call on reviewed, follow-up plans discussed  Depression: Offered  to call Dr. Rao's office to get an appointment, patient  wishes to do so herself    Return if symptoms worsen or fail to improve.      Mitch Moore MD     36.8

## 2019-06-23 NOTE — ED ADULT TRIAGE NOTE - CHIEF COMPLAINT QUOTE
Pt brought in by [facility] after discharge from [facility]. Pt was arrested earlier today and brought to hospital because pt was breathing loudly. Pt discharged from [facility] in same condition he was brought in for. Pt noted to be breathing loudly in ED. Pt states he's been breathing like this since October. Pt sating at 95% on RA.  Pt sleepy in triage but arrousable to loud stimuli. Pt denies drug use. Pt offers no complaints at this time. Pt brought in by [facility] after discharge from [facility]. Pt was arrested earlier today and brought to hospital because pt was breathing loudly. Pt discharged from [facility] in same condition he was brought in for. Pt noted to be breathing loudly in ED. Pt states he's been breathing like this since October. Pt sating at 95% on RA.  Pt sleepy in triage but arrousable to loud stimuli. Pt denies drug use. Pt offers no complaints at this time. MD Burnett called to asses pt breathing.

## 2019-06-24 VITALS
RESPIRATION RATE: 20 BRPM | HEART RATE: 79 BPM | SYSTOLIC BLOOD PRESSURE: 189 MMHG | DIASTOLIC BLOOD PRESSURE: 91 MMHG | OXYGEN SATURATION: 98 %

## 2019-06-24 LAB
ALBUMIN SERPL ELPH-MCNC: 3.6 G/DL — SIGNIFICANT CHANGE UP (ref 3.3–5)
ALP SERPL-CCNC: 93 U/L — SIGNIFICANT CHANGE UP (ref 40–120)
ALT FLD-CCNC: 12 U/L — SIGNIFICANT CHANGE UP (ref 4–41)
ANION GAP SERPL CALC-SCNC: 13 MMO/L — SIGNIFICANT CHANGE UP (ref 7–14)
APAP SERPL-MCNC: < 15 UG/ML — LOW (ref 15–25)
AST SERPL-CCNC: 17 U/L — SIGNIFICANT CHANGE UP (ref 4–40)
BASE EXCESS BLDV CALC-SCNC: 1.4 MMOL/L — SIGNIFICANT CHANGE UP
BASOPHILS # BLD AUTO: 0.01 K/UL — SIGNIFICANT CHANGE UP (ref 0–0.2)
BASOPHILS NFR BLD AUTO: 0.1 % — SIGNIFICANT CHANGE UP (ref 0–2)
BILIRUB SERPL-MCNC: 0.4 MG/DL — SIGNIFICANT CHANGE UP (ref 0.2–1.2)
BLOOD GAS VENOUS - CREATININE: 1 MG/DL — SIGNIFICANT CHANGE UP (ref 0.5–1.3)
BLOOD GAS VENOUS - FIO2: 21 — SIGNIFICANT CHANGE UP
BUN SERPL-MCNC: 11 MG/DL — SIGNIFICANT CHANGE UP (ref 7–23)
CALCIUM SERPL-MCNC: 8.8 MG/DL — SIGNIFICANT CHANGE UP (ref 8.4–10.5)
CHLORIDE BLDV-SCNC: 111 MMOL/L — HIGH (ref 96–108)
CHLORIDE SERPL-SCNC: 101 MMOL/L — SIGNIFICANT CHANGE UP (ref 98–107)
CO2 SERPL-SCNC: 23 MMOL/L — SIGNIFICANT CHANGE UP (ref 22–31)
CREAT SERPL-MCNC: 1.06 MG/DL — SIGNIFICANT CHANGE UP (ref 0.5–1.3)
EOSINOPHIL # BLD AUTO: 0.07 K/UL — SIGNIFICANT CHANGE UP (ref 0–0.5)
EOSINOPHIL NFR BLD AUTO: 0.8 % — SIGNIFICANT CHANGE UP (ref 0–6)
ETHANOL BLD-MCNC: < 10 MG/DL — SIGNIFICANT CHANGE UP
GAS PNL BLDV: 135 MMOL/L — LOW (ref 136–146)
GLUCOSE BLDV-MCNC: 274 MG/DL — HIGH (ref 70–99)
GLUCOSE SERPL-MCNC: 316 MG/DL — HIGH (ref 70–99)
HCO3 BLDV-SCNC: 25 MMOL/L — SIGNIFICANT CHANGE UP (ref 20–27)
HCT VFR BLD CALC: 39.7 % — SIGNIFICANT CHANGE UP (ref 39–50)
HCT VFR BLDV CALC: 40.4 % — SIGNIFICANT CHANGE UP (ref 39–51)
HGB BLD-MCNC: 13.1 G/DL — SIGNIFICANT CHANGE UP (ref 13–17)
HGB BLDV-MCNC: 13.2 G/DL — SIGNIFICANT CHANGE UP (ref 13–17)
IMM GRANULOCYTES NFR BLD AUTO: 0.4 % — SIGNIFICANT CHANGE UP (ref 0–1.5)
LACTATE BLDV-MCNC: 1.2 MMOL/L — SIGNIFICANT CHANGE UP (ref 0.5–2)
LYMPHOCYTES # BLD AUTO: 1.14 K/UL — SIGNIFICANT CHANGE UP (ref 1–3.3)
LYMPHOCYTES # BLD AUTO: 13.8 % — SIGNIFICANT CHANGE UP (ref 13–44)
MCHC RBC-ENTMCNC: 29.6 PG — SIGNIFICANT CHANGE UP (ref 27–34)
MCHC RBC-ENTMCNC: 33 % — SIGNIFICANT CHANGE UP (ref 32–36)
MCV RBC AUTO: 89.8 FL — SIGNIFICANT CHANGE UP (ref 80–100)
MONOCYTES # BLD AUTO: 0.48 K/UL — SIGNIFICANT CHANGE UP (ref 0–0.9)
MONOCYTES NFR BLD AUTO: 5.8 % — SIGNIFICANT CHANGE UP (ref 2–14)
NEUTROPHILS # BLD AUTO: 6.51 K/UL — SIGNIFICANT CHANGE UP (ref 1.8–7.4)
NEUTROPHILS NFR BLD AUTO: 79.1 % — HIGH (ref 43–77)
NRBC # FLD: 0.02 K/UL — SIGNIFICANT CHANGE UP (ref 0–0)
PCO2 BLDV: 48 MMHG — SIGNIFICANT CHANGE UP (ref 41–51)
PH BLDV: 7.36 PH — SIGNIFICANT CHANGE UP (ref 7.32–7.43)
PLATELET # BLD AUTO: 222 K/UL — SIGNIFICANT CHANGE UP (ref 150–400)
PMV BLD: 9.7 FL — SIGNIFICANT CHANGE UP (ref 7–13)
PO2 BLDV: 50 MMHG — HIGH (ref 35–40)
POTASSIUM BLDV-SCNC: 3.6 MMOL/L — SIGNIFICANT CHANGE UP (ref 3.4–4.5)
POTASSIUM SERPL-MCNC: 4.1 MMOL/L — SIGNIFICANT CHANGE UP (ref 3.5–5.3)
POTASSIUM SERPL-SCNC: 4.1 MMOL/L — SIGNIFICANT CHANGE UP (ref 3.5–5.3)
PROT SERPL-MCNC: 7.6 G/DL — SIGNIFICANT CHANGE UP (ref 6–8.3)
RBC # BLD: 4.42 M/UL — SIGNIFICANT CHANGE UP (ref 4.2–5.8)
RBC # FLD: 12.8 % — SIGNIFICANT CHANGE UP (ref 10.3–14.5)
SALICYLATES SERPL-MCNC: < 5 MG/DL — LOW (ref 15–30)
SAO2 % BLDV: 82.3 % — SIGNIFICANT CHANGE UP (ref 60–85)
SODIUM SERPL-SCNC: 137 MMOL/L — SIGNIFICANT CHANGE UP (ref 135–145)
WBC # BLD: 8.24 K/UL — SIGNIFICANT CHANGE UP (ref 3.8–10.5)
WBC # FLD AUTO: 8.24 K/UL — SIGNIFICANT CHANGE UP (ref 3.8–10.5)

## 2019-06-24 PROCEDURE — 71045 X-RAY EXAM CHEST 1 VIEW: CPT | Mod: 26

## 2019-06-24 RX ORDER — SODIUM CHLORIDE 9 MG/ML
1000 INJECTION INTRAMUSCULAR; INTRAVENOUS; SUBCUTANEOUS ONCE
Refills: 0 | Status: COMPLETED | OUTPATIENT
Start: 2019-06-24 | End: 2019-06-24

## 2019-06-24 RX ADMIN — SODIUM CHLORIDE 1000 MILLILITER(S): 9 INJECTION INTRAMUSCULAR; INTRAVENOUS; SUBCUTANEOUS at 03:00

## 2019-06-24 NOTE — ED PROVIDER NOTE - PHYSICAL EXAMINATION
General: sleepy but alarm to stimuli, obese  HENT: Atraumatic, EMOI, no conjunctivae injection, moist mucosa  Neck: normal ROM and trachea midline   Cardiovascular: RRR, S1&2, no M or R, radial pulses equal and b/l  Respiratory: CTABL, no wheezes or crackles, no decreased breath sounds  Abdominal: soft and non-tender non distended, neg for guarding, coon's sign, rovsing's sign, mcburney's sign, no CVA tenderness   Extremities: no edema of the legs/feet, DP/PT equal b/l  Skin: warm, well perfused  Neurologic: nonfocal, AAOx3  Psych: normal mood and affect

## 2019-06-24 NOTE — ED ADULT NURSE NOTE - OBJECTIVE STATEMENT
Break Coverage RN: Patient is a 53 y/o M BIB [employer] after being seen at and discharged from [facility] earlier today.  Patient BIB [employer] as he was breathing loudly in a gasping fashion.  Patient is minimally able to participate in interview, answering select questions.  Patient is lethargic however arousable.  Patient denies drug use, denies pain and discomfort.  Dr. Dubin called to evaluate patient, no orders at this time, patient placed on cardiac monitor and pulse ox, will continue to monitor.  20 gauge PIV in left ac.  Patient unable to answer screening questions about suicide and abuse screen.

## 2019-06-24 NOTE — ED PROVIDER NOTE - NSFOLLOWUPINSTRUCTIONS_ED_ALL_ED_FT
Thank you for visiting our Emergency Department, it has been a pleasure taking part in your healthcare. Please follow up with your PMD within x48 hours.    Your discharge diagnosis is: loud breathing  Please follow-up with your primary care doctor.     A copy of resulted labs, imaging, and findings have been provided to you.   You have had a detailed discussion with your provider regarding your diagnosis, management and discharge plan. You have been given the opportunity to have your questions answered. At this time you have been deemed stable and fit for discharge.    Return precautions to the Emergency Department include but are not limited to: unrelenting nausea, vomiting, fever, chills, chest pain, shortness of breath, dizziness, abdominal pain, worsening pain, syncope, blood in urine or stool, headache that doesn't resolve, numbness or tingling, loss of sensation, loss of motor function, or any other concerning symptoms.

## 2019-06-24 NOTE — ED ADULT NURSE NOTE - CHIEF COMPLAINT QUOTE
Pt brought in by [facility] after discharge from [facility]. Pt was arrested earlier today and brought to hospital because pt was breathing loudly. Pt discharged from [facility] in same condition he was brought in for. Pt noted to be breathing loudly in ED. Pt states he's been breathing like this since October. Pt sating at 95% on RA.  Pt sleepy in triage but arrousable to loud stimuli. Pt denies drug use. Pt offers no complaints at this time. MD Burnett called to asses pt breathing.

## 2019-06-24 NOTE — ED ADULT NURSE NOTE - NSIMPLEMENTINTERV_GEN_ALL_ED
Implemented All Fall Risk Interventions:  [place] to call system. Call bell, personal items and telephone within reach. Instruct patient to call for assistance. Room bathroom lighting operational. Non-slip footwear when patient is off stretcher. Physically safe environment: no spills, clutter or unnecessary equipment. Stretcher in lowest position, wheels locked, appropriate side rails in place. Provide visual cue, wrist band, yellow gown, etc. Monitor gait and stability. Monitor for mental status changes and reorient to person, place, and time. Review medications for side effects contributing to fall risk. Reinforce activity limits and safety measures with patient and family.

## 2019-06-24 NOTE — ED ADULT NURSE REASSESSMENT NOTE - NS ED NURSE REASSESS COMMENT FT1
Report received from break coverage ASHUTOSH BOLDEN Pt sleeping comfortably, arousable to loud verbal stimuli. Pt continues to have intermittent stidor-like breathing sounds. % sp02 on room air. RR 16-20. Pt is under arrest - [employer] at bedside. MD made aware of patient's BP - no orders at this time.

## 2019-06-24 NOTE — ED PROVIDER NOTE - NS ED ROS FT
GENERAL: No fever or chills, weight changes, nightsweats  EYES: no change in vision  HEENT: no dysplasia, odynophagia, ear pain, rhinorrhea, epistasis   CARDIAC: no chest pain, palpitation   PULMONARY: cough no SOB  GI: no abdominal pain, no nausea or no vomiting, no diarrhea or constipation  : No changes in urination for pain/freq.   SKIN: no rashes   NEURO: no headache, numbness/tingling, extremity weakness   MSK: No joint pain

## 2019-06-24 NOTE — ED PROVIDER NOTE - CLINICAL SUMMARY MEDICAL DECISION MAKING FREE TEXT BOX
loud breathing concerning for stridor, patient denied any shortness of breath and states normal breathing, will check xray and labs, and blood gas venous for hypercapnia. will dispo per lab.

## 2019-06-24 NOTE — ED PROVIDER NOTE - ATTENDING CONTRIBUTION TO CARE
54M hx ?CVA w/ residual irregular resps now brought by [employer] after discharge from [facility] earlier in the day b/c pt was breathing loudly.  Pt was arrested earlier today and brought to hospital because pt was breathing loudly. Pt states he's been breathing like this since October. No drug use.  No sx or pain in Emergency Department now.     VS: afebrile, others  HTN 180s  Gen: Well appearing in NAD  Head: NC/AT  HEENT: mmm  Neck: trachea midline  Resp:  No distress, irregular  Ext: no deformities  Neuro:  A&O  Skin:  Warm and dry as visualized  Psych:  calm    EKG: NSR@81, RBBB, RAD, no old to compare  MDM: VSS, afebrile, nontoxic, no complaints in Emergency Department.  Labs appear nl as does VBG, reassuing that no acute process & pt likely breathes like this at baseline, possibly as a result of CVA.   Will CXR.

## 2019-06-24 NOTE — ED PROVIDER NOTE - OBJECTIVE STATEMENT
54M, h.o recent CVA (OCT/19)  brought in by [facility] after discharge from [facility] for stridor. Pt was arrested earlier today and brought to hospital because pt was breathing loudly. Pt discharged from [facility] in same condition he was brought in for. Pt states this is his normal breathing and doesn't have shortness of breath. Pt denied fever, chestpain, cough and any discomfort. Pt denies drug use. 54M, h.o recent CVA (OCT/19)  [place] [facility] for abnl breathing.  Pt was arrested earlier today and brought to hospital because pt was breathing loudly. Pt discharged from [facility] in same condition he was brought in for. Pt states this is his normal breathing and doesn't have shortness of breath. Pt denied fever, chestpain, cough and any discomfort. Pt denies drug use.

## 2019-07-17 ENCOUNTER — INPATIENT (INPATIENT)
Facility: HOSPITAL | Age: 54
LOS: 6 days | Discharge: HOME | DRG: 305 | End: 2019-07-24
Attending: INTERNAL MEDICINE | Admitting: INTERNAL MEDICINE
Payer: MEDICAID

## 2019-07-17 VITALS
RESPIRATION RATE: 18 BRPM | TEMPERATURE: 98 F | WEIGHT: 220.02 LBS | SYSTOLIC BLOOD PRESSURE: 148 MMHG | DIASTOLIC BLOOD PRESSURE: 95 MMHG | HEIGHT: 75 IN | HEART RATE: 82 BPM | OXYGEN SATURATION: 98 %

## 2019-07-17 DIAGNOSIS — Z29.9 ENCOUNTER FOR PROPHYLACTIC MEASURES, UNSPECIFIED: ICD-10-CM

## 2019-07-17 DIAGNOSIS — I25.10 ATHEROSCLEROTIC HEART DISEASE OF NATIVE CORONARY ARTERY WITHOUT ANGINA PECTORIS: ICD-10-CM

## 2019-07-17 DIAGNOSIS — G11.9 HEREDITARY ATAXIA, UNSPECIFIED: ICD-10-CM

## 2019-07-17 DIAGNOSIS — I10 ESSENTIAL (PRIMARY) HYPERTENSION: ICD-10-CM

## 2019-07-17 DIAGNOSIS — Z95.5 PRESENCE OF CORONARY ANGIOPLASTY IMPLANT AND GRAFT: Chronic | ICD-10-CM

## 2019-07-17 DIAGNOSIS — Z90.49 ACQUIRED ABSENCE OF OTHER SPECIFIED PARTS OF DIGESTIVE TRACT: Chronic | ICD-10-CM

## 2019-07-17 DIAGNOSIS — R42 DIZZINESS AND GIDDINESS: ICD-10-CM

## 2019-07-17 DIAGNOSIS — I63.9 CEREBRAL INFARCTION, UNSPECIFIED: ICD-10-CM

## 2019-07-17 DIAGNOSIS — Z95.1 PRESENCE OF AORTOCORONARY BYPASS GRAFT: Chronic | ICD-10-CM

## 2019-07-17 LAB
ACETONE SERPL-MCNC: NEGATIVE — SIGNIFICANT CHANGE UP
ALBUMIN SERPL ELPH-MCNC: 3 G/DL — LOW (ref 3.5–5)
ALP SERPL-CCNC: 105 U/L — SIGNIFICANT CHANGE UP (ref 40–120)
ALT FLD-CCNC: 17 U/L DA — SIGNIFICANT CHANGE UP (ref 10–60)
ANION GAP SERPL CALC-SCNC: 6 MMOL/L — SIGNIFICANT CHANGE UP (ref 5–17)
APTT BLD: 27.2 SEC — LOW (ref 27.5–36.3)
AST SERPL-CCNC: 11 U/L — SIGNIFICANT CHANGE UP (ref 10–40)
BASOPHILS # BLD AUTO: 0.02 K/UL — SIGNIFICANT CHANGE UP (ref 0–0.2)
BASOPHILS NFR BLD AUTO: 0.4 % — SIGNIFICANT CHANGE UP (ref 0–2)
BILIRUB SERPL-MCNC: 0.2 MG/DL — SIGNIFICANT CHANGE UP (ref 0.2–1.2)
BUN SERPL-MCNC: 18 MG/DL — SIGNIFICANT CHANGE UP (ref 7–18)
CALCIUM SERPL-MCNC: 8.4 MG/DL — SIGNIFICANT CHANGE UP (ref 8.4–10.5)
CHLORIDE SERPL-SCNC: 104 MMOL/L — SIGNIFICANT CHANGE UP (ref 96–108)
CK SERPL-CCNC: 84 U/L — SIGNIFICANT CHANGE UP (ref 35–232)
CO2 SERPL-SCNC: 27 MMOL/L — SIGNIFICANT CHANGE UP (ref 22–31)
CREAT SERPL-MCNC: 1.28 MG/DL — SIGNIFICANT CHANGE UP (ref 0.5–1.3)
EOSINOPHIL # BLD AUTO: 0.13 K/UL — SIGNIFICANT CHANGE UP (ref 0–0.5)
EOSINOPHIL NFR BLD AUTO: 2.5 % — SIGNIFICANT CHANGE UP (ref 0–6)
GLUCOSE BLDC GLUCOMTR-MCNC: 169 MG/DL — HIGH (ref 70–99)
GLUCOSE BLDC GLUCOMTR-MCNC: 195 MG/DL — HIGH (ref 70–99)
GLUCOSE SERPL-MCNC: 223 MG/DL — HIGH (ref 70–99)
HCT VFR BLD CALC: 40.1 % — SIGNIFICANT CHANGE UP (ref 39–50)
HGB BLD-MCNC: 13.7 G/DL — SIGNIFICANT CHANGE UP (ref 13–17)
IMM GRANULOCYTES NFR BLD AUTO: 0.2 % — SIGNIFICANT CHANGE UP (ref 0–1.5)
INR BLD: 1.02 RATIO — SIGNIFICANT CHANGE UP (ref 0.88–1.16)
LYMPHOCYTES # BLD AUTO: 1.88 K/UL — SIGNIFICANT CHANGE UP (ref 1–3.3)
LYMPHOCYTES # BLD AUTO: 35.7 % — SIGNIFICANT CHANGE UP (ref 13–44)
MAGNESIUM SERPL-MCNC: 1.7 MG/DL — SIGNIFICANT CHANGE UP (ref 1.6–2.6)
MCHC RBC-ENTMCNC: 29.7 PG — SIGNIFICANT CHANGE UP (ref 27–34)
MCHC RBC-ENTMCNC: 34.2 GM/DL — SIGNIFICANT CHANGE UP (ref 32–36)
MCV RBC AUTO: 86.8 FL — SIGNIFICANT CHANGE UP (ref 80–100)
MONOCYTES # BLD AUTO: 0.46 K/UL — SIGNIFICANT CHANGE UP (ref 0–0.9)
MONOCYTES NFR BLD AUTO: 8.7 % — SIGNIFICANT CHANGE UP (ref 2–14)
NEUTROPHILS # BLD AUTO: 2.77 K/UL — SIGNIFICANT CHANGE UP (ref 1.8–7.4)
NEUTROPHILS NFR BLD AUTO: 52.5 % — SIGNIFICANT CHANGE UP (ref 43–77)
NRBC # BLD: 0 /100 WBCS — SIGNIFICANT CHANGE UP (ref 0–0)
NT-PROBNP SERPL-SCNC: 325 PG/ML — HIGH (ref 0–125)
PLATELET # BLD AUTO: 217 K/UL — SIGNIFICANT CHANGE UP (ref 150–400)
POTASSIUM SERPL-MCNC: 3.9 MMOL/L — SIGNIFICANT CHANGE UP (ref 3.5–5.3)
POTASSIUM SERPL-SCNC: 3.9 MMOL/L — SIGNIFICANT CHANGE UP (ref 3.5–5.3)
PROT SERPL-MCNC: 7.6 G/DL — SIGNIFICANT CHANGE UP (ref 6–8.3)
PROTHROM AB SERPL-ACNC: 11.3 SEC — SIGNIFICANT CHANGE UP (ref 10–12.9)
RBC # BLD: 4.62 M/UL — SIGNIFICANT CHANGE UP (ref 4.2–5.8)
RBC # FLD: 12.5 % — SIGNIFICANT CHANGE UP (ref 10.3–14.5)
SODIUM SERPL-SCNC: 137 MMOL/L — SIGNIFICANT CHANGE UP (ref 135–145)
TROPONIN I SERPL-MCNC: <0.015 NG/ML — SIGNIFICANT CHANGE UP (ref 0–0.04)
WBC # BLD: 5.27 K/UL — SIGNIFICANT CHANGE UP (ref 3.8–10.5)
WBC # FLD AUTO: 5.27 K/UL — SIGNIFICANT CHANGE UP (ref 3.8–10.5)

## 2019-07-17 PROCEDURE — 70450 CT HEAD/BRAIN W/O DYE: CPT | Mod: 26

## 2019-07-17 PROCEDURE — 99223 1ST HOSP IP/OBS HIGH 75: CPT

## 2019-07-17 PROCEDURE — 99285 EMERGENCY DEPT VISIT HI MDM: CPT

## 2019-07-17 PROCEDURE — 71045 X-RAY EXAM CHEST 1 VIEW: CPT | Mod: 26

## 2019-07-17 RX ORDER — ATORVASTATIN CALCIUM 80 MG/1
80 TABLET, FILM COATED ORAL AT BEDTIME
Refills: 0 | Status: DISCONTINUED | OUTPATIENT
Start: 2019-07-17 | End: 2019-07-24

## 2019-07-17 RX ORDER — MECLIZINE HCL 12.5 MG
25 TABLET ORAL EVERY 8 HOURS
Refills: 0 | Status: DISCONTINUED | OUTPATIENT
Start: 2019-07-17 | End: 2019-07-24

## 2019-07-17 RX ORDER — LISINOPRIL 2.5 MG/1
2.5 TABLET ORAL DAILY
Refills: 0 | Status: DISCONTINUED | OUTPATIENT
Start: 2019-07-17 | End: 2019-07-24

## 2019-07-17 RX ORDER — DEXTROSE 50 % IN WATER 50 %
25 SYRINGE (ML) INTRAVENOUS ONCE
Refills: 0 | Status: DISCONTINUED | OUTPATIENT
Start: 2019-07-17 | End: 2019-07-18

## 2019-07-17 RX ORDER — METOPROLOL TARTRATE 50 MG
25 TABLET ORAL
Refills: 0 | Status: DISCONTINUED | OUTPATIENT
Start: 2019-07-17 | End: 2019-07-24

## 2019-07-17 RX ORDER — GLUCAGON INJECTION, SOLUTION 0.5 MG/.1ML
1 INJECTION, SOLUTION SUBCUTANEOUS ONCE
Refills: 0 | Status: DISCONTINUED | OUTPATIENT
Start: 2019-07-17 | End: 2019-07-24

## 2019-07-17 RX ORDER — ENOXAPARIN SODIUM 100 MG/ML
40 INJECTION SUBCUTANEOUS DAILY
Refills: 0 | Status: DISCONTINUED | OUTPATIENT
Start: 2019-07-17 | End: 2019-07-24

## 2019-07-17 RX ORDER — DEXTROSE 50 % IN WATER 50 %
15 SYRINGE (ML) INTRAVENOUS ONCE
Refills: 0 | Status: DISCONTINUED | OUTPATIENT
Start: 2019-07-17 | End: 2019-07-18

## 2019-07-17 RX ORDER — SODIUM CHLORIDE 9 MG/ML
1000 INJECTION INTRAMUSCULAR; INTRAVENOUS; SUBCUTANEOUS
Refills: 0 | Status: DISCONTINUED | OUTPATIENT
Start: 2019-07-17 | End: 2019-07-18

## 2019-07-17 RX ORDER — ASPIRIN/CALCIUM CARB/MAGNESIUM 324 MG
81 TABLET ORAL DAILY
Refills: 0 | Status: DISCONTINUED | OUTPATIENT
Start: 2019-07-17 | End: 2019-07-19

## 2019-07-17 RX ORDER — DEXTROSE 50 % IN WATER 50 %
12.5 SYRINGE (ML) INTRAVENOUS ONCE
Refills: 0 | Status: DISCONTINUED | OUTPATIENT
Start: 2019-07-17 | End: 2019-07-18

## 2019-07-17 RX ORDER — INSULIN GLARGINE 100 [IU]/ML
30 INJECTION, SOLUTION SUBCUTANEOUS AT BEDTIME
Refills: 0 | Status: DISCONTINUED | OUTPATIENT
Start: 2019-07-17 | End: 2019-07-19

## 2019-07-17 RX ORDER — INSULIN LISPRO 100/ML
VIAL (ML) SUBCUTANEOUS
Refills: 0 | Status: DISCONTINUED | OUTPATIENT
Start: 2019-07-17 | End: 2019-07-24

## 2019-07-17 RX ORDER — SODIUM CHLORIDE 9 MG/ML
1000 INJECTION, SOLUTION INTRAVENOUS
Refills: 0 | Status: DISCONTINUED | OUTPATIENT
Start: 2019-07-17 | End: 2019-07-18

## 2019-07-17 RX ADMIN — SODIUM CHLORIDE 125 MILLILITER(S): 9 INJECTION INTRAMUSCULAR; INTRAVENOUS; SUBCUTANEOUS at 21:36

## 2019-07-17 RX ADMIN — ATORVASTATIN CALCIUM 80 MILLIGRAM(S): 80 TABLET, FILM COATED ORAL at 22:45

## 2019-07-17 RX ADMIN — Medication 1: at 22:53

## 2019-07-17 RX ADMIN — INSULIN GLARGINE 30 UNIT(S): 100 INJECTION, SOLUTION SUBCUTANEOUS at 22:53

## 2019-07-17 RX ADMIN — Medication 1 MILLIGRAM(S): at 11:59

## 2019-07-17 RX ADMIN — SODIUM CHLORIDE 125 MILLILITER(S): 9 INJECTION INTRAMUSCULAR; INTRAVENOUS; SUBCUTANEOUS at 11:59

## 2019-07-17 NOTE — H&P ADULT - PROBLEM SELECTOR PLAN 1
likely BPPV as Ingrid middleton manure positive  CT head neg for acute intracranial pathology   will start meclizine 25 prn q 8  c/w iv fluid   f/u orthstatic  f/u lipid profile and hba1c  will do MRI brain with and without contrast and MRI IAC with and without contrast to r/o stroke since pt has risk factor   will monitor pt on medicine floor, if MRI brain comes back positive than will upgrade pt for tele

## 2019-07-17 NOTE — CONSULT NOTE ADULT - SUBJECTIVE AND OBJECTIVE BOX
54 RHM with generalized weakness and room-spinning episodes, especially when lying down, since 6/23/19  On exam, patient has 5/5 strength x 4, chronic LLE sensory deficit, no nystagmus, negative Ingrid-Hallpike (NIHSS 1, MRS 2). No IV tPA because patient is out of time window (LSN 3.5 weeks ago)    Recs:  - Check for metabolic abnormalities  - Approved for MRI Brain +/- Rupesh & MRI IAC +/- Rupesh to evaluate for stroke or IAC abnormality  - Continue ASA & Atorvastatin for secondary stroke prevention  - Check Echo, FLP, HbA1c      NOTE TO BE COMPLETED    Neurology Consult    Patient is a 54y old  Male who presents with a chief complaint of     HPI:      PAST MEDICAL & SURGICAL HISTORY:  Cerebrovascular accident (CVA), unspecified mechanism  Congestive heart failure, NYHA class 3, chronic, combined  Cerebrovascular accident (CVA), unspecified mechanism  Hyperlipidemia, unspecified hyperlipidemia type  Coronary artery disease involving native heart without angina pectoris, unspecified vessel or lesion type  Hypertension, unspecified type  Diabetes mellitus of other type without complication  S/P coronary artery stent placement  History of appendectomy  S/P CABG x 1      FAMILY HISTORY:  FH: CAD (coronary artery disease)      Social History: (-) x 3    Allergies    Plavix (Other)    Intolerances        MEDICATIONS  (STANDING):  sodium chloride 0.9%. 1000 milliLiter(s) (125 mL/Hr) IV Continuous <Continuous>    MEDICATIONS  (PRN):      Review of systems:    Constitutional: No fever, weight loss or fatigue    Eyes: No eye pain or discharge  ENMT:  No difficulty hearing; No sinus or throat pain  Neck: No pain or stiffness  Respiratory: No cough, wheezing, chills or hemoptysis  Cardiovascular: No chest pain, palpitations, shortness of breath, dyspnea on exertion  Gastrointestinal: No abdominal pain, nausea, vomiting or hematemesis; No diarrhea or constipation.   Genitourinary: No dysuria, frequency, hematuria or incontinence  Neurological: As per HPI  Skin: No rashes or lesions   Endocrine: No heat or cold intolerance; No hair loss  Musculoskeletal: No joint pain or swelling  Psychiatric: No depression, anxiety, mood swings  Heme/Lymph: No easy bruising or bleeding gums    Vital Signs Last 24 Hrs  T(C): 36.6 (17 Jul 2019 18:16), Max: 36.8 (17 Jul 2019 11:00)  T(F): 97.9 (17 Jul 2019 18:16), Max: 98.3 (17 Jul 2019 11:00)  HR: 75 (17 Jul 2019 18:16) (75 - 82)  BP: 142/81 (17 Jul 2019 18:16) (119/81 - 148/95)  BP(mean): --  RR: 18 (17 Jul 2019 18:16) (18 - 18)  SpO2: 100% (17 Jul 2019 18:16) (97% - 100%)    Neurologic Examination:  General:  Appearance is consistent with chronologic age.  No abnormal facies.   General: The patient is oriented to person, place, time and date.  Recent and remote memory intact.  Fund of knowledge is intact and normal.  Language with normal repetition, comprehension and naming.  Nondysarthric.    Cranial nerves: intact VA, VFF.  EOMI w/o nystagmus, skew or reported double vision.  PERRL.  No ptosis/weakness of eyelid closure.  Facial sensation is normal with normal bite.  No facial asymmetry.  Hearing grossly intact b/l.  Palate elevates midline.  Tongue midline.  Motor examination:   Normal tone, bulk and range of motion.  No tenderness, twitching, tremors or involuntary movements.  Formal Muscle Strength Testing: (MRC grade R/L) 5/5 UE; 5/5 LE.  No observable drift.  Reflexes:   2+ b/l pectoralis, biceps, triceps, brachioradialis, patella and Achilles.  Plantar response downgoing b/l.  Jaw jerk, Sherrie, clonus absent.  Sensory examination:   Intact to light touch and pinprick, pain, temperature and proprioception and vibration in all extremities.  Cerebellum:   FTN/HKS intact with normal ALIDA in all limbs.  No dysmetria or dysdiadokinesia.  Gait narrow based and normal.    Labs:   CBC Full  -  ( 17 Jul 2019 12:05 )  WBC Count : 5.27 K/uL  RBC Count : 4.62 M/uL  Hemoglobin : 13.7 g/dL  Hematocrit : 40.1 %  Platelet Count - Automated : 217 K/uL  Mean Cell Volume : 86.8 fl  Mean Cell Hemoglobin : 29.7 pg  Mean Cell Hemoglobin Concentration : 34.2 gm/dL  Auto Neutrophil # : 2.77 K/uL  Auto Lymphocyte # : 1.88 K/uL  Auto Monocyte # : 0.46 K/uL  Auto Eosinophil # : 0.13 K/uL  Auto Basophil # : 0.02 K/uL  Auto Neutrophil % : 52.5 %  Auto Lymphocyte % : 35.7 %  Auto Monocyte % : 8.7 %  Auto Eosinophil % : 2.5 %  Auto Basophil % : 0.4 %    07-17    137  |  104  |  18  ----------------------------<  223<H>  3.9   |  27  |  1.28    Ca    8.4      17 Jul 2019 12:05  Mg     1.7     07-17    TPro  7.6  /  Alb  3.0<L>  /  TBili  0.2  /  DBili  x   /  AST  11  /  ALT  17  /  AlkPhos  105  07-17    LIVER FUNCTIONS - ( 17 Jul 2019 12:05 )  Alb: 3.0 g/dL / Pro: 7.6 g/dL / ALK PHOS: 105 U/L / ALT: 17 U/L DA / AST: 11 U/L / GGT: x           PT/INR - ( 17 Jul 2019 12:05 )   PT: 11.3 sec;   INR: 1.02 ratio         PTT - ( 17 Jul 2019 12:05 )  PTT:27.2 sec        Neuroimaging:  NCHCT: CT Head No Cont:   EXAM:  CT BRAIN                            PROCEDURE DATE:  07/17/2019          INTERPRETATION:  CLINICAL INDICATION: Dizziness, vertigo.    TECHNIQUE: CT of the head was performed without the administration of   intravenous contrast.    COMPARISON: CT head 3/21/2019 and brain MRI 2/7/2019.    FINDINGS:    There is no evidence of acute infarction, intracranial hemorrhage or mass   lesion.  There is focal encephalomalacia and gliosis in the occipital   lobes consistent with small chronic PCA territory infarcts bilaterally.   There is hypoattenuation of the subcortical and periventricular white   matter, which is nonspecific finding, but most likely represents sequela   of moderate chronic microvascular ischemic disease. There are   atherosclerotic calcifications of the cavernous and supraclinoid internal   carotid arteries bilaterally, and also the bilateral intradural vertebral   arteries. There is prominence of the cortical sulci related to underlying   brain parenchymal volume loss.    There is no evidence of hydrocephalus. There are no extra-axial fluid   collections.    The visualized intraorbital contents are normal. The imaged portions of   the paranasal sinuses are aerated. The mastoid air cells are clear. There   is a chronic fracture of the left lamina papyracea. The visualized soft   tissues and osseous structures are otherwise unremarkable normal.      IMPRESSION:  No acute intracranial findings.    Moderate chronic microvascular ischemic disease.  Small chronic PCA territory infarcts bilaterally.                NORRIS OLVERA M.D., ATTENDING RADIOLOGIST  This document has been electronically signed. Jul 17 2019 12:52PM             (07-17-19 @ 12:15)    CT Angiography/Perfusion:  MRI Brain NC:  MRA Head/Neck:  EEG:    Assessment:  This is a 54y Male with h/o     Plan:   -   07-17-19 @ 18:48          Please contact the Neurology consult service with any questions.    Jignesh Pimentel MD  Neurology Attending  [facility] Neurology Consult    Patient is a 54y old  Male who presents with a chief complaint of     HPI:  Patient is 54 year old male has past medical history of HTN, DM, HLD, CAD s/p CABG, CVA x2, came in after having vertigo for 3 weeks. He was in his usual health 3 weeks ago, when he started to have severe vertigo with spinning sensation worsened with any movement, improved by staying still, it affected his daily activity, he was staying in the bed all day, associated with gait imbalance and he had multiple fall without head trauma or LOC. Pt has generalized weakness, on and off sob, But denies any fever, cough, ear infection, nausea, vomiting, diarrhea, chest pain, abdominal pain, new weakness or numbness of extremity, palpitation, leg swelling, exertional dyspnea.       Neuro HPI:  54 RHM with generalized weakness and room-spinning episodes, especially when lying down, since 6/23/19.    PAST MEDICAL & SURGICAL HISTORY:  Cerebrovascular accident (CVA), unspecified mechanism  Congestive heart failure, NYHA class 3, chronic, combined  Cerebrovascular accident (CVA), unspecified mechanism  Hyperlipidemia, unspecified hyperlipidemia type  Coronary artery disease involving native heart without angina pectoris, unspecified vessel or lesion type  Hypertension, unspecified type  Diabetes mellitus of other type without complication  S/P coronary artery stent placement  History of appendectomy  S/P CABG x 1    FAMILY HISTORY:  FH: CAD (coronary artery disease)    Social History: None reported    Allergies  Plavix (Other)    MEDICATIONS  (STANDING):  sodium chloride 0.9%. 1000 milliLiter(s) (125 mL/Hr) IV Continuous <Continuous>    Review of systems:    Constitutional: Intermittent generalized weakness; No fever, weight loss   Eyes: No eye pain or discharge  ENMT:  No sinus or throat pain  Neck: No pain or stiffness  Respiratory: Occasional shortness of breath  Cardiovascular: No chest pain, palpitations  Gastrointestinal: No abdominal pain, nausea, vomiting  Genitourinary: No dysuria, frequency  Neurological: As per HPI  Skin: No rashes or lesions   Endocrine: No heat or cold intolerance  Musculoskeletal: No joint pain or swelling  Psychiatric: No depression, anxiety  Heme/Lymph: No easy bruising or bleeding      O:  Vital Signs Last 24 Hrs  T(C): 36.6 (17 Jul 2019 18:16), Max: 36.8 (17 Jul 2019 11:00)  T(F): 97.9 (17 Jul 2019 18:16), Max: 98.3 (17 Jul 2019 11:00)  HR: 75 (17 Jul 2019 18:16) (75 - 82)  BP: 142/81 (17 Jul 2019 18:16) (119/81 - 148/95)  RR: 18 (17 Jul 2019 18:16) (18 - 18)  SpO2: 100% (17 Jul 2019 18:16) (97% - 100%)    General Exam:  General: No acute distress  Respiratory: CTAB/l.  No crackles, rhonchi, or wheezes.  Cardiovascular: RRR, No murmurs, Full b/l radial and pedal pulses    Neurological Exam:  General / Mental Status: Oriented to person, place, and time.  No dysarthria or aphasia present.  Naming and repetition intact.  Cranial Nerves: PERRLA, EOMI x 2, VFF x 4, No nystagmus or diplopia, Optic discs normal b/l.  B/l V1-V3 equal to light touch and pinprick.  Symmetric facial movement and palate elevation.  B/l hearing equal to finger rub.  Negative Ingrid-Hallpike maneuver b/l.  5/5 strength with b/l sternocleidomastoid and trapezius.  Midline tongue protrusion with no atrophy or fasciculations.  Motor: Normal bulk and tone in all four extremities.  5/5 strength throughout all four extremities.  No downward drift, rigidity, spasticity, or tremors in any of the four extremities.  Sensation: Diminished to light touch and pinprick at the left lower extremity.  Intact to light touch and pinprick in the right lower extremity and b/l upper extremities.  Coordination: No dysmetria with b/l finger-to-nose and heel raise tests.  Normal rapid alternating movements b/l.  Reflexes: 2+ and symmetric at b/l biceps, triceps, brachioradialis, patellae, and ankles.  Toes flexor b/l.  Gait and Romberg testing deferred due to vertigo.    NIHSS Score:    LOC - 0  LOC Questions - 0  LOC Commands - 0  Gaze Preference - 0  Visual Fields - 0  Facial Palsy - 0  Motor Arm Left - 0  Motor Arm Right - 0  Motor Leg Left - 0  Motor Leg Right - 0  Limb Ataxia - 0  Sensory - 10  Language - 0  Speech - 0  Extinction - 0    NIHSS Score Total: 1 -No IV tPA because patient is out of time window (LSN 3.5 weeks ago)    Modified Independence Scale: 2      Labs:   CBC Full  -  ( 17 Jul 2019 12:05 )  WBC Count : 5.27 K/uL  RBC Count : 4.62 M/uL  Hemoglobin : 13.7 g/dL  Hematocrit : 40.1 %  Platelet Count - Automated : 217 K/uL  Mean Cell Volume : 86.8 fl  Mean Cell Hemoglobin : 29.7 pg  Mean Cell Hemoglobin Concentration : 34.2 gm/dL  Auto Neutrophil # : 2.77 K/uL  Auto Lymphocyte # : 1.88 K/uL  Auto Monocyte # : 0.46 K/uL  Auto Eosinophil # : 0.13 K/uL  Auto Basophil # : 0.02 K/uL  Auto Neutrophil % : 52.5 %  Auto Lymphocyte % : 35.7 %  Auto Monocyte % : 8.7 %  Auto Eosinophil % : 2.5 %  Auto Basophil % : 0.4 %    07-17    137  |  104  |  18  ----------------------------<  223<H>  3.9   |  27  |  1.28    Ca    8.4      17 Jul 2019 12:05  Mg     1.7     07-17    TPro  7.6  /  Alb  3.0<L>  /  TBili  0.2  /  DBili  x   /  AST  11  /  ALT  17  /  AlkPhos  105  07-17    PT/INR - ( 17 Jul 2019 12:05 )   PT: 11.3 sec;   INR: 1.02 ratio    PTT - ( 17 Jul 2019 12:05 )  PTT:27.2 sec      Neuroimaging:    CT BRAIN (07/17/2019):  - No acute intracranial abnormality  - Chronic b/l PCA territory infarcts  - Moderate chronic microvascular disease  - Diffuse atrophy     Assessment:  54 RHM with persistent vertigo, but without horizontal or rotatory nystagmus, concerning for peripheral vertigo vs. new posterior circulation ischemic stroke      Recommendations:    - Check for metabolic abnormalities    - Approved for MRI Brain +/- Rupesh & MRI IAC +/- Rupesh to evaluate for stroke or IAC abnormality    - If stroke is present, start telemetry and check echocardiogram, fasting lipid panel, and hemoglobin A1c as part of stroke workup    - Continue home medications, aspirin and atorvastatin, for secondary stroke prevention given presence of chronic strokes      Please contact the Neurology consult service with any questions.    Jignesh Pimentel MD  Neurology Attending  [facility]

## 2019-07-17 NOTE — H&P ADULT - NSICDXPASTMEDICALHX_GEN_ALL_CORE_FT
PAST MEDICAL HISTORY:  Cerebrovascular accident (CVA), unspecified mechanism     Cerebrovascular accident (CVA), unspecified mechanism     Congestive heart failure, NYHA class 3, chronic, combined     Coronary artery disease involving native heart without angina pectoris, unspecified vessel or lesion type     Diabetes mellitus of other type without complication     Hyperlipidemia, unspecified hyperlipidemia type     Hypertension, unspecified type

## 2019-07-17 NOTE — H&P ADULT - HISTORY OF PRESENT ILLNESS
Patient is 54 year old male has past medical history of HTN, DM, HLD, CAD s/p CABG, CVA x2, came in after having vertigo for 3 weeks.     patient was in his usual health 3 weeks ago, when he was started to have severe vertigo with spinning sensation worsened with any movement, improved by staying still, it affected his daily activity, he was staying in the bed all day, associated with gait imbalance and he had multiple fall without head trauma or LOC. Pt has generalized weakness, on and off sob, But denies any fever, cough, ear infection, nausea, vomiting, diarrhea, chest pain, abdominal pain, new weakness or numbness of extremity, palpitation, leg swelling, exertional dyspnea.       Allergic: plavix (severe headache)  social hx: smoked 3 and half pack a day for 40 years stopped 3 months ago, denies alcohol   Fhx: Diabetes

## 2019-07-17 NOTE — ED ADULT NURSE NOTE - CHPI ED NUR SYMPTOMS NEG
no vomiting/no change in level of consciousness/no numbness/no confusion/no blurred vision/no nausea/no loss of consciousness/no fever

## 2019-07-17 NOTE — ED PROVIDER NOTE - PMH
Cerebrovascular accident (CVA), unspecified mechanism    Cerebrovascular accident (CVA), unspecified mechanism    Congestive heart failure, NYHA class 3, chronic, combined    Coronary artery disease involving native heart without angina pectoris, unspecified vessel or lesion type    Diabetes mellitus of other type without complication    Hyperlipidemia, unspecified hyperlipidemia type    Hypertension, unspecified type

## 2019-07-17 NOTE — H&P ADULT - NSHPPHYSICALEXAM_GEN_ALL_CORE
PHYSICAL EXAM:  GENERAL: NAD, well-developed  HEAD:  Atraumatic, Normocephalic  EYES: EOMI, PERRLA, conjunctiva and sclera clear  NECK: Supple, No JVD  CHEST/LUNG: Clear to auscultation bilaterally; No wheeze  HEART: Regular rate and rhythm; No murmurs, rubs, or gallops  ABDOMEN: Soft, Nontender, Nondistended; Bowel sounds present  EXTREMITIES:, No clubbing, cyanosis, or edema  PSYCH: AAOx3  NEUROLOGY: non-focal, normal strength, viola funes pike + for nystagmus b/l    SKIN: No rashes or lesions

## 2019-07-17 NOTE — ED PROVIDER NOTE - OBJECTIVE STATEMENT
54 y.o. male with h/o NIDDM last dose last night, BIBA pt c/o feeling dizzy, weakness since 6/23, pt was seen @ [facility] on 6/23 & d/c home.  Pt cont. to c/o feeling dizzy & weakness, worse with head movement & standing up.  Pt vomited in the beginning, not anymore, pt with ataxia, mild SOB, no CP, recent travelling, coughing, LOC.  pt lives alone

## 2019-07-17 NOTE — ED ADULT NURSE NOTE - OBJECTIVE STATEMENT
Pt AOx4, ambulatory with a walker, BIBA c/o dizziness since 6/23. pt endorses frequent falls. Denies CP, SOB. Last fall saturday. Pt states he felt dizzy and fell. EKG done

## 2019-07-17 NOTE — ED ADULT NURSE NOTE - CAS TRG GEN SKIN CONDITION
EVENT DATE:  May 31, 2018

SURGEON:  Marco Langley MD 

ANESTHESIOLOGIST:  Anam Schulz MD 

ANESTHESIA:  General anesthetic.





PREOPERATIVE DIAGNOSIS  

Right ureteral calculi and lower pole stone.



POSTOPERATIVE DIAGNOSES 

1.  Right ureteral calculi.

2.  Left renal stone.

3.  Proximal left mid ureteral narrowing.



PROCEDURES PERFORMED 

1.  Cystoscopy.

2.  Right semi-rigid and flexible ureteroscopy.

3.  Laser fragmentation of renal stone with grasping of fragments times four.

4.  Right internal double-J ureteral stent placement. 



ESTIMATED BLOOD LOSS 

Minimal.



INTRAVENOUS FLUIDS 

Crystalloid.



DRAINS

7-[place]izen [place] x 24 cm Contour stent on right.



PATHOLOGY

Stone fragments sent for analysis.  



COMPLICATIONS 

None.



CONDITION

Patient taken to recovery room awake and in stable condition.  



STATEMENT OF MEDICAL NECESSITY 

Patient is a 55-year-old white female who was originally found to have a large, 
impacted right proximal ureteral stone which required percutaneous nephrostomy 
tube.  This was followed by extracorporeal shock wave lithotripsy, stent 
placement, and ureteroscopy.  Followup low-dose CT scan in May revealed two 
contiguous calculi in the right proximal ureter measuring 2 x 3 and 2 x 1 mm in 
addition to having an approximately 4 mm stone in the lower pole.  The patient 
is now being brought to the operating room for planned ureteroscopy and ESWL as 
indicated.    



DESCRIPTION OF OPERATION PERFORMED

Patient was brought to the operating room.  After general anesthetic was 
obtained, she was placed in the dorsal lithotomy position and prepped and 
draped sterilely.  Anesthetic cystoscopy was performed with the 21-[place] rigid 
[place] sheath and a 30-degree lens.  She had a normal-appearing urethra and 
bladder mucosa.  Her ureteral orifices were slit-like in appearance, both 
effluxing clear urine.  The right ureteral orifice was cannulated with an open-
ended access catheter which was easily advanced to the mid ureter.  A 0.035 
double floppy and Sensor type wire was advanced in the lumen of the Axxcess 
catheter.  The Axxcess catheter was removed.  This wire was used to place an 8/
10 dilating system.  A second wire was placed alongside the first wire inside 
the 10 sheath.  The 10 sheath was removed.  One wire was secured to the drapes 
as a safety wire.  The next wire was backloaded into the [place] semi-rigid 
ureteroscope.  A ureteroscopy was performed over the working wire, and under 
direct vision, her distal and mid ureter appeared normal.  At the previous area 
of impacted stone, the ureter had a circumferential narrowing, but admitted the 
scope through without significant resistance.  Just beyond this, two stones 
were identified, one measuring approximately 2 x 1 and one 2 x 3 mm.  These 
were pushed back to the upper pole calyx of the kidney with irrigation.  The 
semi-rigid scope was removed.  The working wire was then used to place an 11 x 
13 ureteral access sheath of 28 cm in length.  The working wire was then removed
, and the Cobra flexible ureteroscope was introduced into the access sheath and 
advanced up under direct vision to the upper pole calyx where the two stones 
were encountered.  The holmium laser fiber of 200 size was then introduced, and 
the stones were fragmented into several even smaller parts.  The XIPWIRE 
grasping forceps was then used to individually engage these fragments and 
remove them down the ureter through the access sheath to the outside of the 
body.  After these fragments were removed, ureteroscopy of the upper, lower, 
and mid pole calices was performed with the flexible ureteroscope.  No other 
fragments were identified in the remaining areas of the kidneys.  A retrograde 
pyelogram was performed through the working channel of the cystoscope to 
delineate the caliceal anatomy.  Each caliceal was individually inspected to 
confirm no residual fragments.  Following this, the flexible scope was advanced 
down the ureter to the area of the ureteral narrowing, and this appeared to be 
likely related to her impacted stone, but appeared to be adequate diameter.  
The length of the narrowing was relatively short at 2 to 3 mm.  At this point, 
it was unclear whether this was a truly significant functional ureteral 
stricture or just some narrowing that has no functional significance.  She has 
not had a stent in for two months, and her preoperative CT revealed no evidence 
of significant hydronephrosis above this area.  Therefore, at this point, it 
was decided to place a ureteral stent and then remove that in the office and 
perform a functional followup test to evaluate the significance of this 
finding.  Pull-out ureteroscopy was performed at this point.  The remainder of 
the ureter appeared normal without evidence of injury or remaining stones.  The 
safety wire was backloaded into the cystoscope, and this was used to place a 7-
[place] x 24 cm Contour stent on the right side.  She was noted to have curling 
in the renal pelvis by fluoroscopy and good curling in the bladder by direct 
vision.  The patient's bladder was drained through the cystoscopic sheath.  A B 
and O suppository was given at the conclusion of the case.  She was awakened in 
the operating room and taken to the recovery area in stable condition. 



The plan will be to allow the patient to be discharged home today on Flomax, 
Pyridium, Colace, Norco, Motrin, and Ditropan.  We will see in the Urology 
Clinic in two to three weeks to remove her stent and then schedule a functional 
test following this.  
BULL
Warm

## 2019-07-17 NOTE — H&P ADULT - ASSESSMENT
Patient is 54 year old male has past medical history of HTN, DM, HLD, CAD s/p CABG, CVA x2, came in after having vertigo for 3 weeks.

## 2019-07-18 DIAGNOSIS — N39.0 URINARY TRACT INFECTION, SITE NOT SPECIFIED: ICD-10-CM

## 2019-07-18 DIAGNOSIS — E11.9 TYPE 2 DIABETES MELLITUS WITHOUT COMPLICATIONS: ICD-10-CM

## 2019-07-18 LAB
24R-OH-CALCIDIOL SERPL-MCNC: 9.7 NG/ML — LOW (ref 30–80)
ALBUMIN SERPL ELPH-MCNC: 2.9 G/DL — LOW (ref 3.5–5)
ALP SERPL-CCNC: 91 U/L — SIGNIFICANT CHANGE UP (ref 40–120)
ALT FLD-CCNC: 17 U/L DA — SIGNIFICANT CHANGE UP (ref 10–60)
ANION GAP SERPL CALC-SCNC: 7 MMOL/L — SIGNIFICANT CHANGE UP (ref 5–17)
APPEARANCE UR: CLEAR — SIGNIFICANT CHANGE UP
AST SERPL-CCNC: 14 U/L — SIGNIFICANT CHANGE UP (ref 10–40)
BACTERIA # UR AUTO: ABNORMAL /HPF
BILIRUB SERPL-MCNC: 0.4 MG/DL — SIGNIFICANT CHANGE UP (ref 0.2–1.2)
BILIRUB UR-MCNC: NEGATIVE — SIGNIFICANT CHANGE UP
BUN SERPL-MCNC: 16 MG/DL — SIGNIFICANT CHANGE UP (ref 7–18)
CALCIUM SERPL-MCNC: 8.5 MG/DL — SIGNIFICANT CHANGE UP (ref 8.4–10.5)
CHLORIDE SERPL-SCNC: 106 MMOL/L — SIGNIFICANT CHANGE UP (ref 96–108)
CHOLEST SERPL-MCNC: 146 MG/DL — SIGNIFICANT CHANGE UP (ref 10–199)
CO2 SERPL-SCNC: 26 MMOL/L — SIGNIFICANT CHANGE UP (ref 22–31)
COLOR SPEC: YELLOW — SIGNIFICANT CHANGE UP
COMMENT - URINE: SIGNIFICANT CHANGE UP
CREAT SERPL-MCNC: 1.28 MG/DL — SIGNIFICANT CHANGE UP (ref 0.5–1.3)
DIFF PNL FLD: NEGATIVE — SIGNIFICANT CHANGE UP
EPI CELLS # UR: SIGNIFICANT CHANGE UP /HPF
FERRITIN SERPL-MCNC: 259 NG/ML — SIGNIFICANT CHANGE UP (ref 30–400)
FOLATE SERPL-MCNC: 9 NG/ML — SIGNIFICANT CHANGE UP
GLUCOSE BLDC GLUCOMTR-MCNC: 196 MG/DL — HIGH (ref 70–99)
GLUCOSE BLDC GLUCOMTR-MCNC: 327 MG/DL — HIGH (ref 70–99)
GLUCOSE BLDC GLUCOMTR-MCNC: 366 MG/DL — HIGH (ref 70–99)
GLUCOSE BLDC GLUCOMTR-MCNC: 85 MG/DL — SIGNIFICANT CHANGE UP (ref 70–99)
GLUCOSE SERPL-MCNC: 97 MG/DL — SIGNIFICANT CHANGE UP (ref 70–99)
GLUCOSE UR QL: 250
HBA1C BLD-MCNC: 12.3 % — HIGH (ref 4–5.6)
HCT VFR BLD CALC: 39 % — SIGNIFICANT CHANGE UP (ref 39–50)
HDLC SERPL-MCNC: 42 MG/DL — SIGNIFICANT CHANGE UP
HGB BLD-MCNC: 13.2 G/DL — SIGNIFICANT CHANGE UP (ref 13–17)
HYALINE CASTS # UR AUTO: ABNORMAL /LPF
IRON SATN MFR SERPL: 29 % — SIGNIFICANT CHANGE UP (ref 20–55)
IRON SATN MFR SERPL: 66 UG/DL — SIGNIFICANT CHANGE UP (ref 65–170)
KETONES UR-MCNC: NEGATIVE — SIGNIFICANT CHANGE UP
LEUKOCYTE ESTERASE UR-ACNC: NEGATIVE — SIGNIFICANT CHANGE UP
LIPID PNL WITH DIRECT LDL SERPL: 89 MG/DL — SIGNIFICANT CHANGE UP
MAGNESIUM SERPL-MCNC: 1.8 MG/DL — SIGNIFICANT CHANGE UP (ref 1.6–2.6)
MCHC RBC-ENTMCNC: 29.4 PG — SIGNIFICANT CHANGE UP (ref 27–34)
MCHC RBC-ENTMCNC: 33.8 GM/DL — SIGNIFICANT CHANGE UP (ref 32–36)
MCV RBC AUTO: 86.9 FL — SIGNIFICANT CHANGE UP (ref 80–100)
NITRITE UR-MCNC: NEGATIVE — SIGNIFICANT CHANGE UP
NRBC # BLD: 0 /100 WBCS — SIGNIFICANT CHANGE UP (ref 0–0)
PH UR: 6.5 — SIGNIFICANT CHANGE UP (ref 5–8)
PHOSPHATE SERPL-MCNC: 3.7 MG/DL — SIGNIFICANT CHANGE UP (ref 2.5–4.5)
PLATELET # BLD AUTO: 216 K/UL — SIGNIFICANT CHANGE UP (ref 150–400)
POTASSIUM SERPL-MCNC: 3.7 MMOL/L — SIGNIFICANT CHANGE UP (ref 3.5–5.3)
POTASSIUM SERPL-SCNC: 3.7 MMOL/L — SIGNIFICANT CHANGE UP (ref 3.5–5.3)
PROT SERPL-MCNC: 7.3 G/DL — SIGNIFICANT CHANGE UP (ref 6–8.3)
PROT UR-MCNC: 30 MG/DL
RBC # BLD: 4.49 M/UL — SIGNIFICANT CHANGE UP (ref 4.2–5.8)
RBC # FLD: 12.4 % — SIGNIFICANT CHANGE UP (ref 10.3–14.5)
RBC CASTS # UR COMP ASSIST: SIGNIFICANT CHANGE UP /HPF (ref 0–2)
SODIUM SERPL-SCNC: 139 MMOL/L — SIGNIFICANT CHANGE UP (ref 135–145)
SP GR SPEC: 1.01 — SIGNIFICANT CHANGE UP (ref 1.01–1.02)
TIBC SERPL-MCNC: 228 UG/DL — LOW (ref 250–450)
TOTAL CHOLESTEROL/HDL RATIO MEASUREMENT: 3.5 RATIO — SIGNIFICANT CHANGE UP (ref 3.4–9.6)
TRIGL SERPL-MCNC: 75 MG/DL — SIGNIFICANT CHANGE UP (ref 10–149)
TSH SERPL-MCNC: 0.72 UU/ML — SIGNIFICANT CHANGE UP (ref 0.34–4.82)
UIBC SERPL-MCNC: 162 UG/DL — SIGNIFICANT CHANGE UP (ref 110–370)
UROBILINOGEN FLD QL: NEGATIVE — SIGNIFICANT CHANGE UP
VIT B12 SERPL-MCNC: 440 PG/ML — SIGNIFICANT CHANGE UP (ref 232–1245)
WBC # BLD: 5.11 K/UL — SIGNIFICANT CHANGE UP (ref 3.8–10.5)
WBC # FLD AUTO: 5.11 K/UL — SIGNIFICANT CHANGE UP (ref 3.8–10.5)
WBC UR QL: SIGNIFICANT CHANGE UP /HPF (ref 0–5)

## 2019-07-18 PROCEDURE — 99233 SBSQ HOSP IP/OBS HIGH 50: CPT

## 2019-07-18 PROCEDURE — 70553 MRI BRAIN STEM W/O & W/DYE: CPT | Mod: 26

## 2019-07-18 RX ADMIN — Medication 25 MILLIGRAM(S): at 05:48

## 2019-07-18 RX ADMIN — ENOXAPARIN SODIUM 40 MILLIGRAM(S): 100 INJECTION SUBCUTANEOUS at 11:50

## 2019-07-18 RX ADMIN — Medication 1: at 17:06

## 2019-07-18 RX ADMIN — Medication 25 MILLIGRAM(S): at 03:29

## 2019-07-18 RX ADMIN — ATORVASTATIN CALCIUM 80 MILLIGRAM(S): 80 TABLET, FILM COATED ORAL at 21:30

## 2019-07-18 RX ADMIN — LISINOPRIL 2.5 MILLIGRAM(S): 2.5 TABLET ORAL at 05:48

## 2019-07-18 RX ADMIN — Medication 4: at 11:50

## 2019-07-18 RX ADMIN — Medication 25 MILLIGRAM(S): at 17:06

## 2019-07-18 RX ADMIN — Medication 5: at 21:31

## 2019-07-18 RX ADMIN — Medication 81 MILLIGRAM(S): at 11:51

## 2019-07-18 RX ADMIN — INSULIN GLARGINE 30 UNIT(S): 100 INJECTION, SOLUTION SUBCUTANEOUS at 21:30

## 2019-07-18 NOTE — DIETITIAN INITIAL EVALUATION ADULT. - OTHER INFO
Patient reports adequate po intake PTA, No weight loss reported. does not adhere to DM diet . provided patient with number to call kitchen as needed. declined additional DM education at this time

## 2019-07-18 NOTE — CONSULT NOTE ADULT - SUBJECTIVE AND OBJECTIVE BOX
CHIEF COMPLAINT:Patient is a 54y old  Male who presents with a chief complaint of vertigo/ Dizziness.      HPI:  Patient is 54 year old male has past medical history of HTN, DM, HLD, CAD s/p CABG, CVA x2, came in after having vertigo for 3 weeks.     patient was in his usual health 3 weeks ago, when he was started to have severe vertigo with spinning sensation worsened with any movement, improved by staying still, it affected his daily activity, he was staying in the bed all day, associated with gait imbalance and he had multiple fall without head trauma or LOC. Pt has generalized weakness, on and off sob, But denies any fever, cough, ear infection, nausea, vomiting, diarrhea, chest pain, abdominal pain, new weakness or numbness of extremity, palpitation, leg swelling, exertional dyspnea.       PAST MEDICAL & SURGICAL HISTORY:  Cerebrovascular accident (CVA), unspecified mechanism  Congestive heart failure, NYHA class 3, chronic, combined  Cerebrovascular accident (CVA), unspecified mechanism  Hyperlipidemia, unspecified hyperlipidemia type  Coronary artery disease involving native heart without angina pectoris, unspecified vessel or lesion type  Hypertension, unspecified type  Diabetes mellitus of other type without complication  S/P coronary artery stent placement  History of appendectomy  S/P CABG x 1      MEDICATIONS  (STANDING):  aspirin enteric coated 81 milliGRAM(s) Oral daily  atorvastatin 80 milliGRAM(s) Oral at bedtime  enoxaparin Injectable 40 milliGRAM(s) SubCutaneous daily  insulin glargine Injectable (LANTUS) 30 Unit(s) SubCutaneous at bedtime  insulin lispro (HumaLOG) corrective regimen sliding scale   SubCutaneous Before meals and at bedtime  lisinopril 2.5 milliGRAM(s) Oral daily  metoprolol tartrate 25 milliGRAM(s) Oral two times a day    MEDICATIONS  (PRN):  glucagon  Injectable 1 milliGRAM(s) IntraMuscular once PRN Glucose <70 milliGRAM(s)/deciLiter  meclizine 25 milliGRAM(s) Oral every 8 hours PRN Dizziness/ vertigo      FAMILY HISTORY:  FH: CAD (coronary artery disease),DM      SOCIAL HISTORY:  smoked 3 and half pack a day for 40 years stopped 3 months ago, denies alcohol       Allergies    Plavix (Other)    Intolerances    	    REVIEW OF SYSTEMS:  CONSTITUTIONAL: No fever, weight loss, or fatigue  EYES: No eye pain, visual disturbances, or discharge  ENT:  No difficulty hearing, tinnitus, vertigo; No sinus or throat pain  NECK: No pain or stiffness  RESPIRATORY: No cough, wheezing, chills or hemoptysis; No Shortness of Breath  CARDIOVASCULAR: No chest pain, palpitations, passing out,+ dizziness  GASTROINTESTINAL: No abdominal or epigastric pain. No nausea, vomiting, or hematemesis; No diarrhea or constipation. No melena or hematochezia.  GENITOURINARY: No dysuria, frequency, hematuria, or incontinence  NEUROLOGICAL: No headaches, memory loss, loss of strength, numbness, or tremors  SKIN: No itching, burning, rashes, or lesions   LYMPH Nodes: No enlarged glands  ENDOCRINE: No heat or cold intolerance; No hair loss  MUSCULOSKELETAL: No joint pain or swelling; No muscle, back, or extremity pain  PSYCHIATRIC: No depression, anxiety, mood swings, or difficulty sleeping  HEME/LYMPH: No easy bruising, or bleeding gums  ALLERGY AND IMMUNOLOGIC: No hives or eczema	      PHYSICAL EXAM:  T(C): 36.7 (07-18-19 @ 08:11), Max: 36.7 (07-18-19 @ 00:17)  HR: 67 (07-18-19 @ 08:11) (67 - 82)  BP: 142/80 (07-18-19 @ 08:11) (119/81 - 142/81)  RR: 17 (07-18-19 @ 08:11) (16 - 18)  SpO2: 100% (07-18-19 @ 08:11) (97% - 100%)  Wt(kg): --  I&O's Summary      Appearance: Normal	  HEENT:   Normal oral mucosa, PERRL, EOMI	  Lymphatic: No lymphadenopathy  Cardiovascular: Normal S1 S2, No JVD, No murmurs, No edema  Respiratory: Lungs clear to auscultation	  Psychiatry: A & O x 3, Mood & affect appropriate  Gastrointestinal:  Soft, Non-tender, + BS	  Skin: No rashes, No ecchymoses, No cyanosis	  Neurologic: Non-focal  Extremities: Normal range of motion, No clubbing, cyanosis or edema  Vascular: Peripheral pulses palpable 2+ bilaterally      	  LABS:	 	    CARDIAC MARKERS:  CARDIAC MARKERS ( 17 Jul 2019 12:05 )  <0.015 ng/mL / x     / 84 U/L / x     / x                                  13.2   5.11  )-----------( 216      ( 18 Jul 2019 06:31 )             39.0     07-18    139  |  106  |  16  ----------------------------<  97  3.7   |  26  |  1.28    Ca    8.5      18 Jul 2019 06:31  Phos  3.7     07-18  Mg     1.8     07-18    TPro  7.3  /  Alb  2.9<L>  /  TBili  0.4  /  DBili  x   /  AST  14  /  ALT  17  /  AlkPhos  91  07-18      Lipid Profile: Cholesterol 146  LDL 89  HDL 42  TG 75    HgA1c: Hemoglobin A1C, Whole Blood: 12.3 % (07-18 @ 10:04)    TSH: Thyroid Stimulating Hormone, Serum: 0.72 uU/mL (07-18 @ 06:31)        EXAM:  CT BRAIN                            PROCEDURE DATE:  07/17/2019          INTERPRETATION:  CLINICAL INDICATION: Dizziness, vertigo.    TECHNIQUE: CT of the head was performed without the administration of   intravenous contrast.    COMPARISON: CT head 3/21/2019 and brain MRI 2/7/2019.    FINDINGS:    There is no evidence of acute infarction, intracranial hemorrhage or mass   lesion.  There is focal encephalomalacia and gliosis in the occipital   lobes consistent with small chronic PCA territory infarcts bilaterally.   There is hypoattenuation of the subcortical and periventricular white   matter, which is nonspecific finding, but most likely represents sequela   of moderate chronic microvascular ischemic disease. There are   atherosclerotic calcifications of the cavernous and supraclinoid internal   carotid arteries bilaterally, and also the bilateral intradural vertebral   arteries. There is prominence of the cortical sulci related to underlying   brain parenchymal volume loss.    There is no evidence of hydrocephalus. There are no extra-axial fluid   collections.    The visualized intraorbital contents are normal. The imaged portions of   the paranasal sinuses are aerated. The mastoid air cells are clear. There   is a chronic fracture of the left lamina papyracea. The visualized soft   tissues and osseous structures are otherwise unremarkable normal.      IMPRESSION:  No acute intracranial findings.    Moderate chronic microvascular ischemic disease.  Small chronic PCA territory infarcts bilaterally.

## 2019-07-18 NOTE — PROGRESS NOTE ADULT - SUBJECTIVE AND OBJECTIVE BOX
Patient is a 54y old  Male who presents with a chief complaint of vertigo/ Dizziness (17 Jul 2019 21:35)      INTERVAL HPI/OVERNIGHT EVENTS:      REVIEW OF SYSTEMS:  CONSTITUTIONAL: No fever, weight loss, or fatigue  EYES: No eye pain, visual disturbances, or discharge  ENMT:  No difficulty hearing, tinnitus, vertigo; No sinus or throat pain  NECK: No pain or stiffness  BREASTS: No pain, masses, or nipple discharge  RESPIRATORY: No cough, wheezing, chills or hemoptysis; No shortness of breath  CARDIOVASCULAR: No chest pain, palpitations, dizziness, or leg swelling  GASTROINTESTINAL: No abdominal or epigastric pain. No nausea, vomiting, or hematemesis; No diarrhea or constipation. No melena or hematochezia.  GENITOURINARY: No dysuria, frequency, hematuria, or incontinence  NEUROLOGICAL: No headaches, memory loss, loss of strength, numbness, or tremors  SKIN: No itching, burning, rashes, or lesions   LYMPH NODES: No enlarged glands  ENDOCRINE: No heat or cold intolerance; No hair loss  MUSCULOSKELETAL: No joint pain or swelling; No muscle, back, or extremity pain  HEME/LYMPH: No easy bruising, or bleeding gums  ALLERY AND IMMUNOLOGIC: No hives or eczema    FAMILY HISTORY:  FH: CAD (coronary artery disease)    Vital Signs Last 24 Hrs  T(C): 36.7 (18 Jul 2019 08:11), Max: 36.7 (18 Jul 2019 00:17)  T(F): 98 (18 Jul 2019 08:11), Max: 98 (18 Jul 2019 00:17)  HR: 67 (18 Jul 2019 08:11) (67 - 82)  BP: 142/80 (18 Jul 2019 08:11) (119/81 - 142/81)  BP(mean): --  RR: 17 (18 Jul 2019 08:11) (16 - 18)  SpO2: 100% (18 Jul 2019 08:11) (97% - 100%)      PHYSICAL EXAM:  GENERAL: NAD, well-groomed, well-developed  HEAD:  Atraumatic, Normocephalic  EYES: EOMI, PERRLA, conjunctiva and sclera clear  ENMT: No tonsillar erythema, exudates, or enlargement; Moist mucous membranes, Good dentition, No lesions  NECK: Supple, No JVD, Normal thyroid  NERVOUS SYSTEM:  Alert & Oriented X3, Good concentration; Motor Strength 5/5 B/L upper and lower extremities; DTRs 2+ intact and symmetric  CHEST/LUNG: Clear to percussion bilaterally; No rales, rhonchi, wheezing, or rubs  HEART: Regular rate and rhythm; No murmurs, rubs, or gallops  ABDOMEN: Soft, Nontender, Nondistended; Bowel sounds present  EXTREMITIES:  2+ Peripheral Pulses, No clubbing, cyanosis, or edema  LYMPH: No lymphadenopathy noted  SKIN: No rashes or lesions    Consultant(s) Notes Reviewed:  [x ] YES  [ ] NO  Care Discussed with Consultants/Other Providers [ x] YES  [ ] NO    LABS:        RADIOLOGY & ADDITIONAL TESTS:    Imaging Personally Reviewed:  [ ] YES  [ ] NO  aspirin enteric coated 81 milliGRAM(s) Oral daily  atorvastatin 80 milliGRAM(s) Oral at bedtime  dextrose 40% Gel 15 Gram(s) Oral once PRN  dextrose 5%. 1000 milliLiter(s) IV Continuous <Continuous>  dextrose 50% Injectable 12.5 Gram(s) IV Push once  dextrose 50% Injectable 25 Gram(s) IV Push once  dextrose 50% Injectable 25 Gram(s) IV Push once  enoxaparin Injectable 40 milliGRAM(s) SubCutaneous daily  glucagon  Injectable 1 milliGRAM(s) IntraMuscular once PRN  insulin glargine Injectable (LANTUS) 30 Unit(s) SubCutaneous at bedtime  insulin lispro (HumaLOG) corrective regimen sliding scale   SubCutaneous Before meals and at bedtime  lisinopril 2.5 milliGRAM(s) Oral daily  meclizine 25 milliGRAM(s) Oral every 8 hours PRN  metoprolol tartrate 25 milliGRAM(s) Oral two times a day  sodium chloride 0.9%. 1000 milliLiter(s) IV Continuous <Continuous>      HEALTH ISSUES - PROBLEM Dx:  Need for prophylactic measure: Need for prophylactic measure  HTN (hypertension): HTN (hypertension)  CAD (coronary artery disease): CAD (coronary artery disease)  CVA (cerebral vascular accident): CVA (cerebral vascular accident)  Vertigo: Vertigo Patient is a 54y old  Male who presents with a chief complaint of vertigo/ Dizziness (17 Jul 2019 21:35)      INTERVAL HPI/OVERNIGHT EVENTS: pt seen and examined, catarina c/o vertigo , not associated with N/V          REVIEW OF SYSTEMS:  CONSTITUTIONAL: No fever, weight loss, or fatigue  EYES: No eye pain, visual disturbances, or discharge  ENMT: severe vertigo aggravate with movements, alleviates with standing still  NECK: No pain or stiffness  BREASTS: No pain, masses, or nipple discharge  RESPIRATORY: No cough, wheezing, chills or hemoptysis; No shortness of breath  CARDIOVASCULAR: No chest pain, palpitations, dizziness, or leg swelling  GASTROINTESTINAL: No abdominal or epigastric pain. No nausea, vomiting, or hematemesis; No diarrhea or constipation. No melena or hematochezia.  GENITOURINARY: No dysuria, frequency, hematuria, or incontinence  NEUROLOGICAL: No headaches, memory loss, loss of strength, numbness, or tremors  SKIN: No itching, burning, rashes, or lesions   LYMPH NODES: No enlarged glands  ENDOCRINE: No heat or cold intolerance; No hair loss  MUSCULOSKELETAL: No joint pain or swelling; No muscle, back, or extremity pain  HEME/LYMPH: No easy bruising, or bleeding gums  ALLERY AND IMMUNOLOGIC: No hives or eczema    FAMILY HISTORY:  FH: CAD (coronary artery disease)    Vital Signs Last 24 Hrs  T(C): 36.7 (18 Jul 2019 08:11), Max: 36.7 (18 Jul 2019 00:17)  T(F): 98 (18 Jul 2019 08:11), Max: 98 (18 Jul 2019 00:17)  HR: 67 (18 Jul 2019 08:11) (67 - 82)  BP: 142/80 (18 Jul 2019 08:11) (119/81 - 142/81)  BP(mean): --  RR: 17 (18 Jul 2019 08:11) (16 - 18)  SpO2: 100% (18 Jul 2019 08:11) (97% - 100%)      PHYSICAL EXAM:  GENERAL: NAD, well-groomed, well-developed  HEAD:  Atraumatic, Normocephalic  EYES: EOMI, PERRLA, conjunctiva and sclera clear  ENMT: No tonsillar erythema, exudates, or enlargement; Moist mucous membranes, Good dentition, No lesions  NECK: Supple, No JVD, Normal thyroid  NERVOUS SYSTEM:  Alert & Oriented X3, non-focal, normal strength, viola funes pike + for nystagmus b/l    CHEST/LUNG: Clear to percussion bilaterally; No rales, rhonchi, wheezing, or rubs  HEART: Regular rate and rhythm; No murmurs, rubs, or gallops  ABDOMEN: Soft, Nontender, Nondistended; Bowel sounds present  EXTREMITIES:  2+ Peripheral Pulses, No clubbing, cyanosis, or edema  LYMPH: No lymphadenopathy noted  SKIN: No rashes or lesions    Consultant(s) Notes Reviewed:  [x ] YES  [ ] NO  Care Discussed with Consultants/Other Providers [ x] YES  [ ] NO    LABS:        RADIOLOGY & ADDITIONAL TESTS:    Imaging Personally Reviewed:  [ ] YES  [ ] NO  aspirin enteric coated 81 milliGRAM(s) Oral daily  atorvastatin 80 milliGRAM(s) Oral at bedtime  dextrose 40% Gel 15 Gram(s) Oral once PRN  dextrose 5%. 1000 milliLiter(s) IV Continuous <Continuous>  dextrose 50% Injectable 12.5 Gram(s) IV Push once  dextrose 50% Injectable 25 Gram(s) IV Push once  dextrose 50% Injectable 25 Gram(s) IV Push once  enoxaparin Injectable 40 milliGRAM(s) SubCutaneous daily  glucagon  Injectable 1 milliGRAM(s) IntraMuscular once PRN  insulin glargine Injectable (LANTUS) 30 Unit(s) SubCutaneous at bedtime  insulin lispro (HumaLOG) corrective regimen sliding scale   SubCutaneous Before meals and at bedtime  lisinopril 2.5 milliGRAM(s) Oral daily  meclizine 25 milliGRAM(s) Oral every 8 hours PRN  metoprolol tartrate 25 milliGRAM(s) Oral two times a day  sodium chloride 0.9%. 1000 milliLiter(s) IV Continuous <Continuous>      HEALTH ISSUES - PROBLEM Dx:  Need for prophylactic measure: Need for prophylactic measure  HTN (hypertension): HTN (hypertension)  CAD (coronary artery disease): CAD (coronary artery disease)  CVA (cerebral vascular accident): CVA (cerebral vascular accident)  Vertigo: Vertigo

## 2019-07-18 NOTE — PROGRESS NOTE ADULT - PROBLEM SELECTOR PLAN 1
likely BPPV as Ingrid middleton manure positive  CT head neg for acute intracranial pathology   will start meclizine 25 prn q 8  c/w iv fluid   f/u orthstatic  f/u lipid profile and hba1c  will do MRI brain with and without contrast and MRI IAC with and without contrast to r/o stroke since pt has risk factor   will monitor pt on medicine floor, if MRI brain comes back positive than will upgrade pt for tele likely BPPV as Ingrid middleton manure positive  CT head neg for acute intracranial pathology   [] FU MRI:   will start meclizine 25 prn q 8   f/u orthstatic  f/u lipid profile and hba1c: 12.5  will do MRI brain with and without contrast and MRI IAC with and without contrast to r/o stroke since pt has risk factor   will monitor pt on medicine floor, if MRI brain comes back positive than will upgrade pt for tele

## 2019-07-18 NOTE — DIETITIAN INITIAL EVALUATION ADULT. - FACTORS AFF FOOD INTAKE
persistent constipation/Anabaptist/ethnic/cultural/personal food preferences/requesting for additional food

## 2019-07-18 NOTE — DIETITIAN INITIAL EVALUATION ADULT. - PERTINENT LABORATORY DATA
07-18 Na139 mmol/L Glu 97 mg/dL K+ 3.7 mmol/L Cr  1.28 mg/dL BUN 16 mg/dL 07-18 Phos 3.7 mg/dL 07-18 Alb 2.9 g/dL<L> 07-18 [id number] 12.3 %<H> 07-18 Chol 146 mg/dL LDL 89 mg/dL HDL 42 mg/dL Trig 75 mg/dL

## 2019-07-18 NOTE — PROGRESS NOTE ADULT - SUBJECTIVE AND OBJECTIVE BOX
Exam shows nystagmus on b/l gaze, negative Houlton-Hallpike b/l, wide-based stance, otherwise normal neuro exam.  MRI Brain shows subacute left cerebellar stroke.    Recs:  - ASA, Atorvastatin  - Echo, Telemetry, FLP, HbA1c  - If Echo normal, consider ABRAHAN, and then loop monitor if ABRAHAN normal  - PT/OT for stroke recovery      NOTE TO BE COMPLETED    Neurology Follow up note    Name  MADISON PERRY    HPI:  Patient is 54 year old male has past medical history of HTN, DM, HLD, CAD s/p CABG, CVA x2, came in after having vertigo for 3 weeks.     patient was in his usual health 3 weeks ago, when he was started to have severe vertigo with spinning sensation worsened with any movement, improved by staying still, it affected his daily activity, he was staying in the bed all day, associated with gait imbalance and he had multiple fall without head trauma or LOC. Pt has generalized weakness, on and off sob, But denies any fever, cough, ear infection, nausea, vomiting, diarrhea, chest pain, abdominal pain, new weakness or numbness of extremity, palpitation, leg swelling, exertional dyspnea.       Allergic: plavix (severe headache)  social hx: smoked 3 and half pack a day for 40 years stopped 3 months ago, denies alcohol   Fhx: Diabetes (17 Jul 2019 21:35)      Interval History -        Subjective:        MEDICATIONS  (STANDING):  aspirin enteric coated 81 milliGRAM(s) Oral daily  atorvastatin 80 milliGRAM(s) Oral at bedtime  enoxaparin Injectable 40 milliGRAM(s) SubCutaneous daily  insulin glargine Injectable (LANTUS) 30 Unit(s) SubCutaneous at bedtime  insulin lispro (HumaLOG) corrective regimen sliding scale   SubCutaneous Before meals and at bedtime  lisinopril 2.5 milliGRAM(s) Oral daily  metoprolol tartrate 25 milliGRAM(s) Oral two times a day    MEDICATIONS  (PRN):  glucagon  Injectable 1 milliGRAM(s) IntraMuscular once PRN Glucose <70 milliGRAM(s)/deciLiter  meclizine 25 milliGRAM(s) Oral every 8 hours PRN Dizziness/ vertigo      Allergies    Plavix (Other)    Intolerances        Review of Systems:  General: [ ] None, [ ] chills, [ ]fatigue, [ ] fevers  Skin: [ ] None, [ ] rash   HEENT: [ ] None, [ ] head injury, [ ] blurred vision, [ ] double vision, [ ] eye pain, [ ] visual loss, [ ] hearing loss, [ ] deafness, [ ] ear pain, [ ] ringing in the ears, [ ] vertigo, [ ] sinus pain, [ ] voice changes  Neck: [ ] None, [ ] neck stiffness  Respiratory: [ ] None, [ ] cough, [ ] difficulty breathing  Cardiovascular: [ ] None, [ ] calf cramps, [ ] chest pain, [ ] leg pain, [ ] swelling, [ ] rapid heart rate, [ ] shortness of breath  Gastrointestinal: [ ] None, [ ] abdominal pain, [ ] nausea, [ ] vomiting  Musculoskeletal: [ ] None, [ ] back pain, [ ] joint pain, [ ] joint stiffness, [ ] leg cramps, [ ] muscle atrophy, [ ] muscle cramps, [ ] muscle weakness, [ ] swelling of extremities  Neurological: [ ] None, [ ] Dizziness, [ ] decreased memory, [ ] fainting, [ ] focal neurological symptoms, [ ] headaches, [ ] incontinence of stool, [ ] incontinence of urine, [ ] loss of consciousness, [ ] numbness, [ ] seizures, [ ] spinning sensation, [ ] stroke, [ ] trouble walking, [ ] unsteadiness, [ ] visual changes, [ ] weakness  Psychiatric: [ ] None,  [ ] depression, [ ] anxiety, [ ] hallucinations, [ ] inability to concentrate, [ ] mood changes, [ ] panic attacks  Hematology: [ ] None,  [ ] blood clots, [ ] spontaneous bleeding      Objective:   Vital Signs Last 24 Hrs  T(C): 36.6 (18 Jul 2019 15:25), Max: 36.7 (18 Jul 2019 00:17)  T(F): 97.9 (18 Jul 2019 15:25), Max: 98 (18 Jul 2019 00:17)  HR: 76 (18 Jul 2019 15:25) (67 - 79)  BP: 136/79 (18 Jul 2019 15:25) (133/86 - 142/80)  BP(mean): --  RR: 17 (18 Jul 2019 15:25) (16 - 17)  SpO2: 100% (18 Jul 2019 15:25) (100% - 100%)    General Exam:   General appearance: No acute distress                 Cardiovascular: Pedal dorsalis pulses intact bilaterally    Neurological Exam:  Mental Status: Orientated to self, date and place.  Attention intact.  No dysarthria, aphasia or neglect.  Knowledge intact.  Registration intact.  Short and long term memory grossly intact.      Cranial Nerves: CN I - not tested.  PERRL, EOMI, VFF, no nystagmus or diplopia.  No APD.  Fundi not visualized bilaterally.  CN V1-3 intact to light touch and pinprick.  No facial asymmetry.  Hearing intact to finger rub bilaterally.  Tongue, uvula and palate midline.  Sternocleidomastoid and Trapezius intact bilaterally.    Motor:   Tone: normal.                  Strength: intact throughout  Pronator drift: none                 Dysmeria: None to finger-nose-finger or heel-shin-heel  No truncal ataxia.    Tremor: No resting, postural or action tremor.  No myoclonus.    Sensation: intact to light touch, pinprick, vibration and proprioception    Deep Tendon Reflexes: 1+ bilateral biceps, triceps, brachioradialis, knee and ankle  Toes flexor bilaterally    Gait: normal and stable.      Other:    07-18    139  |  106  |  16  ----------------------------<  97  3.7   |  26  |  1.28    Ca    8.5      18 Jul 2019 06:31  Phos  3.7     07-18  Mg     1.8     07-18    TPro  7.3  /  Alb  2.9<L>  /  TBili  0.4  /  DBili  x   /  AST  14  /  ALT  17  /  AlkPhos  91  07-18    07-18    139  |  106  |  16  ----------------------------<  97  3.7   |  26  |  1.28    Ca    8.5      18 Jul 2019 06:31  Phos  3.7     07-18  Mg     1.8     07-18    TPro  7.3  /  Alb  2.9<L>  /  TBili  0.4  /  DBili  x   /  AST  14  /  ALT  17  /  AlkPhos  91  07-18    LIVER FUNCTIONS - ( 18 Jul 2019 06:31 )  Alb: 2.9 g/dL / Pro: 7.3 g/dL / ALK PHOS: 91 U/L / ALT: 17 U/L DA / AST: 14 U/L / GGT: x             Radiology    EKG:  tele:  TTE:  EEG:                 Please contact the Neurology consult service with any questions.    Jignesh Pimentel MD  Neurology Attending  [facility] Neurology Follow up note    Name  MADISON PERRY    HPI:  Patient is 54 year old male has past medical history of HTN, DM, HLD, CAD s/p CABG, CVA x2, came in after having vertigo for 3 weeks. He was in his usual health 3 weeks ago, when he started to have severe vertigo with spinning sensation worsened with any movement, improved by staying still, it affected his daily activity, he was staying in the bed all day, associated with gait imbalance and he had multiple fall without head trauma or LOC. Pt has generalized weakness, on and off sob, But denies any fever, cough, ear infection, nausea, vomiting, diarrhea, chest pain, abdominal pain, new weakness or numbness of extremity, palpitation, leg swelling, exertional dyspnea.       Neuro HPI:  54 RHM with generalized weakness and room-spinning episodes, especially when lying down, since 6/23/19.    Interval History -  The patient continues to feel dizzy on attempts to stand.    MEDICATIONS  (STANDING):  aspirin enteric coated 81 milliGRAM(s) Oral daily  atorvastatin 80 milliGRAM(s) Oral at bedtime  enoxaparin Injectable 40 milliGRAM(s) SubCutaneous daily  insulin glargine Injectable (LANTUS) 30 Unit(s) SubCutaneous at bedtime  insulin lispro (HumaLOG) corrective regimen sliding scale   SubCutaneous Before meals and at bedtime  lisinopril 2.5 milliGRAM(s) Oral daily  metoprolol tartrate 25 milliGRAM(s) Oral two times a day    MEDICATIONS  (PRN):  glucagon  Injectable 1 milliGRAM(s) IntraMuscular once PRN Glucose <70 milliGRAM(s)/deciLiter  meclizine 25 milliGRAM(s) Oral every 8 hours PRN Dizziness/ vertigo    Allergies  Plavix (Other)    Review of Systems: Fourteen systems reviewed and negative except as in HPI / Interval History.      Objective:   Vital Signs Last 24 Hrs  T(C): 36.6 (18 Jul 2019 15:25), Max: 36.7 (18 Jul 2019 00:17)  T(F): 97.9 (18 Jul 2019 15:25), Max: 98 (18 Jul 2019 00:17)  HR: 76 (18 Jul 2019 15:25) (67 - 79)  BP: 136/79 (18 Jul 2019 15:25) (133/86 - 142/80)  RR: 17 (18 Jul 2019 15:25) (16 - 17)  SpO2: 100% (18 Jul 2019 15:25) (100% - 100%)    General Exam:  General: No acute distress  Respiratory: CTAB/l.  No crackles, rhonchi, or wheezes.  Cardiovascular: RRR, No murmurs, Full b/l radial and pedal pulses    Neurological Exam:  General / Mental Status: Oriented to person, place, and time.  No dysarthria or aphasia present.  Naming and repetition intact.  Cranial Nerves: PERRLA, EOMI x 2, VFF x 4, Horizontal nystagmus present b/l, Vertical eye movements are intact.  B/l V1-V3 equal to light touch and pinprick.  Symmetric facial movement and palate elevation.  B/l hearing equal to finger rub.  Negative Ingrid-Hallpike maneuver b/l.  5/5 strength with b/l sternocleidomastoid and trapezius.  Midline tongue protrusion with no atrophy or fasciculations.  Motor: Normal bulk and tone in all four extremities.  5/5 strength throughout all four extremities.  No downward drift, rigidity, spasticity, or tremors in any of the four extremities.  Sensation: Diminished to light touch and pinprick at the left lower extremity.  Intact to light touch and pinprick in the right lower extremity and b/l upper extremities.  Coordination: No dysmetria with b/l finger-to-nose and heel raise tests.  Normal rapid alternating movements b/l.  Reflexes: 2+ and symmetric at b/l biceps, triceps, brachioradialis, patellae, and ankles.  Toes flexor b/l.  Gait and Romberg testing deferred per patient request.  Patient has a wide-based stance on attempt to stand.    NIHSS Score:    LOC - 0  LOC Questions - 0  LOC Commands - 0  Gaze Preference - 0  Visual Fields - 0  Facial Palsy - 0  Motor Arm Left - 0  Motor Arm Right - 0  Motor Leg Left - 0  Motor Leg Right - 0  Limb Ataxia - 0  Sensory - 10  Language - 0  Speech - 0  Extinction - 0    NIHSS Score Total: 1 -No IV tPA because patient is out of time window (LSN 3.5 weeks ago)    Modified Frantz Scale: 2      Labs:    07-18    139  |  106  |  16  ----------------------------<  97  3.7   |  26  |  1.28    Ca    8.5      18 Jul 2019 06:31  Phos  3.7     07-18  Mg     1.8     07-18    TPro  7.3  /  Alb  2.9<L>  /  TBili  0.4  /  DBili  x   /  AST  14  /  ALT  17  /  AlkPhos  91  07-18      Neuroimaging:    CT BRAIN (07/17/2019):  - No acute intracranial abnormality  - Chronic b/l PCA territory infarcts  - Moderate chronic microvascular disease  - Diffuse atrophy    MRI BRAIN & IAC W/WO PETEY (07/18/2019):  - Chronic right occipital infarct (present in Feb. 2019)  - Subacute b/l inferior cerebellar (PICA territory) and left PCA territory infarcts (new since Feb. 2019)  - Mild chronic microvascular disease  - Diffuse atrophy  - No auditory canal abnormalities      Assessment:  54 RHM with persistent vertigo secondary to subacute b/l cerebellar infarcts, secondary to small vessel disease vs. cardioembolic source.      Recommendations:    1. Stroke workup  - Carotid ultrasound  - Transthoracic Echocardiogram - If normal, consider ABRAHAN - If ABRAHAN normal, consider implantable loop recorder  - Telemetry  - Hemoglobin A1c  - Fasting lipid panel    2. Secondary stroke prevention  - Q4H Neurochecks  - Aspirin 81mg daily  - Atorvastatin 80mg QHS  - Treat BP if over 120/80  - Diabetes management  - PT/OT  - DVT ppx      Please contact the Neurology consult service with any questions.    Jignesh Pimentel MD  Neurology Attending  [facility]

## 2019-07-18 NOTE — CONSULT NOTE ADULT - ASSESSMENT
54 year old male has past medical history of HTN, DM, HLD, CAD s/p CABG, CVA x2, came in with dizziness.  1.Neurology eval noted-await MRI and MRA.  2.CVA-asa,statin.  3.DM-insulin.  4.CAD-cont cardiac medication.  5.Orthostatic BP q shift.  6.GI and DVT prophylaxis.

## 2019-07-19 LAB
ANION GAP SERPL CALC-SCNC: 7 MMOL/L — SIGNIFICANT CHANGE UP (ref 5–17)
BUN SERPL-MCNC: 16 MG/DL — SIGNIFICANT CHANGE UP (ref 7–18)
CALCIUM SERPL-MCNC: 8.3 MG/DL — LOW (ref 8.4–10.5)
CEA SERPL-MCNC: 3.3 NG/ML — SIGNIFICANT CHANGE UP (ref 0–3.8)
CHLORIDE SERPL-SCNC: 105 MMOL/L — SIGNIFICANT CHANGE UP (ref 96–108)
CO2 SERPL-SCNC: 25 MMOL/L — SIGNIFICANT CHANGE UP (ref 22–31)
CREAT SERPL-MCNC: 1.27 MG/DL — SIGNIFICANT CHANGE UP (ref 0.5–1.3)
CULTURE RESULTS: NO GROWTH — SIGNIFICANT CHANGE UP
GLUCOSE BLDC GLUCOMTR-MCNC: 235 MG/DL — HIGH (ref 70–99)
GLUCOSE BLDC GLUCOMTR-MCNC: 300 MG/DL — HIGH (ref 70–99)
GLUCOSE BLDC GLUCOMTR-MCNC: 319 MG/DL — HIGH (ref 70–99)
GLUCOSE BLDC GLUCOMTR-MCNC: 345 MG/DL — HIGH (ref 70–99)
GLUCOSE SERPL-MCNC: 285 MG/DL — HIGH (ref 70–99)
HCT VFR BLD CALC: 37.3 % — LOW (ref 39–50)
HCYS SERPL-MCNC: 10.7 UMOL/L — SIGNIFICANT CHANGE UP
HGB BLD-MCNC: 12.6 G/DL — LOW (ref 13–17)
MAGNESIUM SERPL-MCNC: 1.8 MG/DL — SIGNIFICANT CHANGE UP (ref 1.6–2.6)
MCHC RBC-ENTMCNC: 29.5 PG — SIGNIFICANT CHANGE UP (ref 27–34)
MCHC RBC-ENTMCNC: 33.8 GM/DL — SIGNIFICANT CHANGE UP (ref 32–36)
MCV RBC AUTO: 87.4 FL — SIGNIFICANT CHANGE UP (ref 80–100)
NRBC # BLD: 0 /100 WBCS — SIGNIFICANT CHANGE UP (ref 0–0)
PHOSPHATE SERPL-MCNC: 3.2 MG/DL — SIGNIFICANT CHANGE UP (ref 2.5–4.5)
PLATELET # BLD AUTO: 189 K/UL — SIGNIFICANT CHANGE UP (ref 150–400)
POTASSIUM SERPL-MCNC: 4.1 MMOL/L — SIGNIFICANT CHANGE UP (ref 3.5–5.3)
POTASSIUM SERPL-SCNC: 4.1 MMOL/L — SIGNIFICANT CHANGE UP (ref 3.5–5.3)
PSA FLD-MCNC: 0.88 NG/ML — SIGNIFICANT CHANGE UP (ref 0–4)
RBC # BLD: 4.27 M/UL — SIGNIFICANT CHANGE UP (ref 4.2–5.8)
RBC # FLD: 12.3 % — SIGNIFICANT CHANGE UP (ref 10.3–14.5)
SODIUM SERPL-SCNC: 137 MMOL/L — SIGNIFICANT CHANGE UP (ref 135–145)
SPECIMEN SOURCE: SIGNIFICANT CHANGE UP
WBC # BLD: 4.2 K/UL — SIGNIFICANT CHANGE UP (ref 3.8–10.5)
WBC # FLD AUTO: 4.2 K/UL — SIGNIFICANT CHANGE UP (ref 3.8–10.5)

## 2019-07-19 PROCEDURE — 99233 SBSQ HOSP IP/OBS HIGH 50: CPT

## 2019-07-19 PROCEDURE — G0452: CPT | Mod: 26

## 2019-07-19 RX ORDER — INSULIN LISPRO 100/ML
6 VIAL (ML) SUBCUTANEOUS
Refills: 0 | Status: DISCONTINUED | OUTPATIENT
Start: 2019-07-19 | End: 2019-07-20

## 2019-07-19 RX ORDER — ASPIRIN/CALCIUM CARB/MAGNESIUM 324 MG
325 TABLET ORAL DAILY
Refills: 0 | Status: DISCONTINUED | OUTPATIENT
Start: 2019-07-19 | End: 2019-07-24

## 2019-07-19 RX ORDER — INSULIN GLARGINE 100 [IU]/ML
40 INJECTION, SOLUTION SUBCUTANEOUS AT BEDTIME
Refills: 0 | Status: DISCONTINUED | OUTPATIENT
Start: 2019-07-19 | End: 2019-07-24

## 2019-07-19 RX ADMIN — ENOXAPARIN SODIUM 40 MILLIGRAM(S): 100 INJECTION SUBCUTANEOUS at 12:07

## 2019-07-19 RX ADMIN — INSULIN GLARGINE 40 UNIT(S): 100 INJECTION, SOLUTION SUBCUTANEOUS at 21:50

## 2019-07-19 RX ADMIN — Medication 4: at 21:50

## 2019-07-19 RX ADMIN — Medication 3: at 08:44

## 2019-07-19 RX ADMIN — Medication 25 MILLIGRAM(S): at 06:22

## 2019-07-19 RX ADMIN — Medication 25 MILLIGRAM(S): at 17:26

## 2019-07-19 RX ADMIN — Medication 2: at 17:26

## 2019-07-19 RX ADMIN — Medication 4: at 12:06

## 2019-07-19 RX ADMIN — Medication 325 MILLIGRAM(S): at 12:06

## 2019-07-19 RX ADMIN — Medication 6 UNIT(S): at 17:27

## 2019-07-19 RX ADMIN — ATORVASTATIN CALCIUM 80 MILLIGRAM(S): 80 TABLET, FILM COATED ORAL at 21:50

## 2019-07-19 RX ADMIN — LISINOPRIL 2.5 MILLIGRAM(S): 2.5 TABLET ORAL at 06:22

## 2019-07-19 NOTE — PROGRESS NOTE ADULT - PROBLEM SELECTOR PLAN 1
Ingrid swartze manure positive  CT head neg for acute intracranial pathology   [x] FU MRI: Cortical-based enhancement with associated FLAIR signal abnormality in   the left occipital lobe, and also enhancing areas of FLAIR signal   abnormality in the inferior cerebellar hemispheres bilaterally. Findings   are most suggestive of evolving subacute infarcts in the left PCA and   bilateral PICA territory.  [x] FU neurology consult: MRI Brain shows subacute left cerebellar stroke.  Recs:  - [x]ASA, Atorvastatin  - [x]Echo,[x] Telemetry: upgraded, [x]FLP, [x]HbA1c: 12.3  - If Echo normal, consider [x]ABRAHAN: ABRAHAN bubble ordered to R/O patent foramen oval, and then []loop monitor if ABRAHAN normal  - []PT/OT for stroke recovery    [x] cw meclizine 25 prn q 8   []f/u orthstatic f/u echo  c/w aspirin  c/w statin  Dr Pimentel      [x] FU neurology consult: MRI Brain shows subacute left cerebellar stroke.  Recs:  - [x]ASA, Atorvastatin  - [x]Echo,[x] Telemetry: upgraded, [x]FLP, [x]HbA1c: 12.3  - If Echo normal, consider [x]ABRAHAN: ABRAHAN bubble ordered to R/O patent foramen oval, and then []loop monitor if ABRAHAN normal  - []PT/OT for stroke recovery    [x] cw meclizine 25 prn q 8   []f/u orthstatic

## 2019-07-19 NOTE — CONSULT NOTE ADULT - SUBJECTIVE AND OBJECTIVE BOX
Patient is a 54y old  Male who presents with a chief complaint of vertigo/ Dizziness (19 Jul 2019 10:16)      HPI:  Patient is 54 year old male has past medical history of HTN, DM, HLD, CAD s/p CABG, CVA x2, came in after having vertigo for 3 weeks.     patient was in his usual health 3 weeks ago, when he was started to have severe vertigo with spinning sensation worsened with any movement, improved by staying still, it affected his daily activity, he was staying in the bed all day, associated with gait imbalance and he had multiple fall without head trauma or LOC. Pt has generalized weakness, on and off sob, But denies any fever, cough, ear infection, nausea, vomiting, diarrhea, chest pain, abdominal pain, new weakness or numbness of extremity, palpitation, leg swelling, exertional dyspnea.   Found to have CVA and un cont dm. Pt admits to non compliance with insulin as out pt. states fsg high most times    Allergic: plavix (severe headache)  social hx: smoked 3 and half pack a day for 40 years stopped 3 months ago, denies alcohol   Fhx: Diabetes (17 Jul 2019 21:35)      PAST MEDICAL & SURGICAL HISTORY:  Cerebrovascular accident (CVA), unspecified mechanism  Congestive heart failure, NYHA class 3, chronic, combined  Cerebrovascular accident (CVA), unspecified mechanism  Hyperlipidemia, unspecified hyperlipidemia type  Coronary artery disease involving native heart without angina pectoris, unspecified vessel or lesion type  Hypertension, unspecified type  Diabetes mellitus of other type without complication  S/P coronary artery stent placement  History of appendectomy  S/P CABG x 1         MEDICATIONS  (STANDING):  aspirin enteric coated 325 milliGRAM(s) Oral daily  atorvastatin 80 milliGRAM(s) Oral at bedtime  enoxaparin Injectable 40 milliGRAM(s) SubCutaneous daily  insulin glargine Injectable (LANTUS) 40 Unit(s) SubCutaneous at bedtime  insulin lispro (HumaLOG) corrective regimen sliding scale   SubCutaneous Before meals and at bedtime  insulin lispro Injectable (HumaLOG) 6 Unit(s) SubCutaneous three times a day before meals  lisinopril 2.5 milliGRAM(s) Oral daily  metoprolol tartrate 25 milliGRAM(s) Oral two times a day    MEDICATIONS  (PRN):  glucagon  Injectable 1 milliGRAM(s) IntraMuscular once PRN Glucose <70 milliGRAM(s)/deciLiter  meclizine 25 milliGRAM(s) Oral every 8 hours PRN Dizziness/ vertigo      FAMILY HISTORY:  FH: CAD (coronary artery disease)      SOCIAL HISTORY:      REVIEW OF SYSTEMS:  CONSTITUTIONAL: No fever, weight loss, or fatigue  EYES: No eye pain, visual disturbances, or discharge  ENT:  No difficulty hearing, tinnitus, vertigo; No sinus or throat pain  NECK: No pain or stiffness  RESPIRATORY: No cough, wheezing, chills or hemoptysis; No Shortness of Breath  CARDIOVASCULAR: No chest pain, palpitations, passing out, dizziness, or leg swelling  GASTROINTESTINAL: No abdominal or epigastric pain. No nausea, vomiting, or hematemesis; No diarrhea or constipation. No melena or hematochezia.  GENITOURINARY: No dysuria, frequency, hematuria, or incontinence  NEUROLOGICAL: No headaches, memory loss, loss of strength, numbness, or tremors  SKIN: No itching, burning, rashes, or lesions   LYMPH Nodes: No enlarged glands  ENDOCRINE: No heat or cold intolerance; No hair loss  MUSCULOSKELETAL: No joint pain or swelling; No muscle, back, or extremity pain  PSYCHIATRIC: No depression, anxiety, mood swings, or difficulty sleeping  HEME/LYMPH: No easy bruising, or bleeding gums  ALLERGY AND IMMUNOLOGIC: No hives or eczema	        Vital Signs Last 24 Hrs  T(C): 37.1 (19 Jul 2019 15:43), Max: 37.1 (19 Jul 2019 15:43)  T(F): 98.8 (19 Jul 2019 15:43), Max: 98.8 (19 Jul 2019 15:43)  HR: 78 (19 Jul 2019 11:12) (65 - 78)  BP: 144/71 (19 Jul 2019 11:12) (130/71 - 144/71)  BP(mean): --  RR: 18 (19 Jul 2019 15:43) (17 - 18)  SpO2: 100% (19 Jul 2019 15:43) (100% - 100%)      Constitutional:    HEENT: nad    Neck:  No JVD, bruits or thyromegaly    Respiratory:  Clear without rales or rhonchi    Cardiovascular:  RR without murmur, rub or gallop.    Gastrointestinal: Soft without hepatosplenomegaly.    Extremities: without cyanosis, clubbing or edema.    Neurological:  Oriented   x   3   . No gross sensory or motor defects.        LABS:                        12.6   4.20  )-----------( 189      ( 19 Jul 2019 06:46 )             37.3     07-19    137  |  105  |  16  ----------------------------<  285<H>  4.1   |  25  |  1.27    Ca    8.3<L>      19 Jul 2019 06:46  Phos  3.2     07-19  Mg     1.8     07-19    TPro  7.3  /  Alb  2.9<L>  /  TBili  0.4  /  DBili  x   /  AST  14  /  ALT  17  /  AlkPhos  91  07-18          Urinalysis Basic - ( 18 Jul 2019 04:28 )    Color: x / Appearance: x / SG: x / pH: x  Gluc: x / Ketone: x  / Bili: x / Urobili: x   Blood: x / Protein: x / Nitrite: x   Leuk Esterase: x / RBC: 0-2 /HPF / WBC 0-2 /HPF   Sq Epi: x / Non Sq Epi: Few /HPF / Bacteria: Moderate /HPF        Hemoglobin A1C, Whole Blood (07.18.19 @ 10:04)    Hemoglobin A1C, Whole Blood: 12.3: Method: Immunoassay              CAPILLARY BLOOD GLUCOSE      POCT Blood Glucose.: 235 mg/dL (19 Jul 2019 16:48)  POCT Blood Glucose.: 345 mg/dL (19 Jul 2019 12:01)  POCT Blood Glucose.: 300 mg/dL (19 Jul 2019 08:23)  POCT Blood Glucose.: 366 mg/dL (18 Jul 2019 21:25)      RADIOLOGY & ADDITIONAL STUDIES:

## 2019-07-19 NOTE — PROGRESS NOTE ADULT - SUBJECTIVE AND OBJECTIVE BOX
CHIEF COMPLAINT:Patient is a 54y old  Male who presents with a chief complaint of vertigo/ Dizziness .Pt transfered to [facility].    	  REVIEW OF SYSTEMS:  CONSTITUTIONAL: No fever, weight loss, or fatigue  EYES: No eye pain, visual disturbances, or discharge  ENT:  No difficulty hearing, tinnitus, vertigo; No sinus or throat pain  NECK: No pain or stiffness  RESPIRATORY: No cough, wheezing, chills or hemoptysis; No Shortness of Breath  CARDIOVASCULAR: No chest pain, palpitations, passing out, dizziness, or leg swelling  GASTROINTESTINAL: No abdominal or epigastric pain. No nausea, vomiting, or hematemesis; No diarrhea or constipation. No melena or hematochezia.  GENITOURINARY: No dysuria, frequency, hematuria, or incontinence  NEUROLOGICAL: No headaches, memory loss, loss of strength, numbness, or tremors  SKIN: No itching, burning, rashes, or lesions   LYMPH Nodes: No enlarged glands  ENDOCRINE: No heat or cold intolerance; No hair loss  MUSCULOSKELETAL: No joint pain or swelling; No muscle, back, or extremity pain  PSYCHIATRIC: No depression, anxiety, mood swings, or difficulty sleeping  HEME/LYMPH: No easy bruising, or bleeding gums  ALLERGY AND IMMUNOLOGIC: No hives or eczema	      PHYSICAL EXAM:  T(C): 36.2 (07-19-19 @ 09:35), Max: 36.9 (07-19-19 @ 00:09)  HR: 65 (07-19-19 @ 09:35) (65 - 76)  BP: 131/74 (07-19-19 @ 09:35) (130/71 - 136/79)  RR: 18 (07-19-19 @ 09:35) (17 - 18)  SpO2: 100% (07-19-19 @ 09:35) (100% - 100%)  Wt(kg): --  I&O's Summary      Appearance: Normal	  HEENT:   Normal oral mucosa, PERRL, EOMI	  Lymphatic: No lymphadenopathy  Cardiovascular: Normal S1 S2, No JVD, No murmurs, No edema  Respiratory: Lungs clear to auscultation	  Psychiatry: A & O x 3, Mood & affect appropriate  Gastrointestinal:  Soft, Non-tender, + BS	  Skin: No rashes, No ecchymoses, No cyanosis	  Neurologic: Non-focal  Extremities: Normal range of motion, No clubbing, cyanosis or edema  Vascular: Peripheral pulses palpable 2+ bilaterally    MEDICATIONS  (STANDING):  aspirin enteric coated 81 milliGRAM(s) Oral daily  atorvastatin 80 milliGRAM(s) Oral at bedtime  enoxaparin Injectable 40 milliGRAM(s) SubCutaneous daily  insulin glargine Injectable (LANTUS) 30 Unit(s) SubCutaneous at bedtime  insulin lispro (HumaLOG) corrective regimen sliding scale   SubCutaneous Before meals and at bedtime  lisinopril 2.5 milliGRAM(s) Oral daily  metoprolol tartrate 25 milliGRAM(s) Oral two times a day      LABS:	 	      CARDIAC MARKERS ( 17 Jul 2019 12:05 )  <0.015 ng/mL / x     / 84 U/L / x     / x                            12.6   4.20  )-----------( 189      ( 19 Jul 2019 06:46 )             37.3     07-19    137  |  105  |  16  ----------------------------<  285<H>  4.1   |  25  |  1.27    Ca    8.3<L>      19 Jul 2019 06:46  Phos  3.2     07-19  Mg     1.8     07-19    TPro  7.3  /  Alb  2.9<L>  /  TBili  0.4  /  DBili  x   /  AST  14  /  ALT  17  /  AlkPhos  91  07-18    proBNP: Serum Pro-Brain Natriuretic Peptide: 325 pg/mL (07-17 @ 12:05)    Lipid Profile: Cholesterol 146  LDL 89  HDL 42  TG 75    HgA1c: Hemoglobin A1C, Whole Blood: 12.3 % (07-18 @ 10:04)    TSH: Thyroid Stimulating Hormone, Serum: 0.72 uU/mL (07-18 @ 06:31)

## 2019-07-19 NOTE — PROGRESS NOTE ADULT - SUBJECTIVE AND OBJECTIVE BOX
Unchanged neuro exam, still with b/l nystagmus, still feels dizzy      Recs:  - Can increase aspirin from 81mg to 325mg daily  - Check hypercoagulable workup  - Check Echo - consider ABRAHAN if Echo is negative      NOTE TO BE COMPLETED - PLEASE REFER TO ABOVE ONLY AND IGNORE INFORMATION BELOW    Neurology Follow up note    Name  MADISON PERRY    HPI:  Patient is 54 year old male has past medical history of HTN, DM, HLD, CAD s/p CABG, CVA x2, came in after having vertigo for 3 weeks.     patient was in his usual health 3 weeks ago, when he was started to have severe vertigo with spinning sensation worsened with any movement, improved by staying still, it affected his daily activity, he was staying in the bed all day, associated with gait imbalance and he had multiple fall without head trauma or LOC. Pt has generalized weakness, on and off sob, But denies any fever, cough, ear infection, nausea, vomiting, diarrhea, chest pain, abdominal pain, new weakness or numbness of extremity, palpitation, leg swelling, exertional dyspnea.       Allergic: plavix (severe headache)  social hx: smoked 3 and half pack a day for 40 years stopped 3 months ago, denies alcohol   Fhx: Diabetes (17 Jul 2019 21:35)      Interval History -        Subjective:        MEDICATIONS  (STANDING):  aspirin enteric coated 325 milliGRAM(s) Oral daily  atorvastatin 80 milliGRAM(s) Oral at bedtime  enoxaparin Injectable 40 milliGRAM(s) SubCutaneous daily  insulin glargine Injectable (LANTUS) 40 Unit(s) SubCutaneous at bedtime  insulin lispro (HumaLOG) corrective regimen sliding scale   SubCutaneous Before meals and at bedtime  insulin lispro Injectable (HumaLOG) 6 Unit(s) SubCutaneous three times a day before meals  lisinopril 2.5 milliGRAM(s) Oral daily  metoprolol tartrate 25 milliGRAM(s) Oral two times a day    MEDICATIONS  (PRN):  glucagon  Injectable 1 milliGRAM(s) IntraMuscular once PRN Glucose <70 milliGRAM(s)/deciLiter  meclizine 25 milliGRAM(s) Oral every 8 hours PRN Dizziness/ vertigo      Allergies    Plavix (Other)    Intolerances        Review of Systems:  General: [ ] None, [ ] chills, [ ]fatigue, [ ] fevers  Skin: [ ] None, [ ] rash   HEENT: [ ] None, [ ] head injury, [ ] blurred vision, [ ] double vision, [ ] eye pain, [ ] visual loss, [ ] hearing loss, [ ] deafness, [ ] ear pain, [ ] ringing in the ears, [ ] vertigo, [ ] sinus pain, [ ] voice changes  Neck: [ ] None, [ ] neck stiffness  Respiratory: [ ] None, [ ] cough, [ ] difficulty breathing  Cardiovascular: [ ] None, [ ] calf cramps, [ ] chest pain, [ ] leg pain, [ ] swelling, [ ] rapid heart rate, [ ] shortness of breath  Gastrointestinal: [ ] None, [ ] abdominal pain, [ ] nausea, [ ] vomiting  Musculoskeletal: [ ] None, [ ] back pain, [ ] joint pain, [ ] joint stiffness, [ ] leg cramps, [ ] muscle atrophy, [ ] muscle cramps, [ ] muscle weakness, [ ] swelling of extremities  Neurological: [ ] None, [ ] Dizziness, [ ] decreased memory, [ ] fainting, [ ] focal neurological symptoms, [ ] headaches, [ ] incontinence of stool, [ ] incontinence of urine, [ ] loss of consciousness, [ ] numbness, [ ] seizures, [ ] spinning sensation, [ ] stroke, [ ] trouble walking, [ ] unsteadiness, [ ] visual changes, [ ] weakness  Psychiatric: [ ] None,  [ ] depression, [ ] anxiety, [ ] hallucinations, [ ] inability to concentrate, [ ] mood changes, [ ] panic attacks  Hematology: [ ] None,  [ ] blood clots, [ ] spontaneous bleeding      Objective:   Vital Signs Last 24 Hrs  T(C): 37.4 (19 Jul 2019 19:46), Max: 37.4 (19 Jul 2019 19:46)  T(F): 99.3 (19 Jul 2019 19:46), Max: 99.3 (19 Jul 2019 19:46)  HR: 75 (19 Jul 2019 19:46) (65 - 78)  BP: 126/70 (19 Jul 2019 19:46) (126/70 - 144/71)  BP(mean): --  RR: 17 (19 Jul 2019 19:46) (17 - 18)  SpO2: 98% (19 Jul 2019 19:46) (98% - 100%)    General Exam:   General appearance: No acute distress                 Cardiovascular: Pedal dorsalis pulses intact bilaterally    Neurological Exam:  Mental Status: Orientated to self, date and place.  Attention intact.  No dysarthria, aphasia or neglect.  Knowledge intact.  Registration intact.  Short and long term memory grossly intact.      Cranial Nerves: CN I - not tested.  PERRL, EOMI, VFF, no nystagmus or diplopia.  No APD.  Fundi not visualized bilaterally.  CN V1-3 intact to light touch and pinprick.  No facial asymmetry.  Hearing intact to finger rub bilaterally.  Tongue, uvula and palate midline.  Sternocleidomastoid and Trapezius intact bilaterally.    Motor:   Tone: normal.                  Strength: intact throughout  Pronator drift: none                 Dysmeria: None to finger-nose-finger or heel-shin-heel  No truncal ataxia.    Tremor: No resting, postural or action tremor.  No myoclonus.    Sensation: intact to light touch, pinprick, vibration and proprioception    Deep Tendon Reflexes: 1+ bilateral biceps, triceps, brachioradialis, knee and ankle  Toes flexor bilaterally    Gait: normal and stable.      Other:    07-19    137  |  105  |  16  ----------------------------<  285<H>  4.1   |  25  |  1.27    Ca    8.3<L>      19 Jul 2019 06:46  Phos  3.2     07-19  Mg     1.8     07-19    TPro  7.3  /  Alb  2.9<L>  /  TBili  0.4  /  DBili  x   /  AST  14  /  ALT  17  /  AlkPhos  91  07-18 07-19    137  |  105  |  16  ----------------------------<  285<H>  4.1   |  25  |  1.27    Ca    8.3<L>      19 Jul 2019 06:46  Phos  3.2     07-19  Mg     1.8     07-19    TPro  7.3  /  Alb  2.9<L>  /  TBili  0.4  /  DBili  x   /  AST  14  /  ALT  17  /  AlkPhos  91  07-18    LIVER FUNCTIONS - ( 18 Jul 2019 06:31 )  Alb: 2.9 g/dL / Pro: 7.3 g/dL / ALK PHOS: 91 U/L / ALT: 17 U/L DA / AST: 14 U/L / GGT: x             Radiology    EKG:  tele:  TTE:  EEG:                 Please contact the Neurology consult service with any questions.    Jignesh Pimentel MD  Neurology Attending  [facility] Neurology Follow up note    Name  MADISON PERRY    HPI:  Patient is 54 year old male has past medical history of HTN, DM, HLD, CAD s/p CABG, CVA x2, came in after having vertigo for 3 weeks. He was in his usual health 3 weeks ago, when he started to have severe vertigo with spinning sensation worsened with any movement, improved by staying still, it affected his daily activity, he was staying in the bed all day, associated with gait imbalance and he had multiple fall without head trauma or LOC. Pt has generalized weakness, on and off sob, But denies any fever, cough, ear infection, nausea, vomiting, diarrhea, chest pain, abdominal pain, new weakness or numbness of extremity, palpitation, leg swelling, exertional dyspnea.       Neuro HPI:  54 RHM with generalized weakness and room-spinning episodes, especially when lying down, since 6/23/19.    Interval History -  The patient continues to feel dizzy.    MEDICATIONS  (STANDING):  aspirin enteric coated 325 milliGRAM(s) Oral daily  atorvastatin 80 milliGRAM(s) Oral at bedtime  enoxaparin Injectable 40 milliGRAM(s) SubCutaneous daily  insulin glargine Injectable (LANTUS) 40 Unit(s) SubCutaneous at bedtime  insulin lispro (HumaLOG) corrective regimen sliding scale   SubCutaneous Before meals and at bedtime  insulin lispro Injectable (HumaLOG) 6 Unit(s) SubCutaneous three times a day before meals  lisinopril 2.5 milliGRAM(s) Oral daily  metoprolol tartrate 25 milliGRAM(s) Oral two times a day    MEDICATIONS  (PRN):  glucagon  Injectable 1 milliGRAM(s) IntraMuscular once PRN Glucose <70 milliGRAM(s)/deciLiter  meclizine 25 milliGRAM(s) Oral every 8 hours PRN Dizziness/ vertigo    Allergies  Plavix (Other)    Review of Systems: Fourteen systems reviewed and negative except as in HPI / Interval History.      Objective:   Vital Signs Last 24 Hrs  T(C): 37.4 (19 Jul 2019 19:46), Max: 37.4 (19 Jul 2019 19:46)  T(F): 99.3 (19 Jul 2019 19:46), Max: 99.3 (19 Jul 2019 19:46)  HR: 75 (19 Jul 2019 19:46) (65 - 78)  BP: 126/70 (19 Jul 2019 19:46) (126/70 - 144/71)  RR: 17 (19 Jul 2019 19:46) (17 - 18)  SpO2: 98% (19 Jul 2019 19:46) (98% - 100%)    General Exam:  General: No acute distress  Respiratory: CTAB/l.  No crackles, rhonchi, or wheezes.  Cardiovascular: RRR, No murmurs, Full b/l radial and pedal pulses    Neurological Exam:  General / Mental Status: Oriented to person, place, and time.  No dysarthria or aphasia present.  Naming and repetition intact.  Cranial Nerves: PERRLA, EOMI x 2, VFF x 4.  Horizontal nystagmus present b/l, Vertical eye movements intact.  Negative Ingrid-Hallpike maneuver b/l.  B/l V1-V3 equal to light touch and pinprick.  Symmetric facial movement and palate elevation.  B/l hearing equal to finger rub.  5/5 strength with b/l sternocleidomastoid and trapezius.  Midline tongue protrusion with no atrophy or fasciculations.  Motor: Normal bulk and tone in all four extremities.  5/5 strength throughout all four extremities.  No downward drift, rigidity, spasticity, or tremors in any of the four extremities.  Sensation: Diminished to light touch and pinprick at the left lower extremity.  Intact to light touch and pinprick in the right lower extremity and b/l upper extremities.  Coordination: No dysmetria with b/l finger-to-nose and heel raise tests.  Reflexes: 2+ and symmetric at b/l biceps, triceps, brachioradialis, patellae, and ankles.  Toes flexor b/l.  Gait and Romberg testing deferred per patient request.    NIHSS Score:    LOC - 0  LOC Questions - 0  LOC Commands - 0  Gaze Preference - 0  Visual Fields - 0  Facial Palsy - 0  Motor Arm Left - 0  Motor Arm Right - 0  Motor Leg Left - 0  Motor Leg Right - 0  Limb Ataxia - 0  Sensory - 10  Language - 0  Speech - 0  Extinction - 0    NIHSS Score Total: 1 -No IV tPA because patient is out of time window (LSN 3.5 weeks ago)    Modified Kneeland Scale: 2      Labs:    07-19    137  |  105  |  16  ----------------------------<  285<H>  4.1   |  25  |  1.27    Ca    8.3<L>      19 Jul 2019 06:46  Phos  3.2     07-19  Mg     1.8     07-19    TPro  7.3  /  Alb  2.9<L>  /  TBili  0.4  /  DBili  x   /  AST  14  /  ALT  17  /  AlkPhos  91  07-18    Hemoglobin A1C, Whole Blood (07.18.19 @ 10:04)    Hemoglobin A1C, Whole Blood: 12.3%    Lipid Profile (07.18.19 @ 06:31)    Total Cholesterol/HDL Ratio Measurement: 3.5 RATIO    Cholesterol, Serum: 146 mg/dL    Triglycerides, Serum: 75 mg/dL    HDL Cholesterol, Serum: 42 mg/dL    Direct LDL: 89 mg/dL      Neuroimaging:    CT BRAIN (07/17/2019):  - No acute intracranial abnormality  - Chronic b/l PCA territory infarcts  - Moderate chronic microvascular disease  - Diffuse atrophy    MRI BRAIN & IAC W/WO PETEY (07/18/2019):  - Chronic right occipital infarct (present in Feb. 2019)  - Subacute b/l inferior cerebellar (PICA territory) and left PCA territory infarcts (new since Feb. 2019)  - Mild chronic microvascular disease  - Diffuse atrophy  - No auditory canal abnormalities      Assessment:  54 RHM with persistent vertigo secondary to subacute b/l cerebellar infarcts, secondary to small vessel disease vs. cardioembolic source.      Recommendations:    1. Stroke workup  - Carotid ultrasound  - Transthoracic Echocardiogram - If normal, consider ABRAHAN - If ABRAHAN normal, consider implantable loop recorder  - Telemetry  - Check hypercoagulable workup    2. Secondary stroke prevention  - Q4H Neurochecks  - Aspirin 81mg daily can be increased to 325mg daily given recurrent strokes on low-dose aspirin  - Atorvastatin 80mg QHS  - Treat BP if over 120/80  - Diabetes management  - PT/OT for stroke recovery  - DVT ppx      Please contact the Neurology consult service with any questions.    Jignesh Pimentel MD  Neurology Attending  [facility]

## 2019-07-19 NOTE — CONSULT NOTE ADULT - PROBLEM SELECTOR RECOMMENDATION 9
un cont due to non compliance   d/w pt at length imp of glucose control  change lantus to 40 units  add humalog 6 ac tid  fsg ac and hs  consider basal insulin and oral agents upon d/c for better compliance  d/w hs

## 2019-07-19 NOTE — PROGRESS NOTE ADULT - SUBJECTIVE AND OBJECTIVE BOX
Patient is a 54y old  Male who presents with a chief complaint of vertigo/ Dizziness (17 Jul 2019 21:35)      INTERVAL HPI/OVERNIGHT EVENTS: pt seen and examined, still c/o severe vertigo , not associated with N/V, co severe weakness didn't have a bowel  movement in 6 days ,he has no appetite. doesn't co dysphasia , or recent hoarsens inn his voice .          REVIEW OF SYSTEMS:  CONSTITUTIONAL: Severe weakness, afibrile  EYES: No eye pain, visual disturbances, or discharge  ENMT: severe vertigo aggravate with movements, alleviates with standing still  NECK: No pain or stiffness  BREASTS: No pain, masses, or nipple discharge  RESPIRATORY: No cough, wheezing, chills or hemoptysis; No shortness of breath  CARDIOVASCULAR: No chest pain, palpitations, dizziness, or leg swelling  GASTROINTESTINAL: Anorexia for last week , no BM in 6 days.  GENITOURINARY: No dysuria, frequency, hematuria, or incontinence  NEUROLOGICAL: No headaches, memory loss, loss of strength, numbness, or tremors  SKIN: No itching, burning, rashes, or lesions   LYMPH NODES: No enlarged glands  ENDOCRINE: No heat or cold intolerance; No hair loss  MUSCULOSKELETAL: No joint pain or swelling; No muscle, back, or extremity pain  HEME/LYMPH: No easy bruising, or bleeding gums  ALLERY AND IMMUNOLOGIC: No hives or eczema    FAMILY HISTORY:  FH: CAD (coronary artery disease)    Vital Signs Last 24 Hrs  T(C): 36.4 (19 Jul 2019 08:00), Max: 36.9 (19 Jul 2019 00:09)  T(F): 97.6 (19 Jul 2019 08:00), Max: 98.4 (19 Jul 2019 00:09)  HR: 68 (19 Jul 2019 08:00) (68 - 76)  BP: 130/71 (19 Jul 2019 08:00) (130/71 - 136/79)  BP(mean): --  RR: 17 (19 Jul 2019 08:00) (17 - 18)  SpO2: 100% (19 Jul 2019 08:00) (100% - 100%)    PHYSICAL EXAM:  GENERAL: NAD, well-groomed, well-developed  HEAD:  Atraumatic, Normocephalic  EYES: EOMI, PERRLA, conjunctiva and sclera clear  ENMT: No tonsillar erythema, exudates, or enlargement; Moist mucous membranes, Good dentition, No lesions  NECK: Supple, No JVD, Normal thyroid  NERVOUS SYSTEM:  Alert & Oriented X3, non-focal, normal strength, viola funes pike + for nystagmus b/l  , poor nose to finger test   CHEST/LUNG: Clear to percussion bilaterally; No rales, rhonchi, wheezing, or rubs  HEART: Regular rate and rhythm; No murmurs, rubs, or gallops  ABDOMEN: Soft, Nontender, Nondistended; Bowel sounds present  EXTREMITIES:  2+ Peripheral Pulses, No clubbing, cyanosis, or edema  LYMPH: No lymphadenopathy noted  SKIN: No rashes or lesions    Consultant(s) Notes Reviewed:  [x ] YES  [ ] NO  Care Discussed with Consultants/Other Providers [ x] YES  [ ] NO    LABS:                         12.6   4.20  )-----------( 189      ( 19 Jul 2019 06:46 )             37.3     07-19    137  |  105  |  16  ----------------------------<  285<H>  4.1   |  25  |  1.27    Ca    8.3<L>      19 Jul 2019 06:46  Phos  3.2     07-19  Mg     1.8     07-19    TPro  7.3  /  Alb  2.9<L>  /  TBili  0.4  /  DBili  x   /  AST  14  /  ALT  17  /  AlkPhos  91  07-18        RADIOLOGY & ADDITIONAL TESTS:  < from: MR IAC w/wo IV Cont (07.18.19 @ 16:41) >  MPRESSION:     Cortical-based enhancement with associated FLAIR signal abnormality in   the left occipital lobe, and also enhancing areas of FLAIR signal   abnormality in the inferior cerebellar hemispheres bilaterally. Findings   are most suggestive of evolving subacute infarcts in the left PCA and   bilateral PICA territory.    Short-term MRI imaging follow-up to ensure resolution and exclude other   less likely differential diagnoses is recommended.      < from: MR Angio Head No Cont (02.07.19 @ 13:42) >  IMPRESSION:  Limited exam due to motion.    Multiple intracranial stenoses as described above    < from: MR Head No Cont (02.07.19 @ 12:40) >  FINDINGS:  There is mild diffuse parenchymal volume loss. There are T2 prolongation   signal abnormalities in the periventricular subcortical white matter   likely related to mild chronic microvascular ischemic changes.    There is no acute parenchymal hemorrhage, parenchymal mass, or midline   shift. There is no extra-axial fluid collection.  There is no   hydrocephalus.    Small chronic infarct left occipital lobe    Small acute infarct right occipital lobe noted measuring 9 x 8 mm.   Additional punctate acute/subacute infarcts noted in the bilateral   posterior temporal and left occipital region.    Chronic fracture left lamina papyracea.    IMPRESSION:  Bilateral posterior acute and acute/subacute infarcts as described above.    < end of copied text >              MEDICATIONS  (STANDING):  aspirin enteric coated 81 milliGRAM(s) Oral daily  atorvastatin 80 milliGRAM(s) Oral at bedtime  enoxaparin Injectable 40 milliGRAM(s) SubCutaneous daily  insulin glargine Injectable (LANTUS) 30 Unit(s) SubCutaneous at bedtime  insulin lispro (HumaLOG) corrective regimen sliding scale   SubCutaneous Before meals and at bedtime  lisinopril 2.5 milliGRAM(s) Oral daily  metoprolol tartrate 25 milliGRAM(s) Oral two times a day    MEDICATIONS  (PRN):  glucagon  Injectable 1 milliGRAM(s) IntraMuscular once PRN Glucose <70 milliGRAM(s)/deciLiter  meclizine 25 milliGRAM(s) Oral every 8 hours PRN Dizziness/ vertigo      HEALTH ISSUES - PROBLEM Dx:  Need for prophylactic measure: Need for prophylactic measure  HTN (hypertension): HTN (hypertension)  CAD (coronary artery disease): CAD (coronary artery disease)  CVA (cerebral vascular accident): CVA (cerebral vascular accident)  Vertigo: Vertigo Patient is a 54y old  Male who presents with a chief complaint of vertigo/ Dizziness (17 Jul 2019 21:35)      INTERVAL HPI/OVERNIGHT EVENTS:Pt is feeling weak and laying comfortably on bed.           REVIEW OF SYSTEMS:  CONSTITUTIONAL: Severe weakness, afibrile  EYES: No eye pain, visual disturbances, or discharge  ENMT:  NECK: No pain or stiffness  BREASTS: No pain, masses, or nipple discharge  RESPIRATORY: No cough, wheezing, chills or hemoptysis; No shortness of breath  CARDIOVASCULAR: No chest pain, palpitations, dizziness, or leg swelling  GENITOURINARY: No dysuria, frequency, hematuria, or incontinence  NEUROLOGICAL: RUE weakness, Left side of the body unable to feel the sensation.  SKIN: No itching, burning, rashes, or lesions   LYMPH NODES: No enlarged glands  ENDOCRINE: No heat or cold intolerance; No hair loss  MUSCULOSKELETAL: No joint pain or swelling; No muscle, back, or extremity pain  HEME/LYMPH: No easy bruising, or bleeding gums  ALLERY AND IMMUNOLOGIC: No hives or eczema    FAMILY HISTORY:  FH: CAD (coronary artery disease)    Vital Signs Last 24 Hrs  T(C): 36.4 (19 Jul 2019 08:00), Max: 36.9 (19 Jul 2019 00:09)  T(F): 97.6 (19 Jul 2019 08:00), Max: 98.4 (19 Jul 2019 00:09)  HR: 68 (19 Jul 2019 08:00) (68 - 76)  BP: 130/71 (19 Jul 2019 08:00) (130/71 - 136/79)  BP(mean): --  RR: 17 (19 Jul 2019 08:00) (17 - 18)  SpO2: 100% (19 Jul 2019 08:00) (100% - 100%)    PHYSICAL EXAM:  GENERAL: NAD, well-groomed, well-developed  HEAD:  Atraumatic, Normocephalic  EYES: EOMI, PERRLA, conjunctiva and sclera clear  ENMT: No tonsillar erythema, exudates, or enlargement; Moist mucous membranes, Good dentition, No lesions  NECK: Supple, No JVD, Normal thyroid  NERVOUS SYSTEM:  Alert & Oriented X3,  CHEST/LUNG: Clear to percussion bilaterally; No rales, rhonchi, wheezing, or rubs  HEART: Regular rate and rhythm; No murmurs, rubs, or gallops  ABDOMEN: Soft, Nontender, Nondistended; Bowel sounds present  EXTREMITIES:  2+ Peripheral Pulses, No clubbing, cyanosis, or edema  LYMPH: No lymphadenopathy noted  SKIN: No rashes or lesions    Consultant(s) Notes Reviewed:  [x ] YES  [ ] NO  Care Discussed with Consultants/Other Providers [ x] YES  [ ] NO    LABS:                         12.6   4.20  )-----------( 189      ( 19 Jul 2019 06:46 )             37.3     07-19    137  |  105  |  16  ----------------------------<  285<H>  4.1   |  25  |  1.27    Ca    8.3<L>      19 Jul 2019 06:46  Phos  3.2     07-19  Mg     1.8     07-19    TPro  7.3  /  Alb  2.9<L>  /  TBili  0.4  /  DBili  x   /  AST  14  /  ALT  17  /  AlkPhos  91  07-18        RADIOLOGY & ADDITIONAL TESTS:  < from: MR IAC w/wo IV Cont (07.18.19 @ 16:41) >  MPRESSION:     Cortical-based enhancement with associated FLAIR signal abnormality in   the left occipital lobe, and also enhancing areas of FLAIR signal   abnormality in the inferior cerebellar hemispheres bilaterally. Findings   are most suggestive of evolving subacute infarcts in the left PCA and   bilateral PICA territory.    Short-term MRI imaging follow-up to ensure resolution and exclude other   less likely differential diagnoses is recommended.      < from: MR Angio Head No Cont (02.07.19 @ 13:42) >  IMPRESSION:  Limited exam due to motion.    Multiple intracranial stenoses as described above    < from: MR Head No Cont (02.07.19 @ 12:40) >  FINDINGS:  There is mild diffuse parenchymal volume loss. There are T2 prolongation   signal abnormalities in the periventricular subcortical white matter   likely related to mild chronic microvascular ischemic changes.    There is no acute parenchymal hemorrhage, parenchymal mass, or midline   shift. There is no extra-axial fluid collection.  There is no   hydrocephalus.    Small chronic infarct left occipital lobe    Small acute infarct right occipital lobe noted measuring 9 x 8 mm.   Additional punctate acute/subacute infarcts noted in the bilateral   Pt is AAO x 3, laying comfortably on bed.               MEDICATIONS  (STANDING):  aspirin enteric coated 81 milliGRAM(s) Oral daily  atorvastatin 80 milliGRAM(s) Oral at bedtime  enoxaparin Injectable 40 milliGRAM(s) SubCutaneous daily  insulin glargine Injectable (LANTUS) 30 Unit(s) SubCutaneous at bedtime  insulin lispro (HumaLOG) corrective regimen sliding scale   SubCutaneous Before meals and at bedtime  lisinopril 2.5 milliGRAM(s) Oral daily  metoprolol tartrate 25 milliGRAM(s) Oral two times a day    MEDICATIONS  (PRN):  glucagon  Injectable 1 milliGRAM(s) IntraMuscular once PRN Glucose <70 milliGRAM(s)/deciLiter  meclizine 25 milliGRAM(s) Oral every 8 hours PRN Dizziness/ vertigo      HEALTH ISSUES - PROBLEM Dx:  Need for prophylactic measure: Need for prophylactic measure  HTN (hypertension): HTN (hypertension)  CAD (coronary artery disease): CAD (coronary artery disease)  CVA (cerebral vascular accident): CVA (cerebral vascular accident)  Vertigo: Vertigo

## 2019-07-19 NOTE — CONSULT NOTE ADULT - ASSESSMENT
Patient is 54 year old male has past medical history of HTN, DM, HLD, CAD s/p CABG, CVA x2, came in after having vertigo for 3 weeks.  Found to have CVA and un cont dm. Pt admits to non compliance with insulin as out pt. states fsg high most times. On lantus 43 and sliding scale humalog with metformin bid

## 2019-07-20 LAB
ANION GAP SERPL CALC-SCNC: 6 MMOL/L — SIGNIFICANT CHANGE UP (ref 5–17)
B2 GLYCOPROT1 AB SER QL: NEGATIVE — SIGNIFICANT CHANGE UP
BUN SERPL-MCNC: 16 MG/DL — SIGNIFICANT CHANGE UP (ref 7–18)
CALCIUM SERPL-MCNC: 9 MG/DL — SIGNIFICANT CHANGE UP (ref 8.4–10.5)
CHLORIDE SERPL-SCNC: 105 MMOL/L — SIGNIFICANT CHANGE UP (ref 96–108)
CO2 SERPL-SCNC: 27 MMOL/L — SIGNIFICANT CHANGE UP (ref 22–31)
CREAT SERPL-MCNC: 1.13 MG/DL — SIGNIFICANT CHANGE UP (ref 0.5–1.3)
GLUCOSE BLDC GLUCOMTR-MCNC: 134 MG/DL — HIGH (ref 70–99)
GLUCOSE BLDC GLUCOMTR-MCNC: 185 MG/DL — HIGH (ref 70–99)
GLUCOSE BLDC GLUCOMTR-MCNC: 271 MG/DL — HIGH (ref 70–99)
GLUCOSE BLDC GLUCOMTR-MCNC: 421 MG/DL — HIGH (ref 70–99)
GLUCOSE SERPL-MCNC: 138 MG/DL — HIGH (ref 70–99)
HCT VFR BLD CALC: 40.2 % — SIGNIFICANT CHANGE UP (ref 39–50)
HGB BLD-MCNC: 13.7 G/DL — SIGNIFICANT CHANGE UP (ref 13–17)
MAGNESIUM SERPL-MCNC: 1.9 MG/DL — SIGNIFICANT CHANGE UP (ref 1.6–2.6)
MCHC RBC-ENTMCNC: 29.8 PG — SIGNIFICANT CHANGE UP (ref 27–34)
MCHC RBC-ENTMCNC: 34.1 GM/DL — SIGNIFICANT CHANGE UP (ref 32–36)
MCV RBC AUTO: 87.6 FL — SIGNIFICANT CHANGE UP (ref 80–100)
NRBC # BLD: 0 /100 WBCS — SIGNIFICANT CHANGE UP (ref 0–0)
PHOSPHATE SERPL-MCNC: 3.5 MG/DL — SIGNIFICANT CHANGE UP (ref 2.5–4.5)
PLATELET # BLD AUTO: 213 K/UL — SIGNIFICANT CHANGE UP (ref 150–400)
POTASSIUM SERPL-MCNC: 3.9 MMOL/L — SIGNIFICANT CHANGE UP (ref 3.5–5.3)
POTASSIUM SERPL-SCNC: 3.9 MMOL/L — SIGNIFICANT CHANGE UP (ref 3.5–5.3)
RBC # BLD: 4.59 M/UL — SIGNIFICANT CHANGE UP (ref 4.2–5.8)
RBC # FLD: 12.2 % — SIGNIFICANT CHANGE UP (ref 10.3–14.5)
SODIUM SERPL-SCNC: 138 MMOL/L — SIGNIFICANT CHANGE UP (ref 135–145)
WBC # BLD: 5 K/UL — SIGNIFICANT CHANGE UP (ref 3.8–10.5)
WBC # FLD AUTO: 5 K/UL — SIGNIFICANT CHANGE UP (ref 3.8–10.5)

## 2019-07-20 PROCEDURE — 99233 SBSQ HOSP IP/OBS HIGH 50: CPT

## 2019-07-20 RX ORDER — INSULIN LISPRO 100/ML
8 VIAL (ML) SUBCUTANEOUS
Refills: 0 | Status: DISCONTINUED | OUTPATIENT
Start: 2019-07-20 | End: 2019-07-22

## 2019-07-20 RX ADMIN — Medication 3: at 21:38

## 2019-07-20 RX ADMIN — Medication 6 UNIT(S): at 12:28

## 2019-07-20 RX ADMIN — ENOXAPARIN SODIUM 40 MILLIGRAM(S): 100 INJECTION SUBCUTANEOUS at 12:28

## 2019-07-20 RX ADMIN — Medication 6: at 17:25

## 2019-07-20 RX ADMIN — Medication 25 MILLIGRAM(S): at 17:25

## 2019-07-20 RX ADMIN — Medication 25 MILLIGRAM(S): at 05:54

## 2019-07-20 RX ADMIN — Medication 1: at 12:28

## 2019-07-20 RX ADMIN — Medication 325 MILLIGRAM(S): at 12:28

## 2019-07-20 RX ADMIN — Medication 6 UNIT(S): at 17:25

## 2019-07-20 RX ADMIN — ATORVASTATIN CALCIUM 80 MILLIGRAM(S): 80 TABLET, FILM COATED ORAL at 21:38

## 2019-07-20 RX ADMIN — INSULIN GLARGINE 40 UNIT(S): 100 INJECTION, SOLUTION SUBCUTANEOUS at 21:38

## 2019-07-20 RX ADMIN — LISINOPRIL 2.5 MILLIGRAM(S): 2.5 TABLET ORAL at 05:54

## 2019-07-20 NOTE — PROGRESS NOTE ADULT - PROBLEM SELECTOR PLAN 1
f/u echo  c/w aspirin  c/w statin  Dr Pimentel      [x] FU neurology consult: MRI Brain shows subacute left cerebellar stroke.  Recs:  - [x]ASA, Atorvastatin  - [x]Echo,[x] Telemetry: upgraded, [x]FLP, [x]HbA1c: 12.3  - If Echo normal, consider [x]ABRAHAN: ABRAHAN bubble ordered to R/O patent foramen oval, and then []loop monitor if ABRAHAN normal  - []PT/OT for stroke recovery    [x] cw meclizine 25 prn q 8   []f/u orthstatic

## 2019-07-20 NOTE — PROGRESS NOTE ADULT - SUBJECTIVE AND OBJECTIVE BOX
The patient still has b/l nystagmus and vertigo upon standing  Echo is normal.    Recs:  - ABRAHAN, and potential implantable loop monitor if ABRAHAN is normal  - Secondary stroke prevention  - PRN meclizine  - PT/OT for stroke recovery and vestibular therapy      NOTE TO BE COMPLETED - PLEASE REFER TO ABOVE ONLY AND IGNORE INFORMATION BELOW    Neurology Follow up note    Name  MADISON PERRY    HPI:  Patient is 54 year old male has past medical history of HTN, DM, HLD, CAD s/p CABG, CVA x2, came in after having vertigo for 3 weeks.     patient was in his usual health 3 weeks ago, when he was started to have severe vertigo with spinning sensation worsened with any movement, improved by staying still, it affected his daily activity, he was staying in the bed all day, associated with gait imbalance and he had multiple fall without head trauma or LOC. Pt has generalized weakness, on and off sob, But denies any fever, cough, ear infection, nausea, vomiting, diarrhea, chest pain, abdominal pain, new weakness or numbness of extremity, palpitation, leg swelling, exertional dyspnea.       Allergic: plavix (severe headache)  social hx: smoked 3 and half pack a day for 40 years stopped 3 months ago, denies alcohol   Fhx: Diabetes (17 Jul 2019 21:35)      Interval History -        Subjective:        MEDICATIONS  (STANDING):  aspirin enteric coated 325 milliGRAM(s) Oral daily  atorvastatin 80 milliGRAM(s) Oral at bedtime  enoxaparin Injectable 40 milliGRAM(s) SubCutaneous daily  insulin glargine Injectable (LANTUS) 40 Unit(s) SubCutaneous at bedtime  insulin lispro (HumaLOG) corrective regimen sliding scale   SubCutaneous Before meals and at bedtime  insulin lispro Injectable (HumaLOG) 6 Unit(s) SubCutaneous three times a day before meals  lisinopril 2.5 milliGRAM(s) Oral daily  metoprolol tartrate 25 milliGRAM(s) Oral two times a day    MEDICATIONS  (PRN):  glucagon  Injectable 1 milliGRAM(s) IntraMuscular once PRN Glucose <70 milliGRAM(s)/deciLiter  meclizine 25 milliGRAM(s) Oral every 8 hours PRN Dizziness/ vertigo      Allergies    Plavix (Other)    Intolerances        Review of Systems:  General: [ ] None, [ ] chills, [ ]fatigue, [ ] fevers  Skin: [ ] None, [ ] rash   HEENT: [ ] None, [ ] head injury, [ ] blurred vision, [ ] double vision, [ ] eye pain, [ ] visual loss, [ ] hearing loss, [ ] deafness, [ ] ear pain, [ ] ringing in the ears, [ ] vertigo, [ ] sinus pain, [ ] voice changes  Neck: [ ] None, [ ] neck stiffness  Respiratory: [ ] None, [ ] cough, [ ] difficulty breathing  Cardiovascular: [ ] None, [ ] calf cramps, [ ] chest pain, [ ] leg pain, [ ] swelling, [ ] rapid heart rate, [ ] shortness of breath  Gastrointestinal: [ ] None, [ ] abdominal pain, [ ] nausea, [ ] vomiting  Musculoskeletal: [ ] None, [ ] back pain, [ ] joint pain, [ ] joint stiffness, [ ] leg cramps, [ ] muscle atrophy, [ ] muscle cramps, [ ] muscle weakness, [ ] swelling of extremities  Neurological: [ ] None, [ ] Dizziness, [ ] decreased memory, [ ] fainting, [ ] focal neurological symptoms, [ ] headaches, [ ] incontinence of stool, [ ] incontinence of urine, [ ] loss of consciousness, [ ] numbness, [ ] seizures, [ ] spinning sensation, [ ] stroke, [ ] trouble walking, [ ] unsteadiness, [ ] visual changes, [ ] weakness  Psychiatric: [ ] None,  [ ] depression, [ ] anxiety, [ ] hallucinations, [ ] inability to concentrate, [ ] mood changes, [ ] panic attacks  Hematology: [ ] None,  [ ] blood clots, [ ] spontaneous bleeding      Objective:   Vital Signs Last 24 Hrs  T(C): 36.7 (20 Jul 2019 15:17), Max: 37.4 (19 Jul 2019 19:46)  T(F): 98.1 (20 Jul 2019 15:17), Max: 99.3 (19 Jul 2019 19:46)  HR: 85 (20 Jul 2019 15:17) (70 - 85)  BP: 119/69 (20 Jul 2019 15:17) (115/74 - 127/79)  BP(mean): --  RR: 18 (20 Jul 2019 15:17) (17 - 18)  SpO2: 100% (20 Jul 2019 15:17) (94% - 100%)    General Exam:   General appearance: No acute distress                 Cardiovascular: Pedal dorsalis pulses intact bilaterally    Neurological Exam:  Mental Status: Orientated to self, date and place.  Attention intact.  No dysarthria, aphasia or neglect.  Knowledge intact.  Registration intact.  Short and long term memory grossly intact.      Cranial Nerves: CN I - not tested.  PERRL, EOMI, VFF, no nystagmus or diplopia.  No APD.  Fundi not visualized bilaterally.  CN V1-3 intact to light touch and pinprick.  No facial asymmetry.  Hearing intact to finger rub bilaterally.  Tongue, uvula and palate midline.  Sternocleidomastoid and Trapezius intact bilaterally.    Motor:   Tone: normal.                  Strength: intact throughout  Pronator drift: none                 Dysmeria: None to finger-nose-finger or heel-shin-heel  No truncal ataxia.    Tremor: No resting, postural or action tremor.  No myoclonus.    Sensation: intact to light touch, pinprick, vibration and proprioception    Deep Tendon Reflexes: 1+ bilateral biceps, triceps, brachioradialis, knee and ankle  Toes flexor bilaterally    Gait: normal and stable.      Other:    07-20    138  |  105  |  16  ----------------------------<  138<H>  3.9   |  27  |  1.13    Ca    9.0      20 Jul 2019 07:38  Phos  3.5     07-20  Mg     1.9     07-20 07-20    138  |  105  |  16  ----------------------------<  138<H>  3.9   |  27  |  1.13    Ca    9.0      20 Jul 2019 07:38  Phos  3.5     07-20  Mg     1.9     07-20          Radiology    EKG:  tele:  TTE:  EEG:                 Please contact the Neurology consult service with any questions.    Jignesh Pimentel MD  Neurology Attending  [facility] The patient still has b/l nystagmus and vertigo upon standing  Echo is normal.    Recs:  - ABRAHAN, and potential implantable loop monitor if ABRAHAN is normal  - Follow up hypercoagulable workup  - Secondary stroke prevention  - PRN meclizine  - PT/OT for stroke recovery and vestibular therapy      NOTE TO BE COMPLETED - PLEASE REFER TO ABOVE ONLY AND IGNORE INFORMATION BELOW    Neurology Follow up note    Name  MADISON PERRY    HPI:  Patient is 54 year old male has past medical history of HTN, DM, HLD, CAD s/p CABG, CVA x2, came in after having vertigo for 3 weeks.     patient was in his usual health 3 weeks ago, when he was started to have severe vertigo with spinning sensation worsened with any movement, improved by staying still, it affected his daily activity, he was staying in the bed all day, associated with gait imbalance and he had multiple fall without head trauma or LOC. Pt has generalized weakness, on and off sob, But denies any fever, cough, ear infection, nausea, vomiting, diarrhea, chest pain, abdominal pain, new weakness or numbness of extremity, palpitation, leg swelling, exertional dyspnea.       Allergic: plavix (severe headache)  social hx: smoked 3 and half pack a day for 40 years stopped 3 months ago, denies alcohol   Fhx: Diabetes (17 Jul 2019 21:35)      Interval History -        Subjective:        MEDICATIONS  (STANDING):  aspirin enteric coated 325 milliGRAM(s) Oral daily  atorvastatin 80 milliGRAM(s) Oral at bedtime  enoxaparin Injectable 40 milliGRAM(s) SubCutaneous daily  insulin glargine Injectable (LANTUS) 40 Unit(s) SubCutaneous at bedtime  insulin lispro (HumaLOG) corrective regimen sliding scale   SubCutaneous Before meals and at bedtime  insulin lispro Injectable (HumaLOG) 6 Unit(s) SubCutaneous three times a day before meals  lisinopril 2.5 milliGRAM(s) Oral daily  metoprolol tartrate 25 milliGRAM(s) Oral two times a day    MEDICATIONS  (PRN):  glucagon  Injectable 1 milliGRAM(s) IntraMuscular once PRN Glucose <70 milliGRAM(s)/deciLiter  meclizine 25 milliGRAM(s) Oral every 8 hours PRN Dizziness/ vertigo      Allergies    Plavix (Other)    Intolerances        Review of Systems:  General: [ ] None, [ ] chills, [ ]fatigue, [ ] fevers  Skin: [ ] None, [ ] rash   HEENT: [ ] None, [ ] head injury, [ ] blurred vision, [ ] double vision, [ ] eye pain, [ ] visual loss, [ ] hearing loss, [ ] deafness, [ ] ear pain, [ ] ringing in the ears, [ ] vertigo, [ ] sinus pain, [ ] voice changes  Neck: [ ] None, [ ] neck stiffness  Respiratory: [ ] None, [ ] cough, [ ] difficulty breathing  Cardiovascular: [ ] None, [ ] calf cramps, [ ] chest pain, [ ] leg pain, [ ] swelling, [ ] rapid heart rate, [ ] shortness of breath  Gastrointestinal: [ ] None, [ ] abdominal pain, [ ] nausea, [ ] vomiting  Musculoskeletal: [ ] None, [ ] back pain, [ ] joint pain, [ ] joint stiffness, [ ] leg cramps, [ ] muscle atrophy, [ ] muscle cramps, [ ] muscle weakness, [ ] swelling of extremities  Neurological: [ ] None, [ ] Dizziness, [ ] decreased memory, [ ] fainting, [ ] focal neurological symptoms, [ ] headaches, [ ] incontinence of stool, [ ] incontinence of urine, [ ] loss of consciousness, [ ] numbness, [ ] seizures, [ ] spinning sensation, [ ] stroke, [ ] trouble walking, [ ] unsteadiness, [ ] visual changes, [ ] weakness  Psychiatric: [ ] None,  [ ] depression, [ ] anxiety, [ ] hallucinations, [ ] inability to concentrate, [ ] mood changes, [ ] panic attacks  Hematology: [ ] None,  [ ] blood clots, [ ] spontaneous bleeding      Objective:   Vital Signs Last 24 Hrs  T(C): 36.7 (20 Jul 2019 15:17), Max: 37.4 (19 Jul 2019 19:46)  T(F): 98.1 (20 Jul 2019 15:17), Max: 99.3 (19 Jul 2019 19:46)  HR: 85 (20 Jul 2019 15:17) (70 - 85)  BP: 119/69 (20 Jul 2019 15:17) (115/74 - 127/79)  BP(mean): --  RR: 18 (20 Jul 2019 15:17) (17 - 18)  SpO2: 100% (20 Jul 2019 15:17) (94% - 100%)    General Exam:   General appearance: No acute distress                 Cardiovascular: Pedal dorsalis pulses intact bilaterally    Neurological Exam:  Mental Status: Orientated to self, date and place.  Attention intact.  No dysarthria, aphasia or neglect.  Knowledge intact.  Registration intact.  Short and long term memory grossly intact.      Cranial Nerves: CN I - not tested.  PERRL, EOMI, VFF, no nystagmus or diplopia.  No APD.  Fundi not visualized bilaterally.  CN V1-3 intact to light touch and pinprick.  No facial asymmetry.  Hearing intact to finger rub bilaterally.  Tongue, uvula and palate midline.  Sternocleidomastoid and Trapezius intact bilaterally.    Motor:   Tone: normal.                  Strength: intact throughout  Pronator drift: none                 Dysmeria: None to finger-nose-finger or heel-shin-heel  No truncal ataxia.    Tremor: No resting, postural or action tremor.  No myoclonus.    Sensation: intact to light touch, pinprick, vibration and proprioception    Deep Tendon Reflexes: 1+ bilateral biceps, triceps, brachioradialis, knee and ankle  Toes flexor bilaterally    Gait: normal and stable.      Other:    07-20    138  |  105  |  16  ----------------------------<  138<H>  3.9   |  27  |  1.13    Ca    9.0      20 Jul 2019 07:38  Phos  3.5     07-20  Mg     1.9     07-20      07-20    138  |  105  |  16  ----------------------------<  138<H>  3.9   |  27  |  1.13    Ca    9.0      20 Jul 2019 07:38  Phos  3.5     07-20  Mg     1.9     07-20          Radiology    EKG:  tele:  TTE:  EEG:                 Please contact the Neurology consult service with any questions.    Jignesh Pimentel MD  Neurology Attending  [facility] Neurology Follow up note    Name  MADISON PERRY    HPI:  Patient is 54 year old male has past medical history of HTN, DM, HLD, CAD s/p CABG, CVA x2, came in after having vertigo for 3 weeks. He was in his usual health 3 weeks ago, when he started to have severe vertigo with spinning sensation worsened with any movement, improved by staying still, it affected his daily activity, he was staying in the bed all day, associated with gait imbalance and he had multiple fall without head trauma or LOC. Pt has generalized weakness, on and off sob, But denies any fever, cough, ear infection, nausea, vomiting, diarrhea, chest pain, abdominal pain, new weakness or numbness of extremity, palpitation, leg swelling, exertional dyspnea.       Neuro HPI:  54 RHM with generalized weakness and room-spinning episodes, especially when lying down, since 6/23/19.    Interval History -  The patient continues to feel dizzy with nystagmus upon attempts to stand.    MEDICATIONS  (STANDING):  aspirin enteric coated 325 milliGRAM(s) Oral daily  atorvastatin 80 milliGRAM(s) Oral at bedtime  enoxaparin Injectable 40 milliGRAM(s) SubCutaneous daily  insulin glargine Injectable (LANTUS) 40 Unit(s) SubCutaneous at bedtime  insulin lispro (HumaLOG) corrective regimen sliding scale   SubCutaneous Before meals and at bedtime  insulin lispro Injectable (HumaLOG) 6 Unit(s) SubCutaneous three times a day before meals  lisinopril 2.5 milliGRAM(s) Oral daily  metoprolol tartrate 25 milliGRAM(s) Oral two times a day    MEDICATIONS  (PRN):  glucagon  Injectable 1 milliGRAM(s) IntraMuscular once PRN Glucose <70 milliGRAM(s)/deciLiter  meclizine 25 milliGRAM(s) Oral every 8 hours PRN Dizziness/ vertigo    Allergies  Plavix (Other)    Review of Systems: Fourteen systems reviewed and negative except as in HPI / Interval History.      Objective:   Vital Signs Last 24 Hrs  T(C): 36.7 (20 Jul 2019 15:17), Max: 37.4 (19 Jul 2019 19:46)  T(F): 98.1 (20 Jul 2019 15:17), Max: 99.3 (19 Jul 2019 19:46)  HR: 85 (20 Jul 2019 15:17) (70 - 85)  BP: 119/69 (20 Jul 2019 15:17) (115/74 - 127/79)  RR: 18 (20 Jul 2019 15:17) (17 - 18)  SpO2: 100% (20 Jul 2019 15:17) (94% - 100%)    General Exam:  General: No acute distress  Respiratory: CTAB/l.  No crackles, rhonchi, or wheezes.  Cardiovascular: RRR, No murmurs, Full b/l radial and pedal pulses    Neurological Exam:  General / Mental Status: Oriented to person, place, and time.  No dysarthria or aphasia present.  Naming and repetition intact.  Cranial Nerves: PERRLA, EOMI x 2, VFF x 4.  Vertical eye movements intact.  Horizontal nystagmus present b/l.  Negative [place]-Hallpike maneuver b/l.  B/l V1-V3 equal to light touch and pinprick.  Symmetric facial movement and palate elevation.  B/l hearing equal to finger rub.  5/5 strength with b/l sternocleidomastoid and trapezius.  Midline tongue protrusion with no atrophy or fasciculations.  Motor: Normal bulk and tone in all four extremities.  5/5 strength throughout all four extremities.  No downward drift, rigidity, spasticity, or tremors in any of the four extremities.  Sensation: Diminished to light touch and pinprick at the left lower extremity.  Intact to light touch and pinprick in the right lower extremity and b/l upper extremities.  Coordination: No dysmetria with b/l finger-to-nose and heel raise tests.  Reflexes: 2+ and symmetric at b/l biceps, triceps, brachioradialis, patellae, and ankles.  Toes flexor b/l.  Gait and Romberg testing deferred per patient request.    NIHSS Score:    LOC - 0  LOC Questions - 0  LOC Commands - 0  Gaze Preference - 0  Visual Fields - 0  Facial Palsy - 0  Motor Arm Left - 0  Motor Arm Right - 0  Motor Leg Left - 0  Motor Leg Right - 0  Limb Ataxia - 0  Sensory - 10  Language - 0  Speech - 0  Extinction - 0    NIHSS Score Total: 1 -No IV tPA because patient is out of time window (LSN 3.5 weeks ago)    Modified [place] Scale: 2      Labs:    07-20    138  |  105  |  16  ----------------------------<  138<H>  3.9   |  27  |  1.13    Ca    9.0      20 Jul 2019 07:38  Phos  3.5     07-20  Mg     1.9     07-20    Hemoglobin A1C, Whole Blood (07.18.19 @ 10:04)    Hemoglobin A1C, Whole Blood: 12.3%    Lipid Profile (07.18.19 @ 06:31)    Total Cholesterol/HDL Ratio Measurement: 3.5 RATIO    Cholesterol, Serum: 146 mg/dL    Triglycerides, Serum: 75 mg/dL    HDL Cholesterol, Serum: 42 mg/dL    Direct LDL: 89 mg/dL      Neuroimaging:    CT BRAIN (07/17/2019):  - No acute intracranial abnormality  - Chronic b/l PCA territory infarcts  - Moderate chronic microvascular disease  - Diffuse atrophy    MRI BRAIN & IAC W/WO PETEY (07/18/2019):  - Chronic right occipital infarct (present in Feb. 2019)  - Subacute b/l inferior cerebellar (PICA territory) and left PCA territory infarcts (new since Feb. 2019)  - Mild chronic microvascular disease  - Diffuse atrophy  - No auditory canal abnormalities    ECHOCARDIOGRAM (07/19/2019):  - LVEF > 55%  - Mild concentric LVH  - Mild (Stage I) diastolic dysfunction  - No cardiac embolus or intracardiac shunt identified      Assessment:  54 RHM with persistent vertigo secondary to subacute b/l cerebellar infarcts, secondary to small vessel disease vs. cardioembolic source.      Recommendations:    1. Stroke workup  - Carotid ultrasound  - Implantable loop recorder to evaluate for abnormal cardiac rhythm (patient previously had a normal ABRAHAN in Feb. 2019)  - Continue elemetry  - Follow up hypercoagulable workup    2. Secondary stroke prevention  - Q4H Neurochecks  - Aspirin 325mg daily  - Atorvastatin 80mg QHS  - Treat BP if over 120/80  - Diabetes management  - PT/OT for stroke recovery, potentially to include vestibular therapy  - DVT ppx      Please contact the Neurology consult service with any questions.    Jignesh Pimentel MD  Neurology Attending  [facility] Neurology Follow up note    Name  MADISON PERRY    HPI:  Patient is 54 year old male has past medical history of HTN, DM, HLD, CAD s/p CABG, CVA x2, came in after having vertigo for 3 weeks. He was in his usual health 3 weeks ago, when he started to have severe vertigo with spinning sensation worsened with any movement, improved by staying still, it affected his daily activity, he was staying in the bed all day, associated with gait imbalance and he had multiple fall without head trauma or LOC. Pt has generalized weakness, on and off sob, But denies any fever, cough, ear infection, nausea, vomiting, diarrhea, chest pain, abdominal pain, new weakness or numbness of extremity, palpitation, leg swelling, exertional dyspnea.       Neuro HPI:  54 RHM with generalized weakness and room-spinning episodes, especially when lying down, since 6/23/19.    Interval History -  The patient continues to feel dizzy with nystagmus upon attempts to stand.    MEDICATIONS  (STANDING):  aspirin enteric coated 325 milliGRAM(s) Oral daily  atorvastatin 80 milliGRAM(s) Oral at bedtime  enoxaparin Injectable 40 milliGRAM(s) SubCutaneous daily  insulin glargine Injectable (LANTUS) 40 Unit(s) SubCutaneous at bedtime  insulin lispro (HumaLOG) corrective regimen sliding scale   SubCutaneous Before meals and at bedtime  insulin lispro Injectable (HumaLOG) 6 Unit(s) SubCutaneous three times a day before meals  lisinopril 2.5 milliGRAM(s) Oral daily  metoprolol tartrate 25 milliGRAM(s) Oral two times a day    MEDICATIONS  (PRN):  glucagon  Injectable 1 milliGRAM(s) IntraMuscular once PRN Glucose <70 milliGRAM(s)/deciLiter  meclizine 25 milliGRAM(s) Oral every 8 hours PRN Dizziness/ vertigo    Allergies  Plavix (Other)    Review of Systems: Fourteen systems reviewed and negative except as in HPI / Interval History.      Objective:   Vital Signs Last 24 Hrs  T(C): 36.7 (20 Jul 2019 15:17), Max: 37.4 (19 Jul 2019 19:46)  T(F): 98.1 (20 Jul 2019 15:17), Max: 99.3 (19 Jul 2019 19:46)  HR: 85 (20 Jul 2019 15:17) (70 - 85)  BP: 119/69 (20 Jul 2019 15:17) (115/74 - 127/79)  RR: 18 (20 Jul 2019 15:17) (17 - 18)  SpO2: 100% (20 Jul 2019 15:17) (94% - 100%)    General Exam:  General: No acute distress  Respiratory: CTAB/l.  No crackles, rhonchi, or wheezes.  Cardiovascular: RRR, No murmurs, Full b/l radial and pedal pulses    Neurological Exam:  General / Mental Status: Oriented to person, place, and time.  No dysarthria or aphasia present.  Naming and repetition intact.  Cranial Nerves: PERRLA, EOMI x 2, VFF x 4.  Vertical eye movements intact.  Horizontal nystagmus present b/l.  Negative [place]-Hallpike maneuver b/l.  B/l V1-V3 equal to light touch and pinprick.  Symmetric facial movement and palate elevation.  B/l hearing equal to finger rub.  5/5 strength with b/l sternocleidomastoid and trapezius.  Midline tongue protrusion with no atrophy or fasciculations.  Motor: Normal bulk and tone in all four extremities.  5/5 strength throughout all four extremities.  No downward drift, rigidity, spasticity, or tremors in any of the four extremities.  Sensation: Diminished to light touch and pinprick at the left lower extremity.  Intact to light touch and pinprick in the right lower extremity and b/l upper extremities.  Coordination: No dysmetria with b/l finger-to-nose and heel raise tests.  Reflexes: 2+ and symmetric at b/l biceps, triceps, brachioradialis, patellae, and ankles.  Toes flexor b/l.  Gait and Romberg testing deferred per patient request.    NIHSS Score:    LOC - 0  LOC Questions - 0  LOC Commands - 0  Gaze Preference - 0  Visual Fields - 0  Facial Palsy - 0  Motor Arm Left - 0  Motor Arm Right - 0  Motor Leg Left - 0  Motor Leg Right - 0  Limb Ataxia - 0  Sensory - 10  Language - 0  Speech - 0  Extinction - 0    NIHSS Score Total: 1 -No IV tPA because patient is out of time window (LSN 3.5 weeks ago)    Modified [place] Scale: 2      Labs:    07-20    138  |  105  |  16  ----------------------------<  138<H>  3.9   |  27  |  1.13    Ca    9.0      20 Jul 2019 07:38  Phos  3.5     07-20  Mg     1.9     07-20    Hemoglobin A1C, Whole Blood (07.18.19 @ 10:04)    Hemoglobin A1C, Whole Blood: 12.3%    Lipid Profile (07.18.19 @ 06:31)    Total Cholesterol/HDL Ratio Measurement: 3.5 RATIO    Cholesterol, Serum: 146 mg/dL    Triglycerides, Serum: 75 mg/dL    HDL Cholesterol, Serum: 42 mg/dL    Direct LDL: 89 mg/dL      Neuroimaging:    CT BRAIN (07/17/2019):  - No acute intracranial abnormality  - Chronic b/l PCA territory infarcts  - Moderate chronic microvascular disease  - Diffuse atrophy    MRI BRAIN & IAC W/WO PETEY (07/18/2019):  - Chronic right occipital infarct (present in Feb. 2019)  - Subacute b/l inferior cerebellar (PICA territory) and left PCA territory infarcts (new since Feb. 2019)  - Mild chronic microvascular disease  - Diffuse atrophy  - No auditory canal abnormalities    ECHOCARDIOGRAM (07/19/2019):  - LVEF > 55%  - Mild concentric LVH  - Mild (Stage I) diastolic dysfunction  - No cardiac embolus or intracardiac shunt identified      Assessment:  54 RHM with persistent vertigo secondary to subacute b/l cerebellar infarcts, secondary to small vessel disease vs. cardioembolic source.      Recommendations:    1. Stroke workup  - Carotid ultrasound  - Implantable loop recorder to evaluate for abnormal cardiac rhythm (patient previously had a normal ABRAHAN in Feb. 2019)  - Continue telemetry  - Follow up hypercoagulable workup    2. Secondary stroke prevention  - Q4H Neurochecks  - Aspirin 325mg daily  - Atorvastatin 80mg QHS  - Treat BP if over 120/80  - Diabetes management  - PT/OT for stroke recovery, potentially to include vestibular therapy  - DVT ppx      Please contact the Neurology consult service with any questions.    Jignesh Pimentel MD  Neurology Attending  [facility]

## 2019-07-20 NOTE — PROGRESS NOTE ADULT - SUBJECTIVE AND OBJECTIVE BOX
Interval Events:  pt in nad    Allergies    Plavix (Other)    Intolerances      Endocrine/Metabolic Medications:  atorvastatin 80 milliGRAM(s) Oral at bedtime  glucagon  Injectable 1 milliGRAM(s) IntraMuscular once PRN  insulin glargine Injectable (LANTUS) 40 Unit(s) SubCutaneous at bedtime  insulin lispro (HumaLOG) corrective regimen sliding scale   SubCutaneous Before meals and at bedtime  insulin lispro Injectable (HumaLOG) 6 Unit(s) SubCutaneous three times a day before meals      Vital Signs Last 24 Hrs  T(C): 36.6 (20 Jul 2019 19:13), Max: 36.9 (20 Jul 2019 11:05)  T(F): 97.9 (20 Jul 2019 19:13), Max: 98.5 (20 Jul 2019 11:05)  HR: 85 (20 Jul 2019 19:13) (70 - 85)  BP: 114/72 (20 Jul 2019 19:13) (114/72 - 127/79)  BP(mean): --  RR: 18 (20 Jul 2019 19:13) (17 - 18)  SpO2: 95% (20 Jul 2019 19:13) (94% - 100%)      PHYSICAL EXAM  All physical exam findings normal, except those marked:  General:	Alert, active, cooperative, NAD, well hydrated  .		[] Abnormal:  Neck		Normal: supple, no cervical adenopathy, no palpable thyroid  .		[] Abnormal:  Cardiovascular	Normal: regular rate, normal S1, S2, no murmurs  .		[] Abnormal:  Respiratory	Normal: no chest wall deformity, normal respiratory pattern, CTA B/L  .		[] Abnormal:  Abdominal	Normal: soft, ND, NT, bowel sounds present, no masses, no organomegaly  .		[] Abnormal:  		Normal normal genitalia, testes descended, circumcised/uncircumcised  .		Lupe stage:			Breast lupe:  .		Menstrual history:  .		[] Abnormal:  Extremities	Normal: FROM x4  .		[] Abnormal:  Skin		Normal: intact and not indurated, no rash, no acanthosis nigricans  .		[] Abnormal:  Neurologic	Normal: grossly intact  .		[] Abnormal:    LABS                        13.7   5.00  )-----------( 213      ( 20 Jul 2019 07:38 )             40.2                               138    |  105    |  16                  Calcium: 9.0   / iCa: x      (07-20 @ 07:38)    ----------------------------<  138       Magnesium: 1.9                              3.9     |  27     |  1.13             Phosphorous: 3.5        CAPILLARY BLOOD GLUCOSE      POCT Blood Glucose.: 271 mg/dL (20 Jul 2019 21:15)  POCT Blood Glucose.: 421 mg/dL (20 Jul 2019 16:57)  POCT Blood Glucose.: 185 mg/dL (20 Jul 2019 11:38)  POCT Blood Glucose.: 134 mg/dL (20 Jul 2019 08:00)        Assesment/plan    Dm- with swings  cont lantus 40 units   change humalog to 8 ac tid   fsg ac and hs  compliance with diet d/w pt    CVA- f/u neuro recs

## 2019-07-20 NOTE — PROGRESS NOTE ADULT - SUBJECTIVE AND OBJECTIVE BOX
MADISON PERRY  MR# [id number]  54yMale        Patient is a 54y old  Male who presents with a chief complaint of vertigo/ Dizziness (20 Jul 2019 21:24)      INTERVAL HPI/OVERNIGHT EVENTS:  Patient seen and examined at bedside. No notations of chest pain, palpitation, SOB, orthopnea, nausea, vomiting or abdominal pain.    ALLERGIES  Plavix (Other)      MEDICATIONS  aspirin enteric coated 325 milliGRAM(s) Oral daily  atorvastatin 80 milliGRAM(s) Oral at bedtime  enoxaparin Injectable 40 milliGRAM(s) SubCutaneous daily  glucagon  Injectable 1 milliGRAM(s) IntraMuscular once PRN Glucose <70 milliGRAM(s)/deciLiter  insulin glargine Injectable (LANTUS) 40 Unit(s) SubCutaneous at bedtime  insulin lispro (HumaLOG) corrective regimen sliding scale   SubCutaneous Before meals and at bedtime  insulin lispro Injectable (HumaLOG) 8 Unit(s) SubCutaneous three times a day before meals  lisinopril 2.5 milliGRAM(s) Oral daily  meclizine 25 milliGRAM(s) Oral every 8 hours PRN Dizziness/ vertigo  metoprolol tartrate 25 milliGRAM(s) Oral two times a day              REVIEW OF SYSTEMS:  CONSTITUTIONAL: No fever, weight loss, or fatigue  EYES: No eye pain, visual disturbances, or discharge  ENT:  No difficulty hearing, tinnitus, vertigo; No sinus or throat pain  NECK: No pain or stiffness  RESPIRATORY: No cough, wheezing, chills or hemoptysis; No Shortness of Breath  CARDIOVASCULAR: No chest pain, palpitations, passing out, dizziness, or leg swelling  GASTROINTESTINAL: No abdominal or epigastric pain. No nausea, vomiting, or hematemesis; No diarrhea or constipation. No melena or hematochezia.  GENITOURINARY: No dysuria, frequency, hematuria, or incontinence  NEUROLOGICAL: No headaches, memory loss, loss of strength, numbness, or tremors  SKIN: No itching, burning, rashes, or lesions   LYMPH Nodes: No enlarged glands  ENDOCRINE: No heat or cold intolerance; No hair loss  MUSCULOSKELETAL: No joint pain or swelling; No muscle, back, or extremity pain  PSYCHIATRIC: No depression, anxiety, mood swings, or difficulty sleeping  HEME/LYMPH: No easy bruising, or bleeding gums  ALLERGY AND IMMUNOLOGIC: No hives or eczema	    [ ] All others negative	  [ ] Unable to obtain      T(C): 36.7 (07-20-19 @ 23:06), Max: 36.9 (07-20-19 @ 11:05)  T(F): 98 (07-20-19 @ 23:06), Max: 98.5 (07-20-19 @ 11:05)  HR: 79 (07-20-19 @ 23:06) (70 - 85)  BP: 150/73 (07-20-19 @ 23:06) (114/72 - 150/73)  RR: 18 (07-20-19 @ 23:06) (17 - 18)  SpO2: 96% (07-20-19 @ 23:06) (94% - 100%)  Wt(kg): --    I&O's Summary    19 Jul 2019 07:01  -  20 Jul 2019 07:00  --------------------------------------------------------  IN: 75 mL / OUT: 1450 mL / NET: -1375 mL    20 Jul 2019 07:01  -  20 Jul 2019 23:15  --------------------------------------------------------  IN: 0 mL / OUT: 1100 mL / NET: -1100 mL          PHYSICAL EXAM:  A X O x  HEAD:  Atraumatic, Normocephalic  EYES: EOMI, PERRLA, conjunctiva and sclera clear  NECK: Supple, No JVD, Normal thyroid  Resp: CTAB, No crackles, wheezing,   CVS: Regular rate and rhythm; No discernable murmurs, rubs, or gallops  ABD: Soft, Nontender, Nondistended; Bowel sounds present  EXTREMITIES:  2+ Peripheral Pulses, No edema  LYMPH: No dicernable lymphadenopathy noted  GENERAL: NAD, well-groomed, well-developed      LABS:                        13.7   5.00  )-----------( 213      ( 20 Jul 2019 07:38 )             40.2     07-20    138  |  105  |  16  ----------------------------<  138<H>  3.9   |  27  |  1.13    Ca    9.0      20 Jul 2019 07:38  Phos  3.5     07-20  Mg     1.9     07-20          CAPILLARY BLOOD GLUCOSE      POCT Blood Glucose.: 271 mg/dL (20 Jul 2019 21:15)  POCT Blood Glucose.: 421 mg/dL (20 Jul 2019 16:57)  POCT Blood Glucose.: 185 mg/dL (20 Jul 2019 11:38)  POCT Blood Glucose.: 134 mg/dL (20 Jul 2019 08:00)      Troponins:  ProBNP:  Lipid Profile:   HgA1c:  TSH:           RADIOLOGY & ADDITIONAL TESTS:    Imaging Personally Reviewed:  [ ] YES  [ ] NO      Consultant(s) Notes Reviewed:  [x ] YES  [ ] NO    Care Discussed with Consultants/Other Providers [ x] YES  [ ] NO          PAST MEDICAL & SURGICAL HISTORY:  Cerebrovascular accident (CVA), unspecified mechanism  Congestive heart failure, NYHA class 3, chronic, combined  Cerebrovascular accident (CVA), unspecified mechanism  Hyperlipidemia, unspecified hyperlipidemia type  Coronary artery disease involving native heart without angina pectoris, unspecified vessel or lesion type  Hypertension, unspecified type  Diabetes mellitus of other type without complication  S/P coronary artery stent placement  History of appendectomy  S/P CABG x 1        Hereditary ataxia  FH: CAD (coronary artery disease)  No pertinent family history in first degree relatives  Handoff  MEWS Score  Cerebrovascular accident (CVA), unspecified mechanism  Congestive heart failure, NYHA class 3, chronic, combined  Cerebrovascular accident (CVA), unspecified mechanism  Hyperlipidemia, unspecified hyperlipidemia type  Coronary artery disease involving native heart without angina pectoris, unspecified vessel or lesion type  Hypertension, unspecified type  Diabetes mellitus of other type without complication  Cerebellar ataxia  DM (diabetes mellitus)  UTI (urinary tract infection)  Need for prophylactic measure  HTN (hypertension)  CAD (coronary artery disease)  CVA (cerebral vascular accident)  Vertigo  S/P coronary artery stent placement  History of appendectomy  S/P CABG x 1  A) WEAKNESS  DIZZINESS  23

## 2019-07-20 NOTE — PROGRESS NOTE ADULT - SUBJECTIVE AND OBJECTIVE BOX
CHIEF COMPLAINT:Patient is a 54y old  Male who presents with a chief complaint of vertigo/ Dizziness.Pt appears comfortable.    	  REVIEW OF SYSTEMS:  CONSTITUTIONAL: No fever, weight loss, or fatigue  EYES: No eye pain, visual disturbances, or discharge  ENT:  No difficulty hearing, tinnitus, vertigo; No sinus or throat pain  NECK: No pain or stiffness  RESPIRATORY: No cough, wheezing, chills or hemoptysis; No Shortness of Breath  CARDIOVASCULAR: No chest pain, palpitations, passing out, dizziness, or leg swelling  GASTROINTESTINAL: No abdominal or epigastric pain. No nausea, vomiting, or hematemesis; No diarrhea or constipation. No melena or hematochezia.  GENITOURINARY: No dysuria, frequency, hematuria, or incontinence  NEUROLOGICAL: No headaches, memory loss, loss of strength, numbness, or tremors  SKIN: No itching, burning, rashes, or lesions   LYMPH Nodes: No enlarged glands  ENDOCRINE: No heat or cold intolerance; No hair loss  MUSCULOSKELETAL: No joint pain or swelling; No muscle, back, or extremity pain  PSYCHIATRIC: No depression, anxiety, mood swings, or difficulty sleeping  HEME/LYMPH: No easy bruising, or bleeding gums  ALLERGY AND IMMUNOLOGIC: No hives or eczema	    PHYSICAL EXAM:  T(C): 36.6 (07-20-19 @ 07:05), Max: 37.4 (07-19-19 @ 19:46)  HR: 78 (07-20-19 @ 05:09) (65 - 78)  BP: 127/79 (07-20-19 @ 05:09) (125/87 - 144/71)  RR: 18 (07-20-19 @ 07:05) (17 - 18)  SpO2: 98% (07-20-19 @ 07:05) (98% - 100%)  Wt(kg): --  I&O's Summary    19 Jul 2019 07:01  -  20 Jul 2019 07:00  --------------------------------------------------------  IN: 75 mL / OUT: 1450 mL / NET: -1375 mL        Appearance: Normal	  HEENT:   Normal oral mucosa, PERRL, EOMI	  Lymphatic: No lymphadenopathy  Cardiovascular: Normal S1 S2, No JVD, No murmurs, No edema  Respiratory: Lungs clear to auscultation	  Psychiatry: A & O x 3, Mood & affect appropriate  Gastrointestinal:  Soft, Non-tender, + BS	  Skin: No rashes, No ecchymoses, No cyanosis	  Neurologic: Non-focal  Extremities: Normal range of motion, No clubbing, cyanosis or edema  Vascular: Peripheral pulses palpable 2+ bilaterally    MEDICATIONS  (STANDING):  aspirin enteric coated 325 milliGRAM(s) Oral daily  atorvastatin 80 milliGRAM(s) Oral at bedtime  enoxaparin Injectable 40 milliGRAM(s) SubCutaneous daily  insulin glargine Injectable (LANTUS) 40 Unit(s) SubCutaneous at bedtime  insulin lispro (HumaLOG) corrective regimen sliding scale   SubCutaneous Before meals and at bedtime  insulin lispro Injectable (HumaLOG) 6 Unit(s) SubCutaneous three times a day before meals  lisinopril 2.5 milliGRAM(s) Oral daily  metoprolol tartrate 25 milliGRAM(s) Oral two times a day      TELEMETRY: 	NSR with pac's,pvc's      	  LABS:	 	                       13.7   5.00  )-----------( 213      ( 20 Jul 2019 07:38 )             40.2     07-20    138  |  105  |  16  ----------------------------<  138<H>  3.9   |  27  |  1.13    Ca    9.0      20 Jul 2019 07:38  Phos  3.5     07-20  Mg     1.9     07-20      proBNP: Serum Pro-Brain Natriuretic Peptide: 325 pg/mL (07-17 @ 12:05)    Lipid Profile: Cholesterol 146  LDL 89  HDL 42  TG 75    HgA1c: Hemoglobin A1C, Whole Blood: 12.3 % (07-18 @ 10:04)    TSH: Thyroid Stimulating Hormone, Serum: 0.72 uU/mL (07-18 @ 06:31)      	  CONCLUSIONS:  1. Normal mitral valve. Trace mitral regurgitation.  2. Normal trileaflet aortic valve. No aortic stenosis. No  aortic valve regurgitation seen.  3. Normal aortic root. No atheroma is seen in the  descending aorta and aortic arch.  4. No left atrial or left atrial appendage thrombus.  5. Moderate to severe global left ventricular systolic  dysfunction. The mid inferior and inferolateral walls are  hypokinetic.  6. No clot seen in right atrium.  7. Normal right ventricular size and function.  8. Normal tricuspid valve. No tricuspid regurgitation.  9. Atrial septum appears intact on 2D echo and color  Doppler. Agitated saline injection was negative for  intracardiac shunt.  10. No pericardial effusion.  11. No cardiac source of embolus is identified.    *** Compared with echocardiogram of 2/8/2019, a ABRAHAN was  performed and additional informationprovided.    ------------------------------------------------------------------------  Confirmed on  2/13/2019 - 11:59:34 by Ike Jimenez MD  ------------------------------------------------------------------------        OBSERVATIONS:  Mitral Valve: Normal mitral valve. No mitral regurgitation  is noted.  Aortic Root: Normal aortic root.  Aortic Valve: Normal trileaflet aortic valve.  Left Atrium: Normal left atrium.  LA volume index = 34  cc/m2.  Left Ventricle: Normal left ventricular systolic function.  Mild diastolic dysfunction (stage I). Moderate concentric  left ventricular hypertrophy.  Right Heart: Normal right atrium. Normal right ventricular  size and function. There is trace tricuspid regurgitation.  There is trace pulmonic regurgitation.  Pericardium/PleuraNormal pericardium with no pericardial  effusion.  Hemodynamic: Incomplete tricuspid regurgitation jet  precludes accurate assessment of pulmonary artery systolic  pressure. Agitated saline injection was negative for  intracardiac shunt.

## 2019-07-21 LAB
ANION GAP SERPL CALC-SCNC: 7 MMOL/L — SIGNIFICANT CHANGE UP (ref 5–17)
AT III ACT/NOR PPP CHRO: 115 % — SIGNIFICANT CHANGE UP (ref 85–135)
BUN SERPL-MCNC: 24 MG/DL — HIGH (ref 7–18)
CALCIUM SERPL-MCNC: 8.5 MG/DL — SIGNIFICANT CHANGE UP (ref 8.4–10.5)
CHLORIDE SERPL-SCNC: 105 MMOL/L — SIGNIFICANT CHANGE UP (ref 96–108)
CO2 SERPL-SCNC: 25 MMOL/L — SIGNIFICANT CHANGE UP (ref 22–31)
CREAT SERPL-MCNC: 1.23 MG/DL — SIGNIFICANT CHANGE UP (ref 0.5–1.3)
GLUCOSE BLDC GLUCOMTR-MCNC: 204 MG/DL — HIGH (ref 70–99)
GLUCOSE BLDC GLUCOMTR-MCNC: 205 MG/DL — HIGH (ref 70–99)
GLUCOSE BLDC GLUCOMTR-MCNC: 255 MG/DL — HIGH (ref 70–99)
GLUCOSE BLDC GLUCOMTR-MCNC: 290 MG/DL — HIGH (ref 70–99)
GLUCOSE SERPL-MCNC: 281 MG/DL — HIGH (ref 70–99)
HCT VFR BLD CALC: 39.1 % — SIGNIFICANT CHANGE UP (ref 39–50)
HGB BLD-MCNC: 13.4 G/DL — SIGNIFICANT CHANGE UP (ref 13–17)
MAGNESIUM SERPL-MCNC: 1.9 MG/DL — SIGNIFICANT CHANGE UP (ref 1.6–2.6)
MCHC RBC-ENTMCNC: 30 PG — SIGNIFICANT CHANGE UP (ref 27–34)
MCHC RBC-ENTMCNC: 34.3 GM/DL — SIGNIFICANT CHANGE UP (ref 32–36)
MCV RBC AUTO: 87.7 FL — SIGNIFICANT CHANGE UP (ref 80–100)
NRBC # BLD: 0 /100 WBCS — SIGNIFICANT CHANGE UP (ref 0–0)
PHOSPHATE SERPL-MCNC: 3.4 MG/DL — SIGNIFICANT CHANGE UP (ref 2.5–4.5)
PLATELET # BLD AUTO: 195 K/UL — SIGNIFICANT CHANGE UP (ref 150–400)
POTASSIUM SERPL-MCNC: 4.1 MMOL/L — SIGNIFICANT CHANGE UP (ref 3.5–5.3)
POTASSIUM SERPL-SCNC: 4.1 MMOL/L — SIGNIFICANT CHANGE UP (ref 3.5–5.3)
PROT C ACT/NOR PPP: 79 % — SIGNIFICANT CHANGE UP (ref 74–150)
RBC # BLD: 4.46 M/UL — SIGNIFICANT CHANGE UP (ref 4.2–5.8)
RBC # FLD: 12.2 % — SIGNIFICANT CHANGE UP (ref 10.3–14.5)
SODIUM SERPL-SCNC: 137 MMOL/L — SIGNIFICANT CHANGE UP (ref 135–145)
WBC # BLD: 5.22 K/UL — SIGNIFICANT CHANGE UP (ref 3.8–10.5)
WBC # FLD AUTO: 5.22 K/UL — SIGNIFICANT CHANGE UP (ref 3.8–10.5)

## 2019-07-21 PROCEDURE — 99233 SBSQ HOSP IP/OBS HIGH 50: CPT

## 2019-07-21 RX ADMIN — Medication 2: at 17:12

## 2019-07-21 RX ADMIN — Medication 3: at 08:17

## 2019-07-21 RX ADMIN — Medication 8 UNIT(S): at 17:12

## 2019-07-21 RX ADMIN — ATORVASTATIN CALCIUM 80 MILLIGRAM(S): 80 TABLET, FILM COATED ORAL at 21:24

## 2019-07-21 RX ADMIN — Medication 3: at 12:00

## 2019-07-21 RX ADMIN — Medication 2: at 21:24

## 2019-07-21 RX ADMIN — INSULIN GLARGINE 40 UNIT(S): 100 INJECTION, SOLUTION SUBCUTANEOUS at 21:24

## 2019-07-21 RX ADMIN — ENOXAPARIN SODIUM 40 MILLIGRAM(S): 100 INJECTION SUBCUTANEOUS at 12:00

## 2019-07-21 RX ADMIN — LISINOPRIL 2.5 MILLIGRAM(S): 2.5 TABLET ORAL at 05:31

## 2019-07-21 RX ADMIN — Medication 8 UNIT(S): at 08:18

## 2019-07-21 RX ADMIN — Medication 25 MILLIGRAM(S): at 05:31

## 2019-07-21 RX ADMIN — Medication 25 MILLIGRAM(S): at 17:12

## 2019-07-21 RX ADMIN — Medication 8 UNIT(S): at 12:01

## 2019-07-21 RX ADMIN — Medication 325 MILLIGRAM(S): at 12:00

## 2019-07-21 NOTE — PROGRESS NOTE ADULT - SUBJECTIVE AND OBJECTIVE BOX
CHIEF COMPLAINT:Patient is a 54y old  Male who presents with a chief complaint of vertigo/ Dizziness .Pt appears comfortable.    	  REVIEW OF SYSTEMS:  CONSTITUTIONAL: No fever, weight loss, or fatigue  EYES: No eye pain, visual disturbances, or discharge  ENT:  No difficulty hearing, tinnitus, vertigo; No sinus or throat pain  NECK: No pain or stiffness  RESPIRATORY: No cough, wheezing, chills or hemoptysis; No Shortness of Breath  CARDIOVASCULAR: No chest pain, palpitations, passing out, dizziness, or leg swelling  GASTROINTESTINAL: No abdominal or epigastric pain. No nausea, vomiting, or hematemesis; No diarrhea or constipation. No melena or hematochezia.  GENITOURINARY: No dysuria, frequency, hematuria, or incontinence  NEUROLOGICAL: No headaches, memory loss, loss of strength, numbness, or tremors  SKIN: No itching, burning, rashes, or lesions   LYMPH Nodes: No enlarged glands  ENDOCRINE: No heat or cold intolerance; No hair loss  MUSCULOSKELETAL: No joint pain or swelling; No muscle, back, or extremity pain  PSYCHIATRIC: No depression, anxiety, mood swings, or difficulty sleeping  HEME/LYMPH: No easy bruising, or bleeding gums  ALLERGY AND IMMUNOLOGIC: No hives or eczema	      PHYSICAL EXAM:  T(C): 36.8 (07-21-19 @ 08:14), Max: 36.9 (07-20-19 @ 11:05)  HR: 81 (07-21-19 @ 04:29) (70 - 85)  BP: 126/87 (07-21-19 @ 04:29) (114/72 - 150/73)  RR: 18 (07-21-19 @ 04:29) (18 - 18)  SpO2: 100% (07-21-19 @ 04:29) (94% - 100%)  Wt(kg): --  I&O's Summary    20 Jul 2019 07:01  -  21 Jul 2019 07:00  --------------------------------------------------------  IN: 0 mL / OUT: 1100 mL / NET: -1100 mL        Appearance: Normal	  HEENT:   Normal oral mucosa, PERRL, EOMI	  Lymphatic: No lymphadenopathy  Cardiovascular: Normal S1 S2, No JVD, No murmurs, No edema  Respiratory: Lungs clear to auscultation	  Psychiatry: A & O x 3, Mood & affect appropriate  Gastrointestinal:  Soft, Non-tender, + BS	  Skin: No rashes, No ecchymoses, No cyanosis	  Neurologic: Non-focal  Extremities: Normal range of motion, No clubbing, cyanosis or edema  Vascular: Peripheral pulses palpable 2+ bilaterally    MEDICATIONS  (STANDING):  aspirin enteric coated 325 milliGRAM(s) Oral daily  atorvastatin 80 milliGRAM(s) Oral at bedtime  enoxaparin Injectable 40 milliGRAM(s) SubCutaneous daily  insulin glargine Injectable (LANTUS) 40 Unit(s) SubCutaneous at bedtime  insulin lispro (HumaLOG) corrective regimen sliding scale   SubCutaneous Before meals and at bedtime  insulin lispro Injectable (HumaLOG) 8 Unit(s) SubCutaneous three times a day before meals  lisinopril 2.5 milliGRAM(s) Oral daily  metoprolol tartrate 25 milliGRAM(s) Oral two times a day      TELEMETRY: 	  nsr    	  LABS:	 	                        13.4   5.22  )-----------( 195      ( 21 Jul 2019 07:38 )             39.1     07-21    137  |  105  |  24<H>  ----------------------------<  281<H>  4.1   |  25  |  1.23    Ca    8.5      21 Jul 2019 07:38  Phos  3.4     07-21  Mg     1.9     07-21      proBNP: Serum Pro-Brain Natriuretic Peptide: 325 pg/mL (07-17 @ 12:05)    Lipid Profile: Cholesterol 146  LDL 89  HDL 42  TG 75    HgA1c: Hemoglobin A1C, Whole Blood: 12.3 % (07-18 @ 10:04)    TSH: Thyroid Stimulating Hormone, Serum: 0.72 uU/mL (07-18 @ 06:31)

## 2019-07-21 NOTE — PROGRESS NOTE ADULT - SUBJECTIVE AND OBJECTIVE BOX
PGY 1 Note discussed with supervising resident and primary attending    Patient is a 54y old  Male who presents with a chief complaint of vertigo/ Dizziness (21 Jul 2019 09:34)      INTERVAL HPI/OVERNIGHT EVENTS: Pt is laying on bed comfortably.    MEDICATIONS  (STANDING):  aspirin enteric coated 325 milliGRAM(s) Oral daily  atorvastatin 80 milliGRAM(s) Oral at bedtime  enoxaparin Injectable 40 milliGRAM(s) SubCutaneous daily  insulin glargine Injectable (LANTUS) 40 Unit(s) SubCutaneous at bedtime  insulin lispro (HumaLOG) corrective regimen sliding scale   SubCutaneous Before meals and at bedtime  insulin lispro Injectable (HumaLOG) 8 Unit(s) SubCutaneous three times a day before meals  lisinopril 2.5 milliGRAM(s) Oral daily  metoprolol tartrate 25 milliGRAM(s) Oral two times a day    MEDICATIONS  (PRN):  glucagon  Injectable 1 milliGRAM(s) IntraMuscular once PRN Glucose <70 milliGRAM(s)/deciLiter  meclizine 25 milliGRAM(s) Oral every 8 hours PRN Dizziness/ vertigo      __________________________________________________  REVIEW OF SYSTEMS:  Patient denies cough, sputum production, fevers/chills/nausea/vomiting. No diarrhea, abdominal pain.      Vital Signs Last 24 Hrs  T(C): 36.6 (21 Jul 2019 12:15), Max: 36.8 (21 Jul 2019 04:29)  T(F): 97.9 (21 Jul 2019 12:15), Max: 98.3 (21 Jul 2019 04:29)  HR: 67 (21 Jul 2019 12:15) (67 - 85)  BP: 121/65 (21 Jul 2019 12:15) (114/72 - 150/73)  BP(mean): --  RR: 17 (21 Jul 2019 12:15) (17 - 18)  SpO2: 100% (21 Jul 2019 12:15) (95% - 100%)    ________________________________________________  PHYSICAL EXAM:  GENERAL: NAD  HEENT: Normocephalic;  conjunctivae and sclerae clear; moist mucous membranes;   NECK : supple  CHEST/LUNG: Clear to auscultation bilaterally with good air entry   HEART: S1 S2  regular; no murmurs, gallops or rubs  ABDOMEN: Soft, Nontender, Nondistended; Bowel sounds present  EXTREMITIES: no cyanosis; no edema; no calf tenderness  SKIN: warm and dry; no rash  NERVOUS SYSTEM:  weakness on left side_________________________________________________  LABS:                        13.4   5.22  )-----------( 195      ( 21 Jul 2019 07:38 )             39.1     07-21    137  |  105  |  24<H>  ----------------------------<  281<H>  4.1   |  25  |  1.23    Ca    8.5      21 Jul 2019 07:38  Phos  3.4     07-21  Mg     1.9     07-21          CAPILLARY BLOOD GLUCOSE      POCT Blood Glucose.: 255 mg/dL (21 Jul 2019 11:43)  POCT Blood Glucose.: 290 mg/dL (21 Jul 2019 08:09)  POCT Blood Glucose.: 271 mg/dL (20 Jul 2019 21:15)  POCT Blood Glucose.: 421 mg/dL (20 Jul 2019 16:57)

## 2019-07-21 NOTE — PROGRESS NOTE ADULT - SUBJECTIVE AND OBJECTIVE BOX
The patient still has b/l nystagmus and vertigo upon standing  Echo is normal. ABRAHAN was previously done earlier this year and was normal    Recs:  - Follow up hypercoagulable workup  - Plan for implantable loop monitor  - Secondary stroke prevention  - PRN meclizine  - PT/OT for stroke recovery and vestibular therapy      NOTE TO BE COMPLETED - PLEASE REFER TO ABOVE ONLY AND IGNORE INFORMATION BELOW    Neurology Follow up note    Name  MADISON PERRY    HPI:  Patient is 54 year old male has past medical history of HTN, DM, HLD, CAD s/p CABG, CVA x2, came in after having vertigo for 3 weeks.     patient was in his usual health 3 weeks ago, when he was started to have severe vertigo with spinning sensation worsened with any movement, improved by staying still, it affected his daily activity, he was staying in the bed all day, associated with gait imbalance and he had multiple fall without head trauma or LOC. Pt has generalized weakness, on and off sob, But denies any fever, cough, ear infection, nausea, vomiting, diarrhea, chest pain, abdominal pain, new weakness or numbness of extremity, palpitation, leg swelling, exertional dyspnea.       Allergic: plavix (severe headache)  social hx: smoked 3 and half pack a day for 40 years stopped 3 months ago, denies alcohol   Fhx: Diabetes (17 Jul 2019 21:35)      Interval History -        Subjective:        MEDICATIONS  (STANDING):  aspirin enteric coated 325 milliGRAM(s) Oral daily  atorvastatin 80 milliGRAM(s) Oral at bedtime  enoxaparin Injectable 40 milliGRAM(s) SubCutaneous daily  insulin glargine Injectable (LANTUS) 40 Unit(s) SubCutaneous at bedtime  insulin lispro (HumaLOG) corrective regimen sliding scale   SubCutaneous Before meals and at bedtime  insulin lispro Injectable (HumaLOG) 8 Unit(s) SubCutaneous three times a day before meals  lisinopril 2.5 milliGRAM(s) Oral daily  metoprolol tartrate 25 milliGRAM(s) Oral two times a day    MEDICATIONS  (PRN):  glucagon  Injectable 1 milliGRAM(s) IntraMuscular once PRN Glucose <70 milliGRAM(s)/deciLiter  meclizine 25 milliGRAM(s) Oral every 8 hours PRN Dizziness/ vertigo      Allergies    Plavix (Other)    Intolerances        Review of Systems:  General: [ ] None, [ ] chills, [ ]fatigue, [ ] fevers  Skin: [ ] None, [ ] rash   HEENT: [ ] None, [ ] head injury, [ ] blurred vision, [ ] double vision, [ ] eye pain, [ ] visual loss, [ ] hearing loss, [ ] deafness, [ ] ear pain, [ ] ringing in the ears, [ ] vertigo, [ ] sinus pain, [ ] voice changes  Neck: [ ] None, [ ] neck stiffness  Respiratory: [ ] None, [ ] cough, [ ] difficulty breathing  Cardiovascular: [ ] None, [ ] calf cramps, [ ] chest pain, [ ] leg pain, [ ] swelling, [ ] rapid heart rate, [ ] shortness of breath  Gastrointestinal: [ ] None, [ ] abdominal pain, [ ] nausea, [ ] vomiting  Musculoskeletal: [ ] None, [ ] back pain, [ ] joint pain, [ ] joint stiffness, [ ] leg cramps, [ ] muscle atrophy, [ ] muscle cramps, [ ] muscle weakness, [ ] swelling of extremities  Neurological: [ ] None, [ ] Dizziness, [ ] decreased memory, [ ] fainting, [ ] focal neurological symptoms, [ ] headaches, [ ] incontinence of stool, [ ] incontinence of urine, [ ] loss of consciousness, [ ] numbness, [ ] seizures, [ ] spinning sensation, [ ] stroke, [ ] trouble walking, [ ] unsteadiness, [ ] visual changes, [ ] weakness  Psychiatric: [ ] None,  [ ] depression, [ ] anxiety, [ ] hallucinations, [ ] inability to concentrate, [ ] mood changes, [ ] panic attacks  Hematology: [ ] None,  [ ] blood clots, [ ] spontaneous bleeding      Objective:   Vital Signs Last 24 Hrs  T(C): 36.6 (21 Jul 2019 19:38), Max: 37.1 (21 Jul 2019 16:26)  T(F): 97.9 (21 Jul 2019 19:38), Max: 98.7 (21 Jul 2019 16:26)  HR: 73 (21 Jul 2019 19:38) (67 - 81)  BP: 121/80 (21 Jul 2019 19:38) (118/72 - 150/73)  BP(mean): --  RR: 18 (21 Jul 2019 19:38) (17 - 18)  SpO2: 100% (21 Jul 2019 19:38) (96% - 100%)    General Exam:   General appearance: No acute distress                 Cardiovascular: Pedal dorsalis pulses intact bilaterally    Neurological Exam:  Mental Status: Orientated to self, date and place.  Attention intact.  No dysarthria, aphasia or neglect.  Knowledge intact.  Registration intact.  Short and long term memory grossly intact.      Cranial Nerves: CN I - not tested.  PERRL, EOMI, VFF, no nystagmus or diplopia.  No APD.  Fundi not visualized bilaterally.  CN V1-3 intact to light touch and pinprick.  No facial asymmetry.  Hearing intact to finger rub bilaterally.  Tongue, uvula and palate midline.  Sternocleidomastoid and Trapezius intact bilaterally.    Motor:   Tone: normal.                  Strength: intact throughout  Pronator drift: none                 Dysmeria: None to finger-nose-finger or heel-shin-heel  No truncal ataxia.    Tremor: No resting, postural or action tremor.  No myoclonus.    Sensation: intact to light touch, pinprick, vibration and proprioception    Deep Tendon Reflexes: 1+ bilateral biceps, triceps, brachioradialis, knee and ankle  Toes flexor bilaterally    Gait: normal and stable.      Other:    07-21    137  |  105  |  24<H>  ----------------------------<  281<H>  4.1   |  25  |  1.23    Ca    8.5      21 Jul 2019 07:38  Phos  3.4     07-21  Mg     1.9     07-21 07-21    137  |  105  |  24<H>  ----------------------------<  281<H>  4.1   |  25  |  1.23    Ca    8.5      21 Jul 2019 07:38  Phos  3.4     07-21  Mg     1.9 07-21          Radiology    EKG:  tele:  TTE:  EEG:                 Please contact the Neurology consult service with any questions.    Jignesh Pimentel MD  Neurology Attending  [facility] Neurology Follow up note    Name  MADISON PERRY    HPI:  Patient is 54 year old male has past medical history of HTN, DM, HLD, CAD s/p CABG, CVA x2, came in after having vertigo for 3 weeks. He was in his usual health 3 weeks ago, when he started to have severe vertigo with spinning sensation worsened with any movement, improved by staying still, it affected his daily activity, he was staying in the bed all day, associated with gait imbalance and he had multiple fall without head trauma or LOC. Pt has generalized weakness, on and off sob, But denies any fever, cough, ear infection, nausea, vomiting, diarrhea, chest pain, abdominal pain, new weakness or numbness of extremity, palpitation, leg swelling, exertional dyspnea.       Neuro HPI:  54 RHM with generalized weakness and room-spinning episodes, especially when lying down, since 6/23/19.    Interval History -  The patient still has b/l nystagmus and vertigo upon standing    MEDICATIONS  (STANDING):  aspirin enteric coated 325 milliGRAM(s) Oral daily  atorvastatin 80 milliGRAM(s) Oral at bedtime  enoxaparin Injectable 40 milliGRAM(s) SubCutaneous daily  insulin glargine Injectable (LANTUS) 40 Unit(s) SubCutaneous at bedtime  insulin lispro (HumaLOG) corrective regimen sliding scale   SubCutaneous Before meals and at bedtime  insulin lispro Injectable (HumaLOG) 8 Unit(s) SubCutaneous three times a day before meals  lisinopril 2.5 milliGRAM(s) Oral daily  metoprolol tartrate 25 milliGRAM(s) Oral two times a day    MEDICATIONS  (PRN):  glucagon  Injectable 1 milliGRAM(s) IntraMuscular once PRN Glucose <70 milliGRAM(s)/deciLiter  meclizine 25 milliGRAM(s) Oral every 8 hours PRN Dizziness/ vertigo    Allergies  Plavix (Other)    Review of Systems: Fourteen systems reviewed and negative except as in HPI / Interval History.      Objective:   Vital Signs Last 24 Hrs  T(C): 36.6 (21 Jul 2019 19:38), Max: 37.1 (21 Jul 2019 16:26)  T(F): 97.9 (21 Jul 2019 19:38), Max: 98.7 (21 Jul 2019 16:26)  HR: 73 (21 Jul 2019 19:38) (67 - 81)  BP: 121/80 (21 Jul 2019 19:38) (118/72 - 150/73)  RR: 18 (21 Jul 2019 19:38) (17 - 18)  SpO2: 100% (21 Jul 2019 19:38) (96% - 100%)    General Exam:  General: No acute distress  Respiratory: CTAB/l.  No crackles, rhonchi, or wheezes.  Cardiovascular: RRR, No murmurs, Full b/l radial and pedal pulses    Neurological Exam:  General / Mental Status: Oriented to person, place, and time.  No dysarthria or aphasia present.  Naming and repetition intact.  Cranial Nerves: PERRLA, EOMI x 2, VFF x 4.  Vertical eye movements intact.  Horizontal nystagmus present b/l.  Negative Ingrid-Hallpike maneuver b/l.  B/l V1-V3 equal to light touch and pinprick.  Symmetric facial movement and palate elevation.  B/l hearing equal to finger rub.  5/5 strength with b/l sternocleidomastoid and trapezius.  Midline tongue protrusion with no atrophy or fasciculations.  Motor: Normal bulk and tone in all four extremities.  5/5 strength throughout all four extremities.  No downward drift, rigidity, spasticity, or tremors in any of the four extremities.  Sensation: Diminished to light touch and pinprick at the left lower extremity.  Intact to light touch and pinprick in the right lower extremity and b/l upper extremities.  Coordination: No dysmetria with b/l finger-to-nose and heel raise tests.  Reflexes: 2+ and symmetric at b/l biceps, triceps, brachioradialis, patellae, and ankles.  Toes flexor b/l.  Gait and Romberg testing deferred per patient request.    NIHSS Score:    LOC - 0  LOC Questions - 0  LOC Commands - 0  Gaze Preference - 0  Visual Fields - 0  Facial Palsy - 0  Motor Arm Left - 0  Motor Arm Right - 0  Motor Leg Left - 0  Motor Leg Right - 0  Limb Ataxia - 0  Sensory - 10  Language - 0  Speech - 0  Extinction - 0    NIHSS Score Total: 1 -No IV tPA because patient is out of time window (LSN 3.5 weeks ago)    Modified Frantz Scale: 2      Labs:    07-21    137  |  105  |  24<H>  ----------------------------<  281<H>  4.1   |  25  |  1.23    Ca    8.5      21 Jul 2019 07:38  Phos  3.4     07-21  Mg     1.9     07-21    Hemoglobin A1C, Whole Blood (07.18.19 @ 10:04)    Hemoglobin A1C, Whole Blood: 12.3%    Lipid Profile (07.18.19 @ 06:31)    Total Cholesterol/HDL Ratio Measurement: 3.5 RATIO    Cholesterol, Serum: 146 mg/dL    Triglycerides, Serum: 75 mg/dL    HDL Cholesterol, Serum: 42 mg/dL    Direct LDL: 89 mg/dL      Neuroimaging:    CT BRAIN (07/17/2019):  - No acute intracranial abnormality  - Chronic b/l PCA territory infarcts  - Moderate chronic microvascular disease  - Diffuse atrophy    MRI BRAIN & IAC W/WO PETEY (07/18/2019):  - Chronic right occipital infarct (present in Feb. 2019)  - Subacute b/l inferior cerebellar (PICA territory) and left PCA territory infarcts (new since Feb. 2019)  - Mild chronic microvascular disease  - Diffuse atrophy  - No auditory canal abnormalities    ECHOCARDIOGRAM (07/19/2019):  - LVEF > 55%  - Mild concentric LVH  - Mild (Stage I) diastolic dysfunction  - No cardiac embolus or intracardiac shunt identified      Assessment:  54 RHM with persistent vertigo secondary to subacute b/l cerebellar infarcts, secondary to small vessel disease vs. cardioembolic source.      Recommendations:    1. Stroke workup  - Carotid ultrasound  - Implantable loop recorder to evaluate for abnormal cardiac rhythm (patient previously had a normal ABRAHAN in Feb. 2019)    - Agree with interfacility transfer to facilitate implantable loop recorder placement  - Continue telemetry  - Follow up hypercoagulable workup    2. Secondary stroke prevention  - Q4H Neurochecks  - Aspirin 325mg daily  - Atorvastatin 80mg QHS  - Treat BP if over 120/80  - Diabetes management  - PT/OT for stroke recovery and vestibular therapy  - DVT ppx    3. Continue meclizine 25mg PO Q8H PRN Dizziness/Vertigo for symptomatic treatment      Please contact the Neurology consult service with any questions.    Jignesh Pimentel MD  Neurology Attending  [facility]

## 2019-07-21 NOTE — PROGRESS NOTE ADULT - PROBLEM SELECTOR PLAN 1
echo showed EF 55%, Gr 1 DD, No intra  c/w aspirin  c/w statin  Dr Pimentel      [x] FU neurology consult: MRI Brain shows subacute left cerebellar stroke.  Recs:  - [x]ASA, Atorvastatin  - [x]Echo,[x] Telemetry: upgraded, [x]FLP, [x]HbA1c: 12.3  - If Echo normal, consider [x]ABRAHAN: ABRAHAN bubble ordered to R/O patent foramen oval, and then []loop monitor if ABRAHAN normal  - []PT/OT for stroke recovery    [x] cw meclizine 25 prn q 8   []f/u orthstatic echo showed EF 55%, Gr 1 DD, No intracardiac shunt  Homocysteine and betaglycoprotein negative  c/w aspirin  c/w statin  f/u Hypercoagulation work up, if it is negative then ILR.  Dr Pimentel  f/u pt/ot for vertigo.      [x] FU neurology consult: MRI Brain shows subacute left cerebellar stroke.  Recs:  - [x]ASA, Atorvastatin  - [x]Echo,[x] Telemetry: upgraded, [x]FLP, [x]HbA1c: 12.3  - If Echo normal, consider [x]ABRAHAN: ABRAHAN bubble ordered to R/O patent foramen oval, and then []loop monitor if ABRAHAN normal  - []PT/OT for stroke recovery    [x] cw meclizine 25 prn q 8   []f/u orthstatic

## 2019-07-22 LAB
ANION GAP SERPL CALC-SCNC: 7 MMOL/L — SIGNIFICANT CHANGE UP (ref 5–17)
BUN SERPL-MCNC: 22 MG/DL — HIGH (ref 7–18)
CALCIUM SERPL-MCNC: 8.7 MG/DL — SIGNIFICANT CHANGE UP (ref 8.4–10.5)
CHLORIDE SERPL-SCNC: 103 MMOL/L — SIGNIFICANT CHANGE UP (ref 96–108)
CO2 SERPL-SCNC: 27 MMOL/L — SIGNIFICANT CHANGE UP (ref 22–31)
CREAT SERPL-MCNC: 1.26 MG/DL — SIGNIFICANT CHANGE UP (ref 0.5–1.3)
DRVVT SCREEN TO CONFIRM RATIO: SIGNIFICANT CHANGE UP
GLUCOSE BLDC GLUCOMTR-MCNC: 176 MG/DL — HIGH (ref 70–99)
GLUCOSE BLDC GLUCOMTR-MCNC: 189 MG/DL — HIGH (ref 70–99)
GLUCOSE BLDC GLUCOMTR-MCNC: 191 MG/DL — HIGH (ref 70–99)
GLUCOSE BLDC GLUCOMTR-MCNC: 222 MG/DL — HIGH (ref 70–99)
GLUCOSE BLDC GLUCOMTR-MCNC: 233 MG/DL — HIGH (ref 70–99)
GLUCOSE SERPL-MCNC: 211 MG/DL — HIGH (ref 70–99)
HCT VFR BLD CALC: 42.6 % — SIGNIFICANT CHANGE UP (ref 39–50)
HGB BLD-MCNC: 14.4 G/DL — SIGNIFICANT CHANGE UP (ref 13–17)
LA NT DPL PPP QL: 29.1 SEC — SIGNIFICANT CHANGE UP
MAGNESIUM SERPL-MCNC: 1.9 MG/DL — SIGNIFICANT CHANGE UP (ref 1.6–2.6)
MCHC RBC-ENTMCNC: 29.4 PG — SIGNIFICANT CHANGE UP (ref 27–34)
MCHC RBC-ENTMCNC: 33.8 GM/DL — SIGNIFICANT CHANGE UP (ref 32–36)
MCV RBC AUTO: 86.9 FL — SIGNIFICANT CHANGE UP (ref 80–100)
NRBC # BLD: 0 /100 WBCS — SIGNIFICANT CHANGE UP (ref 0–0)
PHOSPHATE SERPL-MCNC: 3.6 MG/DL — SIGNIFICANT CHANGE UP (ref 2.5–4.5)
PLATELET # BLD AUTO: 219 K/UL — SIGNIFICANT CHANGE UP (ref 150–400)
POTASSIUM SERPL-MCNC: 3.9 MMOL/L — SIGNIFICANT CHANGE UP (ref 3.5–5.3)
POTASSIUM SERPL-SCNC: 3.9 MMOL/L — SIGNIFICANT CHANGE UP (ref 3.5–5.3)
RBC # BLD: 4.9 M/UL — SIGNIFICANT CHANGE UP (ref 4.2–5.8)
RBC # FLD: 12.3 % — SIGNIFICANT CHANGE UP (ref 10.3–14.5)
SODIUM SERPL-SCNC: 137 MMOL/L — SIGNIFICANT CHANGE UP (ref 135–145)
WBC # BLD: 4.59 K/UL — SIGNIFICANT CHANGE UP (ref 3.8–10.5)
WBC # FLD AUTO: 4.59 K/UL — SIGNIFICANT CHANGE UP (ref 3.8–10.5)

## 2019-07-22 RX ORDER — MECLIZINE HCL 12.5 MG
1 TABLET ORAL
Qty: 0 | Refills: 0 | DISCHARGE
Start: 2019-07-22

## 2019-07-22 RX ORDER — ASPIRIN/CALCIUM CARB/MAGNESIUM 324 MG
1 TABLET ORAL
Qty: 30 | Refills: 0
Start: 2019-07-22 | End: 2019-08-20

## 2019-07-22 RX ORDER — METFORMIN HYDROCHLORIDE 850 MG/1
1 TABLET ORAL
Qty: 0 | Refills: 0 | DISCHARGE

## 2019-07-22 RX ORDER — LISINOPRIL 2.5 MG/1
1 TABLET ORAL
Qty: 0 | Refills: 0 | DISCHARGE
Start: 2019-07-22

## 2019-07-22 RX ORDER — INSULIN LISPRO 100/ML
10 VIAL (ML) SUBCUTANEOUS
Refills: 0 | Status: DISCONTINUED | OUTPATIENT
Start: 2019-07-22 | End: 2019-07-24

## 2019-07-22 RX ADMIN — Medication 1: at 16:52

## 2019-07-22 RX ADMIN — Medication 25 MILLIGRAM(S): at 05:38

## 2019-07-22 RX ADMIN — Medication 10 UNIT(S): at 16:52

## 2019-07-22 RX ADMIN — INSULIN GLARGINE 40 UNIT(S): 100 INJECTION, SOLUTION SUBCUTANEOUS at 22:14

## 2019-07-22 RX ADMIN — LISINOPRIL 2.5 MILLIGRAM(S): 2.5 TABLET ORAL at 05:37

## 2019-07-22 RX ADMIN — Medication 1: at 08:13

## 2019-07-22 RX ADMIN — Medication 325 MILLIGRAM(S): at 11:14

## 2019-07-22 RX ADMIN — Medication 8 UNIT(S): at 08:13

## 2019-07-22 RX ADMIN — Medication 1: at 11:51

## 2019-07-22 RX ADMIN — ATORVASTATIN CALCIUM 80 MILLIGRAM(S): 80 TABLET, FILM COATED ORAL at 22:06

## 2019-07-22 RX ADMIN — Medication 2: at 22:14

## 2019-07-22 RX ADMIN — Medication 25 MILLIGRAM(S): at 17:21

## 2019-07-22 RX ADMIN — ENOXAPARIN SODIUM 40 MILLIGRAM(S): 100 INJECTION SUBCUTANEOUS at 11:14

## 2019-07-22 RX ADMIN — Medication 10 UNIT(S): at 11:51

## 2019-07-22 NOTE — PROGRESS NOTE ADULT - SUBJECTIVE AND OBJECTIVE BOX
CHIEF COMPLAINT:Patient is a 54y old  Male who presents with a chief complaint of vertigo/ Dizziness.Pt appears comfortable.    	  REVIEW OF SYSTEMS:  CONSTITUTIONAL: No fever, weight loss, or fatigue  EYES: No eye pain, visual disturbances, or discharge  ENT:  No difficulty hearing, tinnitus, vertigo; No sinus or throat pain  NECK: No pain or stiffness  RESPIRATORY: No cough, wheezing, chills or hemoptysis; No Shortness of Breath  CARDIOVASCULAR: No chest pain, palpitations, passing out, dizziness, or leg swelling  GASTROINTESTINAL: No abdominal or epigastric pain. No nausea, vomiting, or hematemesis; No diarrhea or constipation. No melena or hematochezia.  GENITOURINARY: No dysuria, frequency, hematuria, or incontinence  NEUROLOGICAL: No headaches, memory loss, loss of strength, numbness, or tremors  SKIN: No itching, burning, rashes, or lesions   LYMPH Nodes: No enlarged glands  ENDOCRINE: No heat or cold intolerance; No hair loss  MUSCULOSKELETAL: No joint pain or swelling; No muscle, back, or extremity pain  PSYCHIATRIC: No depression, anxiety, mood swings, or difficulty sleeping  HEME/LYMPH: No easy bruising, or bleeding gums  ALLERGY AND IMMUNOLOGIC: No hives or eczema	      PHYSICAL EXAM:  T(C): 36.6 (07-22-19 @ 12:15), Max: 37.1 (07-21-19 @ 16:26)  HR: 71 (07-22-19 @ 09:35) (70 - 78)  BP: 118/66 (07-22-19 @ 09:35) (118/66 - 139/82)  RR: 18 (07-22-19 @ 12:15) (16 - 18)  SpO2: 100% (07-22-19 @ 12:15) (98% - 100%)  Wt(kg): --  I&O's Summary      Appearance: Normal	  HEENT:   Normal oral mucosa, PERRL, EOMI	  Lymphatic: No lymphadenopathy  Cardiovascular: Normal S1 S2, No JVD, No murmurs, No edema  Respiratory: Lungs clear to auscultation	  Psychiatry: A & O x 3, Mood & affect appropriate  Gastrointestinal:  Soft, Non-tender, + BS	  Skin: No rashes, No ecchymoses, No cyanosis	  Neurologic: Non-focal  Extremities: Normal range of motion, No clubbing, cyanosis or edema  Vascular: Peripheral pulses palpable 2+ bilaterally    MEDICATIONS  (STANDING):  aspirin enteric coated 325 milliGRAM(s) Oral daily  atorvastatin 80 milliGRAM(s) Oral at bedtime  enoxaparin Injectable 40 milliGRAM(s) SubCutaneous daily  insulin glargine Injectable (LANTUS) 40 Unit(s) SubCutaneous at bedtime  insulin lispro (HumaLOG) corrective regimen sliding scale   SubCutaneous Before meals and at bedtime  insulin lispro Injectable (HumaLOG) 10 Unit(s) SubCutaneous three times a day before meals  lisinopril 2.5 milliGRAM(s) Oral daily  metoprolol tartrate 25 milliGRAM(s) Oral two times a day      TELEMETRY: 	  nsr    	  LABS:	 	                        14.4   4.59  )-----------( 219      ( 22 Jul 2019 05:53 )             42.6     07-22    137  |  103  |  22<H>  ----------------------------<  211<H>  3.9   |  27  |  1.26    Ca    8.7      22 Jul 2019 05:53  Phos  3.6     07-22  Mg     1.9     07-22      proBNP: Serum Pro-Brain Natriuretic Peptide: 325 pg/mL (07-17 @ 12:05)    Lipid Profile: Cholesterol 146  LDL 89  HDL 42  TG 75    HgA1c: Hemoglobin A1C, Whole Blood: 12.3 % (07-18 @ 10:04)    TSH: Thyroid Stimulating Hormone, Serum: 0.72 uU/mL (07-18 @ 06:31)

## 2019-07-22 NOTE — ACUTE INTERFACILITY TRANSFER NOTE - HOSPITAL COURSE
Patient is 54 year old male has past medical history of HTN, DM, HLD, CAD s/p CABG, CVA x2, came in after having vertigo. He was started to have severe vertigo with spinning sensation worsened with any movement, improved by staying still, it affected his daily activity, he was staying in the bed all day, associated with gait imbalance and he had multiple fall without head trauma or LOC. His CT head was negative. Neurologist was consulted and he was started on high dose aspirin and statin as he was allergic to plavix. Mri showed left occipital infarct and abnormality in inferior cerebellar hemisphere. Cardiologist was consulted and ECHO showed normal EF with no intracardiac shunt. She is recommending stress test in 4-6 weeks. Hypercoagulability work up was negative so far and some test results are pending. He is getting trasferred to [facility] for ILR.

## 2019-07-22 NOTE — PROGRESS NOTE ADULT - PROBLEM SELECTOR PLAN 6
blood sugars are well controlled.  cw  lantus 40  c/w lispro 8 tid  [facility]  Dr Yao

## 2019-07-22 NOTE — PHYSICAL THERAPY INITIAL EVALUATION ADULT - GENERAL OBSERVATIONS, REHAB EVAL
Consult received, chart reviewed. Patient received supine in bed, +Telemetry. Patient agreed to EVALUATION from Physical Therapist..

## 2019-07-22 NOTE — PROGRESS NOTE ADULT - PROBLEM SELECTOR PLAN 1
c/w aspirin  c/w statin  f/u meclizine prn  f/u Hypercoagulation work up, if it is negative then ILR.  Dr Pimentel  f/u pt/ot for vertigo.          -

## 2019-07-22 NOTE — PHYSICAL THERAPY INITIAL EVALUATION ADULT - PERTINENT HX OF CURRENT PROBLEM, REHAB EVAL
Pt. was admitted to hospital d/t c/o vertigo and dizziness for 3 weeks. Pt. reports that he is always dizzy except for when he is supine in bed. Pt stated that he feels like the room is spinning and he feels a pressure sensation near his eyes and in the center of his forehead. Pt reports no history of vertigo. Pt. was admitted to hospital d/t c/o vertigo and dizziness for 3 weeks. Pt. reports that he is always dizzy except for when he is supine in bed. Pt stated that he feels like the room is spinning and he feels a pressure sensation near his eyes and in the center of his forehead.

## 2019-07-22 NOTE — PROGRESS NOTE ADULT - SUBJECTIVE AND OBJECTIVE BOX
PGY 1 Note discussed with supervising resident and primary attending    Patient is a 54y old  Male who presents with a chief complaint of vertigo/ Dizziness (21 Jul 2019 19:50)      INTERVAL HPI/OVERNIGHT EVENTS: Pt is c/o of decreased sensation on left side of the body.    MEDICATIONS  (STANDING):  aspirin enteric coated 325 milliGRAM(s) Oral daily  atorvastatin 80 milliGRAM(s) Oral at bedtime  enoxaparin Injectable 40 milliGRAM(s) SubCutaneous daily  insulin glargine Injectable (LANTUS) 40 Unit(s) SubCutaneous at bedtime  insulin lispro (HumaLOG) corrective regimen sliding scale   SubCutaneous Before meals and at bedtime  insulin lispro Injectable (HumaLOG) 8 Unit(s) SubCutaneous three times a day before meals  lisinopril 2.5 milliGRAM(s) Oral daily  metoprolol tartrate 25 milliGRAM(s) Oral two times a day    MEDICATIONS  (PRN):  glucagon  Injectable 1 milliGRAM(s) IntraMuscular once PRN Glucose <70 milliGRAM(s)/deciLiter  meclizine 25 milliGRAM(s) Oral every 8 hours PRN Dizziness/ vertigo      __________________________________________________  REVIEW OF SYSTEMS:  Patient denies cough, sputum production, fevers/chills/nausea/vomiting. No diarrhea, abdominal pain.         Vital Signs Last 24 Hrs  T(C): 36.7 (22 Jul 2019 07:26), Max: 37.1 (21 Jul 2019 16:26)  T(F): 98 (22 Jul 2019 07:26), Max: 98.7 (21 Jul 2019 16:26)  HR: 72 (22 Jul 2019 07:26) (67 - 78)  BP: 139/82 (22 Jul 2019 07:26) (118/72 - 139/82)  BP(mean): --  RR: 17 (22 Jul 2019 07:26) (16 - 18)  SpO2: 100% (22 Jul 2019 07:26) (100% - 100%)    ________________________________________________  PHYSICAL EXAM:  GENERAL: NAD  HEENT: Normocephalic;  conjunctivae and sclerae clear; moist mucous membranes;   NECK : supple  CHEST/LUNG: Clear to auscultation bilaterally with good air entry   HEART: S1 S2  regular; no murmurs, gallops or rubs  ABDOMEN: Soft, Nontender, Nondistended; Bowel sounds present  EXTREMITIES: no cyanosis; no edema; no calf tenderness  SKIN: warm and dry; no rash  NERVOUS SYSTEM:  Awake and alert; Oriented  to place, person and time ; right upper extremity weakness. _________________________________________________  LABS:                        14.4   4.59  )-----------( 219      ( 22 Jul 2019 05:53 )             42.6     07-22    137  |  103  |  22<H>  ----------------------------<  211<H>  3.9   |  27  |  1.26    Ca    8.7      22 Jul 2019 05:53  Phos  3.6     07-22  Mg     1.9     07-22          CAPILLARY BLOOD GLUCOSE      POCT Blood Glucose.: 176 mg/dL (22 Jul 2019 07:53)  POCT Blood Glucose.: 205 mg/dL (21 Jul 2019 21:04)  POCT Blood Glucose.: 204 mg/dL (21 Jul 2019 16:40)  POCT Blood Glucose.: 255 mg/dL (21 Jul 2019 11:43)

## 2019-07-22 NOTE — ACUTE INTERFACILITY TRANSFER NOTE - PLAN OF CARE
Resolution of symptoms You came here with  gait imbalance and he had multiple fall without head trauma or LOC. Your CT head was negative for stroke. Neurologist was consulted and you were  started on high dose aspirin and statin as you wer allergic to plavix. Mri showed left occipital infarct and abnormality in inferior cerebellar hemisphere. Cardiologist was consulted and ECHO showed normal EF with no intracardiac shunt. You have to follow up with  stress test in 4-6 weeks. Hypercoagulability work up was negative so far and some test results are pending. You are getting trasferred to [facility] for ILR. You are treated with Meclizine and PT recommended outpatient vestibular PT.

## 2019-07-22 NOTE — PHYSICAL THERAPY INITIAL EVALUATION ADULT - IMPAIRMENTS FOUND, PT EVAL
aerobic capacity/endurance/posture/poor safety awareness/gait, locomotion, and balance/muscle strength/ROM

## 2019-07-22 NOTE — PHYSICAL THERAPY INITIAL EVALUATION ADULT - CRITERIA FOR SKILLED THERAPEUTIC INTERVENTIONS
impairments found/rehab potential/predicted duration of therapy intervention/functional limitations in following categories/therapy frequency

## 2019-07-22 NOTE — PROGRESS NOTE ADULT - SUBJECTIVE AND OBJECTIVE BOX
Interval Events:      Allergies    Plavix (Other)    Intolerances      Endocrine/Metabolic Medications:  atorvastatin 80 milliGRAM(s) Oral at bedtime  glucagon  Injectable 1 milliGRAM(s) IntraMuscular once PRN  insulin glargine Injectable (LANTUS) 40 Unit(s) SubCutaneous at bedtime  insulin lispro (HumaLOG) corrective regimen sliding scale   SubCutaneous Before meals and at bedtime  insulin lispro Injectable (HumaLOG) 8 Unit(s) SubCutaneous three times a day before meals      Vital Signs Last 24 Hrs  T(C): 36.7 (22 Jul 2019 07:26), Max: 37.1 (21 Jul 2019 16:26)  T(F): 98 (22 Jul 2019 07:26), Max: 98.7 (21 Jul 2019 16:26)  HR: 71 (22 Jul 2019 09:35) (67 - 78)  BP: 118/66 (22 Jul 2019 09:35) (118/66 - 139/82)  BP(mean): --  RR: 17 (22 Jul 2019 07:26) (16 - 18)  SpO2: 98% (22 Jul 2019 09:35) (98% - 100%)      PHYSICAL EXAM  All physical exam findings normal, except those marked:  General:	Alert, active, cooperative, NAD, well hydrated  .		[] Abnormal:  Neck		Normal: supple, no cervical adenopathy, no palpable thyroid  .		[] Abnormal:  Cardiovascular	Normal: regular rate, normal S1, S2, no murmurs  .		[] Abnormal:  Respiratory	Normal: no chest wall deformity, normal respiratory pattern, CTA B/L  .		[] Abnormal:  Abdominal	Normal: soft, ND, NT, bowel sounds present, no masses, no organomegaly  .		[] Abnormal:  		Normal normal genitalia, testes descended, circumcised/uncircumcised  .		Lupe stage:			Breast lupe:  .		Menstrual history:  .		[] Abnormal:  Extremities	Normal: FROM x4  .		[] Abnormal:  Skin		Normal: intact and not indurated, no rash, no acanthosis nigricans  .		[] Abnormal:  Neurologic	Normal: grossly intact  .		[] Abnormal:    LABS                        14.4   4.59  )-----------( 219      ( 22 Jul 2019 05:53 )             42.6                               137    |  103    |  22                  Calcium: 8.7   / iCa: x      (07-22 @ 05:53)    ----------------------------<  211       Magnesium: 1.9                              3.9     |  27     |  1.26             Phosphorous: 3.6        CAPILLARY BLOOD GLUCOSE      POCT Blood Glucose.: 176 mg/dL (22 Jul 2019 07:53)  POCT Blood Glucose.: 205 mg/dL (21 Jul 2019 21:04)  POCT Blood Glucose.: 204 mg/dL (21 Jul 2019 16:40)  POCT Blood Glucose.: 255 mg/dL (21 Jul 2019 11:43)        Assesment/plan Interval Events:  pt in nad    Allergies    Plavix (Other)    Intolerances      Endocrine/Metabolic Medications:  atorvastatin 80 milliGRAM(s) Oral at bedtime  glucagon  Injectable 1 milliGRAM(s) IntraMuscular once PRN  insulin glargine Injectable (LANTUS) 40 Unit(s) SubCutaneous at bedtime  insulin lispro (HumaLOG) corrective regimen sliding scale   SubCutaneous Before meals and at bedtime  insulin lispro Injectable (HumaLOG) 8 Unit(s) SubCutaneous three times a day before meals      Vital Signs Last 24 Hrs  T(C): 36.7 (22 Jul 2019 07:26), Max: 37.1 (21 Jul 2019 16:26)  T(F): 98 (22 Jul 2019 07:26), Max: 98.7 (21 Jul 2019 16:26)  HR: 71 (22 Jul 2019 09:35) (67 - 78)  BP: 118/66 (22 Jul 2019 09:35) (118/66 - 139/82)  BP(mean): --  RR: 17 (22 Jul 2019 07:26) (16 - 18)  SpO2: 98% (22 Jul 2019 09:35) (98% - 100%)      PHYSICAL EXAM  All physical exam findings normal, except those marked:  General:	Alert, active, cooperative, NAD, well hydrated  .		[] Abnormal:  Neck		Normal: supple, no cervical adenopathy, no palpable thyroid  .		[] Abnormal:  Cardiovascular	Normal: regular rate, normal S1, S2, no murmurs  .		[] Abnormal:  Respiratory	Normal: no chest wall deformity, normal respiratory pattern, CTA B/L  .		[] Abnormal:  Abdominal	Normal: soft, ND, NT, bowel sounds present, no masses, no organomegaly  .		[] Abnormal:  		Normal normal genitalia, testes descended, circumcised/uncircumcised  .		Lupe stage:			Breast lupe:  .		Menstrual history:  .		[] Abnormal:  Extremities	Normal: FROM x4  .		[] Abnormal:  Skin		Normal: intact and not indurated, no rash, no acanthosis nigricans  .		[] Abnormal:  Neurologic	Normal: grossly intact  .		[] Abnormal:    LABS                        14.4   4.59  )-----------( 219      ( 22 Jul 2019 05:53 )             42.6                               137    |  103    |  22                  Calcium: 8.7   / iCa: x      (07-22 @ 05:53)    ----------------------------<  211       Magnesium: 1.9                              3.9     |  27     |  1.26             Phosphorous: 3.6        CAPILLARY BLOOD GLUCOSE      POCT Blood Glucose.: 176 mg/dL (22 Jul 2019 07:53)  POCT Blood Glucose.: 205 mg/dL (21 Jul 2019 21:04)  POCT Blood Glucose.: 204 mg/dL (21 Jul 2019 16:40)  POCT Blood Glucose.: 255 mg/dL (21 Jul 2019 11:43)        Assesment/plan  Dm- better but remains uncont  increase humalog to 10 ac tid  cont lantus 40 units  fsg ac and hs  cva- cont tx per neuro

## 2019-07-23 ENCOUNTER — INPATIENT (INPATIENT)
Facility: HOSPITAL | Age: 54
LOS: 0 days | Discharge: ANOTHER HOSPITAL | End: 2019-07-23
Attending: STUDENT IN AN ORGANIZED HEALTH CARE EDUCATION/TRAINING PROGRAM | Admitting: STUDENT IN AN ORGANIZED HEALTH CARE EDUCATION/TRAINING PROGRAM
Payer: MEDICAID

## 2019-07-23 DIAGNOSIS — Z95.5 PRESENCE OF CORONARY ANGIOPLASTY IMPLANT AND GRAFT: Chronic | ICD-10-CM

## 2019-07-23 DIAGNOSIS — Z90.49 ACQUIRED ABSENCE OF OTHER SPECIFIED PARTS OF DIGESTIVE TRACT: Chronic | ICD-10-CM

## 2019-07-23 DIAGNOSIS — Z95.1 PRESENCE OF AORTOCORONARY BYPASS GRAFT: Chronic | ICD-10-CM

## 2019-07-23 DIAGNOSIS — Z86.73 PERSONAL HISTORY OF TRANSIENT ISCHEMIC ATTACK (TIA), AND CEREBRAL INFARCTION WITHOUT RESIDUAL DEFICITS: ICD-10-CM

## 2019-07-23 PROBLEM — Z00.00 ENCOUNTER FOR PREVENTIVE HEALTH EXAMINATION: Status: ACTIVE | Noted: 2019-07-23

## 2019-07-23 LAB
APCR PPP: 3.11 RATIO — SIGNIFICANT CHANGE UP
GLUCOSE BLDC GLUCOMTR-MCNC: 166 MG/DL — HIGH (ref 70–99)
GLUCOSE BLDC GLUCOMTR-MCNC: 182 MG/DL — HIGH (ref 70–99)
GLUCOSE BLDC GLUCOMTR-MCNC: 189 MG/DL — HIGH (ref 70–99)
GLUCOSE BLDC GLUCOMTR-MCNC: 295 MG/DL — HIGH (ref 70–99)
GLUCOSE BLDC GLUCOMTR-MCNC: 343 MG/DL — HIGH (ref 70–99)

## 2019-07-23 PROCEDURE — 99233 SBSQ HOSP IP/OBS HIGH 50: CPT

## 2019-07-23 RX ADMIN — LISINOPRIL 2.5 MILLIGRAM(S): 2.5 TABLET ORAL at 05:45

## 2019-07-23 RX ADMIN — Medication 25 MILLIGRAM(S): at 17:32

## 2019-07-23 RX ADMIN — Medication 10 UNIT(S): at 17:31

## 2019-07-23 RX ADMIN — Medication 4: at 22:43

## 2019-07-23 RX ADMIN — Medication 1: at 08:11

## 2019-07-23 RX ADMIN — Medication 3: at 17:31

## 2019-07-23 RX ADMIN — INSULIN GLARGINE 40 UNIT(S): 100 INJECTION, SOLUTION SUBCUTANEOUS at 22:43

## 2019-07-23 RX ADMIN — ATORVASTATIN CALCIUM 80 MILLIGRAM(S): 80 TABLET, FILM COATED ORAL at 22:43

## 2019-07-23 RX ADMIN — Medication 25 MILLIGRAM(S): at 05:45

## 2019-07-23 NOTE — PROGRESS NOTE ADULT - SUBJECTIVE AND OBJECTIVE BOX
Patient is a 54y old  Male who presents with a chief complaint of vertigo/ Dizziness (21 Jul 2019 19:50)      INTERVAL HPI/OVERNIGHT EVENTS: Pt is c/o of decreased sensation on left side of the body.    MEDICATIONS  (STANDING):  aspirin enteric coated 325 milliGRAM(s) Oral daily  atorvastatin 80 milliGRAM(s) Oral at bedtime  enoxaparin Injectable 40 milliGRAM(s) SubCutaneous daily  insulin glargine Injectable (LANTUS) 40 Unit(s) SubCutaneous at bedtime  insulin lispro (HumaLOG) corrective regimen sliding scale   SubCutaneous Before meals and at bedtime  insulin lispro Injectable (HumaLOG) 10 Unit(s) SubCutaneous three times a day before meals  lisinopril 2.5 milliGRAM(s) Oral daily  metoprolol tartrate 25 milliGRAM(s) Oral two times a day    MEDICATIONS  (PRN):  glucagon  Injectable 1 milliGRAM(s) IntraMuscular once PRN Glucose <70 milliGRAM(s)/deciLiter  meclizine 25 milliGRAM(s) Oral every 8 hours PRN Dizziness/ vertigo      __________________________________________________  REVIEW OF SYSTEMS:  Patient denies cough, sputum production, fevers/chills/nausea/vomiting. No diarrhea, abdominal pain.         Vital Signs Last 24 Hrs  T(C): 36.6 (23 Jul 2019 08:05), Max: 37.2 (22 Jul 2019 15:27)  T(F): 97.9 (23 Jul 2019 08:05), Max: 98.9 (22 Jul 2019 15:27)  HR: 66 (23 Jul 2019 08:05) (66 - 76)  BP: 142/86 (23 Jul 2019 08:05) (123/78 - 148/76)  BP(mean): --  RR: 18 (23 Jul 2019 08:05) (17 - 19)  SpO2: 100% (23 Jul 2019 08:05) (98% - 100%)    ________________________________________________  PHYSICAL EXAM:  GENERAL: NAD  HEENT: Normocephalic;  conjunctivae and sclerae clear; moist mucous membranes;   NECK : supple  CHEST/LUNG: Clear to auscultation bilaterally with good air entry   HEART: S1 S2  regular; no murmurs, gallops or rubs  ABDOMEN: Soft, Nontender, Nondistended; Bowel sounds present  EXTREMITIES: no cyanosis; no edema; no calf tenderness  SKIN: warm and dry; no rash  NERVOUS SYSTEM:  Awake and alert; Oriented  to place, person and time ; right upper extremity weakness. _________________________________________________  LABS:                                             14.4   4.59  )-----------( 219      ( 22 Jul 2019 05:53 )             42.6   07-22    137  |  103  |  22<H>  ----------------------------<  211<H>  3.9   |  27  |  1.26    Ca    8.7      22 Jul 2019 05:53  Phos  3.6     07-22  Mg     1.9     07-22    CAPILLARY BLOOD GLUCOSE      POCT Blood Glucose.: 166 mg/dL (23 Jul 2019 12:24)  POCT Blood Glucose.: 182 mg/dL (23 Jul 2019 11:14)  POCT Blood Glucose.: 189 mg/dL (23 Jul 2019 07:51)  POCT Blood Glucose.: 233 mg/dL (22 Jul 2019 22:04)  POCT Blood Glucose.: 222 mg/dL (22 Jul 2019 20:49)  POCT Blood Glucose.: 189 mg/dL (22 Jul 2019 16:32)

## 2019-07-23 NOTE — CHART NOTE - NSCHARTNOTEFT_GEN_A_CORE
Type of Procedure: ILR implant  Licensed independent practitioner: Brandi Bañuelos MD  Assistant: None  Description of procedure:   Written informed consent was obtained from the patient after a full explanation of the risks and benefits of  the procedure. The patient was brought to the lab in the fasting state. Continuous electrocardiographic and  hemodynamic monitoring was initiated. The patient was prepped and draped in the usual sterile fashion. Ancef was given within 30 mins of preocedure time.  Local anesthetic was delivered and a 1 cm diagonal incision was made just lateral to the sternum using the  incision tool supplied by the . Using the insertion tool, a subcutaneous tunnel was created  approximately 8 mm under the skin. The insertion tool was then rotated 180 degrees to create a pocket for the  device, and the preloaded device was then inserted into the pocket. The device was held in place while the  insertion tool was removed. Hemostasis was achieved with manual pressure. Dermabond was then placed  over the incision. The wound was covered with a dry sterile dressing. The procedure was well  tolerated and the patient left the laboratory alert and in good condition. Patient education regarding device  triggering was performed prior to discharge.  Programming was as follows:  - Tachycardia: 171 bpm), 16 beats duration  - Bradycardia: >2,000ms   - Pause: >3 seconds  - AF detection: ON    Findings of procedure: As above  Estimated blood loss: 0cc  Specimen removed: N/A  Preoperative Dx: CVA  Postoperative Dx: CVA  Complications: None  Anesthesia type: Local

## 2019-07-23 NOTE — CHART NOTE - NSCHARTNOTEFT_GEN_A_CORE
Patient is s/p ILR implantation. Device teaching given to patient and he demonstrates understanding of the instructions. F/U appointment with device clinic on 8/7/19 @ 9:45 am

## 2019-07-23 NOTE — PROGRESS NOTE ADULT - ATTENDING COMMENTS
pt stable  f/u hypercoag w/u as outpt  pending tx to [facility] for loop recorder today
pt stable  f/u hypercoag w/u as outpt  pending tx to see for loop recorder
acute cva on MRI  neuro and cards f/u  PT
possible loop recorder  PT/OT  f/u hypercoag w/u

## 2019-07-23 NOTE — PROGRESS NOTE ADULT - SUBJECTIVE AND OBJECTIVE BOX
CHIEF COMPLAINT:Patient is a 54y old  Male who presents with a chief complaint of vertigo/ Dizziness.Pt appears comfortable.    	  REVIEW OF SYSTEMS:  CONSTITUTIONAL: No fever, weight loss, or fatigue  EYES: No eye pain, visual disturbances, or discharge  ENT:  No difficulty hearing, tinnitus, vertigo; No sinus or throat pain  NECK: No pain or stiffness  RESPIRATORY: No cough, wheezing, chills or hemoptysis; No Shortness of Breath  CARDIOVASCULAR: No chest pain, palpitations, passing out, dizziness, or leg swelling  GASTROINTESTINAL: No abdominal or epigastric pain. No nausea, vomiting, or hematemesis; No diarrhea or constipation. No melena or hematochezia.  GENITOURINARY: No dysuria, frequency, hematuria, or incontinence  NEUROLOGICAL: No headaches, memory loss, loss of strength, numbness, or tremors  SKIN: No itching, burning, rashes, or lesions   LYMPH Nodes: No enlarged glands  ENDOCRINE: No heat or cold intolerance; No hair loss  MUSCULOSKELETAL: No joint pain or swelling; No muscle, back, or extremity pain  PSYCHIATRIC: No depression, anxiety, mood swings, or difficulty sleeping  HEME/LYMPH: No easy bruising, or bleeding gums  ALLERGY AND IMMUNOLOGIC: No hives or eczema	      PHYSICAL EXAM:  T(C): 36.6 (07-23-19 @ 08:05), Max: 37.2 (07-22-19 @ 15:27)  HR: 66 (07-23-19 @ 08:05) (66 - 76)  BP: 142/86 (07-23-19 @ 08:05) (118/66 - 148/76)  RR: 18 (07-23-19 @ 08:05) (17 - 19)  SpO2: 100% (07-23-19 @ 08:05) (98% - 100%)    I&O's Summary    22 Jul 2019 07:01  -  23 Jul 2019 07:00  --------------------------------------------------------  IN: 0 mL / OUT: 300 mL / NET: -300 mL        Appearance: Normal	  HEENT:   Normal oral mucosa, PERRL, EOMI	  Lymphatic: No lymphadenopathy  Cardiovascular: Normal S1 S2, No JVD, No murmurs, No edema  Respiratory: Lungs clear to auscultation	  Psychiatry: A & O x 3, Mood & affect appropriate  Gastrointestinal:  Soft, Non-tender, + BS	  Skin: No rashes, No ecchymoses, No cyanosis	  Neurologic: Non-focal  Extremities: Normal range of motion, No clubbing, cyanosis or edema  Vascular: Peripheral pulses palpable 2+ bilaterally    MEDICATIONS  (STANDING):  aspirin enteric coated 325 milliGRAM(s) Oral daily  atorvastatin 80 milliGRAM(s) Oral at bedtime  enoxaparin Injectable 40 milliGRAM(s) SubCutaneous daily  insulin glargine Injectable (LANTUS) 40 Unit(s) SubCutaneous at bedtime  insulin lispro (HumaLOG) corrective regimen sliding scale   SubCutaneous Before meals and at bedtime  insulin lispro Injectable (HumaLOG) 10 Unit(s) SubCutaneous three times a day before meals  lisinopril 2.5 milliGRAM(s) Oral daily  metoprolol tartrate 25 milliGRAM(s) Oral two times a day      	  LABS:	 	                      14.4   4.59  )-----------( 219      ( 22 Jul 2019 05:53 )             42.6     07-22    137  |  103  |  22<H>  ----------------------------<  211<H>  3.9   |  27  |  1.26    Ca    8.7      22 Jul 2019 05:53  Phos  3.6     07-22  Mg     1.9     07-22      proBNP: Serum Pro-Brain Natriuretic Peptide: 325 pg/mL (07-17 @ 12:05)    Lipid Profile: Cholesterol 146  LDL 89  HDL 42  TG 75    HgA1c: Hemoglobin A1C, Whole Blood: 12.3 % (07-18 @ 10:04)    TSH: Thyroid Stimulating Hormone, Serum: 0.72 uU/mL (07-18 @ 06:31)

## 2019-07-23 NOTE — PROGRESS NOTE ADULT - SUBJECTIVE AND OBJECTIVE BOX
Interval Events:      Allergies    Plavix (Other)    Intolerances      Endocrine/Metabolic Medications:  atorvastatin 80 milliGRAM(s) Oral at bedtime  glucagon  Injectable 1 milliGRAM(s) IntraMuscular once PRN  insulin glargine Injectable (LANTUS) 40 Unit(s) SubCutaneous at bedtime  insulin lispro (HumaLOG) corrective regimen sliding scale   SubCutaneous Before meals and at bedtime  insulin lispro Injectable (HumaLOG) 10 Unit(s) SubCutaneous three times a day before meals      Vital Signs Last 24 Hrs  T(C): 36.6 (23 Jul 2019 08:05), Max: 37.2 (22 Jul 2019 15:27)  T(F): 97.9 (23 Jul 2019 08:05), Max: 98.9 (22 Jul 2019 15:27)  HR: 66 (23 Jul 2019 08:05) (66 - 76)  BP: 142/86 (23 Jul 2019 08:05) (123/78 - 148/76)  BP(mean): --  RR: 18 (23 Jul 2019 08:05) (17 - 19)  SpO2: 100% (23 Jul 2019 08:05) (98% - 100%)      PHYSICAL EXAM  All physical exam findings normal, except those marked:  General:	Alert, active, cooperative, NAD, well hydrated  .		[] Abnormal:  Neck		Normal: supple, no cervical adenopathy, no palpable thyroid  .		[] Abnormal:  Cardiovascular	Normal: regular rate, normal S1, S2, no murmurs  .		[] Abnormal:  Respiratory	Normal: no chest wall deformity, normal respiratory pattern, CTA B/L  .		[] Abnormal:  Abdominal	Normal: soft, ND, NT, bowel sounds present, no masses, no organomegaly  .		[] Abnormal:  		Normal normal genitalia, testes descended, circumcised/uncircumcised  .		Lupe stage:			Breast lupe:  .		Menstrual history:  .		[] Abnormal:  Extremities	Normal: FROM x4  .		[] Abnormal:  Skin		Normal: intact and not indurated, no rash, no acanthosis nigricans  .		[] Abnormal:  Neurologic	Normal: grossly intact  .		[] Abnormal:    LABS        CAPILLARY BLOOD GLUCOSE      POCT Blood Glucose.: 189 mg/dL (23 Jul 2019 07:51)  POCT Blood Glucose.: 233 mg/dL (22 Jul 2019 22:04)  POCT Blood Glucose.: 222 mg/dL (22 Jul 2019 20:49)  POCT Blood Glucose.: 189 mg/dL (22 Jul 2019 16:32)  POCT Blood Glucose.: 191 mg/dL (22 Jul 2019 11:37)        Assesment/plan Interval Events:  pt in nad    Allergies    Plavix (Other)    Intolerances      Endocrine/Metabolic Medications:  atorvastatin 80 milliGRAM(s) Oral at bedtime  glucagon  Injectable 1 milliGRAM(s) IntraMuscular once PRN  insulin glargine Injectable (LANTUS) 40 Unit(s) SubCutaneous at bedtime  insulin lispro (HumaLOG) corrective regimen sliding scale   SubCutaneous Before meals and at bedtime  insulin lispro Injectable (HumaLOG) 10 Unit(s) SubCutaneous three times a day before meals      Vital Signs Last 24 Hrs  T(C): 36.6 (23 Jul 2019 08:05), Max: 37.2 (22 Jul 2019 15:27)  T(F): 97.9 (23 Jul 2019 08:05), Max: 98.9 (22 Jul 2019 15:27)  HR: 66 (23 Jul 2019 08:05) (66 - 76)  BP: 142/86 (23 Jul 2019 08:05) (123/78 - 148/76)  BP(mean): --  RR: 18 (23 Jul 2019 08:05) (17 - 19)  SpO2: 100% (23 Jul 2019 08:05) (98% - 100%)      PHYSICAL EXAM  All physical exam findings normal, except those marked:  General:	Alert, active, cooperative, NAD, well hydrated  .		[] Abnormal:  Neck		Normal: supple, no cervical adenopathy, no palpable thyroid  .		[] Abnormal:  Cardiovascular	Normal: regular rate, normal S1, S2, no murmurs  .		[] Abnormal:  Respiratory	Normal: no chest wall deformity, normal respiratory pattern, CTA B/L  .		[] Abnormal:  Abdominal	Normal: soft, ND, NT, bowel sounds present, no masses, no organomegaly  .		[] Abnormal:  		Normal normal genitalia, testes descended, circumcised/uncircumcised  .		Lupe stage:			Breast lupe:  .		Menstrual history:  .		[] Abnormal:  Extremities	Normal: FROM x4  .		[] Abnormal:  Skin		Normal: intact and not indurated, no rash, no acanthosis nigricans  .		[] Abnormal:  Neurologic	Normal: grossly intact  .		[] Abnormal:    LABS        CAPILLARY BLOOD GLUCOSE      POCT Blood Glucose.: 189 mg/dL (23 Jul 2019 07:51)  POCT Blood Glucose.: 233 mg/dL (22 Jul 2019 22:04)  POCT Blood Glucose.: 222 mg/dL (22 Jul 2019 20:49)  POCT Blood Glucose.: 189 mg/dL (22 Jul 2019 16:32)  POCT Blood Glucose.: 191 mg/dL (22 Jul 2019 11:37)        Assesment/plan    Dm- good control  cont lantus 40 units  humalog 10 ac tid  fsg ac and hs  compliance d/w pt  cardio and neuro f/u  await tx to see

## 2019-07-24 ENCOUNTER — TRANSCRIPTION ENCOUNTER (OUTPATIENT)
Age: 54
End: 2019-07-24

## 2019-07-24 VITALS
OXYGEN SATURATION: 100 % | SYSTOLIC BLOOD PRESSURE: 122 MMHG | RESPIRATION RATE: 18 BRPM | TEMPERATURE: 98 F | DIASTOLIC BLOOD PRESSURE: 76 MMHG | HEART RATE: 76 BPM

## 2019-07-24 LAB
ANION GAP SERPL CALC-SCNC: 5 MMOL/L — SIGNIFICANT CHANGE UP (ref 5–17)
BUN SERPL-MCNC: 23 MG/DL — HIGH (ref 7–18)
CALCIUM SERPL-MCNC: 8.7 MG/DL — SIGNIFICANT CHANGE UP (ref 8.4–10.5)
CHLORIDE SERPL-SCNC: 105 MMOL/L — SIGNIFICANT CHANGE UP (ref 96–108)
CO2 SERPL-SCNC: 27 MMOL/L — SIGNIFICANT CHANGE UP (ref 22–31)
CREAT SERPL-MCNC: 1.28 MG/DL — SIGNIFICANT CHANGE UP (ref 0.5–1.3)
DNA PLOIDY SPEC FC-IMP: SIGNIFICANT CHANGE UP
GLUCOSE BLDC GLUCOMTR-MCNC: 185 MG/DL — HIGH (ref 70–99)
GLUCOSE BLDC GLUCOMTR-MCNC: 190 MG/DL — HIGH (ref 70–99)
GLUCOSE BLDC GLUCOMTR-MCNC: 220 MG/DL — HIGH (ref 70–99)
GLUCOSE SERPL-MCNC: 179 MG/DL — HIGH (ref 70–99)
HCT VFR BLD CALC: 42.5 % — SIGNIFICANT CHANGE UP (ref 39–50)
HGB BLD-MCNC: 14.2 G/DL — SIGNIFICANT CHANGE UP (ref 13–17)
MAGNESIUM SERPL-MCNC: 1.9 MG/DL — SIGNIFICANT CHANGE UP (ref 1.6–2.6)
MCHC RBC-ENTMCNC: 29.3 PG — SIGNIFICANT CHANGE UP (ref 27–34)
MCHC RBC-ENTMCNC: 33.4 GM/DL — SIGNIFICANT CHANGE UP (ref 32–36)
MCV RBC AUTO: 87.8 FL — SIGNIFICANT CHANGE UP (ref 80–100)
MTHFR GENE INTERPRETATION: SIGNIFICANT CHANGE UP
NRBC # BLD: 0 /100 WBCS — SIGNIFICANT CHANGE UP (ref 0–0)
PHOSPHATE SERPL-MCNC: 3.8 MG/DL — SIGNIFICANT CHANGE UP (ref 2.5–4.5)
PLATELET # BLD AUTO: 230 K/UL — SIGNIFICANT CHANGE UP (ref 150–400)
POTASSIUM SERPL-MCNC: 4 MMOL/L — SIGNIFICANT CHANGE UP (ref 3.5–5.3)
POTASSIUM SERPL-SCNC: 4 MMOL/L — SIGNIFICANT CHANGE UP (ref 3.5–5.3)
PROT S FREE PPP-ACNC: 82 % NORMAL — SIGNIFICANT CHANGE UP (ref 70–150)
PTR INTERPRETATION: SIGNIFICANT CHANGE UP
RBC # BLD: 4.84 M/UL — SIGNIFICANT CHANGE UP (ref 4.2–5.8)
RBC # FLD: 12.4 % — SIGNIFICANT CHANGE UP (ref 10.3–14.5)
SODIUM SERPL-SCNC: 137 MMOL/L — SIGNIFICANT CHANGE UP (ref 135–145)
WBC # BLD: 4.6 K/UL — SIGNIFICANT CHANGE UP (ref 3.8–10.5)
WBC # FLD AUTO: 4.6 K/UL — SIGNIFICANT CHANGE UP (ref 3.8–10.5)

## 2019-07-24 PROCEDURE — 85027 COMPLETE CBC AUTOMATED: CPT

## 2019-07-24 PROCEDURE — 83735 ASSAY OF MAGNESIUM: CPT

## 2019-07-24 PROCEDURE — 84443 ASSAY THYROID STIM HORMONE: CPT

## 2019-07-24 PROCEDURE — 81240 F2 GENE: CPT

## 2019-07-24 PROCEDURE — 86146 BETA-2 GLYCOPROTEIN ANTIBODY: CPT

## 2019-07-24 PROCEDURE — 93005 ELECTROCARDIOGRAM TRACING: CPT

## 2019-07-24 PROCEDURE — 82962 GLUCOSE BLOOD TEST: CPT

## 2019-07-24 PROCEDURE — 80053 COMPREHEN METABOLIC PANEL: CPT

## 2019-07-24 PROCEDURE — 87086 URINE CULTURE/COLONY COUNT: CPT

## 2019-07-24 PROCEDURE — 71045 X-RAY EXAM CHEST 1 VIEW: CPT

## 2019-07-24 PROCEDURE — 83550 IRON BINDING TEST: CPT

## 2019-07-24 PROCEDURE — 81241 F5 GENE: CPT

## 2019-07-24 PROCEDURE — 82607 VITAMIN B-12: CPT

## 2019-07-24 PROCEDURE — 81001 URINALYSIS AUTO W/SCOPE: CPT

## 2019-07-24 PROCEDURE — 84484 ASSAY OF TROPONIN QUANT: CPT

## 2019-07-24 PROCEDURE — 82550 ASSAY OF CK (CPK): CPT

## 2019-07-24 PROCEDURE — 70553 MRI BRAIN STEM W/O & W/DYE: CPT

## 2019-07-24 PROCEDURE — 85307 ASSAY ACTIVATED PROTEIN C: CPT

## 2019-07-24 PROCEDURE — 80061 LIPID PANEL: CPT

## 2019-07-24 PROCEDURE — 82746 ASSAY OF FOLIC ACID SERUM: CPT

## 2019-07-24 PROCEDURE — 80048 BASIC METABOLIC PNL TOTAL CA: CPT

## 2019-07-24 PROCEDURE — 85610 PROTHROMBIN TIME: CPT

## 2019-07-24 PROCEDURE — 83036 HEMOGLOBIN GLYCOSYLATED A1C: CPT

## 2019-07-24 PROCEDURE — 83880 ASSAY OF NATRIURETIC PEPTIDE: CPT

## 2019-07-24 PROCEDURE — 85303 CLOT INHIBIT PROT C ACTIVITY: CPT

## 2019-07-24 PROCEDURE — 85730 THROMBOPLASTIN TIME PARTIAL: CPT

## 2019-07-24 PROCEDURE — 83540 ASSAY OF IRON: CPT

## 2019-07-24 PROCEDURE — 84100 ASSAY OF PHOSPHORUS: CPT

## 2019-07-24 PROCEDURE — 85300 ANTITHROMBIN III ACTIVITY: CPT

## 2019-07-24 PROCEDURE — 82378 CARCINOEMBRYONIC ANTIGEN: CPT

## 2019-07-24 PROCEDURE — 81291 MTHFR GENE: CPT

## 2019-07-24 PROCEDURE — 70450 CT HEAD/BRAIN W/O DYE: CPT

## 2019-07-24 PROCEDURE — 85613 RUSSELL VIPER VENOM DILUTED: CPT

## 2019-07-24 PROCEDURE — 36415 COLL VENOUS BLD VENIPUNCTURE: CPT

## 2019-07-24 PROCEDURE — 82728 ASSAY OF FERRITIN: CPT

## 2019-07-24 PROCEDURE — 83090 ASSAY OF HOMOCYSTEINE: CPT

## 2019-07-24 PROCEDURE — 82009 KETONE BODYS QUAL: CPT

## 2019-07-24 PROCEDURE — G0103: CPT

## 2019-07-24 PROCEDURE — 82306 VITAMIN D 25 HYDROXY: CPT

## 2019-07-24 PROCEDURE — 99285 EMERGENCY DEPT VISIT HI MDM: CPT | Mod: 25

## 2019-07-24 PROCEDURE — 93306 TTE W/DOPPLER COMPLETE: CPT

## 2019-07-24 PROCEDURE — 85306 CLOT INHIBIT PROT S FREE: CPT

## 2019-07-24 RX ORDER — MECLIZINE HCL 12.5 MG
1 TABLET ORAL
Qty: 90 | Refills: 0
Start: 2019-07-24 | End: 2019-08-22

## 2019-07-24 RX ORDER — ATORVASTATIN CALCIUM 80 MG/1
1 TABLET, FILM COATED ORAL
Qty: 30 | Refills: 0
Start: 2019-07-24 | End: 2019-08-22

## 2019-07-24 RX ORDER — METFORMIN HYDROCHLORIDE 850 MG/1
1 TABLET ORAL
Qty: 60 | Refills: 0
Start: 2019-07-24 | End: 2019-08-22

## 2019-07-24 RX ORDER — INSULIN LISPRO 100/ML
12 VIAL (ML) SUBCUTANEOUS
Refills: 0 | Status: DISCONTINUED | OUTPATIENT
Start: 2019-07-24 | End: 2019-07-24

## 2019-07-24 RX ORDER — METOPROLOL TARTRATE 50 MG
1 TABLET ORAL
Qty: 0 | Refills: 0 | DISCHARGE
Start: 2019-07-24

## 2019-07-24 RX ORDER — ASPIRIN/CALCIUM CARB/MAGNESIUM 324 MG
1 TABLET ORAL
Qty: 30 | Refills: 0
Start: 2019-07-24 | End: 2019-08-22

## 2019-07-24 RX ADMIN — Medication 12 UNIT(S): at 12:13

## 2019-07-24 RX ADMIN — LISINOPRIL 2.5 MILLIGRAM(S): 2.5 TABLET ORAL at 05:34

## 2019-07-24 RX ADMIN — Medication 1: at 12:13

## 2019-07-24 RX ADMIN — Medication 1: at 08:20

## 2019-07-24 RX ADMIN — Medication 325 MILLIGRAM(S): at 12:13

## 2019-07-24 RX ADMIN — Medication 25 MILLIGRAM(S): at 05:34

## 2019-07-24 RX ADMIN — Medication 10 UNIT(S): at 08:21

## 2019-07-24 RX ADMIN — Medication 25 MILLIGRAM(S): at 05:33

## 2019-07-24 NOTE — PROGRESS NOTE ADULT - ASSESSMENT
54 year old male has past medical history of HTN, DM, HLD, CAD s/p CABG, CVA x2, came in with dizziness,+B/L CVA.  1.Tele monitoring.  2.CVA-asa,statin.  3.DM-insulin.  4.CAD-cont cardiac medication.  5.Neurology f/u.  6.Check hypercoag-p.  7.Pt had CVA and  ABRAHAN 2/19, if hypercoag negative will need ILR.  8.GI and DVT prophylaxis.  9.Stress test in 4-6 weeks.
54 year old male has past medical history of HTN, DM, HLD, CAD s/p CABG, CVA x2, came in with dizziness,+B/L CVA.  1.D/C Tele monitoring.  2.CVA-asa,statin.  3.DM-insulin.  4.CAD-cont cardiac medication.  5.Hypercoag-.  6.S/P ILR at [facility].  7.GI and DVT prophylaxis.  8.Stress test in 4-6 weeks.
54 year old male has past medical history of HTN, DM, HLD, CAD s/p CABG, CVA x2, came in with dizziness,+B/L CVA.  1.Tele monitoring.  2.CVA-asa,statin.  3.DM-insulin.  4.CAD-cont cardiac medication.  5.Neurology f/u.  6.Check hypercoag-p.  7.Pt had CVA and  ABRAHAN 2/19, if hypercoag negative will need ILR.  8.GI and DVT prophylaxis.  9.Stress test in 4-6 weeks.
54 year old male has past medical history of HTN, DM, HLD, CAD s/p CABG, CVA x2, came in with dizziness,+B/L CVA.  1.Tele monitoring.  2.CVA-asa,statin.  3.DM-insulin.  4.CAD-cont cardiac medication.  5.Neurology f/u.  6.Check hypercoag-p.  7.Echocardiogram  8.GI and DVT prophylaxis.
54 year old male has past medical history of HTN, DM, HLD, CAD s/p CABG, CVA x2, came in with dizziness,+B/L CVA.  1.Tele monitoring.  2.CVA-asa,statin.  3.DM-insulin.  4.CAD-cont cardiac medication.  5.Neurology f/u.  6.Check hypercoag-p.  7.Hypercoag negative,D/W Fr. Ismail for  ILR at [facility].  8.GI and DVT prophylaxis.  9.Stress test in 4-6 weeks.
54 year old male has past medical history of HTN, DM, HLD, CAD s/p CABG, CVA x2, came in with dizziness,+B/L CVA.  1.Tele monitoring.  2.CVA-asa,statin.  3.DM-insulin.  4.CAD-cont cardiac medication.  5.Neurology f/u.  6.Hypercoag-.  7.Hypercoag negative,D/W Fr. Ismail for  ILR at [facility].  8.GI and DVT prophylaxis.  9.Stress test in 4-6 weeks.
Patient is 54 year old male has past medical history of HTN, DM, HLD, CAD s/p CABG, CVA x2, came in after having vertigo for 3 weeks.
Patient is 54 year old male has past medical history of HTN, DM, HLD, CAD s/p CABG, CVA x2, came in after having vertigo for 3 weeks.         MRI shows subacute infarcts in the left PCA and   bilateral PICA territory.  Patient Upgrated to TELI
Patient is 54 year old male has past medical history of HTN, DM, HLD, CAD s/p CABG, CVA x2, came in after having vertigo for 3 weeks.         MRI shows subacute infarcts in the left PCA and   bilateral PICA territory.  Patient Upgrated to TELI

## 2019-07-24 NOTE — DISCHARGE NOTE NURSING/CASE MANAGEMENT/SOCIAL WORK - NSDCPEPTSTRK_GEN_ALL_CORE
Signs and symptoms of stroke/Stroke warning signs and symptoms/Risk factors for stroke/Stroke support groups for patients, families, and friends/Call 911 for stroke/Need for follow up after discharge/Prescribed medications/Stroke education booklet

## 2019-07-24 NOTE — PROGRESS NOTE ADULT - PROBLEM SELECTOR PLAN 6
blood sugars are well controlled.  cw  lantus 40  c/w lispro 10tid  [facility]  Dr Yao

## 2019-07-24 NOTE — DISCHARGE NOTE PROVIDER - HOSPITAL COURSE
Patient is 54 year old male has past medical history of HTN, DM, HLD, CAD s/p CABG, CVA x2, came in after having vertigo. He was started to have severe vertigo with spinning sensation worsened with any movement, improved by staying still, it affected his daily activity, he was staying in the bed all day, associated with gait imbalance and he had multiple fall without head trauma or LOC. His CT head was negative. Neurologist was consulted and he was started on high dose aspirin and statin as he was allergic to plavix. Mri showed left occipital infarct and abnormality in inferior cerebellar hemisphere. Cardiologist was consulted and ECHO showed normal EF with no intracardiac shunt. She is recommending stress test in 4-6 weeks. Hypercoagulability work up was negative so far and some test results are pending. He got Implantable loop recorder. Given patient's improved clinical status and current hemodynamic stability, decision was made to discharge. Discussed with attending    Please refer to patient's complete medical chart with documents for a full hospital course, for this is only a brief summary. Patient is 54 year old male has past medical history of HTN, DM, HLD, CAD s/p CABG, CVA x2, came in after having vertigo. He was started to have severe vertigo with spinning sensation worsened with any movement, improved by staying still, it affected his daily activity, he was staying in the bed all day, associated with gait imbalance and he had multiple fall without head trauma or LOC. His CT head was negative. Neurologist was consulted and he was started on high dose aspirin and statin as he was allergic to plavix. Mri showed left occipital infarct and abnormality in inferior cerebellar hemisphere. It showed possible sub acute infarct vs neoplasm. We recommend MRI with contrast in 6 months. Cardiologist was consulted and ECHO showed normal EF with no intracardiac shunt. She is recommending stress test in 4-6 weeks. Hypercoagulability work up was negative so far and some test results are pending. He got Implantable loop recorder. Given patient's improved clinical status and current hemodynamic stability, decision was made to discharge. Discussed with attending    Please refer to patient's complete medical chart with documents for a full hospital course, for this is only a brief summary.

## 2019-07-24 NOTE — PROGRESS NOTE ADULT - SUBJECTIVE AND OBJECTIVE BOX
Interval Events:  pt in and    Allergies    Plavix (Other)    Intolerances      Endocrine/Metabolic Medications:  atorvastatin 80 milliGRAM(s) Oral at bedtime  glucagon  Injectable 1 milliGRAM(s) IntraMuscular once PRN  insulin glargine Injectable (LANTUS) 40 Unit(s) SubCutaneous at bedtime  insulin lispro (HumaLOG) corrective regimen sliding scale   SubCutaneous Before meals and at bedtime  insulin lispro Injectable (HumaLOG) 12 Unit(s) SubCutaneous three times a day before meals      Vital Signs Last 24 Hrs  T(C): 37.1 (24 Jul 2019 07:16), Max: 37.1 (24 Jul 2019 07:16)  T(F): 98.7 (24 Jul 2019 07:16), Max: 98.7 (24 Jul 2019 07:16)  HR: 69 (24 Jul 2019 07:16) (69 - 84)  BP: 122/82 (24 Jul 2019 07:16) (120/85 - 137/89)  BP(mean): --  RR: 18 (24 Jul 2019 07:16) (18 - 19)  SpO2: 100% (24 Jul 2019 07:16) (100% - 100%)      PHYSICAL EXAM  All physical exam findings normal, except those marked:  General:	Alert, active, cooperative, NAD, well hydrated  .		[] Abnormal:  Neck		Normal: supple, no cervical adenopathy, no palpable thyroid  .		[] Abnormal:  Cardiovascular	Normal: regular rate, normal S1, S2, no murmurs  .		[] Abnormal:  Respiratory	Normal: no chest wall deformity, normal respiratory pattern, CTA B/L  .		[] Abnormal:  Abdominal	Normal: soft, ND, NT, bowel sounds present, no masses, no organomegaly  .		[] Abnormal:  		Normal normal genitalia, testes descended, circumcised/uncircumcised  .		Lupe stage:			Breast lupe:  .		Menstrual history:  .		[] Abnormal:  Extremities	Normal: FROM x4  .		[] Abnormal:  Skin		Normal: intact and not indurated, no rash, no acanthosis nigricans  .		[] Abnormal:  Neurologic	Normal: grossly intact  .		[] Abnormal:    LABS                        14.2   4.60  )-----------( 230      ( 24 Jul 2019 07:56 )             42.5                               137    |  105    |  23                  Calcium: 8.7   / iCa: x      (07-24 @ 07:56)    ----------------------------<  179       Magnesium: 1.9                              4.0     |  27     |  1.28             Phosphorous: 3.8        CAPILLARY BLOOD GLUCOSE      POCT Blood Glucose.: 190 mg/dL (24 Jul 2019 07:57)  POCT Blood Glucose.: 220 mg/dL (24 Jul 2019 00:03)  POCT Blood Glucose.: 343 mg/dL (23 Jul 2019 22:11)  POCT Blood Glucose.: 295 mg/dL (23 Jul 2019 17:22)  POCT Blood Glucose.: 166 mg/dL (23 Jul 2019 12:24)  POCT Blood Glucose.: 182 mg/dL (23 Jul 2019 11:14)        Assesment/plan    Dm- better controlled  change humalog to 12 ac tid  cont lantus 40   fsg ac and hs  compliance with insulin d/w pt  d/c planning per prim team  d/w hs

## 2019-07-24 NOTE — PROGRESS NOTE ADULT - PROVIDER SPECIALTY LIST ADULT
Cardiology
Endocrinology
Internal Medicine
Neurology
Neurology
Cardiology
Neurology
Neurology
Cardiology
Cardiology

## 2019-07-24 NOTE — DISCHARGE NOTE PROVIDER - CARE PROVIDER_API CALL
Jignesh Pimetnel)  Neurology  Epilepsy  [address], [address]  [place] [address]  Phone: [phone]  Follow Up Time:     Sabrina Yao)  EndocrinologyMetabDiabetes  [address] [place] [address]  Phone: [phone]  Fax: [phone]  Follow Up Time:     Kallie Wall)  Internal Medicine  [address] [place] [address]  Phone: [phone]  Fax: [phone]  Follow Up Time:

## 2019-07-24 NOTE — PROGRESS NOTE ADULT - REASON FOR ADMISSION
vertigo/ Dizziness

## 2019-07-24 NOTE — PROGRESS NOTE ADULT - PROBLEM SELECTOR PLAN 1
S/P ILR  c/w aspirin  c/w statin  C/W meclizine prn  f/u Hypercoagulation work up  Dr Pimentel  f/u pt/ot for vertigo.          -

## 2019-07-24 NOTE — PROGRESS NOTE ADULT - SUBJECTIVE AND OBJECTIVE BOX
CHIEF COMPLAINT:Patient is a 54y old  Male who presents with a chief complaint of vertigo/ Dizziness.Pt appears comfortable.    	  REVIEW OF SYSTEMS:  CONSTITUTIONAL: No fever, weight loss, or fatigue  EYES: No eye pain, visual disturbances, or discharge  ENT:  No difficulty hearing, tinnitus, vertigo; No sinus or throat pain  NECK: No pain or stiffness  RESPIRATORY: No cough, wheezing, chills or hemoptysis; No Shortness of Breath  CARDIOVASCULAR: No chest pain, palpitations, passing out, dizziness, or leg swelling  GASTROINTESTINAL: No abdominal or epigastric pain. No nausea, vomiting, or hematemesis; No diarrhea or constipation. No melena or hematochezia.  GENITOURINARY: No dysuria, frequency, hematuria, or incontinence  NEUROLOGICAL: No headaches, memory loss, loss of strength, numbness, or tremors  SKIN: No itching, burning, rashes, or lesions   LYMPH Nodes: No enlarged glands  ENDOCRINE: No heat or cold intolerance; No hair loss  MUSCULOSKELETAL: No joint pain or swelling; No muscle, back, or extremity pain  PSYCHIATRIC: No depression, anxiety, mood swings, or difficulty sleeping  HEME/LYMPH: No easy bruising, or bleeding gums  ALLERGY AND IMMUNOLOGIC: No hives or eczema	      PHYSICAL EXAM:  T(C): 37.1 (07-24-19 @ 07:16), Max: 37.1 (07-24-19 @ 07:16)  HR: 69 (07-24-19 @ 07:16) (69 - 84)  BP: 122/82 (07-24-19 @ 07:16) (120/85 - 137/89)  RR: 18 (07-24-19 @ 07:16) (18 - 19)  SpO2: 100% (07-24-19 @ 07:16) (100% - 100%)    I&O's Summary    23 Jul 2019 07:01  -  24 Jul 2019 07:00  --------------------------------------------------------  IN: 50 mL / OUT: 350 mL / NET: -300 mL        Appearance: Normal	  HEENT:   Normal oral mucosa, PERRL, EOMI	  Lymphatic: No lymphadenopathy  Cardiovascular: Normal S1 S2, No JVD, No murmurs, No edema  Respiratory: Lungs clear to auscultation	  Psychiatry: A & O x 3, Mood & affect appropriate  Gastrointestinal:  Soft, Non-tender, + BS	  Skin: No rashes, No ecchymoses, No cyanosis	  Neurologic: Non-focal  Extremities: Normal range of motion, No clubbing, cyanosis or edema  Vascular: Peripheral pulses palpable 2+ bilaterally    MEDICATIONS  (STANDING):  aspirin enteric coated 325 milliGRAM(s) Oral daily  atorvastatin 80 milliGRAM(s) Oral at bedtime  enoxaparin Injectable 40 milliGRAM(s) SubCutaneous daily  insulin glargine Injectable (LANTUS) 40 Unit(s) SubCutaneous at bedtime  insulin lispro (HumaLOG) corrective regimen sliding scale   SubCutaneous Before meals and at bedtime  insulin lispro Injectable (HumaLOG) 12 Unit(s) SubCutaneous three times a day before meals  lisinopril 2.5 milliGRAM(s) Oral daily  metoprolol tartrate 25 milliGRAM(s) Oral two times a day      	  LABS:	 	                       14.2   4.60  )-----------( 230      ( 24 Jul 2019 07:56 )             42.5     07-24    137  |  105  |  23<H>  ----------------------------<  179<H>  4.0   |  27  |  1.28    Ca    8.7      24 Jul 2019 07:56  Phos  3.8     07-24  Mg     1.9     07-24      proBNP: Serum Pro-Brain Natriuretic Peptide: 325 pg/mL (07-17 @ 12:05)    Lipid Profile: Cholesterol 146  LDL 89  HDL 42  TG 75    HgA1c: Hemoglobin A1C, Whole Blood: 12.3 % (07-18 @ 10:04)    TSH: Thyroid Stimulating Hormone, Serum: 0.72 uU/mL (07-18 @ 06:31)

## 2019-07-24 NOTE — DISCHARGE NOTE PROVIDER - PROVIDER TOKENS
PROVIDER:[TOKEN:[[id number]]],PROVIDER:[TOKEN:[[id number]]],PROVIDER:[TOKEN:[[id number]]]

## 2019-07-24 NOTE — DISCHARGE NOTE PROVIDER - NSDCCPCAREPLAN_GEN_ALL_CORE_FT
PRINCIPAL DISCHARGE DIAGNOSIS  Diagnosis: Cerebrovascular accident (CVA), unspecified mechanism  Assessment and Plan of Treatment: You came here with  gait imbalance and he had multiple fall without head trauma or LOC. Your CT head was negative for stroke. Neurologist was consulted and you were  started on high dose aspirin and statin as you wer allergic to plavix. Mri showed left occipital infarct and abnormality in inferior cerebellar hemisphere. Cardiologist was consulted and ECHO showed normal EF with no intracardiac shunt. You have to follow up with  stress test in 4-6 weeks. Hypercoagulability work up was negative so far and some test results are pending. Implantable loop recorder was placed and it detects any skipped beats of heart.   . It is recommended you go to Outpatient vestibular physical therapy, and your take your aspirin and plavix for 3 months, and follow up with a neurologist outpatient, as well as your primary doctor.        SECONDARY DISCHARGE DIAGNOSES  Diagnosis: HTN (hypertension)  Assessment and Plan of Treatment: You have a history of hypertension. Your blood pressure has been controlled with lisinopril and metoprolol. Continue with your medications as prescribed. You are advised to eat DASH diet. Please monitor your blood pressure daily, record it and take it to your primary doctor. Follow up with your Primary medical doctor.      Diagnosis: DM (diabetes mellitus)  Assessment and Plan of Treatment: You have high blood sugar. You are non compliant with medications. Your Hba1c is 12.3 Please continue Levemir 40 unints and Lispro is increased from 8 units to 12 unints. Follow up with your primary medical doctor and Endocrinologist for Hba1c level measurement. Your sugars are well controlled. You were given education about diabetes. Please carry with you a candy, eat it whenever you feel dizzy, sweating a lot and weak that may be due to low blood glucose. Please take the medications regularly and go to appointments regularly.  Outpatient follow up with ophthalmologist due to possible diabetic retinopathy.  Please follow up with your PCp within 1-2 weeks of discharge from the hospital for continued care.      Diagnosis: Vertigo  Assessment and Plan of Treatment: Please take meclizine as needed and follow up with Outpatient Vestibular therapy recommended by PT. PRINCIPAL DISCHARGE DIAGNOSIS  Diagnosis: Cerebrovascular accident (CVA), unspecified mechanism  Assessment and Plan of Treatment: You came here with  gait imbalance and he had multiple fall without head trauma or LOC. Your CT head was negative for stroke. Neurologist was consulted and you were  started on high dose aspirin and statin as you wer allergic to plavix. Mri showed left occipital infarct and abnormality in inferior cerebellar hemisphere. Cardiologist was consulted and ECHO showed normal EF with no intracardiac shunt. You have to follow up with  stress test in 4-6 weeks. Hypercoagulability work up was negative so far and some test results are pending. Implantable loop recorder was placed and it detects any skipped beats of heart. It showed possible sub acute infarct vs neoplasm. We recommend MRI with contrast in 6 months for further evaluation and Neurologist as follow up. It is recommended you go to Outpatient vestibular physical therapy, and your take your aspirin and plavix for 3 months, and follow up with a neurologist outpatient, as well as your primary doctor.        SECONDARY DISCHARGE DIAGNOSES  Diagnosis: HTN (hypertension)  Assessment and Plan of Treatment: You have a history of hypertension. Your blood pressure has been controlled with lisinopril and metoprolol. Continue with your medications as prescribed. You are advised to eat DASH diet. Please monitor your blood pressure daily, record it and take it to your primary doctor. Follow up with your Primary medical doctor.      Diagnosis: DM (diabetes mellitus)  Assessment and Plan of Treatment: You have high blood sugar. You are non compliant with medications. Your Hba1c is 12.3 Please continue Levemir 40 unints and Lispro is increased from 8 units to 12 unints. Follow up with your primary medical doctor and Endocrinologist for Hba1c level measurement. Your sugars are well controlled. You were given education about diabetes. Please carry with you a candy, eat it whenever you feel dizzy, sweating a lot and weak that may be due to low blood glucose. Please take the medications regularly and go to appointments regularly.  Outpatient follow up with ophthalmologist due to possible diabetic retinopathy.  Please follow up with your PCp within 1-2 weeks of discharge from the hospital for continued care.      Diagnosis: Vertigo  Assessment and Plan of Treatment: Please take meclizine as needed and follow up with Outpatient Vestibular therapy recommended by PT.

## 2019-07-24 NOTE — DISCHARGE NOTE NURSING/CASE MANAGEMENT/SOCIAL WORK - NSDCDPATPORTLINK_GEN_ALL_CORE
You can access the [facility] Portal, offered by [facility], by registering with the following website: [email or web address]

## 2019-07-24 NOTE — PROGRESS NOTE ADULT - SUBJECTIVE AND OBJECTIVE BOX
PGY 1 Note discussed with supervising resident and primary attending    Patient is a 54y old  Male who presents with a chief complaint of vertigo/ Dizziness (24 Jul 2019 08:47)      INTERVAL HPI/OVERNIGHT EVENTS: Pt had ILR.    MEDICATIONS  (STANDING):  aspirin enteric coated 325 milliGRAM(s) Oral daily  atorvastatin 80 milliGRAM(s) Oral at bedtime  enoxaparin Injectable 40 milliGRAM(s) SubCutaneous daily  insulin glargine Injectable (LANTUS) 40 Unit(s) SubCutaneous at bedtime  insulin lispro (HumaLOG) corrective regimen sliding scale   SubCutaneous Before meals and at bedtime  insulin lispro Injectable (HumaLOG) 12 Unit(s) SubCutaneous three times a day before meals  lisinopril 2.5 milliGRAM(s) Oral daily  metoprolol tartrate 25 milliGRAM(s) Oral two times a day    MEDICATIONS  (PRN):  glucagon  Injectable 1 milliGRAM(s) IntraMuscular once PRN Glucose <70 milliGRAM(s)/deciLiter  meclizine 25 milliGRAM(s) Oral every 8 hours PRN Dizziness/ vertigo      __________________________________________________  REVIEW OF SYSTEMS:  Patient denies cough, sputum production, fevers/chills/nausea/vomiting. No diarrhea, abdominal pain.      Vital Signs Last 24 Hrs  T(C): 37.1 (24 Jul 2019 07:16), Max: 37.1 (24 Jul 2019 07:16)  T(F): 98.7 (24 Jul 2019 07:16), Max: 98.7 (24 Jul 2019 07:16)  HR: 69 (24 Jul 2019 07:16) (69 - 84)  BP: 122/82 (24 Jul 2019 07:16) (120/85 - 137/89)  BP(mean): --  RR: 18 (24 Jul 2019 07:16) (18 - 19)  SpO2: 100% (24 Jul 2019 07:16) (100% - 100%)    ________________________________________________  PHYSICAL EXAM:  GENERAL: NAD  HEENT: Normocephalic;  conjunctivae and sclerae clear; moist mucous membranes;   NECK : supple  CHEST/LUNG: Clear to auscultation bilaterally with good air entry   HEART: S1 S2  regular; no murmurs, gallops or rubs  ABDOMEN: Soft, Nontender, Nondistended; Bowel sounds present  EXTREMITIES: no cyanosis; no edema; no calf tenderness  SKIN: warm and dry; no rash  NERVOUS SYSTEM:  Awake and alert; Oriented  to place, person and time ; no new deficits    _________________________________________________  LABS:                        14.2   4.60  )-----------( 230      ( 24 Jul 2019 07:56 )             42.5     07-24    137  |  105  |  23<H>  ----------------------------<  179<H>  4.0   |  27  |  1.28    Ca    8.7      24 Jul 2019 07:56  Phos  3.8     07-24  Mg     1.9     07-24          CAPILLARY BLOOD GLUCOSE      POCT Blood Glucose.: 190 mg/dL (24 Jul 2019 07:57)  POCT Blood Glucose.: 220 mg/dL (24 Jul 2019 00:03)  POCT Blood Glucose.: 343 mg/dL (23 Jul 2019 22:11)  POCT Blood Glucose.: 295 mg/dL (23 Jul 2019 17:22)  POCT Blood Glucose.: 166 mg/dL (23 Jul 2019 12:24)  POCT Blood Glucose.: 182 mg/dL (23 Jul 2019 11:14)

## 2019-08-07 ENCOUNTER — APPOINTMENT (OUTPATIENT)
Dept: ELECTROPHYSIOLOGY | Facility: CLINIC | Age: 54
End: 2019-08-07

## 2019-08-23 ENCOUNTER — APPOINTMENT (OUTPATIENT)
Dept: ELECTROPHYSIOLOGY | Facility: CLINIC | Age: 54
End: 2019-08-23
Payer: MEDICAID

## 2019-08-23 PROCEDURE — 93298 REM INTERROG DEV EVAL SCRMS: CPT

## 2019-08-23 PROCEDURE — 93299: CPT

## 2019-10-24 ENCOUNTER — APPOINTMENT (OUTPATIENT)
Dept: ELECTROPHYSIOLOGY | Facility: CLINIC | Age: 54
End: 2019-10-24

## 2019-10-31 PROCEDURE — 84443 ASSAY THYROID STIM HORMONE: CPT

## 2019-10-31 PROCEDURE — 82607 VITAMIN B-12: CPT

## 2019-10-31 PROCEDURE — 85610 PROTHROMBIN TIME: CPT

## 2019-10-31 PROCEDURE — 83880 ASSAY OF NATRIURETIC PEPTIDE: CPT

## 2019-10-31 PROCEDURE — 80048 BASIC METABOLIC PNL TOTAL CA: CPT

## 2019-10-31 PROCEDURE — 84484 ASSAY OF TROPONIN QUANT: CPT

## 2019-10-31 PROCEDURE — 86780 TREPONEMA PALLIDUM: CPT

## 2019-10-31 PROCEDURE — 82550 ASSAY OF CK (CPK): CPT

## 2019-10-31 PROCEDURE — 99285 EMERGENCY DEPT VISIT HI MDM: CPT | Mod: 25

## 2019-10-31 PROCEDURE — 93005 ELECTROCARDIOGRAM TRACING: CPT

## 2019-10-31 PROCEDURE — 83036 HEMOGLOBIN GLYCOSYLATED A1C: CPT

## 2019-10-31 PROCEDURE — 70450 CT HEAD/BRAIN W/O DYE: CPT

## 2019-10-31 PROCEDURE — 82553 CREATINE MB FRACTION: CPT

## 2019-10-31 PROCEDURE — 86593 SYPHILIS TEST NON-TREP QUANT: CPT

## 2019-10-31 PROCEDURE — 71046 X-RAY EXAM CHEST 2 VIEWS: CPT

## 2019-10-31 PROCEDURE — 80061 LIPID PANEL: CPT

## 2019-10-31 PROCEDURE — 80053 COMPREHEN METABOLIC PANEL: CPT

## 2019-10-31 PROCEDURE — 94640 AIRWAY INHALATION TREATMENT: CPT

## 2019-10-31 PROCEDURE — 86592 SYPHILIS TEST NON-TREP QUAL: CPT

## 2019-10-31 PROCEDURE — 85730 THROMBOPLASTIN TIME PARTIAL: CPT

## 2019-10-31 PROCEDURE — 85027 COMPLETE CBC AUTOMATED: CPT

## 2019-10-31 PROCEDURE — 82962 GLUCOSE BLOOD TEST: CPT

## 2019-10-31 PROCEDURE — 82803 BLOOD GASES ANY COMBINATION: CPT

## 2019-10-31 PROCEDURE — 36415 COLL VENOUS BLD VENIPUNCTURE: CPT

## 2019-10-31 PROCEDURE — 82306 VITAMIN D 25 HYDROXY: CPT

## 2019-10-31 PROCEDURE — 83735 ASSAY OF MAGNESIUM: CPT

## 2019-10-31 PROCEDURE — 84100 ASSAY OF PHOSPHORUS: CPT

## 2019-10-31 PROCEDURE — 82746 ASSAY OF FOLIC ACID SERUM: CPT

## 2019-11-18 NOTE — ED ADULT NURSE NOTE - NS ED NURSE REPORT GIVEN DT
[facility]   [address], [facility] [address]  [place] [address]  Phone: [phone]  Fax: [phone]              Socrates Falk  1956    Encounter Date: 11/18/2019    STACIE Araujo          PROGRESS NOTE:  Chief Complaint   Patient presents with   • Coronary Artery Disease       Subjective:   Socrates Falk is a 63 y.o. male who presents today for follow up CAD.    He is followed by Dr. Rea in our clinic, history of cad S/P pci mid and proximal LAD, hypertension, hyperlipidemia and carotid artery stenosis.    Since reducing his metoprolol and starting his ramipril again. The numbness and tingling sensation resolved. This weekend patient stated he was helping his son do some yard work without any difficulties.      He has lost 50lbs on dandre craigs diet.      Past Medical History:   Diagnosis Date   • Angina    • Backpain    • CAD (coronary artery disease)    • Chest pain, unspecified    • Coronary atherosclerosis of native coronary artery    • Esophageal reflux    • Esophageal reflux    • Essential hypertension, benign    • Hypertension    • Nonspecific abnormal unspecified cardiovascular function study    • Occlusion and stenosis of carotid artery without mention of cerebral infarction    • Other and unspecified angina pectoris    • Other and unspecified hyperlipidemia    • Personal history of arthritis    • Unspecified essential hypertension      Past Surgical History:   Procedure Laterality Date   • OTHER CARDIAC SURGERY  05/2010    stent x 1   • OTHER CARDIAC SURGERY  05/2009    stent x 2   • HERNIA REPAIR     • OTHER     • OTHER ABDOMINAL SURGERY      hernia   • PB REMOVAL OF TONSILS,<9 Y/O     • TONSILLECTOMY       Family History   Problem Relation Age of Onset   • Heart Disease Mother         cad   • Heart Disease Father         Aortic Aneurysm/cabg   • Heart Disease Brother         cabg     Social History     Socioeconomic History   • Marital  status:      Spouse name: Not on file   • Number of children: Not on file   • Years of education: Not on file   • Highest education level: Not on file   Occupational History   • Not on file   Social Needs   • Financial resource strain: Not on file   • Food insecurity:     Worry: Not on file     Inability: Not on file   • Transportation needs:     Medical: Not on file     Non-medical: Not on file   Tobacco Use   • Smoking status: Former Smoker     Last attempt to quit: 2006     Years since quittin.8   • Smokeless tobacco: Former User     Types: Chew     Quit date: 1990   • Tobacco comment: 10/2008   Substance and Sexual Activity   • Alcohol use: No   • Drug use: No   • Sexual activity: Not on file   Lifestyle   • Physical activity:     Days per week: Not on file     Minutes per session: Not on file   • Stress: Not on file   Relationships   • Social connections:     Talks on phone: Not on file     Gets together: Not on file     Attends [employer] service: Not on file     Active member of club or organization: Not on file     Attends meetings of clubs or organizations: Not on file     Relationship status: Not on file   • Intimate partner violence:     Fear of current or ex partner: Not on file     Emotionally abused: Not on file     Physically abused: Not on file     Forced sexual activity: Not on file   Other Topics Concern   • Not on file   Social History Narrative   • Not on file     No Known Allergies  Outpatient Encounter Medications as of 2019   Medication Sig Dispense Refill   • ramipril (ALTACE) 2.5 MG Cap Take 1 Cap by mouth every day. 30 Cap 3   • metoprolol (LOPRESSOR) 25 MG Tab Take 0.5 Tabs by mouth 2 times a day. 180 Tab 3   • tamsulosin (FLOMAX) 0.4 MG capsule Take 0.4 mg by mouth every day.     • pravastatin (PRAVACHOL) 20 MG Tab Take 1 Tab by mouth every day. 90 Tab 3   • psyllium (METAMUCIL) 58.12 % Pack Take 1 Packet by mouth every day.     • omeprazole (PRILOSEC) 20 MG CPDR  TAKE ONE CAPSULE BY MOUTH EVERY DAY 90 Cap 3   • aspirin (ASA) 81 MG CHEW Take 81 mg by mouth every day.     • metFORMIN ER (GLUCOPHAGE XR) 500 MG TABLET SR 24 HR Take 500 mg by mouth every day.       No facility-administered encounter medications on file as of 11/18/2019.      Review of Systems   Constitutional: Negative for malaise/fatigue.   Eyes: Negative for blurred vision and double vision.   Respiratory: Negative for cough, shortness of breath and wheezing.    Cardiovascular: Negative for chest pain, palpitations, orthopnea and leg swelling.   Musculoskeletal: Negative for falls.   Neurological: Negative for dizziness, tingling, sensory change, focal weakness, loss of consciousness, weakness and headaches.   Psychiatric/Behavioral: The patient is not nervous/anxious.    All other systems reviewed and are negative.       Objective:   /64 (BP Location: Left arm, Patient Position: Sitting, BP Cuff Size: Adult)   Pulse 64   Ht 1.829 m (6')   Wt 94.7 kg (208 lb 12.8 oz)   SpO2 95%   BMI 28.32 kg/m²      Physical Exam   Constitutional: He is oriented to person, place, and time. He appears well-developed and well-nourished. No distress.   HENT:   Head: Normocephalic and atraumatic.   Eyes: Pupils are equal, round, and reactive to light.   Neck: No JVD present.   Cardiovascular: Normal rate, regular rhythm and normal heart sounds.   No murmur heard.  Pulmonary/Chest: Effort normal and breath sounds normal.   Abdominal: Soft. Bowel sounds are normal. He exhibits no distension.   Musculoskeletal:         General: No edema.   Neurological: He is alert and oriented to person, place, and time.   Skin: Skin is warm and dry. He is not diaphoretic.   Psychiatric: He has a normal mood and affect. His behavior is normal. Judgment and thought content normal.       CARDIAC CATHETERIZATION CONCLUSIONS by Dr. Morales (08/25/11)  A 55-year-old male with the above clinical and cardiovascular history who has  angiographic evidence of normal left ventricular function, normal wall motion, patent mid LAD stent and additional noncritical coronary artery disease. The patient will be continued on aggressive medical therapy.     CARDIAC CATHETERIZATION CONCLUSIONS (05/17/10)  1. Three-vessel coronary artery disease with high-grade in-stent restenosis of the mid LAD, ostial high-grade D2 stenosis originating from the high-grade in-stent restenosis territory, nonobstructive obtuse marginal, and RCA stenosis.   2. Successful stent placement of the mid LAD with 3.0 x 15-mm [place] drug-eluting stent.   3. Successful PTCA of the ostial D2.     CARDIAC CATHETERIZATION CONCLUSIONS by Dr. Khan (05/06/09)  1. Normal left ventricular systolic and diastolic function.   2. High grade complex stenosis proximal left anterior descending coronary artery status post successful stenting of that vessel and successful stenting of a dissection distal to the initial stent with a second stent as described above. (3.0 X 15 mm [place] drug eluding stents x 2)  3. Moderate disease in two of the diagonal branches of the left anterior descending artery described above.   4. Plaquing disease in the circumflex and right coronary arteries.     CAROTID ULTRASOUND (12/21/16)  Mild bilateral internal carotid artery stenosis (<50%).      CAROTID ULTRASOUND (07/15/09)  Mild plaques noted.     ECHOCARDIOGRAM CONCLUSIONS (12/21/16)  Normal transthoracic echocardiogram.   Compared to the images of the prior study done  8/5/2009, there has   been no significant change, there is no longer features of mild   diastolic dysfunction.     ECHOCARDIOGRAM (08/05/09)  Unremarkable echocardiogram with ejection fraction of 60%.    Myocardial Perfusion  12/21/2016   Normal stress test.    No evidence of significant jeopardized viable myocardium or prior myocardial    infarction.   Normal left ventricular size, ejection fraction, and wall motion.   ECG INTERPRETATION   Negative  stress ECG for ischemia.    Lab Results   Component Value Date/Time    CHOLSTRLTOT 109 12/11/2018 10:28 AM    CHOLSTRLTOT 88 (L) 08/24/2011 12:40 PM    LDL 48 12/11/2018 10:28 AM    LDL 43 08/24/2011 12:40 PM    HDL 39 (L) 12/11/2018 10:28 AM    HDL 33 (L) 08/24/2011 12:40 PM    TRIGLYCERIDE 108 12/11/2018 10:28 AM    TRIGLYCERIDE 61 08/24/2011 12:40 PM       Lab Results   Component Value Date/Time    SODIUM 143 12/11/2018 10:28 AM    SODIUM 138 08/25/2011 03:55 AM    POTASSIUM 5.2 12/11/2018 10:28 AM    POTASSIUM 4.7 08/25/2011 03:55 AM    CHLORIDE 104 12/11/2018 10:28 AM    CHLORIDE 109 08/25/2011 03:55 AM    CO2 24 12/11/2018 10:28 AM    CO2 24 08/25/2011 03:55 AM    GLUCOSE 94 12/11/2018 10:28 AM    GLUCOSE 105 (H) 08/25/2011 03:55 AM    BUN 23 12/11/2018 10:28 AM    BUN 16 08/25/2011 03:55 AM    CREATININE 1.15 12/11/2018 10:28 AM    CREATININE 1.09 08/25/2011 03:55 AM    CREATININE 1.2 05/08/2009 03:30 AM    BUNCREATRAT 20 12/11/2018 10:28 AM     Lab Results   Component Value Date/Time    ALKPHOSPHAT 45 12/11/2018 10:28 AM    ALKPHOSPHAT 40 08/25/2011 03:55 AM    ASTSGOT 36 12/11/2018 10:28 AM    ASTSGOT 23 08/25/2011 03:55 AM    ALTSGPT 52 (H) 12/11/2018 10:28 AM    ALTSGPT 38 08/25/2011 03:55 AM    TBILIRUBIN 0.7 12/11/2018 10:28 AM    TBILIRUBIN 1.1 08/25/2011 03:55 AM        Assessment:     1. Coronary artery disease involving native coronary artery of native heart without angina pectoris     2. Stented coronary artery     3. Atherosclerosis of both carotid arteries         Medical Decision Making:  Today's Assessment / Status / Plan:     1.  CAD:  - denies any angina  - continue metoprolol 12.5mg BID and ramipril to 2.5mg qd   - continue asa and pravastatin 20mg qd     2. Numbness and tingling sensation:  - resolved, if another episodes occur we will obtain MPI  - cervical MRI normal per patient       Follow up in 3 months with Dr. Rea, sooner as needed.     Collaborating Provider: Dr. Jones                  Miladys Sheth, A.P.N.  [address] [place] [id number]  VIA Facsimile: [phone]                  21-Mar-2019 20:30

## 2019-12-12 NOTE — PROGRESS NOTE ADULT - PROBLEM/PLAN-8
DISPLAY PLAN FREE TEXT
10

## 2020-02-11 NOTE — ED ADULT NURSE NOTE - NSFALLRSKINDICATORS_ED_ALL_ED
Called and spoke with patient. Result given as well as Dr Smooth Herrera recommendation to bring back a semen sample in 1 month. Patient verbalized understanding. yes

## 2020-04-21 PROCEDURE — 99285 EMERGENCY DEPT VISIT HI MDM: CPT

## 2020-05-26 ENCOUNTER — EMERGENCY (EMERGENCY)
Facility: HOSPITAL | Age: 55
LOS: 1 days | Discharge: LEFT BEFORE DISCHARGE | End: 2020-05-26
Attending: EMERGENCY MEDICINE
Payer: MEDICAID

## 2020-05-26 VITALS
RESPIRATION RATE: 16 BRPM | HEART RATE: 76 BPM | DIASTOLIC BLOOD PRESSURE: 70 MMHG | HEIGHT: 73 IN | TEMPERATURE: 99 F | SYSTOLIC BLOOD PRESSURE: 140 MMHG | WEIGHT: 214.95 LBS | OXYGEN SATURATION: 100 %

## 2020-05-26 DIAGNOSIS — Z90.49 ACQUIRED ABSENCE OF OTHER SPECIFIED PARTS OF DIGESTIVE TRACT: Chronic | ICD-10-CM

## 2020-05-26 DIAGNOSIS — Z95.5 PRESENCE OF CORONARY ANGIOPLASTY IMPLANT AND GRAFT: Chronic | ICD-10-CM

## 2020-05-26 DIAGNOSIS — Z95.1 PRESENCE OF AORTOCORONARY BYPASS GRAFT: Chronic | ICD-10-CM

## 2020-05-26 LAB
ALBUMIN SERPL ELPH-MCNC: 3.1 G/DL — LOW (ref 3.5–5)
ALP SERPL-CCNC: 96 U/L — SIGNIFICANT CHANGE UP (ref 40–120)
ALT FLD-CCNC: 32 U/L DA — SIGNIFICANT CHANGE UP (ref 10–60)
ANION GAP SERPL CALC-SCNC: 5 MMOL/L — SIGNIFICANT CHANGE UP (ref 5–17)
AST SERPL-CCNC: 26 U/L — SIGNIFICANT CHANGE UP (ref 10–40)
BILIRUB SERPL-MCNC: 0.2 MG/DL — SIGNIFICANT CHANGE UP (ref 0.2–1.2)
BUN SERPL-MCNC: 27 MG/DL — HIGH (ref 7–18)
CALCIUM SERPL-MCNC: 8.2 MG/DL — LOW (ref 8.4–10.5)
CHLORIDE SERPL-SCNC: 108 MMOL/L — SIGNIFICANT CHANGE UP (ref 96–108)
CO2 SERPL-SCNC: 29 MMOL/L — SIGNIFICANT CHANGE UP (ref 22–31)
CREAT SERPL-MCNC: 1.51 MG/DL — HIGH (ref 0.5–1.3)
GLUCOSE SERPL-MCNC: 215 MG/DL — HIGH (ref 70–99)
HCT VFR BLD CALC: 33.8 % — LOW (ref 39–50)
HGB BLD-MCNC: 11.1 G/DL — LOW (ref 13–17)
MAGNESIUM SERPL-MCNC: 1.9 MG/DL — SIGNIFICANT CHANGE UP (ref 1.6–2.6)
MCHC RBC-ENTMCNC: 30.1 PG — SIGNIFICANT CHANGE UP (ref 27–34)
MCHC RBC-ENTMCNC: 32.8 GM/DL — SIGNIFICANT CHANGE UP (ref 32–36)
MCV RBC AUTO: 91.6 FL — SIGNIFICANT CHANGE UP (ref 80–100)
NRBC # BLD: 0 /100 WBCS — SIGNIFICANT CHANGE UP (ref 0–0)
PLATELET # BLD AUTO: 169 K/UL — SIGNIFICANT CHANGE UP (ref 150–400)
POTASSIUM SERPL-MCNC: 4.1 MMOL/L — SIGNIFICANT CHANGE UP (ref 3.5–5.3)
POTASSIUM SERPL-SCNC: 4.1 MMOL/L — SIGNIFICANT CHANGE UP (ref 3.5–5.3)
PROT SERPL-MCNC: 7.4 G/DL — SIGNIFICANT CHANGE UP (ref 6–8.3)
RBC # BLD: 3.69 M/UL — LOW (ref 4.2–5.8)
RBC # FLD: 13 % — SIGNIFICANT CHANGE UP (ref 10.3–14.5)
SODIUM SERPL-SCNC: 142 MMOL/L — SIGNIFICANT CHANGE UP (ref 135–145)
TROPONIN I SERPL-MCNC: <0.015 NG/ML — SIGNIFICANT CHANGE UP (ref 0–0.04)
WBC # BLD: 4.6 K/UL — SIGNIFICANT CHANGE UP (ref 3.8–10.5)
WBC # FLD AUTO: 4.6 K/UL — SIGNIFICANT CHANGE UP (ref 3.8–10.5)

## 2020-05-26 PROCEDURE — 93005 ELECTROCARDIOGRAM TRACING: CPT

## 2020-05-26 PROCEDURE — 36415 COLL VENOUS BLD VENIPUNCTURE: CPT

## 2020-05-26 PROCEDURE — 71045 X-RAY EXAM CHEST 1 VIEW: CPT

## 2020-05-26 PROCEDURE — 99284 EMERGENCY DEPT VISIT MOD MDM: CPT

## 2020-05-26 PROCEDURE — 71045 X-RAY EXAM CHEST 1 VIEW: CPT | Mod: 26

## 2020-05-26 PROCEDURE — 80053 COMPREHEN METABOLIC PANEL: CPT

## 2020-05-26 PROCEDURE — 99284 EMERGENCY DEPT VISIT MOD MDM: CPT | Mod: 25

## 2020-05-26 PROCEDURE — 83735 ASSAY OF MAGNESIUM: CPT

## 2020-05-26 PROCEDURE — 84484 ASSAY OF TROPONIN QUANT: CPT

## 2020-05-26 PROCEDURE — 85027 COMPLETE CBC AUTOMATED: CPT

## 2020-05-26 NOTE — ED ADULT NURSE NOTE - OBJECTIVE STATEMENT
pt reports CP 3/10  no meds taken today  placed on cardiac monitor  falling asleep on stretcher  awaiting MD aguilar

## 2020-05-26 NOTE — ED PROVIDER NOTE - CLINICAL SUMMARY MEDICAL DECISION MAKING FREE TEXT BOX
patient is insisting on leaving, will attempt to convince to stay for a second troponin and f/u with cardiologist.
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
## 2020-05-26 NOTE — ED ADULT NURSE NOTE - NSIMPLEMENTINTERV_GEN_ALL_ED
Implemented All Fall Risk Interventions:  [place] to call system. Call bell, personal items and telephone within reach. Instruct patient to call for assistance. Room bathroom lighting operational. Non-slip footwear when patient is off stretcher. Physically safe environment: no spills, clutter or unnecessary equipment. Stretcher in lowest position, wheels locked, appropriate side rails in place. Provide visual cue, wrist band, yellow gown, etc. Monitor gait and stability. Monitor for mental status changes and reorient to person, place, and time. Review medications for side effects contributing to fall risk. Reinforce activity limits and safety measures with patient and family.

## 2020-07-21 ENCOUNTER — INPATIENT (INPATIENT)
Facility: HOSPITAL | Age: 55
LOS: 3 days | Discharge: HOME | DRG: 871 | End: 2020-07-25
Attending: INTERNAL MEDICINE | Admitting: INTERNAL MEDICINE
Payer: MEDICAID

## 2020-07-21 VITALS
SYSTOLIC BLOOD PRESSURE: 168 MMHG | HEART RATE: 98 BPM | DIASTOLIC BLOOD PRESSURE: 98 MMHG | OXYGEN SATURATION: 90 % | RESPIRATION RATE: 22 BRPM

## 2020-07-21 DIAGNOSIS — E11.9 TYPE 2 DIABETES MELLITUS WITHOUT COMPLICATIONS: ICD-10-CM

## 2020-07-21 DIAGNOSIS — I50.9 HEART FAILURE, UNSPECIFIED: ICD-10-CM

## 2020-07-21 DIAGNOSIS — N17.9 ACUTE KIDNEY FAILURE, UNSPECIFIED: ICD-10-CM

## 2020-07-21 DIAGNOSIS — E78.5 HYPERLIPIDEMIA, UNSPECIFIED: ICD-10-CM

## 2020-07-21 DIAGNOSIS — Z95.1 PRESENCE OF AORTOCORONARY BYPASS GRAFT: Chronic | ICD-10-CM

## 2020-07-21 DIAGNOSIS — Z98.890 OTHER SPECIFIED POSTPROCEDURAL STATES: Chronic | ICD-10-CM

## 2020-07-21 DIAGNOSIS — Z95.5 PRESENCE OF CORONARY ANGIOPLASTY IMPLANT AND GRAFT: Chronic | ICD-10-CM

## 2020-07-21 DIAGNOSIS — Z90.49 ACQUIRED ABSENCE OF OTHER SPECIFIED PARTS OF DIGESTIVE TRACT: Chronic | ICD-10-CM

## 2020-07-21 DIAGNOSIS — J18.9 PNEUMONIA, UNSPECIFIED ORGANISM: ICD-10-CM

## 2020-07-21 DIAGNOSIS — Z29.9 ENCOUNTER FOR PROPHYLACTIC MEASURES, UNSPECIFIED: ICD-10-CM

## 2020-07-21 DIAGNOSIS — J96.91 RESPIRATORY FAILURE, UNSPECIFIED WITH HYPOXIA: ICD-10-CM

## 2020-07-21 DIAGNOSIS — I10 ESSENTIAL (PRIMARY) HYPERTENSION: ICD-10-CM

## 2020-07-21 LAB
ACETONE SERPL-MCNC: NEGATIVE — SIGNIFICANT CHANGE UP
ALBUMIN SERPL ELPH-MCNC: 2.5 G/DL — LOW (ref 3.5–5)
ALP SERPL-CCNC: 199 U/L — HIGH (ref 40–120)
ALT FLD-CCNC: 89 U/L DA — HIGH (ref 10–60)
ANION GAP SERPL CALC-SCNC: 8 MMOL/L — SIGNIFICANT CHANGE UP (ref 5–17)
APPEARANCE UR: CLEAR — SIGNIFICANT CHANGE UP
AST SERPL-CCNC: 85 U/L — HIGH (ref 10–40)
BACTERIA # UR AUTO: ABNORMAL /HPF
BASE EXCESS BLDA CALC-SCNC: 4.4 MMOL/L — HIGH (ref -2–2)
BASE EXCESS BLDA CALC-SCNC: 4.9 MMOL/L — HIGH (ref -2–2)
BASOPHILS # BLD AUTO: 0.02 K/UL — SIGNIFICANT CHANGE UP (ref 0–0.2)
BASOPHILS NFR BLD AUTO: 0.2 % — SIGNIFICANT CHANGE UP (ref 0–2)
BILIRUB SERPL-MCNC: 1 MG/DL — SIGNIFICANT CHANGE UP (ref 0.2–1.2)
BILIRUB UR-MCNC: ABNORMAL
BLOOD GAS COMMENTS ARTERIAL: SIGNIFICANT CHANGE UP
BUN SERPL-MCNC: 24 MG/DL — HIGH (ref 7–18)
CALCIUM SERPL-MCNC: 8.3 MG/DL — LOW (ref 8.4–10.5)
CHLORIDE SERPL-SCNC: 97 MMOL/L — SIGNIFICANT CHANGE UP (ref 96–108)
CK MB BLD-MCNC: 0.6 % — SIGNIFICANT CHANGE UP (ref 0–3.5)
CK MB CFR SERPL CALC: 1.1 NG/ML — SIGNIFICANT CHANGE UP (ref 0–3.6)
CK SERPL-CCNC: 198 U/L — SIGNIFICANT CHANGE UP (ref 35–232)
CO2 SERPL-SCNC: 30 MMOL/L — SIGNIFICANT CHANGE UP (ref 22–31)
COLOR SPEC: YELLOW — SIGNIFICANT CHANGE UP
CREAT SERPL-MCNC: 1.64 MG/DL — HIGH (ref 0.5–1.3)
DIFF PNL FLD: ABNORMAL
EOSINOPHIL # BLD AUTO: 0 K/UL — SIGNIFICANT CHANGE UP (ref 0–0.5)
EOSINOPHIL NFR BLD AUTO: 0 % — SIGNIFICANT CHANGE UP (ref 0–6)
EPI CELLS # UR: ABNORMAL /HPF
GLUCOSE SERPL-MCNC: 284 MG/DL — HIGH (ref 70–99)
GLUCOSE UR QL: 100 MG/DL
HCO3 BLDA-SCNC: 26 MMOL/L — SIGNIFICANT CHANGE UP (ref 23–27)
HCO3 BLDA-SCNC: 28 MMOL/L — HIGH (ref 23–27)
HCT VFR BLD CALC: 32.6 % — LOW (ref 39–50)
HGB BLD-MCNC: 10.6 G/DL — LOW (ref 13–17)
HOROWITZ INDEX BLDA+IHG-RTO: 21 — SIGNIFICANT CHANGE UP
HOROWITZ INDEX BLDA+IHG-RTO: 40 — SIGNIFICANT CHANGE UP
HYALINE CASTS # UR AUTO: ABNORMAL /LPF
IMM GRANULOCYTES NFR BLD AUTO: 0.5 % — SIGNIFICANT CHANGE UP (ref 0–1.5)
KETONES UR-MCNC: ABNORMAL
LACTATE SERPL-SCNC: 1.9 MMOL/L — SIGNIFICANT CHANGE UP (ref 0.7–2)
LACTATE SERPL-SCNC: 2.3 MMOL/L — HIGH (ref 0.7–2)
LEUKOCYTE ESTERASE UR-ACNC: NEGATIVE — SIGNIFICANT CHANGE UP
LYMPHOCYTES # BLD AUTO: 1.49 K/UL — SIGNIFICANT CHANGE UP (ref 1–3.3)
LYMPHOCYTES # BLD AUTO: 11.6 % — LOW (ref 13–44)
MAGNESIUM SERPL-MCNC: 1.8 MG/DL — SIGNIFICANT CHANGE UP (ref 1.6–2.6)
MCHC RBC-ENTMCNC: 29 PG — SIGNIFICANT CHANGE UP (ref 27–34)
MCHC RBC-ENTMCNC: 32.5 GM/DL — SIGNIFICANT CHANGE UP (ref 32–36)
MCV RBC AUTO: 89.3 FL — SIGNIFICANT CHANGE UP (ref 80–100)
MONOCYTES # BLD AUTO: 0.96 K/UL — HIGH (ref 0–0.9)
MONOCYTES NFR BLD AUTO: 7.5 % — SIGNIFICANT CHANGE UP (ref 2–14)
NEUTROPHILS # BLD AUTO: 10.3 K/UL — HIGH (ref 1.8–7.4)
NEUTROPHILS NFR BLD AUTO: 80.2 % — HIGH (ref 43–77)
NITRITE UR-MCNC: NEGATIVE — SIGNIFICANT CHANGE UP
NRBC # BLD: 0 /100 WBCS — SIGNIFICANT CHANGE UP (ref 0–0)
NT-PROBNP SERPL-SCNC: HIGH PG/ML (ref 0–125)
PCO2 BLDA: 29 MMHG — LOW (ref 32–46)
PCO2 BLDA: 39 MMHG — SIGNIFICANT CHANGE UP (ref 32–46)
PH BLDA: 7.47 — HIGH (ref 7.35–7.45)
PH BLDA: 7.57 — HIGH (ref 7.35–7.45)
PH UR: 5 — SIGNIFICANT CHANGE UP (ref 5–8)
PLATELET # BLD AUTO: 395 K/UL — SIGNIFICANT CHANGE UP (ref 150–400)
PO2 BLDA: 67 MMHG — LOW (ref 74–108)
PO2 BLDA: 84 MMHG — SIGNIFICANT CHANGE UP (ref 74–108)
POTASSIUM SERPL-MCNC: 4.5 MMOL/L — SIGNIFICANT CHANGE UP (ref 3.5–5.3)
POTASSIUM SERPL-SCNC: 4.5 MMOL/L — SIGNIFICANT CHANGE UP (ref 3.5–5.3)
PROT SERPL-MCNC: 8.1 G/DL — SIGNIFICANT CHANGE UP (ref 6–8.3)
PROT UR-MCNC: 500 MG/DL
RBC # BLD: 3.65 M/UL — LOW (ref 4.2–5.8)
RBC # FLD: 13.5 % — SIGNIFICANT CHANGE UP (ref 10.3–14.5)
RBC CASTS # UR COMP ASSIST: ABNORMAL /HPF (ref 0–2)
SAO2 % BLDA: 93 % — SIGNIFICANT CHANGE UP (ref 92–96)
SAO2 % BLDA: 96 % — SIGNIFICANT CHANGE UP (ref 92–96)
SARS-COV-2 RNA SPEC QL NAA+PROBE: SIGNIFICANT CHANGE UP
SODIUM SERPL-SCNC: 135 MMOL/L — SIGNIFICANT CHANGE UP (ref 135–145)
SP GR SPEC: 1.02 — SIGNIFICANT CHANGE UP (ref 1.01–1.02)
TROPONIN I SERPL-MCNC: 0.37 NG/ML — HIGH (ref 0–0.04)
TROPONIN I SERPL-MCNC: 0.47 NG/ML — HIGH (ref 0–0.04)
UROBILINOGEN FLD QL: 8
WBC # BLD: 12.83 K/UL — HIGH (ref 3.8–10.5)
WBC # FLD AUTO: 12.83 K/UL — HIGH (ref 3.8–10.5)
WBC UR QL: SIGNIFICANT CHANGE UP /HPF (ref 0–5)

## 2020-07-21 PROCEDURE — 71250 CT THORAX DX C-: CPT | Mod: 26

## 2020-07-21 PROCEDURE — 71045 X-RAY EXAM CHEST 1 VIEW: CPT | Mod: 26

## 2020-07-21 RX ORDER — ASPIRIN/CALCIUM CARB/MAGNESIUM 324 MG
81 TABLET ORAL DAILY
Refills: 0 | Status: DISCONTINUED | OUTPATIENT
Start: 2020-07-21 | End: 2020-07-21

## 2020-07-21 RX ORDER — AZITHROMYCIN 500 MG/1
500 TABLET, FILM COATED ORAL ONCE
Refills: 0 | Status: COMPLETED | OUTPATIENT
Start: 2020-07-21 | End: 2020-07-21

## 2020-07-21 RX ORDER — INSULIN LISPRO 100/ML
VIAL (ML) SUBCUTANEOUS AT BEDTIME
Refills: 0 | Status: DISCONTINUED | OUTPATIENT
Start: 2020-07-21 | End: 2020-07-25

## 2020-07-21 RX ORDER — DEXTROSE 50 % IN WATER 50 %
25 SYRINGE (ML) INTRAVENOUS ONCE
Refills: 0 | Status: DISCONTINUED | OUTPATIENT
Start: 2020-07-21 | End: 2020-07-21

## 2020-07-21 RX ORDER — DEXTROSE 50 % IN WATER 50 %
12.5 SYRINGE (ML) INTRAVENOUS ONCE
Refills: 0 | Status: DISCONTINUED | OUTPATIENT
Start: 2020-07-21 | End: 2020-07-21

## 2020-07-21 RX ORDER — INSULIN LISPRO 100/ML
VIAL (ML) SUBCUTANEOUS
Refills: 0 | Status: DISCONTINUED | OUTPATIENT
Start: 2020-07-21 | End: 2020-07-25

## 2020-07-21 RX ORDER — CEFTRIAXONE 500 MG/1
1000 INJECTION, POWDER, FOR SOLUTION INTRAMUSCULAR; INTRAVENOUS EVERY 24 HOURS
Refills: 0 | Status: DISCONTINUED | OUTPATIENT
Start: 2020-07-21 | End: 2020-07-24

## 2020-07-21 RX ORDER — AZITHROMYCIN 500 MG/1
500 TABLET, FILM COATED ORAL EVERY 24 HOURS
Refills: 0 | Status: DISCONTINUED | OUTPATIENT
Start: 2020-07-21 | End: 2020-07-24

## 2020-07-21 RX ORDER — ATORVASTATIN CALCIUM 80 MG/1
80 TABLET, FILM COATED ORAL AT BEDTIME
Refills: 0 | Status: DISCONTINUED | OUTPATIENT
Start: 2020-07-21 | End: 2020-07-23

## 2020-07-21 RX ORDER — DEXTROSE 50 % IN WATER 50 %
15 SYRINGE (ML) INTRAVENOUS ONCE
Refills: 0 | Status: DISCONTINUED | OUTPATIENT
Start: 2020-07-21 | End: 2020-07-21

## 2020-07-21 RX ORDER — FUROSEMIDE 40 MG
80 TABLET ORAL ONCE
Refills: 0 | Status: COMPLETED | OUTPATIENT
Start: 2020-07-21 | End: 2020-07-21

## 2020-07-21 RX ORDER — SODIUM CHLORIDE 9 MG/ML
1000 INJECTION, SOLUTION INTRAVENOUS
Refills: 0 | Status: DISCONTINUED | OUTPATIENT
Start: 2020-07-21 | End: 2020-07-24

## 2020-07-21 RX ORDER — GLUCAGON INJECTION, SOLUTION 0.5 MG/.1ML
1 INJECTION, SOLUTION SUBCUTANEOUS ONCE
Refills: 0 | Status: DISCONTINUED | OUTPATIENT
Start: 2020-07-21 | End: 2020-07-25

## 2020-07-21 RX ORDER — INSULIN GLARGINE 100 [IU]/ML
20 INJECTION, SOLUTION SUBCUTANEOUS AT BEDTIME
Refills: 0 | Status: DISCONTINUED | OUTPATIENT
Start: 2020-07-21 | End: 2020-07-23

## 2020-07-21 RX ORDER — METOPROLOL TARTRATE 50 MG
25 TABLET ORAL
Refills: 0 | Status: DISCONTINUED | OUTPATIENT
Start: 2020-07-21 | End: 2020-07-25

## 2020-07-21 RX ORDER — ASPIRIN/CALCIUM CARB/MAGNESIUM 324 MG
162 TABLET ORAL ONCE
Refills: 0 | Status: COMPLETED | OUTPATIENT
Start: 2020-07-21 | End: 2020-07-21

## 2020-07-21 RX ORDER — FUROSEMIDE 40 MG
60 TABLET ORAL ONCE
Refills: 0 | Status: COMPLETED | OUTPATIENT
Start: 2020-07-21 | End: 2020-07-21

## 2020-07-21 RX ORDER — ACETAMINOPHEN 500 MG
650 TABLET ORAL ONCE
Refills: 0 | Status: COMPLETED | OUTPATIENT
Start: 2020-07-21 | End: 2020-07-21

## 2020-07-21 RX ORDER — ASPIRIN/CALCIUM CARB/MAGNESIUM 324 MG
81 TABLET ORAL DAILY
Refills: 0 | Status: DISCONTINUED | OUTPATIENT
Start: 2020-07-21 | End: 2020-07-25

## 2020-07-21 RX ORDER — CEFTRIAXONE 500 MG/1
1000 INJECTION, POWDER, FOR SOLUTION INTRAMUSCULAR; INTRAVENOUS ONCE
Refills: 0 | Status: COMPLETED | OUTPATIENT
Start: 2020-07-21 | End: 2020-07-21

## 2020-07-21 RX ORDER — HEPARIN SODIUM 5000 [USP'U]/ML
5000 INJECTION INTRAVENOUS; SUBCUTANEOUS EVERY 12 HOURS
Refills: 0 | Status: DISCONTINUED | OUTPATIENT
Start: 2020-07-21 | End: 2020-07-25

## 2020-07-21 RX ADMIN — Medication 80 MILLIGRAM(S): at 21:01

## 2020-07-21 RX ADMIN — Medication 650 MILLIGRAM(S): at 18:46

## 2020-07-21 RX ADMIN — Medication 2: at 22:00

## 2020-07-21 RX ADMIN — AZITHROMYCIN 500 MILLIGRAM(S): 500 TABLET, FILM COATED ORAL at 20:20

## 2020-07-21 RX ADMIN — CEFTRIAXONE 100 MILLIGRAM(S): 500 INJECTION, POWDER, FOR SOLUTION INTRAMUSCULAR; INTRAVENOUS at 18:47

## 2020-07-21 RX ADMIN — Medication 650 MILLIGRAM(S): at 19:20

## 2020-07-21 RX ADMIN — CEFTRIAXONE 1000 MILLIGRAM(S): 500 INJECTION, POWDER, FOR SOLUTION INTRAMUSCULAR; INTRAVENOUS at 19:20

## 2020-07-21 RX ADMIN — INSULIN GLARGINE 20 UNIT(S): 100 INJECTION, SOLUTION SUBCUTANEOUS at 23:59

## 2020-07-21 RX ADMIN — AZITHROMYCIN 255 MILLIGRAM(S): 500 TABLET, FILM COATED ORAL at 19:19

## 2020-07-21 RX ADMIN — Medication 162 MILLIGRAM(S): at 18:46

## 2020-07-21 NOTE — H&P ADULT - NSICDXPASTSURGICALHX_GEN_ALL_CORE_FT
PAST SURGICAL HISTORY:  History of appendectomy     History of loop recorder     S/P CABG x 1     S/P coronary artery stent placement

## 2020-07-21 NOTE — H&P ADULT - PROBLEM SELECTOR PLAN 9
RISK                                                          Points  [] Previous VTE                                           3  [] Thrombophilia                                        2  [] Lower limb paralysis                              2   [] Current Cancer                                       2   [x] Immobilization > 24 hrs                        1  [] ICU/CCU stay > 24 hours                       1  [x] Age > 60                                                   1    Heparin for DVT PPx Patient has Elevated liver enzymes, normal bilirubin.  no tenderness on exam.  Follow US hepatic and pancreatic

## 2020-07-21 NOTE — H&P ADULT - NSHPSOCIALHISTORY_GEN_ALL_CORE
Patient smokes 3 PPD, states that he has now left smoking, denies use of Alcohol or use of illicit drugs.

## 2020-07-21 NOTE — H&P ADULT - PROBLEM SELECTOR PLAN 2
Patient is admitted after shortness of breath, currently on 4 Lr nasal cannula.  CT chest done showed Geographic infiltrates with septal thickening. Differential diagnosis includes bilateral pneumonia, CHF and pulmonary alveolar proteinosis.  Small right pleural effusion.  Patient has elevated WBC, lactate and fever after admission, will treat him for Community acquired pneumonia.  -Patient also has hx of CHF , last ECHO showed Moderate concentric left ventricular hypertrophy.  3. Normal left ventricular systolic function. Mild  diastolic dysfunction (stage I).  S/P 80 IV lasix in ED.  will monitor IS and OS, repeat CXR in am.   Will repeat Echo .  cardiology consult Patient is admitted after shortness of breath, currently on 4 Lr nasal cannula.  CT chest done showed Geographic infiltrates with septal thickening. Differential diagnosis includes bilateral pneumonia, CHF and pulmonary alveolar proteinosis.  Small right pleural effusion.  Patient has elevated WBC, lactate and fever after admission, will treat him for Community acquired pneumonia.  -Patient also has hx of CHF , last ECHO showed Moderate concentric left ventricular hypertrophy.  3. Normal left ventricular systolic function. Mild  diastolic dysfunction (stage I).  S/P 80 IV lasix in ED.  will monitor IS and OS, repeat CXR in am.   Will repeat Echo .  cardiology consulted Dr Allred

## 2020-07-21 NOTE — H&P ADULT - PROBLEM SELECTOR PLAN 6
Patient has DM, takes 40 Lantus and 8 units of Humalog tid.  Starting him on 20 Lantus and Sliding scale.  adjust dose as clinically indiacted.  Follow Hb A1c Patient has DM, takes 43 Lantus and 8 units of Humalog tid.  Starting him on 20 Lantus and Sliding scale.  adjust dose as clinically indicated.  Follow Hb A1c

## 2020-07-21 NOTE — ED PROVIDER NOTE - NS ED ROS FT
REVIEW OF SYSTEMS:  CONSTITUTIONAL: No fever,   EYES: no acute visual disturbances  NECK: No pain or stiffness  RESPIRATORY: no cough   CARDIOVASCULAR: No chest pain, no palpitations  GASTROINTESTINAL: No pain. No nausea or vomiting; No diarrhea   NEUROLOGICAL: No headache or numbness, no tremors  MUSCULOSKELETAL: No joint pain, no muscle pain  GENITOURINARY: no dysuria, no frequency, no hesitancy  PSYCHIATRY: no depression , no anxiety  ALL OTHER  ROS negative

## 2020-07-21 NOTE — ED PROVIDER NOTE - CARE PLAN
Principal Discharge DX:	CHF (congestive heart failure)  Secondary Diagnosis:	PNA (pneumonia)  Secondary Diagnosis:	Hypoxemia  Secondary Diagnosis:	MALCOM (acute kidney injury)

## 2020-07-21 NOTE — ED ADULT NURSE NOTE - OBJECTIVE STATEMENT
Pt stated has been having difficulty breathing x 5-6 days, coughing  On arrival to ED pt noted with fever

## 2020-07-21 NOTE — H&P ADULT - PROBLEM SELECTOR PLAN 8
Patient takes Metoprolol 25 bid.  continue with home meds. Patient takes Metoprolol 25 bid and lisinopril.  Holding Lisinopril for MALCOM  continue with home meds.

## 2020-07-21 NOTE — H&P ADULT - PROBLEM SELECTOR PLAN 10
RISK                                                          Points  [] Previous VTE                                           3  [] Thrombophilia                                        2  [] Lower limb paralysis                              2   [] Current Cancer                                       2   [x] Immobilization > 24 hrs                        1  [] ICU/CCU stay > 24 hours                       1  [x] Age > 60                                                   1    Heparin for DVT PPx

## 2020-07-21 NOTE — H&P ADULT - ASSESSMENT
55 year old male with medical history  of HTN, DM, HLD, CAD, CABG in the past, multiple embolic strokes(Loop recorder placement), CHF, vertigo presented with shortness of breath for past 1 week 55 year old male with medical history  of HTN, DM, HLD, CAD, CABG in the past, multiple embolic strokes(Loop recorder placement), CHF, vertigo presented with shortness of breath for past 1 week.    Starting the medications which patients verbally told me. Primary team to confirm meds if any confusion. Pharmacy updated in system

## 2020-07-21 NOTE — H&P ADULT - PROBLEM SELECTOR PLAN 3
Patients CT chest findings were concerning for pneumonia .  started the patient on Rocephin and Zithromax for Community acquired pneumonia.  Follow CXR in am , procalcitonin, blood cultures, legionella antigen and strep antigen.

## 2020-07-21 NOTE — ED PROVIDER NOTE - PHYSICAL EXAMINATION
PHYSICAL EXAM:  GENERAL: male in bed, in mild distress due to dyspnia.   HEENT: Normocephalic;  conjunctivae and sclerae clear; moist mucous membranes;   NECK : supple  CHEST/LUNG: breath sounds present bilaterally, clear, no wheeze, good air entry   HEART: S1 S2  regular; no murmurs, gallops or rubs  ABDOMEN: Soft, Nontender, Nondistended; Bowel sounds present  EXTREMITIES: no cyanosis; no edema; no calf tenderness  SKIN: warm and dry; no rash  NERVOUS SYSTEM:  Awake and alert; Oriented  to place, person and time ; no new deficits

## 2020-07-21 NOTE — H&P ADULT - NSHPPHYSICALEXAM_GEN_ALL_CORE
Vital Signs Last 24 Hrs  T(C): 37.6 (21 Jul 2020 21:25), Max: 38.6 (21 Jul 2020 18:06)  T(F): 99.6 (21 Jul 2020 21:25), Max: 101.5 (21 Jul 2020 18:06)  HR: 90 (21 Jul 2020 21:25) (90 - 98)  BP: 172/89 (21 Jul 2020 21:25) (168/98 - 191/109)  BP(mean): --  RR: 22 (21 Jul 2020 21:25) (16 - 23)  SpO2: 98% (21 Jul 2020 21:25) (90% - 98%)    GENERAL: NAD, speaks in full sentences, no signs of respiratory distress  HEAD:  Atraumatic, Normocephalic  EYES: EOMI, PERRLA, conjunctiva and sclera clear  NECK: Supple, No JVD  CHEST/LUNG: Clear to auscultation bilaterally; No wheeze; No crackles; No accessory muscles used  HEART: Regular rate and rhythm; No murmurs;   ABDOMEN: Soft, Nontender, Nondistended; Bowel sounds present; No guarding  EXTREMITIES:  2+ Peripheral Pulses, No cyanosis or edema  PSYCH:  Normal Affect  NEUROLOGY: non-focal, AAO X 3. Strength is 5/5. no sensory loss.  SKIN: No rashes or lesions Vital Signs Last 24 Hrs  T(C): 37.6 (21 Jul 2020 21:25), Max: 38.6 (21 Jul 2020 18:06)  T(F): 99.6 (21 Jul 2020 21:25), Max: 101.5 (21 Jul 2020 18:06)  HR: 90 (21 Jul 2020 21:25) (90 - 98)  BP: 172/89 (21 Jul 2020 21:25) (168/98 - 191/109)  BP(mean): --  RR: 22 (21 Jul 2020 21:25) (16 - 23)  SpO2: 98% (21 Jul 2020 21:25) (90% - 98%)    GENERAL: Adult male, on nasal cannula , able to speak in full sentence.  HEAD:  Atraumatic, Normocephalic  EYES: EOMI, PERRLA, conjunctiva and sclera clear  NECK: Supple, No JVD  CHEST/LUNG: decreased Breath sounds B/L  HEART: Regular rate and rhythm; No murmurs;   ABDOMEN: Soft, Nontender, Nondistended; Bowel sounds present; No guarding  EXTREMITIES:  2+ Peripheral Pulses, No cyanosis or edema  PSYCH:  Normal Affect  NEUROLOGY: non-focal, AAO X 3. Strength is 5/5. no sensory loss.  SKIN: No rashes or lesions

## 2020-07-21 NOTE — H&P ADULT - PROBLEM SELECTOR PLAN 4
Patient has CHF.  Patient doesnot remember if he takes Lasix /spironolactone at home.   got 80 IV lasix in ED.  Monitor Is and Os,   and adjust Lasix dose as clinically indicated.  starting him on 40 IV lasix Patient has CHF.  Patient doesnot remember if he takes Lasix /spironolactone at home.   got 80 IV lasix in ED.  Monitor Is and Os,   and adjust Lasix dose as clinically indicated.  starting him on 40 IV lasix.  patient has Elevated trops, likely secondary to demand ischemia, EKG showed RBBB.  Cardiology consult Dr Allred

## 2020-07-21 NOTE — ED PROVIDER NOTE - OBJECTIVE STATEMENT
54 yo male with PMH of HTN, DM, HLD, CAD, CABG in the past, multiple embolic strokes(Loop recorder placement), CHF(EF: 30%), vertigo presented with SOB present from past 1 week, presently all the time, denies orthopnea, worsens with exertion associated with diaphoresis. He denies any h/o COPD, but reports h/o heavy chronic smoking(3 packs a day). Denies CP, fever, any sick contact, flu like symptoms.

## 2020-07-21 NOTE — H&P ADULT - PROBLEM SELECTOR PLAN 5
Patients Creatinine is Elevated 1.64.  Follow Urine lytes .  follow BMP.  consoder renal ultrasound if serum creatinine gets worse

## 2020-07-21 NOTE — H&P ADULT - PROBLEM SELECTOR PLAN 1
Patient met severe sepsis criteria.  spikes temp fo 102 in ED, has Elevated WBC count, was hypoxic and with elevated Lactate.   Patient did not get any fluid for Hx of CHF and CXR was congested. Lactate trended down to normal.  follow Blood cultures.  will continue with Rocephin and Zithromax.  Tylenol for fever.

## 2020-07-21 NOTE — H&P ADULT - HISTORY OF PRESENT ILLNESS
55 year old male with medical history  of HTN, DM, HLD, CAD, CABG in the past, multiple embolic strokes(Loop recorder placement), CHF, vertigo presented with shortness of breath for past 1 week.  Patient is a very poor historian . Patient denies cough, fever or chills, nausea, vomiting, chest pain, diaphoresis palpitations, urinary or Bowel problems.

## 2020-07-21 NOTE — ED PROVIDER NOTE - PSH
History of appendectomy    History of loop recorder    S/P CABG x 1    S/P coronary artery stent placement

## 2020-07-21 NOTE — ED ADULT NURSE NOTE - NSIMPLEMENTINTERV_GEN_ALL_ED
Implemented All Universal Safety Interventions:  [place] to call system. Call bell, personal items and telephone within reach. Instruct patient to call for assistance. Room bathroom lighting operational. Non-slip footwear when patient is off stretcher. Physically safe environment: no spills, clutter or unnecessary equipment. Stretcher in lowest position, wheels locked, appropriate side rails in place.

## 2020-07-21 NOTE — ED PROVIDER NOTE - ATTENDING CONTRIBUTION TO CARE
55 y.o. male with h/o NIDDM last dose yest., c/o sob, BRANCH, weakness, no leg edema, CP, coughing, fever, COVID exposure,  Pt with good appetite.  PE:  mild distress, Heart-S1S2RR, Lungs- clear, Abd-soft, obese, NT, Ext- no C/E/C  Pt with sob, BRANCH, concern for ACS, will get labs, CXR, give meds, admission

## 2020-07-21 NOTE — H&P ADULT - PROBLEM SELECTOR PROBLEM 8
Outpatient Pediatric Occupational Therapy Screen   [facility]   Well-Being Assessment       Present: No     Fall Risk Screen  Are you concerned about your child s balance?: No  Does your child trip or fall more often than you would expect?: No  Is your child fearful of falling or hesitant during daily activities?: No  Is your child receiving physical therapy services?: No    Patient History  Age: 1 years 5 months  Country of Origin: [place]  Date of Arrival: (has been with family since December 2018)  Living Situation prior to adoption: Birth family, Foster care  Known Medical History: Please refer to physician note  Pre-adoption Social History: Davian was in foster care since birth with friend/roommate of biological aunt.  He was transitioned to current foster family due to legal issues with previous foster care provider.  Parental rights terminated in April 2019.  Since transitioning, he and brother were having frequent visits with previous foster care family.  Reportedly had anxiety surrounding visits with previous family.  They will have final [facility] visit next week.  Plan for limited visitation in the future. Parents hope to adopt.  Parental Concerns: General development  Referring Physician: Tricia Quintero MD  Orders: Evaluate and treat    Current Social History  Adoptive family information: Two parent family(fostering to adopt)  Number of biological children: 3  Number of adopted children: 2  : home with foster mom   Comment: Foster mom home schools  School based services: Other  Comment: was assessed for early intervention but did not qualify, plan for re-assessment in September  Comments/Additional Occupational Profile info/Pertinent History of Current Problem: Davian has a history significant for early adversity including possible fetal substance exposure, early transitions and possible limited developmental stimulation which can impact progression of developmental and  functional skill performance.     Neurological Information    Sensory Processing  Vision: Tracks in all four quadrants, Makes appropriate eye contact  Hearing: To be tested(recommend testing due to concerns with language)  Tactile / Touch: screams with wiping when changing his diaper  Oral Motor: Chews well, Swallows well, Eats a wide variety of foods  Calming / Self-Regulation: Sleeps well  Comment: Not upset with being messy, but is upset with washing.     Strength  Upper Extremity Strength: Normal  Lower Extremity Strength: Normal  Trunk: Normal     Muscle Tone  Upper Extremity Muscle Tone: WNL  Lower Extremity Muscle Tone: WNL  Trunk Muscle Tone: WNL     Developmental Information     Gross Motor Skills  Sitting: Sits independently with hands free to play  Standing: Stands independently, Able to squat in stand and return to stand, Appropriate trunk and LE alignment in stand  Walking: Walks functional distances, Typical gait pattern for age  Gross Motor Skill Comment: Mom had to carry him in a pack a lot initially, but not as much anymore and he is starting to progress with gross motor skills    Fine Motor Skills  Grasp: Emerging tripod grasp  Shapes / Puzzles: Not able to place a shape  Drawing Skills: Copies a vertical line, Copies a horizontal line, Makes a johnnie/scribbles(copies circular motion )  Hand Dominance: Undecided(eats with right, nathalie with left)    Speech and Language  Receptive Skills: Attends to sound / speech, Responds to name, Follows simple directions(follows very simple directions)  Expressive Skills: Social smiles, Imitates sounds, Pointing, Single words in English(just started putting two word phrases together)  Speech and Language Skill Comment: When unable to communicate gets upset, communicates with a fit    Cognition  Alertness: Alert  Attention Span: Distractable    Attachment  Attachment: No indiscriminate friendliness, References parents  Behavioral / Social Emotional: Difficulty  transitioning between activities     Assessment  Assessment: Normal strength in trunk, Normal strength in extremities, Normal muscle tone, Range of motion is functional, Mild gross motor skill delay, Mild to moderate fine motor skill delay, Speech and language delay, Behavioral concerns, Mild to moderate sensory processing concerns    Assessment Comment: Davian is a sweet 1 year 5 month old male seen for an OT screen during his well-being assessment at the [facility]. He presents with delays in motor skills, speech language and sensory processing likely due to possible fetal substance exposure, limited developmental stimulation and early transitions. He is making good progress with support and stimulation from his family and it is anticipated that he will continue to make good progress with his family and appropriate interventions.     Assessment of Occupational Performance: 3-5 Performance Deficits  Identified Performance Deficits: motor skills, speech/language skills, sensory processing skills  Clinical Decision Making (Complexity): Low complexity    Plan  Plan: Refer to school services, Recommended home program to progress motor skills  Plan Comment: Recommend re-assessment by early intervention services in September for Speech/Language, motor skills and sensory processing skills    Education Assessment  Learner: Family  Readiness: Eager, Acceptance  Method: Booklet/handout, Explanation  Response: Verbalizes Understanding  Home Education: Home Practice Program Initiated Geared Toward Treatment Goals  Educational Materials Given : Developmental Skills for Two to Three Years  Education Notes: Parents were provided with education on results and findings along with recommendations and verbalized good understanding.      It was a pleasure to meet Davian and his family; please feel free to contact me with any further questions or concerns at [phone].    Thea Hernandez, OTR/L  Pediatric Occupational  Therapist  [facility]    No charge billed, visit covered by [employer] sherine   Prophylactic measure Hypertension

## 2020-07-22 DIAGNOSIS — A41.9 SEPSIS, UNSPECIFIED ORGANISM: ICD-10-CM

## 2020-07-22 DIAGNOSIS — R74.0 NONSPECIFIC ELEVATION OF LEVELS OF TRANSAMINASE AND LACTIC ACID DEHYDROGENASE [LDH]: ICD-10-CM

## 2020-07-22 LAB
A1C WITH ESTIMATED AVERAGE GLUCOSE RESULT: 9.5 % — HIGH (ref 4–5.6)
ALBUMIN SERPL ELPH-MCNC: 2.2 G/DL — LOW (ref 3.5–5)
ALP SERPL-CCNC: 176 U/L — HIGH (ref 40–120)
ALT FLD-CCNC: 94 U/L DA — HIGH (ref 10–60)
ANION GAP SERPL CALC-SCNC: 5 MMOL/L — SIGNIFICANT CHANGE UP (ref 5–17)
AST SERPL-CCNC: 77 U/L — HIGH (ref 10–40)
BASOPHILS # BLD AUTO: 0.02 K/UL — SIGNIFICANT CHANGE UP (ref 0–0.2)
BASOPHILS NFR BLD AUTO: 0.2 % — SIGNIFICANT CHANGE UP (ref 0–2)
BILIRUB SERPL-MCNC: 0.9 MG/DL — SIGNIFICANT CHANGE UP (ref 0.2–1.2)
BUN SERPL-MCNC: 27 MG/DL — HIGH (ref 7–18)
CALCIUM SERPL-MCNC: 8.3 MG/DL — LOW (ref 8.4–10.5)
CHLORIDE SERPL-SCNC: 99 MMOL/L — SIGNIFICANT CHANGE UP (ref 96–108)
CHOLEST SERPL-MCNC: 137 MG/DL — SIGNIFICANT CHANGE UP (ref 10–199)
CK MB BLD-MCNC: 0.6 % — SIGNIFICANT CHANGE UP (ref 0–3.5)
CK MB CFR SERPL CALC: 1.3 NG/ML — SIGNIFICANT CHANGE UP (ref 0–3.6)
CK SERPL-CCNC: 201 U/L — SIGNIFICANT CHANGE UP (ref 35–232)
CO2 SERPL-SCNC: 31 MMOL/L — SIGNIFICANT CHANGE UP (ref 22–31)
CREAT ?TM UR-MCNC: 36 MG/DL — SIGNIFICANT CHANGE UP
CREAT SERPL-MCNC: 1.53 MG/DL — HIGH (ref 0.5–1.3)
EOSINOPHIL # BLD AUTO: 0.01 K/UL — SIGNIFICANT CHANGE UP (ref 0–0.5)
EOSINOPHIL NFR BLD AUTO: 0.1 % — SIGNIFICANT CHANGE UP (ref 0–6)
ERYTHROCYTE [SEDIMENTATION RATE] IN BLOOD: 53 MM/HR — HIGH (ref 0–20)
ESTIMATED AVERAGE GLUCOSE: 226 MG/DL — HIGH (ref 68–114)
FOLATE SERPL-MCNC: 14.4 NG/ML — SIGNIFICANT CHANGE UP
GLUCOSE BLDC GLUCOMTR-MCNC: 209 MG/DL — HIGH (ref 70–99)
GLUCOSE BLDC GLUCOMTR-MCNC: 263 MG/DL — HIGH (ref 70–99)
GLUCOSE BLDC GLUCOMTR-MCNC: 380 MG/DL — HIGH (ref 70–99)
GLUCOSE SERPL-MCNC: 258 MG/DL — HIGH (ref 70–99)
HCT VFR BLD CALC: 32 % — LOW (ref 39–50)
HCT VFR BLD CALC: 32.9 % — LOW (ref 39–50)
HDLC SERPL-MCNC: 31 MG/DL — LOW
HGB BLD-MCNC: 10.3 G/DL — LOW (ref 13–17)
HGB BLD-MCNC: 10.6 G/DL — LOW (ref 13–17)
IMM GRANULOCYTES NFR BLD AUTO: 0.7 % — SIGNIFICANT CHANGE UP (ref 0–1.5)
LEGIONELLA AG UR QL: NEGATIVE — SIGNIFICANT CHANGE UP
LIPID PNL WITH DIRECT LDL SERPL: 91 MG/DL — SIGNIFICANT CHANGE UP
LYMPHOCYTES # BLD AUTO: 1.07 K/UL — SIGNIFICANT CHANGE UP (ref 1–3.3)
LYMPHOCYTES # BLD AUTO: 10.2 % — LOW (ref 13–44)
MAGNESIUM SERPL-MCNC: 2 MG/DL — SIGNIFICANT CHANGE UP (ref 1.6–2.6)
MCHC RBC-ENTMCNC: 29 PG — SIGNIFICANT CHANGE UP (ref 27–34)
MCHC RBC-ENTMCNC: 29.3 PG — SIGNIFICANT CHANGE UP (ref 27–34)
MCHC RBC-ENTMCNC: 32.2 GM/DL — SIGNIFICANT CHANGE UP (ref 32–36)
MCHC RBC-ENTMCNC: 32.2 GM/DL — SIGNIFICANT CHANGE UP (ref 32–36)
MCV RBC AUTO: 90.1 FL — SIGNIFICANT CHANGE UP (ref 80–100)
MCV RBC AUTO: 90.9 FL — SIGNIFICANT CHANGE UP (ref 80–100)
MONOCYTES # BLD AUTO: 0.67 K/UL — SIGNIFICANT CHANGE UP (ref 0–0.9)
MONOCYTES NFR BLD AUTO: 6.4 % — SIGNIFICANT CHANGE UP (ref 2–14)
NEUTROPHILS # BLD AUTO: 8.64 K/UL — HIGH (ref 1.8–7.4)
NEUTROPHILS NFR BLD AUTO: 82.4 % — HIGH (ref 43–77)
NRBC # BLD: 0 /100 WBCS — SIGNIFICANT CHANGE UP (ref 0–0)
NRBC # BLD: 0 /100 WBCS — SIGNIFICANT CHANGE UP (ref 0–0)
OSMOLALITY UR: 360 MOS/KG — SIGNIFICANT CHANGE UP (ref 50–1200)
PHOSPHATE SERPL-MCNC: 2.5 MG/DL — SIGNIFICANT CHANGE UP (ref 2.5–4.5)
PLATELET # BLD AUTO: 361 K/UL — SIGNIFICANT CHANGE UP (ref 150–400)
PLATELET # BLD AUTO: 377 K/UL — SIGNIFICANT CHANGE UP (ref 150–400)
POTASSIUM SERPL-MCNC: 4 MMOL/L — SIGNIFICANT CHANGE UP (ref 3.5–5.3)
POTASSIUM SERPL-SCNC: 4 MMOL/L — SIGNIFICANT CHANGE UP (ref 3.5–5.3)
PROCALCITONIN SERPL-MCNC: 2.06 NG/ML — HIGH (ref 0.02–0.1)
PROT SERPL-MCNC: 7.6 G/DL — SIGNIFICANT CHANGE UP (ref 6–8.3)
RBC # BLD: 3.55 M/UL — LOW (ref 4.2–5.8)
RBC # BLD: 3.62 M/UL — LOW (ref 4.2–5.8)
RBC # FLD: 13.4 % — SIGNIFICANT CHANGE UP (ref 10.3–14.5)
RBC # FLD: 13.4 % — SIGNIFICANT CHANGE UP (ref 10.3–14.5)
SARS-COV-2 IGG SERPL QL IA: NEGATIVE — SIGNIFICANT CHANGE UP
SARS-COV-2 IGM SERPL IA-ACNC: <0.1 INDEX — SIGNIFICANT CHANGE UP
SODIUM SERPL-SCNC: 135 MMOL/L — SIGNIFICANT CHANGE UP (ref 135–145)
SODIUM UR-SCNC: 95 MMOL/L — SIGNIFICANT CHANGE UP
TOTAL CHOLESTEROL/HDL RATIO MEASUREMENT: 4.4 RATIO — SIGNIFICANT CHANGE UP (ref 3.4–9.6)
TRIGL SERPL-MCNC: 76 MG/DL — SIGNIFICANT CHANGE UP (ref 10–149)
TROPONIN I SERPL-MCNC: 0.29 NG/ML — HIGH (ref 0–0.04)
TSH SERPL-MCNC: 0.55 UU/ML — SIGNIFICANT CHANGE UP (ref 0.34–4.82)
VIT B12 SERPL-MCNC: 571 PG/ML — SIGNIFICANT CHANGE UP (ref 232–1245)
WBC # BLD: 10.48 K/UL — SIGNIFICANT CHANGE UP (ref 3.8–10.5)
WBC # BLD: 9.39 K/UL — SIGNIFICANT CHANGE UP (ref 3.8–10.5)
WBC # FLD AUTO: 10.48 K/UL — SIGNIFICANT CHANGE UP (ref 3.8–10.5)
WBC # FLD AUTO: 9.39 K/UL — SIGNIFICANT CHANGE UP (ref 3.8–10.5)

## 2020-07-22 PROCEDURE — 71045 X-RAY EXAM CHEST 1 VIEW: CPT | Mod: 26

## 2020-07-22 PROCEDURE — 76705 ECHO EXAM OF ABDOMEN: CPT | Mod: 26

## 2020-07-22 RX ORDER — ACETAMINOPHEN 500 MG
650 TABLET ORAL EVERY 6 HOURS
Refills: 0 | Status: DISCONTINUED | OUTPATIENT
Start: 2020-07-22 | End: 2020-07-25

## 2020-07-22 RX ORDER — ALBUTEROL 90 UG/1
2 AEROSOL, METERED ORAL EVERY 6 HOURS
Refills: 0 | Status: DISCONTINUED | OUTPATIENT
Start: 2020-07-22 | End: 2020-07-25

## 2020-07-22 RX ORDER — FUROSEMIDE 40 MG
40 TABLET ORAL DAILY
Refills: 0 | Status: DISCONTINUED | OUTPATIENT
Start: 2020-07-22 | End: 2020-07-23

## 2020-07-22 RX ADMIN — CEFTRIAXONE 100 MILLIGRAM(S): 500 INJECTION, POWDER, FOR SOLUTION INTRAMUSCULAR; INTRAVENOUS at 05:39

## 2020-07-22 RX ADMIN — ATORVASTATIN CALCIUM 80 MILLIGRAM(S): 80 TABLET, FILM COATED ORAL at 21:16

## 2020-07-22 RX ADMIN — Medication 1: at 21:17

## 2020-07-22 RX ADMIN — Medication 2: at 17:02

## 2020-07-22 RX ADMIN — Medication 81 MILLIGRAM(S): at 12:16

## 2020-07-22 RX ADMIN — HEPARIN SODIUM 5000 UNIT(S): 5000 INJECTION INTRAVENOUS; SUBCUTANEOUS at 17:03

## 2020-07-22 RX ADMIN — Medication 5: at 12:15

## 2020-07-22 RX ADMIN — Medication 25 MILLIGRAM(S): at 17:02

## 2020-07-22 RX ADMIN — INSULIN GLARGINE 20 UNIT(S): 100 INJECTION, SOLUTION SUBCUTANEOUS at 21:16

## 2020-07-22 RX ADMIN — AZITHROMYCIN 255 MILLIGRAM(S): 500 TABLET, FILM COATED ORAL at 04:20

## 2020-07-22 RX ADMIN — Medication 25 MILLIGRAM(S): at 05:40

## 2020-07-22 RX ADMIN — Medication 40 MILLIGRAM(S): at 05:40

## 2020-07-22 RX ADMIN — HEPARIN SODIUM 5000 UNIT(S): 5000 INJECTION INTRAVENOUS; SUBCUTANEOUS at 05:41

## 2020-07-22 NOTE — PROGRESS NOTE ADULT - ASSESSMENT
55 year old male with medical history  of HTN, DM, HLD, CAD, CABG in the past, multiple embolic strokes(Loop recorder placement), CHF, vertigo presented with shortness of breath for past 1 week.    Starting the medications which patients verbally told me. Primary team to confirm meds if any confusion. Pharmacy updated in system *** 55 year old male with medical history  of HTN, DM, HLD, CAD, CABG in the past, multiple embolic strokes(Loop recorder placement), CHF, vertigo presented with shortness of breath for past 1 week.    MED REC on [[facility] [place]  [phone]] 7/22:   Basilglar qwick pen - 43 U TID  Insulin Novalog qwick pen - 8U TID  Lanaprost eyedrop 1 drop in affected eye HS  Metoprolol  mg QD  Atorvastatin 20 HS  Aspirin 81 QD

## 2020-07-22 NOTE — PROGRESS NOTE ADULT - PROBLEM SELECTOR PLAN 3
Patients CT chest findings were concerning for pneumonia .  started the patient on Rocephin and Zithromax for Community acquired pneumonia.  Follow CXR in am , procalcitonin, blood cultures, legionella antigen and strep antigen. Patients CT chest findings were concerning for pneumonia .  started the patient on Rocephin and Zithromax for Community acquired pneumonia.  - f/u CXR   - procalcitonin + elevated at 2.06  - f/u blood cultures  - legionella antigen and strep antigen.

## 2020-07-22 NOTE — PROGRESS NOTE ADULT - PROBLEM SELECTOR PLAN 4
Patient has CHF.  Patient doesnot remember if he takes Lasix /spironolactone at home.   got 80 IV lasix in ED.  Monitor Is and Os,   and adjust Lasix dose as clinically indicated.  starting him on 40 IV lasix.  patient has Elevated trops, likely secondary to demand ischemia, EKG showed RBBB.  Cardiology consult Dr Allred Patient has CHF.  Patient doesnot remember if he takes Lasix /spironolactone at home.   got 80 IV lasix in ED.  Monitor Is and Os,   and adjust Lasix dose as clinically indicated.  starting him on 40 IV lasix.  patient has Elevated trops, likely secondary to demand ischemia, EKG showed RBBB. Trop trended down on 7/22.   - Cardiology consult Dr Allred  - f/u repeat Echo

## 2020-07-22 NOTE — PROGRESS NOTE ADULT - PROBLEM SELECTOR PLAN 5
Patients Creatinine is Elevated 1.64.  Follow Urine lytes .  follow BMP.  consoder renal ultrasound if serum creatinine gets worse Patients Creatinine is Elevated 1.64  - BUN/Creat 23/1.02 > improving  - follow Urine lytes  - follow BMP  - consider renal ultrasound if serum creatinine gets worse

## 2020-07-22 NOTE — PROGRESS NOTE ADULT - PROBLEM SELECTOR PLAN 8
Patient takes Metoprolol 25 bid and lisinopril.  Holding Lisinopril for MALCOM  continue with home meds. Patient takes Metoprolol 25 bid and lisinopril.  - Holding Lisinopril for MALCOM  - continue with other home meds

## 2020-07-22 NOTE — PROGRESS NOTE ADULT - PROBLEM SELECTOR PLAN 1
Patient met severe sepsis criteria.  spikes temp fo 102 in ED, has Elevated WBC count, was hypoxic and with elevated Lactate.   Patient did not get any fluid for Hx of CHF and CXR was congested. Lactate trended down to normal.  follow Blood cultures.  will continue with Rocephin and Zithromax.  Tylenol for fever. Patient met severe sepsis criteria. T max 102 in ED, had Elevated WBC count (trended down), was hypoxic on RA and with elevated Lactate (trended down). Patient did not get any fluid for Hx of CHF and CXR was congested.   - f/u Blood cultures  - c/w Rocephin and Zithromax  - Tylenol PRN for fever

## 2020-07-22 NOTE — PROGRESS NOTE ADULT - SUBJECTIVE AND OBJECTIVE BOX
PGY-1 Progress Note discussed with attending    PAGER #: [[phone]] TILL 5:00 PM  PLEASE CONTACT ON CALL TEAM:   - On Call Team (Please refer to Joby) FROM 5:00 PM - 8:30PM  - Nightfloat Team FROM 8:30 -7:30 AM    CHIEF COMPLAINT & BRIEF HOSPITAL COURSE:      INTERVAL HPI/OVERNIGHT EVENTS:       REVIEW OF SYSTEMS:  CONSTITUTIONAL: No fever, weight loss, or fatigue  RESPIRATORY: No cough, wheezing, chills or hemoptysis; No shortness of breath  CARDIOVASCULAR: No chest pain, palpitations, dizziness, or leg swelling  GASTROINTESTINAL: No abdominal pain. No nausea, vomiting, or hematemesis; No diarrhea or constipation. No melena or hematochezia.  GENITOURINARY: No dysuria or hematuria, urinary frequency  NEUROLOGICAL: No headaches, memory loss, loss of strength, numbness, or tremors  SKIN: No itching, burning, rashes, or lesions     MEDICATIONS  (STANDING):  aspirin  chewable 81 milliGRAM(s) Oral daily  atorvastatin 80 milliGRAM(s) Oral at bedtime  azithromycin  IVPB 500 milliGRAM(s) IV Intermittent every 24 hours  cefTRIAXone   IVPB 1000 milliGRAM(s) IV Intermittent every 24 hours  dextrose 5%. 1000 milliLiter(s) (50 mL/Hr) IV Continuous <Continuous>  furosemide   Injectable 40 milliGRAM(s) IV Push daily  heparin   Injectable 5000 Unit(s) SubCutaneous every 12 hours  insulin glargine Injectable (LANTUS) 20 Unit(s) SubCutaneous at bedtime  insulin lispro (HumaLOG) corrective regimen sliding scale   SubCutaneous three times a day before meals  insulin lispro (HumaLOG) corrective regimen sliding scale   SubCutaneous at bedtime  metoprolol tartrate 25 milliGRAM(s) Oral two times a day    MEDICATIONS  (PRN):  acetaminophen   Tablet .. 650 milliGRAM(s) Oral every 6 hours PRN Temp greater or equal to 38C (100.4F)  ALBUTerol    90 MICROgram(s) HFA Inhaler 2 Puff(s) Inhalation every 6 hours PRN Bronchospasm  glucagon  Injectable 1 milliGRAM(s) IntraMuscular once PRN Glucose LESS THAN 70 milligrams/deciliter      Vital Signs Last 24 Hrs  T(C): 36.9 (22 Jul 2020 07:15), Max: 38.6 (21 Jul 2020 18:06)  T(F): 98.4 (22 Jul 2020 07:15), Max: 101.5 (21 Jul 2020 18:06)  HR: 85 (22 Jul 2020 07:15) (84 - 98)  BP: 146/93 (22 Jul 2020 07:15) (129/79 - 191/109)  BP(mean): --  RR: 19 (22 Jul 2020 07:15) (16 - 24)  SpO2: 100% (22 Jul 2020 07:15) (90% - 100%)    PHYSICAL EXAMINATION:  GENERAL: NAD, well built  HEAD:  Atraumatic, Normocephalic  EYES:  conjunctiva and sclera clear  NECK: Supple, No JVD, Normal thyroid  CHEST/LUNG: Clear to auscultation. Clear to percussion bilaterally; No rales, rhonchi, wheezing, or rubs  HEART: Regular rate and rhythm; No murmurs, rubs, or gallops  ABDOMEN: Soft, Nontender, Nondistended; Bowel sounds present  NERVOUS SYSTEM:  Alert & Oriented X3,    EXTREMITIES:  2+ Peripheral Pulses, No clubbing, cyanosis, or edema  SKIN: warm dry                          10.3   10.48 )-----------( 377      ( 22 Jul 2020 07:06 )             32.0     07-22    135  |  99  |  27<H>  ----------------------------<  258<H>  4.0   |  31  |  1.53<H>    Ca    8.3<L>      22 Jul 2020 07:06  Phos  2.5     07-22  Mg     2.0     07-22    TPro  7.6  /  Alb  2.2<L>  /  TBili  0.9  /  DBili  x   /  AST  77<H>  /  ALT  94<H>  /  AlkPhos  176<H>  07-22    LIVER FUNCTIONS - ( 22 Jul 2020 07:06 )  Alb: 2.2 g/dL / Pro: 7.6 g/dL / ALK PHOS: 176 U/L / ALT: 94 U/L DA / AST: 77 U/L / GGT: x           CARDIAC MARKERS ( 21 Jul 2020 22:00 )  0.471 ng/mL / x     / 198 U/L / x     / 1.1 ng/mL  CARDIAC MARKERS ( 21 Jul 2020 18:53 )  0.372 ng/mL / x     / x     / x     / x                  I&O's Summary    21 Jul 2020 07:01  -  22 Jul 2020 07:00  --------------------------------------------------------  IN: 0 mL / OUT: 1950 mL / NET: -1950 mL    22 Jul 2020 07:01  -  22 Jul 2020 09:19  --------------------------------------------------------  IN: 0 mL / OUT: 1800 mL / NET: -1800 mL          CAPILLARY BLOOD GLUCOSE  CAPILLARY BLOOD GLUCOSE      POCT Blood Glucose.: 379 mg/dL (21 Jul 2020 14:30)    CAPILLARY BLOOD GLUCOSE      POCT Blood Glucose.: 379 mg/dL (21 Jul 2020 14:30)      RADIOLOGY & ADDITIONAL TESTS: PGY-1 Progress Note discussed with attending    PAGER #: [[phone]] TILL 5:00 PM  PLEASE CONTACT ON CALL TEAM:   - On Call Team (Please refer to Joby) FROM 5:00 PM - 8:30PM  - Nightfloat Team FROM 8:30 -7:30 AM    CHIEF COMPLAINT & BRIEF HOSPITAL COURSE:  55 year old male, 3 PPD smoker, with a past medical history of HTN, DM, HLD, CAD, CABG  in 2005 the past, multiple embolic strokes (with loop recorder placement), CHF, vertigo presented with shortness of breath x 1 week.     INTERVAL HPI/OVERNIGHT EVENTS:       REVIEW OF SYSTEMS:  CONSTITUTIONAL: No fever, weight loss, or fatigue  RESPIRATORY: No cough, wheezing, chills or hemoptysis; No shortness of breath  CARDIOVASCULAR: No chest pain, palpitations, dizziness, or leg swelling  GASTROINTESTINAL: No abdominal pain. No nausea, vomiting, or hematemesis; No diarrhea or constipation. No melena or hematochezia.  GENITOURINARY: No dysuria or hematuria, urinary frequency  NEUROLOGICAL: No headaches, memory loss, loss of strength, numbness, or tremors  SKIN: No itching, burning, rashes, or lesions     MEDICATIONS  (STANDING):  aspirin  chewable 81 milliGRAM(s) Oral daily  atorvastatin 80 milliGRAM(s) Oral at bedtime  azithromycin  IVPB 500 milliGRAM(s) IV Intermittent every 24 hours  cefTRIAXone   IVPB 1000 milliGRAM(s) IV Intermittent every 24 hours  dextrose 5%. 1000 milliLiter(s) (50 mL/Hr) IV Continuous <Continuous>  furosemide   Injectable 40 milliGRAM(s) IV Push daily  heparin   Injectable 5000 Unit(s) SubCutaneous every 12 hours  insulin glargine Injectable (LANTUS) 20 Unit(s) SubCutaneous at bedtime  insulin lispro (HumaLOG) corrective regimen sliding scale   SubCutaneous three times a day before meals  insulin lispro (HumaLOG) corrective regimen sliding scale   SubCutaneous at bedtime  metoprolol tartrate 25 milliGRAM(s) Oral two times a day    MEDICATIONS  (PRN):  acetaminophen   Tablet .. 650 milliGRAM(s) Oral every 6 hours PRN Temp greater or equal to 38C (100.4F)  ALBUTerol    90 MICROgram(s) HFA Inhaler 2 Puff(s) Inhalation every 6 hours PRN Bronchospasm  glucagon  Injectable 1 milliGRAM(s) IntraMuscular once PRN Glucose LESS THAN 70 milligrams/deciliter      Vital Signs Last 24 Hrs  T(C): 36.9 (22 Jul 2020 07:15), Max: 38.6 (21 Jul 2020 18:06)  T(F): 98.4 (22 Jul 2020 07:15), Max: 101.5 (21 Jul 2020 18:06)  HR: 85 (22 Jul 2020 07:15) (84 - 98)  BP: 146/93 (22 Jul 2020 07:15) (129/79 - 191/109)  BP(mean): --  RR: 19 (22 Jul 2020 07:15) (16 - 24)  SpO2: 100% (22 Jul 2020 07:15) (90% - 100%)    PHYSICAL EXAMINATION:  GENERAL: NAD, well built  HEAD:  Atraumatic, Normocephalic  EYES:  conjunctiva and sclera clear  NECK: Supple, No JVD, Normal thyroid  CHEST/LUNG: Clear to auscultation. Clear to percussion bilaterally; No rales, rhonchi, wheezing, or rubs  HEART: Regular rate and rhythm; No murmurs, rubs, or gallops  ABDOMEN: Soft, Nontender, Nondistended; Bowel sounds present  NERVOUS SYSTEM:  Alert & Oriented X3,    EXTREMITIES:  2+ Peripheral Pulses, No clubbing, cyanosis, or edema  SKIN: warm dry                          10.3   10.48 )-----------( 377      ( 22 Jul 2020 07:06 )             32.0     07-22    135  |  99  |  27<H>  ----------------------------<  258<H>  4.0   |  31  |  1.53<H>    Ca    8.3<L>      22 Jul 2020 07:06  Phos  2.5     07-22  Mg     2.0     07-22    TPro  7.6  /  Alb  2.2<L>  /  TBili  0.9  /  DBili  x   /  AST  77<H>  /  ALT  94<H>  /  AlkPhos  176<H>  07-22    LIVER FUNCTIONS - ( 22 Jul 2020 07:06 )  Alb: 2.2 g/dL / Pro: 7.6 g/dL / ALK PHOS: 176 U/L / ALT: 94 U/L DA / AST: 77 U/L / GGT: x           CARDIAC MARKERS ( 21 Jul 2020 22:00 )  0.471 ng/mL / x     / 198 U/L / x     / 1.1 ng/mL  CARDIAC MARKERS ( 21 Jul 2020 18:53 )  0.372 ng/mL / x     / x     / x     / x                  I&O's Summary    21 Jul 2020 07:01  -  22 Jul 2020 07:00  --------------------------------------------------------  IN: 0 mL / OUT: 1950 mL / NET: -1950 mL    22 Jul 2020 07:01  -  22 Jul 2020 09:19  --------------------------------------------------------  IN: 0 mL / OUT: 1800 mL / NET: -1800 mL          CAPILLARY BLOOD GLUCOSE  CAPILLARY BLOOD GLUCOSE      POCT Blood Glucose.: 379 mg/dL (21 Jul 2020 14:30)    CAPILLARY BLOOD GLUCOSE      POCT Blood Glucose.: 379 mg/dL (21 Jul 2020 14:30)      RADIOLOGY & ADDITIONAL TESTS: PGY-1 Progress Note discussed with attending    PAGER #: [[phone]] TILL 5:00 PM  PLEASE CONTACT ON CALL TEAM:   - On Call Team (Please refer to Joby) FROM 5:00 PM - 8:30PM  - Nightfloat Team FROM 8:30 -7:30 AM    CHIEF COMPLAINT & BRIEF HOSPITAL COURSE:  55 year old male, 3 PPD smoker, with a past medical history of HTN, DM, HLD, CAD, CABG  in 2005 the past, multiple embolic strokes (with loop recorder placement), CHF, vertigo presented with shortness of breath x 1 week. Admitted with sepsis 2/2 PNA and respiratory failure with hypoxemia. Started on Rocephin and Zithromax. Trop with slight uptrend, resolved. T max on admission 102, WBC trending down. Cardiology and Pulmonology consulted. Plan for Echo.     INTERVAL HPI/OVERNIGHT EVENTS:   Patient feels improved. Still has cough. Denies any other complaints. No events overnight. Afebrile. Plan for cardiology and pulmo consult today.      REVIEW OF SYSTEMS:  CONSTITUTIONAL: No fever, weight loss, or fatigue  RESPIRATORY: + cough. No wheezing, chills or hemoptysis; No shortness of breath  CARDIOVASCULAR: No chest pain, palpitations, dizziness, or leg swelling  GASTROINTESTINAL: No abdominal pain. No nausea, vomiting, or hematemesis; No diarrhea or constipation. No melena or hematochezia.  GENITOURINARY: No dysuria or hematuria, urinary frequency  NEUROLOGICAL: No headaches, memory loss, loss of strength, numbness, or tremors  SKIN: No itching, burning, rashes, or lesions     MEDICATIONS  (STANDING):  aspirin  chewable 81 milliGRAM(s) Oral daily  atorvastatin 80 milliGRAM(s) Oral at bedtime  azithromycin  IVPB 500 milliGRAM(s) IV Intermittent every 24 hours  cefTRIAXone   IVPB 1000 milliGRAM(s) IV Intermittent every 24 hours  dextrose 5%. 1000 milliLiter(s) (50 mL/Hr) IV Continuous <Continuous>  furosemide   Injectable 40 milliGRAM(s) IV Push daily  heparin   Injectable 5000 Unit(s) SubCutaneous every 12 hours  insulin glargine Injectable (LANTUS) 20 Unit(s) SubCutaneous at bedtime  insulin lispro (HumaLOG) corrective regimen sliding scale   SubCutaneous three times a day before meals  insulin lispro (HumaLOG) corrective regimen sliding scale   SubCutaneous at bedtime  metoprolol tartrate 25 milliGRAM(s) Oral two times a day    MEDICATIONS  (PRN):  acetaminophen   Tablet .. 650 milliGRAM(s) Oral every 6 hours PRN Temp greater or equal to 38C (100.4F)  ALBUTerol    90 MICROgram(s) HFA Inhaler 2 Puff(s) Inhalation every 6 hours PRN Bronchospasm  glucagon  Injectable 1 milliGRAM(s) IntraMuscular once PRN Glucose LESS THAN 70 milligrams/deciliter      Vital Signs Last 24 Hrs  T(C): 36.9 (22 Jul 2020 07:15), Max: 38.6 (21 Jul 2020 18:06)  T(F): 98.4 (22 Jul 2020 07:15), Max: 101.5 (21 Jul 2020 18:06)  HR: 85 (22 Jul 2020 07:15) (84 - 98)  BP: 146/93 (22 Jul 2020 07:15) (129/79 - 191/109)  BP(mean): --  RR: 19 (22 Jul 2020 07:15) (16 - 24)  SpO2: 100% (22 Jul 2020 07:15) (90% - 100%)    PHYSICAL EXAMINATION:  GENERAL: NAD, well built man  HEAD:  Atraumatic, Normocephalic  EYES:  conjunctiva and sclera clear  NECK: Supple, No JVD, Normal thyroid  CHEST/LUNG: Diffuse snoring bronchial breath sounds with grossly normal airflow; No wheezing  HEART: Regular rate and rhythm; No murmurs, rubs, or gallops  ABDOMEN: Soft, Nontender, Nondistended; Bowel sounds present  NERVOUS SYSTEM:  Alert & Oriented X3,    EXTREMITIES:  2+ Peripheral Pulses, No clubbing, cyanosis, or no leg edema  SKIN: warm dry, no rashes                          10.3   10.48 )-----------( 377      ( 22 Jul 2020 07:06 )             32.0     07-22    135  |  99  |  27<H>  ----------------------------<  258<H>  4.0   |  31  |  1.53<H>    Ca    8.3<L>      22 Jul 2020 07:06  Phos  2.5     07-22  Mg     2.0     07-22    TPro  7.6  /  Alb  2.2<L>  /  TBili  0.9  /  DBili  x   /  AST  77<H>  /  ALT  94<H>  /  AlkPhos  176<H>  07-22    LIVER FUNCTIONS - ( 22 Jul 2020 07:06 )  Alb: 2.2 g/dL / Pro: 7.6 g/dL / ALK PHOS: 176 U/L / ALT: 94 U/L DA / AST: 77 U/L / GGT: x           CARDIAC MARKERS ( 21 Jul 2020 22:00 )  0.471 ng/mL / x     / 198 U/L / x     / 1.1 ng/mL  CARDIAC MARKERS ( 21 Jul 2020 18:53 )  0.372 ng/mL / x     / x     / x     / x          I&O's Summary    21 Jul 2020 07:01  -  22 Jul 2020 07:00  --------------------------------------------------------  IN: 0 mL / OUT: 1950 mL / NET: -1950 mL    22 Jul 2020 07:01  -  22 Jul 2020 09:19  --------------------------------------------------------  IN: 0 mL / OUT: 1800 mL / NET: -1800 mL        CAPILLARY BLOOD GLUCOSE  POCT Blood Glucose.: 379 mg/dL (21 Jul 2020 14:30)    CAPILLARY BLOOD GLUCOSE  POCT Blood Glucose.: 379 mg/dL (21 Jul 2020 14:30)      RADIOLOGY & ADDITIONAL TESTS:      EXAM:  XR CHEST PORTABLE ROUTINE 1V                        PROCEDURE DATE:  07/22/2020      INTERPRETATION:  CLINICAL INDICATION: 55 years  Male with CHF, Interval change.    COMPARISON: Chest x-ray and chest CT 7/21/2020  AP view of the chest demonstrates bilateral reticular markings with superimposed left upper lobe hazy opacification. There is no pleural effusion. There is no pneumothorax.    The heart is normal in size. There is a loop recorder overlying the cardiac silhouette. The patient status post CABG. There is no mediastinal or hilar mass.   The pulmonary vasculature is normal.   Mild thoracic degenerative changes are present.    IMPRESSION:  No change in reticular infiltrates with superimposed left upper lobe airspace disease. Please refer to chest CT 7/21/2020.  Loop recorder. CABG.        EXAM:  CT CHEST                        PROCEDURE DATE:  07/21/2020      INTERPRETATION:  CLINICAL INFORMATION: Shortness of breath   COMPARISON: Chest x-ray of earlier in the day and CT chest of 11/11/2018    PROCEDURE:   CT of the Chest was performed without intravenous contrast.  Sagittal and coronal reformats were performed.    FINDINGS:    LUNGS AND AIRWAYS: Patent central airways.  Lungs demonstrate geographic areas of increased attenuation bilaterally left greater than right with interlobular septal thickening suggestive of crazy painting which may be seen in pulmonary alveolar proteinosis. Differential diagnosis also includes CHF and bilateral pneumonias.  PLEURA: Small right pleural effusion.  MEDIASTINUM AND HARMAN: Diffusely mildly enlarged mediastinal lymph nodes likely reactive. Hilar evaluation is limited without IV contrast.  VESSELS: Within normal limits.  HEART: Heart size is normal. No pericardial effusion.  CHEST WALL AND LOWER NECK: There is bilateral gynecomastia  VISUALIZED UPPER ABDOMEN: Cholelithiasis.  BONES: Degenerative changes of the spine are appreciated. Sternal wires are seen. Loop recorder is seen in the subcutaneous soft tissues of the left anterior chest wall    IMPRESSION:   Geographic infiltrates with septal thickening. Differential diagnosis includes bilateral pneumonia, CHF and pulmonary alveolar proteinosis.  Small right pleural effusion.  Status post CABG  Loop recorder  Cholelithiasis      ECHO- pending PGY-1 Progress Note discussed with attending    PAGER #: [[phone]] TILL 5:00 PM  PLEASE CONTACT ON CALL TEAM:   - On Call Team (Please refer to Joby) FROM 5:00 PM - 8:30PM  - Nightfloat Team FROM 8:30 -7:30 AM    CHIEF COMPLAINT & BRIEF HOSPITAL COURSE:  55 year old male, 3 PPD smoker, with a past medical history of HTN, DM, HLD, CAD, CABG  in 2005 the past, multiple embolic strokes (with loop recorder placement), CHF, vertigo presented with shortness of breath x 1 week. Admitted with sepsis 2/2 PNA and respiratory failure with hypoxemia. Started on Rocephin and Zithromax. Trop with slight uptrend, resolved. T max on admission 102, WBC trending down. Cardiology and Pulmonology consulted. Plan for Echo.     INTERVAL HPI/OVERNIGHT EVENTS:   Patient feels improved. Still has cough. Denies any other complaints. No events overnight. Afebrile. Plan for cardiology and pulmo consult today.      REVIEW OF SYSTEMS:  CONSTITUTIONAL: No fever, weight loss, or fatigue  RESPIRATORY: + cough. No wheezing, chills or hemoptysis; No shortness of breath  CARDIOVASCULAR: No chest pain, palpitations, dizziness, or leg swelling  GASTROINTESTINAL: No abdominal pain. No nausea, vomiting, or hematemesis; No diarrhea or constipation. No melena or hematochezia.  GENITOURINARY: No dysuria or hematuria, urinary frequency  NEUROLOGICAL: No headaches, memory loss, loss of strength, numbness, or tremors  SKIN: No itching, burning, rashes, or lesions     MEDICATIONS  (STANDING):  aspirin  chewable 81 milliGRAM(s) Oral daily  atorvastatin 80 milliGRAM(s) Oral at bedtime  azithromycin  IVPB 500 milliGRAM(s) IV Intermittent every 24 hours  cefTRIAXone   IVPB 1000 milliGRAM(s) IV Intermittent every 24 hours  dextrose 5%. 1000 milliLiter(s) (50 mL/Hr) IV Continuous <Continuous>  furosemide   Injectable 40 milliGRAM(s) IV Push daily  heparin   Injectable 5000 Unit(s) SubCutaneous every 12 hours  insulin glargine Injectable (LANTUS) 20 Unit(s) SubCutaneous at bedtime  insulin lispro (HumaLOG) corrective regimen sliding scale   SubCutaneous three times a day before meals  insulin lispro (HumaLOG) corrective regimen sliding scale   SubCutaneous at bedtime  metoprolol tartrate 25 milliGRAM(s) Oral two times a day    MEDICATIONS  (PRN):  acetaminophen   Tablet .. 650 milliGRAM(s) Oral every 6 hours PRN Temp greater or equal to 38C (100.4F)  ALBUTerol    90 MICROgram(s) HFA Inhaler 2 Puff(s) Inhalation every 6 hours PRN Bronchospasm  glucagon  Injectable 1 milliGRAM(s) IntraMuscular once PRN Glucose LESS THAN 70 milligrams/deciliter      Vital Signs Last 24 Hrs  T(C): 36.9 (22 Jul 2020 07:15), Max: 38.6 (21 Jul 2020 18:06)  T(F): 98.4 (22 Jul 2020 07:15), Max: 101.5 (21 Jul 2020 18:06)  HR: 85 (22 Jul 2020 07:15) (84 - 98)  BP: 146/93 (22 Jul 2020 07:15) (129/79 - 191/109)  BP(mean): --  RR: 19 (22 Jul 2020 07:15) (16 - 24)  SpO2: 100% (22 Jul 2020 07:15) (90% - 100%)    PHYSICAL EXAMINATION:  GENERAL: NAD, well built man  HEAD:  Atraumatic, Normocephalic  EYES:  conjunctiva and sclera clear  NECK: Supple, No JVD, Normal thyroid  CHEST/LUNG: Diffuse snoring bronchial breath sounds with good airflow; No wheezing  HEART: Regular rate and rhythm; No murmurs, rubs, or gallops  ABDOMEN: Soft, Nontender, Nondistended; Bowel sounds present  NERVOUS SYSTEM:  Alert & Oriented X3,    EXTREMITIES:  2+ Peripheral Pulses, No clubbing, cyanosis, or no leg edema  SKIN: warm dry, no rashes                          10.3   10.48 )-----------( 377      ( 22 Jul 2020 07:06 )             32.0     07-22    135  |  99  |  27<H>  ----------------------------<  258<H>  4.0   |  31  |  1.53<H>    Ca    8.3<L>      22 Jul 2020 07:06  Phos  2.5     07-22  Mg     2.0     07-22    TPro  7.6  /  Alb  2.2<L>  /  TBili  0.9  /  DBili  x   /  AST  77<H>  /  ALT  94<H>  /  AlkPhos  176<H>  07-22    LIVER FUNCTIONS - ( 22 Jul 2020 07:06 )  Alb: 2.2 g/dL / Pro: 7.6 g/dL / ALK PHOS: 176 U/L / ALT: 94 U/L DA / AST: 77 U/L / GGT: x           CARDIAC MARKERS ( 21 Jul 2020 22:00 )  0.471 ng/mL / x     / 198 U/L / x     / 1.1 ng/mL  CARDIAC MARKERS ( 21 Jul 2020 18:53 )  0.372 ng/mL / x     / x     / x     / x          I&O's Summary    21 Jul 2020 07:01  -  22 Jul 2020 07:00  --------------------------------------------------------  IN: 0 mL / OUT: 1950 mL / NET: -1950 mL    22 Jul 2020 07:01  -  22 Jul 2020 09:19  --------------------------------------------------------  IN: 0 mL / OUT: 1800 mL / NET: -1800 mL        CAPILLARY BLOOD GLUCOSE  POCT Blood Glucose.: 379 mg/dL (21 Jul 2020 14:30)    CAPILLARY BLOOD GLUCOSE  POCT Blood Glucose.: 379 mg/dL (21 Jul 2020 14:30)      RADIOLOGY & ADDITIONAL TESTS:      EXAM:  XR CHEST PORTABLE ROUTINE 1V                        PROCEDURE DATE:  07/22/2020      INTERPRETATION:  CLINICAL INDICATION: 55 years  Male with CHF, Interval change.    COMPARISON: Chest x-ray and chest CT 7/21/2020  AP view of the chest demonstrates bilateral reticular markings with superimposed left upper lobe hazy opacification. There is no pleural effusion. There is no pneumothorax.    The heart is normal in size. There is a loop recorder overlying the cardiac silhouette. The patient status post CABG. There is no mediastinal or hilar mass.   The pulmonary vasculature is normal.   Mild thoracic degenerative changes are present.    IMPRESSION:  No change in reticular infiltrates with superimposed left upper lobe airspace disease. Please refer to chest CT 7/21/2020.  Loop recorder. CABG.        EXAM:  CT CHEST                        PROCEDURE DATE:  07/21/2020      INTERPRETATION:  CLINICAL INFORMATION: Shortness of breath   COMPARISON: Chest x-ray of earlier in the day and CT chest of 11/11/2018    PROCEDURE:   CT of the Chest was performed without intravenous contrast.  Sagittal and coronal reformats were performed.    FINDINGS:    LUNGS AND AIRWAYS: Patent central airways.  Lungs demonstrate geographic areas of increased attenuation bilaterally left greater than right with interlobular septal thickening suggestive of crazy painting which may be seen in pulmonary alveolar proteinosis. Differential diagnosis also includes CHF and bilateral pneumonias.  PLEURA: Small right pleural effusion.  MEDIASTINUM AND HARMAN: Diffusely mildly enlarged mediastinal lymph nodes likely reactive. Hilar evaluation is limited without IV contrast.  VESSELS: Within normal limits.  HEART: Heart size is normal. No pericardial effusion.  CHEST WALL AND LOWER NECK: There is bilateral gynecomastia  VISUALIZED UPPER ABDOMEN: Cholelithiasis.  BONES: Degenerative changes of the spine are appreciated. Sternal wires are seen. Loop recorder is seen in the subcutaneous soft tissues of the left anterior chest wall    IMPRESSION:   Geographic infiltrates with septal thickening. Differential diagnosis includes bilateral pneumonia, CHF and pulmonary alveolar proteinosis.  Small right pleural effusion.  Status post CABG  Loop recorder  Cholelithiasis      US: Hepatic and Pancreatic    EXAM:  US LIVER AND PANCREAS                        PROCEDURE DATE:  07/22/2020      INTERPRETATION:  History: Elevated liver enzymes.  Right upper quadrant ultrasound.    The gallbladder is contracted with shadowing mobile stones. No significant wall thickening or pericholecystic fluid. No biliary dilatation. Common duct 0.4 cm. Liver unremarkable. Pancreas not optimally visualized grossly unremarkable. Right kidney is unremarkable measures 12.7 cm. No ascites.    Impression:  Contracted gallbladder with stones. No biliary dilatation.  ECHO- pending

## 2020-07-22 NOTE — PROGRESS NOTE ADULT - PROBLEM SELECTOR PLAN 7
Patient takes LIPITOR 80 mg.  continue with home meds.  follow Lipid Panel in am. Patient takes LIPITOR 80 mg (pharmacy said Atorvastatin 20mg HS).  - continue with home meds.  - follow Lipid Panel in am.

## 2020-07-22 NOTE — PROGRESS NOTE ADULT - PROBLEM SELECTOR PLAN 6
Patient has DM, takes 43 Lantus and 8 units of Humalog tid.  Starting him on 20 Lantus and Sliding scale.  adjust dose as clinically indicated.  Follow Hb A1c Patient has DM, takes 43 Lantus and 8 units of Humalog tid.  Starting him on 20 Lantus and Sliding scale.  adjust dose as clinically indicated.  - Hb A1c 9.5

## 2020-07-22 NOTE — CONSULT NOTE ADULT - SUBJECTIVE AND OBJECTIVE BOX
HISTORY OF PRESENT ILLNESS: HPI:  55 year old male with medical history  of HTN, DM, HLD, CAD, CABG  in 2005 the past, multiple embolic strokes(Loop recorder placement), CHF, vertigo presented with shortness of breath for past 1 week.  Patient is a very poor historian . Patient denies cough, fever or chills, nausea, vomiting, chest pain, diaphoresis palpitations, urinary or Bowel problems.     PAST MEDICAL & SURGICAL HISTORY:  Cerebrovascular accident (CVA), unspecified mechanism  Congestive heart failure, NYHA class 3, chronic, combined  Cerebrovascular accident (CVA), unspecified mechanism  Hyperlipidemia, unspecified hyperlipidemia type  Coronary artery disease involving native heart without angina pectoris, unspecified vessel or lesion type  Hypertension, unspecified type  Diabetes mellitus of other type without complication  History of loop recorder  S/P coronary artery stent placement  History of appendectomy  S/P CABG x 1          MEDICATIONS:  MEDICATIONS  (STANDING):  aspirin  chewable 81 milliGRAM(s) Oral daily  atorvastatin 80 milliGRAM(s) Oral at bedtime  azithromycin  IVPB 500 milliGRAM(s) IV Intermittent every 24 hours  cefTRIAXone   IVPB 1000 milliGRAM(s) IV Intermittent every 24 hours  dextrose 5%. 1000 milliLiter(s) (50 mL/Hr) IV Continuous <Continuous>  furosemide   Injectable 40 milliGRAM(s) IV Push daily  heparin   Injectable 5000 Unit(s) SubCutaneous every 12 hours  insulin glargine Injectable (LANTUS) 20 Unit(s) SubCutaneous at bedtime  insulin lispro (HumaLOG) corrective regimen sliding scale   SubCutaneous three times a day before meals  insulin lispro (HumaLOG) corrective regimen sliding scale   SubCutaneous at bedtime  metoprolol tartrate 25 milliGRAM(s) Oral two times a day      Allergies    Plavix (Other)    Intolerances        FAMILY HISTORY:  FH: CAD (coronary artery disease)    Non-contributary for premature coronary disease or sudden cardiac death    SOCIAL HISTORY:    [ ] Non-smoker  [ X] Smoker  [ ] Alcohol        REVIEW OF SYSTEMS:  [ ]chest pain  [  X]shortness of breath  [  ]palpitations  [  ]syncope  [ ]near syncope [ ]upper extremity weakness   [ ] lower extremity weakness  [  ]diplopia  [  ]altered mental status   [  ]fevers  [ ]chills [ ]nausea  [ ]vomitting  [  ]dysphagia    [ ]abdominal pain  [ ]melena  [ ]BRBPR    [  ]epistaxis  [  ]rash    [ ]lower extremity edema        [x ] All others negative	  [ ] Unable to obtain      LABS:	 	    CARDIAC MARKERS:  CARDIAC MARKERS ( 22 Jul 2020 07:07 )  0.286 ng/mL / x     / 201 U/L / x     / 1.3 ng/mL  CARDIAC MARKERS ( 21 Jul 2020 22:00 )  0.471 ng/mL / x     / 198 U/L / x     / 1.1 ng/mL  CARDIAC MARKERS ( 21 Jul 2020 18:53 )  0.372 ng/mL / x     / x     / x     / x                                  10.3   10.48 )-----------( 377      ( 22 Jul 2020 07:06 )             32.0     Hb Trend: 10.3<--, 10.6<--    07-22    135  |  99  |  27<H>  ----------------------------<  258<H>  4.0   |  31  |  1.53<H>    Ca    8.3<L>      22 Jul 2020 07:06  Phos  2.5     07-22  Mg     2.0     07-22    TPro  7.6  /  Alb  2.2<L>  /  TBili  0.9  /  DBili  x   /  AST  77<H>  /  ALT  94<H>  /  AlkPhos  176<H>  07-22    Creatinine Trend: 1.53<--, 1.64<--    Coags:      proBNP: Serum Pro-Brain Natriuretic Peptide: 67533 pg/mL (07-21 @ 18:53)    Lipid Profile:   HgA1c:   TSH: Thyroid Stimulating Hormone, Serum: 0.55 uU/mL (07-22 @ 07:06)          PHYSICAL EXAM:  T(C): 36.9 (07-22-20 @ 07:15), Max: 38.6 (07-21-20 @ 18:06)  HR: 85 (07-22-20 @ 07:15) (84 - 98)  BP: 146/93 (07-22-20 @ 07:15) (129/79 - 191/109)  RR: 19 (07-22-20 @ 07:15) (16 - 24)  SpO2: 100% (07-22-20 @ 07:15) (90% - 100%)  Wt(kg): --     I&O's Summary    21 Jul 2020 07:01  -  22 Jul 2020 07:00  --------------------------------------------------------  IN: 0 mL / OUT: 1950 mL / NET: -1950 mL    22 Jul 2020 07:01  -  22 Jul 2020 11:04  --------------------------------------------------------  IN: 0 mL / OUT: 1800 mL / NET: -1800 mL        Gen: Appears well in NAD  HEENT:  (-)icterus (-)pallor  CV: N S1 S2 1/6 MANDY (+)2 Pulses B/l  Resp:  Course B/L BS, decreased at the bases  normal effort  GI: (+) BS Soft, NT, ND  Lymph:  (-)Edema, (-)obvious lymphadenopathy  Skin: Warm to touch, Normal turgor  Psych: Appropriate mood and affect    	      ECG:   sinus 86 BPM, RBBB    RADIOLOGY:         CXR:   < from: Xray Chest 1 View- PORTABLE-Routine (07.22.20 @ 09:35) >  No change in reticular infiltrates with superimposed left upper lobe airspace disease. Please refer to chest CT 7/21/2020.    < end of copied text >      ASSESSMENT/PLAN: 	55y Male HTN, DM, HLD, CAD, CABG  in 2005 the past, multiple embolic strokes(Loop recorder placement), CHF, vertigo presented with shortness of breath for past 1 week.  suspected PNA    - echo  - Abx per primary team  - cont asa, statin, BB  - Will follow with you    I once again thank you for allowing me to participate in the care of your patient.  If you have any questions or concerns please do not hesitate to contact me.    Rosendo Allred MD, [facility]  BEEPER [phone] HISTORY OF PRESENT ILLNESS: HPI:  55 year old male with medical history  of HTN, DM, HLD, CAD, CABG  in 2005 the past, multiple embolic strokes(Loop recorder placement), CHF, vertigo presented with shortness of breath for past 1 week.  Patient is a very poor historian . Patient denies cough, fever or chills, nausea, vomiting, chest pain, diaphoresis palpitations, urinary or Bowel problems.     PAST MEDICAL & SURGICAL HISTORY:  Cerebrovascular accident (CVA), unspecified mechanism  Congestive heart failure, NYHA class 3, chronic, combined  Cerebrovascular accident (CVA), unspecified mechanism  Hyperlipidemia, unspecified hyperlipidemia type  Coronary artery disease involving native heart without angina pectoris, unspecified vessel or lesion type  Hypertension, unspecified type  Diabetes mellitus of other type without complication  History of loop recorder  S/P coronary artery stent placement  History of appendectomy  S/P CABG x 1          MEDICATIONS:  MEDICATIONS  (STANDING):  aspirin  chewable 81 milliGRAM(s) Oral daily  atorvastatin 80 milliGRAM(s) Oral at bedtime  azithromycin  IVPB 500 milliGRAM(s) IV Intermittent every 24 hours  cefTRIAXone   IVPB 1000 milliGRAM(s) IV Intermittent every 24 hours  dextrose 5%. 1000 milliLiter(s) (50 mL/Hr) IV Continuous <Continuous>  furosemide   Injectable 40 milliGRAM(s) IV Push daily  heparin   Injectable 5000 Unit(s) SubCutaneous every 12 hours  insulin glargine Injectable (LANTUS) 20 Unit(s) SubCutaneous at bedtime  insulin lispro (HumaLOG) corrective regimen sliding scale   SubCutaneous three times a day before meals  insulin lispro (HumaLOG) corrective regimen sliding scale   SubCutaneous at bedtime  metoprolol tartrate 25 milliGRAM(s) Oral two times a day      Allergies    Plavix (Other)    Intolerances        FAMILY HISTORY:  FH: CAD (coronary artery disease)    Non-contributary for premature coronary disease or sudden cardiac death    SOCIAL HISTORY:    [ ] Non-smoker  [ X] Smoker  [ ] Alcohol        REVIEW OF SYSTEMS:  [ ]chest pain  [  X]shortness of breath  [  ]palpitations  [  ]syncope  [ ]near syncope [ ]upper extremity weakness   [ ] lower extremity weakness  [  ]diplopia  [  ]altered mental status   [  ]fevers  [ ]chills [ ]nausea  [ ]vomitting  [  ]dysphagia    [ ]abdominal pain  [ ]melena  [ ]BRBPR    [  ]epistaxis  [  ]rash    [ ]lower extremity edema        [x ] All others negative	  [ ] Unable to obtain      LABS:	 	    CARDIAC MARKERS:  CARDIAC MARKERS ( 22 Jul 2020 07:07 )  0.286 ng/mL / x     / 201 U/L / x     / 1.3 ng/mL  CARDIAC MARKERS ( 21 Jul 2020 22:00 )  0.471 ng/mL / x     / 198 U/L / x     / 1.1 ng/mL  CARDIAC MARKERS ( 21 Jul 2020 18:53 )  0.372 ng/mL / x     / x     / x     / x                                  10.3   10.48 )-----------( 377      ( 22 Jul 2020 07:06 )             32.0     Hb Trend: 10.3<--, 10.6<--    07-22    135  |  99  |  27<H>  ----------------------------<  258<H>  4.0   |  31  |  1.53<H>    Ca    8.3<L>      22 Jul 2020 07:06  Phos  2.5     07-22  Mg     2.0     07-22    TPro  7.6  /  Alb  2.2<L>  /  TBili  0.9  /  DBili  x   /  AST  77<H>  /  ALT  94<H>  /  AlkPhos  176<H>  07-22    Creatinine Trend: 1.53<--, 1.64<--    Coags:      proBNP: Serum Pro-Brain Natriuretic Peptide: 77309 pg/mL (07-21 @ 18:53)    Lipid Profile:   HgA1c:   TSH: Thyroid Stimulating Hormone, Serum: 0.55 uU/mL (07-22 @ 07:06)          PHYSICAL EXAM:  T(C): 36.9 (07-22-20 @ 07:15), Max: 38.6 (07-21-20 @ 18:06)  HR: 85 (07-22-20 @ 07:15) (84 - 98)  BP: 146/93 (07-22-20 @ 07:15) (129/79 - 191/109)  RR: 19 (07-22-20 @ 07:15) (16 - 24)  SpO2: 100% (07-22-20 @ 07:15) (90% - 100%)  Wt(kg): --     I&O's Summary    21 Jul 2020 07:01  -  22 Jul 2020 07:00  --------------------------------------------------------  IN: 0 mL / OUT: 1950 mL / NET: -1950 mL    22 Jul 2020 07:01  -  22 Jul 2020 11:04  --------------------------------------------------------  IN: 0 mL / OUT: 1800 mL / NET: -1800 mL        Gen: Appears well in NAD  HEENT:  (-)icterus (-)pallor  CV: N S1 S2 1/6 MANDY (+)2 Pulses B/l  Resp:  Course B/L BS, decreased at the bases  normal effort  GI: (+) BS Soft, NT, ND  Lymph:  (-)Edema, (-)obvious lymphadenopathy  Skin: Warm to touch, Normal turgor  Psych: Appropriate mood and affect    	      ECG:   sinus 86 BPM, RBBB    RADIOLOGY:         CXR:   < from: Xray Chest 1 View- PORTABLE-Routine (07.22.20 @ 09:35) >  No change in reticular infiltrates with superimposed left upper lobe airspace disease. Please refer to chest CT 7/21/2020.    < end of copied text >      ASSESSMENT/PLAN: 	55y Male HTN, DM, HLD, CAD, remote stents subsequent CABG  in 2005 the past, multiple embolic strokes(Loop recorder placement), CHF, vertigo presented with shortness of breath for past 1 week.  suspected PNA    - echo  - Abx per primary team  - cont asa, statin, BB  - Will follow with you    I once again thank you for allowing me to participate in the care of your patient.  If you have any questions or concerns please do not hesitate to contact me.    Rosendo Allred MD, [facility]  BEEPER [phone]

## 2020-07-22 NOTE — PROGRESS NOTE ADULT - PROBLEM SELECTOR PLAN 2
Patient is admitted after shortness of breath, currently on 4 Lr nasal cannula.  CT chest done showed Geographic infiltrates with septal thickening. Differential diagnosis includes bilateral pneumonia, CHF and pulmonary alveolar proteinosis.  Small right pleural effusion.  Patient has elevated WBC, lactate and fever after admission, will treat him for Community acquired pneumonia.  -Patient also has hx of CHF , last ECHO showed Moderate concentric left ventricular hypertrophy.  3. Normal left ventricular systolic function. Mild  diastolic dysfunction (stage I).  S/P 80 IV lasix in ED.  will monitor IS and OS, repeat CXR in am.   Will repeat Echo .  cardiology consulted Dr Allred Patient is admitted after shortness of breath, currently on 4 L nasal cannula spO2 100%. CT chest with Geographic infiltrates with septal thickening. Small right pleural effusion.  - c/w Abx Rocephin + Zithromax for PNA coverage  - Patient also has hx of CHF , last ECHO showed Moderate concentric left ventricular hypertrophy. Normal left ventricular systolic function. Mild diastolic dysfunction (stage I).   - s/p 80 IV lasix in ED.  - f/u I's/O's net -1950  - f/u repeat Echo .  - cardiology consulted Dr Allred  - pulmonology consult Dr. Starks

## 2020-07-22 NOTE — PROGRESS NOTE ADULT - PROBLEM SELECTOR PLAN 9
Patient has Elevated liver enzymes, normal bilirubin.  no tenderness on exam.  Follow US hepatic and pancreatic Patient has Elevated liver enzymes, normal bilirubin, no tenderness on exam.  - US hepatic and pancreatic: contracted GB with stones, no bile tract dilation  - trend LFTs AST/ALT 85/89 admission > now 77/94  - continue to trend LFTs

## 2020-07-22 NOTE — PROGRESS NOTE ADULT - PROBLEM SELECTOR PLAN 10
RISK                                                          Points  [] Previous VTE                                           3  [] Thrombophilia                                        2  [] Lower limb paralysis                              2   [] Current Cancer                                       2   [x] Immobilization > 24 hrs                        1  [] ICU/CCU stay > 24 hours                       1  [x] Age > 60                                                   1    Heparin for DVT PPx - Heparin 5000 units q 12 for DVT PPx  RISK                                                          Points  [] Previous VTE                                           3  [] Thrombophilia                                        2  [] Lower limb paralysis                              2   [] Current Cancer                                       2   [x] Immobilization > 24 hrs                        1  [] ICU/CCU stay > 24 hours                       1  [x] Age > 60                                                   1

## 2020-07-23 LAB
24R-OH-CALCIDIOL SERPL-MCNC: 13.5 NG/ML — LOW (ref 30–80)
ALBUMIN SERPL ELPH-MCNC: 2.2 G/DL — LOW (ref 3.5–5)
ALP SERPL-CCNC: 166 U/L — HIGH (ref 40–120)
ALT FLD-CCNC: 166 U/L DA — HIGH (ref 10–60)
ANION GAP SERPL CALC-SCNC: 8 MMOL/L — SIGNIFICANT CHANGE UP (ref 5–17)
AST SERPL-CCNC: 95 U/L — HIGH (ref 10–40)
BASOPHILS # BLD AUTO: 0.01 K/UL — SIGNIFICANT CHANGE UP (ref 0–0.2)
BASOPHILS NFR BLD AUTO: 0.1 % — SIGNIFICANT CHANGE UP (ref 0–2)
BILIRUB SERPL-MCNC: 0.4 MG/DL — SIGNIFICANT CHANGE UP (ref 0.2–1.2)
BUN SERPL-MCNC: 30 MG/DL — HIGH (ref 7–18)
CALCIUM SERPL-MCNC: 8.4 MG/DL — SIGNIFICANT CHANGE UP (ref 8.4–10.5)
CHLORIDE SERPL-SCNC: 97 MMOL/L — SIGNIFICANT CHANGE UP (ref 96–108)
CO2 SERPL-SCNC: 29 MMOL/L — SIGNIFICANT CHANGE UP (ref 22–31)
CREAT ?TM UR-MCNC: 53 MG/DL — SIGNIFICANT CHANGE UP
CREAT SERPL-MCNC: 1.57 MG/DL — HIGH (ref 0.5–1.3)
EOSINOPHIL # BLD AUTO: 0.08 K/UL — SIGNIFICANT CHANGE UP (ref 0–0.5)
EOSINOPHIL NFR BLD AUTO: 0.8 % — SIGNIFICANT CHANGE UP (ref 0–6)
GLUCOSE BLDC GLUCOMTR-MCNC: 251 MG/DL — HIGH (ref 70–99)
GLUCOSE BLDC GLUCOMTR-MCNC: 267 MG/DL — HIGH (ref 70–99)
GLUCOSE BLDC GLUCOMTR-MCNC: 313 MG/DL — HIGH (ref 70–99)
GLUCOSE BLDC GLUCOMTR-MCNC: 333 MG/DL — HIGH (ref 70–99)
GLUCOSE BLDC GLUCOMTR-MCNC: 359 MG/DL — HIGH (ref 70–99)
GLUCOSE SERPL-MCNC: 318 MG/DL — HIGH (ref 70–99)
HCT VFR BLD CALC: 34 % — LOW (ref 39–50)
HGB BLD-MCNC: 10.9 G/DL — LOW (ref 13–17)
IMM GRANULOCYTES NFR BLD AUTO: 0.4 % — SIGNIFICANT CHANGE UP (ref 0–1.5)
LYMPHOCYTES # BLD AUTO: 1.4 K/UL — SIGNIFICANT CHANGE UP (ref 1–3.3)
LYMPHOCYTES # BLD AUTO: 14.4 % — SIGNIFICANT CHANGE UP (ref 13–44)
MCHC RBC-ENTMCNC: 29.1 PG — SIGNIFICANT CHANGE UP (ref 27–34)
MCHC RBC-ENTMCNC: 32.1 GM/DL — SIGNIFICANT CHANGE UP (ref 32–36)
MCV RBC AUTO: 90.7 FL — SIGNIFICANT CHANGE UP (ref 80–100)
MONOCYTES # BLD AUTO: 0.51 K/UL — SIGNIFICANT CHANGE UP (ref 0–0.9)
MONOCYTES NFR BLD AUTO: 5.3 % — SIGNIFICANT CHANGE UP (ref 2–14)
NEUTROPHILS # BLD AUTO: 7.65 K/UL — HIGH (ref 1.8–7.4)
NEUTROPHILS NFR BLD AUTO: 79 % — HIGH (ref 43–77)
NRBC # BLD: 0 /100 WBCS — SIGNIFICANT CHANGE UP (ref 0–0)
OSMOLALITY UR: 438 MOS/KG — SIGNIFICANT CHANGE UP (ref 50–1200)
PLATELET # BLD AUTO: 446 K/UL — HIGH (ref 150–400)
POTASSIUM SERPL-MCNC: 3.7 MMOL/L — SIGNIFICANT CHANGE UP (ref 3.5–5.3)
POTASSIUM SERPL-SCNC: 3.7 MMOL/L — SIGNIFICANT CHANGE UP (ref 3.5–5.3)
PROT SERPL-MCNC: 7.9 G/DL — SIGNIFICANT CHANGE UP (ref 6–8.3)
RBC # BLD: 3.75 M/UL — LOW (ref 4.2–5.8)
RBC # FLD: 13.3 % — SIGNIFICANT CHANGE UP (ref 10.3–14.5)
SODIUM SERPL-SCNC: 134 MMOL/L — LOW (ref 135–145)
SODIUM UR-SCNC: 95 MMOL/L — SIGNIFICANT CHANGE UP
WBC # BLD: 9.69 K/UL — SIGNIFICANT CHANGE UP (ref 3.8–10.5)
WBC # FLD AUTO: 9.69 K/UL — SIGNIFICANT CHANGE UP (ref 3.8–10.5)

## 2020-07-23 RX ORDER — ERGOCALCIFEROL 1.25 MG/1
50000 CAPSULE ORAL
Refills: 0 | Status: DISCONTINUED | OUTPATIENT
Start: 2020-07-23 | End: 2020-07-25

## 2020-07-23 RX ORDER — FUROSEMIDE 40 MG
40 TABLET ORAL DAILY
Refills: 0 | Status: DISCONTINUED | OUTPATIENT
Start: 2020-07-23 | End: 2020-07-25

## 2020-07-23 RX ORDER — INSULIN LISPRO 100/ML
2 VIAL (ML) SUBCUTANEOUS
Refills: 0 | Status: DISCONTINUED | OUTPATIENT
Start: 2020-07-23 | End: 2020-07-25

## 2020-07-23 RX ORDER — INSULIN GLARGINE 100 [IU]/ML
25 INJECTION, SOLUTION SUBCUTANEOUS AT BEDTIME
Refills: 0 | Status: DISCONTINUED | OUTPATIENT
Start: 2020-07-23 | End: 2020-07-25

## 2020-07-23 RX ADMIN — INSULIN GLARGINE 25 UNIT(S): 100 INJECTION, SOLUTION SUBCUTANEOUS at 21:21

## 2020-07-23 RX ADMIN — Medication 2 UNIT(S): at 17:05

## 2020-07-23 RX ADMIN — AZITHROMYCIN 255 MILLIGRAM(S): 500 TABLET, FILM COATED ORAL at 04:07

## 2020-07-23 RX ADMIN — Medication 100 MILLIGRAM(S): at 02:08

## 2020-07-23 RX ADMIN — ERGOCALCIFEROL 50000 UNIT(S): 1.25 CAPSULE ORAL at 17:05

## 2020-07-23 RX ADMIN — Medication 25 MILLIGRAM(S): at 05:04

## 2020-07-23 RX ADMIN — ALBUTEROL 2 PUFF(S): 90 AEROSOL, METERED ORAL at 01:30

## 2020-07-23 RX ADMIN — Medication 4: at 08:07

## 2020-07-23 RX ADMIN — HEPARIN SODIUM 5000 UNIT(S): 5000 INJECTION INTRAVENOUS; SUBCUTANEOUS at 05:04

## 2020-07-23 RX ADMIN — Medication 3: at 12:02

## 2020-07-23 RX ADMIN — HEPARIN SODIUM 5000 UNIT(S): 5000 INJECTION INTRAVENOUS; SUBCUTANEOUS at 17:06

## 2020-07-23 RX ADMIN — Medication 81 MILLIGRAM(S): at 12:05

## 2020-07-23 RX ADMIN — Medication 25 MILLIGRAM(S): at 17:05

## 2020-07-23 RX ADMIN — Medication 40 MILLIGRAM(S): at 17:05

## 2020-07-23 RX ADMIN — CEFTRIAXONE 100 MILLIGRAM(S): 500 INJECTION, POWDER, FOR SOLUTION INTRAMUSCULAR; INTRAVENOUS at 05:05

## 2020-07-23 RX ADMIN — Medication 5: at 17:05

## 2020-07-23 RX ADMIN — Medication 40 MILLIGRAM(S): at 05:04

## 2020-07-23 RX ADMIN — Medication 2: at 21:21

## 2020-07-23 NOTE — PROGRESS NOTE ADULT - PROBLEM SELECTOR PLAN 10
- Heparin 5000 units q 12 for DVT PPx  RISK                                                          Points  [] Previous VTE                                           3  [] Thrombophilia                                        2  [] Lower limb paralysis                              2   [] Current Cancer                                       2   [x] Immobilization > 24 hrs                        1  [] ICU/CCU stay > 24 hours                       1  [x] Age > 60                                                   1

## 2020-07-23 NOTE — PROGRESS NOTE ADULT - PROBLEM SELECTOR PLAN 1
Patient met severe sepsis criteria. T max 102 in ED, had Elevated WBC count (trended down), was hypoxic on RA and with elevated Lactate (trended down). Patient did not get any fluid for Hx of CHF and CXR was congested.   - f/u Blood cultures  - c/w Rocephin and Zithromax  - Tylenol PRN for fever Resolved   On admission, patient met severe sepsis criteria. T max 102 in ED, had Elevated WBC count (trended down), was hypoxic on RA and with elevated Lactate (trended down). Patient did not get any fluid for Hx of CHF and CXR was congested.   - Blood cultures negative till date  - c/w Rocephin and Zithromax , will switch to oral on D/C  - Tylenol PRN for fever

## 2020-07-23 NOTE — DIETITIAN INITIAL EVALUATION ADULT. - PROBLEM SELECTOR PLAN 8
Patient takes Metoprolol 25 bid and lisinopril.  Holding Lisinopril for MALCOM  continue with home meds.

## 2020-07-23 NOTE — PROGRESS NOTE ADULT - PROBLEM SELECTOR PLAN 3
Patients CT chest findings were concerning for pneumonia .  started the patient on Rocephin and Zithromax for Community acquired pneumonia.  - f/u CXR   - procalcitonin + elevated at 2.06  - f/u blood cultures  - legionella antigen and strep antigen. Patients CT chest findings were concerning for pneumonia .  Continue Rocephin and Zithromax for Community acquired pneumonia.  - CXR shows right lung infiltrate, patient clinically improving  - procalcitonin + elevated at 2.06  - blood cultures negative  - legionella antigen and strep antigen. negative

## 2020-07-23 NOTE — PROGRESS NOTE ADULT - PROBLEM SELECTOR PLAN 6
Patient has DM, takes 43 Lantus and 8 units of Humalog tid.  Starting him on 20 Lantus and Sliding scale.  adjust dose as clinically indicated.  - Hb A1c 9.5 Patient has DM, takes 43 Lantus and 8 units of Humalog tid.  Blood sugars are running onhigher side, will increase LAntus to 25 Units at bedtime and add pre meals 2U  Will adjust dose as clinically indicated.  - Hb A1c 9.5  -Diabetic Diet

## 2020-07-23 NOTE — DIETITIAN INITIAL EVALUATION ADULT. - PROBLEM SELECTOR PLAN 2
Patient is admitted after shortness of breath, currently on 4 Lr nasal cannula.  CT chest done showed Geographic infiltrates with septal thickening. Differential diagnosis includes bilateral pneumonia, CHF and pulmonary alveolar proteinosis.  Small right pleural effusion.  Patient has elevated WBC, lactate and fever after admission, will treat him for Community acquired pneumonia.  -Patient also has hx of CHF , last ECHO showed Moderate concentric left ventricular hypertrophy.  3. Normal left ventricular systolic function. Mild  diastolic dysfunction (stage I).  S/P 80 IV lasix in ED.  will monitor IS and OS, repeat CXR in am.   Will repeat Echo .  cardiology consulted Dr Allred

## 2020-07-23 NOTE — PROGRESS NOTE ADULT - PROBLEM SELECTOR PLAN 4
Patient has CHF.  Patient doesnot remember if he takes Lasix /spironolactone at home.   got 80 IV lasix in ED.  Monitor Is and Os,   and adjust Lasix dose as clinically indicated.  starting him on 40 IV lasix.  patient has Elevated trops, likely secondary to demand ischemia, EKG showed RBBB. Trop trended down on 7/22.   - Cardiology consult Dr Allred  - f/u repeat Echo Patient has CHF.  Patient does not remember if he takes Lasix /spironolactone at home.   got 80 IV lasix in ED.  Monitor Is and Os,   Taper Lasix to PO 40mg with parameters  patient has Elevated trops, likely secondary to demand ischemia, EKG showed RBBB. Trop trended down on 7/22.   - Cardiology consult Dr Allred  - f/u repeat Echo shows EF 40-45%, no acute changes  - Net negative  - Daily weights and urine output monitoring

## 2020-07-23 NOTE — PROGRESS NOTE ADULT - SUBJECTIVE AND OBJECTIVE BOX
PGY-1 Progress Note discussed with attending    PAGER #: [[phone]] TILL 5:00 PM  PLEASE CONTACT ON CALL TEAM:   - On Call Team (Please refer to Joby) FROM 5:00 PM - 8:30PM  - Nightfloat Team FROM 8:30 -7:30 AM    CHIEF COMPLAINT & BRIEF HOSPITAL COURSE:      INTERVAL HPI/OVERNIGHT EVENTS:       REVIEW OF SYSTEMS:  CONSTITUTIONAL: No fever, weight loss, or fatigue  RESPIRATORY: No cough, wheezing, chills or hemoptysis; No shortness of breath  CARDIOVASCULAR: No chest pain, palpitations, dizziness, or leg swelling  GASTROINTESTINAL: No abdominal pain. No nausea, vomiting, or hematemesis; No diarrhea or constipation. No melena or hematochezia.  GENITOURINARY: No dysuria or hematuria, urinary frequency  NEUROLOGICAL: No headaches, memory loss, loss of strength, numbness, or tremors  SKIN: No itching, burning, rashes, or lesions     MEDICATIONS  (STANDING):  aspirin  chewable 81 milliGRAM(s) Oral daily  atorvastatin 80 milliGRAM(s) Oral at bedtime  azithromycin  IVPB 500 milliGRAM(s) IV Intermittent every 24 hours  cefTRIAXone   IVPB 1000 milliGRAM(s) IV Intermittent every 24 hours  dextrose 5%. 1000 milliLiter(s) (50 mL/Hr) IV Continuous <Continuous>  furosemide    Tablet 40 milliGRAM(s) Oral daily  heparin   Injectable 5000 Unit(s) SubCutaneous every 12 hours  insulin glargine Injectable (LANTUS) 20 Unit(s) SubCutaneous at bedtime  insulin lispro (HumaLOG) corrective regimen sliding scale   SubCutaneous three times a day before meals  insulin lispro (HumaLOG) corrective regimen sliding scale   SubCutaneous at bedtime  metoprolol tartrate 25 milliGRAM(s) Oral two times a day    MEDICATIONS  (PRN):  acetaminophen   Tablet .. 650 milliGRAM(s) Oral every 6 hours PRN Temp greater or equal to 38C (100.4F)  ALBUTerol    90 MICROgram(s) HFA Inhaler 2 Puff(s) Inhalation every 6 hours PRN Bronchospasm  glucagon  Injectable 1 milliGRAM(s) IntraMuscular once PRN Glucose LESS THAN 70 milligrams/deciliter  guaiFENesin   Syrup  (Sugar-Free) 200 milliGRAM(s) Oral every 6 hours PRN Cough      Vital Signs Last 24 Hrs  T(C): 37 (23 Jul 2020 07:45), Max: 37.7 (23 Jul 2020 04:58)  T(F): 98.6 (23 Jul 2020 07:45), Max: 99.8 (23 Jul 2020 04:58)  HR: 80 (23 Jul 2020 07:45) (80 - 87)  BP: 149/77 (23 Jul 2020 07:45) (132/82 - 152/88)  BP(mean): --  RR: 18 (23 Jul 2020 07:45) (18 - 20)  SpO2: 100% (23 Jul 2020 07:45) (97% - 100%)    PHYSICAL EXAMINATION:  GENERAL: NAD, well built  HEAD:  Atraumatic, Normocephalic  EYES:  conjunctiva and sclera clear  NECK: Supple, No JVD, Normal thyroid  CHEST/LUNG: Clear to auscultation. Clear to percussion bilaterally; No rales, rhonchi, wheezing, or rubs  HEART: Regular rate and rhythm; No murmurs, rubs, or gallops  ABDOMEN: Soft, Nontender, Nondistended; Bowel sounds present  NERVOUS SYSTEM:  Alert & Oriented X3,    EXTREMITIES:  2+ Peripheral Pulses, No clubbing, cyanosis, or edema  SKIN: warm dry                          10.9   9.69  )-----------( 446      ( 23 Jul 2020 07:06 )             34.0     07-23    134<L>  |  97  |  30<H>  ----------------------------<  318<H>  3.7   |  29  |  1.57<H>    Ca    8.4      23 Jul 2020 07:06  Phos  2.5     07-22  Mg     2.0     07-22    TPro  7.9  /  Alb  2.2<L>  /  TBili  0.4  /  DBili  x   /  AST  95<H>  /  ALT  166<H>  /  AlkPhos  166<H>  07-23    LIVER FUNCTIONS - ( 23 Jul 2020 07:06 )  Alb: 2.2 g/dL / Pro: 7.9 g/dL / ALK PHOS: 166 U/L / ALT: 166 U/L DA / AST: 95 U/L / GGT: x           CARDIAC MARKERS ( 22 Jul 2020 07:07 )  0.286 ng/mL / x     / 201 U/L / x     / 1.3 ng/mL  CARDIAC MARKERS ( 21 Jul 2020 22:00 )  0.471 ng/mL / x     / 198 U/L / x     / 1.1 ng/mL  CARDIAC MARKERS ( 21 Jul 2020 18:53 )  0.372 ng/mL / x     / x     / x     / x                Culture - Blood (collected 21 Jul 2020 22:37)  Source: .Blood Blood  Preliminary Report (22 Jul 2020 23:01):    No growth to date.    Culture - Blood (collected 21 Jul 2020 22:37)  Source: .Blood Blood  Preliminary Report (22 Jul 2020 23:01):    No growth to date.        I&O's Summary    22 Jul 2020 07:01  -  23 Jul 2020 07:00  --------------------------------------------------------  IN: 0 mL / OUT: 2300 mL / NET: -2300 mL    23 Jul 2020 07:01  -  23 Jul 2020 11:03  --------------------------------------------------------  IN: 200 mL / OUT: 0 mL / NET: 200 mL          CAPILLARY BLOOD GLUCOSE  CAPILLARY BLOOD GLUCOSE      POCT Blood Glucose.: 333 mg/dL (23 Jul 2020 08:01)    CAPILLARY BLOOD GLUCOSE      POCT Blood Glucose.: 333 mg/dL (23 Jul 2020 08:01)  POCT Blood Glucose.: 263 mg/dL (22 Jul 2020 21:02)  POCT Blood Glucose.: 209 mg/dL (22 Jul 2020 16:33)  POCT Blood Glucose.: 380 mg/dL (22 Jul 2020 11:22)      RADIOLOGY & ADDITIONAL TESTS: PGY-1 Progress Note discussed with attending    PAGER #: [[phone]] TILL 5:00 PM  PLEASE CONTACT ON CALL TEAM:   - On Call Team (Please refer to Joby) FROM 5:00 PM - 8:30PM  - Nightfloat Team FROM 8:30 -7:30 AM    CHIEF COMPLAINT & BRIEF HOSPITAL COURSE: 55 year old male, 3 PPD smoker, with a past medical history of HTN, DM, HLD, CAD, CABG  in 2005 the past, multiple embolic strokes (with loop recorder placement), CHF, vertigo presented with shortness of breath x 1 week. Admitted with sepsis 2/2 PNA and respiratory failure with hypoxemia. Started on Rocephin and Zithromax. Trop with slight uptrend, resolved. T max on admission 102, WBC trending down. Cardiology and Pulmonology consult noted. Echo performed          INTERVAL HPI/OVERNIGHT EVENTS: Patient was seen and exmained by bedside, does not complain of any acute event and no event reported by overnight nurses/ resident team      REVIEW OF SYSTEMS:  CONSTITUTIONAL: No fever, weight loss, or fatigue  RESPIRATORY: No cough, chills or hemoptysis; No shortness of breath,   CARDIOVASCULAR: No chest pain, palpitations, dizziness, or leg swelling  GASTROINTESTINAL: No abdominal pain. No nausea, vomiting, or hematemesis; No diarrhea or constipation. No melena or hematochezia.  GENITOURINARY: No dysuria or hematuria, urinary frequency  NEUROLOGICAL: No headaches, memory loss, loss of strength, numbness, or tremors  SKIN: No itching, burning, rashes, or lesions     MEDICATIONS  (STANDING):  aspirin  chewable 81 milliGRAM(s) Oral daily  atorvastatin 80 milliGRAM(s) Oral at bedtime  azithromycin  IVPB 500 milliGRAM(s) IV Intermittent every 24 hours  cefTRIAXone   IVPB 1000 milliGRAM(s) IV Intermittent every 24 hours  dextrose 5%. 1000 milliLiter(s) (50 mL/Hr) IV Continuous <Continuous>  furosemide    Tablet 40 milliGRAM(s) Oral daily  heparin   Injectable 5000 Unit(s) SubCutaneous every 12 hours  insulin glargine Injectable (LANTUS) 20 Unit(s) SubCutaneous at bedtime  insulin lispro (HumaLOG) corrective regimen sliding scale   SubCutaneous three times a day before meals  insulin lispro (HumaLOG) corrective regimen sliding scale   SubCutaneous at bedtime  metoprolol tartrate 25 milliGRAM(s) Oral two times a day    MEDICATIONS  (PRN):  acetaminophen   Tablet .. 650 milliGRAM(s) Oral every 6 hours PRN Temp greater or equal to 38C (100.4F)  ALBUTerol    90 MICROgram(s) HFA Inhaler 2 Puff(s) Inhalation every 6 hours PRN Bronchospasm  glucagon  Injectable 1 milliGRAM(s) IntraMuscular once PRN Glucose LESS THAN 70 milligrams/deciliter  guaiFENesin   Syrup  (Sugar-Free) 200 milliGRAM(s) Oral every 6 hours PRN Cough      Vital Signs Last 24 Hrs  T(C): 37 (23 Jul 2020 07:45), Max: 37.7 (23 Jul 2020 04:58)  T(F): 98.6 (23 Jul 2020 07:45), Max: 99.8 (23 Jul 2020 04:58)  HR: 80 (23 Jul 2020 07:45) (80 - 87)  BP: 149/77 (23 Jul 2020 07:45) (132/82 - 152/88)  BP(mean): --  RR: 18 (23 Jul 2020 07:45) (18 - 20)  SpO2: 100% (23 Jul 2020 07:45) (97% - 100%)    PHYSICAL EXAMINATION:  GENERAL: NAD, well built  HEAD:  Atraumatic, Normocephalic  EYES:  conjunctiva and sclera clear  NECK: Supple, No JVD, Normal thyroid  CHEST/LUNG: normal breath sounds, Mild B/L rhonchi, wheezing, or rubs  HEART: Regular rate and rhythm; No murmurs, rubs, or gallops  ABDOMEN: Soft, Nontender, Nondistended; Bowel sounds present  NERVOUS SYSTEM:  Alert & Oriented X3,    EXTREMITIES:  2+ Peripheral Pulses, No clubbing, cyanosis, or edema  SKIN: warm dry                          10.9   9.69  )-----------( 446      ( 23 Jul 2020 07:06 )             34.0     07-23    134<L>  |  97  |  30<H>  ----------------------------<  318<H>  3.7   |  29  |  1.57<H>    Ca    8.4      23 Jul 2020 07:06  Phos  2.5     07-22  Mg     2.0     07-22    TPro  7.9  /  Alb  2.2<L>  /  TBili  0.4  /  DBili  x   /  AST  95<H>  /  ALT  166<H>  /  AlkPhos  166<H>  07-23    LIVER FUNCTIONS - ( 23 Jul 2020 07:06 )  Alb: 2.2 g/dL / Pro: 7.9 g/dL / ALK PHOS: 166 U/L / ALT: 166 U/L DA / AST: 95 U/L / GGT: x           CARDIAC MARKERS ( 22 Jul 2020 07:07 )  0.286 ng/mL / x     / 201 U/L / x     / 1.3 ng/mL  CARDIAC MARKERS ( 21 Jul 2020 22:00 )  0.471 ng/mL / x     / 198 U/L / x     / 1.1 ng/mL  CARDIAC MARKERS ( 21 Jul 2020 18:53 )  0.372 ng/mL / x     / x     / x     / x                Culture - Blood (collected 21 Jul 2020 22:37)  Source: .Blood Blood  Preliminary Report (22 Jul 2020 23:01):    No growth to date.    Culture - Blood (collected 21 Jul 2020 22:37)  Source: .Blood Blood  Preliminary Report (22 Jul 2020 23:01):    No growth to date.        I&O's Summary    22 Jul 2020 07:01  -  23 Jul 2020 07:00  --------------------------------------------------------  IN: 0 mL / OUT: 2300 mL / NET: -2300 mL    23 Jul 2020 07:01  -  23 Jul 2020 11:03  --------------------------------------------------------  IN: 200 mL / OUT: 0 mL / NET: 200 mL          CAPILLARY BLOOD GLUCOSE  CAPILLARY BLOOD GLUCOSE      POCT Blood Glucose.: 333 mg/dL (23 Jul 2020 08:01)    CAPILLARY BLOOD GLUCOSE      POCT Blood Glucose.: 333 mg/dL (23 Jul 2020 08:01)  POCT Blood Glucose.: 263 mg/dL (22 Jul 2020 21:02)  POCT Blood Glucose.: 209 mg/dL (22 Jul 2020 16:33)  POCT Blood Glucose.: 380 mg/dL (22 Jul 2020 11:22)      RADIOLOGY & ADDITIONAL TESTS:

## 2020-07-23 NOTE — DIETITIAN INITIAL EVALUATION ADULT. - OTHER INFO
Patient reports adequate oral intake PTA, No weight loss. Obtained weight and height from patient: 6'3", 106kg, BMI=29. elevated A1 C: 9.5, declined additional DM education

## 2020-07-23 NOTE — DIETITIAN INITIAL EVALUATION ADULT. - PERTINENT LABORATORY DATA
07-23 Na134 mmol/L<L> Glu 318 mg/dL<H> K+ 3.7 mmol/L Cr  1.57 mg/dL<H> BUN 30 mg/dL<H> 07-22 Phos 2.5 mg/dL 07-23 Alb 2.2 g/dL<L> 07-22 Chol 137 mg/dL LDL 91 mg/dL HDL 31 mg/dL<L> Trig 76 mg/dL

## 2020-07-23 NOTE — CONSULT NOTE ADULT - SUBJECTIVE AND OBJECTIVE BOX
PULMONARY CONSULT NOTE      MADISON PERRY  MRN-[id number]    Patient is a 55y old  Male who presents with a chief complaint of Shortness of Breath (2020 11:02)    History of Present Illness:  Reason for Admission: Shortness of Breath	  History of Present Illness: 	  55 year old male with medical history  of HTN, DM, HLD, CAD, CABG in the past, multiple embolic strokes(Loop recorder placement), CHF, vertigo presented with shortness of breath for past 1 week.  Patient is a very poor historian . Patient denies cough, fever or chills, nausea, vomiting, chest pain, diaphoresis palpitations, urinary or Bowel problems.      HISTORY OF PRESENT ILLNESS: As above. Awake, alert, comfortable in bed in NAD    MEDICATIONS  (STANDING):  aspirin  chewable 81 milliGRAM(s) Oral daily  atorvastatin 80 milliGRAM(s) Oral at bedtime  azithromycin  IVPB 500 milliGRAM(s) IV Intermittent every 24 hours  cefTRIAXone   IVPB 1000 milliGRAM(s) IV Intermittent every 24 hours  dextrose 5%. 1000 milliLiter(s) (50 mL/Hr) IV Continuous <Continuous>  furosemide    Tablet 40 milliGRAM(s) Oral daily  heparin   Injectable 5000 Unit(s) SubCutaneous every 12 hours  insulin glargine Injectable (LANTUS) 20 Unit(s) SubCutaneous at bedtime  insulin lispro (HumaLOG) corrective regimen sliding scale   SubCutaneous three times a day before meals  insulin lispro (HumaLOG) corrective regimen sliding scale   SubCutaneous at bedtime  metoprolol tartrate 25 milliGRAM(s) Oral two times a day      MEDICATIONS  (PRN):  acetaminophen   Tablet .. 650 milliGRAM(s) Oral every 6 hours PRN Temp greater or equal to 38C (100.4F)  ALBUTerol    90 MICROgram(s) HFA Inhaler 2 Puff(s) Inhalation every 6 hours PRN Bronchospasm  glucagon  Injectable 1 milliGRAM(s) IntraMuscular once PRN Glucose LESS THAN 70 milligrams/deciliter  guaiFENesin   Syrup  (Sugar-Free) 200 milliGRAM(s) Oral every 6 hours PRN Cough      Allergies    Plavix (Other)    Intolerances        PAST MEDICAL & SURGICAL HISTORY:  Cerebrovascular accident (CVA), unspecified mechanism  Congestive heart failure, NYHA class 3, chronic, combined  Cerebrovascular accident (CVA), unspecified mechanism  Hyperlipidemia, unspecified hyperlipidemia type  Coronary artery disease involving native heart without angina pectoris, unspecified vessel or lesion type  Hypertension, unspecified type  Diabetes mellitus of other type without complication  History of loop recorder  S/P coronary artery stent placement  History of appendectomy  S/P CABG x 1      FAMILY HISTORY:  FH: CAD (coronary artery disease)      SOCIAL HISTORY  Smoking History:     REVIEW OF SYSTEMS:    CONSTITUTIONAL:  No fevers, chills, sweats    HEENT:  Eyes:  No diplopia or blurred vision. ENT:  No earache, sore throat or runny nose.    CARDIOVASCULAR:  No pressure, squeezing, tightness, or heaviness about the chest; no palpitations.    RESPIRATORY:  Per HPI    GASTROINTESTINAL:  No abdominal pain, nausea, vomiting or diarrhea.    GENITOURINARY:  No dysuria, frequency or urgency.    NEUROLOGIC:  No paresthesias, fasciculations, seizures or weakness.    PSYCHIATRIC:  No disorder of thought or mood.    Vital Signs Last 24 Hrs  T(C): 37 (2020 07:45), Max: 37.7 (2020 04:58)  T(F): 98.6 (2020 07:45), Max: 99.8 (2020 04:58)  HR: 80 (2020 07:45) (80 - 87)  BP: 149/77 (2020 07:45) (132/82 - 152/88)  BP(mean): --  RR: 18 (2020 07:45) (18 - 20)  SpO2: 100% (2020 07:45) (97% - 100%)  I&O's Detail    2020 07:01  -  2020 07:00  --------------------------------------------------------  IN:  Total IN: 0 mL    OUT:    Voided: 2300 mL  Total OUT: 2300 mL    Total NET: -2300 mL      2020 07:01  -  2020 10:13  --------------------------------------------------------  IN:    Oral Fluid: 200 mL  Total IN: 200 mL    OUT:  Total OUT: 0 mL    Total NET: 200 mL          PHYSICAL EXAMINATION:    GENERAL: The patient is a well-developed, well-nourished _____in no apparent distress.     HEENT: Head is normocephalic and atraumatic. Extraocular muscles are intact. Mucous membranes are moist.     NECK: Supple.     LUNGS: Clear to auscultation without wheezing, rales, or rhonchi. Respirations unlabored    HEART: Regular rate and rhythm without murmur.    ABDOMEN: Soft, nontender, and nondistended.  No hepatosplenomegaly is noted.    EXTREMITIES: Without any cyanosis, clubbing, rash, lesions or edema.    NEUROLOGIC: Grossly intact.      LABS:                        10.9   9.69  )-----------( 446      ( 2020 07:06 )             34.0     07-23    134<L>  |  97  |  30<H>  ----------------------------<  318<H>  3.7   |  29  |  1.57<H>    Ca    8.4      2020 07:06  Phos  2.5     07-22  Mg     2.0     07-22    TPro  7.9  /  Alb  2.2<L>  /  TBili  0.4  /  DBili  x   /  AST  95<H>  /  ALT  166<H>  /  AlkPhos  166<H>  07-23      Urinalysis Basic - ( 2020 18:30 )    Color: Yellow / Appearance: Clear / S.020 / pH: x  Gluc: x / Ketone: Trace  / Bili: Small / Urobili: 8   Blood: x / Protein: 500 mg/dL / Nitrite: Negative   Leuk Esterase: Negative / RBC: 2-5 /HPF / WBC 3-5 /HPF   Sq Epi: x / Non Sq Epi: Moderate /HPF / Bacteria: Few /HPF      ABG - ( 2020 21:43 )  pH, Arterial: 7.47  pH, Blood: x     /  pCO2: 39    /  pO2: 84    / HCO3: 28    / Base Excess: 4.4   /  SaO2: 96                CARDIAC MARKERS ( 2020 07:07 )  0.286 ng/mL / x     / 201 U/L / x     / 1.3 ng/mL  CARDIAC MARKERS ( 2020 22:00 )  0.471 ng/mL / x     / 198 U/L / x     / 1.1 ng/mL  CARDIAC MARKERS ( 2020 18:53 )  0.372 ng/mL / x     / x     / x     / x      COVID-19  Antibody - for prior infection screening (20 @ 10:12)    COVID-19 IgG Antibody Index: <0.10: [employer] CMIA  Negative Result    <= 1.39 Index  Positive Result      >= 1.40 Index Index    COVID-19 IgG Antibody Interpretation: Negative: This test has not been reviewed by the FDA by the standard review  process. It has been authorized for emergency use by the FDA. [employer] has validated this test to be accurate.  Negative results do not rule out SARS-CoV-2 infection, particularly in  those who have been in recent contact with the virus. Follow-up testing  with a molecular diagnostic test should be considered to rule out  infection in these individuals.  Results from antibody testing should not be used as thesole basis to  diagnose or exclude SARS-CoV-2 infection, or to inform infection status.  Positive results may rarely be due to past or present infection with  non-SARS-CoV-2 coronavirus strains, such as coronavirus HKU1, NL63, OC43,  or 229E. [employer], through extensive validation  testing, has confirmed that this risk is minimal with this test.          Serum Pro-Brain Natriuretic Peptide: 12505 pg/mL (20 @ 18:53)      Procalcitonin, Serum: 2.06 ng/mL (20 @ 04:30)      MICROBIOLOGY:    RADIOLOGY & ADDITIONAL STUDIES:    CXR:  < from: Xray Chest 1 View- PORTABLE-Routine (20 @ 09:35) >  IMPRESSION:    No change in reticular infiltrates with superimposed left upper lobe airspace disease. Please refer to chest CT 2020.    Loop recorder. CABG.    < end of copied text >    Ct scan chest:    < from: CT Chest No Cont (20 @ 20:41) >    IMPRESSION:   Geographic infiltrates with septal thickening. Differential diagnosis includes bilateral pneumonia, CHF and pulmonary alveolar proteinosis.  Small right pleural effusion.  Status post CABG  Loop recorder  Cholelithiasis      < end of copied text >  ekg;    echo:< from: Transthoracic Echocardiogram (20 @ 07:26) >  CONCLUSIONS:  1. Mild mitral regurgitation.  2. Mildly dilated left atrium.  LA volume index = 40 cc/m2.  3. Mild concentric left ventricular hypertrophy.  4. Moderate segmental left ventricular systolic  dysfunction.  The inferior andinferolateral walls are  akinetic.  Septal motion consistent with a conduction  defect.   Moderate diastolic dysfunction (stage II).  5. Linear structure compatible with Chiari network  identified in the right atrium.  This is a normal variant.  6. Normal right ventricular size and function.    < end of copied text >

## 2020-07-23 NOTE — PROGRESS NOTE ADULT - PROBLEM SELECTOR PLAN 8
Patient takes Metoprolol 25 bid and lisinopril.  - Holding Lisinopril for MALCOM  - continue with other home meds

## 2020-07-23 NOTE — PROGRESS NOTE ADULT - PROBLEM SELECTOR PLAN 2
Patient is admitted after shortness of breath, currently on 4 L nasal cannula spO2 100%. CT chest with Geographic infiltrates with septal thickening. Small right pleural effusion.  - c/w Abx Rocephin + Zithromax for PNA coverage  - Patient also has hx of CHF , last ECHO showed Moderate concentric left ventricular hypertrophy. Normal left ventricular systolic function. Mild diastolic dysfunction (stage I).   - s/p 80 IV lasix in ED.  - f/u I's/O's net -1950  - f/u repeat Echo .  - cardiology consulted Dr Allred  - pulmonology consult Dr. Starks Resolved   Likely multifactorial   Oxygen supply being titrated down,   CT chest with Geographic infiltrates with septal thickening. Small right pleural effusion.  - c/w Abx Rocephin + Zithromax for PNA coverage  - Patient also has hx of CHF , last ECHO showed Moderate concentric left ventricular hypertrophy. Normal left ventricular systolic function. Mild diastolic dysfunction (stage I).   - Change LAsix to 40PO daily   - f/u I's/O's net -ve  - Echo shows EF 40-45% and Hypokinesia in anterior and latera lwalls (likely due to previous MI)  - cardiology consult appreciated, continue curent management  - pulmonology consult Dr. Starks , noted

## 2020-07-23 NOTE — DIETITIAN INITIAL EVALUATION ADULT. - PROBLEM SELECTOR PLAN 9
Patient has Elevated liver enzymes, normal bilirubin.  no tenderness on exam.  Follow US hepatic and pancreatic

## 2020-07-23 NOTE — PROGRESS NOTE ADULT - PROBLEM SELECTOR PLAN 7
Patient takes LIPITOR 80 mg (pharmacy said Atorvastatin 20mg HS).  - continue with home meds.  - follow Lipid Panel in am. Patient takes LIPITOR 80 mg (pharmacy said Atorvastatin 20mg HS).  - Will hold Statin for now as LFTS are trending up.  - Low HDL, otherwise lipid panel is normal;

## 2020-07-23 NOTE — DIETITIAN INITIAL EVALUATION ADULT. - PROBLEM SELECTOR PLAN 6
Patient has DM, takes 43 Lantus and 8 units of Humalog tid.  Starting him on 20 Lantus and Sliding scale.  adjust dose as clinically indicated.  Follow Hb A1c

## 2020-07-23 NOTE — PROGRESS NOTE ADULT - PROBLEM SELECTOR PLAN 5
Patients Creatinine is Elevated 1.64  - BUN/Creat 23/1.02 > improving  - follow Urine lytes  - follow BMP  - consider renal ultrasound if serum creatinine gets worse Patients Creatinine is Elevated 1.64  - Baseline is around 1.57  - follow Urine lytes FeNA 2.1%  - follow BMP daily  - consider renal ultrasound if serum creatinine gets worse

## 2020-07-23 NOTE — PROGRESS NOTE ADULT - ASSESSMENT
55 year old male with medical history  of HTN, DM, HLD, CAD, CABG in the past, multiple embolic strokes(Loop recorder placement), CHF, vertigo presented with shortness of breath for past 1 week.    MED REC on [[facility]  [phone]] 7/22:   Basilglar qwick pen - 43 U TID  Insulin Novalog qwick pen - 8U TID  Lanaprost eyedrop 1 drop in affected eye HS  Metoprolol  mg QD  Atorvastatin 20 HS  Aspirin 81 QD 55 year old male with medical history  of HTN, DM, HLD, CAD, CABG in the past, multiple embolic strokes(Loop recorder placement), CHF, vertigo presented with shortness of breath for past 1 week.    MED REC on [[facility]  [phone]] 7/22:   Basilglar qwick pen - 43 U TID  Insulin Novalog qwick pen - 8U TID  Lanaprost eyedrop 1 drop in affected eye HS  Metoprolol  mg QD  Atorvastatin 20 HS  Aspirin 81 QD            Problem 11:  Vitamin D Deficiency:   Vitamind D levels 13.6  Will add 50,000 units weekly

## 2020-07-23 NOTE — PROGRESS NOTE ADULT - SUBJECTIVE AND OBJECTIVE BOX
Patient denies chest pain or shortness of breath.   Review of systems otherwise (-)  	  MEDICATIONS:  MEDICATIONS  (STANDING):  aspirin  chewable 81 milliGRAM(s) Oral daily  atorvastatin 80 milliGRAM(s) Oral at bedtime  azithromycin  IVPB 500 milliGRAM(s) IV Intermittent every 24 hours  cefTRIAXone   IVPB 1000 milliGRAM(s) IV Intermittent every 24 hours  dextrose 5%. 1000 milliLiter(s) (50 mL/Hr) IV Continuous <Continuous>  furosemide    Tablet 40 milliGRAM(s) Oral daily  heparin   Injectable 5000 Unit(s) SubCutaneous every 12 hours  insulin glargine Injectable (LANTUS) 20 Unit(s) SubCutaneous at bedtime  insulin lispro (HumaLOG) corrective regimen sliding scale   SubCutaneous three times a day before meals  insulin lispro (HumaLOG) corrective regimen sliding scale   SubCutaneous at bedtime  metoprolol tartrate 25 milliGRAM(s) Oral two times a day      LABS:	 	    CARDIAC MARKERS:  CARDIAC MARKERS ( 22 Jul 2020 07:07 )  0.286 ng/mL / x     / 201 U/L / x     / 1.3 ng/mL  CARDIAC MARKERS ( 21 Jul 2020 22:00 )  0.471 ng/mL / x     / 198 U/L / x     / 1.1 ng/mL  CARDIAC MARKERS ( 21 Jul 2020 18:53 )  0.372 ng/mL / x     / x     / x     / x                                    10.9   9.69  )-----------( 446      ( 23 Jul 2020 07:06 )             34.0     Hemoglobin: 10.9 g/dL (07-23 @ 07:06)  Hemoglobin: 10.6 g/dL (07-22 @ 18:39)  Hemoglobin: 10.3 g/dL (07-22 @ 07:06)  Hemoglobin: 10.6 g/dL (07-21 @ 18:53)      07-23    134<L>  |  97  |  30<H>  ----------------------------<  318<H>  3.7   |  29  |  1.57<H>    Ca    8.4      23 Jul 2020 07:06  Phos  2.5     07-22  Mg     2.0     07-22    TPro  7.9  /  Alb  2.2<L>  /  TBili  0.4  /  DBili  x   /  AST  95<H>  /  ALT  166<H>  /  AlkPhos  166<H>  07-23    Creatinine Trend: 1.57<--, 1.53<--, 1.64<--      PHYSICAL EXAM:  T(C): 37.1 (07-23-20 @ 11:14), Max: 37.7 (07-23-20 @ 04:58)  HR: 79 (07-23-20 @ 11:14) (79 - 87)  BP: 149/99 (07-23-20 @ 11:14) (132/82 - 152/88)  RR: 18 (07-23-20 @ 11:14) (18 - 20)  SpO2: 99% (07-23-20 @ 11:14) (97% - 100%)  Wt(kg): --  I&O's Summary    22 Jul 2020 07:01  -  23 Jul 2020 07:00  --------------------------------------------------------  IN: 0 mL / OUT: 2300 mL / NET: -2300 mL    23 Jul 2020 07:01  -  23 Jul 2020 14:44  --------------------------------------------------------  IN: 430 mL / OUT: 0 mL / NET: 430 mL        Gen: Appears well in NAD  HEENT:  (-)icterus (-)pallor  CV: N S1 S2 1/6 MANDY (+)2 Pulses B/l  Resp:  Course B/L BS, decreased at the bases  normal effort  GI: (+) BS Soft, NT, ND  Lymph:  (-)Edema, (-)obvious lymphadenopathy  Skin: Warm to touch, Normal turgor  Psych: Appropriate mood and affect    	        ASSESSMENT/PLAN: 	55y Male HTN, DM, HLD, CAD, remote stents subsequent CABG  in 2005 the past, multiple embolic strokes(Loop recorder placement), CHF, vertigo presented with shortness of breath for past 1 week.  suspected PNA    - echo with no new interval changes  - Abx per primary team  - cont asa, statin, BB  -  suspect SOB is multifactorial, COPD, PN and CHF  - Keep net negative    Rosendo Allred MD, [facility]  BEEPER [phone] Patient denies chest pain or shortness of breath.   Review of systems otherwise (-)  	  MEDICATIONS:  MEDICATIONS  (STANDING):  aspirin  chewable 81 milliGRAM(s) Oral daily  atorvastatin 80 milliGRAM(s) Oral at bedtime  azithromycin  IVPB 500 milliGRAM(s) IV Intermittent every 24 hours  cefTRIAXone   IVPB 1000 milliGRAM(s) IV Intermittent every 24 hours  dextrose 5%. 1000 milliLiter(s) (50 mL/Hr) IV Continuous <Continuous>  furosemide    Tablet 40 milliGRAM(s) Oral daily  heparin   Injectable 5000 Unit(s) SubCutaneous every 12 hours  insulin glargine Injectable (LANTUS) 20 Unit(s) SubCutaneous at bedtime  insulin lispro (HumaLOG) corrective regimen sliding scale   SubCutaneous three times a day before meals  insulin lispro (HumaLOG) corrective regimen sliding scale   SubCutaneous at bedtime  metoprolol tartrate 25 milliGRAM(s) Oral two times a day      LABS:	 	    CARDIAC MARKERS:  CARDIAC MARKERS ( 22 Jul 2020 07:07 )  0.286 ng/mL / x     / 201 U/L / x     / 1.3 ng/mL  CARDIAC MARKERS ( 21 Jul 2020 22:00 )  0.471 ng/mL / x     / 198 U/L / x     / 1.1 ng/mL  CARDIAC MARKERS ( 21 Jul 2020 18:53 )  0.372 ng/mL / x     / x     / x     / x                                    10.9   9.69  )-----------( 446      ( 23 Jul 2020 07:06 )             34.0     Hemoglobin: 10.9 g/dL (07-23 @ 07:06)  Hemoglobin: 10.6 g/dL (07-22 @ 18:39)  Hemoglobin: 10.3 g/dL (07-22 @ 07:06)  Hemoglobin: 10.6 g/dL (07-21 @ 18:53)      07-23    134<L>  |  97  |  30<H>  ----------------------------<  318<H>  3.7   |  29  |  1.57<H>    Ca    8.4      23 Jul 2020 07:06  Phos  2.5     07-22  Mg     2.0     07-22    TPro  7.9  /  Alb  2.2<L>  /  TBili  0.4  /  DBili  x   /  AST  95<H>  /  ALT  166<H>  /  AlkPhos  166<H>  07-23    Creatinine Trend: 1.57<--, 1.53<--, 1.64<--      PHYSICAL EXAM:  T(C): 37.1 (07-23-20 @ 11:14), Max: 37.7 (07-23-20 @ 04:58)  HR: 79 (07-23-20 @ 11:14) (79 - 87)  BP: 149/99 (07-23-20 @ 11:14) (132/82 - 152/88)  RR: 18 (07-23-20 @ 11:14) (18 - 20)  SpO2: 99% (07-23-20 @ 11:14) (97% - 100%)  Wt(kg): --  I&O's Summary    22 Jul 2020 07:01  -  23 Jul 2020 07:00  --------------------------------------------------------  IN: 0 mL / OUT: 2300 mL / NET: -2300 mL    23 Jul 2020 07:01  -  23 Jul 2020 14:44  --------------------------------------------------------  IN: 430 mL / OUT: 0 mL / NET: 430 mL        Gen: Appears well in NAD  HEENT:  (-)icterus (-)pallor  CV: N S1 S2 1/6 MANDY (+)2 Pulses B/l  Resp:  Course B/L BS, decreased at the bases  normal effort  GI: (+) BS Soft, NT, ND  Lymph:  (-)Edema, (-)obvious lymphadenopathy  Skin: Warm to touch, Normal turgor  Psych: Appropriate mood and affect    	    Tele: Sinus     ASSESSMENT/PLAN: 	55y Male HTN, DM, HLD, CAD, remote stents subsequent CABG  in 2005 the past, multiple embolic strokes(Loop recorder placement), CHF, vertigo presented with shortness of breath for past 1 week.  suspected PNA    - echo with no new interval changes  - Abx per primary team  - cont asa, statin, BB  -  suspect SOB is multifactorial, COPD, PN and CHF  - Keep net negative    Rosendo Allred MD, [facility]  BEEPER [phone]

## 2020-07-23 NOTE — PROGRESS NOTE ADULT - PROBLEM SELECTOR PLAN 9
Patient has Elevated liver enzymes, normal bilirubin, no tenderness on exam.  - US hepatic and pancreatic: contracted GB with stones, no bile tract dilation  - trend LFTs AST/ALT 85/89 admission > now 77/94  - continue to trend LFTs Patient has Elevated liver enzymes, normal bilirubin, no tenderness on exam.  - US hepatic and pancreatic: contracted GB with stones, no bile tract dilation  - LFTs trending up, sudden jump in ALT/AST noted  - Will hold Lipitor for now   - continue to trend LFTs

## 2020-07-23 NOTE — DIETITIAN INITIAL EVALUATION ADULT. - PROBLEM SELECTOR PLAN 4
Patient has CHF.  Patient doesnot remember if he takes Lasix /spironolactone at home.   got 80 IV lasix in ED.  Monitor Is and Os,   and adjust Lasix dose as clinically indicated.  starting him on 40 IV lasix.  patient has Elevated trops, likely secondary to demand ischemia, EKG showed RBBB.  Cardiology consult Dr Allred

## 2020-07-24 ENCOUNTER — TRANSCRIPTION ENCOUNTER (OUTPATIENT)
Age: 55
End: 2020-07-24

## 2020-07-24 LAB
ALBUMIN SERPL ELPH-MCNC: 2.1 G/DL — LOW (ref 3.5–5)
ALP SERPL-CCNC: 163 U/L — HIGH (ref 40–120)
ALT FLD-CCNC: 143 U/L DA — HIGH (ref 10–60)
ANION GAP SERPL CALC-SCNC: 5 MMOL/L — SIGNIFICANT CHANGE UP (ref 5–17)
AST SERPL-CCNC: 54 U/L — HIGH (ref 10–40)
BASOPHILS # BLD AUTO: 0.02 K/UL — SIGNIFICANT CHANGE UP (ref 0–0.2)
BASOPHILS NFR BLD AUTO: 0.3 % — SIGNIFICANT CHANGE UP (ref 0–2)
BILIRUB SERPL-MCNC: 0.2 MG/DL — SIGNIFICANT CHANGE UP (ref 0.2–1.2)
BUN SERPL-MCNC: 31 MG/DL — HIGH (ref 7–18)
CALCIUM SERPL-MCNC: 8.5 MG/DL — SIGNIFICANT CHANGE UP (ref 8.4–10.5)
CHLORIDE SERPL-SCNC: 99 MMOL/L — SIGNIFICANT CHANGE UP (ref 96–108)
CO2 SERPL-SCNC: 32 MMOL/L — HIGH (ref 22–31)
CREAT SERPL-MCNC: 1.46 MG/DL — HIGH (ref 0.5–1.3)
CULTURE RESULTS: NO GROWTH — SIGNIFICANT CHANGE UP
EOSINOPHIL # BLD AUTO: 0.16 K/UL — SIGNIFICANT CHANGE UP (ref 0–0.5)
EOSINOPHIL NFR BLD AUTO: 2.3 % — SIGNIFICANT CHANGE UP (ref 0–6)
GLUCOSE BLDC GLUCOMTR-MCNC: 198 MG/DL — HIGH (ref 70–99)
GLUCOSE BLDC GLUCOMTR-MCNC: 294 MG/DL — HIGH (ref 70–99)
GLUCOSE BLDC GLUCOMTR-MCNC: 307 MG/DL — HIGH (ref 70–99)
GLUCOSE BLDC GLUCOMTR-MCNC: 374 MG/DL — HIGH (ref 70–99)
GLUCOSE BLDC GLUCOMTR-MCNC: 389 MG/DL — HIGH (ref 70–99)
GLUCOSE SERPL-MCNC: 347 MG/DL — HIGH (ref 70–99)
HCT VFR BLD CALC: 33.2 % — LOW (ref 39–50)
HGB BLD-MCNC: 10.8 G/DL — LOW (ref 13–17)
IMM GRANULOCYTES NFR BLD AUTO: 0.4 % — SIGNIFICANT CHANGE UP (ref 0–1.5)
LYMPHOCYTES # BLD AUTO: 1.27 K/UL — SIGNIFICANT CHANGE UP (ref 1–3.3)
LYMPHOCYTES # BLD AUTO: 18.2 % — SIGNIFICANT CHANGE UP (ref 13–44)
MCHC RBC-ENTMCNC: 29 PG — SIGNIFICANT CHANGE UP (ref 27–34)
MCHC RBC-ENTMCNC: 32.5 GM/DL — SIGNIFICANT CHANGE UP (ref 32–36)
MCV RBC AUTO: 89.2 FL — SIGNIFICANT CHANGE UP (ref 80–100)
MONOCYTES # BLD AUTO: 0.47 K/UL — SIGNIFICANT CHANGE UP (ref 0–0.9)
MONOCYTES NFR BLD AUTO: 6.7 % — SIGNIFICANT CHANGE UP (ref 2–14)
NEUTROPHILS # BLD AUTO: 5.02 K/UL — SIGNIFICANT CHANGE UP (ref 1.8–7.4)
NEUTROPHILS NFR BLD AUTO: 72.1 % — SIGNIFICANT CHANGE UP (ref 43–77)
NRBC # BLD: 0 /100 WBCS — SIGNIFICANT CHANGE UP (ref 0–0)
PLATELET # BLD AUTO: 458 K/UL — HIGH (ref 150–400)
POTASSIUM SERPL-MCNC: 4 MMOL/L — SIGNIFICANT CHANGE UP (ref 3.5–5.3)
POTASSIUM SERPL-SCNC: 4 MMOL/L — SIGNIFICANT CHANGE UP (ref 3.5–5.3)
PROT SERPL-MCNC: 7.4 G/DL — SIGNIFICANT CHANGE UP (ref 6–8.3)
RBC # BLD: 3.72 M/UL — LOW (ref 4.2–5.8)
RBC # FLD: 13.2 % — SIGNIFICANT CHANGE UP (ref 10.3–14.5)
SODIUM SERPL-SCNC: 136 MMOL/L — SIGNIFICANT CHANGE UP (ref 135–145)
SPECIMEN SOURCE: SIGNIFICANT CHANGE UP
WBC # BLD: 6.97 K/UL — SIGNIFICANT CHANGE UP (ref 3.8–10.5)
WBC # FLD AUTO: 6.97 K/UL — SIGNIFICANT CHANGE UP (ref 3.8–10.5)

## 2020-07-24 RX ORDER — CEFTRIAXONE 500 MG/1
1000 INJECTION, POWDER, FOR SOLUTION INTRAMUSCULAR; INTRAVENOUS EVERY 24 HOURS
Refills: 0 | Status: DISCONTINUED | OUTPATIENT
Start: 2020-07-24 | End: 2020-07-25

## 2020-07-24 RX ORDER — AZITHROMYCIN 500 MG/1
500 TABLET, FILM COATED ORAL DAILY
Refills: 0 | Status: COMPLETED | OUTPATIENT
Start: 2020-07-25 | End: 2020-07-25

## 2020-07-24 RX ADMIN — AZITHROMYCIN 255 MILLIGRAM(S): 500 TABLET, FILM COATED ORAL at 05:00

## 2020-07-24 RX ADMIN — Medication 81 MILLIGRAM(S): at 12:47

## 2020-07-24 RX ADMIN — CEFTRIAXONE 100 MILLIGRAM(S): 500 INJECTION, POWDER, FOR SOLUTION INTRAMUSCULAR; INTRAVENOUS at 05:07

## 2020-07-24 RX ADMIN — Medication 5: at 12:47

## 2020-07-24 RX ADMIN — Medication 25 MILLIGRAM(S): at 17:29

## 2020-07-24 RX ADMIN — Medication 40 MILLIGRAM(S): at 05:07

## 2020-07-24 RX ADMIN — Medication 25 MILLIGRAM(S): at 05:07

## 2020-07-24 RX ADMIN — HEPARIN SODIUM 5000 UNIT(S): 5000 INJECTION INTRAVENOUS; SUBCUTANEOUS at 05:08

## 2020-07-24 RX ADMIN — Medication 3: at 17:28

## 2020-07-24 RX ADMIN — Medication 5: at 08:30

## 2020-07-24 RX ADMIN — INSULIN GLARGINE 25 UNIT(S): 100 INJECTION, SOLUTION SUBCUTANEOUS at 21:55

## 2020-07-24 RX ADMIN — Medication 2 UNIT(S): at 08:30

## 2020-07-24 RX ADMIN — Medication 2 UNIT(S): at 12:47

## 2020-07-24 RX ADMIN — Medication 2 UNIT(S): at 17:29

## 2020-07-24 RX ADMIN — HEPARIN SODIUM 5000 UNIT(S): 5000 INJECTION INTRAVENOUS; SUBCUTANEOUS at 17:29

## 2020-07-24 RX ADMIN — CEFTRIAXONE 100 MILLIGRAM(S): 500 INJECTION, POWDER, FOR SOLUTION INTRAMUSCULAR; INTRAVENOUS at 12:50

## 2020-07-24 NOTE — PHYSICAL THERAPY INITIAL EVALUATION ADULT - GENERAL OBSERVATIONS, REHAB EVAL
Patient received in supine; awake and alert. Patient w/ cardiac monitor and IV line. Patient presents w/ right sided facial droop and eye closed due to previous stroke.

## 2020-07-24 NOTE — PROGRESS NOTE ADULT - SUBJECTIVE AND OBJECTIVE BOX
PGY-1 Progress Note discussed with attending    PAGER #: [[phone]] TILL 5:00 PM  PLEASE CONTACT ON CALL TEAM:   - On Call Team (Please refer to Joby) FROM 5:00 PM - 8:30PM  - Nightfloat Team FROM 8:30 -7:30 AM    CHIEF COMPLAINT & BRIEF HOSPITAL COURSE:      INTERVAL HPI/OVERNIGHT EVENTS:       REVIEW OF SYSTEMS:  CONSTITUTIONAL: No fever, weight loss, or fatigue  RESPIRATORY: No cough, wheezing, chills or hemoptysis; No shortness of breath  CARDIOVASCULAR: No chest pain, palpitations, dizziness, or leg swelling  GASTROINTESTINAL: No abdominal pain. No nausea, vomiting, or hematemesis; No diarrhea or constipation. No melena or hematochezia.  GENITOURINARY: No dysuria or hematuria, urinary frequency  NEUROLOGICAL: No headaches, memory loss, loss of strength, numbness, or tremors  SKIN: No itching, burning, rashes, or lesions     MEDICATIONS  (STANDING):  aspirin  chewable 81 milliGRAM(s) Oral daily  cefTRIAXone   IVPB 1000 milliGRAM(s) IV Intermittent every 24 hours  ergocalciferol 16366 Unit(s) Oral <User Schedule>  furosemide    Tablet 40 milliGRAM(s) Oral daily  heparin   Injectable 5000 Unit(s) SubCutaneous every 12 hours  insulin glargine Injectable (LANTUS) 25 Unit(s) SubCutaneous at bedtime  insulin lispro (HumaLOG) corrective regimen sliding scale   SubCutaneous three times a day before meals  insulin lispro (HumaLOG) corrective regimen sliding scale   SubCutaneous at bedtime  insulin lispro Injectable (HumaLOG) 2 Unit(s) SubCutaneous three times a day before meals  metoprolol tartrate 25 milliGRAM(s) Oral two times a day    MEDICATIONS  (PRN):  acetaminophen   Tablet .. 650 milliGRAM(s) Oral every 6 hours PRN Temp greater or equal to 38C (100.4F)  ALBUTerol    90 MICROgram(s) HFA Inhaler 2 Puff(s) Inhalation every 6 hours PRN Bronchospasm  glucagon  Injectable 1 milliGRAM(s) IntraMuscular once PRN Glucose LESS THAN 70 milligrams/deciliter  guaiFENesin   Syrup  (Sugar-Free) 200 milliGRAM(s) Oral every 6 hours PRN Cough      Vital Signs Last 24 Hrs  T(C): 37.1 (24 Jul 2020 07:29), Max: 37.1 (23 Jul 2020 11:14)  T(F): 98.7 (24 Jul 2020 07:29), Max: 98.7 (23 Jul 2020 11:14)  HR: 74 (24 Jul 2020 07:29) (74 - 83)  BP: 119/77 (24 Jul 2020 07:29) (119/77 - 150/80)  BP(mean): --  RR: 19 (24 Jul 2020 07:29) (18 - 20)  SpO2: 100% (24 Jul 2020 07:29) (99% - 100%)    PHYSICAL EXAMINATION:  GENERAL: NAD, well built  HEAD:  Atraumatic, Normocephalic  EYES:  conjunctiva and sclera clear  NECK: Supple, No JVD, Normal thyroid  CHEST/LUNG: Clear to auscultation. Clear to percussion bilaterally; No rales, rhonchi, wheezing, or rubs  HEART: Regular rate and rhythm; No murmurs, rubs, or gallops  ABDOMEN: Soft, Nontender, Nondistended; Bowel sounds present  NERVOUS SYSTEM:  Alert & Oriented X3,    EXTREMITIES:  2+ Peripheral Pulses, No clubbing, cyanosis, or edema  SKIN: warm dry                          10.8   6.97  )-----------( 458      ( 24 Jul 2020 07:30 )             33.2     07-24    136  |  99  |  31<H>  ----------------------------<  347<H>  4.0   |  32<H>  |  1.46<H>    Ca    8.5      24 Jul 2020 07:30    TPro  7.4  /  Alb  2.1<L>  /  TBili  0.2  /  DBili  x   /  AST  54<H>  /  ALT  143<H>  /  AlkPhos  163<H>  07-24    LIVER FUNCTIONS - ( 24 Jul 2020 07:30 )  Alb: 2.1 g/dL / Pro: 7.4 g/dL / ALK PHOS: 163 U/L / ALT: 143 U/L DA / AST: 54 U/L / GGT: x                     Culture - Blood (collected 21 Jul 2020 22:37)  Source: .Blood Blood  Preliminary Report (22 Jul 2020 23:01):    No growth to date.    Culture - Blood (collected 21 Jul 2020 22:37)  Source: .Blood Blood  Preliminary Report (22 Jul 2020 23:01):    No growth to date.        I&O's Summary    23 Jul 2020 07:01  -  24 Jul 2020 07:00  --------------------------------------------------------  IN: 805 mL / OUT: 580 mL / NET: 225 mL          CAPILLARY BLOOD GLUCOSE  CAPILLARY BLOOD GLUCOSE      POCT Blood Glucose.: 374 mg/dL (24 Jul 2020 08:16)    CAPILLARY BLOOD GLUCOSE      POCT Blood Glucose.: 374 mg/dL (24 Jul 2020 08:16)  POCT Blood Glucose.: 313 mg/dL (23 Jul 2020 21:00)  POCT Blood Glucose.: 359 mg/dL (23 Jul 2020 16:44)  POCT Blood Glucose.: 267 mg/dL (23 Jul 2020 11:54)      RADIOLOGY & ADDITIONAL TESTS: PGY-1 Progress Note discussed with attending    PAGER #: [[phone]] TILL 5:00 PM  PLEASE CONTACT ON CALL TEAM:   - On Call Team (Please refer to Joby) FROM 5:00 PM - 8:30PM  - Nightfloat Team FROM 8:30 -7:30 AM    CHIEF COMPLAINT & BRIEF HOSPITAL COURSE:  55 year old male, 3 PPD smoker, with a past medical history of HTN, DM, HLD, CAD, CABG  in 2005 the past, multiple embolic strokes (with loop recorder placement), CHF, vertigo presented with shortness of breath x 1 week. Admitted with sepsis 2/2 PNA and respiratory failure with hypoxemia. Started on Rocephin and Zithromax. Trop with slight uptrend, resolved. T max on admission 102, WBC trending down. Cardiology and Pulmonology consult noted. Echo performed on 7/23 showed no changes from previous Echo. PT will eval patient for ambulation and O2 requirements. WIll complete IV Abx Azithromycin and Rocephin tomorrow 7/25.      INTERVAL HPI/OVERNIGHT EVENTS:   Patient seen and evaluated at bedside. No complaints or events overnight. Tapered NC to 2L. Patient feels improved. Will perform O2sat at rest and PT eval with O2sat walk test for home D/C planning. Continuing Abx, last dose tomorrow.    REVIEW OF SYSTEMS:  CONSTITUTIONAL: No fever, weight loss, or fatigue  RESPIRATORY: No cough, wheezing, chills or hemoptysis; No shortness of breath  CARDIOVASCULAR: No chest pain, palpitations, dizziness, or leg swelling  GASTROINTESTINAL: No abdominal pain. No nausea, vomiting, or hematemesis; No diarrhea or constipation. No melena or hematochezia.  GENITOURINARY: No dysuria or hematuria, urinary frequency  NEUROLOGICAL: No headaches, memory loss, loss of strength, numbness, or tremors  SKIN: No itching, burning, rashes, or lesions     MEDICATIONS  (STANDING):  aspirin  chewable 81 milliGRAM(s) Oral daily  cefTRIAXone   IVPB 1000 milliGRAM(s) IV Intermittent every 24 hours  ergocalciferol 06451 Unit(s) Oral <User Schedule>  furosemide    Tablet 40 milliGRAM(s) Oral daily  heparin   Injectable 5000 Unit(s) SubCutaneous every 12 hours  insulin glargine Injectable (LANTUS) 25 Unit(s) SubCutaneous at bedtime  insulin lispro (HumaLOG) corrective regimen sliding scale   SubCutaneous three times a day before meals  insulin lispro (HumaLOG) corrective regimen sliding scale   SubCutaneous at bedtime  insulin lispro Injectable (HumaLOG) 2 Unit(s) SubCutaneous three times a day before meals  metoprolol tartrate 25 milliGRAM(s) Oral two times a day    MEDICATIONS  (PRN):  acetaminophen   Tablet .. 650 milliGRAM(s) Oral every 6 hours PRN Temp greater or equal to 38C (100.4F)  ALBUTerol    90 MICROgram(s) HFA Inhaler 2 Puff(s) Inhalation every 6 hours PRN Bronchospasm  glucagon  Injectable 1 milliGRAM(s) IntraMuscular once PRN Glucose LESS THAN 70 milligrams/deciliter  guaiFENesin   Syrup  (Sugar-Free) 200 milliGRAM(s) Oral every 6 hours PRN Cough      Vital Signs Last 24 Hrs  T(C): 37.1 (24 Jul 2020 07:29), Max: 37.1 (23 Jul 2020 11:14)  T(F): 98.7 (24 Jul 2020 07:29), Max: 98.7 (23 Jul 2020 11:14)  HR: 74 (24 Jul 2020 07:29) (74 - 83)  BP: 119/77 (24 Jul 2020 07:29) (119/77 - 150/80)  BP(mean): --  RR: 19 (24 Jul 2020 07:29) (18 - 20)  SpO2: 100% (24 Jul 2020 07:29) (99% - 100%)    PHYSICAL EXAMINATION:  GENERAL: NAD, well built  HEAD:  Atraumatic, Normocephalic  EYES:  conjunctiva and sclera clear  NECK: Supple, No JVD, Normal thyroid  CHEST/LUNG: normal breath sounds, mild B/L rhonchi - improved. No wheezing, or rubs  HEART: Regular rate and rhythm; No murmurs, rubs, or gallops  ABDOMEN: Soft, Nontender, Nondistended; Bowel sounds present  NERVOUS SYSTEM:  Alert & Oriented X3,    EXTREMITIES:  2+ Peripheral Pulses, No clubbing, cyanosis, or edema  SKIN: warm dry                          10.8   6.97  )-----------( 458      ( 24 Jul 2020 07:30 )             33.2     07-24    136  |  99  |  31<H>  ----------------------------<  347<H>  4.0   |  32<H>  |  1.46<H>    Ca    8.5      24 Jul 2020 07:30    TPro  7.4  /  Alb  2.1<L>  /  TBili  0.2  /  DBili  x   /  AST  54<H>  /  ALT  143<H>  /  AlkPhos  163<H>  07-24    LIVER FUNCTIONS - ( 24 Jul 2020 07:30 )  Alb: 2.1 g/dL / Pro: 7.4 g/dL / ALK PHOS: 163 U/L / ALT: 143 U/L DA / AST: 54 U/L / GGT: x             Culture - Blood (collected 21 Jul 2020 22:37)  Source: .Blood Blood  Preliminary Report (22 Jul 2020 23:01):    No growth to date.    Culture - Blood (collected 21 Jul 2020 22:37)  Source: .Blood Blood  Preliminary Report (22 Jul 2020 23:01):    No growth to date.        I&O's Summary    23 Jul 2020 07:01  -  24 Jul 2020 07:00  --------------------------------------------------------  IN: 805 mL / OUT: 580 mL / NET: 225 mL          CAPILLARY BLOOD GLUCOSE  POCT Blood Glucose.: 374 mg/dL (24 Jul 2020 08:16)    CAPILLARY BLOOD GLUCOSE  POCT Blood Glucose.: 374 mg/dL (24 Jul 2020 08:16)  POCT Blood Glucose.: 313 mg/dL (23 Jul 2020 21:00)  POCT Blood Glucose.: 359 mg/dL (23 Jul 2020 16:44)  POCT Blood Glucose.: 267 mg/dL (23 Jul 2020 11:54)      RADIOLOGY & ADDITIONAL TESTS:      Patient name: MADISON PERRY  YOB: 1965   Age: 55 (M)   MR#: [id number]  Study Date: 7/22/2020  Location: [address] [place], [place] [address]Sonographer: Marshall Jacobs RDCS  Study quality: Technically good  Referring Physician: Nathan Ch MD  Blood Pressure: 146/93 mmHg  Height: 175 cm  Weight: 104 kg  BSA: 2.2 m2  ------------------------------------------------------------------------    PROCEDURE: Transthoracic echocardiogram with 2-D, M-Mode  and complete spectral and color flow Doppler.  INDICATION: Heart failure, unspecified (I50.9)  HISTORY:  ------------------------------------------------------------------------  DIMENSIONS:  Dimensions:     Normal Values:  LA:     4.3 cm    2.0 - 4.0 cm  Ao:     3.2 cm    2.0 - 3.8 cm  SEPTUM: 1.2 cm    0.6 - 1.2 cm  PWT:    1.1 cm    0.6 - 1.1 cm  LVIDd:  5.6 cm    3.0 - 5.6 cm  LVIDs:  4.3 cm    1.8 - 4.0 cm      Derived Variables:  LVMI: 121 g/m2  RWT: 0.39  Ejection Fraction Visual Estimate: 40-45 %    ------------------------------------------------------------------------  OBSERVATIONS:  Mitral Valve: Normal mitral valve. Mild mitral  regurgitation.  Aortic Root: Normal aortic root.  Aortic Valve: Calcified trileaflet aortic valve with normal  opening.  Left Atrium: Mildly dilated left atrium.  LA volume index =  40 cc/m2.  Left Ventricle: Moderate segmental left ventricular  systolic dysfunction.  The inferior and  inferolateral  walls are akinetic.  Septal motion consistent with a  conduction defect.   Moderate diastolic dysfunction (stage  II). Mild concentric left ventricular hypertrophy.  Right Heart: Linear structure compatible with Chiari  network identified in the right atrium.  This is a normal  variant. Normal right ventricular size and function. There  is moderate tricuspid regurgitation. There is mild pulmonic  regurgitation.  Pericardium/PleuraNormal pericardium with no pericardial  effusion.  Hemodynamic: RA Pressure is 8 mm Hg. RV systolic pressure  is 29 mm Hg.  ------------------------------------------------------------------------  CONCLUSIONS:  1. Mild mitral regurgitation.  2. Mildly dilated left atrium.  LA volume index = 40 cc/m2.  3. Mild concentric left ventricular hypertrophy.  4. Moderate segmental left ventricular systolic  dysfunction.  The inferior and inferolateral walls are  akinetic.  Septal motion consistent with a conduction  defect.   Moderate diastolic dysfunction (stage II).  5. Linear structure compatible with Chiari network  identified in the right atrium.  This is a normal variant.  6. Normal right ventricular size and function.    ------------------------------------------------------------------------  Confirmed on  7/22/2020 - 17:56:49 by Rosendo Allred MD  ------------------------------------------------------------------------

## 2020-07-24 NOTE — DISCHARGE NOTE PROVIDER - NSDCMRMEDTOKEN_GEN_ALL_CORE_FT
atorvastatin 80 mg oral tablet: 1 tab(s) orally once a day (at bedtime)  Lantus 100 units/mL subcutaneous solution: 43 unit(s) subcutaneous once a day (at bedtime)  lisinopril 2.5 mg oral tablet: 1 tab(s) orally once a day  LISPRO: 8 unit(s) subcutaneous 3 times a day (before meals)  metoprolol tartrate 25 mg oral tablet: 1 tab(s) orally 2 times a day albuterol 90 mcg/inh inhalation aerosol: 2 puff(s) inhaled every 6 hours, As needed, Bronchospasm  aspirin 81 mg oral tablet, chewable: 1 tab(s) orally once a day  atorvastatin 80 mg oral tablet: 1 tab(s) orally once a day (at bedtime)  BLOOD PRESSURE MACHINE: Measure your blood pressure 3 times a day   furosemide 40 mg oral tablet: 1 tab(s) orally once a day. Hold if SBP below 110.  guaiFENesin 100 mg/5 mL oral liquid: 10 milliliter(s) orally every 6 hours, As needed, Cough  Lantus 100 units/mL subcutaneous solution: 43 unit(s) subcutaneous once a day (at bedtime)  lisinopril 2.5 mg oral tablet: 1 tab(s) orally once a day  LISPRO: 8 unit(s) subcutaneous 3 times a day (before meals)  metoprolol tartrate 25 mg oral tablet: 1 tab(s) orally 2 times a day

## 2020-07-24 NOTE — PROGRESS NOTE ADULT - PROBLEM SELECTOR PLAN 5
Patients Creatinine is Elevated 1.64  - Baseline is around 1.57  - follow Urine lytes FeNA 2.1%  - follow BMP daily  - consider renal ultrasound if serum creatinine gets worse

## 2020-07-24 NOTE — DISCHARGE NOTE PROVIDER - CARE PROVIDER_API CALL
Nathan Ch  INTERNAL MEDICINE  [address]  [place] [address]  Phone: [phone]  Fax: [phone]  Follow Up Time:     Rosendo Allred  CARDIOVASCULAR DISEASE  [address]  [place] [address]  Phone: [phone]  Fax: [phone]  Follow Up Time:     Otto Starks)  Internal Medicine; Pulmonary Disease  [address] [place] [address]  Phone: [phone]  Fax: [phone]  Follow Up Time:

## 2020-07-24 NOTE — DISCHARGE NOTE PROVIDER - HOSPITAL COURSE
55 year old male, 3 PPD smoker, with a past medical history of HTN, DM, HLD, CAD, CABG  in 2005 the past, multiple embolic strokes (with loop recorder placement), CHF, vertigo presented with shortness of breath x 1 week. Admitted with sepsis 2/2 PNA and respiratory failure with hypoxemia. Started on Rocephin and Zithromax. Trop with slight uptrend, resolved. T max on admission 102, WBC trending down. Cardiology and Pulmonology consult noted. Echo performed on 7/23 showed no changes from previous Echo. PT evaluated patient for ambulation and O2 requirements. Patient was switched from IV to oral antibiotics on discharge.             Given patient's improved clinical status and current hemodynamic stability, decision was made to discharge. Discussed with attending    Please refer to patient's complete medical chart with documents for a full hospital course, for this is only a brief summary. 55 year old male, 3 PPD smoker, with a past medical history of HTN, DM, HLD, CAD, CABG  in 2005 the past, multiple embolic strokes (with loop recorder placement), CHF, vertigo presented with shortness of breath x 1 week. Admitted with sepsis 2/2 PNA and respiratory failure with hypoxemia. Started on Rocephin and Zithromax. Trop with slight uptrend, resolved. T max on admission 102, WBC trending down. Cardiology and Pulmonology consult noted. Echo performed on 7/23 showed no changes from previous Echo. PT evaluated patient for ambulation and O2 requirements and patient remained stable on room air. Patient was switched completed IV antibiotics on 7/25. Was given a prescription for oral Lasix and instructed to follow up as outpatient with PCP, Cardiology and Pulmonology.             Given patient's improved clinical status and current hemodynamic stability, decision was made to discharge. Discussed with attending    Please refer to patient's complete medical chart with documents for a full hospital course, for this is only a brief summary.

## 2020-07-24 NOTE — PROGRESS NOTE ADULT - SUBJECTIVE AND OBJECTIVE BOX
Patient denies chest pain or shortness of breath.   Review of systems otherwise (-)  	  MEDICATIONS:  MEDICATIONS  (STANDING):  aspirin  chewable 81 milliGRAM(s) Oral daily  atorvastatin 80 milliGRAM(s) Oral at bedtime  azithromycin  IVPB 500 milliGRAM(s) IV Intermittent every 24 hours  cefTRIAXone   IVPB 1000 milliGRAM(s) IV Intermittent every 24 hours  dextrose 5%. 1000 milliLiter(s) (50 mL/Hr) IV Continuous <Continuous>  furosemide    Tablet 40 milliGRAM(s) Oral daily  heparin   Injectable 5000 Unit(s) SubCutaneous every 12 hours  insulin glargine Injectable (LANTUS) 20 Unit(s) SubCutaneous at bedtime  insulin lispro (HumaLOG) corrective regimen sliding scale   SubCutaneous three times a day before meals  insulin lispro (HumaLOG) corrective regimen sliding scale   SubCutaneous at bedtime  metoprolol tartrate 25 milliGRAM(s) Oral two times a day      LABS:	 	    CARDIAC MARKERS:  CARDIAC MARKERS ( 22 Jul 2020 07:07 )  0.286 ng/mL / x     / 201 U/L / x     / 1.3 ng/mL  CARDIAC MARKERS ( 21 Jul 2020 22:00 )  0.471 ng/mL / x     / 198 U/L / x     / 1.1 ng/mL  CARDIAC MARKERS ( 21 Jul 2020 18:53 )  0.372 ng/mL / x     / x     / x     / x                                    10.9   9.69  )-----------( 446      ( 23 Jul 2020 07:06 )             34.0     Hemoglobin: 10.9 g/dL (07-23 @ 07:06)  Hemoglobin: 10.6 g/dL (07-22 @ 18:39)  Hemoglobin: 10.3 g/dL (07-22 @ 07:06)  Hemoglobin: 10.6 g/dL (07-21 @ 18:53)      07-23    134<L>  |  97  |  30<H>  ----------------------------<  318<H>  3.7   |  29  |  1.57<H>    Ca    8.4      23 Jul 2020 07:06  Phos  2.5     07-22  Mg     2.0     07-22    TPro  7.9  /  Alb  2.2<L>  /  TBili  0.4  /  DBili  x   /  AST  95<H>  /  ALT  166<H>  /  AlkPhos  166<H>  07-23    Creatinine Trend: 1.57<--, 1.53<--, 1.64<--      PHYSICAL EXAM:  T(C): 37.1 (07-23-20 @ 11:14), Max: 37.7 (07-23-20 @ 04:58)  HR: 79 (07-23-20 @ 11:14) (79 - 87)  BP: 149/99 (07-23-20 @ 11:14) (132/82 - 152/88)  RR: 18 (07-23-20 @ 11:14) (18 - 20)  SpO2: 99% (07-23-20 @ 11:14) (97% - 100%)  Wt(kg): --  I&O's Summary    22 Jul 2020 07:01  -  23 Jul 2020 07:00  --------------------------------------------------------  IN: 0 mL / OUT: 2300 mL / NET: -2300 mL    23 Jul 2020 07:01  -  23 Jul 2020 14:44  --------------------------------------------------------  IN: 430 mL / OUT: 0 mL / NET: 430 mL        Gen: Appears well in NAD  HEENT:  (-)icterus (-)pallor  CV: N S1 S2 1/6 MANDY (+)2 Pulses B/l  Resp:  Course B/L BS, decreased at the bases  normal effort  GI: (+) BS Soft, NT, ND  Lymph:  (-)Edema, (-)obvious lymphadenopathy  Skin: Warm to touch, Normal turgor  Psych: Appropriate mood and affect    	    Tele: Sinus     ASSESSMENT/PLAN: 	55y Male HTN, DM, HLD, CAD, remote stents subsequent CABG  in 2005 the past, multiple embolic strokes(Loop recorder placement), CHF, vertigo presented with shortness of breath for past 1 week.  suspected PNA    - echo with no new interval changes  - Abx per primary team  - cont asa, statin, BB  -  suspect SOB is multifactorial, COPD, PNA and CHF  - Keep net negative  - clinically improving    Rosendo Allred MD, [facility]  BEEPER [phone]

## 2020-07-24 NOTE — PROGRESS NOTE ADULT - PROBLEM SELECTOR PLAN 4
Patient has CHF.  Patient does not remember if he takes Lasix /spironolactone at home.   got 80 IV lasix in ED.  Monitor Is and Os,   Taper Lasix to PO 40mg with parameters  patient has Elevated trops, likely secondary to demand ischemia, EKG showed RBBB. Trop trended down on 7/22.   - Cardiology consult Dr Allred  - f/u repeat Echo shows EF 40-45%, no acute changes  - Net negative  - Daily weights and urine output monitoring Patient has CHF. Patient does not remember if he takes Lasix /spironolactone at home. No Lasix in home meds.  - s/p 80 IV lasix in ED  - Lasix to PO 40mg with parameters  - Elevated trops, likely secondary to demand ischemia, EKG showed RBBB. Trop trended down on 7/22.   - Cardiology consult Dr Allred  - f/u repeat Echo shows EF 40-45%, no acute changes  - Net negative  - Daily weights and urine output monitoring

## 2020-07-24 NOTE — PROGRESS NOTE ADULT - PROBLEM SELECTOR PLAN 9
Patient has Elevated liver enzymes, normal bilirubin, no tenderness on exam.  - US hepatic and pancreatic: contracted GB with stones, no bile tract dilation  - LFTs trending up, sudden jump in ALT/AST noted  - Will hold Lipitor for now   - continue to trend LFTs

## 2020-07-24 NOTE — PHYSICAL THERAPY INITIAL EVALUATION ADULT - ADDITIONAL COMMENTS
Patient was able to neg. steps w/ VC  and with both hands on banister. Patient maintained O2 Saturation at 100% after ambulation and stairr neg.

## 2020-07-24 NOTE — PROGRESS NOTE ADULT - SUBJECTIVE AND OBJECTIVE BOX
Pt is awake, alert, lying in bed in NAD. Feels well. Using O2 NC - does not use at home. Reports he has used Rollator walker but needs one, no longer has it.     INTERVAL HPI/OVERNIGHT EVENTS:      VITAL SIGNS:  T(F): 98.7 (07-24-20 @ 07:29)  HR: 74 (07-24-20 @ 07:29)  BP: 119/77 (07-24-20 @ 07:29)  RR: 19 (07-24-20 @ 07:29)  SpO2: 100% (07-24-20 @ 07:29)  Wt(kg): --  I&O's Detail    23 Jul 2020 07:01  -  24 Jul 2020 07:00  --------------------------------------------------------  IN:    Oral Fluid: 805 mL  Total IN: 805 mL    OUT:    Voided: 580 mL  Total OUT: 580 mL    Total NET: 225 mL              REVIEW OF SYSTEMS:    CONSTITUTIONAL:  No fevers, chills, sweats    HEENT:  Eyes:  No diplopia or blurred vision. ENT:  No earache, sore throat or runny nose.    CARDIOVASCULAR:  No pressure, squeezing, tightness, or heaviness about the chest; no palpitations.    RESPIRATORY:  Per HPI    GASTROINTESTINAL:  No abdominal pain, nausea, vomiting or diarrhea.    GENITOURINARY:  No dysuria, frequency or urgency.    NEUROLOGIC:  No paresthesias, fasciculations, seizures or weakness.    PSYCHIATRIC:  No disorder of thought or mood.      PHYSICAL EXAM:    Constitutional: Well developed and nourished  Eyes:Perrla  ENMT: normal  Neck:supple  Respiratory: good air entry  Cardiovascular: S1 S2 regular  Gastrointestinal: Soft, Non tender  Extremities: No edema  Vascular:normal  Neurological:Awake, alert,Ox3  Musculoskeletal:Normal      MEDICATIONS  (STANDING):  aspirin  chewable 81 milliGRAM(s) Oral daily  cefTRIAXone   IVPB 1000 milliGRAM(s) IV Intermittent every 24 hours  ergocalciferol 17502 Unit(s) Oral <User Schedule>  furosemide    Tablet 40 milliGRAM(s) Oral daily  heparin   Injectable 5000 Unit(s) SubCutaneous every 12 hours  insulin glargine Injectable (LANTUS) 25 Unit(s) SubCutaneous at bedtime  insulin lispro (HumaLOG) corrective regimen sliding scale   SubCutaneous three times a day before meals  insulin lispro (HumaLOG) corrective regimen sliding scale   SubCutaneous at bedtime  insulin lispro Injectable (HumaLOG) 2 Unit(s) SubCutaneous three times a day before meals  metoprolol tartrate 25 milliGRAM(s) Oral two times a day    MEDICATIONS  (PRN):  acetaminophen   Tablet .. 650 milliGRAM(s) Oral every 6 hours PRN Temp greater or equal to 38C (100.4F)  ALBUTerol    90 MICROgram(s) HFA Inhaler 2 Puff(s) Inhalation every 6 hours PRN Bronchospasm  glucagon  Injectable 1 milliGRAM(s) IntraMuscular once PRN Glucose LESS THAN 70 milligrams/deciliter  guaiFENesin   Syrup  (Sugar-Free) 200 milliGRAM(s) Oral every 6 hours PRN Cough      Allergies    Plavix (Other)    Intolerances        LABS:                        10.8   6.97  )-----------( 458      ( 24 Jul 2020 07:30 )             33.2     07-24    136  |  99  |  31<H>  ----------------------------<  347<H>  4.0   |  32<H>  |  1.46<H>    Ca    8.5      24 Jul 2020 07:30    TPro  7.4  /  Alb  2.1<L>  /  TBili  0.2  /  DBili  x   /  AST  54<H>  /  ALT  143<H>  /  AlkPhos  163<H>  07-24              CAPILLARY BLOOD GLUCOSE      POCT Blood Glucose.: 374 mg/dL (24 Jul 2020 08:16)  POCT Blood Glucose.: 313 mg/dL (23 Jul 2020 21:00)  POCT Blood Glucose.: 359 mg/dL (23 Jul 2020 16:44)  POCT Blood Glucose.: 267 mg/dL (23 Jul 2020 11:54)    pro-bnp [id number] 07-21 @ 18:53     d-dimer --  07-21 @ 18:53      RADIOLOGY & ADDITIONAL TESTS:    CXR:  < from: Xray Chest 1 View- PORTABLE-Routine (07.22.20 @ 09:35) >  IMPRESSION:    No change in reticular infiltrates with superimposed left upper lobe airspace disease. Please refer to chest CT 7/21/2020.    Loop recorder. CABG.    < end of copied text >    Ct scan chest:  < from: CT Chest No Cont (07.21.20 @ 20:41) >  IMPRESSION:   Geographic infiltrates with septal thickening. Differential diagnosis includes bilateral pneumonia, CHF and pulmonary alveolar proteinosis.  Small right pleural effusion.  Status post CABG  Loop recorder  Cholelithiasis    < end of copied text >    ekg;    echo:  < from: Transthoracic Echocardiogram (07.22.20 @ 07:26) >  CONCLUSIONS:  1. Mild mitral regurgitation.  2. Mildly dilated left atrium.  LA volume index = 40 cc/m2.  3. Mild concentric left ventricular hypertrophy.  4. Moderate segmental left ventricular systolic  dysfunction.  The inferior andinferolateral walls are  akinetic.  Septal motion consistent with a conduction  defect.   Moderate diastolic dysfunction (stage II).  5. Linear structure compatible with Chiari network  identified in the right atrium.  This is a normal variant.  6. Normal right ventricular size and function.    < end of copied text >

## 2020-07-24 NOTE — PROGRESS NOTE ADULT - PROBLEM SELECTOR PLAN 3
Patients CT chest findings were concerning for pneumonia .  Continue Rocephin and Zithromax for Community acquired pneumonia.  - CXR shows right lung infiltrate, patient clinically improving  - procalcitonin + elevated at 2.06  - blood cultures negative  - legionella antigen and strep antigen. negative Patients CT chest findings were concerning for pneumonia.  Continue Rocephin and Zithromax for CAP. WBC normalized.  - CXR shows right lung infiltrate, patient clinically improving  - procalcitonin + elevated at 2.06  - blood cultures negative  - legionella antigen and strep antigen. negative

## 2020-07-24 NOTE — PROGRESS NOTE ADULT - ASSESSMENT
55 year old male with medical history  of HTN, DM, HLD, CAD, CABG in the past, multiple embolic strokes(Loop recorder placement), CHF, vertigo presented with shortness of breath for past 1 week.    MED REC on [[employer] - [facility]  [phone]] 7/22:   Basilglar qwick pen - 43 U TID  Insulin Novalog qwick pen - 8U TID  Lanaprost eyedrop 1 drop in affected eye HS  Metoprolol  mg QD  Atorvastatin 20 HS  Aspirin 81 QD            Problem 11:  Vitamin D Deficiency:   Vitamind D levels 13.6  Will add 50,000 units weekly 55 year old male with medical history  of HTN, DM, HLD, CAD, CABG in the past, multiple embolic strokes(Loop recorder placement), CHF, vertigo presented with shortness of breath for past 1 week.    MED REC on [[employer] - [facility]  [phone]] 7/22:   Basilglar qwick pen - 43 U TID  Insulin Novalog qwick pen - 8U TID  Lanaprost eyedrop 1 drop in affected eye HS  Metoprolol  mg QD  Atorvastatin 20 HS  Aspirin 81 QD      Problem 11:  Vitamin D Deficiency:   Vitamind D levels 13.6  Will add 50,000 units weekly

## 2020-07-24 NOTE — PROGRESS NOTE ADULT - PROBLEM SELECTOR PLAN 7
Patient takes LIPITOR 80 mg (pharmacy said Atorvastatin 20mg HS).  - Will hold Statin for now as LFTS are trending up.  - Low HDL, otherwise lipid panel is normal;

## 2020-07-24 NOTE — PROGRESS NOTE ADULT - PROBLEM SELECTOR PLAN 1
Resolved   On admission, patient met severe sepsis criteria. T max 102 in ED, had Elevated WBC count (trended down), was hypoxic on RA and with elevated Lactate (trended down). Patient did not get any fluid for Hx of CHF and CXR was congested.   - Blood cultures negative till date  - c/w Rocephin and Zithromax , will switch to oral on D/C  - Tylenol PRN for fever Resolved. On admission, patient met severe sepsis criteria. T max 102 in ED, had Elevated WBC count (trended down), was hypoxic on RA and with elevated Lactate (trended down). Patient did not get any fluid for Hx of CHF and CXR was congested.   - Blood cultures negative till date  - c/w Rocephin and Zithromax - complete on 7/25  - Tylenol PRN for fever

## 2020-07-24 NOTE — PHYSICAL THERAPY INITIAL EVALUATION ADULT - DIAGNOSIS, PT EVAL
Patient presents w/ baseline of (I) w/ bed mobility and transfers and gait; states w/ difficulty w/ stairrs.

## 2020-07-24 NOTE — PROGRESS NOTE ADULT - ASSESSMENT
Assessment and Recommendation:   Problem/Recommendation - 1:  Problem: CHF (congestive heart failure). Recommendation: Lasix po  Cardio F/U   Tele monitoring   Echo noted.   Follow up CXR.    Problem/Recommendation - 2:  ·  Problem: R/O PNA (pneumonia).  Recommendation: check pending cultures  antibiotics  oxygen supp  check O2 Sat on RA and when ambulating. Does not use home O2.   monitor temp and WBC.     Problem/Recommendation - 3:  ·  Problem: MALCOM (acute kidney injury).  Recommendation: Avoid nephrotoxic drugs  Renal eval  monitor temp and WBC.     Problem/Recommendation - 4:  ·  Problem: Diabetes mellitus.  Recommendation: Accuchecks with reg insulin coverage  HBA1C.     Problem/Recommendation - 5:  ·  Problem: Hypertension.  Recommendation: monitor BP  cont meds.     Problem/Recommendation - 6:  Problem: Severe sepsis. Recommendation: resolved  ID follow up  cont antibiotics.    Problem/Recommendation - 7:  Problem: Transaminitis. Recommendation: monitor LFTs  GI follow up.    Problem/Recommendation - 8:  Problem: Unsteady Gait. Recommendation: Pt reports using Rollator, but needs new one  PT eval ordered to further eval.

## 2020-07-24 NOTE — PROGRESS NOTE ADULT - PROBLEM SELECTOR PLAN 2
Resolved   Likely multifactorial   Oxygen supply being titrated down,   CT chest with Geographic infiltrates with septal thickening. Small right pleural effusion.  - c/w Abx Rocephin + Zithromax for PNA coverage  - Patient also has hx of CHF , last ECHO showed Moderate concentric left ventricular hypertrophy. Normal left ventricular systolic function. Mild diastolic dysfunction (stage I).   - Change LAsix to 40PO daily   - f/u I's/O's net -ve  - Echo shows EF 40-45% and Hypokinesia in anterior and latera lwalls (likely due to previous MI)  - cardiology consult appreciated, continue curent management  - pulmonology consult Dr. Starks , noted Resolved. Likely multifactorial. Oxygen supply being titrated down,   CT chest with Geographic infiltrates with septal thickening. Small right pleural effusion.  - c/w Abx Rocephin + Zithromax for PNA coverage till 7/25  - Patient also has hx of CHF , last ECHO showed Moderate concentric left ventricular hypertrophy. Normal left ventricular systolic function. Mild diastolic dysfunction (stage I).   - Changed LAsix to 40 PO daily   - f/u I's/O's net -ve  - Echo shows EF 40-45% and Hypokinesia in anterior and latera lwalls (likely due to previous MI)  - cardiology following, clear for D/C home with o/p f/u. Rec PO lasix  - pulmonology consult Dr. Starks following

## 2020-07-24 NOTE — PROGRESS NOTE ADULT - PROBLEM SELECTOR PLAN 6
Patient has DM, takes 43 Lantus and 8 units of Humalog tid.  Blood sugars are running onhigher side, will increase LAntus to 25 Units at bedtime and add pre meals 2U  Will adjust dose as clinically indicated.  - Hb A1c 9.5  -Diabetic Diet

## 2020-07-24 NOTE — PROGRESS NOTE ADULT - SUBJECTIVE AND OBJECTIVE BOX
INTERVAL HPI/OVERNIGHT EVENTS:  Patient seen at [facility],afebrile,no acute issues  VITAL SIGNS:  T(F): 98.7 (07-24-20 @ 07:29)  HR: 74 (07-24-20 @ 07:29)  BP: 119/77 (07-24-20 @ 07:29)  RR: 19 (07-24-20 @ 07:29)  SpO2: 100% (07-24-20 @ 07:29)  Wt(kg): --    PHYSICAL EXAM:  awake,alert  Constaewitutional:  Eyes:  ENMT:perrla  Neck:  Respiratory:few rales  Cardiovascular:s1 s2,m-none  Gastrointestinal:soft,bs pos  Extremities:  Vascular:  Neurological:no focal deficit  Musculoskeletal:    MEDICATIONS  (STANDING):  aspirin  chewable 81 milliGRAM(s) Oral daily  cefTRIAXone   IVPB 1000 milliGRAM(s) IV Intermittent every 24 hours  ergocalciferol 41885 Unit(s) Oral <User Schedule>  furosemide    Tablet 40 milliGRAM(s) Oral daily  heparin   Injectable 5000 Unit(s) SubCutaneous every 12 hours  insulin glargine Injectable (LANTUS) 25 Unit(s) SubCutaneous at bedtime  insulin lispro (HumaLOG) corrective regimen sliding scale   SubCutaneous three times a day before meals  insulin lispro (HumaLOG) corrective regimen sliding scale   SubCutaneous at bedtime  insulin lispro Injectable (HumaLOG) 2 Unit(s) SubCutaneous three times a day before meals  metoprolol tartrate 25 milliGRAM(s) Oral two times a day    MEDICATIONS  (PRN):  acetaminophen   Tablet .. 650 milliGRAM(s) Oral every 6 hours PRN Temp greater or equal to 38C (100.4F)  ALBUTerol    90 MICROgram(s) HFA Inhaler 2 Puff(s) Inhalation every 6 hours PRN Bronchospasm  glucagon  Injectable 1 milliGRAM(s) IntraMuscular once PRN Glucose LESS THAN 70 milligrams/deciliter  guaiFENesin   Syrup  (Sugar-Free) 200 milliGRAM(s) Oral every 6 hours PRN Cough      Allergies    Plavix (Other)    Intolerances        LABS:                        10.8   6.97  )-----------( 458      ( 24 Jul 2020 07:30 )             33.2     07-24    136  |  99  |  31<H>  ----------------------------<  347<H>  4.0   |  32<H>  |  1.46<H>    Ca    8.5      24 Jul 2020 07:30    TPro  7.4  /  Alb  2.1<L>  /  TBili  0.2  /  DBili  x   /  AST  54<H>  /  ALT  143<H>  /  AlkPhos  163<H>  07-24    Assessment and Plan:   · Assessment		  55 year old male with medical history  of HTN, DM, HLD, CAD, CABG in the past, multiple embolic strokes(Loop recorder placement), CHF, vertigo presented with shortness of breath for past 1 week.    MED REC on [[employer] - [facility]  [phone]] 7/22:   Basilglar qwick pen - 43 U TID  Insulin Novalog qwick pen - 8U TID  Lanaprost eyedrop 1 drop in affected eye HS  Metoprolol  mg QD  Atorvastatin 20 HS  Aspirin 81 QD            Problem 11:  Vitamin D Deficiency:   Vitamind D levels 13.6  Will add 50,000 units weekly     Problem/Plan - 1:  ·  Problem: Severe sepsis.  Plan: Resolved   - Blood cultures negative till date  - c/w Rocephin and Zithromax , will switch to oral on D/C  - Tylenol PRN for fever.   PT evaluation for d/c planning     Problem/Plan - 2:  ·  Problem: Respiratory failure with hypoxia.  Plan: Resolved   - c/w Abx Rocephin + Zithromax for PNA coverage  - f/u I's/O's net -ve  - Echo shows EF 40-45% and Hypokinesia in anterior and latera lwalls (likely due to previous MI)  - cardiology consult appreciated, continue curent management  - pulmonology consult Dr. Starks , noted.      Problem/Plan - 3:  ·  Problem: PNA (pneumonia)improving clinically  Continue Rocephin and Zithromax for Community acquired pneumonia.  - blood cultures negative  -f/up pulmonology appret     Problem/Plan - 4:  ·  Problem: CHF (congestive heart failure).  Plan: Patient has CHF.  Patient does not remember if he takes Lasix /spironolactone at home.   got 80 IV lasix in ED.  Monitor Is and Os,   Taper Lasix to PO 40mg with parameters  patient has Elevated trops, likely secondary to demand ischemia, EKG showed RBBB. Trop trended down on 7/22.   - Cardiology consult Dr Allred  - f/u repeat Echo shows EF 40-45%, no acute changes  - Net negative  - Daily weights and urine output monitoring.      Problem/Plan - 5:  ·  Problem: MALCOM (acute kidney injury).  Plan: Patients Creatinine is Elevated 1.64  - Baseline is around 1.57  - follow Urine lytes FeNA 2.1%  - follow BMP daily  - consider renal ultrasound if serum creatinine gets worse.      Problem/Plan - 6:  Problem: Diabetes mellitus. Plan: Patient has DM, takes 43 Lantus and 8 units of Humalog tid.  Blood sugars are running onhigher side, will increase LAntus to 25 Units at bedtime and add pre meals 2U  Will adjust dose as clinically indicated.  - Hb A1c 9.5  -Diabetic Diet.     Problem/Plan - 7:  ·  Problem: Hyperlipidemia, unspecified hyperlipidemia type.  Plan: Patient takes LIPITOR 80 mg (pharmacy said Atorvastatin 20mg HS).  - Will hold Statin for now as LFTS are trending up.  - Low HDL, otherwise lipid panel is normal;      Problem/Plan - 8:  ·  Problem: Hypertension.  Plan: Patient takes Metoprolol 25 bid and lisinopril.  - Holding Lisinopril for MALCOM  - continue with other home meds.      Problem/Plan - 9:  ·  Problem: Transaminitis.  Plan: Patient has Elevated liver enzymes, normal bilirubin, no tenderness on exam.  - US hepatic and pancreatic: contracted GB with stones, no bile tract dilation  - LFTs trending up, sudden jump in ALT/AST noted  - Will hold Lipitor for now   - continue to trend LFTs.      Problem/Plan - 10:  Problem: Prophylactic measure. Plan; - Heparin 5000 units q 12 for DVT PPx  RISK                                                          Points  [] Previous VTE                                           3  [] Thrombophilia                                        2  [] Lower limb paralysis                              2   [] Current Cancer                                       2   [x] Immobilization > 24 hrs                        1  [] ICU/CCU stay > 24 hours                       1  [x] Age > 60                                                   1.

## 2020-07-25 ENCOUNTER — TRANSCRIPTION ENCOUNTER (OUTPATIENT)
Age: 55
End: 2020-07-25

## 2020-07-25 VITALS
TEMPERATURE: 98 F | RESPIRATION RATE: 18 BRPM | OXYGEN SATURATION: 98 % | DIASTOLIC BLOOD PRESSURE: 82 MMHG | SYSTOLIC BLOOD PRESSURE: 144 MMHG | HEART RATE: 81 BPM

## 2020-07-25 LAB
ANION GAP SERPL CALC-SCNC: 5 MMOL/L — SIGNIFICANT CHANGE UP (ref 5–17)
BUN SERPL-MCNC: 25 MG/DL — HIGH (ref 7–18)
CALCIUM SERPL-MCNC: 8.7 MG/DL — SIGNIFICANT CHANGE UP (ref 8.4–10.5)
CHLORIDE SERPL-SCNC: 102 MMOL/L — SIGNIFICANT CHANGE UP (ref 96–108)
CO2 SERPL-SCNC: 33 MMOL/L — HIGH (ref 22–31)
CREAT SERPL-MCNC: 1.24 MG/DL — SIGNIFICANT CHANGE UP (ref 0.5–1.3)
GLUCOSE BLDC GLUCOMTR-MCNC: 203 MG/DL — HIGH (ref 70–99)
GLUCOSE BLDC GLUCOMTR-MCNC: 219 MG/DL — HIGH (ref 70–99)
GLUCOSE SERPL-MCNC: 208 MG/DL — HIGH (ref 70–99)
HCT VFR BLD CALC: 34.1 % — LOW (ref 39–50)
HGB BLD-MCNC: 11 G/DL — LOW (ref 13–17)
MCHC RBC-ENTMCNC: 28.9 PG — SIGNIFICANT CHANGE UP (ref 27–34)
MCHC RBC-ENTMCNC: 32.3 GM/DL — SIGNIFICANT CHANGE UP (ref 32–36)
MCV RBC AUTO: 89.5 FL — SIGNIFICANT CHANGE UP (ref 80–100)
NRBC # BLD: 0 /100 WBCS — SIGNIFICANT CHANGE UP (ref 0–0)
PLATELET # BLD AUTO: 469 K/UL — HIGH (ref 150–400)
POTASSIUM SERPL-MCNC: 4 MMOL/L — SIGNIFICANT CHANGE UP (ref 3.5–5.3)
POTASSIUM SERPL-SCNC: 4 MMOL/L — SIGNIFICANT CHANGE UP (ref 3.5–5.3)
RBC # BLD: 3.81 M/UL — LOW (ref 4.2–5.8)
RBC # FLD: 13.2 % — SIGNIFICANT CHANGE UP (ref 10.3–14.5)
SODIUM SERPL-SCNC: 140 MMOL/L — SIGNIFICANT CHANGE UP (ref 135–145)
WBC # BLD: 7.46 K/UL — SIGNIFICANT CHANGE UP (ref 3.8–10.5)
WBC # FLD AUTO: 7.46 K/UL — SIGNIFICANT CHANGE UP (ref 3.8–10.5)

## 2020-07-25 PROCEDURE — 87449 NOS EACH ORGANISM AG IA: CPT

## 2020-07-25 PROCEDURE — 85652 RBC SED RATE AUTOMATED: CPT

## 2020-07-25 PROCEDURE — 94640 AIRWAY INHALATION TREATMENT: CPT

## 2020-07-25 PROCEDURE — 83735 ASSAY OF MAGNESIUM: CPT

## 2020-07-25 PROCEDURE — 82550 ASSAY OF CK (CPK): CPT

## 2020-07-25 PROCEDURE — 82962 GLUCOSE BLOOD TEST: CPT

## 2020-07-25 PROCEDURE — 71045 X-RAY EXAM CHEST 1 VIEW: CPT

## 2020-07-25 PROCEDURE — 80048 BASIC METABOLIC PNL TOTAL CA: CPT

## 2020-07-25 PROCEDURE — 76705 ECHO EXAM OF ABDOMEN: CPT

## 2020-07-25 PROCEDURE — 82570 ASSAY OF URINE CREATININE: CPT

## 2020-07-25 PROCEDURE — 81001 URINALYSIS AUTO W/SCOPE: CPT

## 2020-07-25 PROCEDURE — 84300 ASSAY OF URINE SODIUM: CPT

## 2020-07-25 PROCEDURE — 82009 KETONE BODYS QUAL: CPT

## 2020-07-25 PROCEDURE — 86769 SARS-COV-2 COVID-19 ANTIBODY: CPT

## 2020-07-25 PROCEDURE — 82607 VITAMIN B-12: CPT

## 2020-07-25 PROCEDURE — 97161 PT EVAL LOW COMPLEX 20 MIN: CPT

## 2020-07-25 PROCEDURE — 99285 EMERGENCY DEPT VISIT HI MDM: CPT | Mod: 25

## 2020-07-25 PROCEDURE — 82746 ASSAY OF FOLIC ACID SERUM: CPT

## 2020-07-25 PROCEDURE — 85027 COMPLETE CBC AUTOMATED: CPT

## 2020-07-25 PROCEDURE — 87899 AGENT NOS ASSAY W/OPTIC: CPT

## 2020-07-25 PROCEDURE — 82306 VITAMIN D 25 HYDROXY: CPT

## 2020-07-25 PROCEDURE — 87086 URINE CULTURE/COLONY COUNT: CPT

## 2020-07-25 PROCEDURE — 84100 ASSAY OF PHOSPHORUS: CPT

## 2020-07-25 PROCEDURE — 80053 COMPREHEN METABOLIC PANEL: CPT

## 2020-07-25 PROCEDURE — 82553 CREATINE MB FRACTION: CPT

## 2020-07-25 PROCEDURE — 83880 ASSAY OF NATRIURETIC PEPTIDE: CPT

## 2020-07-25 PROCEDURE — 93306 TTE W/DOPPLER COMPLETE: CPT

## 2020-07-25 PROCEDURE — 84443 ASSAY THYROID STIM HORMONE: CPT

## 2020-07-25 PROCEDURE — 84145 PROCALCITONIN (PCT): CPT

## 2020-07-25 PROCEDURE — 87635 SARS-COV-2 COVID-19 AMP PRB: CPT

## 2020-07-25 PROCEDURE — 80061 LIPID PANEL: CPT

## 2020-07-25 PROCEDURE — 71250 CT THORAX DX C-: CPT

## 2020-07-25 PROCEDURE — 83036 HEMOGLOBIN GLYCOSYLATED A1C: CPT

## 2020-07-25 PROCEDURE — 36415 COLL VENOUS BLD VENIPUNCTURE: CPT

## 2020-07-25 PROCEDURE — 96367 TX/PROPH/DG ADDL SEQ IV INF: CPT

## 2020-07-25 PROCEDURE — 87040 BLOOD CULTURE FOR BACTERIA: CPT

## 2020-07-25 PROCEDURE — 93005 ELECTROCARDIOGRAM TRACING: CPT

## 2020-07-25 PROCEDURE — 84484 ASSAY OF TROPONIN QUANT: CPT

## 2020-07-25 PROCEDURE — 96365 THER/PROPH/DIAG IV INF INIT: CPT

## 2020-07-25 PROCEDURE — 82803 BLOOD GASES ANY COMBINATION: CPT

## 2020-07-25 PROCEDURE — 83605 ASSAY OF LACTIC ACID: CPT

## 2020-07-25 PROCEDURE — 83935 ASSAY OF URINE OSMOLALITY: CPT

## 2020-07-25 RX ORDER — FUROSEMIDE 40 MG
1 TABLET ORAL
Qty: 7 | Refills: 0
Start: 2020-07-25 | End: 2020-07-31

## 2020-07-25 RX ORDER — ASPIRIN/CALCIUM CARB/MAGNESIUM 324 MG
1 TABLET ORAL
Qty: 0 | Refills: 0 | DISCHARGE
Start: 2020-07-25

## 2020-07-25 RX ORDER — ALBUTEROL 90 UG/1
2 AEROSOL, METERED ORAL
Qty: 0 | Refills: 0 | DISCHARGE
Start: 2020-07-25

## 2020-07-25 RX ADMIN — Medication 25 MILLIGRAM(S): at 06:38

## 2020-07-25 RX ADMIN — Medication 2: at 08:08

## 2020-07-25 RX ADMIN — AZITHROMYCIN 500 MILLIGRAM(S): 500 TABLET, FILM COATED ORAL at 11:52

## 2020-07-25 RX ADMIN — HEPARIN SODIUM 5000 UNIT(S): 5000 INJECTION INTRAVENOUS; SUBCUTANEOUS at 06:38

## 2020-07-25 RX ADMIN — Medication 2 UNIT(S): at 11:52

## 2020-07-25 RX ADMIN — Medication 81 MILLIGRAM(S): at 11:51

## 2020-07-25 RX ADMIN — Medication 40 MILLIGRAM(S): at 06:38

## 2020-07-25 RX ADMIN — Medication 2 UNIT(S): at 08:08

## 2020-07-25 RX ADMIN — Medication 2: at 11:52

## 2020-07-25 RX ADMIN — CEFTRIAXONE 100 MILLIGRAM(S): 500 INJECTION, POWDER, FOR SOLUTION INTRAMUSCULAR; INTRAVENOUS at 11:53

## 2020-07-25 NOTE — PROGRESS NOTE ADULT - ASSESSMENT
Assessment and Recommendation:   Problem/Recommendation - 1:  Problem: CHF (congestive heart failure). Recommendation: Lasix po  Cardio F/U   Tele monitoring   Echo noted.   Follow up CXR.    Problem/Recommendation - 2:  ·  Problem: R/O PNA (pneumonia).  Recommendation: check pending cultures  antibiotics  oxygen supp  check O2 Sat on RA and when ambulating. Does not use home O2.   monitor temp and WBC.     Problem/Recommendation - 3:  ·  Problem: MALCOM (acute kidney injury).  Recommendation: Avoid nephrotoxic drugs  Renal eval  monitor BMP    Problem/Recommendation - 4:  ·  Problem: Diabetes mellitus.  Recommendation: Accuchecks with reg insulin coverage  HBA1C.     Problem/Recommendation - 5:  ·  Problem: Hypertension.  Recommendation: monitor BP  cont meds.     Problem/Recommendation - 6:  Problem: Severe sepsis. Recommendation: resolved  ID follow up  cont antibiotics.    Problem/Recommendation - 7:  Problem: Transaminitis. Recommendation: monitor LFTs  GI follow up.    Problem/Recommendation - 8:  Problem: Unsteady Gait. Recommendation: Pt reports using Rollator, but needs new one  PT eval ordered to further eval.

## 2020-07-25 NOTE — PROGRESS NOTE ADULT - SUBJECTIVE AND OBJECTIVE BOX
S: no chest pain or shortness of breath.   Review of systems otherwise (-)  	    acetaminophen   Tablet .. 650 milliGRAM(s) Oral every 6 hours PRN  ALBUTerol    90 MICROgram(s) HFA Inhaler 2 Puff(s) Inhalation every 6 hours PRN  aspirin  chewable 81 milliGRAM(s) Oral daily  cefTRIAXone   IVPB 1000 milliGRAM(s) IV Intermittent every 24 hours  ergocalciferol 08390 Unit(s) Oral <User Schedule>  furosemide    Tablet 40 milliGRAM(s) Oral daily  glucagon  Injectable 1 milliGRAM(s) IntraMuscular once PRN  guaiFENesin   Syrup  (Sugar-Free) 200 milliGRAM(s) Oral every 6 hours PRN  heparin   Injectable 5000 Unit(s) SubCutaneous every 12 hours  insulin glargine Injectable (LANTUS) 25 Unit(s) SubCutaneous at bedtime  insulin lispro (HumaLOG) corrective regimen sliding scale   SubCutaneous three times a day before meals  insulin lispro (HumaLOG) corrective regimen sliding scale   SubCutaneous at bedtime  insulin lispro Injectable (HumaLOG) 2 Unit(s) SubCutaneous three times a day before meals  metoprolol tartrate 25 milliGRAM(s) Oral two times a day                            11.0   7.46  )-----------( 469      ( 25 Jul 2020 07:29 )             34.1       07-25    140  |  102  |  25<H>  ----------------------------<  208<H>  4.0   |  33<H>  |  1.24    Ca    8.7      25 Jul 2020 07:29    TPro  7.4  /  Alb  2.1<L>  /  TBili  0.2  /  DBili  x   /  AST  54<H>  /  ALT  143<H>  /  AlkPhos  163<H>  07-24            T(C): 36.6 (07-25-20 @ 11:09), Max: 37.1 (07-24-20 @ 20:07)  HR: 81 (07-25-20 @ 11:09) (78 - 89)  BP: 144/82 (07-25-20 @ 11:09) (132/74 - 167/65)  RR: 18 (07-25-20 @ 11:09) (18 - 18)  SpO2: 98% (07-25-20 @ 11:09) (96% - 100%)  Wt(kg): --    I&O's Summary    25 Jul 2020 07:01  -  25 Jul 2020 12:08  --------------------------------------------------------  IN: 250 mL / OUT: 0 mL / NET: 250 mL          Gen: Appears well in NAD  HEENT:  (-)icterus (-)pallor  CV: N S1 S2 1/6 MANDY (+)2 Pulses B/l  Resp:  Course B/L BS, decreased at the bases  normal effort  GI: (+) BS Soft, NT, ND  Lymph:  (-)Edema, (-)obvious lymphadenopathy  Skin: Warm to touch, Normal turgor  Psych: Appropriate mood and affect    	    Tele: Sinus, PVC's    ASSESSMENT/PLAN: 	55y Male HTN, DM, HLD, CAD, remote stents subsequent CABG  in 2005 the past, multiple embolic strokes(Loop recorder placement), CHF, vertigo presented with shortness of breath for past 1 week.  suspected PNA    - echo with no new interval changes  - Abx per primary team  - cont asa, statin, BB for history of CAD   -  suspect SOB is multifactorial, COPD, PNA and CHF  - Keep net negative    Nena Downs MD

## 2020-07-25 NOTE — DISCHARGE NOTE NURSING/CASE MANAGEMENT/SOCIAL WORK - PATIENT PORTAL LINK FT
You can access the FollowMyHealth Patient Portal offered by [facility] by registering at the following website: [email or web address]. By joining [facility]’s FollowMyHealth portal, you will also be able to view your health information using other applications (apps) compatible with our system.

## 2020-07-25 NOTE — PROGRESS NOTE ADULT - SUBJECTIVE AND OBJECTIVE BOX
Patient is a 55y old  Male who presents with a chief complaint of Shortness of Breath (24 Jul 2020 17:10)  Awake, alert, comfortable on bed in NAD    INTERVAL HPI/OVERNIGHT EVENTS:      VITAL SIGNS:  T(F): 97.9 (07-25-20 @ 11:09)  HR: 81 (07-25-20 @ 11:09)  BP: 144/82 (07-25-20 @ 11:09)  RR: 18 (07-25-20 @ 11:09)  SpO2: 98% (07-25-20 @ 11:09)  Wt(kg): --  I&O's Detail    25 Jul 2020 07:01  -  25 Jul 2020 11:25  --------------------------------------------------------  IN:    Oral Fluid: 250 mL  Total IN: 250 mL    OUT:  Total OUT: 0 mL    Total NET: 250 mL              REVIEW OF SYSTEMS:    CONSTITUTIONAL:  No fevers, chills, sweats    HEENT:  Eyes:  No diplopia or blurred vision. ENT:  No earache, sore throat or runny nose.    CARDIOVASCULAR:  No pressure, squeezing, tightness, or heaviness about the chest; no palpitations.    RESPIRATORY:  Per HPI    GASTROINTESTINAL:  No abdominal pain, nausea, vomiting or diarrhea.    GENITOURINARY:  No dysuria, frequency or urgency.    NEUROLOGIC:  No paresthesias, fasciculations, seizures or weakness.    PSYCHIATRIC:  No disorder of thought or mood.      PHYSICAL EXAM:    Constitutional: Well developed and nourished  Eyes:Perrla  ENMT: normal  Neck:supple  Respiratory: good air entry  Cardiovascular: S1 S2 regular  Gastrointestinal: Soft, Non tender  Extremities: No edema  Vascular:normal  Neurological:Awake, alert,Ox3  Musculoskeletal:Normal      MEDICATIONS  (STANDING):  aspirin  chewable 81 milliGRAM(s) Oral daily  azithromycin   Tablet 500 milliGRAM(s) Oral daily  cefTRIAXone   IVPB 1000 milliGRAM(s) IV Intermittent every 24 hours  ergocalciferol 34713 Unit(s) Oral <User Schedule>  furosemide    Tablet 40 milliGRAM(s) Oral daily  heparin   Injectable 5000 Unit(s) SubCutaneous every 12 hours  insulin glargine Injectable (LANTUS) 25 Unit(s) SubCutaneous at bedtime  insulin lispro (HumaLOG) corrective regimen sliding scale   SubCutaneous three times a day before meals  insulin lispro (HumaLOG) corrective regimen sliding scale   SubCutaneous at bedtime  insulin lispro Injectable (HumaLOG) 2 Unit(s) SubCutaneous three times a day before meals  metoprolol tartrate 25 milliGRAM(s) Oral two times a day    MEDICATIONS  (PRN):  acetaminophen   Tablet .. 650 milliGRAM(s) Oral every 6 hours PRN Temp greater or equal to 38C (100.4F)  ALBUTerol    90 MICROgram(s) HFA Inhaler 2 Puff(s) Inhalation every 6 hours PRN Bronchospasm  glucagon  Injectable 1 milliGRAM(s) IntraMuscular once PRN Glucose LESS THAN 70 milligrams/deciliter  guaiFENesin   Syrup  (Sugar-Free) 200 milliGRAM(s) Oral every 6 hours PRN Cough      Allergies    Plavix (Other)    Intolerances        LABS:                        11.0   7.46  )-----------( 469      ( 25 Jul 2020 07:29 )             34.1     07-25    140  |  102  |  25<H>  ----------------------------<  208<H>  4.0   |  33<H>  |  1.24    Ca    8.7      25 Jul 2020 07:29    TPro  7.4  /  Alb  2.1<L>  /  TBili  0.2  /  DBili  x   /  AST  54<H>  /  ALT  143<H>  /  AlkPhos  163<H>  07-24              CAPILLARY BLOOD GLUCOSE      POCT Blood Glucose.: 219 mg/dL (25 Jul 2020 07:56)  POCT Blood Glucose.: 198 mg/dL (24 Jul 2020 21:07)  POCT Blood Glucose.: 294 mg/dL (24 Jul 2020 16:48)  POCT Blood Glucose.: 389 mg/dL (24 Jul 2020 12:04)    pro-bnp [id number] 07-21 @ 18:53     d-dimer --  07-21 @ 18:53      RADIOLOGY & ADDITIONAL TESTS:    CXR:    Ct scan chest:    ekg;    echo:

## 2020-07-25 NOTE — PROGRESS NOTE ADULT - REASON FOR ADMISSION
Shortness of Breath

## 2020-07-25 NOTE — PROGRESS NOTE ADULT - PROVIDER SPECIALTY LIST ADULT
Cardiology
Internal Medicine
Pulmonology
Pulmonology
Cardiology
Cardiology
Internal Medicine

## 2020-07-25 NOTE — PROGRESS NOTE ADULT - SUBJECTIVE AND OBJECTIVE BOX
Patient is a 55y old  Male who presents with a chief complaint of Shortness of Breath (25 Jul 2020 12:08)    pt seen in icu [  ], reg med floor [   ], bed [  ], chair at bedside [   ], a+o x3 [  ], lethargic [  ],  nad [  ]    mayers [  ], ngt [  ], peg [  ], et tube [  ], cent line [  ], picc line [  ]        Allergies    Plavix (Other)        Vitals    T(F): 97.9 (07-25-20 @ 11:09), Max: 98.8 (07-24-20 @ 20:07)  HR: 81 (07-25-20 @ 11:09) (78 - 89)  BP: 144/82 (07-25-20 @ 11:09) (132/74 - 167/65)  RR: 18 (07-25-20 @ 11:09) (18 - 18)  SpO2: 98% (07-25-20 @ 11:09) (96% - 100%)  Wt(kg): --  CAPILLARY BLOOD GLUCOSE      POCT Blood Glucose.: 203 mg/dL (25 Jul 2020 11:26)      Labs                          11.0   7.46  )-----------( 469      ( 25 Jul 2020 07:29 )             34.1       07-25    140  |  102  |  25<H>  ----------------------------<  208<H>  4.0   |  33<H>  |  1.24    Ca    8.7      25 Jul 2020 07:29    TPro  7.4  /  Alb  2.1<L>  /  TBili  0.2  /  DBili  x   /  AST  54<H>  /  ALT  143<H>  /  AlkPhos  163<H>  07-24            .Urine Clean Catch (Midstream)  07-23 @ 15:02   No growth  --  --      .Blood Blood  07-21 @ 22:37   No growth to date.  --  --          Radiology Results      Meds    MEDICATIONS  (STANDING):  aspirin  chewable 81 milliGRAM(s) Oral daily  cefTRIAXone   IVPB 1000 milliGRAM(s) IV Intermittent every 24 hours  ergocalciferol 70680 Unit(s) Oral <User Schedule>  furosemide    Tablet 40 milliGRAM(s) Oral daily  heparin   Injectable 5000 Unit(s) SubCutaneous every 12 hours  insulin glargine Injectable (LANTUS) 25 Unit(s) SubCutaneous at bedtime  insulin lispro (HumaLOG) corrective regimen sliding scale   SubCutaneous three times a day before meals  insulin lispro (HumaLOG) corrective regimen sliding scale   SubCutaneous at bedtime  insulin lispro Injectable (HumaLOG) 2 Unit(s) SubCutaneous three times a day before meals  metoprolol tartrate 25 milliGRAM(s) Oral two times a day      MEDICATIONS  (PRN):  acetaminophen   Tablet .. 650 milliGRAM(s) Oral every 6 hours PRN Temp greater or equal to 38C (100.4F)  ALBUTerol    90 MICROgram(s) HFA Inhaler 2 Puff(s) Inhalation every 6 hours PRN Bronchospasm  glucagon  Injectable 1 milliGRAM(s) IntraMuscular once PRN Glucose LESS THAN 70 milligrams/deciliter  guaiFENesin   Syrup  (Sugar-Free) 200 milliGRAM(s) Oral every 6 hours PRN Cough      Physical Exam    Neuro :  no focal deficits  Respiratory: coarse bs B/L  CV: RRR, S1S2, no murmurs,   Abdominal: Soft, NT, ND +BS,  Extremities: No edema, + peripheral pulses      ASSESSMENT        Problem 11:  Vitamin D Deficiency:   Vitamind D levels 13.6  Will add 50,000 units weekly     Problem/Plan - 1:  ·  Problem: Severe sepsis.  Plan: Resolved   - Blood cultures negative till date  - c/w Rocephin and Zithromax , will switch to oral on D/C  - Tylenol PRN for fever.   PT evaluation for d/c planning     Problem/Plan - 2:  ·  Problem: Respiratory failure with hypoxia.  Plan: Resolved   - c/w Abx Rocephin + Zithromax for PNA coverage  - f/u I's/O's net -ve  - Echo shows EF 40-45% and Hypokinesia in anterior and latera lwalls (likely due to previous MI)  - cardiology consult appreciated, continue curent management  - pulmonology consult Dr. Starks , noted.   - check O2 Sat on RA and when ambulating. Does not use home O2.     Problem/Plan - 3:  ·  Problem: PNA (pneumonia)improving clinically  Continue Rocephin and Zithromax for Community acquired pneumonia.  - blood cultures negative  -pulmonology appreciated     Problem/Plan - 4:  ·  Problem: CHF (congestive heart failure).  Plan: Patient has CHF.  Patient does not remember if he takes Lasix /spironolactone at home.   got 80 IV lasix in ED.  Monitor Is and Os,   Taper Lasix to PO 40mg with parameters  patient has Elevated trops, likely secondary to demand ischemia, EKG showed RBBB. Trop trended down on 7/22.   - Cardiology consult Dr Allred  - repeat Echo shows EF 40-45%, no acute changes  - Net negative  - Daily weights and urine output monitoring.      Problem/Plan - 5:  ·  Problem: MALCOM (acute kidney injury).  Plan: Patients Creatinine is Elevated 1.64  - Baseline is around 1.57  - follow Urine lytes FeNA 2.1%  - follow BMP daily  - consider renal ultrasound if serum creatinine gets worse.      Problem/Plan - 6:  Problem: Diabetes mellitus. Plan: Patient has DM, takes 43 Lantus and 8 units of Humalog tid.  Blood sugars are running onhigher side, will increase LAntus to 25 Units at bedtime and add pre meals 2U  Will adjust dose as clinically indicated.  - Hb A1c 9.5  -Diabetic Diet.     Problem/Plan - 7:  ·  Problem: Hyperlipidemia, unspecified hyperlipidemia type.  Plan: Patient takes LIPITOR 80 mg (pharmacy said Atorvastatin 20mg HS).  - Will hold Statin for now as LFTS are trending up.  - Low HDL, otherwise lipid panel is normal;      Problem/Plan - 8:  ·  Problem: Hypertension.  Plan: Patient takes Metoprolol 25 bid and lisinopril.  - Holding Lisinopril for MALCOM  - continue with other home meds.      Problem/Plan - 9:  ·  Problem: Transaminitis.  Plan: Patient has Elevated liver enzymes, normal bilirubin, no tenderness on exam.  - US hepatic and pancreatic: contracted GB with stones, no bile tract dilation  - LFTs trending up, sudden jump in ALT/AST noted  - Will hold Lipitor for now   - continue to trend LFTs.      Problem/Plan - 10:  Problem: Prophylactic measure. Plan; - Heparin 5000 units q 12 for DVT PPx  RISK                                                          Points  [] Previous VTE                                           3  [] Thrombophilia                                        2  [] Lower limb paralysis                              2   [] Current Cancer                                       2   [x] Immobilization > 24 hrs                        1  [] ICU/CCU stay > 24 hours                       1  [x] Age > 60                                                   1.

## 2020-07-26 LAB
CULTURE RESULTS: SIGNIFICANT CHANGE UP
CULTURE RESULTS: SIGNIFICANT CHANGE UP
S PNEUM AG UR QL: NEGATIVE — SIGNIFICANT CHANGE UP
SPECIMEN SOURCE: SIGNIFICANT CHANGE UP
SPECIMEN SOURCE: SIGNIFICANT CHANGE UP

## 2020-09-25 ENCOUNTER — OUTPATIENT (OUTPATIENT)
Dept: OUTPATIENT SERVICES | Facility: HOSPITAL | Age: 55
LOS: 1 days | End: 2020-09-25
Payer: MEDICAID

## 2020-09-25 DIAGNOSIS — Z95.1 PRESENCE OF AORTOCORONARY BYPASS GRAFT: Chronic | ICD-10-CM

## 2020-09-25 DIAGNOSIS — Z98.890 OTHER SPECIFIED POSTPROCEDURAL STATES: Chronic | ICD-10-CM

## 2020-09-25 DIAGNOSIS — R07.9 CHEST PAIN, UNSPECIFIED: ICD-10-CM

## 2020-09-25 DIAGNOSIS — Z90.49 ACQUIRED ABSENCE OF OTHER SPECIFIED PARTS OF DIGESTIVE TRACT: Chronic | ICD-10-CM

## 2020-09-25 DIAGNOSIS — R06.02 SHORTNESS OF BREATH: ICD-10-CM

## 2020-09-25 DIAGNOSIS — Z95.5 PRESENCE OF CORONARY ANGIOPLASTY IMPLANT AND GRAFT: Chronic | ICD-10-CM

## 2020-09-25 PROCEDURE — 78452 HT MUSCLE IMAGE SPECT MULT: CPT

## 2020-09-25 PROCEDURE — A9502: CPT

## 2020-09-25 PROCEDURE — 93017 CV STRESS TEST TRACING ONLY: CPT

## 2020-10-08 ENCOUNTER — OUTPATIENT (OUTPATIENT)
Dept: OUTPATIENT SERVICES | Facility: HOSPITAL | Age: 55
LOS: 1 days | End: 2020-10-08
Payer: MEDICAID

## 2020-10-08 DIAGNOSIS — Z11.59 ENCOUNTER FOR SCREENING FOR OTHER VIRAL DISEASES: ICD-10-CM

## 2020-10-08 DIAGNOSIS — Z90.49 ACQUIRED ABSENCE OF OTHER SPECIFIED PARTS OF DIGESTIVE TRACT: Chronic | ICD-10-CM

## 2020-10-08 DIAGNOSIS — Z95.1 PRESENCE OF AORTOCORONARY BYPASS GRAFT: Chronic | ICD-10-CM

## 2020-10-08 DIAGNOSIS — Z95.5 PRESENCE OF CORONARY ANGIOPLASTY IMPLANT AND GRAFT: Chronic | ICD-10-CM

## 2020-10-08 DIAGNOSIS — Z98.890 OTHER SPECIFIED POSTPROCEDURAL STATES: Chronic | ICD-10-CM

## 2020-10-08 LAB — SARS-COV-2 RNA SPEC QL NAA+PROBE: SIGNIFICANT CHANGE UP

## 2020-10-08 PROCEDURE — U0003: CPT

## 2020-10-09 ENCOUNTER — INPATIENT (INPATIENT)
Facility: HOSPITAL | Age: 55
LOS: 0 days | Discharge: HOME | End: 2020-10-09
Attending: INTERNAL MEDICINE | Admitting: INTERNAL MEDICINE
Payer: MEDICAID

## 2020-10-09 DIAGNOSIS — Z95.1 PRESENCE OF AORTOCORONARY BYPASS GRAFT: Chronic | ICD-10-CM

## 2020-10-09 DIAGNOSIS — Z95.5 PRESENCE OF CORONARY ANGIOPLASTY IMPLANT AND GRAFT: Chronic | ICD-10-CM

## 2020-10-09 DIAGNOSIS — Z90.49 ACQUIRED ABSENCE OF OTHER SPECIFIED PARTS OF DIGESTIVE TRACT: Chronic | ICD-10-CM

## 2020-10-09 DIAGNOSIS — I25.10 ATHEROSCLEROTIC HEART DISEASE OF NATIVE CORONARY ARTERY WITHOUT ANGINA PECTORIS: ICD-10-CM

## 2020-10-09 DIAGNOSIS — Z98.890 OTHER SPECIFIED POSTPROCEDURAL STATES: Chronic | ICD-10-CM

## 2020-10-09 LAB
ANION GAP SERPL CALC-SCNC: 7 MMO/L — SIGNIFICANT CHANGE UP (ref 7–14)
BUN SERPL-MCNC: 19 MG/DL — SIGNIFICANT CHANGE UP (ref 7–23)
CALCIUM SERPL-MCNC: 9.2 MG/DL — SIGNIFICANT CHANGE UP (ref 8.4–10.5)
CHLORIDE SERPL-SCNC: 101 MMOL/L — SIGNIFICANT CHANGE UP (ref 98–107)
CO2 SERPL-SCNC: 28 MMOL/L — SIGNIFICANT CHANGE UP (ref 22–31)
CREAT SERPL-MCNC: 1.23 MG/DL — SIGNIFICANT CHANGE UP (ref 0.5–1.3)
GLUCOSE SERPL-MCNC: 256 MG/DL — HIGH (ref 70–99)
HBA1C BLD-MCNC: 9 % — HIGH (ref 4–5.6)
HCT VFR BLD CALC: 38.2 % — LOW (ref 39–50)
HGB BLD-MCNC: 12.1 G/DL — LOW (ref 13–17)
MCHC RBC-ENTMCNC: 28.9 PG — SIGNIFICANT CHANGE UP (ref 27–34)
MCHC RBC-ENTMCNC: 31.7 % — LOW (ref 32–36)
MCV RBC AUTO: 91.2 FL — SIGNIFICANT CHANGE UP (ref 80–100)
NRBC # FLD: 0 K/UL — SIGNIFICANT CHANGE UP (ref 0–0)
PLATELET # BLD AUTO: 205 K/UL — SIGNIFICANT CHANGE UP (ref 150–400)
PMV BLD: 9.8 FL — SIGNIFICANT CHANGE UP (ref 7–13)
POTASSIUM SERPL-MCNC: 4 MMOL/L — SIGNIFICANT CHANGE UP (ref 3.5–5.3)
POTASSIUM SERPL-MCNC: 5.7 MMOL/L — HIGH (ref 3.5–5.3)
POTASSIUM SERPL-SCNC: 4 MMOL/L — SIGNIFICANT CHANGE UP (ref 3.5–5.3)
POTASSIUM SERPL-SCNC: 5.7 MMOL/L — HIGH (ref 3.5–5.3)
RBC # BLD: 4.19 M/UL — LOW (ref 4.2–5.8)
RBC # FLD: 15 % — HIGH (ref 10.3–14.5)
SODIUM SERPL-SCNC: 136 MMOL/L — SIGNIFICANT CHANGE UP (ref 135–145)
WBC # BLD: 6.94 K/UL — SIGNIFICANT CHANGE UP (ref 3.8–10.5)
WBC # FLD AUTO: 6.94 K/UL — SIGNIFICANT CHANGE UP (ref 3.8–10.5)

## 2020-10-09 PROCEDURE — 93010 ELECTROCARDIOGRAM REPORT: CPT

## 2020-10-09 RX ORDER — DEXTROSE 50 % IN WATER 50 %
12.5 SYRINGE (ML) INTRAVENOUS ONCE
Refills: 0 | Status: DISCONTINUED | OUTPATIENT
Start: 2020-10-09 | End: 2020-10-09

## 2020-10-09 RX ORDER — DEXTROSE 50 % IN WATER 50 %
25 SYRINGE (ML) INTRAVENOUS ONCE
Refills: 0 | Status: DISCONTINUED | OUTPATIENT
Start: 2020-10-09 | End: 2020-10-09

## 2020-10-09 RX ORDER — INSULIN GLARGINE 100 [IU]/ML
43 INJECTION, SOLUTION SUBCUTANEOUS AT BEDTIME
Refills: 0 | Status: DISCONTINUED | OUTPATIENT
Start: 2020-10-09 | End: 2020-10-09

## 2020-10-09 RX ORDER — INSULIN LISPRO 100/ML
VIAL (ML) SUBCUTANEOUS AT BEDTIME
Refills: 0 | Status: DISCONTINUED | OUTPATIENT
Start: 2020-10-09 | End: 2020-10-09

## 2020-10-09 RX ORDER — HEPARIN SODIUM 5000 [USP'U]/ML
5000 INJECTION INTRAVENOUS; SUBCUTANEOUS EVERY 12 HOURS
Refills: 0 | Status: DISCONTINUED | OUTPATIENT
Start: 2020-10-10 | End: 2020-10-09

## 2020-10-09 RX ORDER — DEXTROSE 50 % IN WATER 50 %
15 SYRINGE (ML) INTRAVENOUS ONCE
Refills: 0 | Status: DISCONTINUED | OUTPATIENT
Start: 2020-10-09 | End: 2020-10-09

## 2020-10-09 RX ORDER — INSULIN LISPRO 100/ML
10 VIAL (ML) SUBCUTANEOUS
Refills: 0 | Status: DISCONTINUED | OUTPATIENT
Start: 2020-10-09 | End: 2020-10-09

## 2020-10-09 RX ORDER — LISINOPRIL 2.5 MG/1
2.5 TABLET ORAL DAILY
Refills: 0 | Status: DISCONTINUED | OUTPATIENT
Start: 2020-10-09 | End: 2020-10-09

## 2020-10-09 RX ORDER — ATORVASTATIN CALCIUM 80 MG/1
20 TABLET, FILM COATED ORAL AT BEDTIME
Refills: 0 | Status: DISCONTINUED | OUTPATIENT
Start: 2020-10-09 | End: 2020-10-09

## 2020-10-09 RX ORDER — SODIUM CHLORIDE 9 MG/ML
3 INJECTION INTRAMUSCULAR; INTRAVENOUS; SUBCUTANEOUS EVERY 8 HOURS
Refills: 0 | Status: DISCONTINUED | OUTPATIENT
Start: 2020-10-09 | End: 2020-10-09

## 2020-10-09 RX ORDER — ATORVASTATIN CALCIUM 80 MG/1
1 TABLET, FILM COATED ORAL
Qty: 0 | Refills: 0 | DISCHARGE

## 2020-10-09 RX ORDER — CARVEDILOL PHOSPHATE 80 MG/1
1 CAPSULE, EXTENDED RELEASE ORAL
Qty: 60 | Refills: 0
Start: 2020-10-09 | End: 2020-11-07

## 2020-10-09 RX ORDER — INSULIN LISPRO 100/ML
VIAL (ML) SUBCUTANEOUS
Refills: 0 | Status: DISCONTINUED | OUTPATIENT
Start: 2020-10-09 | End: 2020-10-09

## 2020-10-09 RX ORDER — FUROSEMIDE 40 MG
20 TABLET ORAL ONCE
Refills: 0 | Status: COMPLETED | OUTPATIENT
Start: 2020-10-09 | End: 2020-10-09

## 2020-10-09 RX ORDER — SODIUM CHLORIDE 9 MG/ML
1000 INJECTION, SOLUTION INTRAVENOUS
Refills: 0 | Status: DISCONTINUED | OUTPATIENT
Start: 2020-10-09 | End: 2020-10-09

## 2020-10-09 RX ORDER — ASPIRIN/CALCIUM CARB/MAGNESIUM 324 MG
81 TABLET ORAL DAILY
Refills: 0 | Status: DISCONTINUED | OUTPATIENT
Start: 2020-10-09 | End: 2020-10-09

## 2020-10-09 RX ORDER — GLUCAGON INJECTION, SOLUTION 0.5 MG/.1ML
1 INJECTION, SOLUTION SUBCUTANEOUS ONCE
Refills: 0 | Status: DISCONTINUED | OUTPATIENT
Start: 2020-10-09 | End: 2020-10-09

## 2020-10-09 RX ADMIN — Medication 20 MILLIGRAM(S): at 10:48

## 2020-10-09 NOTE — CONSULT NOTE ADULT - SUBJECTIVE AND OBJECTIVE BOX
Date of Admission: 10/9/20    CHIEF COMPLAINT: ADHF with shortness of breath, orthopnea and edema    HISTORY OF PRESENT ILLNESS:  Pt is a 55 year old male with PMH ischemic cardiomyopathy with LVEF 10-15%, CAD S/P CABG x 5  ( 2005 [facility]), S/P PCI, DM, HTN referred for an elective LHC/RHC today at [facility] for symptoms of increasing shortness of breath, orthopnea and edema. LHC reported as patent grafts and RHC reported as elevated LVEDP, PCWP 30, CI 1.76. Pt was treated with Lasix 40 mg IV in lab and was challenged with IV nitro with decrease in PA pressures, PCWP and CI increased to 2.2. labs notable for K 5.7 , BUN/creat 19/1.23. Heart failure consultation requested for assistance with ADHF and patient was admitted to telemetry unit. Cardiologist Dr. Haddad.    Relevant studies   7/22/20 TTE: LVEF 40-45%, LVIDD 5.6 cm, mod segmental LV systolic dysfunction, mod diastolic dysfunction Stage II, mild MR    9/25/20 NST: LVEF 24%- Marshall evidence of ischemia after IV lexiscan, LV markedly dilated with small, severe, defects in apical, basal infero lateral and inferior walls that are reversible consistent with ischemia EF 24%        Allergies    No Known Allergies    Intolerances    Plavix (Headache)  	    MEDICATIONS:  aspirin enteric coated 81 milliGRAM(s) Oral daily  lisinopril 2.5 milliGRAM(s) Oral daily            atorvastatin 20 milliGRAM(s) Oral at bedtime  dextrose 40% Gel 15 Gram(s) Oral once PRN  dextrose 50% Injectable 12.5 Gram(s) IV Push once  dextrose 50% Injectable 25 Gram(s) IV Push once  dextrose 50% Injectable 25 Gram(s) IV Push once  glucagon  Injectable 1 milliGRAM(s) IntraMuscular once PRN  insulin glargine Injectable (LANTUS) 43 Unit(s) SubCutaneous at bedtime  insulin lispro (HumaLOG) corrective regimen sliding scale   SubCutaneous three times a day before meals  insulin lispro (HumaLOG) corrective regimen sliding scale   SubCutaneous at bedtime  insulin lispro Injectable (HumaLOG) 10 Unit(s) SubCutaneous three times a day before meals    dextrose 5%. 1000 milliLiter(s) IV Continuous <Continuous>  sodium chloride 0.9% lock flush 3 milliLiter(s) IV Push every 8 hours      PAST MEDICAL & SURGICAL HISTORY:  Cerebrovascular accident (CVA), unspecified mechanism    Congestive heart failure, NYHA class 3, chronic, combined    Cerebrovascular accident (CVA), unspecified mechanism    Hyperlipidemia, unspecified hyperlipidemia type    Coronary artery disease involving native heart without angina pectoris, unspecified vessel or lesion type    Hypertension, unspecified type    Diabetes mellitus of other type without complication    S/P CABG x 5  2005 at [facility]    History of loop recorder    S/P coronary artery stent placement    History of appendectomy        FAMILY HISTORY:  FH: CAD (coronary artery disease)        SOCIAL HISTORY:    [ ] Non-smoker  [ ] Smoker  [ ] Alcohol      REVIEW OF SYSTEMS:  CONSTITUTIONAL: No fever, weight loss, or fatigue  EYES: No eye pain, visual disturbances, or discharge  ENMT:  No difficulty hearing, tinnitus, vertigo; No sinus or throat pain  NECK: No pain or stiffness  RESPIRATORY: No cough, wheezing, chills or hemoptysis; No Shortness of Breath  CARDIOVASCULAR: No chest pain, palpitations, passing out, dizziness, or leg swelling  GASTROINTESTINAL: No abdominal or epigastric pain. No nausea, vomiting, or hematemesis; No diarrhea or constipation. No melena or hematochezia.  GENITOURINARY: No dysuria, frequency, hematuria, or incontinence  NEUROLOGICAL: No headaches, memory loss, loss of strength, numbness, or tremors  SKIN: No itching, burning, rashes, or lesions   LYMPH Nodes: No enlarged glands  ENDOCRINE: No heat or cold intolerance; No hair loss  MUSCULOSKELETAL: No joint pain or swelling; No muscle, back, or extremity pain  PSYCHIATRIC: No depression, anxiety, mood swings, or difficulty sleeping  HEME/LYMPH: No easy bruising, or bleeding gums  ALLERY AND IMMUNOLOGIC: No hives or eczema	    [ ] All others negative	  [ ] Unable to obtain    PHYSICAL EXAM:  T(C): --  HR: --  BP: --  RR: --  SpO2: --  Wt(kg): --  I&O's Summary      Appearance: Normal	  HEENT:   Normal oral mucosa, PERRL, EOMI	  Lymphatic: No lymphadenopathy  Cardiovascular: Normal S1 S2, No JVD, No murmurs, No edema  Respiratory: Lungs clear to auscultation	  Psychiatry: A & O x 3, Mood & affect appropriate  Gastrointestinal:  Soft, Non-tender, + BS	  Skin: No rashes, No ecchymoses, No cyanosis	  Neurologic: Non-focal  Extremities: Normal range of motion, No clubbing, cyanosis or edema  Vascular: Peripheral pulses palpable 2+ bilaterally        LABS:	 	    CBC Full  -  ( 09 Oct 2020 10:27 )  WBC Count : 6.94 K/uL  Hemoglobin : 12.1 g/dL  Hematocrit : 38.2 %  Platelet Count - Automated : 205 K/uL  Mean Cell Volume : 91.2 fL  Mean Cell Hemoglobin : 28.9 pg  Mean Cell Hemoglobin Concentration : 31.7 %  Auto Neutrophil # : x  Auto Lymphocyte # : x  Auto Monocyte # : x  Auto Eosinophil # : x  Auto Basophil # : x  Auto Neutrophil % : x  Auto Lymphocyte % : x  Auto Monocyte % : x  Auto Eosinophil % : x  Auto Basophil % : x    10-09    136  |  101  |  19  ----------------------------<  256<H>  5.7<H>   |  28  |  1.23    Ca    9.2      09 Oct 2020 10:27        proBNP:   Lipid Profile:   HgA1c:   TSH:       CARDIAC MARKERS:            TELEMETRY: 	    ECG:  	  RADIOLOGY:  OTHER: 	    PREVIOUS DIAGNOSTIC TESTING:    [ ] Echocardiogram:  [ ]  Catheterization:  [ ] Stress Test:  	  < from: Transthoracic Echocardiogram (07.22.20 @ 07:26) >  Dimensions:     Normal Values:  LA:     4.3 cm    2.0 - 4.0 cm  Ao:     3.2 cm    2.0 - 3.8 cm  SEPTUM: 1.2 cm    0.6 - 1.2 cm  PWT:    1.1 cm    0.6 - 1.1 cm  LVIDd:  5.6 cm    3.0 - 5.6 cm  LVIDs:  4.3 cm    1.8 - 4.0 cm      Derived Variables:  LVMI: 121 g/m2  RWT: 0.39  Ejection Fraction Visual Estimate: 40-45 %    ------------------------------------------------------------------------  OBSERVATIONS:  Mitral Valve: Normal mitral valve. Mild mitral  regurgitation.  Aortic Root: Normal aortic root.  Aortic Valve: Calcified trileaflet aortic valve with normal  opening.  Left Atrium: Mildly dilated left atrium.  LA volume index =  40 cc/m2.  Left Ventricle: Moderate segmental left ventricular  < from: Transthoracic Echocardiogram (07.22.20 @ 07:26) >  3. Mild concentric left ventricular hypertrophy.  4. Moderate segmental left ventricular systolic  dysfunction.  The inferior andinferolateral walls are  akinetic.  Septal motion consistent with a conduction  defect.   Moderate diastolic dysfunction (stage II).  5. Linear structure compatible with Chiari network  identified in the right atrium.  This is a normal variant.  6. Normal right ventricular size and function.  systolic dysfunction.  The inferior and  inferolateral  walls are akinetic.  Septal motion consistent with a  conduction defect.   Moderate diastolic dysfunction (stage  II). Mild concentric left ventricular hypertrophy.  Right Heart: Linear structure compatible with Chiari  network identified in the right atrium.  This is a normal  variant. Normal right ventricular size and function. There  is moderate tricuspid regurgitation. There is mild pulmonic  regurgitation.  Pericardium/PleuraNormal pericardium with no pericardial  effusion.  Hemodynamic: RA Pressure is 8 mm Hg. RV systolic pressure  is 29 mm Hg.  ------------------------------------------------------------------------  CONCLUSIONS:  1. Mild mitral regurgitation.  2. Mildly dilated left atrium.  LA volume index = 40 cc/m2.      < from: Nuclear Stress Test-Pharmacologic (09.25.20 @ 09:46) >  NDICATION: Chest pain, unspecified (R07.9), Shortness of  breath (R06.02)  HEIGHT: 191 cm  WEIGHT: 100.0 kg  ------------------------------------------------------------------------  HISTORY:  CARDIAC HISTORY: 55 years oldmale with  OTHER HISTORY: HTN,,HLD, DM  RISK FACTORS: Diabetes, Elevated Cholesterol, Hypertension  ------------------------------------------------------------------------    BASELINE ELECTROCARDIOGRAM:  Rhythm: Normal Sinus Rhythm with RBBB    ------------------------------------------------------------------------    HEMODYNAMIC PARAMETERS:                                      HR      BP  Baseline  Pre-Injection             78 170/101  00:00     Inject Regadenoson         0  00:30     Post Injection             0  01:00     Post Injection            78 158/104  02:00     Post Injection            85  159/88  05:00     Recovery                  83  152/81  06:00     Recovery                  82  154/88  07:00     Recovery                  81  162/90  10:00     Recovery                  80  158/70  ------------------------------------------------------------------------    Agent: Regadenoson 0.4 mg/5 ml NS. injected over 10 sec.  HR: Baseline HR: 78 bpm   Peak HR: 85 bpm (52% of MPHR)  MPHR: 163 bpm   85% of MPHR: 139 bpm  BP: Baseline BP: 170/101 mmHg   Peak BP: 170/101 mmHg  Peak RPP: 77423 (Rate Pressure Product)  HR Isotope Injected: 78 bpm (Tc 99m Tetrofosmin)  78 bpm (Tc 99m Tetrofosmin)  Last Caffeine intake: 12 hrs  Terminated: Completion of protocol  ------------------------------------------------------------------------    SYMPTOMS/FINDINGS:  Symptoms: Flushing  Chest pain: No chest pain with administration of  Regadenoson    < end of copied text >  < from: Nuclear Stress Test-Pharmacologic (09.25.20 @ 09:46) >  CG ABNORMALITIES DURING/AFTER STRESS:   Abnormalities: None  ------------------------------------------------------------------------    NEW ARRHYTHMIAS DEVELOPED DURING/AFTER STRESS:  None  ------------------------------------------------------------------------    STRESS TEST IMPRESSIONS:  Negative ECG evidence of ischemia after IV of Lexiscan.  ------------------------------------------------------------------------    PROCEDURE:  10.0 mCi of Tc 99m Tetrofosmin were injected intravenously  at rest. Approximately 45 minutes later, tomographic  images were obtained in a 180 degree arc from right  anterior oblique to left anterior oblique with 64 stops.  At a separate time, 30.9 mCi of Tc 99m Tetrofosmin were  injected intravenously during stress protocol.  Approximately 45 minute(s) later, tomographic images were  obtained in a 180 degree arc from right anterior oblique  to left anterior oblique with 64 stops. The tomographic  slices were reconstructed in 3orthogonal planes (short  axis, horizontal long axis and vertical long axis).  Interpretation was performed both by visual and  quantitative analysis.      < end of copied text >  < from: Nuclear Stress Test-Pharmacologic (09.25.20 @ 09:46) >  UCLEAR FINDINGS:  Review of raw data shows: The study is of good technical  quality.  The left ventricle was markely dilated LV at baseline.  There are small, severe defects in apical,  basal  infero-lateral and inferior walls that are reversible  consistent with ischemia.  ------------------------------------------------------------------------      GATED ANALYSIS:  Dilated LV with global hypokinesis EF=24%.  ------------------------------------------------------------------------    IMPRESSIONS:Abnormal Study  * Negative ECG evidence of ischemia after IV of Lexiscan.  * Review of raw data shows: The study is of good technical  quality.  * The left ventricle was markely dilated LV at baseline.  There are small, severe defects in apical, basal  infero-lateral and inferior  walls that are reversible  consistent with ischemia.  * Dilated LV with global hypokinesis EF=24%.      < from: OBSERVATION (Cardiac Cath Lab - Adult) (10.09.20 @ 10:39) >  C/RHC pending            	  ASSESSMENT/PLAN: 	     Date of Admission: 10/9/20    CHIEF COMPLAINT: ADHF with shortness of breath, orthopnea and edema    HISTORY OF PRESENT ILLNESS:  Pt is a 55 year old male with PMH ischemic cardiomyopathy with LVEF 10-15%, CAD S/P CABG x 5  ( 2005 [facility]), S/P PCI, DM, HTN , smoker, multiple CVA ( 2019) referred for an elective LHC/RHC today at [facility] for symptoms of increasing shortness of breath, orthopnea and edema. LHC reported as patent grafts and RHC reported as elevated LVEDP, PCWP 30, CI 1.76. Pt was treated with Lasix 40 mg IV in lab and was challenged with IV nitro with decrease in PA pressures, PCWP and CI increased to 2.2. labs notable for K 5.7 , BUN/creat 19/1.23. Heart failure consultation requested for assistance with ADHF and patient was admitted to telemetry unit. Cardiologist Dr. Haddad.    Relevant studies   7/22/20 TTE: LVEF 40-45%, LVIDD 5.6 cm, mod segmental LV systolic dysfunction, mod diastolic dysfunction Stage II, mild MR    9/25/20 NST: LVEF 24%- Marshall evidence of ischemia after IV lexiscan, LV markedly dilated with small, severe, defects in apical, basal infero lateral and inferior walls that are reversible consistent with ischemia EF 24%        Allergies    No Known Allergies    Intolerances    Plavix (Headache)  	    MEDICATIONS:  aspirin enteric coated 81 milliGRAM(s) Oral daily  lisinopril 2.5 milliGRAM(s) Oral daily            atorvastatin 20 milliGRAM(s) Oral at bedtime  dextrose 40% Gel 15 Gram(s) Oral once PRN  dextrose 50% Injectable 12.5 Gram(s) IV Push once  dextrose 50% Injectable 25 Gram(s) IV Push once  dextrose 50% Injectable 25 Gram(s) IV Push once  glucagon  Injectable 1 milliGRAM(s) IntraMuscular once PRN  insulin glargine Injectable (LANTUS) 43 Unit(s) SubCutaneous at bedtime  insulin lispro (HumaLOG) corrective regimen sliding scale   SubCutaneous three times a day before meals  insulin lispro (HumaLOG) corrective regimen sliding scale   SubCutaneous at bedtime  insulin lispro Injectable (HumaLOG) 10 Unit(s) SubCutaneous three times a day before meals    dextrose 5%. 1000 milliLiter(s) IV Continuous <Continuous>  sodium chloride 0.9% lock flush 3 milliLiter(s) IV Push every 8 hours      PAST MEDICAL & SURGICAL HISTORY:  Cerebrovascular accident (CVA), unspecified mechanism    Congestive heart failure, NYHA class 3, chronic, combined    Cerebrovascular accident (CVA), unspecified mechanism    Hyperlipidemia, unspecified hyperlipidemia type    Coronary artery disease involving native heart without angina pectoris, unspecified vessel or lesion type    Hypertension, unspecified type    Diabetes mellitus of other type without complication    S/P CABG x 5  2005 at [facility]    History of loop recorder    S/P coronary artery stent placement    History of appendectomy        FAMILY HISTORY:  FH: CAD (coronary artery disease)        SOCIAL HISTORY:    [ ] Non-smoker  [ ] Smoker  [ ] Alcohol      REVIEW OF SYSTEMS:  CONSTITUTIONAL: No fever, weight loss, or fatigue  EYES: No eye pain, visual disturbances, or discharge  ENMT:  No difficulty hearing, tinnitus, vertigo; No sinus or throat pain  NECK: No pain or stiffness  RESPIRATORY: No cough, wheezing, chills or hemoptysis; No Shortness of Breath  CARDIOVASCULAR: No chest pain, palpitations, passing out, dizziness, or leg swelling  GASTROINTESTINAL: No abdominal or epigastric pain. No nausea, vomiting, or hematemesis; No diarrhea or constipation. No melena or hematochezia.  GENITOURINARY: No dysuria, frequency, hematuria, or incontinence  NEUROLOGICAL: No headaches, memory loss, loss of strength, numbness, or tremors  SKIN: No itching, burning, rashes, or lesions   LYMPH Nodes: No enlarged glands  ENDOCRINE: No heat or cold intolerance; No hair loss  MUSCULOSKELETAL: No joint pain or swelling; No muscle, back, or extremity pain  PSYCHIATRIC: No depression, anxiety, mood swings, or difficulty sleeping  HEME/LYMPH: No easy bruising, or bleeding gums  ALLERY AND IMMUNOLOGIC: No hives or eczema	    [ ] All others negative	  [ ] Unable to obtain    PHYSICAL EXAM:  T(C): --  HR: --  BP: --  RR: --  SpO2: --  Wt(kg): --  I&O's Summary      Appearance: Normal	  HEENT:   Normal oral mucosa, PERRL, EOMI	  Lymphatic: No lymphadenopathy  Cardiovascular: Normal S1 S2, No JVD, No murmurs, No edema  Respiratory: Lungs clear to auscultation	  Psychiatry: A & O x 3, Mood & affect appropriate  Gastrointestinal:  Soft, Non-tender, + BS	  Skin: No rashes, No ecchymoses, No cyanosis	  Neurologic: Non-focal  Extremities: Normal range of motion, No clubbing, cyanosis or edema  Vascular: Peripheral pulses palpable 2+ bilaterally        LABS:	 	    CBC Full  -  ( 09 Oct 2020 10:27 )  WBC Count : 6.94 K/uL  Hemoglobin : 12.1 g/dL  Hematocrit : 38.2 %  Platelet Count - Automated : 205 K/uL  Mean Cell Volume : 91.2 fL  Mean Cell Hemoglobin : 28.9 pg  Mean Cell Hemoglobin Concentration : 31.7 %  Auto Neutrophil # : x  Auto Lymphocyte # : x  Auto Monocyte # : x  Auto Eosinophil # : x  Auto Basophil # : x  Auto Neutrophil % : x  Auto Lymphocyte % : x  Auto Monocyte % : x  Auto Eosinophil % : x  Auto Basophil % : x    10-09    136  |  101  |  19  ----------------------------<  256<H>  5.7<H>   |  28  |  1.23    Ca    9.2      09 Oct 2020 10:27        proBNP:   Lipid Profile:   HgA1c:   TSH:       CARDIAC MARKERS:            TELEMETRY: 	    ECG:  	  RADIOLOGY:  OTHER: 	    PREVIOUS DIAGNOSTIC TESTING:    [ ] Echocardiogram:  [ ]  Catheterization:  [ ] Stress Test:  	  < from: Transthoracic Echocardiogram (07.22.20 @ 07:26) >  Dimensions:     Normal Values:  LA:     4.3 cm    2.0 - 4.0 cm  Ao:     3.2 cm    2.0 - 3.8 cm  SEPTUM: 1.2 cm    0.6 - 1.2 cm  PWT:    1.1 cm    0.6 - 1.1 cm  LVIDd:  5.6 cm    3.0 - 5.6 cm  LVIDs:  4.3 cm    1.8 - 4.0 cm      Derived Variables:  LVMI: 121 g/m2  RWT: 0.39  Ejection Fraction Visual Estimate: 40-45 %    ------------------------------------------------------------------------  OBSERVATIONS:  Mitral Valve: Normal mitral valve. Mild mitral  regurgitation.  Aortic Root: Normal aortic root.  Aortic Valve: Calcified trileaflet aortic valve with normal  opening.  Left Atrium: Mildly dilated left atrium.  LA volume index =  40 cc/m2.  Left Ventricle: Moderate segmental left ventricular  < from: Transthoracic Echocardiogram (07.22.20 @ 07:26) >  3. Mild concentric left ventricular hypertrophy.  4. Moderate segmental left ventricular systolic  dysfunction.  The inferior andinferolateral walls are  akinetic.  Septal motion consistent with a conduction  defect.   Moderate diastolic dysfunction (stage II).  5. Linear structure compatible with Chiari network  identified in the right atrium.  This is a normal variant.  6. Normal right ventricular size and function.  systolic dysfunction.  The inferior and  inferolateral  walls are akinetic.  Septal motion consistent with a  conduction defect.   Moderate diastolic dysfunction (stage  II). Mild concentric left ventricular hypertrophy.  Right Heart: Linear structure compatible with Chiari  network identified in the right atrium.  This is a normal  variant. Normal right ventricular size and function. There  is moderate tricuspid regurgitation. There is mild pulmonic  regurgitation.  Pericardium/PleuraNormal pericardium with no pericardial  effusion.  Hemodynamic: RA Pressure is 8 mm Hg. RV systolic pressure  is 29 mm Hg.  ------------------------------------------------------------------------  CONCLUSIONS:  1. Mild mitral regurgitation.  2. Mildly dilated left atrium.  LA volume index = 40 cc/m2.      < from: Nuclear Stress Test-Pharmacologic (09.25.20 @ 09:46) >  NDICATION: Chest pain, unspecified (R07.9), Shortness of  breath (R06.02)  HEIGHT: 191 cm  WEIGHT: 100.0 kg  ------------------------------------------------------------------------  HISTORY:  CARDIAC HISTORY: 55 years oldmale with  OTHER HISTORY: HTN,,HLD, DM  RISK FACTORS: Diabetes, Elevated Cholesterol, Hypertension  ------------------------------------------------------------------------    BASELINE ELECTROCARDIOGRAM:  Rhythm: Normal Sinus Rhythm with RBBB    ------------------------------------------------------------------------    HEMODYNAMIC PARAMETERS:                                      HR      BP  Baseline  Pre-Injection             78 170/101  00:00     Inject Regadenoson         0  00:30     Post Injection             0  01:00     Post Injection            78 158/104  02:00     Post Injection            85  159/88  05:00     Recovery                  83  152/81  06:00     Recovery                  82  154/88  07:00     Recovery                  81  162/90  10:00     Recovery                  80  158/70  ------------------------------------------------------------------------    Agent: Regadenoson 0.4 mg/5 ml NS. injected over 10 sec.  HR: Baseline HR: 78 bpm   Peak HR: 85 bpm (52% of MPHR)  MPHR: 163 bpm   85% of MPHR: 139 bpm  BP: Baseline BP: 170/101 mmHg   Peak BP: 170/101 mmHg  Peak RPP: 14951 (Rate Pressure Product)  HR Isotope Injected: 78 bpm (Tc 99m Tetrofosmin)  78 bpm (Tc 99m Tetrofosmin)  Last Caffeine intake: 12 hrs  Terminated: Completion of protocol  ------------------------------------------------------------------------    SYMPTOMS/FINDINGS:  Symptoms: Flushing  Chest pain: No chest pain with administration of  Regadenoson    < end of copied text >  < from: Nuclear Stress Test-Pharmacologic (09.25.20 @ 09:46) >  CG ABNORMALITIES DURING/AFTER STRESS:   Abnormalities: None  ------------------------------------------------------------------------    NEW ARRHYTHMIAS DEVELOPED DURING/AFTER STRESS:  None  ------------------------------------------------------------------------    STRESS TEST IMPRESSIONS:  Negative ECG evidence of ischemia after IV of Lexiscan.  ------------------------------------------------------------------------    PROCEDURE:  10.0 mCi of Tc 99m Tetrofosmin were injected intravenously  at rest. Approximately 45 minutes later, tomographic  images were obtained in a 180 degree arc from right  anterior oblique to left anterior oblique with 64 stops.  At a separate time, 30.9 mCi of Tc 99m Tetrofosmin were  injected intravenously during stress protocol.  Approximately 45 minute(s) later, tomographic images were  obtained in a 180 degree arc from right anterior oblique  to left anterior oblique with 64 stops. The tomographic  slices were reconstructed in 3orthogonal planes (short  axis, horizontal long axis and vertical long axis).  Interpretation was performed both by visual and  quantitative analysis.      < end of copied text >  < from: Nuclear Stress Test-Pharmacologic (09.25.20 @ 09:46) >  UCLEAR FINDINGS:  Review of raw data shows: The study is of good technical  quality.  The left ventricle was markely dilated LV at baseline.  There are small, severe defects in apical,  basal  infero-lateral and inferior walls that are reversible  consistent with ischemia.  ------------------------------------------------------------------------      GATED ANALYSIS:  Dilated LV with global hypokinesis EF=24%.  ------------------------------------------------------------------------    IMPRESSIONS:Abnormal Study  * Negative ECG evidence of ischemia after IV of Lexiscan.  * Review of raw data shows: The study is of good technical  quality.  * The left ventricle was markely dilated LV at baseline.  There are small, severe defects in apical, basal  infero-lateral and inferior  walls that are reversible  consistent with ischemia.  * Dilated LV with global hypokinesis EF=24%.      < from: OBSERVATION (Cardiac Cath Lab - Adult) (10.09.20 @ 10:39) >  [facility]/RHC pending            	  ASSESSMENT/PLAN:

## 2020-10-09 NOTE — H&P CARDIOLOGY - DATE:
09-Oct-2020 During your hospital stay, you had a pleuryx catheter placed to relieve your lungs of fluid. This will be managed by your home health team and your lung specialist. Your catheter was placed in the interventional radiology department by Dr. ADEOLA Mcdonald. If you have any questions or concerns regarding your drain please free to call our department at [phone] (Monday to Friday 730am to 530pm).  Our after hours number is [phone].

## 2020-10-09 NOTE — H&P CARDIOLOGY - PRO TOBACCO TYPE
cigarettes Minocycline Counseling: Patient advised regarding possible photosensitivity and discoloration of the teeth, skin, lips, tongue and gums.  Patient instructed to avoid sunlight, if possible.  When exposed to sunlight, patients should wear protective clothing, sunglasses, and sunscreen.  The patient was instructed to call the office immediately if the following severe adverse effects occur:  hearing changes, easy bruising/bleeding, severe headache, or vision changes.  The patient verbalized understanding of the proper use and possible adverse effects of minocycline.  All of the patient's questions and concerns were addressed.

## 2020-10-09 NOTE — CONSULT NOTE ADULT - ASSESSMENT
55 year old male with PMH ischemic cardiomyopathy with LVEF 10-15% ( prior TTE 7/22/20 with LVEF 40-45%, LVIDD 5.6 cm), CAD S/P CABG x 5  ( 2005 [facility]), S/P PCI, DM, HTN referred for an elective LHC/RHC today at [facility] for symptoms of increasing shortness of breath, orthopnea and edema. LHC reported as patent grafts and RHC reported as elevated LVEDP, PCWP 30, CI 1.76. Pt was treated with Lasix 40 mg IV in lab and was challenged with IV nitro with decrease in PA pressures, PCWP and CI increased to 2.2. labs notable for K 5.7 , BUN/creat 19/1.23. Heart failure consultation requested for assistance with ADHF and patient was admitted to telemetry unit. Cardiologist Dr. Haddad.    Relevant studies   7/22/20 TTE: LVEF 40-45%, LVIDD 5.6 cm, mod segmental LV systolic dysfunction, mod diastolic dysfunction Stage II, mild MR    9/25/20 NST: LVEF 24%- Marshall evidence of ischemia after IV lexiscan, LV markedly dilated with small, severe, defects in apical, basal infero lateral and inferior walls that are reversible consistent with ischemia EF 24%

## 2020-10-09 NOTE — H&P CARDIOLOGY - HISTORY OF PRESENT ILLNESS
56 y/o M w/ PMH of HTN, HLD, DM, CAD s/p CABG(2005), multiple CVA (2019) and HFrEF presents for cardiac catheretization. Pt states that he has been experiencing severe orthopnea for the past 1.5 weeks and has had very little sleep. Pt can only walk about 20 steps before experiencing shortness of breath. Pt was prescribed furosemide and aldactone but has not picked it up from the pharmacy. Pt's stress test revealed reversible ischemia in the apical, basal infero-lateral + inferior walls and severe LV dysfunction with EF 24%. Pt denies N/V/D, fevers, chills, cough, palpitations, chest pain, syncope, edema, cyanosis, heart murmurs, varicosities, phlebitis, claudication. 54 y/o M w/ PMH of HTN, HLD, DM, CAD s/p 5V CABG(2005), Smoker, multiple CVA (2019) and HFrEF presents for cardiac catheretization. Pt states that he has been experiencing severe orthopnea for the past 1.5 weeks and has had very little sleep. Pt can only walk about 20 steps before experiencing shortness of breath. Pt was prescribed furosemide and aldactone but has not picked it up from the pharmacy. Pt's stress test revealed reversible ischemia in the apical, basal infero-lateral + inferior walls and severe LV dysfunction with EF 24%. Pt denies N/V/D, fevers, chills, cough, palpitations, chest pain, syncope, edema, cyanosis, heart murmurs, varicosities, phlebitis, claudication. 54 y/o M w/ PMH of HTN, HLD, DM, CAD s/p 5V CABG(2005), Smoker, multiple CVA (2019) and HFrEF presents for cardiac catheretization. Pt states that he has been experiencing severe orthopnea for the past 1.5 weeks and has had very little sleep. Pt can only walk about 20 steps before experiencing shortness of breath. Pt was prescribed furosemide and aldactone but has not picked it up from the pharmacy. Pt's stress test revealed reversible ischemia in the apical, basal infero-lateral + inferior walls and severe LV dysfunction with EF 24%. Pt denies N/V/D, fevers, chills, cough, palpitations, chest pain, syncope, cyanosis, heart murmurs, varicosities, phlebitis, claudication.

## 2020-10-09 NOTE — H&P CARDIOLOGY - PSH
History of appendectomy    History of loop recorder    S/P CABG x 1    S/P coronary artery stent placement     History of appendectomy    History of loop recorder    S/P CABG x 5  2005 at [facility]  S/P coronary artery stent placement

## 2020-10-20 NOTE — DISCHARGE NOTE ADULT - REHABILITATION ASSESSMENT
Statement Selected
What Is The Reason For Today's Visit?: History of Non-Melanoma Skin Cancer
How Many Skin Cancers Have You Had?: more than one
When Was Your Last Cancer Diagnosed?: 2010

## 2020-11-13 ENCOUNTER — INPATIENT (INPATIENT)
Facility: HOSPITAL | Age: 55
LOS: 2 days | Discharge: ANOTHER HOSPITAL | DRG: 305 | End: 2020-11-16
Attending: INTERNAL MEDICINE | Admitting: INTERNAL MEDICINE
Payer: MEDICAID

## 2020-11-13 VITALS
DIASTOLIC BLOOD PRESSURE: 92 MMHG | WEIGHT: 214.95 LBS | TEMPERATURE: 98 F | HEIGHT: 73 IN | HEART RATE: 79 BPM | RESPIRATION RATE: 18 BRPM | SYSTOLIC BLOOD PRESSURE: 172 MMHG | OXYGEN SATURATION: 95 %

## 2020-11-13 DIAGNOSIS — I50.42 CHRONIC COMBINED SYSTOLIC (CONGESTIVE) AND DIASTOLIC (CONGESTIVE) HEART FAILURE: ICD-10-CM

## 2020-11-13 DIAGNOSIS — R42 DIZZINESS AND GIDDINESS: ICD-10-CM

## 2020-11-13 DIAGNOSIS — Z90.49 ACQUIRED ABSENCE OF OTHER SPECIFIED PARTS OF DIGESTIVE TRACT: Chronic | ICD-10-CM

## 2020-11-13 DIAGNOSIS — N17.9 ACUTE KIDNEY FAILURE, UNSPECIFIED: ICD-10-CM

## 2020-11-13 DIAGNOSIS — Z29.9 ENCOUNTER FOR PROPHYLACTIC MEASURES, UNSPECIFIED: ICD-10-CM

## 2020-11-13 DIAGNOSIS — G47.33 OBSTRUCTIVE SLEEP APNEA (ADULT) (PEDIATRIC): ICD-10-CM

## 2020-11-13 DIAGNOSIS — Z98.890 OTHER SPECIFIED POSTPROCEDURAL STATES: Chronic | ICD-10-CM

## 2020-11-13 DIAGNOSIS — Z95.5 PRESENCE OF CORONARY ANGIOPLASTY IMPLANT AND GRAFT: Chronic | ICD-10-CM

## 2020-11-13 DIAGNOSIS — I25.10 ATHEROSCLEROTIC HEART DISEASE OF NATIVE CORONARY ARTERY WITHOUT ANGINA PECTORIS: ICD-10-CM

## 2020-11-13 DIAGNOSIS — I50.9 HEART FAILURE, UNSPECIFIED: ICD-10-CM

## 2020-11-13 DIAGNOSIS — E78.5 HYPERLIPIDEMIA, UNSPECIFIED: ICD-10-CM

## 2020-11-13 DIAGNOSIS — Z95.1 PRESENCE OF AORTOCORONARY BYPASS GRAFT: Chronic | ICD-10-CM

## 2020-11-13 DIAGNOSIS — E13.9 OTHER SPECIFIED DIABETES MELLITUS WITHOUT COMPLICATIONS: ICD-10-CM

## 2020-11-13 DIAGNOSIS — I10 ESSENTIAL (PRIMARY) HYPERTENSION: ICD-10-CM

## 2020-11-13 LAB
ALBUMIN SERPL ELPH-MCNC: 3 G/DL — LOW (ref 3.5–5)
ALP SERPL-CCNC: 123 U/L — HIGH (ref 40–120)
ALT FLD-CCNC: 31 U/L DA — SIGNIFICANT CHANGE UP (ref 10–60)
ANION GAP SERPL CALC-SCNC: 6 MMOL/L — SIGNIFICANT CHANGE UP (ref 5–17)
AST SERPL-CCNC: 26 U/L — SIGNIFICANT CHANGE UP (ref 10–40)
BASOPHILS # BLD AUTO: 0.01 K/UL — SIGNIFICANT CHANGE UP (ref 0–0.2)
BASOPHILS NFR BLD AUTO: 0.2 % — SIGNIFICANT CHANGE UP (ref 0–2)
BILIRUB SERPL-MCNC: 0.4 MG/DL — SIGNIFICANT CHANGE UP (ref 0.2–1.2)
BUN SERPL-MCNC: 21 MG/DL — HIGH (ref 7–18)
CALCIUM SERPL-MCNC: 8.4 MG/DL — SIGNIFICANT CHANGE UP (ref 8.4–10.5)
CHLORIDE SERPL-SCNC: 104 MMOL/L — SIGNIFICANT CHANGE UP (ref 96–108)
CK MB BLD-MCNC: 1.5 % — SIGNIFICANT CHANGE UP (ref 0–3.5)
CK MB CFR SERPL CALC: 3.1 NG/ML — SIGNIFICANT CHANGE UP (ref 0–3.6)
CK SERPL-CCNC: 210 U/L — SIGNIFICANT CHANGE UP (ref 35–232)
CO2 SERPL-SCNC: 29 MMOL/L — SIGNIFICANT CHANGE UP (ref 22–31)
CREAT ?TM UR-MCNC: 25 MG/DL — SIGNIFICANT CHANGE UP
CREAT SERPL-MCNC: 1.63 MG/DL — HIGH (ref 0.5–1.3)
EOSINOPHIL # BLD AUTO: 0.05 K/UL — SIGNIFICANT CHANGE UP (ref 0–0.5)
EOSINOPHIL NFR BLD AUTO: 0.9 % — SIGNIFICANT CHANGE UP (ref 0–6)
GLUCOSE BLDC GLUCOMTR-MCNC: 273 MG/DL — HIGH (ref 70–99)
GLUCOSE BLDC GLUCOMTR-MCNC: 275 MG/DL — HIGH (ref 70–99)
GLUCOSE SERPL-MCNC: 320 MG/DL — HIGH (ref 70–99)
HCT VFR BLD CALC: 40.5 % — SIGNIFICANT CHANGE UP (ref 39–50)
HGB BLD-MCNC: 13 G/DL — SIGNIFICANT CHANGE UP (ref 13–17)
IMM GRANULOCYTES NFR BLD AUTO: 0.2 % — SIGNIFICANT CHANGE UP (ref 0–1.5)
LYMPHOCYTES # BLD AUTO: 1.24 K/UL — SIGNIFICANT CHANGE UP (ref 1–3.3)
LYMPHOCYTES # BLD AUTO: 23.2 % — SIGNIFICANT CHANGE UP (ref 13–44)
MCHC RBC-ENTMCNC: 29.1 PG — SIGNIFICANT CHANGE UP (ref 27–34)
MCHC RBC-ENTMCNC: 32.1 GM/DL — SIGNIFICANT CHANGE UP (ref 32–36)
MCV RBC AUTO: 90.6 FL — SIGNIFICANT CHANGE UP (ref 80–100)
MONOCYTES # BLD AUTO: 0.31 K/UL — SIGNIFICANT CHANGE UP (ref 0–0.9)
MONOCYTES NFR BLD AUTO: 5.8 % — SIGNIFICANT CHANGE UP (ref 2–14)
NEUTROPHILS # BLD AUTO: 3.73 K/UL — SIGNIFICANT CHANGE UP (ref 1.8–7.4)
NEUTROPHILS NFR BLD AUTO: 69.7 % — SIGNIFICANT CHANGE UP (ref 43–77)
NRBC # BLD: 0 /100 WBCS — SIGNIFICANT CHANGE UP (ref 0–0)
NT-PROBNP SERPL-SCNC: 2400 PG/ML — HIGH (ref 0–125)
PLATELET # BLD AUTO: 189 K/UL — SIGNIFICANT CHANGE UP (ref 150–400)
POTASSIUM SERPL-MCNC: 4.1 MMOL/L — SIGNIFICANT CHANGE UP (ref 3.5–5.3)
POTASSIUM SERPL-SCNC: 4.1 MMOL/L — SIGNIFICANT CHANGE UP (ref 3.5–5.3)
PROT SERPL-MCNC: 8 G/DL — SIGNIFICANT CHANGE UP (ref 6–8.3)
RBC # BLD: 4.47 M/UL — SIGNIFICANT CHANGE UP (ref 4.2–5.8)
RBC # FLD: 14 % — SIGNIFICANT CHANGE UP (ref 10.3–14.5)
SARS-COV-2 RNA SPEC QL NAA+PROBE: SIGNIFICANT CHANGE UP
SODIUM SERPL-SCNC: 139 MMOL/L — SIGNIFICANT CHANGE UP (ref 135–145)
SODIUM UR-SCNC: 106 MMOL/L — SIGNIFICANT CHANGE UP
TROPONIN I SERPL-MCNC: 0.37 NG/ML — HIGH (ref 0–0.04)
TROPONIN I SERPL-MCNC: 0.43 NG/ML — HIGH (ref 0–0.04)
WBC # BLD: 5.35 K/UL — SIGNIFICANT CHANGE UP (ref 3.8–10.5)
WBC # FLD AUTO: 5.35 K/UL — SIGNIFICANT CHANGE UP (ref 3.8–10.5)

## 2020-11-13 PROCEDURE — 70450 CT HEAD/BRAIN W/O DYE: CPT | Mod: 26

## 2020-11-13 PROCEDURE — 99283 EMERGENCY DEPT VISIT LOW MDM: CPT

## 2020-11-13 PROCEDURE — 71045 X-RAY EXAM CHEST 1 VIEW: CPT | Mod: 26

## 2020-11-13 RX ORDER — ATORVASTATIN CALCIUM 80 MG/1
20 TABLET, FILM COATED ORAL AT BEDTIME
Refills: 0 | Status: DISCONTINUED | OUTPATIENT
Start: 2020-11-13 | End: 2020-11-16

## 2020-11-13 RX ORDER — DEXAMETHASONE 0.5 MG/5ML
10 ELIXIR ORAL ONCE
Refills: 0 | Status: DISCONTINUED | OUTPATIENT
Start: 2020-11-13 | End: 2020-11-13

## 2020-11-13 RX ORDER — INSULIN LISPRO 100/ML
15 VIAL (ML) SUBCUTANEOUS
Qty: 0 | Refills: 0 | DISCHARGE

## 2020-11-13 RX ORDER — METOPROLOL TARTRATE 50 MG
25 TABLET ORAL THREE TIMES A DAY
Refills: 0 | Status: DISCONTINUED | OUTPATIENT
Start: 2020-11-13 | End: 2020-11-16

## 2020-11-13 RX ORDER — PANTOPRAZOLE SODIUM 20 MG/1
40 TABLET, DELAYED RELEASE ORAL DAILY
Refills: 0 | Status: DISCONTINUED | OUTPATIENT
Start: 2020-11-13 | End: 2020-11-15

## 2020-11-13 RX ORDER — HYDRALAZINE HCL 50 MG
5 TABLET ORAL ONCE
Refills: 0 | Status: COMPLETED | OUTPATIENT
Start: 2020-11-13 | End: 2020-11-13

## 2020-11-13 RX ORDER — INSULIN GLARGINE 100 [IU]/ML
20 INJECTION, SOLUTION SUBCUTANEOUS AT BEDTIME
Refills: 0 | Status: DISCONTINUED | OUTPATIENT
Start: 2020-11-13 | End: 2020-11-13

## 2020-11-13 RX ORDER — FUROSEMIDE 40 MG
40 TABLET ORAL DAILY
Refills: 0 | Status: DISCONTINUED | OUTPATIENT
Start: 2020-11-14 | End: 2020-11-16

## 2020-11-13 RX ORDER — MECLIZINE HCL 12.5 MG
25 TABLET ORAL THREE TIMES A DAY
Refills: 0 | Status: DISCONTINUED | OUTPATIENT
Start: 2020-11-13 | End: 2020-11-16

## 2020-11-13 RX ORDER — ISOSORBIDE MONONITRATE 60 MG/1
30 TABLET, EXTENDED RELEASE ORAL DAILY
Refills: 0 | Status: DISCONTINUED | OUTPATIENT
Start: 2020-11-13 | End: 2020-11-16

## 2020-11-13 RX ORDER — HYDRALAZINE HCL 50 MG
25 TABLET ORAL THREE TIMES A DAY
Refills: 0 | Status: DISCONTINUED | OUTPATIENT
Start: 2020-11-13 | End: 2020-11-16

## 2020-11-13 RX ORDER — DIPHENHYDRAMINE HCL 50 MG
25 CAPSULE ORAL ONCE
Refills: 0 | Status: COMPLETED | OUTPATIENT
Start: 2020-11-13 | End: 2020-11-13

## 2020-11-13 RX ORDER — LABETALOL HCL 100 MG
10 TABLET ORAL ONCE
Refills: 0 | Status: COMPLETED | OUTPATIENT
Start: 2020-11-13 | End: 2020-11-13

## 2020-11-13 RX ORDER — HEPARIN SODIUM 5000 [USP'U]/ML
5000 INJECTION INTRAVENOUS; SUBCUTANEOUS EVERY 8 HOURS
Refills: 0 | Status: DISCONTINUED | OUTPATIENT
Start: 2020-11-13 | End: 2020-11-16

## 2020-11-13 RX ORDER — INSULIN GLARGINE 100 [IU]/ML
30 INJECTION, SOLUTION SUBCUTANEOUS AT BEDTIME
Refills: 0 | Status: DISCONTINUED | OUTPATIENT
Start: 2020-11-13 | End: 2020-11-16

## 2020-11-13 RX ORDER — SPIRONOLACTONE 25 MG/1
25 TABLET, FILM COATED ORAL DAILY
Refills: 0 | Status: DISCONTINUED | OUTPATIENT
Start: 2020-11-13 | End: 2020-11-14

## 2020-11-13 RX ORDER — METOCLOPRAMIDE HCL 10 MG
10 TABLET ORAL ONCE
Refills: 0 | Status: COMPLETED | OUTPATIENT
Start: 2020-11-13 | End: 2020-11-13

## 2020-11-13 RX ORDER — ACETAMINOPHEN 500 MG
1000 TABLET ORAL ONCE
Refills: 0 | Status: COMPLETED | OUTPATIENT
Start: 2020-11-13 | End: 2020-11-13

## 2020-11-13 RX ORDER — ASPIRIN/CALCIUM CARB/MAGNESIUM 324 MG
325 TABLET ORAL ONCE
Refills: 0 | Status: COMPLETED | OUTPATIENT
Start: 2020-11-13 | End: 2020-11-13

## 2020-11-13 RX ORDER — KETOROLAC TROMETHAMINE 30 MG/ML
30 SYRINGE (ML) INJECTION ONCE
Refills: 0 | Status: DISCONTINUED | OUTPATIENT
Start: 2020-11-13 | End: 2020-11-13

## 2020-11-13 RX ORDER — FUROSEMIDE 40 MG
40 TABLET ORAL ONCE
Refills: 0 | Status: COMPLETED | OUTPATIENT
Start: 2020-11-13 | End: 2020-11-13

## 2020-11-13 RX ORDER — MECLIZINE HCL 12.5 MG
25 TABLET ORAL ONCE
Refills: 0 | Status: COMPLETED | OUTPATIENT
Start: 2020-11-13 | End: 2020-11-13

## 2020-11-13 RX ORDER — INSULIN LISPRO 100/ML
8 VIAL (ML) SUBCUTANEOUS
Refills: 0 | Status: DISCONTINUED | OUTPATIENT
Start: 2020-11-13 | End: 2020-11-14

## 2020-11-13 RX ORDER — INSULIN LISPRO 100/ML
16 VIAL (ML) SUBCUTANEOUS
Qty: 0 | Refills: 0 | DISCHARGE

## 2020-11-13 RX ORDER — INSULIN LISPRO 100/ML
VIAL (ML) SUBCUTANEOUS
Refills: 0 | Status: DISCONTINUED | OUTPATIENT
Start: 2020-11-13 | End: 2020-11-16

## 2020-11-13 RX ORDER — ACETAMINOPHEN 500 MG
650 TABLET ORAL EVERY 6 HOURS
Refills: 0 | Status: DISCONTINUED | OUTPATIENT
Start: 2020-11-13 | End: 2020-11-16

## 2020-11-13 RX ORDER — ASPIRIN/CALCIUM CARB/MAGNESIUM 324 MG
81 TABLET ORAL DAILY
Refills: 0 | Status: DISCONTINUED | OUTPATIENT
Start: 2020-11-13 | End: 2020-11-16

## 2020-11-13 RX ADMIN — Medication 5 MILLIGRAM(S): at 22:19

## 2020-11-13 RX ADMIN — Medication 325 MILLIGRAM(S): at 11:19

## 2020-11-13 RX ADMIN — Medication 25 MILLIGRAM(S): at 22:25

## 2020-11-13 RX ADMIN — Medication 3: at 22:26

## 2020-11-13 RX ADMIN — Medication 400 MILLIGRAM(S): at 19:09

## 2020-11-13 RX ADMIN — Medication 10 MILLIGRAM(S): at 18:21

## 2020-11-13 RX ADMIN — Medication 8 UNIT(S): at 18:02

## 2020-11-13 RX ADMIN — INSULIN GLARGINE 30 UNIT(S): 100 INJECTION, SOLUTION SUBCUTANEOUS at 22:18

## 2020-11-13 RX ADMIN — Medication 3: at 18:02

## 2020-11-13 RX ADMIN — Medication 650 MILLIGRAM(S): at 23:34

## 2020-11-13 RX ADMIN — Medication 30 MILLIGRAM(S): at 18:22

## 2020-11-13 RX ADMIN — Medication 30 MILLIGRAM(S): at 08:30

## 2020-11-13 RX ADMIN — HEPARIN SODIUM 5000 UNIT(S): 5000 INJECTION INTRAVENOUS; SUBCUTANEOUS at 22:26

## 2020-11-13 RX ADMIN — Medication 25 MILLIGRAM(S): at 16:53

## 2020-11-13 RX ADMIN — Medication 25 MILLIGRAM(S): at 18:22

## 2020-11-13 RX ADMIN — Medication 40 MILLIGRAM(S): at 11:19

## 2020-11-13 RX ADMIN — Medication 30 MILLIGRAM(S): at 09:30

## 2020-11-13 RX ADMIN — Medication 1000 MILLIGRAM(S): at 22:14

## 2020-11-13 RX ADMIN — Medication 25 MILLIGRAM(S): at 22:26

## 2020-11-13 RX ADMIN — ATORVASTATIN CALCIUM 20 MILLIGRAM(S): 80 TABLET, FILM COATED ORAL at 22:26

## 2020-11-13 RX ADMIN — Medication 10 MILLIGRAM(S): at 18:22

## 2020-11-13 RX ADMIN — ISOSORBIDE MONONITRATE 30 MILLIGRAM(S): 60 TABLET, EXTENDED RELEASE ORAL at 16:53

## 2020-11-13 RX ADMIN — Medication 25 MILLIGRAM(S): at 08:30

## 2020-11-13 NOTE — H&P ADULT - HISTORY OF PRESENT ILLNESS
Patient is a 55 year old male from home AAO x3, with PMH of DM, HTN, CAD s/p CABG 2005, HLD, CVA and HFrEF, who presented to the ED due to dizziness and shortness of breath. Patient is a 55 year old male from home AAO x3, with PMH of DM, HTN, CAD s/p CABG 2005, HLD, CVA and HFrEF, who presented to the ED due to dizziness and shortness of breath. Patient states that he has been having difficulty sleeping at night for the past 4-5 days due to having to wake up multiple times due to shortness of breath. Patient also states that he has associated headache as well. States that these symptoms were not present prior to the past week. Patient states that when he walks outside, he has to stop due to developing shortness of breath. Patient also complains of dizziness and room spinning sensation mainly when he turns his head to the left or right but symptoms have improved at this time. Currently, patient denies any chest pain, nausea, vomiting, diarrhea, constipation or abdominal pain. Patient is a 55 year old male from home AAO x3, with PMH of DM, HTN, CAD s/p CABG 2005, HLD, CVA and HFrEF, who presented to the ED due to dizziness and shortness of breath. Patient states that he has been having difficulty sleeping at night for the past 4-5 days due to having to wake up multiple times due to shortness of breath. Patient also states that he has associated headache as well. States that these symptoms were not present prior to the past week. Patient states that when he walks outside, he has to stop due to developing shortness of breath. Patient also complains of dizziness and room spinning sensation mainly when he turns his head to the left or right but symptoms have improved at this time. Currently, patient denies any chest pain, nausea, vomiting, diarrhea, constipation or abdominal pain.   Of note, patient had recent cardiac cath done last month at [facility].

## 2020-11-13 NOTE — H&P ADULT - PROBLEM SELECTOR PROBLEM 7
Coronary artery disease involving native heart without angina pectoris, unspecified vessel or lesion type Diabetes mellitus of other type without complication

## 2020-11-13 NOTE — H&P ADULT - PROBLEM SELECTOR PROBLEM 8
Need for prophylactic measure Coronary artery disease involving native heart without angina pectoris, unspecified vessel or lesion type

## 2020-11-13 NOTE — H&P ADULT - NSICDXPASTSURGICALHX_GEN_ALL_CORE_FT
PAST SURGICAL HISTORY:  History of appendectomy     History of loop recorder     S/P CABG x 5 2005 at [facility]    S/P coronary artery stent placement

## 2020-11-13 NOTE — H&P ADULT - NSHPSOCIALHISTORY_GEN_ALL_CORE
Patient lives at home. Denies any alcohol or use of illicit drugs. Patient states he is cutting back on smoking and is currently smoking 1/2 PPD.

## 2020-11-13 NOTE — H&P ADULT - PROBLEM SELECTOR PROBLEM 6
Diabetes mellitus of other type without complication Hyperlipidemia, unspecified hyperlipidemia type

## 2020-11-13 NOTE — ED PROVIDER NOTE - CARE PLAN
Principal Discharge DX:	Congestive heart failure, NYHA class 3, chronic, combined  Secondary Diagnosis:	Dizziness

## 2020-11-13 NOTE — H&P ADULT - PROBLEM SELECTOR PLAN 1
patient presented due to shortness of breath on exertion and difficulty sleeping due to waking up SOB  may be from CHF exacerbation from noncompliance vs undiagnosed ANTONIA   given dose of lasix 40mg in ED  continue with IV lasix   strict I/O   monitor daily weight  Cardio, Dr. Wall   monitor on tele for now; no need to do ECHO at this time as per cardio   aspirin, statin  troponin elevated at 0.428; denies chest pain; no EKG changes; likely demand; trend troponins  continue home med of metoprolol and aldactone

## 2020-11-13 NOTE — H&P ADULT - PROBLEM SELECTOR PLAN 8
patient has history of CABG in 2005  continue aspirin and statin   patient states he gets headaches on plavix   cardio Dr. Wall

## 2020-11-13 NOTE — H&P ADULT - PROBLEM SELECTOR PLAN 5
continue home med of metoprolol and aldactone  hold lisinopril in setting of MALCOM  monitor BP and adjust meds as needed

## 2020-11-13 NOTE — H&P ADULT - PROBLEM SELECTOR PLAN 7
patient states he is on basaglar 43 units at bedtime and admelog 15 units TID with meals at home  will start patient on lantus 30 units bedtime, admelog 8 units TID along with SSI for now  A1c of 9.0 in October  monitor blood sugars and adjust insulin as needed

## 2020-11-13 NOTE — H&P ADULT - PROBLEM SELECTOR PLAN 2
patient also complaining of dizziness mainly when turning head left or right  given dose of meclizine in ED  states symptoms slightly better now  CT head showing no acute changes; chronic ischemic changes noted  neuro consult  meclizine PRN

## 2020-11-13 NOTE — ED PROVIDER NOTE - OBJECTIVE STATEMENT
54 y/o male with PMHx of DM, HTN, CAD, HLD, CVA, CHF presents with feeling of dizziness and headache for past 3 days. Pt denies fever, chest pain, neck pain, or shortness of breath.  pt denies recent URI, no ear ringing.  pt says the headache usually occurs in the middle of the night, and keeps the patient up so he  can't sleep.  pt lives alone, and usually ambulates with a cane.

## 2020-11-13 NOTE — H&P ADULT - ATTENDING COMMENTS
Patient is a 55 year old male from home AAO x3, with PMH of DM, HTN, CAD s/p CABG 2005, HLD, CVA and HFrEF, who presented to the ED due to dizziness and shortness of breath. Patient states that he has been having difficulty sleeping at night for the past 4-5 days due to having to wake up multiple times due to shortness of breath. Patient also states that he has associated headache as well. States that these symptoms were not present prior to the past week. Patient states that when he walks outside, he has to stop due to developing shortness of breath. Patient also complains of dizziness and room spinning sensation mainly when he turns his head to the left or right but symptoms have improved at this time. Currently, patient denies any chest pain, nausea, vomiting, diarrhea, constipation or abdominal pain.   Of note, patient had recent cardiac cath done last month at [facility].         assessment   --- sob 2nd to chf exacerb vs florencio,  r/o acs, r/o copd, luanne h/o DM, HTN, CAD s/p CABG 2005, HLD, CVA and HFrEF, active smoker    plan  --  adm to tele, acs protocol, lopressor, aspirin, statin, lasix, no ace-i 2nd to luanne, supplemental O2, bronchodilator, hss cont preadmit home meds, gi and dvt profilaxis    cbc, bmp, mg, phos, lipid, tsh, ce q8 x3 [id number]    interrogate loop recorder    cardio cons  pulm cons   endo cons    advised smoking cesation

## 2020-11-13 NOTE — ED PROVIDER NOTE - PROGRESS NOTE DETAILS
CXR with some vascular congestion.  pro-bnp elevated.  trop positive.    Pt reassessed, will admit. Dr. Garrett is covering Dr. Ch, will admit to Gerry's service.

## 2020-11-13 NOTE — CONSULT NOTE ADULT - ASSESSMENT
55 year old male from home AAO x3, with PMH of DM, HTN, CAD s/p CABG 2005, HLD, CVA,s/p ILR and HFrEF, who presented to the ED due to dizziness and shortness of breath,acute on chronic systolic HF and MALCOM.  1.Tele monitoring.  2.Interrogate ILR.  3.MALCOM-no ace and aldactone, add Imdur and hydralazine.  4.Acute on chronic systolic HF-Lasix 40mg iv qd, cont b blocker,add Imdur,hydralazine.  5.CAD-asa,b blocker,statin.  6.CVA-asa,statin.  7.DM-Insulin.  8.Lipid d/o-statin.  9.GI and DVT prophylaxis.

## 2020-11-13 NOTE — ED PROVIDER NOTE - CLINICAL SUMMARY MEDICAL DECISION MAKING FREE TEXT BOX
pt presents with headache and dizziness for past 3 days.  Denies fever, cough, chest pain, or shortness of breath.  Will check labs, head CT, supportive care, and reassess.

## 2020-11-13 NOTE — ED ADULT NURSE NOTE - CHPI ED NUR SYMPTOMS NEG
no vomiting/no fever/no nausea/no numbness/no weakness/no blurred vision/no loss of consciousness/no confusion

## 2020-11-13 NOTE — H&P ADULT - PROBLEM SELECTOR PLAN 4
patient states that he awakens from sleep gasping for air for the past 4-5 days   patient may likley have ANTONIA   sleep study as outpatient

## 2020-11-13 NOTE — H&P ADULT - ASSESSMENT
Patient is a 55 year old male from home AAO x3, with PMH of DM, HTN, CAD s/p CABG 2005, HLD, CVA and HFrEF, who presented to the ED due to dizziness and shortness of breath. Patient is a 55 year old male from home AAO x3, with PMH of DM, HTN, CAD s/p CABG 2005, HLD, CVA and HFrEF, who presented to the ED due to dizziness and shortness of breath.     Troponin of 0.428. BNP of 2400. Creatinine of 1.63. EKG unchanged from prior EKG last month. CT head done showing no acute changes. Extensive chronic ischemic changes. CXR showing some mild pulmonary vascular congestion. Loop recorder noted on CXR. Given dose of aspirin, lasix 40mg IV, toradol and meclizine in ED.

## 2020-11-13 NOTE — CONSULT NOTE ADULT - SUBJECTIVE AND OBJECTIVE BOX
CHIEF COMPLAINT:Patient is a 55y old  Male who presents with a chief complaint of dizziness, sob.      HPI:  Patient is a 55 year old male from home AAO x3, with PMH of DM, HTN, CAD s/p CABG 2005, HLD, CVA and HFrEF, who presented to the ED due to dizziness and shortness of breath. Patient states that he has been having difficulty sleeping at night for the past 4-5 days due to having to wake up multiple times due to shortness of breath. Patient also states that he has associated headache as well. States that these symptoms were not present prior to the past week. Patient states that when he walks outside, he has to stop due to developing shortness of breath. Patient also complains of dizziness and room spinning sensation mainly when he turns his head to the left or right but symptoms have improved at this time. Currently, patient denies any chest pain, nausea, vomiting, diarrhea, constipation or abdominal pain.   Of note, patient had recent cardiac cath done last month at [facility] and was to follow up fpr AICD placement.  (13 Nov 2020 13:13)      PAST MEDICAL & SURGICAL HISTORY:  Cerebrovascular accident (CVA), unspecified mechanism    Congestive heart failure, NYHA class 3, chronic, combined    Cerebrovascular accident (CVA), unspecified mechanism    Hyperlipidemia, unspecified hyperlipidemia type    Coronary artery disease involving native heart without angina pectoris, unspecified vessel or lesion type    Hypertension, unspecified type    Diabetes mellitus of other type without complication    S/P CABG x 5  2005 at [facility]    History of loop recorder    S/P coronary artery stent placement    History of appendectomy        MEDICATIONS  (STANDING):  aspirin  chewable 81 milliGRAM(s) Oral daily  atorvastatin 20 milliGRAM(s) Oral at bedtime  heparin   Injectable 5000 Unit(s) SubCutaneous every 8 hours  insulin glargine Injectable (LANTUS) 30 Unit(s) SubCutaneous at bedtime  insulin lispro (ADMELOG) corrective regimen sliding scale   SubCutaneous Before meals and at bedtime  insulin lispro Injectable (ADMELOG) 8 Unit(s) SubCutaneous three times a day with meals  metoprolol tartrate 25 milliGRAM(s) Oral three times a day  spironolactone 25 milliGRAM(s) Oral daily    MEDICATIONS  (PRN):  acetaminophen   Tablet .. 650 milliGRAM(s) Oral every 6 hours PRN Temp greater or equal to 38C (100.4F), Moderate Pain (4 - 6)  meclizine 25 milliGRAM(s) Oral three times a day PRN Dizziness      FAMILY HISTORY:  FH: CAD (coronary artery disease)        SOCIAL HISTORY:    [x ] Non-smoker    [x ] Alcohol=denies    Allergies    No Known Allergies    Intolerances    Plavix (Headache)  	    REVIEW OF SYSTEMS:  CONSTITUTIONAL: No fever, weight loss, or fatigue  EYES: No eye pain, visual disturbances, or discharge  ENT:  No difficulty hearing, tinnitus, vertigo; No sinus or throat pain  NECK: No pain or stiffness  RESPIRATORY: No cough, wheezing, chills or hemoptysis; +Shortness of Breath  CARDIOVASCULAR: No chest pain, palpitations, passing out, +dizziness  GASTROINTESTINAL: No abdominal or epigastric pain. No nausea, vomiting, or hematemesis; No diarrhea or constipation. No melena or hematochezia.  GENITOURINARY: No dysuria, frequency, hematuria, or incontinence  NEUROLOGICAL: No headaches, memory loss, loss of strength, numbness, or tremors  SKIN: No itching, burning, rashes, or lesions   LYMPH Nodes: No enlarged glands  ENDOCRINE: No heat or cold intolerance; No hair loss  MUSCULOSKELETAL: No joint pain or swelling; No muscle, back, or extremity pain  PSYCHIATRIC: No depression, anxiety, mood swings, or difficulty sleeping  HEME/LYMPH: No easy bruising, or bleeding gums  ALLERGY AND IMMUNOLOGIC: No hives or eczema	      PHYSICAL EXAM:  T(C): 36.2 (11-13-20 @ 11:00), Max: 36.8 (11-13-20 @ 07:29)  HR: 75 (11-13-20 @ 11:00) (75 - 79)  BP: 164/95 (11-13-20 @ 11:00) (164/95 - 172/92)  RR: 19 (11-13-20 @ 11:00) (18 - 19)  SpO2: 100% (11-13-20 @ 11:00) (95% - 100%)  Wt(kg): --  I&O's Summary      Appearance: Normal	  HEENT:   Normal oral mucosa, PERRL, EOMI	  Lymphatic: No lymphadenopathy  Cardiovascular: Normal S1 S2, + JVD  Respiratory: Dec BS at bases  Psychiatry: A & O x 3, Mood & affect appropriate  Gastrointestinal:  Soft, Non-tender, + BS	  Skin: No rashes, No ecchymoses, No cyanosis	  Neurologic: Non-focal  Extremities: Normal range of motion, No clubbing, cyanosis or edema  Vascular: Peripheral pulses palpable 2+ bilaterally    	    ECG:    Normal sinus rhythm  Possible Left atrial enlargement  Left axis deviation  Right bundle branch block  	  LABS:	 	    CARDIAC MARKERS:  CARDIAC MARKERS ( 13 Nov 2020 08:38 )  0.428 ng/mL / x     / 210 U/L / x     / 3.1 ng/mL                              13.0   5.35  )-----------( 189      ( 13 Nov 2020 08:38 )             40.5     11-13    139  |  104  |  21<H>  ----------------------------<  320<H>  4.1   |  29  |  1.63<H>    Ca    8.4      13 Nov 2020 08:38    TPro  8.0  /  Alb  3.0<L>  /  TBili  0.4  /  DBili  x   /  AST  26  /  ALT  31  /  AlkPhos  123<H>  11-13    proBNP: Serum Pro-Brain Natriuretic Peptide: 2400 pg/mL (11-13 @ 08:38)     Transthoracic Echocardiogram (07.22.20 @ 07:26) >  OBSERVATIONS:  Mitral Valve: Normal mitral valve. Mild mitral  regurgitation.  Aortic Root: Normal aortic root.  Aortic Valve: Calcified trileaflet aortic valve with normal  opening.  Left Atrium: Mildly dilated left atrium.  LA volume index =  40 cc/m2.  Left Ventricle: Moderate segmental left ventricular  systolic dysfunction.  The inferior and  inferolateral  walls are akinetic.  Septal motion consistent with a  conduction defect.   Moderate diastolic dysfunction (stage  II). Mild concentric left ventricular hypertrophy.  Right Heart: Linear structure compatible with Chiari  network identified in the right atrium.  This is a normal  variant. Normal right ventricular size and function. There  is moderate tricuspid regurgitation. There is mild pulmonic  regurgitation.  Pericardium/PleuraNormal pericardium with no pericardial  effusion.  Hemodynamic: RA Pressure is 8 mm Hg. RV systolic pressure  is 29 mm Hg.      milena< from: Nuclear Stress Test-Pharmacologic (09.25.20 @ 09:46) >  IMPRESSIONS:Abnormal Study  * Negative ECG evidence of ischemia after IV of Lexiscan.  * Review of raw data shows: The study is of good technical  quality.  * The left ventricle was markely dilated LV at baseline.  There are small, severe defects in apical, basal  infero-lateral and inferior  walls that are reversible  consistent with ischemia.  * Dilated LV with global hypokinesis EF=24%.    ------------------------------------------------------------------------      ------------------------------------------------------------------------    Confirmed on  9/25/2020 - 14:02:42 at  by Kallie Wall MD  ------------------------------------------------------------------------    cath< from: Cardiac Cath Lab - Adult (10.09.20 @ 13:16) >  Martha Pimentel M.D.  Fellow:  HÉCTOR Cottrell M.D.  Referring Physician:  Kallie Wall M.D.  INDICATIONS: Abnormal stress test.  HISTORY: The patient has hypertension and medication-treated dyslipidemia.  PROCEDURE:  --  Right heart catheterization.  --  Left heart catheterization.  --  Left coronary angiography.  --  Right coronary angiography.  --  LIMA graft angiography.  --  Ascending aortography.  --  Sonosite - Diagnostic.  --  Hemostasis with Angioseal.  TECHNIQUE: Oxygen 2 L/min. The risks and alternatives of the procedures and  conscious sedation were explained to the patient and informed consent was  obtained. Cardiac catheterization performed electively.  Localanesthetic given. Right femoral artery access. Local anesthetic  given. A 4fr Sheath Shiloh was inserted in the vessel, utilizing the  modified Seldinger technique. Right femoral vein access. Local anesthetic  given. A 5fr Sheath Shiloh was inserted in the vessel, utilizing the  modified Seldinger technique. Right heart catheterization. The procedure  was performed utilizing a 4 Fr Mod Pig Angled 5 Sideholes catheter under  fluoroscopic guidance. Measurements of pressures, arterial and venous  oxygen saturation, and cardiac output (by Desi using assumed VO2) were  obtained. The catheter was removed. Left heart catheterization. A 4 Fr JR  4.0 catheter was utilized. After recording ascending aortic pressure, the  catheter was advanced across the aortic valve and left ventricular  pressure was recorded. Left coronary artery angiography. The vessel was  injected utilizing a 4 Fr JL 4.0 catheter and positioned in the vessel  ostia under fluoroscopic guidance. Angiography was performed in multiple  projections using power injection of contrast. Right coronary artery  angiography. The vessel was injected utilizing a 4 Fr JR 4.0 catheter and  positioned in the vessel ostia under fluoroscopic guidance. Angiography  was performed in multiple projections using power injection of contrast.  Left internal mammary graft angiography. The vessel was injected utilizing  a 4 Fr IM catheter and positioned in the vessel ostia under fluoroscopic  guidance. Angiography was performed in multiple projections using power  injection of contrast. Ascending aortography. A 4 Fr Mod Pig Angled 5  Sideholes catheter was placed and 20 ml contrast was injected. Imaging was  performed using an Kiswahili projection. Sonosite - Diagnostic. Hemostasis with  Angioseal. RADIATION EXPOSURE: 10.9 min.  CONTRAST GIVEN: 103 ml Optiray.  MEDICATIONS GIVEN: Fentanyl, 50 mcg, IV. Midazolam, 1 mg, IV.  Nitroglycerin, 400 mcg, intracoronary. Hydralazine (Apresoline), 10 mg,  IV. Nitroglycerin, 400 mcg, intracoronary. 2% Lidocaine, 10 ml,  subcutaneously. Ancef, 1 g, IV. Lasix (Furosemide), 40 mg, IV. 0.9% Normal  saline, 50 ml, IV.  VENTRICLES: No left ventriculogram was performed.  CORONARY VESSELS: The coronary circulation is right dominant.  LM:   --  LM: The vessel was medium sized. Angiography showed moderate  atherosclerosis.  LAD:   --  Ostial LAD: There was a 100 % stenosis. This lesion is a chronic  total occlusion.  CX:   --  Ostial circumflex: There was a 100 % stenosis. This lesion is a  chronic total occlusion.  RCA:   --  Proximal RCA: There was a 100 % stenosis. This lesion is a  chronic total occlusion.  GRAFTS:   --  Graft to the mid LAD: The graft was a medium sized LIMA.  Distal vessel angiography showed a medium sized vessel and mild diffuse  disease.  --  Graft to the 1st diagonal: The graft was a medium sized saphenous vein  graft from the ascending aorta. Graft angiography showed mild  degeneration. Distal vessel angiography showed a small to medium sized  vessel and moderate diffusedisease.  --  Graft to the RPDA: The graft was a medium sized saphenous vein graft  from the ascending aorta. Graft angiography showed mild degeneration.  Distal vessel angiography showed mild diffuse disease. Distal RCA supplies  collaterals to LCx.  COMPLICATIONS: There were no complications.  DIAGNOSTIC IMPRESSIONS: Patent LIMA to LAD  Patent SVG to RPDA(distal RCA supplies god collaterals to LCX)  Patent SVG to D-1  Severely elevated Blood Pressure, Elevated LVEP  DIAGNOSTIC RECOMMENDATIONS: Optimize medical therapy for Heart failure and  Hypertension (patient admits not being compliant with meds)  INTERVENTIONAL IMPRESSIONS: Patent LIMA to LAD  Patent SVG to RPDA(distal RCA supplies god collaterals to LCX)  Patent SVG to D-1  Severely elevated Blood Pressure, Elevated LVEP  INTERVENTIONAL RECOMMENDATIONS: Optimize medical therapy for Heart failure  and Hypertension (patient admits not being compliant with meds)

## 2020-11-13 NOTE — H&P ADULT - PROBLEM SELECTOR PLAN 3
noted to have creatinine of 1.63 on admission   baseline appears to be normal  f/u urine lytes  will hold off on IV fluids for now  monitor BMP in AM

## 2020-11-14 DIAGNOSIS — I50.42 CHRONIC COMBINED SYSTOLIC (CONGESTIVE) AND DIASTOLIC (CONGESTIVE) HEART FAILURE: ICD-10-CM

## 2020-11-14 DIAGNOSIS — R06.02 SHORTNESS OF BREATH: ICD-10-CM

## 2020-11-14 LAB
24R-OH-CALCIDIOL SERPL-MCNC: 8.4 NG/ML — LOW (ref 30–80)
ANION GAP SERPL CALC-SCNC: 5 MMOL/L — SIGNIFICANT CHANGE UP (ref 5–17)
APTT BLD: 30.2 SEC — SIGNIFICANT CHANGE UP (ref 27.5–35.5)
BUN SERPL-MCNC: 25 MG/DL — HIGH (ref 7–18)
CALCIUM SERPL-MCNC: 8.3 MG/DL — LOW (ref 8.4–10.5)
CHLORIDE SERPL-SCNC: 106 MMOL/L — SIGNIFICANT CHANGE UP (ref 96–108)
CHOLEST SERPL-MCNC: <50 MG/DL — SIGNIFICANT CHANGE UP
CO2 SERPL-SCNC: 29 MMOL/L — SIGNIFICANT CHANGE UP (ref 22–31)
CREAT SERPL-MCNC: 1.54 MG/DL — HIGH (ref 0.5–1.3)
GLUCOSE BLDC GLUCOMTR-MCNC: 140 MG/DL — HIGH (ref 70–99)
GLUCOSE BLDC GLUCOMTR-MCNC: 180 MG/DL — HIGH (ref 70–99)
GLUCOSE BLDC GLUCOMTR-MCNC: 191 MG/DL — HIGH (ref 70–99)
GLUCOSE BLDC GLUCOMTR-MCNC: 212 MG/DL — HIGH (ref 70–99)
GLUCOSE BLDC GLUCOMTR-MCNC: 269 MG/DL — HIGH (ref 70–99)
GLUCOSE SERPL-MCNC: 185 MG/DL — HIGH (ref 70–99)
HCT VFR BLD CALC: 38.5 % — LOW (ref 39–50)
HDLC SERPL-MCNC: 35 MG/DL — LOW
HGB BLD-MCNC: 12.6 G/DL — LOW (ref 13–17)
INR BLD: 1.06 RATIO — SIGNIFICANT CHANGE UP (ref 0.88–1.16)
LIPID PNL WITH DIRECT LDL SERPL: <0 MG/DL — SIGNIFICANT CHANGE UP
MAGNESIUM SERPL-MCNC: 2 MG/DL — SIGNIFICANT CHANGE UP (ref 1.6–2.6)
MCHC RBC-ENTMCNC: 29.5 PG — SIGNIFICANT CHANGE UP (ref 27–34)
MCHC RBC-ENTMCNC: 32.7 GM/DL — SIGNIFICANT CHANGE UP (ref 32–36)
MCV RBC AUTO: 90.2 FL — SIGNIFICANT CHANGE UP (ref 80–100)
NON HDL CHOLESTEROL: <15 MG/DL — SIGNIFICANT CHANGE UP
NRBC # BLD: 0 /100 WBCS — SIGNIFICANT CHANGE UP (ref 0–0)
PHOSPHATE SERPL-MCNC: 3.5 MG/DL — SIGNIFICANT CHANGE UP (ref 2.5–4.5)
PLATELET # BLD AUTO: 191 K/UL — SIGNIFICANT CHANGE UP (ref 150–400)
POTASSIUM SERPL-MCNC: 3.8 MMOL/L — SIGNIFICANT CHANGE UP (ref 3.5–5.3)
POTASSIUM SERPL-SCNC: 3.8 MMOL/L — SIGNIFICANT CHANGE UP (ref 3.5–5.3)
PROTHROM AB SERPL-ACNC: 12.6 SEC — SIGNIFICANT CHANGE UP (ref 10.6–13.6)
RBC # BLD: 4.27 M/UL — SIGNIFICANT CHANGE UP (ref 4.2–5.8)
RBC # FLD: 14.2 % — SIGNIFICANT CHANGE UP (ref 10.3–14.5)
SARS-COV-2 IGG SERPL QL IA: NEGATIVE — SIGNIFICANT CHANGE UP
SARS-COV-2 IGM SERPL IA-ACNC: <0.1 INDEX — SIGNIFICANT CHANGE UP
SODIUM SERPL-SCNC: 140 MMOL/L — SIGNIFICANT CHANGE UP (ref 135–145)
TRIGL SERPL-MCNC: 76 MG/DL — SIGNIFICANT CHANGE UP
TSH SERPL-MCNC: 1.2 UU/ML — SIGNIFICANT CHANGE UP (ref 0.34–4.82)
VIT B12 SERPL-MCNC: 365 PG/ML — SIGNIFICANT CHANGE UP (ref 232–1245)
WBC # BLD: 4.6 K/UL — SIGNIFICANT CHANGE UP (ref 3.8–10.5)
WBC # FLD AUTO: 4.6 K/UL — SIGNIFICANT CHANGE UP (ref 3.8–10.5)

## 2020-11-14 PROCEDURE — 93880 EXTRACRANIAL BILAT STUDY: CPT | Mod: 26

## 2020-11-14 RX ORDER — INSULIN LISPRO 100/ML
10 VIAL (ML) SUBCUTANEOUS
Refills: 0 | Status: DISCONTINUED | OUTPATIENT
Start: 2020-11-14 | End: 2020-11-16

## 2020-11-14 RX ORDER — LANOLIN ALCOHOL/MO/W.PET/CERES
5 CREAM (GRAM) TOPICAL AT BEDTIME
Refills: 0 | Status: COMPLETED | OUTPATIENT
Start: 2020-11-14 | End: 2020-11-14

## 2020-11-14 RX ORDER — LANOLIN ALCOHOL/MO/W.PET/CERES
5 CREAM (GRAM) TOPICAL AT BEDTIME
Refills: 0 | Status: DISCONTINUED | OUTPATIENT
Start: 2020-11-14 | End: 2020-11-16

## 2020-11-14 RX ADMIN — ISOSORBIDE MONONITRATE 30 MILLIGRAM(S): 60 TABLET, EXTENDED RELEASE ORAL at 13:29

## 2020-11-14 RX ADMIN — Medication 1: at 13:28

## 2020-11-14 RX ADMIN — Medication 650 MILLIGRAM(S): at 18:05

## 2020-11-14 RX ADMIN — Medication 650 MILLIGRAM(S): at 00:25

## 2020-11-14 RX ADMIN — Medication 10 UNIT(S): at 18:05

## 2020-11-14 RX ADMIN — ATORVASTATIN CALCIUM 20 MILLIGRAM(S): 80 TABLET, FILM COATED ORAL at 21:46

## 2020-11-14 RX ADMIN — INSULIN GLARGINE 30 UNIT(S): 100 INJECTION, SOLUTION SUBCUTANEOUS at 21:47

## 2020-11-14 RX ADMIN — Medication 25 MILLIGRAM(S): at 05:36

## 2020-11-14 RX ADMIN — Medication 25 MILLIGRAM(S): at 13:28

## 2020-11-14 RX ADMIN — HEPARIN SODIUM 5000 UNIT(S): 5000 INJECTION INTRAVENOUS; SUBCUTANEOUS at 21:46

## 2020-11-14 RX ADMIN — HEPARIN SODIUM 5000 UNIT(S): 5000 INJECTION INTRAVENOUS; SUBCUTANEOUS at 05:37

## 2020-11-14 RX ADMIN — Medication 25 MILLIGRAM(S): at 21:46

## 2020-11-14 RX ADMIN — Medication 81 MILLIGRAM(S): at 13:28

## 2020-11-14 RX ADMIN — Medication 650 MILLIGRAM(S): at 18:35

## 2020-11-14 RX ADMIN — HEPARIN SODIUM 5000 UNIT(S): 5000 INJECTION INTRAVENOUS; SUBCUTANEOUS at 13:29

## 2020-11-14 RX ADMIN — Medication 1: at 21:47

## 2020-11-14 RX ADMIN — PANTOPRAZOLE SODIUM 40 MILLIGRAM(S): 20 TABLET, DELAYED RELEASE ORAL at 13:28

## 2020-11-14 RX ADMIN — Medication 10 UNIT(S): at 13:28

## 2020-11-14 RX ADMIN — SPIRONOLACTONE 25 MILLIGRAM(S): 25 TABLET, FILM COATED ORAL at 05:36

## 2020-11-14 RX ADMIN — Medication 40 MILLIGRAM(S): at 05:36

## 2020-11-14 RX ADMIN — Medication 5 MILLIGRAM(S): at 21:46

## 2020-11-14 RX ADMIN — Medication 3: at 09:13

## 2020-11-14 RX ADMIN — Medication 5 MILLIGRAM(S): at 03:49

## 2020-11-14 RX ADMIN — Medication 8 UNIT(S): at 09:12

## 2020-11-14 NOTE — PROGRESS NOTE ADULT - ASSESSMENT
55 year old male from home AAO x3, with PMH of DM, HTN, CAD s/p CABG 2005, HLD, CVA,s/p ILR and HFrEF, who presented to the ED due to dizziness and shortness of breath,acute on chronic systolic HF and MALCOM.  1.Tele monitoring.  2.Interrogate ILR.  3.MALCOM-no ace and aldactone,  Imdur and hydralazine.  4.Acute on chronic systolic HF-Lasix 40mg iv qd, b blocker,Imdur,hydralazine.  5.CAD-asa,b blocker,statin.  6.CVA-asa,statin.  7.DM-Insulin.  8.Lipid d/o-statin.  9.GI and DVT prophylaxis. 55 year old male from home AAO x3, with PMH of DM, HTN, CAD s/p CABG 2005, HLD, CVA,s/p ILR and HFrEF, who presented to the ED due to dizziness and shortness of breath,acute on chronic systolic HF and MALCOM.  1.Tele monitoring.  2.Interrogate ILR.  3.MALCOM-no ace and aldactone,  Imdur and hydralazine.Renal eval called.  4.Acute on chronic systolic HF-Lasix 40mg iv qd, b blocker,Imdur,hydralazine.  5.CAD-asa,b blocker,statin.  6.CVA-asa,statin.  7.DM-Insulin.  8.Lipid d/o-statin.  9.GI and DVT prophylaxis.  10.Sleep study as outpatient-R/O ANTONIA.

## 2020-11-14 NOTE — PROGRESS NOTE ADULT - SUBJECTIVE AND OBJECTIVE BOX
CHIEF COMPLAINT:Patient is a 55y old  Male who presents with a chief complaint of dizziness, SOB.Pt feeling better.    	  REVIEW OF SYSTEMS:  CONSTITUTIONAL: No fever, weight loss, or fatigue  EYES: No eye pain, visual disturbances, or discharge  ENT:  No difficulty hearing, tinnitus, vertigo; No sinus or throat pain  NECK: No pain or stiffness  RESPIRATORY: No cough, wheezing, chills or hemoptysis; No Shortness of Breath  CARDIOVASCULAR: No chest pain, palpitations, passing out, dizziness, or leg swelling  GASTROINTESTINAL: No abdominal or epigastric pain. No nausea, vomiting, or hematemesis; No diarrhea or constipation. No melena or hematochezia.  GENITOURINARY: No dysuria, frequency, hematuria, or incontinence  NEUROLOGICAL: No headaches, memory loss, loss of strength, numbness, or tremors  SKIN: No itching, burning, rashes, or lesions   LYMPH Nodes: No enlarged glands  ENDOCRINE: No heat or cold intolerance; No hair loss  MUSCULOSKELETAL: No joint pain or swelling; No muscle, back, or extremity pain  PSYCHIATRIC: No depression, anxiety, mood swings, or difficulty sleeping  HEME/LYMPH: No easy bruising, or bleeding gums  ALLERGY AND IMMUNOLOGIC: No hives or eczema	        PHYSICAL EXAM:  T(C): 36.9 (11-14-20 @ 07:44), Max: 36.9 (11-13-20 @ 15:00)  HR: 70 (11-13-20 @ 23:20) (70 - 91)  BP: 133/74 (11-13-20 @ 23:20) (133/74 - 201/108)  RR: 20 (11-14-20 @ 07:44) (18 - 22)  SpO2: 98% (11-14-20 @ 07:44) (98% - 100%)  Wt(kg): --  I&O's Summary    14 Nov 2020 07:01  -  14 Nov 2020 09:53  --------------------------------------------------------  IN: 200 mL / OUT: 0 mL / NET: 200 mL        Appearance: Normal	  HEENT:   Normal oral mucosa, PERRL, EOMI	  Lymphatic: No lymphadenopathy  Cardiovascular: Normal S1 S2, No JVD, No murmurs, No edema  Respiratory: Lungs clear to auscultation	  Psychiatry: A & O x 3, Mood & affect appropriate  Gastrointestinal:  Soft, Non-tender, + BS	  Skin: No rashes, No ecchymoses, No cyanosis	  Neurologic: Non-focal  Extremities: Normal range of motion, No clubbing, cyanosis or edema  Vascular: Peripheral pulses palpable 2+ bilaterally    MEDICATIONS  (STANDING):  aspirin  chewable 81 milliGRAM(s) Oral daily  atorvastatin 20 milliGRAM(s) Oral at bedtime  furosemide   Injectable 40 milliGRAM(s) IV Push daily  heparin   Injectable 5000 Unit(s) SubCutaneous every 8 hours  hydrALAZINE 25 milliGRAM(s) Oral three times a day  insulin glargine Injectable (LANTUS) 30 Unit(s) SubCutaneous at bedtime  insulin lispro (ADMELOG) corrective regimen sliding scale   SubCutaneous Before meals and at bedtime  insulin lispro Injectable (ADMELOG) 10 Unit(s) SubCutaneous three times a day before meals  isosorbide   mononitrate ER Tablet (IMDUR) 30 milliGRAM(s) Oral daily  metoprolol tartrate 25 milliGRAM(s) Oral three times a day  pantoprazole  Injectable 40 milliGRAM(s) IV Push daily      TELEMETRY: nsr,lbbb	    	  	  LABS:	 	      CARDIAC MARKERS ( 13 Nov 2020 20:45 )  0.367 ng/mL / x     / x     / x     / x      CARDIAC MARKERS ( 13 Nov 2020 08:38 )  0.428 ng/mL / x     / 210 U/L / x     / 3.1 ng/mL                                12.6   4.60  )-----------( 191      ( 14 Nov 2020 08:40 )             38.5     11-14    140  |  106  |  25<H>  ----------------------------<  185<H>  3.8   |  29  |  1.54<H>    Ca    8.3<L>      14 Nov 2020 08:40  Phos  3.5     11-14  Mg     2.0     11-14    TPro  8.0  /  Alb  3.0<L>  /  TBili  0.4  /  DBili  x   /  AST  26  /  ALT  31  /  AlkPhos  123<H>  11-13    proBNP: Serum Pro-Brain Natriuretic Peptide: 2400 pg/mL (11-13 @ 08:38)    Lipid Profile: Cholesterol <50  LDL --  HDL 35  TG 76      TSH: Thyroid Stimulating Hormone, Serum: 1.20 uU/mL (11-14 @ 08:40)

## 2020-11-14 NOTE — PROGRESS NOTE ADULT - PROBLEM SELECTOR PLAN 1
SOB 2/2 CHF exacerbation from noncompliance vs undiagnosed ANTONIA   Troponins downtrended, no EKG changes  C/w 40mg IV lasix   Interrogate loop recorder  strict I/O   monitor daily weight  no need to do ECHO at this time as per cardio   Cardio, Dr. Wall

## 2020-11-14 NOTE — CONSULT NOTE ADULT - PROBLEM SELECTOR PROBLEM 6
MALCOM (acute kidney injury)
Abdomen soft, nontender, nondistended, bowel sounds present in all 4 quadrants.

## 2020-11-14 NOTE — PROGRESS NOTE ADULT - PROBLEM SELECTOR PLAN 2
C/o dizziness mainly when turning head left or right  Symptoms now improved  CT head showing no acute changes; chronic ischemic changes noted  neuro consult  meclizine PRN

## 2020-11-14 NOTE — PROGRESS NOTE ADULT - PROBLEM SELECTOR PLAN 7
basaglar 43 units at bedtime and admelog 15 units TID with meals at home  C/w lantus 30 units bedtime, increase admelog 10 units TID along with SSI for now  A1c of 9.0 in October  monitor blood sugars and adjust insulin as needed

## 2020-11-14 NOTE — PROGRESS NOTE ADULT - SUBJECTIVE AND OBJECTIVE BOX
PGY 1 Note discussed with supervising resident and primary attending  PGY-1: Ghislaine Ferreira MD  PAGER #: [phone] TILL 5:00 PM  Please contact On Call team 5PM - 8:30PM  Please contact Nightfloat team 8:30PM - 7:30AM  Patient is a 55y old  Male who presents with a chief complaint of dizziness, SOB (14 Nov 2020 06:49)    Brief Hospital Course: Patient is a 55 year old male from home AAO x3, with PMH of DM, HTN, CAD s/p CABG 2005, HLD, CVA and HFrEF, who presented to the ED due to dizziness and shortness of breath. Patient states that he has been having difficulty sleeping at night for the past 4-5 days due to having to wake up multiple times due to shortness of breath. Patient also states that he has associated headache as well. States that these symptoms were not present prior to the past week. Patient states that when he walks outside, he has to stop due to developing shortness of breath. Patient also complains of dizziness and room spinning sensation mainly when he turns his head to the left or right but symptoms have improved at this time. Currently, patient denies any chest pain, nausea, vomiting, diarrhea, constipation or abdominal pain.   Of note, patient had recent cardiac cath done last month at [facility] that was negative.     INTERVAL HPI/OVERNIGHT EVENTS: No acute events noted overnight.    MEDICATIONS  (STANDING):  aspirin  chewable 81 milliGRAM(s) Oral daily  atorvastatin 20 milliGRAM(s) Oral at bedtime  furosemide   Injectable 40 milliGRAM(s) IV Push daily  heparin   Injectable 5000 Unit(s) SubCutaneous every 8 hours  hydrALAZINE 25 milliGRAM(s) Oral three times a day  insulin glargine Injectable (LANTUS) 30 Unit(s) SubCutaneous at bedtime  insulin lispro (ADMELOG) corrective regimen sliding scale   SubCutaneous Before meals and at bedtime  insulin lispro Injectable (ADMELOG) 8 Unit(s) SubCutaneous three times a day with meals  isosorbide   mononitrate ER Tablet (IMDUR) 30 milliGRAM(s) Oral daily  metoprolol tartrate 25 milliGRAM(s) Oral three times a day  pantoprazole  Injectable 40 milliGRAM(s) IV Push daily  spironolactone 25 milliGRAM(s) Oral daily    MEDICATIONS  (PRN):  acetaminophen   Tablet .. 650 milliGRAM(s) Oral every 6 hours PRN Temp greater or equal to 38C (100.4F), Moderate Pain (4 - 6)  meclizine 25 milliGRAM(s) Oral three times a day PRN Dizziness      __________________________________________________  REVIEW OF SYSTEMS:  CONSTITUTIONAL: No fever, weight loss, or fatigue  RESPIRATORY: No cough, wheezing, chills or hemoptysis; No shortness of breath  CARDIOVASCULAR: No chest pain, palpitations, dizziness, or leg swelling  GASTROINTESTINAL: No abdominal pain. No nausea, vomiting, or hematemesis; No diarrhea or constipation. No melena or hematochezia.  GENITOURINARY: No dysuria or hematuria, no burning micturition, no polyuria, no urinary hesitancy, no nocturia, normal urinary frequency  NEUROLOGICAL: No headaches, memory loss, loss of strength, numbness, or tremors  SKIN: No itching, burning, rashes, or lesions   All other ROS negative except noted above    Vital Signs Last 24 Hrs  T(C): 36.9 (14 Nov 2020 07:44), Max: 36.9 (13 Nov 2020 15:00)  T(F): 98.4 (14 Nov 2020 07:44), Max: 98.5 (13 Nov 2020 19:30)  HR: 70 (13 Nov 2020 23:20) (70 - 91)  BP: 133/74 (13 Nov 2020 23:20) (133/74 - 201/108)  BP(mean): 117 (13 Nov 2020 19:30) (117 - 132)  RR: 20 (14 Nov 2020 07:44) (18 - 22)  SpO2: 98% (14 Nov 2020 07:44) (98% - 100%)    ________________________________________________  PHYSICAL EXAMINATION:  GENERAL: NAD, well-developed  HEAD:  Atraumatic, Normocephalic  EYES:  conjunctiva and sclera clear  NECK: Supple, No JVD, Normal thyroid  CHEST/LUNG: Clear to auscultation bilaterally; No rales, rhonchi, wheezing, or rubs  HEART: Regular rate and rhythm; No murmurs, rubs, or gallops  ABDOMEN: Soft, Nontender, Nondistended; Bowel sounds present  NERVOUS SYSTEM:  Alert & Oriented X3,  Moving all 4 extremities  EXTREMITIES:  Peripheral pulses palpable. No clubbing, cyanosis, or edema  SKIN: Warm, Dry, no rashes or lesions    _________________________________________________  LABS:                        13.0   5.35  )-----------( 189      ( 13 Nov 2020 08:38 )             40.5     11-13    139  |  104  |  21<H>  ----------------------------<  320<H>  4.1   |  29  |  1.63<H>    Ca    8.4      13 Nov 2020 08:38    TPro  8.0  /  Alb  3.0<L>  /  TBili  0.4  /  DBili  x   /  AST  26  /  ALT  31  /  AlkPhos  123<H>  11-13        CAPILLARY BLOOD GLUCOSE      POCT Blood Glucose.: 212 mg/dL (14 Nov 2020 07:35)  POCT Blood Glucose.: 275 mg/dL (13 Nov 2020 22:11)  POCT Blood Glucose.: 273 mg/dL (13 Nov 2020 17:42)        RADIOLOGY & ADDITIONAL TESTS:    Imaging Personally Reviewed:  YES    Consultant(s) Notes Reviewed:   YES    Care Discussed with Consultants : YES     Plan of care was discussed with patient and /or primary care giver; all questions and concerns were addressed and care was aligned with patient's wishes.     PGY 1 Note discussed with supervising resident and primary attending  PGY-1: Ghislaine Ferreira MD  PAGER #: [phone] TILL 5:00 PM  Please contact On Call team 5PM - 8:30PM  Please contact Nightfloat team 8:30PM - 7:30AM  Patient is a 55y old  Male who presents with a chief complaint of dizziness, SOB (14 Nov 2020 06:49)    Brief Hospital Course: Patient is a 55 year old male from home AAO x3, with PMH of DM, HTN, CAD s/p CABG 2005, HLD, CVA and HFrEF, who presented to the ED due to dizziness and shortness of breath. Patient states that he has been having difficulty sleeping at night for the past 4-5 days due to having to wake up multiple times due to shortness of breath. Patient also states that he has associated headache as well. States that these symptoms were not present prior to the past week. Patient states that when he walks outside, he has to stop due to developing shortness of breath. Patient also complains of dizziness and room spinning sensation mainly when he turns his head to the left or right but symptoms have improved at this time. Currently, patient denies any chest pain, nausea, vomiting, diarrhea, constipation or abdominal pain.   Of note, patient had recent cardiac cath done last month at [facility] that was negative.     INTERVAL HPI/OVERNIGHT EVENTS: No acute events noted overnight. No events on telemetry. Pt comfortable at time of exam, but states intermittently gets SOB and is gasping for air. Tolerating diet.     MEDICATIONS  (STANDING):  aspirin  chewable 81 milliGRAM(s) Oral daily  atorvastatin 20 milliGRAM(s) Oral at bedtime  furosemide   Injectable 40 milliGRAM(s) IV Push daily  heparin   Injectable 5000 Unit(s) SubCutaneous every 8 hours  hydrALAZINE 25 milliGRAM(s) Oral three times a day  insulin glargine Injectable (LANTUS) 30 Unit(s) SubCutaneous at bedtime  insulin lispro (ADMELOG) corrective regimen sliding scale   SubCutaneous Before meals and at bedtime  insulin lispro Injectable (ADMELOG) 8 Unit(s) SubCutaneous three times a day with meals  isosorbide   mononitrate ER Tablet (IMDUR) 30 milliGRAM(s) Oral daily  metoprolol tartrate 25 milliGRAM(s) Oral three times a day  pantoprazole  Injectable 40 milliGRAM(s) IV Push daily  spironolactone 25 milliGRAM(s) Oral daily    MEDICATIONS  (PRN):  acetaminophen   Tablet .. 650 milliGRAM(s) Oral every 6 hours PRN Temp greater or equal to 38C (100.4F), Moderate Pain (4 - 6)  meclizine 25 milliGRAM(s) Oral three times a day PRN Dizziness      __________________________________________________  REVIEW OF SYSTEMS:  CONSTITUTIONAL: No fever, weight loss, or fatigue  RESPIRATORY: +SOB No cough, wheezing, chills or hemoptysis;   CARDIOVASCULAR: No chest pain, palpitations, dizziness, or leg swelling  GASTROINTESTINAL: No abdominal pain. No nausea, vomiting, or hematemesis; No diarrhea or constipation. No melena or hematochezia.  GENITOURINARY: No dysuria or hematuria, no burning micturition, no polyuria, no urinary hesitancy, no nocturia, normal urinary frequency  NEUROLOGICAL: No headaches, memory loss, loss of strength, numbness, or tremors  SKIN: No itching, burning, rashes, or lesions   All other ROS negative except noted above    Vital Signs Last 24 Hrs  T(C): 36.9 (14 Nov 2020 07:44), Max: 36.9 (13 Nov 2020 15:00)  T(F): 98.4 (14 Nov 2020 07:44), Max: 98.5 (13 Nov 2020 19:30)  HR: 70 (13 Nov 2020 23:20) (70 - 91)  BP: 133/74 (13 Nov 2020 23:20) (133/74 - 201/108)  BP(mean): 117 (13 Nov 2020 19:30) (117 - 132)  RR: 20 (14 Nov 2020 07:44) (18 - 22)  SpO2: 98% (14 Nov 2020 07:44) (98% - 100%)    ________________________________________________  PHYSICAL EXAMINATION:  GENERAL: NAD, well-developed  HEAD:  Atraumatic, Normocephalic  EYES:  conjunctiva and sclera clear  NECK: Supple, No JVD, Normal thyroid  CHEST/LUNG: Clear to auscultation bilaterally; No rales, rhonchi, wheezing, or rubs  HEART: Regular rate and rhythm; No murmurs, rubs, or gallops  ABDOMEN: Soft, Nontender, Nondistended; Bowel sounds present  NERVOUS SYSTEM:  Alert & Oriented X3,  Moving all 4 extremities  EXTREMITIES:  Peripheral pulses palpable. No clubbing, cyanosis, or edema  SKIN: Warm, Dry, no rashes or lesions    _________________________________________________  LABS:                        13.0   5.35  )-----------( 189      ( 13 Nov 2020 08:38 )             40.5     11-13    139  |  104  |  21<H>  ----------------------------<  320<H>  4.1   |  29  |  1.63<H>    Ca    8.4      13 Nov 2020 08:38    TPro  8.0  /  Alb  3.0<L>  /  TBili  0.4  /  DBili  x   /  AST  26  /  ALT  31  /  AlkPhos  123<H>  11-13        CAPILLARY BLOOD GLUCOSE      POCT Blood Glucose.: 212 mg/dL (14 Nov 2020 07:35)  POCT Blood Glucose.: 275 mg/dL (13 Nov 2020 22:11)  POCT Blood Glucose.: 273 mg/dL (13 Nov 2020 17:42)        RADIOLOGY & ADDITIONAL TESTS:    Imaging Personally Reviewed:  YES    Consultant(s) Notes Reviewed:   YES    Care Discussed with Consultants : YES     Plan of care was discussed with patient and /or primary care giver; all questions and concerns were addressed and care was aligned with patient's wishes.

## 2020-11-14 NOTE — PROGRESS NOTE ADULT - ASSESSMENT
Patient is a 55 year old male from home AAO x3, with PMH of DM, HTN, CAD s/p CABG 2005, HLD, CVA and HFrEF, who presented to the ED due to dizziness and shortness of breath, admitted for workup of SOB.

## 2020-11-14 NOTE — CONSULT NOTE ADULT - SUBJECTIVE AND OBJECTIVE BOX
PULMONARY CONSULT NOTE      MADISON PERRY  MRN-[id number]    Patient is a 55y old  Male who presents with a chief complaint of dizziness, SOB (14 Nov 2020 09:52)    History of Present Illness:  Reason for Admission: dizziness, SOB  History of Present Illness:   Patient is a 55 year old male from home AAO x3, with PMH of DM, HTN, CAD s/p CABG 2005, HLD, CVA and HFrEF, who presented to the ED due to dizziness and shortness of breath. Patient states that he has been having difficulty sleeping at night for the past 4-5 days due to having to wake up multiple times due to shortness of breath. Patient also states that he has associated headache as well. States that these symptoms were not present prior to the past week. Patient states that when he walks outside, he has to stop due to developing shortness of breath. Patient also complains of dizziness and room spinning sensation mainly when he turns his head to the left or right but symptoms have improved at this time. Currently, patient denies any chest pain, nausea, vomiting, diarrhea, constipation or abdominal pain.   Of note, patient had recent cardiac cath done last month at [facility].       HISTORY OF PRESENT ILLNESS: As above. Awake, alert, comfortable in bed in NAD. Having episodes of apnea while sleeping    MEDICATIONS  (STANDING):  aspirin  chewable 81 milliGRAM(s) Oral daily  atorvastatin 20 milliGRAM(s) Oral at bedtime  furosemide   Injectable 40 milliGRAM(s) IV Push daily  heparin   Injectable 5000 Unit(s) SubCutaneous every 8 hours  hydrALAZINE 25 milliGRAM(s) Oral three times a day  insulin glargine Injectable (LANTUS) 30 Unit(s) SubCutaneous at bedtime  insulin lispro (ADMELOG) corrective regimen sliding scale   SubCutaneous Before meals and at bedtime  insulin lispro Injectable (ADMELOG) 10 Unit(s) SubCutaneous three times a day before meals  isosorbide   mononitrate ER Tablet (IMDUR) 30 milliGRAM(s) Oral daily  metoprolol tartrate 25 milliGRAM(s) Oral three times a day  pantoprazole  Injectable 40 milliGRAM(s) IV Push daily      MEDICATIONS  (PRN):  acetaminophen   Tablet .. 650 milliGRAM(s) Oral every 6 hours PRN Temp greater or equal to 38C (100.4F), Moderate Pain (4 - 6)  meclizine 25 milliGRAM(s) Oral three times a day PRN Dizziness      Allergies    No Known Allergies    Intolerances    Plavix (Headache)      PAST MEDICAL & SURGICAL HISTORY:  Cerebrovascular accident (CVA), unspecified mechanism    Congestive heart failure, NYHA class 3, chronic, combined    Cerebrovascular accident (CVA), unspecified mechanism    Hyperlipidemia, unspecified hyperlipidemia type    Coronary artery disease involving native heart without angina pectoris, unspecified vessel or lesion type    Hypertension, unspecified type    Diabetes mellitus of other type without complication    S/P CABG x 5  2005 at [facility]    History of loop recorder    S/P coronary artery stent placement    History of appendectomy        FAMILY HISTORY:  FH: CAD (coronary artery disease)        SOCIAL HISTORY  Smoking History:     REVIEW OF SYSTEMS:    CONSTITUTIONAL:  No fevers, chills, sweats    HEENT:  Eyes:  No diplopia or blurred vision. ENT:  No earache, sore throat or runny nose.    CARDIOVASCULAR:  No pressure, squeezing, tightness, or heaviness about the chest; no palpitations.    RESPIRATORY:  Per HPI    GASTROINTESTINAL:  No abdominal pain, nausea, vomiting or diarrhea.    GENITOURINARY:  No dysuria, frequency or urgency.    NEUROLOGIC:  No paresthesias, fasciculations, seizures or weakness.    PSYCHIATRIC:  No disorder of thought or mood.    Vital Signs Last 24 Hrs  T(C): 36.9 (14 Nov 2020 07:44), Max: 36.9 (13 Nov 2020 15:00)  T(F): 98.4 (14 Nov 2020 07:44), Max: 98.5 (13 Nov 2020 19:30)  HR: 70 (13 Nov 2020 23:20) (70 - 91)  BP: 133/74 (13 Nov 2020 23:20) (133/74 - 201/108)  BP(mean): 117 (13 Nov 2020 19:30) (117 - 132)  RR: 20 (14 Nov 2020 07:44) (18 - 22)  SpO2: 98% (14 Nov 2020 07:44) (98% - 100%)  I&O's Detail    14 Nov 2020 07:01  -  14 Nov 2020 10:51  --------------------------------------------------------  IN:    Oral Fluid: 200 mL  Total IN: 200 mL    OUT:  Total OUT: 0 mL    Total NET: 200 mL          PHYSICAL EXAMINATION:    GENERAL: The patient is a well-developed, well-nourished _____in no apparent distress.     HEENT: Head is normocephalic and atraumatic. Extraocular muscles are intact. Mucous membranes are moist.     NECK: Supple.     LUNGS: Clear to auscultation without wheezing, rales, or rhonchi. Respirations unlabored    HEART: Regular rate and rhythm without murmur.    ABDOMEN: Soft, nontender, and nondistended.  No hepatosplenomegaly is noted.    EXTREMITIES: Without any cyanosis, clubbing, rash, lesions or edema.    NEUROLOGIC: Grossly intact.      LABS:                        12.6   4.60  )-----------( 191      ( 14 Nov 2020 08:40 )             38.5     11-14    140  |  106  |  25<H>  ----------------------------<  185<H>  3.8   |  29  |  1.54<H>    Ca    8.3<L>      14 Nov 2020 08:40  Phos  3.5     11-14  Mg     2.0     11-14    TPro  8.0  /  Alb  3.0<L>  /  TBili  0.4  /  DBili  x   /  AST  26  /  ALT  31  /  AlkPhos  123<H>  11-13    PT/INR - ( 14 Nov 2020 08:40 )   PT: 12.6 sec;   INR: 1.06 ratio         PTT - ( 14 Nov 2020 08:40 )  PTT:30.2 sec      CARDIAC MARKERS ( 13 Nov 2020 20:45 )  0.367 ng/mL / x     / x     / x     / x      CARDIAC MARKERS ( 13 Nov 2020 08:38 )  0.428 ng/mL / x     / 210 U/L / x     / 3.1 ng/mL        Serum Pro-Brain Natriuretic Peptide: 2400 pg/mL (11-13-20 @ 08:38)          MICROBIOLOGY:    RADIOLOGY & ADDITIONAL STUDIES:    CXR:  < from: Xray Chest 1 View AP/PA (11.13.20 @ 09:45) >  IMPRESSION: Clear lungs at this time. Other findings stable as above.    < end of copied text >  < from: Xray Chest 1 View AP/PA (11.13.20 @ 09:45) >  IMPRESSION: Clear lungs at this time. Other findings stable as above.    < end of copied text >    Ct scan chest:    ekg;    echo:

## 2020-11-14 NOTE — PROGRESS NOTE ADULT - SUBJECTIVE AND OBJECTIVE BOX
Patient is a 55y old  Male who presents with a chief complaint of dizziness, SOB (13 Nov 2020 16:00)    pt seen in icu [  ], reg med floor [   ], bed [  ], chair at bedside [   ], a+o x3 [  ], lethargic [  ],  nad [  ]    mayers [  ], ngt [  ], peg [  ], et tube [  ], cent line [  ], picc line [  ]        Allergies    No Known Allergies  Plavix (Headache)        Vitals    T(F): 98.1 (11-14-20 @ 05:41), Max: 98.5 (11-13-20 @ 19:30)  HR: 70 (11-13-20 @ 23:20) (70 - 91)  BP: 133/74 (11-13-20 @ 23:20) (133/74 - 201/108)  RR: 22 (11-14-20 @ 05:41) (18 - 22)  SpO2: 98% (11-14-20 @ 05:41) (95% - 100%)  Wt(kg): --  CAPILLARY BLOOD GLUCOSE      POCT Blood Glucose.: 275 mg/dL (13 Nov 2020 22:11)      Labs                          13.0   5.35  )-----------( 189      ( 13 Nov 2020 08:38 )             40.5       11-13    139  |  104  |  21<H>  ----------------------------<  320<H>  4.1   |  29  |  1.63<H>    Ca    8.4      13 Nov 2020 08:38    TPro  8.0  /  Alb  3.0<L>  /  TBili  0.4  /  DBili  x   /  AST  26  /  ALT  31  /  AlkPhos  123<H>  11-13      CARDIAC MARKERS ( 13 Nov 2020 20:45 )  0.367 ng/mL / x     / x     / x     / x      CARDIAC MARKERS ( 13 Nov 2020 08:38 )  0.428 ng/mL / x     / 210 U/L / x     / 3.1 ng/mL            Radiology Results      Meds    MEDICATIONS  (STANDING):  aspirin  chewable 81 milliGRAM(s) Oral daily  atorvastatin 20 milliGRAM(s) Oral at bedtime  furosemide   Injectable 40 milliGRAM(s) IV Push daily  heparin   Injectable 5000 Unit(s) SubCutaneous every 8 hours  hydrALAZINE 25 milliGRAM(s) Oral three times a day  insulin glargine Injectable (LANTUS) 30 Unit(s) SubCutaneous at bedtime  insulin lispro (ADMELOG) corrective regimen sliding scale   SubCutaneous Before meals and at bedtime  insulin lispro Injectable (ADMELOG) 8 Unit(s) SubCutaneous three times a day with meals  isosorbide   mononitrate ER Tablet (IMDUR) 30 milliGRAM(s) Oral daily  metoprolol tartrate 25 milliGRAM(s) Oral three times a day  pantoprazole  Injectable 40 milliGRAM(s) IV Push daily  spironolactone 25 milliGRAM(s) Oral daily      MEDICATIONS  (PRN):  acetaminophen   Tablet .. 650 milliGRAM(s) Oral every 6 hours PRN Temp greater or equal to 38C (100.4F), Moderate Pain (4 - 6)  meclizine 25 milliGRAM(s) Oral three times a day PRN Dizziness      Physical Exam    Neuro :  no focal deficits  Respiratory: CTA B/L  CV: RRR, S1S2, no murmurs,   Abdominal: Soft, NT, ND +BS,  Extremities: No edema, + peripheral pulses    ASSESSMENT    Chronic combined systolic and diastolic congestive heart failure    Cerebrovascular accident (CVA), unspecified mechanism    Congestive heart failure, NYHA class 3, chronic, combined    Cerebrovascular accident (CVA), unspecified mechanism    Hyperlipidemia, unspecified hyperlipidemia type    Coronary artery disease involving native heart without angina pectoris, unspecified vessel or lesion type    Hypertension, unspecified type    Diabetes mellitus of other type without complication    S/P CABG x 5    History of loop recorder    S/P coronary artery stent placement    History of appendectomy    S/P CABG x 1        PLAN     Patient is a 55y old  Male who presents with a chief complaint of dizziness, SOB (13 Nov 2020 16:00)    pt seen in tele [x  ], reg med floor [   ], bed [ x ], chair at bedside [   ], a+o x3 [ x ], lethargic [  ],  nad [x  ]        Allergies    No Known Allergies  Plavix (Headache)        Vitals    T(F): 98.1 (11-14-20 @ 05:41), Max: 98.5 (11-13-20 @ 19:30)  HR: 70 (11-13-20 @ 23:20) (70 - 91)  BP: 133/74 (11-13-20 @ 23:20) (133/74 - 201/108)  RR: 22 (11-14-20 @ 05:41) (18 - 22)  SpO2: 98% (11-14-20 @ 05:41) (95% - 100%)  Wt(kg): --  CAPILLARY BLOOD GLUCOSE      POCT Blood Glucose.: 275 mg/dL (13 Nov 2020 22:11)      Labs                          13.0   5.35  )-----------( 189      ( 13 Nov 2020 08:38 )             40.5       11-13    139  |  104  |  21<H>  ----------------------------<  320<H>  4.1   |  29  |  1.63<H>    Ca    8.4      13 Nov 2020 08:38    TPro  8.0  /  Alb  3.0<L>  /  TBili  0.4  /  DBili  x   /  AST  26  /  ALT  31  /  AlkPhos  123<H>  11-13      A1C with Estimated Average Glucose (10.09.20 @ 10:27)   A1C with Estimated Average Glucose: 9.0: High Risk (prediabetic) 5.7 - 6.4 %   CARDIAC MARKERS ( 13 Nov 2020 20:45 )  0.367 ng/mL / x     / x     / x     / x      CARDIAC MARKERS ( 13 Nov 2020 08:38 )  0.428 ng/mL / x     / 210 U/L / x     / 3.1 ng/mL            Radiology Results      Meds    MEDICATIONS  (STANDING):  aspirin  chewable 81 milliGRAM(s) Oral daily  atorvastatin 20 milliGRAM(s) Oral at bedtime  furosemide   Injectable 40 milliGRAM(s) IV Push daily  heparin   Injectable 5000 Unit(s) SubCutaneous every 8 hours  hydrALAZINE 25 milliGRAM(s) Oral three times a day  insulin glargine Injectable (LANTUS) 30 Unit(s) SubCutaneous at bedtime  insulin lispro (ADMELOG) corrective regimen sliding scale   SubCutaneous Before meals and at bedtime  insulin lispro Injectable (ADMELOG) 8 Unit(s) SubCutaneous three times a day with meals  isosorbide   mononitrate ER Tablet (IMDUR) 30 milliGRAM(s) Oral daily  metoprolol tartrate 25 milliGRAM(s) Oral three times a day  pantoprazole  Injectable 40 milliGRAM(s) IV Push daily  spironolactone 25 milliGRAM(s) Oral daily      MEDICATIONS  (PRN):  acetaminophen   Tablet .. 650 milliGRAM(s) Oral every 6 hours PRN Temp greater or equal to 38C (100.4F), Moderate Pain (4 - 6)  meclizine 25 milliGRAM(s) Oral three times a day PRN Dizziness      Physical Exam    Neuro :  no focal deficits  Respiratory: CTA B/L  CV: RRR, S1S2, no murmurs,   Abdominal: Soft, NT, ND +BS,  Extremities: No edema, + peripheral pulses    ASSESSMENT      sob 2nd to chf exacerb vs florencio,    r/o acs,   r/o copd,   luanne   h/o DM,   HTN,   CAD s/p CABG 2005,   HLD,   CVA   HFrEF,   active smoker  History of loop recorder  S/P coronary artery stent placement  History of appendectomy          PLAN        cont tele,   acs protocol,   lopressor, aspirin, statin, lasix,   cont imdur hydralazine, spironolactone  no ace-i 2nd to luanne,   cardio f/u   hgb x2 noted above   interrogate loop recorder  supplemental O2,  pulm cons  pt needs sleep study and pft outpt   bronchodilators prn  advised smoking cesation   renal cons  serum creat improving.   recent hgba1c 9 noted above  endo cons  cont lantus 30 qhs and humalog 8 units actid  cont current meds

## 2020-11-14 NOTE — PROGRESS NOTE ADULT - PROBLEM SELECTOR PLAN 3
noted to have creatinine of 1.63 on admission   baseline appears to be ~1.2  Fena 4.7 - post renal/obstructive  will hold off on IV fluids for now  Hold lisopril, aldactone  Trend BMP noted to have creatinine of 1.63 on admission   baseline appears to be ~1.2  Fena 4.7 - post renal/obstructive  will hold off on IV fluids for now  Hold lisopril, aldactone  Trend BMP  Nephro Dr. Cisneros

## 2020-11-14 NOTE — CONSULT NOTE ADULT - SUBJECTIVE AND OBJECTIVE BOX
Chief complain.   History CABG,  in 2005,ischemic cardiomyopathy.  Diabetes and hypertension  He came to the ER for increased SOB, orthopnea and PND.  Renal consult was called for MALCOM  10/20 creatinine was 1.23  < from: US Renal (11.11.18 @ 10:30) >    Right kidney:  11.1 cm. No renal mass, hydronephrosis or calculi.    Left kidney:  10.5 cm. No renal mass, hydronephrosis or calculi.    Urinary bladder: Visualized portions appear unremarkable.    IMPRESSION:     No evidence for bilateral hydronephrosis.    < end of copied text >  Proteinuria 500 mg / dl 07/20            PAST MEDICAL & SURGICAL HISTORY:  Cerebrovascular accident (CVA), unspecified mechanism    Congestive heart failure, NYHA class 3, chronic, combined    Cerebrovascular accident (CVA), unspecified mechanism    Hyperlipidemia, unspecified hyperlipidemia type    Coronary artery disease involving native heart without angina pectoris, unspecified vessel or lesion type    Hypertension, unspecified type    Diabetes mellitus of other type without complication    S/P CABG x 5  2005 at [facility]    History of loop recorder    S/P coronary artery stent placement    History of appendectomy        Home Medications Reviewed    Hospital Medications:   MEDICATIONS  (STANDING):  aspirin  chewable 81 milliGRAM(s) Oral daily  atorvastatin 20 milliGRAM(s) Oral at bedtime  furosemide   Injectable 40 milliGRAM(s) IV Push daily  heparin   Injectable 5000 Unit(s) SubCutaneous every 8 hours  hydrALAZINE 25 milliGRAM(s) Oral three times a day  insulin glargine Injectable (LANTUS) 30 Unit(s) SubCutaneous at bedtime  insulin lispro (ADMELOG) corrective regimen sliding scale   SubCutaneous Before meals and at bedtime  insulin lispro Injectable (ADMELOG) 10 Unit(s) SubCutaneous three times a day before meals  isosorbide   mononitrate ER Tablet (IMDUR) 30 milliGRAM(s) Oral daily  melatonin 5 milliGRAM(s) Oral at bedtime  metoprolol tartrate 25 milliGRAM(s) Oral three times a day  pantoprazole  Injectable 40 milliGRAM(s) IV Push daily    MEDICATIONS  (PRN):  acetaminophen   Tablet .. 650 milliGRAM(s) Oral every 6 hours PRN Temp greater or equal to 38C (100.4F), Moderate Pain (4 - 6)  meclizine 25 milliGRAM(s) Oral three times a day PRN Dizziness      Allergies    No Known Allergies    Intolerances    Plavix (Headache)    Smoker 1/2 PPD  ETOH negative  Poosr compliance with medications.                            12.6   4.60  )-----------( 191      ( 14 Nov 2020 08:40 )             38.5     11-14    140  |  106  |  25<H>  ----------------------------<  185<H>  3.8   |  29  |  1.54<H>    Ca    8.3<L>      14 Nov 2020 08:40  Phos  3.5     11-14  Mg     2.0     11-14    TPro  8.0  /  Alb  3.0<L>  /  TBili  0.4  /  DBili  x   /  AST  26  /  ALT  31  /  AlkPhos  123<H>  11-13    PT/INR - ( 14 Nov 2020 08:40 )   PT: 12.6 sec;   INR: 1.06 ratio         PTT - ( 14 Nov 2020 08:40 )  PTT:30.2 sec    Sodium, Random Urine: 106 mmol/L (11-13 @ 23:10)  Creatinine, Random Urine: 25 mg/dL (11-13 @ 23:10)        RADIOLOGY & ADDITIONAL STUDIES:  < from: Nuclear Stress Test-Pharmacologic (09.25.20 @ 09:46) >  Review of raw data shows: The study is of good technical  quality.  The left ventricle was markely dilated LV at baseline.  There are small, severe defects in apical,  basal  infero-lateral and inferior walls that are reversible  consistent with ischemia.  ------------------------------------------------------------------------      GATED ANALYSIS:  Dilated LV with global hypokinesis EF=24%.    < end of copied text >    SOCIAL HISTORY: Denies ETOh,Smoking,     FAMILY HISTORY:  FH: CAD (coronary artery disease)        REVIEW OF SYSTEMS:  CONSTITUTIONAL: No malaise, No fatigue, No fevers or chills, well developed, no diaphoresis    RESPIRATORY: SOB in supine position  CARDIOVASCULAR: No chest pain or palpitations. No edema  GASTROINTESTINAL: No abdominal or epigastric pain. No nausea, vomiting, or hematemesis; No diarrhea or constipation. No melena or hematochezia.  GENITOURINARY: No Incontinence.      VITALS:  Vital Signs Last 24 Hrs  T(C): 36.7 (14 Nov 2020 11:16), Max: 36.9 (13 Nov 2020 15:00)  T(F): 98.1 (14 Nov 2020 11:16), Max: 98.5 (13 Nov 2020 19:30)  HR: 69 (14 Nov 2020 11:16) (69 - 91)  BP: 134/73 (14 Nov 2020 11:16) (133/74 - 201/108)  BP(mean): 117 (13 Nov 2020 19:30) (117 - 132)  RR: 20 (14 Nov 2020 11:16) (18 - 22)  SpO2: 98% (14 Nov 2020 11:16) (98% - 100%)    11-14 @ 07:01  -  11-14 @ 13:04  --------------------------------------------------------  IN: 200 mL / OUT: 0 mL / NET: 200 mL          PHYSICAL EXAM:  Constitutional: NAD  HEENT: anicteric sclera, oropharynx clear, MMM  Neck: No JVD  Respiratory: good air entrance B/L, no wheezes, rales or rhonchi  Cardiovascular: S1, S2, RRR, no pericardial rub, no murmur  Gastrointestinal: BS+, soft, no tenderness, no distension, no bruit  Pelvis: bladder non-distended, no CVA tenderness  Extremities: No cyanosis or clubbing. No peripheral edema

## 2020-11-15 LAB
ANION GAP SERPL CALC-SCNC: 9 MMOL/L — SIGNIFICANT CHANGE UP (ref 5–17)
BUN SERPL-MCNC: 34 MG/DL — HIGH (ref 7–18)
CALCIUM SERPL-MCNC: 8.8 MG/DL — SIGNIFICANT CHANGE UP (ref 8.4–10.5)
CHLORIDE SERPL-SCNC: 105 MMOL/L — SIGNIFICANT CHANGE UP (ref 96–108)
CO2 SERPL-SCNC: 26 MMOL/L — SIGNIFICANT CHANGE UP (ref 22–31)
CREAT SERPL-MCNC: 1.55 MG/DL — HIGH (ref 0.5–1.3)
GLUCOSE BLDC GLUCOMTR-MCNC: 140 MG/DL — HIGH (ref 70–99)
GLUCOSE BLDC GLUCOMTR-MCNC: 146 MG/DL — HIGH (ref 70–99)
GLUCOSE BLDC GLUCOMTR-MCNC: 152 MG/DL — HIGH (ref 70–99)
GLUCOSE BLDC GLUCOMTR-MCNC: 95 MG/DL — SIGNIFICANT CHANGE UP (ref 70–99)
GLUCOSE SERPL-MCNC: 122 MG/DL — HIGH (ref 70–99)
HCT VFR BLD CALC: 40.6 % — SIGNIFICANT CHANGE UP (ref 39–50)
HGB BLD-MCNC: 13.2 G/DL — SIGNIFICANT CHANGE UP (ref 13–17)
MAGNESIUM SERPL-MCNC: 2.1 MG/DL — SIGNIFICANT CHANGE UP (ref 1.6–2.6)
MCHC RBC-ENTMCNC: 29.1 PG — SIGNIFICANT CHANGE UP (ref 27–34)
MCHC RBC-ENTMCNC: 32.5 GM/DL — SIGNIFICANT CHANGE UP (ref 32–36)
MCV RBC AUTO: 89.6 FL — SIGNIFICANT CHANGE UP (ref 80–100)
NRBC # BLD: 0 /100 WBCS — SIGNIFICANT CHANGE UP (ref 0–0)
PHOSPHATE SERPL-MCNC: 3.3 MG/DL — SIGNIFICANT CHANGE UP (ref 2.5–4.5)
PLATELET # BLD AUTO: 231 K/UL — SIGNIFICANT CHANGE UP (ref 150–400)
POTASSIUM SERPL-MCNC: 3.6 MMOL/L — SIGNIFICANT CHANGE UP (ref 3.5–5.3)
POTASSIUM SERPL-SCNC: 3.6 MMOL/L — SIGNIFICANT CHANGE UP (ref 3.5–5.3)
RBC # BLD: 4.53 M/UL — SIGNIFICANT CHANGE UP (ref 4.2–5.8)
RBC # FLD: 14.1 % — SIGNIFICANT CHANGE UP (ref 10.3–14.5)
SODIUM SERPL-SCNC: 140 MMOL/L — SIGNIFICANT CHANGE UP (ref 135–145)
WBC # BLD: 5.45 K/UL — SIGNIFICANT CHANGE UP (ref 3.8–10.5)
WBC # FLD AUTO: 5.45 K/UL — SIGNIFICANT CHANGE UP (ref 3.8–10.5)

## 2020-11-15 RX ORDER — ERGOCALCIFEROL 1.25 MG/1
50000 CAPSULE ORAL
Refills: 0 | Status: DISCONTINUED | OUTPATIENT
Start: 2020-11-15 | End: 2020-11-16

## 2020-11-15 RX ORDER — METOCLOPRAMIDE HCL 10 MG
10 TABLET ORAL ONCE
Refills: 0 | Status: COMPLETED | OUTPATIENT
Start: 2020-11-15 | End: 2020-11-15

## 2020-11-15 RX ORDER — PANTOPRAZOLE SODIUM 20 MG/1
40 TABLET, DELAYED RELEASE ORAL
Refills: 0 | Status: DISCONTINUED | OUTPATIENT
Start: 2020-11-15 | End: 2020-11-16

## 2020-11-15 RX ADMIN — Medication 25 MILLIGRAM(S): at 05:34

## 2020-11-15 RX ADMIN — Medication 10 MILLIGRAM(S): at 14:12

## 2020-11-15 RX ADMIN — Medication 10 UNIT(S): at 08:17

## 2020-11-15 RX ADMIN — INSULIN GLARGINE 30 UNIT(S): 100 INJECTION, SOLUTION SUBCUTANEOUS at 22:07

## 2020-11-15 RX ADMIN — Medication 25 MILLIGRAM(S): at 22:08

## 2020-11-15 RX ADMIN — Medication 650 MILLIGRAM(S): at 14:02

## 2020-11-15 RX ADMIN — ERGOCALCIFEROL 50000 UNIT(S): 1.25 CAPSULE ORAL at 12:17

## 2020-11-15 RX ADMIN — Medication 10 UNIT(S): at 12:17

## 2020-11-15 RX ADMIN — Medication 650 MILLIGRAM(S): at 13:31

## 2020-11-15 RX ADMIN — ATORVASTATIN CALCIUM 20 MILLIGRAM(S): 80 TABLET, FILM COATED ORAL at 22:08

## 2020-11-15 RX ADMIN — Medication 25 MILLIGRAM(S): at 13:30

## 2020-11-15 RX ADMIN — ISOSORBIDE MONONITRATE 30 MILLIGRAM(S): 60 TABLET, EXTENDED RELEASE ORAL at 12:17

## 2020-11-15 RX ADMIN — Medication 81 MILLIGRAM(S): at 12:17

## 2020-11-15 RX ADMIN — HEPARIN SODIUM 5000 UNIT(S): 5000 INJECTION INTRAVENOUS; SUBCUTANEOUS at 05:34

## 2020-11-15 RX ADMIN — HEPARIN SODIUM 5000 UNIT(S): 5000 INJECTION INTRAVENOUS; SUBCUTANEOUS at 13:30

## 2020-11-15 RX ADMIN — Medication 5 MILLIGRAM(S): at 22:08

## 2020-11-15 RX ADMIN — Medication 40 MILLIGRAM(S): at 05:34

## 2020-11-15 RX ADMIN — Medication 1: at 12:17

## 2020-11-15 RX ADMIN — Medication 10 UNIT(S): at 17:14

## 2020-11-15 NOTE — PROGRESS NOTE ADULT - ASSESSMENT
55 year old male from home AAO x3, with PMH of DM, HTN, CAD s/p CABG 2005, HLD, CVA,s/p ILR and HFrEF, who presented to the ED due to dizziness and shortness of breath,acute on chronic systolic HF and MALCOM.  1.Tele monitoring.  2.D/W pt need to be compliant with cardiac medication 3 mo if repeat EF less than 35%,BIV-AICD eval.  3.MALCOM-no ace and aldactone,  Imdur and hydralazine.Renal f/u.  4.Acute on chronic systolic HF-Lasix 40mg iv qd, b blocker,Imdur,hydralazine.  5.CAD-asa,b blocker,statin.  6.CVA-asa,statin.  7.DM-Insulin.  8.Lipid d/o-statin.  9.GI and DVT prophylaxis.  10.Sleep study as outpatient-R/O ANTONIA.

## 2020-11-15 NOTE — PROGRESS NOTE ADULT - SUBJECTIVE AND OBJECTIVE BOX
Patient is a 55y old  Male who presents with a chief complaint of dizziness, SOB (14 Nov 2020 13:04)    pt seen in tele [x  ], reg med floor [   ], bed [ x ], chair at bedside [   ], a+o x3 [ x ], lethargic [  ],    nad [x  ]      Allergies    No Known Allergies  Plavix (Headache)        Vitals    T(F): 97.8 (11-15-20 @ 04:51), Max: 98.4 (11-14-20 @ 07:44)  HR: 70 (11-15-20 @ 04:51) (67 - 71)  BP: 148/91 (11-15-20 @ 04:51) (134/73 - 162/100)  RR: 19 (11-15-20 @ 04:51) (18 - 20)  SpO2: 100% (11-15-20 @ 04:51) (98% - 100%)  Wt(kg): --  CAPILLARY BLOOD GLUCOSE      POCT Blood Glucose.: 191 mg/dL (14 Nov 2020 21:05)      Labs                          12.6   4.60  )-----------( 191      ( 14 Nov 2020 08:40 )             38.5       11-14    140  |  106  |  25<H>  ----------------------------<  185<H>  3.8   |  29  |  1.54<H>    Ca    8.3<L>      14 Nov 2020 08:40  Phos  3.5     11-14  Mg     2.0     11-14    TPro  8.0  /  Alb  3.0<L>  /  TBili  0.4  /  DBili  x   /  AST  26  /  ALT  31  /  AlkPhos  123<H>  11-13      CARDIAC MARKERS ( 13 Nov 2020 20:45 )  0.367 ng/mL / x     / x     / x     / x      CARDIAC MARKERS ( 13 Nov 2020 08:38 )  0.428 ng/mL / x     / 210 U/L / x     / 3.1 ng/mL            Radiology Results      Meds    MEDICATIONS  (STANDING):  aspirin  chewable 81 milliGRAM(s) Oral daily  atorvastatin 20 milliGRAM(s) Oral at bedtime  furosemide   Injectable 40 milliGRAM(s) IV Push daily  heparin   Injectable 5000 Unit(s) SubCutaneous every 8 hours  hydrALAZINE 25 milliGRAM(s) Oral three times a day  insulin glargine Injectable (LANTUS) 30 Unit(s) SubCutaneous at bedtime  insulin lispro (ADMELOG) corrective regimen sliding scale   SubCutaneous Before meals and at bedtime  insulin lispro Injectable (ADMELOG) 10 Unit(s) SubCutaneous three times a day before meals  isosorbide   mononitrate ER Tablet (IMDUR) 30 milliGRAM(s) Oral daily  melatonin 5 milliGRAM(s) Oral at bedtime  metoprolol tartrate 25 milliGRAM(s) Oral three times a day  pantoprazole  Injectable 40 milliGRAM(s) IV Push daily      MEDICATIONS  (PRN):  acetaminophen   Tablet .. 650 milliGRAM(s) Oral every 6 hours PRN Temp greater or equal to 38C (100.4F), Moderate Pain (4 - 6)  meclizine 25 milliGRAM(s) Oral three times a day PRN Dizziness      Physical Exam    Neuro :  no focal deficits  Respiratory: CTA B/L  CV: RRR, S1S2, no murmurs,   Abdominal: Soft, NT, ND +BS,  Extremities: No edema, + peripheral pulses    ASSESSMENT      sob 2nd to chf exacerb vs florencio,    r/o acs,   r/o copd,   luanne   h/o DM,   HTN,   CAD s/p CABG 2005,   HLD,   CVA   HFrEF,   active smoker  History of loop recorder  S/P coronary artery stent placement  History of appendectomy          PLAN        cont tele,   acs protocol,   lopressor, aspirin, statin, lasix,   cont imdur hydralazine, spironolactone  no ace-i 2nd to luanne,   cardio f/u   hgb x2 noted above   interrogate loop recorder  supplemental O2,  pulm cons  pt needs sleep study and pft outpt   bronchodilators prn  advised smoking cesation   renal cons  serum creat improving.   recent hgba1c 9 noted above  endo cons  cont lantus 30 qhs and humalog 8 units actid  cont current meds         Patient is a 55y old  Male who presents with a chief complaint of dizziness, SOB (14 Nov 2020 13:04)    pt seen in tele [x  ], reg med floor [   ], bed [ x ], chair at bedside [   ], a+o x3 [ x ], lethargic [  ],    nad [x  ]      Allergies    No Known Allergies  Plavix (Headache)        Vitals    T(F): 97.8 (11-15-20 @ 04:51), Max: 98.4 (11-14-20 @ 07:44)  HR: 70 (11-15-20 @ 04:51) (67 - 71)  BP: 148/91 (11-15-20 @ 04:51) (134/73 - 162/100)  RR: 19 (11-15-20 @ 04:51) (18 - 20)  SpO2: 100% (11-15-20 @ 04:51) (98% - 100%)  Wt(kg): --  CAPILLARY BLOOD GLUCOSE      POCT Blood Glucose.: 191 mg/dL (14 Nov 2020 21:05)      Labs                          12.6   4.60  )-----------( 191      ( 14 Nov 2020 08:40 )             38.5       11-14    140  |  106  |  25<H>  ----------------------------<  185<H>  3.8   |  29  |  1.54<H>    Ca    8.3<L>      14 Nov 2020 08:40  Phos  3.5     11-14  Mg     2.0     11-14    TPro  8.0  /  Alb  3.0<L>  /  TBili  0.4  /  DBili  x   /  AST  26  /  ALT  31  /  AlkPhos  123<H>  11-13      CARDIAC MARKERS ( 13 Nov 2020 20:45 )  0.367 ng/mL / x     / x     / x     / x      CARDIAC MARKERS ( 13 Nov 2020 08:38 )  0.428 ng/mL / x     / 210 U/L / x     / 3.1 ng/mL            Radiology Results      Meds    MEDICATIONS  (STANDING):  aspirin  chewable 81 milliGRAM(s) Oral daily  atorvastatin 20 milliGRAM(s) Oral at bedtime  furosemide   Injectable 40 milliGRAM(s) IV Push daily  heparin   Injectable 5000 Unit(s) SubCutaneous every 8 hours  hydrALAZINE 25 milliGRAM(s) Oral three times a day  insulin glargine Injectable (LANTUS) 30 Unit(s) SubCutaneous at bedtime  insulin lispro (ADMELOG) corrective regimen sliding scale   SubCutaneous Before meals and at bedtime  insulin lispro Injectable (ADMELOG) 10 Unit(s) SubCutaneous three times a day before meals  isosorbide   mononitrate ER Tablet (IMDUR) 30 milliGRAM(s) Oral daily  melatonin 5 milliGRAM(s) Oral at bedtime  metoprolol tartrate 25 milliGRAM(s) Oral three times a day  pantoprazole  Injectable 40 milliGRAM(s) IV Push daily      MEDICATIONS  (PRN):  acetaminophen   Tablet .. 650 milliGRAM(s) Oral every 6 hours PRN Temp greater or equal to 38C (100.4F), Moderate Pain (4 - 6)  meclizine 25 milliGRAM(s) Oral three times a day PRN Dizziness      Physical Exam    Neuro :  no focal deficits  Respiratory: CTA B/L  CV: RRR, S1S2, no murmurs,   Abdominal: Soft, NT, ND +BS,  Extremities: No edema, + peripheral pulses    ASSESSMENT      sob 2nd to acute on chronic systolic chf exacerb and florencio,    r/o acs,   r/o copd,   luanne   vit d defficiency  h/o DM,   HTN,   CAD s/p CABG 2005,   HLD,   CVA   HFrEF,   active smoker  History of loop recorder  S/P coronary artery stent placement  History of appendectomy          PLAN        cont tele,   acs protocol,   lopressor, aspirin, statin,  cont Lasix 40mg iv qd,   cont Imdur, hydralazine  no ace-i 2nd to luanne,   cardio f/u   ce  x 2 noted above   loop recorder interrogated and on chart  supplemental O2,  pulm f/u  pt needs sleep study and pft outpt   bronchodilators prn  advised smoking cesation   renal f/u  serum creat improving.   recent hgba1c 9 noted above  endo cons  cont lantus 30 qhs and humalog 8 units actid   supplement vit d  cont current meds         Patient is a 55y old  Male who presents with a chief complaint of dizziness, SOB (14 Nov 2020 13:04)    pt seen in tele [x  ], reg med floor [   ], bed [ x ], chair at bedside [   ], a+o x3 [ x ], lethargic [  ],    nad [x  ]      Allergies    No Known Allergies  Plavix (Headache)        Vitals    T(F): 97.8 (11-15-20 @ 04:51), Max: 98.4 (11-14-20 @ 07:44)  HR: 70 (11-15-20 @ 04:51) (67 - 71)  BP: 148/91 (11-15-20 @ 04:51) (134/73 - 162/100)  RR: 19 (11-15-20 @ 04:51) (18 - 20)  SpO2: 100% (11-15-20 @ 04:51) (98% - 100%)  Wt(kg): --  CAPILLARY BLOOD GLUCOSE      POCT Blood Glucose.: 191 mg/dL (14 Nov 2020 21:05)      Labs                          12.6   4.60  )-----------( 191      ( 14 Nov 2020 08:40 )             38.5       11-14    140  |  106  |  25<H>  ----------------------------<  185<H>  3.8   |  29  |  1.54<H>    Ca    8.3<L>      14 Nov 2020 08:40  Phos  3.5     11-14  Mg     2.0     11-14    TPro  8.0  /  Alb  3.0<L>  /  TBili  0.4  /  DBili  x   /  AST  26  /  ALT  31  /  AlkPhos  123<H>  11-13      CARDIAC MARKERS ( 13 Nov 2020 20:45 )  0.367 ng/mL / x     / x     / x     / x      CARDIAC MARKERS ( 13 Nov 2020 08:38 )  0.428 ng/mL / x     / 210 U/L / x     / 3.1 ng/mL            Radiology Results      Meds    MEDICATIONS  (STANDING):  aspirin  chewable 81 milliGRAM(s) Oral daily  atorvastatin 20 milliGRAM(s) Oral at bedtime  furosemide   Injectable 40 milliGRAM(s) IV Push daily  heparin   Injectable 5000 Unit(s) SubCutaneous every 8 hours  hydrALAZINE 25 milliGRAM(s) Oral three times a day  insulin glargine Injectable (LANTUS) 30 Unit(s) SubCutaneous at bedtime  insulin lispro (ADMELOG) corrective regimen sliding scale   SubCutaneous Before meals and at bedtime  insulin lispro Injectable (ADMELOG) 10 Unit(s) SubCutaneous three times a day before meals  isosorbide   mononitrate ER Tablet (IMDUR) 30 milliGRAM(s) Oral daily  melatonin 5 milliGRAM(s) Oral at bedtime  metoprolol tartrate 25 milliGRAM(s) Oral three times a day  pantoprazole  Injectable 40 milliGRAM(s) IV Push daily      MEDICATIONS  (PRN):  acetaminophen   Tablet .. 650 milliGRAM(s) Oral every 6 hours PRN Temp greater or equal to 38C (100.4F), Moderate Pain (4 - 6)  meclizine 25 milliGRAM(s) Oral three times a day PRN Dizziness      Physical Exam    Neuro :  no focal deficits  Respiratory: CTA B/L  CV: RRR, S1S2, no murmurs,   Abdominal: Soft, NT, ND +BS,  Extremities: No edema, + peripheral pulses    ASSESSMENT      sob 2nd to acute on chronic systolic chf exacerb and florencio,    r/o acs,   r/o copd,   luanne   vit d defficiency  h/o DM,   HTN,   CAD s/p CABG 2005,   HLD,   CVA   HFrEF,   active smoker  History of loop recorder  S/P coronary artery stent placement  History of appendectomy          PLAN        cont tele,   acs protocol,   lopressor, aspirin, statin,  cont Lasix 40mg iv qd,   cont Imdur, hydralazine  no ace-i 2nd to luanne,   cardio f/u   ce  x 2 noted above   loop recorder interrogated and no events noted on chart on chart  supplemental O2,  pulm f/u  pt needs sleep study and pft outpt   bronchodilators prn  advised smoking cesation   renal f/u  serum creat improving.   recent hgba1c 9 noted above  endo cons  cont lantus 30 qhs and humalog 8 units actid   supplement vit d  cont current meds

## 2020-11-15 NOTE — PROGRESS NOTE ADULT - SUBJECTIVE AND OBJECTIVE BOX
Patient is a 55y old  Male who presents with a chief complaint of dizziness, SOB (15 Nov 2020 06:38)  Patient is laying in bed in NAD    INTERVAL HPI/OVERNIGHT EVENTS:      VITAL SIGNS:  T(F): 98.4 (11-15-20 @ 07:51)  HR: 60 (11-15-20 @ 07:51)  BP: 144/88 (11-15-20 @ 07:51)  RR: 18 (11-15-20 @ 07:51)  SpO2: 99% (11-15-20 @ 07:51)  Wt(kg): --  I&O's Detail    14 Nov 2020 07:01  -  15 Nov 2020 07:00  --------------------------------------------------------  IN:    Oral Fluid: 480 mL  Total IN: 480 mL    OUT:    Voided (mL): 1850 mL  Total OUT: 1850 mL    Total NET: -1370 mL      15 Nov 2020 07:01  -  15 Nov 2020 11:03  --------------------------------------------------------  IN:    Oral Fluid: 200 mL  Total IN: 200 mL    OUT:    Voided (mL): 1100 mL  Total OUT: 1100 mL    Total NET: -900 mL              REVIEW OF SYSTEMS:    CONSTITUTIONAL:  No fevers, chills, sweats    HEENT:  Eyes:  No diplopia or blurred vision. ENT:  No earache, sore throat or runny nose.    CARDIOVASCULAR:  No pressure, squeezing, tightness, or heaviness about the chest; no palpitations.    RESPIRATORY:  Per HPI    GASTROINTESTINAL:  No abdominal pain, nausea, vomiting or diarrhea.    GENITOURINARY:  No dysuria, frequency or urgency.    NEUROLOGIC:  No paresthesias, fasciculations, seizures or weakness.    PSYCHIATRIC:  No disorder of thought or mood.      PHYSICAL EXAM:    Constitutional: Well developed and nourished  Eyes:Perrla  ENMT: normal  Neck:supple  Respiratory: good air entry  Cardiovascular: S1 S2 regular  Gastrointestinal: Soft, Non tender  Extremities: No edema  Vascular:normal  Neurological:Awake, alert,Ox3  Musculoskeletal:Normal      MEDICATIONS  (STANDING):  aspirin  chewable 81 milliGRAM(s) Oral daily  atorvastatin 20 milliGRAM(s) Oral at bedtime  ergocalciferol 83225 Unit(s) Oral <User Schedule>  furosemide   Injectable 40 milliGRAM(s) IV Push daily  heparin   Injectable 5000 Unit(s) SubCutaneous every 8 hours  hydrALAZINE 25 milliGRAM(s) Oral three times a day  insulin glargine Injectable (LANTUS) 30 Unit(s) SubCutaneous at bedtime  insulin lispro (ADMELOG) corrective regimen sliding scale   SubCutaneous Before meals and at bedtime  insulin lispro Injectable (ADMELOG) 10 Unit(s) SubCutaneous three times a day before meals  isosorbide   mononitrate ER Tablet (IMDUR) 30 milliGRAM(s) Oral daily  melatonin 5 milliGRAM(s) Oral at bedtime  metoprolol tartrate 25 milliGRAM(s) Oral three times a day  pantoprazole    Tablet 40 milliGRAM(s) Oral before breakfast    MEDICATIONS  (PRN):  acetaminophen   Tablet .. 650 milliGRAM(s) Oral every 6 hours PRN Temp greater or equal to 38C (100.4F), Moderate Pain (4 - 6)  meclizine 25 milliGRAM(s) Oral three times a day PRN Dizziness      Allergies    No Known Allergies    Intolerances    Plavix (Headache)      LABS:                        13.2   5.45  )-----------( 231      ( 15 Nov 2020 08:44 )             40.6     11-15    140  |  105  |  34<H>  ----------------------------<  122<H>  3.6   |  26  |  1.55<H>    Ca    8.8      15 Nov 2020 08:44  Phos  3.3     11-15  Mg     2.1     11-15      PT/INR - ( 14 Nov 2020 08:40 )   PT: 12.6 sec;   INR: 1.06 ratio         PTT - ( 14 Nov 2020 08:40 )  PTT:30.2 sec      CARDIAC MARKERS ( 13 Nov 2020 20:45 )  0.367 ng/mL / x     / x     / x     / x          CAPILLARY BLOOD GLUCOSE      POCT Blood Glucose.: 140 mg/dL (15 Nov 2020 07:58)  POCT Blood Glucose.: 191 mg/dL (14 Nov 2020 21:05)  POCT Blood Glucose.: 140 mg/dL (14 Nov 2020 16:40)  POCT Blood Glucose.: 180 mg/dL (14 Nov 2020 13:06)    pro-bnp 2400 11-13 @ 08:38     d-dimer --  11-13 @ 08:38      RADIOLOGY & ADDITIONAL TESTS:    CXR:    Ct scan chest:    ekg;    echo:

## 2020-11-15 NOTE — PROGRESS NOTE ADULT - SUBJECTIVE AND OBJECTIVE BOX
CHIEF COMPLAINT:Patient is a 55y old  Male who presents with a chief complaint of dizziness, SOB .pt appears comfortable.    	  REVIEW OF SYSTEMS:  CONSTITUTIONAL: No fever, weight loss, or fatigue  EYES: No eye pain, visual disturbances, or discharge  ENT:  No difficulty hearing, tinnitus, vertigo; No sinus or throat pain  NECK: No pain or stiffness  RESPIRATORY: No cough, wheezing, chills or hemoptysis; No Shortness of Breath  CARDIOVASCULAR: No chest pain, palpitations, passing out, dizziness, or leg swelling  GASTROINTESTINAL: No abdominal or epigastric pain. No nausea, vomiting, or hematemesis; No diarrhea or constipation. No melena or hematochezia.  GENITOURINARY: No dysuria, frequency, hematuria, or incontinence  NEUROLOGICAL: No headaches, memory loss, loss of strength, numbness, or tremors  SKIN: No itching, burning, rashes, or lesions   LYMPH Nodes: No enlarged glands  ENDOCRINE: No heat or cold intolerance; No hair loss  MUSCULOSKELETAL: No joint pain or swelling; No muscle, back, or extremity pain  PSYCHIATRIC: No depression, anxiety, mood swings, or difficulty sleeping  HEME/LYMPH: No easy bruising, or bleeding gums  ALLERGY AND IMMUNOLOGIC: No hives or eczema	        PHYSICAL EXAM:  T(C): 37 (11-15-20 @ 11:21), Max: 37 (11-15-20 @ 11:21)  HR: 93 (11-15-20 @ 11:21) (60 - 93)  BP: 153/84 (11-15-20 @ 11:21) (141/88 - 162/100)  RR: 18 (11-15-20 @ 11:21) (18 - 20)  SpO2: 98% (11-15-20 @ 11:21) (98% - 100%)  Wt(kg): --  I&O's Summary    14 Nov 2020 07:01  -  15 Nov 2020 07:00  --------------------------------------------------------  IN: 480 mL / OUT: 1850 mL / NET: -1370 mL    15 Nov 2020 07:01  -  15 Nov 2020 11:28  --------------------------------------------------------  IN: 200 mL / OUT: 1100 mL / NET: -900 mL        Appearance: Normal	  HEENT:   Normal oral mucosa, PERRL, EOMI	  Lymphatic: No lymphadenopathy  Cardiovascular: Normal S1 S2, No JVD, No murmurs, No edema  Respiratory: Lungs clear to auscultation	  Psychiatry: A & O x 3, Mood & affect appropriate  Gastrointestinal:  Soft, Non-tender, + BS	  Skin: No rashes, No ecchymoses, No cyanosis	  Neurologic: Non-focal  Extremities: Normal range of motion, No clubbing, cyanosis or edema  Vascular: Peripheral pulses palpable 2+ bilaterally    MEDICATIONS  (STANDING):  aspirin  chewable 81 milliGRAM(s) Oral daily  atorvastatin 20 milliGRAM(s) Oral at bedtime  ergocalciferol 07862 Unit(s) Oral <User Schedule>  furosemide   Injectable 40 milliGRAM(s) IV Push daily  heparin   Injectable 5000 Unit(s) SubCutaneous every 8 hours  hydrALAZINE 25 milliGRAM(s) Oral three times a day  insulin glargine Injectable (LANTUS) 30 Unit(s) SubCutaneous at bedtime  insulin lispro (ADMELOG) corrective regimen sliding scale   SubCutaneous Before meals and at bedtime  insulin lispro Injectable (ADMELOG) 10 Unit(s) SubCutaneous three times a day before meals  isosorbide   mononitrate ER Tablet (IMDUR) 30 milliGRAM(s) Oral daily  melatonin 5 milliGRAM(s) Oral at bedtime  metoprolol tartrate 25 milliGRAM(s) Oral three times a day  pantoprazole    Tablet 40 milliGRAM(s) Oral before breakfast      TELEMETRY: 	nsr      	  LABS:	 	    CARDIAC MARKERS:  CARDIAC MARKERS ( 13 Nov 2020 20:45 )  0.367 ng/mL / x     / x     / x     / x                                    13.2   5.45  )-----------( 231      ( 15 Nov 2020 08:44 )             40.6     11-15    140  |  105  |  34<H>  ----------------------------<  122<H>  3.6   |  26  |  1.55<H>    Ca    8.8      15 Nov 2020 08:44  Phos  3.3     11-15  Mg     2.1     11-15      proBNP: Serum Pro-Brain Natriuretic Peptide: 2400 pg/mL (11-13 @ 08:38)    Lipid Profile: Cholesterol <50  LDL --  HDL 35  TG 76      TSH: Thyroid Stimulating Hormone, Serum: 1.20 uU/mL (11-14 @ 08:40)      	  ILR-no events

## 2020-11-16 ENCOUNTER — INPATIENT (INPATIENT)
Facility: HOSPITAL | Age: 55
LOS: 1 days | Discharge: LEFT BEFORE DISCHARGE | End: 2020-11-18
Attending: INTERNAL MEDICINE | Admitting: INTERNAL MEDICINE
Payer: MEDICAID

## 2020-11-16 ENCOUNTER — APPOINTMENT (OUTPATIENT)
Dept: ELECTROPHYSIOLOGY | Facility: CLINIC | Age: 55
End: 2020-11-16
Payer: MEDICAID

## 2020-11-16 VITALS
TEMPERATURE: 98 F | SYSTOLIC BLOOD PRESSURE: 129 MMHG | WEIGHT: 238.1 LBS | DIASTOLIC BLOOD PRESSURE: 72 MMHG | RESPIRATION RATE: 18 BRPM | OXYGEN SATURATION: 100 % | HEIGHT: 72.3 IN | HEART RATE: 73 BPM

## 2020-11-16 VITALS
RESPIRATION RATE: 18 BRPM | HEART RATE: 82 BPM | OXYGEN SATURATION: 91 % | SYSTOLIC BLOOD PRESSURE: 146 MMHG | DIASTOLIC BLOOD PRESSURE: 78 MMHG | TEMPERATURE: 98 F

## 2020-11-16 DIAGNOSIS — Z90.49 ACQUIRED ABSENCE OF OTHER SPECIFIED PARTS OF DIGESTIVE TRACT: Chronic | ICD-10-CM

## 2020-11-16 DIAGNOSIS — I49.9 CARDIAC ARRHYTHMIA, UNSPECIFIED: ICD-10-CM

## 2020-11-16 DIAGNOSIS — Z95.1 PRESENCE OF AORTOCORONARY BYPASS GRAFT: Chronic | ICD-10-CM

## 2020-11-16 DIAGNOSIS — Z98.890 OTHER SPECIFIED POSTPROCEDURAL STATES: Chronic | ICD-10-CM

## 2020-11-16 DIAGNOSIS — Z95.5 PRESENCE OF CORONARY ANGIOPLASTY IMPLANT AND GRAFT: Chronic | ICD-10-CM

## 2020-11-16 LAB
ALBUMIN SERPL ELPH-MCNC: 3 G/DL — LOW (ref 3.5–5)
ALP SERPL-CCNC: 107 U/L — SIGNIFICANT CHANGE UP (ref 40–120)
ALT FLD-CCNC: 26 U/L DA — SIGNIFICANT CHANGE UP (ref 10–60)
ANION GAP SERPL CALC-SCNC: 5 MMOL/L — SIGNIFICANT CHANGE UP (ref 5–17)
ANION GAP SERPL CALC-SCNC: 8 MMOL/L — SIGNIFICANT CHANGE UP (ref 5–17)
AST SERPL-CCNC: 19 U/L — SIGNIFICANT CHANGE UP (ref 10–40)
BASOPHILS # BLD AUTO: 0.01 K/UL — SIGNIFICANT CHANGE UP (ref 0–0.2)
BASOPHILS NFR BLD AUTO: 0.2 % — SIGNIFICANT CHANGE UP (ref 0–2)
BILIRUB SERPL-MCNC: 0.4 MG/DL — SIGNIFICANT CHANGE UP (ref 0.2–1.2)
BUN SERPL-MCNC: 29 MG/DL — HIGH (ref 7–18)
BUN SERPL-MCNC: 31 MG/DL — HIGH (ref 7–18)
CALCIUM SERPL-MCNC: 8.8 MG/DL — SIGNIFICANT CHANGE UP (ref 8.4–10.5)
CALCIUM SERPL-MCNC: 9.1 MG/DL — SIGNIFICANT CHANGE UP (ref 8.4–10.5)
CHLORIDE SERPL-SCNC: 105 MMOL/L — SIGNIFICANT CHANGE UP (ref 96–108)
CHLORIDE SERPL-SCNC: 105 MMOL/L — SIGNIFICANT CHANGE UP (ref 96–108)
CO2 SERPL-SCNC: 25 MMOL/L — SIGNIFICANT CHANGE UP (ref 22–31)
CO2 SERPL-SCNC: 27 MMOL/L — SIGNIFICANT CHANGE UP (ref 22–31)
CREAT SERPL-MCNC: 1.6 MG/DL — HIGH (ref 0.5–1.3)
CREAT SERPL-MCNC: 1.75 MG/DL — HIGH (ref 0.5–1.3)
EOSINOPHIL # BLD AUTO: 0.02 K/UL — SIGNIFICANT CHANGE UP (ref 0–0.5)
EOSINOPHIL NFR BLD AUTO: 0.4 % — SIGNIFICANT CHANGE UP (ref 0–6)
GLUCOSE BLDC GLUCOMTR-MCNC: 164 MG/DL — HIGH (ref 70–99)
GLUCOSE BLDC GLUCOMTR-MCNC: 168 MG/DL — HIGH (ref 70–99)
GLUCOSE BLDC GLUCOMTR-MCNC: 189 MG/DL — HIGH (ref 70–99)
GLUCOSE BLDC GLUCOMTR-MCNC: 192 MG/DL — HIGH (ref 70–99)
GLUCOSE SERPL-MCNC: 155 MG/DL — HIGH (ref 70–99)
GLUCOSE SERPL-MCNC: 207 MG/DL — HIGH (ref 70–99)
HCT VFR BLD CALC: 42.3 % — SIGNIFICANT CHANGE UP (ref 39–50)
HCT VFR BLD CALC: 43.5 % — SIGNIFICANT CHANGE UP (ref 39–50)
HGB BLD-MCNC: 14.1 G/DL — SIGNIFICANT CHANGE UP (ref 13–17)
HGB BLD-MCNC: 14.4 G/DL — SIGNIFICANT CHANGE UP (ref 13–17)
IMM GRANULOCYTES NFR BLD AUTO: 0.2 % — SIGNIFICANT CHANGE UP (ref 0–1.5)
LYMPHOCYTES # BLD AUTO: 1.18 K/UL — SIGNIFICANT CHANGE UP (ref 1–3.3)
LYMPHOCYTES # BLD AUTO: 26.3 % — SIGNIFICANT CHANGE UP (ref 13–44)
MAGNESIUM SERPL-MCNC: 1.9 MG/DL — SIGNIFICANT CHANGE UP (ref 1.6–2.6)
MAGNESIUM SERPL-MCNC: 2 MG/DL — SIGNIFICANT CHANGE UP (ref 1.6–2.6)
MCHC RBC-ENTMCNC: 29.3 PG — SIGNIFICANT CHANGE UP (ref 27–34)
MCHC RBC-ENTMCNC: 29.5 PG — SIGNIFICANT CHANGE UP (ref 27–34)
MCHC RBC-ENTMCNC: 33.1 GM/DL — SIGNIFICANT CHANGE UP (ref 32–36)
MCHC RBC-ENTMCNC: 33.3 GM/DL — SIGNIFICANT CHANGE UP (ref 32–36)
MCV RBC AUTO: 88.4 FL — SIGNIFICANT CHANGE UP (ref 80–100)
MCV RBC AUTO: 88.5 FL — SIGNIFICANT CHANGE UP (ref 80–100)
MONOCYTES # BLD AUTO: 0.31 K/UL — SIGNIFICANT CHANGE UP (ref 0–0.9)
MONOCYTES NFR BLD AUTO: 6.9 % — SIGNIFICANT CHANGE UP (ref 2–14)
NEUTROPHILS # BLD AUTO: 2.95 K/UL — SIGNIFICANT CHANGE UP (ref 1.8–7.4)
NEUTROPHILS NFR BLD AUTO: 66 % — SIGNIFICANT CHANGE UP (ref 43–77)
NRBC # BLD: 0 /100 WBCS — SIGNIFICANT CHANGE UP (ref 0–0)
NRBC # BLD: 0 /100 WBCS — SIGNIFICANT CHANGE UP (ref 0–0)
PHOSPHATE SERPL-MCNC: 3.2 MG/DL — SIGNIFICANT CHANGE UP (ref 2.5–4.5)
PHOSPHATE SERPL-MCNC: 3.4 MG/DL — SIGNIFICANT CHANGE UP (ref 2.5–4.5)
PLATELET # BLD AUTO: 238 K/UL — SIGNIFICANT CHANGE UP (ref 150–400)
PLATELET # BLD AUTO: 262 K/UL — SIGNIFICANT CHANGE UP (ref 150–400)
POTASSIUM SERPL-MCNC: 3.9 MMOL/L — SIGNIFICANT CHANGE UP (ref 3.5–5.3)
POTASSIUM SERPL-MCNC: 4 MMOL/L — SIGNIFICANT CHANGE UP (ref 3.5–5.3)
POTASSIUM SERPL-SCNC: 3.9 MMOL/L — SIGNIFICANT CHANGE UP (ref 3.5–5.3)
POTASSIUM SERPL-SCNC: 4 MMOL/L — SIGNIFICANT CHANGE UP (ref 3.5–5.3)
PROT SERPL-MCNC: 8.1 G/DL — SIGNIFICANT CHANGE UP (ref 6–8.3)
RBC # BLD: 4.78 M/UL — SIGNIFICANT CHANGE UP (ref 4.2–5.8)
RBC # BLD: 4.92 M/UL — SIGNIFICANT CHANGE UP (ref 4.2–5.8)
RBC # FLD: 13.8 % — SIGNIFICANT CHANGE UP (ref 10.3–14.5)
RBC # FLD: 13.8 % — SIGNIFICANT CHANGE UP (ref 10.3–14.5)
SODIUM SERPL-SCNC: 137 MMOL/L — SIGNIFICANT CHANGE UP (ref 135–145)
SODIUM SERPL-SCNC: 138 MMOL/L — SIGNIFICANT CHANGE UP (ref 135–145)
WBC # BLD: 4.48 K/UL — SIGNIFICANT CHANGE UP (ref 3.8–10.5)
WBC # BLD: 4.93 K/UL — SIGNIFICANT CHANGE UP (ref 3.8–10.5)
WBC # FLD AUTO: 4.48 K/UL — SIGNIFICANT CHANGE UP (ref 3.8–10.5)
WBC # FLD AUTO: 4.93 K/UL — SIGNIFICANT CHANGE UP (ref 3.8–10.5)

## 2020-11-16 PROCEDURE — 93005 ELECTROCARDIOGRAM TRACING: CPT

## 2020-11-16 PROCEDURE — 84100 ASSAY OF PHOSPHORUS: CPT

## 2020-11-16 PROCEDURE — 84484 ASSAY OF TROPONIN QUANT: CPT

## 2020-11-16 PROCEDURE — 80061 LIPID PANEL: CPT

## 2020-11-16 PROCEDURE — 83735 ASSAY OF MAGNESIUM: CPT

## 2020-11-16 PROCEDURE — 83880 ASSAY OF NATRIURETIC PEPTIDE: CPT

## 2020-11-16 PROCEDURE — 87635 SARS-COV-2 COVID-19 AMP PRB: CPT

## 2020-11-16 PROCEDURE — 82550 ASSAY OF CK (CPK): CPT

## 2020-11-16 PROCEDURE — 93880 EXTRACRANIAL BILAT STUDY: CPT

## 2020-11-16 PROCEDURE — G2066: CPT

## 2020-11-16 PROCEDURE — 82553 CREATINE MB FRACTION: CPT

## 2020-11-16 PROCEDURE — 80053 COMPREHEN METABOLIC PANEL: CPT

## 2020-11-16 PROCEDURE — 82962 GLUCOSE BLOOD TEST: CPT

## 2020-11-16 PROCEDURE — 93298 REM INTERROG DEV EVAL SCRMS: CPT

## 2020-11-16 PROCEDURE — 80048 BASIC METABOLIC PNL TOTAL CA: CPT

## 2020-11-16 PROCEDURE — 82306 VITAMIN D 25 HYDROXY: CPT

## 2020-11-16 PROCEDURE — 96375 TX/PRO/DX INJ NEW DRUG ADDON: CPT

## 2020-11-16 PROCEDURE — 85730 THROMBOPLASTIN TIME PARTIAL: CPT

## 2020-11-16 PROCEDURE — 84300 ASSAY OF URINE SODIUM: CPT

## 2020-11-16 PROCEDURE — 99285 EMERGENCY DEPT VISIT HI MDM: CPT | Mod: 25

## 2020-11-16 PROCEDURE — 85610 PROTHROMBIN TIME: CPT

## 2020-11-16 PROCEDURE — 84443 ASSAY THYROID STIM HORMONE: CPT

## 2020-11-16 PROCEDURE — 86769 SARS-COV-2 COVID-19 ANTIBODY: CPT

## 2020-11-16 PROCEDURE — 96374 THER/PROPH/DIAG INJ IV PUSH: CPT

## 2020-11-16 PROCEDURE — 85025 COMPLETE CBC W/AUTO DIFF WBC: CPT

## 2020-11-16 PROCEDURE — 82607 VITAMIN B-12: CPT

## 2020-11-16 PROCEDURE — 99233 SBSQ HOSP IP/OBS HIGH 50: CPT

## 2020-11-16 PROCEDURE — 85027 COMPLETE CBC AUTOMATED: CPT

## 2020-11-16 PROCEDURE — 82570 ASSAY OF URINE CREATININE: CPT

## 2020-11-16 PROCEDURE — 70450 CT HEAD/BRAIN W/O DYE: CPT

## 2020-11-16 PROCEDURE — 36415 COLL VENOUS BLD VENIPUNCTURE: CPT

## 2020-11-16 PROCEDURE — 71045 X-RAY EXAM CHEST 1 VIEW: CPT

## 2020-11-16 RX ORDER — TRAMADOL HYDROCHLORIDE 50 MG/1
50 TABLET ORAL EVERY 12 HOURS
Refills: 0 | Status: DISCONTINUED | OUTPATIENT
Start: 2020-11-16 | End: 2020-11-16

## 2020-11-16 RX ORDER — TRAMADOL HYDROCHLORIDE 50 MG/1
50 TABLET ORAL EVERY 8 HOURS
Refills: 0 | Status: DISCONTINUED | OUTPATIENT
Start: 2020-11-16 | End: 2020-11-16

## 2020-11-16 RX ORDER — HYDRALAZINE HCL 50 MG
50 TABLET ORAL EVERY 8 HOURS
Refills: 0 | Status: DISCONTINUED | OUTPATIENT
Start: 2020-11-16 | End: 2020-11-16

## 2020-11-16 RX ADMIN — TRAMADOL HYDROCHLORIDE 50 MILLIGRAM(S): 50 TABLET ORAL at 18:00

## 2020-11-16 RX ADMIN — Medication 650 MILLIGRAM(S): at 01:17

## 2020-11-16 RX ADMIN — Medication 1: at 17:11

## 2020-11-16 RX ADMIN — Medication 1: at 12:08

## 2020-11-16 RX ADMIN — Medication 650 MILLIGRAM(S): at 13:30

## 2020-11-16 RX ADMIN — Medication 40 MILLIGRAM(S): at 05:47

## 2020-11-16 RX ADMIN — Medication 50 MILLIGRAM(S): at 13:31

## 2020-11-16 RX ADMIN — TRAMADOL HYDROCHLORIDE 50 MILLIGRAM(S): 50 TABLET ORAL at 17:10

## 2020-11-16 RX ADMIN — Medication 650 MILLIGRAM(S): at 02:52

## 2020-11-16 RX ADMIN — Medication 25 MILLIGRAM(S): at 05:47

## 2020-11-16 RX ADMIN — PANTOPRAZOLE SODIUM 40 MILLIGRAM(S): 20 TABLET, DELAYED RELEASE ORAL at 05:48

## 2020-11-16 RX ADMIN — Medication 25 MILLIGRAM(S): at 13:31

## 2020-11-16 RX ADMIN — Medication 25 MILLIGRAM(S): at 05:48

## 2020-11-16 RX ADMIN — Medication 650 MILLIGRAM(S): at 14:15

## 2020-11-16 RX ADMIN — Medication 10 UNIT(S): at 08:00

## 2020-11-16 RX ADMIN — ISOSORBIDE MONONITRATE 30 MILLIGRAM(S): 60 TABLET, EXTENDED RELEASE ORAL at 12:09

## 2020-11-16 RX ADMIN — Medication 81 MILLIGRAM(S): at 12:09

## 2020-11-16 RX ADMIN — Medication 10 UNIT(S): at 12:08

## 2020-11-16 RX ADMIN — Medication 10 UNIT(S): at 17:10

## 2020-11-16 RX ADMIN — Medication 1: at 08:00

## 2020-11-16 NOTE — PROGRESS NOTE ADULT - PROBLEM SELECTOR PLAN 1
SOB 2/2 CHF exacerbation from noncompliance vs undiagnosed ANTONIA   Troponins downtrended, no EKG changes  C/w 40mg IV lasix   Interrogate loop recorder  strict I/O   monitor daily weight  no need to do ECHO at this time as per cardio   Cardio, Dr. Wall SOB 2/2 CHF exacerbation from noncompliance vs undiagnosed ANTONIA   Troponins downtrended, no EKG changes  C/w 40mg IV lasix   Interrogate loop recorder  strict I/O   monitor daily weight  no need to do ECHO at this time as per cardio   Non sustained Vtach for EP study transfer potential  Cardio, Dr. Wall

## 2020-11-16 NOTE — PATIENT PROFILE ADULT - FOOD INSECURITY
Due to COVID-19 ACTION PLAN, the patient's office visit was converted to a phone visit 6/15/2020    Last visit 9/9/2019    Patient agreeable to telephone encounter visit, calling patient and wife Tamara at their home    I'm calling patient from [facility]    Patient notes the following changes in medical history since last visit:   No chief complaint on file.       Review of Systems   Constitution: Positive for fatigue.   HENT: Negative.    Eyes: Negative.    Cardiovascular: Negative.    Respiratory: Positive for cough.    Endocrine: Negative.    Hematologic/Lymphatic: Negative.    Skin: Negative.    Musculoskeletal: Negative.    Gastrointestinal: Negative.    Genitourinary: Negative.    Neurological: Negative.    Psychiatric/Behavioral: Negative.    Allergic/Immunologic: Negative.           Patient offers the following concerns:       Patient is doing home BP checks: No    The following testing was reviewed during this phone visit:   10/9/2019 and 10/28/2019 ECHO   10/28/2019 EKG    Medication and allergy reconciliation completed over the phone.     The following problems were addressed during today's call:  Problem List Items Addressed This Visit        Circulatory    Essential hypertension    Relevant Orders    ELECTROCARDIOGRAM 12-LEAD    PAF (paroxysmal atrial fibrillation) (CMS/[facility]) - Primary    Relevant Orders    ELECTROCARDIOGRAM 12-LEAD       Musculoskeletal    Traumatic complete tear of left rotator cuff       Endocrine    Hyperlipidemia       Hematologic & Lymphatic    Anticoagulated on Coumadin          The following was ordered during today's call:   Annual EKG    Time spent talking with patient during today's call: 24 minutes  Call start 1211 hrs Call End 1235 hrs    Testing should be completed prior to next visit. New prescriptions / refills sent to the pharmacy.      Return to clinic in 6 months PAMELA Wilosn and 12 months Anthony Sorto DO [facility]  upon completion of aforementioned studies or sooner for concerns.  All questions were answered to the patient's satisfaction and to the best of my abilities.     Interventional Cardiology  Progress Note    Reason for Visit: Cardiovascular revisit  Last visit: 9/9/2019; Jan 20, 2015 with Dr. Waylon Escoto  PCP: Dr. Wenceslao Mark  PULM: Dr. Clayton Jiang    The patient was seen and examined and the chart was reviewed this date.  Thank you for your referral.  My impressions and recommendations are as follows:    IMPRESSIONS:  1. Paroxsymal atrial fibrillation, currently sinus bradycardia  - stable on Metoprolol and Amiodarone, no clinical signs of chronotropic incompetence  - [id number] of 5 (age, HTN, CVA)  - Chronically anticoagulated with Coumadin  - No issues  2.  Persistent cough since Winter 2017 (Dec)  * possible acute/chronic bronchitis, asthma, COPD  * CXR PA/Lat 3/30/2018  * trial of Albuterol and OTC mucolytic's  * following Pulmonology with prior extensive tobacco history  * noted emphysema/COPD by PFT 12/2017  * awaiting renew Pulmonary Function Testing (PFT) at [facility]    3. Hypertension with HCVD, Chronic Diastolic CHF NYHA 1 at goal on Cozaar, Amlodipine, Metoprolol  4. CVA 2014 with   - Moderate sized acute vs subacute infarct of the right occipital lobe  - Improved visual deficits  - Stable  5. Prior extensive tobacco abuse; quit 1965  - Dec 2016 PFT with mild COPD  6. Dyslipidemia on Lipitor   7. Pulsatile abdominal mass (thin body habitus)  - Aug 31, 2016 AAA screening negative   8. Allergy to Lisinopril with cough  9. Fatigue  - none with exertion  - If no to have profound bradycardia on current medications including amiodarone 100 mg and metoprolol tartrate 25 mg twice daily may need to either reduce metoprolol 12.5 mg or discontinue altogether however his fatigue seems to be more related to sleep habitus as opposed to activity and exertion which she has absolutely no issues  completing his daily activities and exercise  10. Mechanical fall [facility] 2019/2020  - injured Left shoulder  - no head/hip injury   - aware of fall risk on Coumadin, If continues to have falls may need to discontinue Coumadin altogether Continue with aspirin and Plavix or consider watchman  RECOMMENDATIONS:  1. Regarding his CVD, PAF status  - Reassurance provided after review of Aug 2016 Screening AAA as well as 2019 ECHO(S)  - Fall risk discussed  - Annual Pulmonary function testing (PFT) and TSH while on amiodarone, as per Dr. Jiang    - No indication for ischemia testing, asymptomatic  - If syncope, chest pain with exertion, or worsening symptoms occur advised to notify office or go to nearest ER  2. Medications and labs reviewed  - Coumadin management per [facility] with goal INR 2-3 for PAF with CHADS Vasc of 4  - Continue all other medications; No ASA, on Plavix 75 mg; Amiodarone 100 mg  - Antihypertensives; Norvasc 5 mg, Cozaar 25 mg, Lopressor 25 mg BID  - Dyslipidemia; Lipitor 10 mg  - Renew FLP, TSH annually   - Last known vaccines; Influenza 2019 and Pneumovax 2015, Prevnar (13) 2015 Vaccines  3. Exercise at least 150 minutes aerobic activity per week as tolerated.   4. Emphasized the need for low salt, low fat, low cholesterol diet.  5. Return to clinic in 6 months PAMELA Wilson and 12 months DO [facility] upon completion of aforementioned studies or sooner for concerns.  All questions were answered to the patient's satisfaction and to the best of my abilities.    SUBJECTIVE:   Ishmael Sánchez is a 82 year old  male who presents 6/15/2020 for a revisit. He was previously followed by my late colleague Dr. Waylon Escoto.  He has a history of prior tobacco abuse, HTN, CVA 2014, prior ETOH use, PAF on coumadin anticoagulation and amiodarone antiarrhythmic therapy.    Slip and fall, March 2020 left shoulder rotator cuff injury  Underwent physical therapy    No GIB  (hematemesis, hematochezia, melena), hematuria, epistaxis nor excessive bleeding/bruising.    He continues to be physically active. Two to three times a week he goes to the gym: goes for regular walks, 2-4 miles at times on a treadmill - no limitations.  Using step doing 100 steps.    No new cardiac complaints. No new issues to date.  He does feel tired, but stable.    He does note he still has this persistent coughing but awaiting pulmonary function test when able following with Dr. Jiang.  No fevers, chills, admissions for heart failure.  No issues with his medical regiment.    He notes fatigue once more however only notes that when he is trying to sit down and read a book.  With activities he may need to stop briefly but able to recover within seconds and continues to walk.  He does admit he is not able to keep up with his wife who walks much faster than him however he is able to do 2 to 4 miles on his treadmill or outside.    Denies any chest pain or shortness of breath at rest or with ambulation. Denies any dizziness or lightheadedness. Denies any palpitations, skipped beats or fluttering sensation. Denies any PND or orthopnea. Denies being awoken in the middle of the night with chest pain, shortness of breath or chest pain. Appetite is good.  Energy level good.    No GIB (hematemesis, hematochezia, melena), hematuria, epistaxis nor excessive bleeding/bruising on Coumadin and Plavix    His [place] wife Tamara did accompany him today.  Appointed medical health care power of  is his wife Tamara Sánchez      Past Medical History:   Diagnosis Date   • Anticoagulated on Coumadin    • CVA (cerebrovascular accident) (CMS/HCC) 2014    Moderate sized acute vs subacute infarct of the right occipital lobe   • Essential (primary) hypertension    • Essential tremor    • HLD (hyperlipidemia)    • HTN (hypertension)    • PAF (paroxysmal atrial fibrillation) (CMS/HCC)    • Tobacco abuse    • Vitamin B12 deficiency            Past Surgical History:   Procedure Laterality Date   • Cataract extraction, bilateral     • Hernia repair      x 4   • Tonsillectomy         Allergies:   ALLERGIES:   Allergen Reactions   • Lisinopril Cough       Medications:   Current Outpatient Medications   Medication Sig Dispense Refill   • losartan (COZAAR) 25 MG tablet TAKE 1 TABLET BY MOUTH DAILY 90 tablet 0   • amLODIPine (NORVASC) 5 MG tablet TAKE 1 TABLET BY MOUTH DAILY 90 tablet 0   • atorvastatin (LIPITOR) 10 MG tablet TAKE 1 TABLET BY MOUTH DAILY 90 tablet 1   • AMIODarone (PACERONE) 200 MG tablet Take 1 tablet by mouth daily. 90 tablet 1   • warfarin (COUMADIN) 5 MG tablet Take 1 tablet (5 mg) on Monday and take 0.5 tablets (2.5 mg) on all other days and as directed. 20 tablet 2   • primidone (MYSOLINE) 50 MG tablet TAKE 1 TABLET BY MOUTH THREE TIMES DAILY 270 tablet 3   • Multiple Vitamins-Minerals (PRESERVISION AREDS 2 PO)      • metoPROLOL tartrate (LOPRESSOR) 25 MG tablet TAKE 1 TABLET BY MOUTH TWICE DAILY 180 tablet 1   • budesonide-formoterol (SYMBICORT) 160-4.5 MCG/ACT inhaler Inhale 2 puffs into the lungs 2 times daily. 10.2 g 3   • Cyanocobalamin (B-12) 1000 MCG Cap Take 1 tablet by mouth.       No current facility-administered medications for this visit.        Social History:   Social History     Socioeconomic History   • Marital status: /Civil Union     Spouse name: Not on file   • Number of children: Not on file   • Years of education: Not on file   • Highest education level: Not on file   Occupational History   • Not on file   Social Needs   • Financial resource strain: Not on file   • Food insecurity:     Worry: Not on file     Inability: Not on file   • Transportation needs:     Medical: Not on file     Non-medical: Not on file   Tobacco Use   • Smoking status: Former Smoker     Packs/day: 1.00     Years: 30.00     Pack years: 30.00     Types: Cigarettes     Start date: 1949     Last attempt to quit: 1979     Years since  quittin.4   • Smokeless tobacco: Never Used   Substance and Sexual Activity   • Alcohol use: Yes   • Drug use: No   • Sexual activity: Yes   Lifestyle   • Physical activity:     Days per week: 7 days     Minutes per session: 60 min   • Stress: Not at all   Relationships   • Social connections:     Talks on phone: Not on file     Gets together: Not on file     Attends Oriental orthodox service: Not on file     Active member of club or organization: Not on file     Attends meetings of clubs or organizations: Not on file     Relationship status: Not on file   • Intimate partner violence:     Fear of current or ex partner: Not on file     Emotionally abused: Not on file     Physically abused: Not on file     Forced sexual activity: Not on file   Other Topics Concern   • Not on file   Social History Narrative   • Not on file       Family History:   Family History   Problem Relation Age of Onset   • Diabetes Brother    • Cancer, Colon Brother    • Cancer, Colon Brother    • Stroke Brother        ROS  Review of Systems   Constitutional: Positive for fatigue.   HENT: Negative.    Eyes: Negative.    Respiratory: Positive for cough.    Cardiovascular: Negative.    Gastrointestinal: Negative.    Endocrine: Negative.    Genitourinary: Negative.    Musculoskeletal: Negative.    Skin: Negative.    Allergic/Immunologic: Negative.    Neurological: Negative.    Hematological: Negative.    Psychiatric/Behavioral: Negative.        OBJECTIVE:  There were no vitals taken for this visit.      Physical Exam  Physical Exam  Pulmonary:      Effort: Pulmonary effort is normal.   Neurological:      General: No focal deficit present.      Mental Status: He is alert.   Psychiatric:         Mood and Affect: Mood normal.         SARS-COV2 - COVID 19 Disaster Declaration: Due to the nature of this patients COVID-19 status (either confirmed or suspected/PUI) and current pandemic, this note was prepared without a bedside physical examination and face  to face evaluation to prevent the spread of the infection and conserve PPE    Lab / Testing:    The following labs and diagnostic studies were reviewed by me independently and discussed with the patient. Refer to individual report(s) for complete details.    Anti-Coag on 06/01/2020   Component Date Value Ref Range Status   • INR 06/01/2020 2.4   Final   Anti-Coag on 05/04/2020   Component Date Value Ref Range Status   • INR 05/04/2020 2.2   Final   Anti-Coag on 03/10/2020   Component Date Value Ref Range Status   • INR 03/10/2020 2.6   Final   Anti-Coag on 02/11/2020   Component Date Value Ref Range Status   • INR 02/11/2020 2.0   Final   Lab Services on 02/03/2020   Component Date Value Ref Range Status   • CLARITY 02/03/2020 CLEAR  CLEAR Final   • PH URINE 02/03/2020 5.0  5.0 - 7.0 Final   • COLOR 02/03/2020 YELLOW  YELLOW Final   • SPECIFIC GRAVITY URINE 02/03/2020 1.020  1.001 - 1.030 Final   • BLOOD URINE 02/03/2020 NEGATIVE  NEGATIVE Final    Comment: A urine microscopic was performed due to the presence of ASCORBIC ACID,   which may cause false negative blood chemical results.     • KETONES, URINE 02/03/2020 NEGATIVE  NEGATIVE Final   • GLUCOSE QUALITATIVE U 02/03/2020 NEGATIVE  NEGATIVE Final   • UROBILINOGEN URINE 02/03/2020 <2.0  <2 Final   • URINE PROTEIN, QUAL (DIPSTICK) 02/03/2020 NEGATIVE  NEGATIVE Final   • BILIRUBIN URINE 02/03/2020 NEGATIVE  NEGATIVE Final   • LEUKOCYTE ESTERASE URINE 02/03/2020 NEGATIVE  NEGATIVE Final   • NITRITE URINE 02/03/2020 NEGATIVE  NEGATIVE Final   • PROTEIN, TOTAL SERUM 02/03/2020 6.8  6.4 - 8.5 g/dL Final   • NA (SODIUM, SERUM) 02/03/2020 141  136 - 146 mmol/L Final   • K (POTASSIUM, SERUM) 02/03/2020 4.8  3.5 - 5.3 mmol/L Final   • GLUCOSE, RANDOM 02/03/2020 83  70 - 200 mg/dL Final   • CREATININE, SERUM 02/03/2020 0.9  0.6 - 1.6 mg/dL Final   • CO2 VENOUS 02/03/2020 28  22 - 32 mmol/L Final   • CHLORIDE, SERUM 02/03/2020 109* 96 - 107 mmol/L Final   • CALCIUM, SERUM  02/03/2020 9.3  8.6 - 10.6 mg/dL Final   • BLOOD UREA NITROGEN 02/03/2020 19  6 - 27 mg/dL Final   • ASPARTATE AMINOTRNSFRASE(SGOT) 02/03/2020 29  14 - 43 U/L Final   • BILIRUBIN, TOTAL 02/03/2020 0.5  0.0 - 1.3 mg/dL Final   • ALANINE AMINOTRANSFERASE(SGPT) 02/03/2020 39  5 - 49 U/L Final   • ALKALINE PHOSPHATASE(9809959) 02/03/2020 80  45 - 115 U/L Final   • ALBUMIN, SERUM 02/03/2020 3.9  3.6 - 5.1 g/dL Final   • EGFR*  02/03/2020 >60  >60 mL/min/[1.73m2] Final   • EGFR* NON- 02/03/2020 >60  >60 mL/min/[1.73m2] Final   • WHITE BLOOD CELL COUNT 02/03/2020 8.4  4.0 - 10.0 10*3/uL Final   • RED BLOOD CELL COUNT 02/03/2020 5.09  3.90 - 5.70 10*6/uL Final   • HEMOGLOBIN 02/03/2020 15.7  13.7 - 17.5 g/dL Final   • HEMATOCRIT 02/03/2020 49.7  40.0 - 51.0 % Final   • MEAN CORPUSCULAR VOLUME 02/03/2020 97.6* 79.0 - 95.0 fL Final   • MEAN CORPUSCULAR HGB 02/03/2020 30.8  27.0 - 34.0 pg Final   • MEAN CORPUSCULAR HGB CONCENTRATION 02/03/2020 31.6* 32.0 - 36.0 % Final   • PLATELET COUNT 02/03/2020 172  150 - 400 10*3/uL Final   • MEAN PLATELET VOLUME 02/03/2020 11.6  8.6 - 12.4 fL Final   • RED CELL DISTRIBUTION WIDTH 02/03/2020 14.6  11.3 - 14.8 % Final   • NEUTROPHIL PERCENT 02/03/2020 70.5  34.0 - 73.5 % Final   • NEUTROPHIL ABSOLUTE # 02/03/2020 5.9  1.4 - 6.5 10*3/uL Final   • LYMPH PERCENT 02/03/2020 17.2* 20.5 - 51.1 % Final   • LYMPHOCYTE ABSOLUTE # 02/03/2020 1.5  1.2 - 3.4 10*3/uL Final   • MONOCYTE PERCENT 02/03/2020 9.4  4.3 - 12.9 % Final   • MONOCYTE ABSOLUTE # 02/03/2020 0.8  0.2 - 0.9 10*3/uL Final   • EOSINOPHIL % 02/03/2020 2.7  0.0 - 10.0 % Final   • EOSINOPHIL ABSOLUTE # 02/03/2020 0.2  0.0 - 0.5 10*3/uL Final   • BASOPHIL % 02/03/2020 0.2  0.0 - 1.2 % Final   • BASOPHIL ABSOLUTE # 02/03/2020 0.0  0.0 - 0.1 10*3/uL Final   • DIFFERENTIAL TYPE 02/03/2020 AUTO DIFF   Final   • WHITE BLOOD CELLS URINE 02/03/2020 <1 /HPF  0 - 5 Final   • RED BLOOD CELLS URINE 02/03/2020 1 /HPF   0 - 3 Final   • MUCOUS 02/03/2020 RARE  NONE,RARE,FEW,OCCASIONAL Final   • SQUAMOUS EPITHELIAL CELLS 02/03/2020 1 /HPF  NONE Final   Anti-Coag on 01/14/2020   Component Date Value Ref Range Status   • INR 01/14/2020 2.0   Final   Lab Services on 12/17/2019   Component Date Value Ref Range Status   • CLARITY 12/17/2019 CLEAR  CLEAR Final   • PH URINE 12/17/2019 6.0  5.0 - 7.0 Final   • COLOR 12/17/2019 YELLOW  YELLOW Final   • SPECIFIC GRAVITY URINE 12/17/2019 1.018  1.001 - 1.030 Final   • BLOOD URINE 12/17/2019 NEGATIVE  NEGATIVE Final    Comment: A urine microscopic was performed due to the presence of ASCORBIC ACID,   which may cause false negative blood chemical results.     • KETONES, URINE 12/17/2019 NEGATIVE  NEGATIVE Final   • GLUCOSE QUALITATIVE U 12/17/2019 NEGATIVE  NEGATIVE Final   • UROBILINOGEN URINE 12/17/2019 2.0  <2 Final   • URINE PROTEIN, QUAL (DIPSTICK) 12/17/2019 NEGATIVE  NEGATIVE Final   • BILIRUBIN URINE 12/17/2019 NEGATIVE  NEGATIVE Final   • LEUKOCYTE ESTERASE URINE 12/17/2019 NEGATIVE  NEGATIVE Final   • NITRITE URINE 12/17/2019 NEGATIVE  NEGATIVE Final   • CHOLESTEROL, TOTAL 12/17/2019 128* 140 - 200 mg/dL Final   • HDL CHOLESTEROL 12/17/2019 38* >40 mg/dL Final   • LDL CHOL, CALCULATED 12/17/2019 67  30 - 100 mg/dL Final   • TRIGLYCERIDES 12/17/2019 117  0 - 200 mg/dL Final   • CALCIUM, SERUM 12/17/2019 9.3  8.6 - 10.6 mg/dL Final   • ALBUMIN, SERUM 12/17/2019 4.0  3.6 - 5.1 g/dL Final   • ALKALINE PHOSPHATASE([id number]) 12/17/2019 82  45 - 115 U/L Final   • ALANINE AMINOTRANSFERASE(SGPT) 12/17/2019 33  5 - 49 U/L Final   • ASPARTATE AMINOTRNSFRASE(SGOT) 12/17/2019 29  14 - 43 U/L Final   • BILIRUBIN, TOTAL 12/17/2019 0.7  0.0 - 1.3 mg/dL Final   • BLOOD UREA NITROGEN 12/17/2019 13  6 - 27 mg/dL Final   • CHLORIDE, SERUM 12/17/2019 106  96 - 107 mmol/L Final   • CO2 VENOUS 12/17/2019 26  22 - 32 mmol/L Final   • CREATININE, SERUM 12/17/2019 0.9  0.6 - 1.6 mg/dL Final   • K (POTASSIUM,  SERUM) 12/17/2019 4.7  3.5 - 5.3 mmol/L Final   • NA (SODIUM, SERUM) 12/17/2019 138  136 - 146 mmol/L Final   • GLUCOSE, FASTING 12/17/2019 100  60 - 100 mg/dL Final    Comment: ADA Recommendations:       *Fasting Glucose  < 100 mg/dL . . . . . . . Normal fasting glucose       *Fasting Glucose  100-125 mg/dL . . . . . Impaired fasting glucose       *Fasting Glucose  >= 126 mg/dL . . . . . . Provisional diagnosis of   diabetes                                (confirm with 2 hr Post Prandial)     • PROTEIN, TOTAL SERUM 12/17/2019 6.9  6.4 - 8.5 g/dL Final   • EGFR*  12/17/2019 >60  >60 mL/min/[1.73m2] Final   • EGFR* NON- 12/17/2019 >60  >60 mL/min/[1.73m2] Final   • WHITE BLOOD CELL COUNT 12/17/2019 10.0  4.0 - 10.0 10*3/uL Final   • RED BLOOD CELL COUNT 12/17/2019 5.23  3.90 - 5.70 10*6/uL Final   • HEMOGLOBIN 12/17/2019 15.9  13.7 - 17.5 g/dL Final   • HEMATOCRIT 12/17/2019 48.5  40.0 - 51.0 % Final   • MEAN CORPUSCULAR VOLUME 12/17/2019 92.7  79.0 - 95.0 fL Final   • MEAN CORPUSCULAR HGB 12/17/2019 30.4  27.0 - 34.0 pg Final   • MEAN CORPUSCULAR HGB CONCENTRATION 12/17/2019 32.8  32.0 - 36.0 % Final   • PLATELET COUNT 12/17/2019 171  150 - 400 10*3/uL Final   • MEAN PLATELET VOLUME 12/17/2019 11.5  8.6 - 12.4 fL Final   • RED CELL DISTRIBUTION WIDTH 12/17/2019 14.5  11.3 - 14.8 % Final   • NEUTROPHIL PERCENT 12/17/2019 69.9  34.0 - 73.5 % Final   • NEUTROPHIL ABSOLUTE # 12/17/2019 7.0* 1.4 - 6.5 10*3/uL Final   • LYMPH PERCENT 12/17/2019 15.5* 20.5 - 51.1 % Final   • LYMPHOCYTE ABSOLUTE # 12/17/2019 1.6  1.2 - 3.4 10*3/uL Final   • MONOCYTE PERCENT 12/17/2019 11.9  4.3 - 12.9 % Final   • MONOCYTE ABSOLUTE # 12/17/2019 1.2* 0.2 - 0.9 10*3/uL Final   • EOSINOPHIL % 12/17/2019 2.5  0.0 - 10.0 % Final   • EOSINOPHIL ABSOLUTE # 12/17/2019 0.3  0.0 - 0.5 10*3/uL Final   • BASOPHIL % 12/17/2019 0.2  0.0 - 1.2 % Final   • BASOPHIL ABSOLUTE # 12/17/2019 0.0  0.0 - 0.1 10*3/uL Final   •  DIFFERENTIAL TYPE 12/17/2019 AUTO DIFF   Final   • VITAMIN B12 12/17/2019 347  193 - 982 pg/mL Final   • WHITE BLOOD CELLS URINE 12/17/2019 <1 /HPF  0 - 5 Final   • RED BLOOD CELLS URINE 12/17/2019 <1 /HPF  0 - 3 Final   • MUCOUS 12/17/2019 RARE  NONE,RARE,FEW,OCCASIONAL Final   • SQUAMOUS EPITHELIAL CELLS 12/17/2019 <1 /HPF  NONE Final   Anti-Coag on 12/17/2019   Component Date Value Ref Range Status   • INR 12/17/2019 2.3   Final   Anti-Coag on 11/19/2019   Component Date Value Ref Range Status   • INR 11/19/2019 2.5   Final   Anti-Coag on 10/29/2019   Component Date Value Ref Range Status   • INR 10/29/2019 2.9   Final   There may be more visits with results that are not included.     EKG 9/9/2019 (reviewed Independently interpreted)  Sinus bradycardia 42    EKG 12/3/2018  Sinus bradycardia HR 46    EKG 3/30/2018  Sinus bradycardia HR 46    EKG 9/27/2017  Sinus bradycardia HR 51    ECHO 10/28/2019 (independently interpreted and reviewed personally)  1. Procedure narrative: A limited transthoracic echocardiography was     performed. Image quality was adequate. Intravenous contrast (Definity)     was administered to opacify the left ventricle.  2. Left ventricle: The cavity size is normal. Wall thickness is normal.     Systolic function is normal. The estimated ejection fraction is 55-60%.    ECHO 10/9/2019 (independently interpreted and reviewed personally)  1. Left ventricle: The cavity size is normal. Wall thickness is     mildly increased. There is concentric hypertrophy. Systolic     function is normal. The estimated ejection fraction is 55-60%.  2. Aortic valve: Transvalvular velocity is within the normal range.     There is no stenosis. Mild regurgitation.  3. Mitral valve: Trivial regurgitation.  4. Left atrium: The atrium is mildly dilated.  5. Tricuspid valve: Trace regurgitation.    ECHO May 23, 2014  * Normal LV size with normal function. LVEF=60%.   * There is mild concentric hypertrophy.  * Mitral  filling indicates normal diastolic function.  * Normal right ventricular size with normal function.  * Mildly dilated left atrium. Normal right atrium.  * Normal, trileaflet aortic valve. There is trace aortic  regurgitation. There is no aortic stenosis.  * Normal mitral valve. There is trivial mitral regurgitation.  * Normal tricuspid valve. There is trivial tricuspid regurgitation.  * Normal PA pressure (29 mmHg).  * Normal pulmonic valve.  * Negative bubble study.     Lexiscan nuclear stress test 9/6/2016  1. Normal myocardial perfusion examination.   2. The overall quality of the study is good.  3. Normal perfusion imaging without evidence of inducible  ischemia or infarct.  4. Normal left ventricular volume with normal systolic thickening  and function and an ejection fraction of 56%.  5. No previous study was available for comparison.    Nuclear exercise stress test Feb 2, 2006  Peak Heart Rate: 144 Peak Blood Pressure: 174/92  Peak Met: 10.40 Exercise Time: 9:22   1. Normal myocardial perfusion scan with no evidence of  exercise-inducible ischemia.  2. Normal wall motion.  3. Calculated ejection fraction post stress is 64%.  4. Cardiology will notify pt. with above result     Screen Abdominal Aorta Ultrasound 8/31/2016  Portions of the proximal and mid abdominal aorta are obscured by  overlying bowel gas. No sonographic findings to suggest an abdominal  aortic aneurysm    CT chest 2/14/2019  Essentially stable unenhanced chest CT with stable subtle ground glass nodular opacities in the lower lobes that are likely infectious or inflammatory in etiology.  Mild bilateral lower lobe bronchiectasis is also noted.  There is no new   suspicious pulmonary parenchymal abnormality.  Follow-up imaging is recommended as clinically warranted.    Electronically Signed by:    Anthony Sorto DO , 6/15/2020    [This note used Dragon technology. Transcription errors are not uncommon and may not have been corrected  prior to electronically signing the note. Should you find these errors, please consult the clinician for interpretation (or apply common sense adjustment when safe and appropriate).]     no

## 2020-11-16 NOTE — PROGRESS NOTE ADULT - SUBJECTIVE AND OBJECTIVE BOX
Problem List:  Systolic cardiomyopathy9  Cardiorenal syndrome.    PAST MEDICAL & SURGICAL HISTORY:  Cerebrovascular accident (CVA), unspecified mechanism  Congestive heart failure, NYHA class 3, chronic, combined  Cerebrovascular accident (CVA), unspecified mechanism  Hyperlipidemia, unspecified hyperlipidemia type  Coronary artery disease involving native heart without angina pectoris, unspecified vessel or lesion type  Hypertension, unspecified type  Diabetes mellitus of other type without complication    S/P CABG x 5  2005 at [facility]    History of loop recorder    S/P coronary artery stent placement    History of appendectomy        No Known Allergies  Plavix (Headache)      MEDICATIONS  (STANDING):  aspirin  chewable 81 milliGRAM(s) Oral daily  atorvastatin 20 milliGRAM(s) Oral at bedtime  ergocalciferol 79969 Unit(s) Oral <User Schedule>  furosemide   Injectable 40 milliGRAM(s) IV Push daily  heparin   Injectable 5000 Unit(s) SubCutaneous every 8 hours  hydrALAZINE 50 milliGRAM(s) Oral every 8 hours  insulin glargine Injectable (LANTUS) 30 Unit(s) SubCutaneous at bedtime  insulin lispro (ADMELOG) corrective regimen sliding scale   SubCutaneous Before meals and at bedtime  insulin lispro Injectable (ADMELOG) 10 Unit(s) SubCutaneous three times a day before meals  isosorbide   mononitrate ER Tablet (IMDUR) 30 milliGRAM(s) Oral daily  melatonin 5 milliGRAM(s) Oral at bedtime  metoprolol tartrate 25 milliGRAM(s) Oral three times a day  pantoprazole    Tablet 40 milliGRAM(s) Oral before breakfast    MEDICATIONS  (PRN):  acetaminophen   Tablet .. 650 milliGRAM(s) Oral every 6 hours PRN Temp greater or equal to 38C (100.4F), Moderate Pain (4 - 6)  meclizine 25 milliGRAM(s) Oral three times a day PRN Dizziness                            14.1   4.93  )-----------( 238      ( 16 Nov 2020 06:47 )             42.3     11-16    138  |  105  |  29<H>  ----------------------------<  155<H>  3.9   |  25  |  1.60<H>    Ca    9.1      16 Nov 2020 06:47  Phos  3.2     11-16  Mg     2.0     11-16          Sodium, Random Urine: 106 mmol/L (11-13 @ 23:10)  Creatinine, Random Urine: 25 mg/dL (11-13 @ 23:10)      REVIEW OF SYSTEMS:  General: no fever no chills, no weight loss.  EYES/ENT: No visual changes;  No vertigo, no headache.  NECK: No pain or stiffness  RESPIRATORY: No cough, wheezing, hemoptysis; No shortness of breath  CARDIOVASCULAR: No chest pain or palpitations. No Edema  GASTROINTESTINAL: No abdominal or epigastric pain. No nausea, vomiting. No diarrhea or constipation. No melena.  GENITOURINARY: No dysuria, frequency, foamy urine, urinary urgency, incontinence or hematuria  NEUROLOGICAL: No numbness or weakness, no tremor , no dizziness.           VITALS:  T(F): 98.2 (11-16-20 @ 07:37), Max: 98.7 (11-15-20 @ 15:35)  HR: 77 (11-16-20 @ 04:54)  BP: 161/82 (11-16-20 @ 04:54)  RR: 18 (11-16-20 @ 07:37)  SpO2: 100% (11-16-20 @ 07:37)  Wt(kg): --    11-15 @ 07:01  -  11-16 @ 07:00  --------------------------------------------------------  IN: 275 mL / OUT: 2500 mL / NET: -2225 mL    11-16 @ 07:01  -  11-16 @ 10:28  --------------------------------------------------------  IN: 220 mL / OUT: 0 mL / NET: 220 mL        PHYSICAL EXAM:  Constitutional: well developed, no diaphoresis, no distress.  Neck: No JVD, no carotid bruit, supple, no adenopathy  Respiratory: Good air entrance B/L, no wheezes, rales or rhonchi  Cardiovascular: S1, S2, RRR, no pericardial rub, no murmur  Abdomen: BS+, soft, no tenderness, no bruit  Pelvis: bladder nondistended  Extremities: No cyanosis or clubbing. No peripheral edema.   Pulses: All present

## 2020-11-16 NOTE — PROGRESS NOTE ADULT - PROBLEM SELECTOR PROBLEM 4
Diabetes mellitus of other type without complication
Diabetes mellitus of other type without complication
ANTONIA (obstructive sleep apnea)
ANTONIA (obstructive sleep apnea)

## 2020-11-16 NOTE — ACUTE INTERFACILITY TRANSFER NOTE - SECONDARY DIAGNOSIS.
Hypertension, unspecified type Diabetes mellitus of other type without complication Ventricular tachycardia seen on cardiac monitor

## 2020-11-16 NOTE — PROGRESS NOTE ADULT - SUBJECTIVE AND OBJECTIVE BOX
CHIEF COMPLAINT:Patient is a 55y old  Male who presents with a chief complaint of dizziness, SOB .Pt appears comfortable.    	  REVIEW OF SYSTEMS:  CONSTITUTIONAL: No fever, weight loss, or fatigue  EYES: No eye pain, visual disturbances, or discharge  ENT:  No difficulty hearing, tinnitus, vertigo; No sinus or throat pain  NECK: No pain or stiffness  RESPIRATORY: No cough, wheezing, chills or hemoptysis; No Shortness of Breath  CARDIOVASCULAR: No chest pain, palpitations, passing out, dizziness, or leg swelling  GASTROINTESTINAL: No abdominal or epigastric pain. No nausea, vomiting, or hematemesis; No diarrhea or constipation. No melena or hematochezia.  GENITOURINARY: No dysuria, frequency, hematuria, or incontinence  NEUROLOGICAL: No headaches, memory loss, loss of strength, numbness, or tremors  SKIN: No itching, burning, rashes, or lesions   LYMPH Nodes: No enlarged glands  ENDOCRINE: No heat or cold intolerance; No hair loss  MUSCULOSKELETAL: No joint pain or swelling; No muscle, back, or extremity pain  PSYCHIATRIC: No depression, anxiety, mood swings, or difficulty sleeping  HEME/LYMPH: No easy bruising, or bleeding gums  ALLERGY AND IMMUNOLOGIC: No hives or eczema	        PHYSICAL EXAM:  T(C): 36.8 (11-16-20 @ 07:37), Max: 37.1 (11-15-20 @ 15:35)  HR: 77 (11-16-20 @ 04:54) (70 - 93)  BP: 161/82 (11-16-20 @ 04:54) (153/84 - 165/89)  RR: 18 (11-16-20 @ 07:37) (18 - 19)  SpO2: 100% (11-16-20 @ 07:37) (97% - 100%)  Wt(kg): --  I&O's Summary    15 Nov 2020 07:01  -  16 Nov 2020 07:00  --------------------------------------------------------  IN: 275 mL / OUT: 2500 mL / NET: -2225 mL    16 Nov 2020 07:01  -  16 Nov 2020 10:01  --------------------------------------------------------  IN: 220 mL / OUT: 0 mL / NET: 220 mL        Appearance: Normal	  HEENT:   Normal oral mucosa, PERRL, EOMI	  Lymphatic: No lymphadenopathy  Cardiovascular: Normal S1 S2, No JVD, No murmurs, No edema  Respiratory: Lungs clear to auscultation	  Psychiatry: A & O x 3, Mood & affect appropriate  Gastrointestinal:  Soft, Non-tender, + BS	  Skin: No rashes, No ecchymoses, No cyanosis	  Neurologic: Non-focal  Extremities: Normal range of motion, No clubbing, cyanosis or edema  Vascular: Peripheral pulses palpable 2+ bilaterally    MEDICATIONS  (STANDING):  aspirin  chewable 81 milliGRAM(s) Oral daily  atorvastatin 20 milliGRAM(s) Oral at bedtime  ergocalciferol 96399 Unit(s) Oral <User Schedule>  furosemide   Injectable 40 milliGRAM(s) IV Push daily  heparin   Injectable 5000 Unit(s) SubCutaneous every 8 hours  hydrALAZINE 25 milliGRAM(s) Oral three times a day  insulin glargine Injectable (LANTUS) 30 Unit(s) SubCutaneous at bedtime  insulin lispro (ADMELOG) corrective regimen sliding scale   SubCutaneous Before meals and at bedtime  insulin lispro Injectable (ADMELOG) 10 Unit(s) SubCutaneous three times a day before meals  isosorbide   mononitrate ER Tablet (IMDUR) 30 milliGRAM(s) Oral daily  melatonin 5 milliGRAM(s) Oral at bedtime  metoprolol tartrate 25 milliGRAM(s) Oral three times a day  pantoprazole    Tablet 40 milliGRAM(s) Oral before breakfast      TELEMETRY: 	nsr,nsvt    	  	  LABS:	 	                         14.1   4.93  )-----------( 238      ( 16 Nov 2020 06:47 )             42.3     11-16    138  |  105  |  29<H>  ----------------------------<  155<H>  3.9   |  25  |  1.60<H>    Ca    9.1      16 Nov 2020 06:47  Phos  3.2     11-16  Mg     2.0     11-16      proBNP: Serum Pro-Brain Natriuretic Peptide: 2400 pg/mL (11-13 @ 08:38)    Lipid Profile: Cholesterol <50  LDL --  HDL 35  TG 76      TSH: Thyroid Stimulating Hormone, Serum: 1.20 uU/mL (11-14 @ 08:40)

## 2020-11-16 NOTE — PROGRESS NOTE ADULT - PROBLEM SELECTOR PLAN 5
monitor BP  cont meds
monitor BP  cont meds
continue home med of metoprolol   Started on hydralazine and imdur  Lasix as above  hold lisinopril, aldactone in setting of MALCOM  monitor BP and adjust meds as needed
continue home med of metoprolol   Started on hydralazine and imdur  Lasix as above  hold lisinopril, aldactone in setting of MALCOM  monitor BP and adjust meds as needed

## 2020-11-16 NOTE — PROGRESS NOTE ADULT - PROBLEM SELECTOR PLAN 6
Avoid nephrotoxic drugs  monitor BMP  Renal follow up
Avoid nephrotoxic drugs  monitor BMP  Renal follow up
continue home med of atorvastatin   LIP panel wnl
continue home med of atorvastatin   LIP panel wnl

## 2020-11-16 NOTE — PROGRESS NOTE ADULT - ASSESSMENT
Cardiorenal syndrome, creatinine 1.63 on admission  Hypertension on admission thay might had added to his renal failure.  Patient was placed on diuretics, increased Creatinine /BUN side effect of medication      Post cardiac cat  10/09 1.23-1.24 .    Follow up renal function   Avoid NSAID.  Follow UA and renal US.

## 2020-11-16 NOTE — PROGRESS NOTE ADULT - PROBLEM SELECTOR PROBLEM 6
MALCOM (acute kidney injury)
MALCOM (acute kidney injury)
Hyperlipidemia, unspecified hyperlipidemia type
Hyperlipidemia, unspecified hyperlipidemia type

## 2020-11-16 NOTE — PROGRESS NOTE ADULT - PROBLEM SELECTOR PLAN 3
noted to have creatinine of 1.63 on admission   baseline appears to be ~1.2  Fena 4.7 - post renal/obstructive  will hold off on IV fluids for now  Hold lisopril, aldactone  Trend BMP  Nephro Dr. Cisneros noted to have creatinine of 1.63 on admission   baseline appears to be ~1.2  Fena 4.7 - post renal/obstructive  will hold off on IV fluids for now  Hold lisopril, aldactone  Trend BMP  F/U Renal US  Nephro Dr. Cisneros

## 2020-11-16 NOTE — ACUTE INTERFACILITY TRANSFER NOTE - HOSPITAL COURSE
Patient is a 55 year old male from home AAO x3, with PMH of DM, HTN, CAD s/p CABG 2005, HLD, CVA and HFrEF, who presented to the ED due to dizziness and shortness of breath, admitted for workup of SOB.   caterina: CHF (congestive heart failure). Pt troponins downtrended, no EKG changes. Pt started on IV lasix for diuresis. Loop recorder interrogated without acute vents. Pt placed on strict I&O but is non compliant. Pt generally non-compliant with home medications. Monitor daily weight. As per cardiology, no need to do Echo. Non sustained Vtach for EP study transfer potential    Dizziness  Pt complained of dizziness when turning head, likely positional vertigo. Pt symptoms have since been improved. CT head showing no acute changes; chronic ischemic changes noted. meclizine PRN. Pt complaining of headache partially relieved by tylenol, added tramadol for pain 11/16. Neurology consulted Dr Michel العراقي (acute kidney injury)  noted to have creatinine of 1.63 on admission. Baseline appears to be ~1.2. Fena 4.7 - post renal/obstructive. Held off on IV fluids for now. Hold lisopril, aldactone. Trend BMP. F/U Renal US    ANTONIA (obstructive sleep apnea)  patient states that he awakens from sleep gasping for air for the past 4-5 days   patient may likely have ANTONIA. Sleep study as outpatient.     Hypertension, unspecified type.  Plan: continue home med of metoprolol   C/w metoprolol. Started hydralazine and imdur. Lasix as above. Continue holding lisinopril, aldactone in setting of MALCOM  monitor BP and adjust meds as needed.     DM  basaglar 43 units at bedtime and admelog 15 units TID with meals at home. C/w lantus 30 units bedtime, admelog 10 units TID along with SSI for now. A1c of 9.0 in October  monitor blood sugars and adjust insulin as needed.     Coronary artery disease involving native heart without angina pectoris, unspecified vessel or lesion type.  Plan: patient has history of CABG in 2005  Recent stress test negative, Cath - patent vessels  continue aspirin and statin   patient states he gets headaches on plavix.

## 2020-11-16 NOTE — PROGRESS NOTE ADULT - PROBLEM SELECTOR PLAN 9
IMPROVE VTE Individual Risk Assessment          RISK                                                          Points  [  ] Previous VTE                                                3  [  ] Thrombophilia                                             2  [  ] Lower limb paralysis                                   2        (unable to hold up >15 seconds)    [  ] Current Cancer                                            2         (within 6 months)  [x  ] Immobilization > 24 hrs                             1  [  ] ICU/CCU stay > 24 hours                           1  [  ] Age > 60                                                       1  IMPROVE VTE Score _____1____  DVT ppx: Subq Heparin  GI ppx: Protonix  Diet: Regular, Diabetic  Electrolytes replaced PRN  Dispo: Home  FULL CODE
IMPROVE VTE Individual Risk Assessment          RISK                                                          Points  [  ] Previous VTE                                                3  [  ] Thrombophilia                                             2  [  ] Lower limb paralysis                                   2        (unable to hold up >15 seconds)    [  ] Current Cancer                                            2         (within 6 months)  [x  ] Immobilization > 24 hrs                             1  [  ] ICU/CCU stay > 24 hours                           1  [  ] Age > 60                                                       1  IMPROVE VTE Score _____1____  DVT ppx: Subq Heparin  GI ppx: Protonix  Diet: Regular, Diabetic  Electrolytes replaced PRN  Dispo: Home  FULL CODE

## 2020-11-16 NOTE — PROGRESS NOTE ADULT - PROBLEM SELECTOR PLAN 7
basaglar 43 units at bedtime and admelog 15 units TID with meals at home  C/w lantus 30 units bedtime, increase admelog 10 units TID along with SSI for now  A1c of 9.0 in October  monitor blood sugars and adjust insulin as needed basaglar 43 units at bedtime and admelog 15 units TID with meals at home  C/w lantus 30 units bedtime, admelog 10 units TID along with SSI for now  A1c of 9.0 in October  monitor blood sugars and adjust insulin as needed

## 2020-11-16 NOTE — PROGRESS NOTE ADULT - PROBLEM SELECTOR PLAN 8
patient has history of CABG in 2005  Recent stress test negative, Cath - patent vessels  continue aspirin and statin   patient states he gets headaches on plavix
patient has history of CABG in 2005  Recent stress test negative, Cath - patent vessels  continue aspirin and statin   patient states he gets headaches on plavix

## 2020-11-16 NOTE — PROGRESS NOTE ADULT - PROBLEM SELECTOR PROBLEM 3
ANTONIA (obstructive sleep apnea)
ANTONIA (obstructive sleep apnea)
MALCOM (acute kidney injury)
MALCOM (acute kidney injury)

## 2020-11-16 NOTE — PROGRESS NOTE ADULT - SUBJECTIVE AND OBJECTIVE BOX
Pt is awake, alert, lying in bed in NAD.     INTERVAL HPI/OVERNIGHT EVENTS:      VITAL SIGNS:  T(F): 98.5 (11-16-20 @ 11:07)  HR: 78 (11-16-20 @ 11:07)  BP: 147/81 (11-16-20 @ 11:07)  RR: 18 (11-16-20 @ 11:07)  SpO2: 100% (11-16-20 @ 11:07)  Wt(kg): --  I&O's Detail    15 Nov 2020 07:01  -  16 Nov 2020 07:00  --------------------------------------------------------  IN:    Oral Fluid: 275 mL  Total IN: 275 mL    OUT:    Voided (mL): 2500 mL  Total OUT: 2500 mL    Total NET: -2225 mL      16 Nov 2020 07:01  -  16 Nov 2020 14:02  --------------------------------------------------------  IN:    Oral Fluid: 450 mL  Total IN: 450 mL    OUT:  Total OUT: 0 mL    Total NET: 450 mL              REVIEW OF SYSTEMS:    CONSTITUTIONAL:  No fevers, chills, sweats    HEENT:  Eyes:  No diplopia or blurred vision. ENT:  No earache, sore throat or runny nose.    CARDIOVASCULAR:  No pressure, squeezing, tightness, or heaviness about the chest; no palpitations.    RESPIRATORY:  Per HPI    GASTROINTESTINAL:  No abdominal pain, nausea, vomiting or diarrhea.    GENITOURINARY:  No dysuria, frequency or urgency.    NEUROLOGIC:  No paresthesias, fasciculations, seizures or weakness.    PSYCHIATRIC:  No disorder of thought or mood.      PHYSICAL EXAM:    Constitutional: Well developed and nourished  Eyes:Perrla  ENMT: normal  Neck:supple  Respiratory: good air entry  Cardiovascular: S1 S2 regular  Gastrointestinal: Soft, Non tender  Extremities: No edema  Vascular:normal  Neurological:Awake, alert,Ox3  Musculoskeletal:Normal      MEDICATIONS  (STANDING):  aspirin  chewable 81 milliGRAM(s) Oral daily  atorvastatin 20 milliGRAM(s) Oral at bedtime  ergocalciferol 57370 Unit(s) Oral <User Schedule>  furosemide   Injectable 40 milliGRAM(s) IV Push daily  heparin   Injectable 5000 Unit(s) SubCutaneous every 8 hours  hydrALAZINE 50 milliGRAM(s) Oral every 8 hours  insulin glargine Injectable (LANTUS) 30 Unit(s) SubCutaneous at bedtime  insulin lispro (ADMELOG) corrective regimen sliding scale   SubCutaneous Before meals and at bedtime  insulin lispro Injectable (ADMELOG) 10 Unit(s) SubCutaneous three times a day before meals  isosorbide   mononitrate ER Tablet (IMDUR) 30 milliGRAM(s) Oral daily  melatonin 5 milliGRAM(s) Oral at bedtime  metoprolol tartrate 25 milliGRAM(s) Oral three times a day  pantoprazole    Tablet 40 milliGRAM(s) Oral before breakfast    MEDICATIONS  (PRN):  acetaminophen   Tablet .. 650 milliGRAM(s) Oral every 6 hours PRN Temp greater or equal to 38C (100.4F), Moderate Pain (4 - 6)  meclizine 25 milliGRAM(s) Oral three times a day PRN Dizziness      Allergies    No Known Allergies    Intolerances    Plavix (Headache)      LABS:                        14.1   4.93  )-----------( 238      ( 16 Nov 2020 06:47 )             42.3     11-16    138  |  105  |  29<H>  ----------------------------<  155<H>  3.9   |  25  |  1.60<H>    Ca    9.1      16 Nov 2020 06:47  Phos  3.2     11-16  Mg     2.0     11-16                CAPILLARY BLOOD GLUCOSE      POCT Blood Glucose.: 192 mg/dL (16 Nov 2020 11:31)  POCT Blood Glucose.: 164 mg/dL (16 Nov 2020 07:48)  POCT Blood Glucose.: 146 mg/dL (15 Nov 2020 21:20)  POCT Blood Glucose.: 95 mg/dL (15 Nov 2020 16:51)    pro-bnp 2400 11-13 @ 08:38     d-dimer --  11-13 @ 08:38      RADIOLOGY & ADDITIONAL TESTS:    CXR:    Ct scan chest:    ekg;    echo:

## 2020-11-16 NOTE — PROGRESS NOTE ADULT - PROBLEM SELECTOR PLAN 4
Accuchecks with rfeg insulin coverage  HBA1C
Accuchecks with rfeg insulin coverage  HBA1C
patient states that he awakens from sleep gasping for air for the past 4-5 days   patient may likely have ANTONIA   sleep study as outpatient
patient states that he awakens from sleep gasping for air for the past 4-5 days   patient may likely have ANTONIA   sleep study as outpatient

## 2020-11-16 NOTE — PROGRESS NOTE ADULT - PROBLEM SELECTOR PROBLEM 1
Congestive heart failure, NYHA class 3, chronic, combined
Congestive heart failure, NYHA class 3, chronic, combined
CHF (congestive heart failure)
CHF (congestive heart failure)

## 2020-11-16 NOTE — PROGRESS NOTE ADULT - SUBJECTIVE AND OBJECTIVE BOX
PGY-1 Progress Note discussed with attending    PAGER #: [[phone]] TILL 5:00 PM  PLEASE CONTACT ON CALL TEAM:  - On Call Team (Please refer to Joby) FROM 5:00 PM - 8:30PM  - Nightfloat Team FROM 8:30 -7:30 AM    CHIEF COMPLAINT & BRIEF HOSPITAL COURSE:    INTERVAL HPI/OVERNIGHT EVENTS:       REVIEW OF SYSTEMS:  CONSTITUTIONAL: No fever, weight loss, or fatigue  RESPIRATORY: No cough, wheezing, chills or hemoptysis; No shortness of breath  CARDIOVASCULAR: No chest pain, palpitations, dizziness, or leg swelling  GASTROINTESTINAL: No abdominal pain. No nausea, vomiting, or hematemesis; No diarrhea or constipation. No melena or hematochezia.  GENITOURINARY: No dysuria or hematuria, urinary frequency  NEUROLOGICAL: No headaches, memory loss, loss of strength, numbness, or tremors  SKIN: No itching, burning, rashes, or lesions     Vital Signs Last 24 Hrs  T(C): 36.8 (16 Nov 2020 07:37), Max: 37.1 (15 Nov 2020 15:35)  T(F): 98.2 (16 Nov 2020 07:37), Max: 98.7 (15 Nov 2020 15:35)  HR: 77 (16 Nov 2020 04:54) (70 - 93)  BP: 161/82 (16 Nov 2020 04:54) (153/84 - 165/89)  BP(mean): --  RR: 18 (16 Nov 2020 07:37) (18 - 19)  SpO2: 100% (16 Nov 2020 07:37) (97% - 100%)    PHYSICAL EXAMINATION:  GENERAL: NAD, well built  HEAD:  Atraumatic, Normocephalic  EYES:  conjunctiva and sclera clear  NECK: Supple, No JVD, Normal thyroid  CHEST/LUNG: Clear to auscultation. Clear to percussion bilaterally; No rales, rhonchi, wheezing, or rubs  HEART: Regular rate and rhythm; No murmurs, rubs, or gallops  ABDOMEN: Soft, Nontender, Nondistended; Bowel sounds present  NERVOUS SYSTEM:  Alert & Oriented X3,    EXTREMITIES:  2+ Peripheral Pulses, No clubbing, cyanosis, or edema  SKIN: warm dry                          14.1   4.93  )-----------( 238      ( 16 Nov 2020 06:47 )             42.3     11-16    138  |  105  |  29<H>  ----------------------------<  155<H>  3.9   |  25  |  1.60<H>    Ca    9.1      16 Nov 2020 06:47  Phos  3.2     11-16  Mg     2.0     11-16                CAPILLARY BLOOD GLUCOSE      RADIOLOGY & ADDITIONAL TESTS:                   PGY-1 Progress Note discussed with attending    PAGER #: [[phone]] TILL 5:00 PM  PLEASE CONTACT ON CALL TEAM:  - On Call Team (Please refer to Joby) FROM 5:00 PM - 8:30PM  - Nightfloat Team FROM 8:30 -7:30 AM    INTERVAL HPI/OVERNIGHT EVENTS:   Pt expressed desire to go home. Pt complaining of headache in afternoon, dull. Explained to pt that he had events of non-sustained Vtach and discussed plan for transfer for EP study.    REVIEW OF SYSTEMS:  CONSTITUTIONAL: No fever, weight loss, or fatigue  RESPIRATORY: No cough, wheezing, chills or hemoptysis; No shortness of breath  CARDIOVASCULAR: No chest pain, palpitations, dizziness, or leg swelling  GASTROINTESTINAL: No abdominal pain. No nausea, vomiting, or hematemesis; No diarrhea or constipation. No melena or hematochezia.  GENITOURINARY: No dysuria or hematuria, urinary frequency  NEUROLOGICAL: No memory loss, loss of strength, numbness, or tremors. (+) headaches  SKIN: No itching, burning, rashes, or lesions     Vital Signs Last 24 Hrs  T(C): 36.8 (16 Nov 2020 07:37), Max: 37.1 (15 Nov 2020 15:35)  T(F): 98.2 (16 Nov 2020 07:37), Max: 98.7 (15 Nov 2020 15:35)  HR: 77 (16 Nov 2020 04:54) (70 - 93)  BP: 161/82 (16 Nov 2020 04:54) (153/84 - 165/89)  BP(mean): --  RR: 18 (16 Nov 2020 07:37) (18 - 19)  SpO2: 100% (16 Nov 2020 07:37) (97% - 100%)    PHYSICAL EXAMINATION:  GENERAL: NAD, well built  HEAD:  Atraumatic, Normocephalic  EYES:  conjunctiva and sclera clear  NECK: Supple, No JVD, Normal thyroid  CHEST/LUNG: Clear to auscultation. Clear to percussion bilaterally; No rales, rhonchi, wheezing, or rubs  HEART: Regular rate and rhythm; No murmurs, rubs, or gallops  ABDOMEN: Soft, Nontender, Nondistended; Bowel sounds present  NERVOUS SYSTEM:  Alert & Oriented X3,    EXTREMITIES:  2+ Peripheral Pulses, No clubbing, cyanosis, or edema  SKIN: warm dry                          14.1   4.93  )-----------( 238      ( 16 Nov 2020 06:47 )             42.3     11-16    138  |  105  |  29<H>  ----------------------------<  155<H>  3.9   |  25  |  1.60<H>    Ca    9.1      16 Nov 2020 06:47  Phos  3.2     11-16  Mg     2.0     11-16                CAPILLARY BLOOD GLUCOSE      RADIOLOGY & ADDITIONAL TESTS:                   PGY-1 Progress Note discussed with attending    PAGER #: [[phone]] TILL 5:00 PM  PLEASE CONTACT ON CALL TEAM:  - On Call Team (Please refer to Joby) FROM 5:00 PM - 8:30PM  - Nightfloat Team FROM 8:30 -7:30 AM    INTERVAL HPI/OVERNIGHT EVENTS:   Pt expressed desire to go home. Pt complaining of headache in afternoon, dull. Explained to pt that he had events of non-sustained Vtach and discussed plan for transfer for EP study.    REVIEW OF SYSTEMS:  CONSTITUTIONAL: No fever, weight loss, or fatigue  RESPIRATORY: No cough, wheezing, chills or hemoptysis; No shortness of breath  CARDIOVASCULAR: No chest pain, palpitations, dizziness, or leg swelling  GASTROINTESTINAL: No abdominal pain. No nausea, vomiting, or hematemesis; No diarrhea or constipation. No melena or hematochezia.  GENITOURINARY: No dysuria or hematuria, urinary frequency  NEUROLOGICAL: No memory loss, loss of strength, numbness, or tremors. (+) headaches  SKIN: No itching, burning, rashes, or lesions     Vital Signs Last 24 Hrs  T(C): 36.8 (16 Nov 2020 07:37), Max: 37.1 (15 Nov 2020 15:35)  T(F): 98.2 (16 Nov 2020 07:37), Max: 98.7 (15 Nov 2020 15:35)  HR: 77 (16 Nov 2020 04:54) (70 - 93)  BP: 161/82 (16 Nov 2020 04:54) (153/84 - 165/89)  BP(mean): --  RR: 18 (16 Nov 2020 07:37) (18 - 19)  SpO2: 100% (16 Nov 2020 07:37) (97% - 100%)    PHYSICAL EXAMINATION:  GENERAL: NAD, well built  HEAD:  Atraumatic, Normocephalic  EYES:  conjunctiva and sclera clear  NECK: Supple, No JVD, Normal thyroid  CHEST/LUNG: Clear to auscultation. Clear to percussion bilaterally; No rales, rhonchi, wheezing, or rubs  HEART: Regular rate and rhythm; No murmurs, rubs, or gallops  ABDOMEN: Soft, Nontender, Nondistended; Bowel sounds present  NERVOUS SYSTEM:  Alert & Oriented X3,    EXTREMITIES:  2+ Peripheral Pulses, No clubbing, cyanosis, or edema  SKIN: warm dry                          14.1   4.93  )-----------( 238      ( 16 Nov 2020 06:47 )             42.3     11-16    138  |  105  |  29<H>  ----------------------------<  155<H>  3.9   |  25  |  1.60<H>    Ca    9.1      16 Nov 2020 06:47  Phos  3.2     11-16  Mg     2.0     11-16                CAPILLARY BLOOD GLUCOSE      RADIOLOGY & ADDITIONAL TESTS:

## 2020-11-16 NOTE — PROGRESS NOTE ADULT - ASSESSMENT
55 year old male from home AAO x3, with PMH of DM, HTN, CAD s/p CABG 2005, HLD, CVA,s/p ILR and HFrEF, who presented to the ED due to dizziness and shortness of breath,acute on chronic systolic HF,MALCOM and NSVT.  1.Tele monitoring.  2.D/W pt NSVT rec EPS.  3.MALCOM-no ace and aldactone,  Imdur and inc hydralazine 50mg q8h,.Renal f/u.  4.Acute on chronic systolic HF-Lasix 40mg iv qd, b blocker,Imdur,hydralazine.  5.CAD-asa,b blocker,statin.  6.CVA-asa,statin.  7.DM-Insulin.  8.Lipid d/o-statin.  9.GI and DVT prophylaxis.  10.Sleep study as outpatient-R/O ANTONIA.

## 2020-11-17 DIAGNOSIS — E78.5 HYPERLIPIDEMIA, UNSPECIFIED: ICD-10-CM

## 2020-11-17 DIAGNOSIS — G47.33 OBSTRUCTIVE SLEEP APNEA (ADULT) (PEDIATRIC): ICD-10-CM

## 2020-11-17 DIAGNOSIS — I47.2 VENTRICULAR TACHYCARDIA: ICD-10-CM

## 2020-11-17 DIAGNOSIS — I50.9 HEART FAILURE, UNSPECIFIED: ICD-10-CM

## 2020-11-17 DIAGNOSIS — I25.10 ATHEROSCLEROTIC HEART DISEASE OF NATIVE CORONARY ARTERY WITHOUT ANGINA PECTORIS: ICD-10-CM

## 2020-11-17 DIAGNOSIS — N17.9 ACUTE KIDNEY FAILURE, UNSPECIFIED: ICD-10-CM

## 2020-11-17 DIAGNOSIS — I10 ESSENTIAL (PRIMARY) HYPERTENSION: ICD-10-CM

## 2020-11-17 DIAGNOSIS — E11.9 TYPE 2 DIABETES MELLITUS WITHOUT COMPLICATIONS: ICD-10-CM

## 2020-11-17 DIAGNOSIS — Z29.9 ENCOUNTER FOR PROPHYLACTIC MEASURES, UNSPECIFIED: ICD-10-CM

## 2020-11-17 DIAGNOSIS — E11.65 TYPE 2 DIABETES MELLITUS WITH HYPERGLYCEMIA: ICD-10-CM

## 2020-11-17 DIAGNOSIS — R42 DIZZINESS AND GIDDINESS: ICD-10-CM

## 2020-11-17 LAB
ANION GAP SERPL CALC-SCNC: 11 MMO/L — SIGNIFICANT CHANGE UP (ref 7–14)
APTT BLD: 28.8 SEC — SIGNIFICANT CHANGE UP (ref 27–36.3)
BASOPHILS # BLD AUTO: 0.01 K/UL — SIGNIFICANT CHANGE UP (ref 0–0.2)
BASOPHILS NFR BLD AUTO: 0.2 % — SIGNIFICANT CHANGE UP (ref 0–2)
BUN SERPL-MCNC: 29 MG/DL — HIGH (ref 7–23)
CALCIUM SERPL-MCNC: 9.6 MG/DL — SIGNIFICANT CHANGE UP (ref 8.4–10.5)
CHLORIDE SERPL-SCNC: 102 MMOL/L — SIGNIFICANT CHANGE UP (ref 98–107)
CO2 SERPL-SCNC: 27 MMOL/L — SIGNIFICANT CHANGE UP (ref 22–31)
CREAT SERPL-MCNC: 1.53 MG/DL — HIGH (ref 0.5–1.3)
EOSINOPHIL # BLD AUTO: 0.05 K/UL — SIGNIFICANT CHANGE UP (ref 0–0.5)
EOSINOPHIL NFR BLD AUTO: 1.2 % — SIGNIFICANT CHANGE UP (ref 0–6)
GLUCOSE BLDC GLUCOMTR-MCNC: 143 MG/DL — HIGH (ref 70–99)
GLUCOSE BLDC GLUCOMTR-MCNC: 165 MG/DL — HIGH (ref 70–99)
GLUCOSE BLDC GLUCOMTR-MCNC: 173 MG/DL — HIGH (ref 70–99)
GLUCOSE BLDC GLUCOMTR-MCNC: 221 MG/DL — HIGH (ref 70–99)
GLUCOSE SERPL-MCNC: 180 MG/DL — HIGH (ref 70–99)
HCT VFR BLD CALC: 45.7 % — SIGNIFICANT CHANGE UP (ref 39–50)
HGB BLD-MCNC: 14.7 G/DL — SIGNIFICANT CHANGE UP (ref 13–17)
IMM GRANULOCYTES NFR BLD AUTO: 0.2 % — SIGNIFICANT CHANGE UP (ref 0–1.5)
INR BLD: 1.12 — SIGNIFICANT CHANGE UP (ref 0.88–1.16)
LYMPHOCYTES # BLD AUTO: 1.41 K/UL — SIGNIFICANT CHANGE UP (ref 1–3.3)
LYMPHOCYTES # BLD AUTO: 34.9 % — SIGNIFICANT CHANGE UP (ref 13–44)
MAGNESIUM SERPL-MCNC: 2 MG/DL — SIGNIFICANT CHANGE UP (ref 1.6–2.6)
MCHC RBC-ENTMCNC: 28.8 PG — SIGNIFICANT CHANGE UP (ref 27–34)
MCHC RBC-ENTMCNC: 32.2 % — SIGNIFICANT CHANGE UP (ref 32–36)
MCV RBC AUTO: 89.4 FL — SIGNIFICANT CHANGE UP (ref 80–100)
MONOCYTES # BLD AUTO: 0.42 K/UL — SIGNIFICANT CHANGE UP (ref 0–0.9)
MONOCYTES NFR BLD AUTO: 10.4 % — SIGNIFICANT CHANGE UP (ref 2–14)
NEUTROPHILS # BLD AUTO: 2.14 K/UL — SIGNIFICANT CHANGE UP (ref 1.8–7.4)
NEUTROPHILS NFR BLD AUTO: 53.1 % — SIGNIFICANT CHANGE UP (ref 43–77)
NRBC # FLD: 0 K/UL — SIGNIFICANT CHANGE UP (ref 0–0)
PHOSPHATE SERPL-MCNC: 3.7 MG/DL — SIGNIFICANT CHANGE UP (ref 2.5–4.5)
PLATELET # BLD AUTO: 237 K/UL — SIGNIFICANT CHANGE UP (ref 150–400)
PMV BLD: 9.7 FL — SIGNIFICANT CHANGE UP (ref 7–13)
POTASSIUM SERPL-MCNC: 3.9 MMOL/L — SIGNIFICANT CHANGE UP (ref 3.5–5.3)
POTASSIUM SERPL-SCNC: 3.9 MMOL/L — SIGNIFICANT CHANGE UP (ref 3.5–5.3)
PROTHROM AB SERPL-ACNC: 12.7 SEC — SIGNIFICANT CHANGE UP (ref 10.6–13.6)
RBC # BLD: 5.11 M/UL — SIGNIFICANT CHANGE UP (ref 4.2–5.8)
RBC # FLD: 13.9 % — SIGNIFICANT CHANGE UP (ref 10.3–14.5)
SARS-COV-2 RNA SPEC QL NAA+PROBE: SIGNIFICANT CHANGE UP
SODIUM SERPL-SCNC: 140 MMOL/L — SIGNIFICANT CHANGE UP (ref 135–145)
WBC # BLD: 4.04 K/UL — SIGNIFICANT CHANGE UP (ref 3.8–10.5)
WBC # FLD AUTO: 4.04 K/UL — SIGNIFICANT CHANGE UP (ref 3.8–10.5)

## 2020-11-17 PROCEDURE — 93620 COMP EP EVL R AT VEN PAC&REC: CPT | Mod: 26,59

## 2020-11-17 PROCEDURE — 99232 SBSQ HOSP IP/OBS MODERATE 35: CPT

## 2020-11-17 PROCEDURE — 76770 US EXAM ABDO BACK WALL COMP: CPT | Mod: 26

## 2020-11-17 PROCEDURE — 93010 ELECTROCARDIOGRAM REPORT: CPT

## 2020-11-17 RX ORDER — MECLIZINE HCL 12.5 MG
25 TABLET ORAL THREE TIMES A DAY
Refills: 0 | Status: DISCONTINUED | OUTPATIENT
Start: 2020-11-17 | End: 2020-11-18

## 2020-11-17 RX ORDER — DEXTROSE 50 % IN WATER 50 %
15 SYRINGE (ML) INTRAVENOUS ONCE
Refills: 0 | Status: DISCONTINUED | OUTPATIENT
Start: 2020-11-17 | End: 2020-11-18

## 2020-11-17 RX ORDER — DEXTROSE 50 % IN WATER 50 %
25 SYRINGE (ML) INTRAVENOUS ONCE
Refills: 0 | Status: DISCONTINUED | OUTPATIENT
Start: 2020-11-17 | End: 2020-11-18

## 2020-11-17 RX ORDER — GLUCAGON INJECTION, SOLUTION 0.5 MG/.1ML
1 INJECTION, SOLUTION SUBCUTANEOUS ONCE
Refills: 0 | Status: DISCONTINUED | OUTPATIENT
Start: 2020-11-17 | End: 2020-11-18

## 2020-11-17 RX ORDER — METOPROLOL TARTRATE 50 MG
25 TABLET ORAL THREE TIMES A DAY
Refills: 0 | Status: DISCONTINUED | OUTPATIENT
Start: 2020-11-17 | End: 2020-11-18

## 2020-11-17 RX ORDER — HYDRALAZINE HCL 50 MG
50 TABLET ORAL EVERY 8 HOURS
Refills: 0 | Status: DISCONTINUED | OUTPATIENT
Start: 2020-11-17 | End: 2020-11-18

## 2020-11-17 RX ORDER — INSULIN LISPRO 100/ML
10 VIAL (ML) SUBCUTANEOUS
Refills: 0 | Status: DISCONTINUED | OUTPATIENT
Start: 2020-11-17 | End: 2020-11-18

## 2020-11-17 RX ORDER — POTASSIUM CHLORIDE 20 MEQ
10 PACKET (EA) ORAL ONCE
Refills: 0 | Status: COMPLETED | OUTPATIENT
Start: 2020-11-17 | End: 2020-11-17

## 2020-11-17 RX ORDER — SODIUM CHLORIDE 9 MG/ML
1000 INJECTION, SOLUTION INTRAVENOUS
Refills: 0 | Status: DISCONTINUED | OUTPATIENT
Start: 2020-11-17 | End: 2020-11-18

## 2020-11-17 RX ORDER — INSULIN GLARGINE 100 [IU]/ML
30 INJECTION, SOLUTION SUBCUTANEOUS AT BEDTIME
Refills: 0 | Status: DISCONTINUED | OUTPATIENT
Start: 2020-11-17 | End: 2020-11-18

## 2020-11-17 RX ORDER — ISOSORBIDE MONONITRATE 60 MG/1
30 TABLET, EXTENDED RELEASE ORAL EVERY 24 HOURS
Refills: 0 | Status: DISCONTINUED | OUTPATIENT
Start: 2020-11-17 | End: 2020-11-18

## 2020-11-17 RX ORDER — ATORVASTATIN CALCIUM 80 MG/1
20 TABLET, FILM COATED ORAL AT BEDTIME
Refills: 0 | Status: DISCONTINUED | OUTPATIENT
Start: 2020-11-17 | End: 2020-11-18

## 2020-11-17 RX ORDER — ASPIRIN/CALCIUM CARB/MAGNESIUM 324 MG
81 TABLET ORAL DAILY
Refills: 0 | Status: DISCONTINUED | OUTPATIENT
Start: 2020-11-17 | End: 2020-11-18

## 2020-11-17 RX ORDER — INSULIN LISPRO 100/ML
VIAL (ML) SUBCUTANEOUS AT BEDTIME
Refills: 0 | Status: DISCONTINUED | OUTPATIENT
Start: 2020-11-17 | End: 2020-11-18

## 2020-11-17 RX ORDER — FUROSEMIDE 40 MG
40 TABLET ORAL DAILY
Refills: 0 | Status: DISCONTINUED | OUTPATIENT
Start: 2020-11-17 | End: 2020-11-18

## 2020-11-17 RX ORDER — INSULIN LISPRO 100/ML
VIAL (ML) SUBCUTANEOUS
Refills: 0 | Status: DISCONTINUED | OUTPATIENT
Start: 2020-11-17 | End: 2020-11-18

## 2020-11-17 RX ORDER — DEXTROSE 50 % IN WATER 50 %
12.5 SYRINGE (ML) INTRAVENOUS ONCE
Refills: 0 | Status: DISCONTINUED | OUTPATIENT
Start: 2020-11-17 | End: 2020-11-18

## 2020-11-17 RX ADMIN — ISOSORBIDE MONONITRATE 30 MILLIGRAM(S): 60 TABLET, EXTENDED RELEASE ORAL at 13:08

## 2020-11-17 RX ADMIN — Medication 40 MILLIGRAM(S): at 13:07

## 2020-11-17 RX ADMIN — ATORVASTATIN CALCIUM 20 MILLIGRAM(S): 80 TABLET, FILM COATED ORAL at 21:59

## 2020-11-17 RX ADMIN — Medication 1: at 06:31

## 2020-11-17 RX ADMIN — Medication 50 MILLIGRAM(S): at 13:08

## 2020-11-17 RX ADMIN — Medication 50 MILLIGRAM(S): at 21:59

## 2020-11-17 RX ADMIN — Medication 1: at 13:07

## 2020-11-17 RX ADMIN — Medication 25 MILLIGRAM(S): at 13:08

## 2020-11-17 RX ADMIN — Medication 25 MILLIGRAM(S): at 21:59

## 2020-11-17 RX ADMIN — Medication 81 MILLIGRAM(S): at 13:08

## 2020-11-17 RX ADMIN — Medication 10 MILLIEQUIVALENT(S): at 13:08

## 2020-11-17 RX ADMIN — INSULIN GLARGINE 30 UNIT(S): 100 INJECTION, SOLUTION SUBCUTANEOUS at 21:58

## 2020-11-17 NOTE — CONSULT NOTE ADULT - SUBJECTIVE AND OBJECTIVE BOX
This is a 55 year old male transferred from [facility] with past medical history of diabetes T2, HTN, HLD, CVA, ILR, CAD s/p CABG/stent and HFrEF (moderate LV systolic dysfunction) where he was admitted with shortness of breath, dizziness and palpitations. Head CT negative.  Found to be in CHF and was diuresed with IV Lasix. On telemetry he was noted to have runs of nonsustained VT and is transferred to [facility] for EP study and possible ICD and ILR removal. Of note, he is noncompliant with medications and dietary discretion. He denies  syncope, fever, chills, cough, falls, LOC, chest pain, abdominal pain, nausea, vomiting, melena, hematochezia, LE edema or dysuria. Patient states his SOB has improved significantly since his hospitalization and is able to lie flat in bed.       PAST MEDICAL & SURGICAL HISTORY:  Cerebrovascular accident (CVA), unspecified mechanism  Congestive heart failure, NYHA class 3, chronic, combined  Cerebrovascular accident (CVA), unspecified mechanism  Hyperlipidemia, unspecified hyperlipidemia type  Coronary artery disease involving native heart without angina pectoris, unspecified vessel or lesion type  Hypertension, unspecified type  Diabetes mellitus of other type without complication    S/P CABG x 5 2005 at [facility]   loop recorder  S/P coronary artery stent placement  History of appendectomy      MEDICATIONS  (STANDING):  aspirin  chewable 81 milliGRAM(s) Oral daily  atorvastatin 20 milliGRAM(s) Oral at bedtime  dextrose 40% Gel 15 Gram(s) Oral once  dextrose 5%. 1000 milliLiter(s) (50 mL/Hr) IV Continuous <Continuous>  dextrose 5%. 1000 milliLiter(s) (100 mL/Hr) IV Continuous <Continuous>  dextrose 50% Injectable 25 Gram(s) IV Push once  dextrose 50% Injectable 12.5 Gram(s) IV Push once  dextrose 50% Injectable 25 Gram(s) IV Push once  furosemide   Injectable 40 milliGRAM(s) IV Push daily  glucagon  Injectable 1 milliGRAM(s) IntraMuscular once  hydrALAZINE 50 milliGRAM(s) Oral every 8 hours  insulin glargine Injectable (LANTUS) 30 Unit(s) SubCutaneous at bedtime  insulin lispro (ADMELOG) corrective regimen sliding scale   SubCutaneous three times a day before meals  insulin lispro (ADMELOG) corrective regimen sliding scale   SubCutaneous at bedtime  insulin lispro Injectable (ADMELOG) 10 Unit(s) SubCutaneous three times a day before meals  isosorbide   mononitrate ER Tablet (IMDUR) 30 milliGRAM(s) Oral every 24 hours  metoprolol tartrate 25 milliGRAM(s) Oral three times a day  potassium chloride    Tablet ER 10 milliEquivalent(s) Oral once    MEDICATIONS  (PRN):  meclizine 25 milliGRAM(s) Oral three times a day PRN Dizziness      FAMILY HISTORY:  FH: CAD (coronary artery disease) (Father)  family        SOCIAL HISTORY:    CIGARETTES: no   DRUGS:  no  ALCOHOL:  no     REVIEW OF SYSTEMS:    CONSTITUTIONAL: No fever, weight loss, chills, shakes, or fatigue  EYES: No eye pain, visual disturbances, or discharge.  Right eye with decreased visual acuity  ENMT:  No difficulty hearing, tinnitus, vertigo; No sinus or throat pain  NECK: No pain or stiffness  BREASTS: No pain, masses, or nipple discharge  RESPIRATORY: No cough, wheezing, hemoptysis, + shortness of breath  CARDIOVASCULAR: No chest pain,  dizziness, syncope, paroxysmal nocturnal dyspnea, orthopnea, or arm or leg swelling  + palpitations  GASTROINTESTINAL: No abdominal  or epigastric pain, nausea, vomiting, hematemesis, diarrhea, constipation, melena or bright red blood.  GENITOURINARY: No dysuria, nocturia, hematuria, or urinary incontinence  NEUROLOGICAL:  + headaches (migraine) No  memory loss, slurred speech, limb weakness, loss of strength, numbness, or tremors  SKIN: No itching, burning, rashes, or lesions   LYMPH NODES: No enlarged glands  ENDOCRINE: No heat or cold intolerance, or hair loss  MUSCULOSKELETAL: No joint pain or swelling, muscle, back, or extremity pain  PSYCHIATRIC: No depression, anxiety, or difficulty sleeping  HEME/LYMPH: No easy bruising or bleeding gums  ALLERGY AND IMMUNOLOGIC: No hives or rash.      Vital Signs Last 24 Hrs  T(C): 36.9 (17 Nov 2020 09:10), Max: 36.9 (17 Nov 2020 09:10)  T(F): 98.4 (17 Nov 2020 09:10), Max: 98.4 (17 Nov 2020 09:10)  HR: 87 (17 Nov 2020 09:10) (73 - 87)  BP: 141/88 (17 Nov 2020 09:10) (129/72 - 146/78)  BP(mean): 103 (17 Nov 2020 09:10) (103 - 103)  RR: 18 (17 Nov 2020 09:10) (18 - 18)  SpO2: 96% (17 Nov 2020 09:10) (91% - 100%)    PHYSICAL EXAM:    GENERAL: In no apparent distress, well nourished, and hydrated.  HEAD:  Atraumatic, Normocephalic  EYES: EOMI, PERRLA, conjunctiva and sclera clear  ENMT: No tonsillar erythema, exudates, or enlargement; Moist mucous membranes, Good dentition, No lesions  NECK: Supple and normal thyroid.  No JVD or carotid bruit.  Carotid pulse is 2+ bilaterally.  HEART: Regular rate and rhythm; No murmurs, rubs, or gallops.  PULMONARY: Clear to auscultation and perfusion.  No rales, wheezing, or rhonchi bilaterally.  ABDOMEN: Soft, Nontender, Nondistended; Bowel sounds present  EXTREMITIES:  2+ Peripheral Pulses, No clubbing, cyanosis, or edema  LYMPH: No lymphadenopathy noted  NEUROLOGICAL: Grossly nonfocal          INTERPRETATION OF TELEMETRY:      ECG:    LABS:                        14.7   4.04  )-----------( 237      ( 17 Nov 2020 06:00 )             45.7     11-17    140  |  102  |  29<H>  ----------------------------<  180<H>  3.9   |  27  |  1.53<H>    Ca    9.6      17 Nov 2020 06:00  Phos  3.7     11-17  Mg     2.0     11-17    TPro  8.1  /  Alb  3.0<L>  /  TBili  0.4  /  DBili  x   /  AST  19  /  ALT  26  /  AlkPhos  107  11-16        PT/INR - ( 17 Nov 2020 06:00 )   PT: 12.7 SEC;   INR: 1.12          PTT - ( 17 Nov 2020 06:00 )  PTT:28.8 SEC    BNP  2400  I&O's Detail              Daily Height in cm: 183.64 (16 Nov 2020 20:30)    Daily     RADIOLOGY & ADDITIONAL STUDIES:  PREVIOUS DIAGNOSTIC TESTING:      ECHO  FINDINGS:  < from: Transthoracic Echocardiogram (07.22.20 @ 07:26) >  Patient name: MADISON PERRY  YOB: 1965   Age: 55 (M)   MR#: [id number]  Study Date: 7/22/2020  Location: [address] [place], [place] [address]Sonographer: Marshall Jacobs [facility]  Study quality: Technically good  Referring Physician: Nathan Ch MD  Blood Pressure: 146/93 mmHg  Height: 175 cm  Weight: 104 kg  BSA: 2.2 m2  ------------------------------------------------------------------------    PROCEDURE: Transthoracic echocardiogram with 2-D, M-Mode  and complete spectral and color flow Doppler.  INDICATION: Heart failure, unspecified (I50.9)  HISTORY:  ------------------------------------------------------------------------  DIMENSIONS:  Dimensions:     Normal Values:  LA:     4.3 cm    2.0 - 4.0 cm  Ao:     3.2 cm    2.0 - 3.8 cm  SEPTUM: 1.2 cm    0.6 - 1.2 cm  PWT:    1.1 cm    0.6 - 1.1 cm  LVIDd:  5.6 cm    3.0 - 5.6 cm  LVIDs:  4.3 cm    1.8 - 4.0 cm      Derived Variables:  LVMI: 121 g/m2  RWT: 0.39  Ejection Fraction Visual Estimate: 40-45 %    ------------------------------------------------------------------------  OBSERVATIONS:  Mitral Valve: Normal mitral valve. Mild mitral  regurgitation.  Aortic Root: Normal aortic root.  Aortic Valve: Calcified trileaflet aortic valve with normal  opening.  Left Atrium: Mildly dilated left atrium.  LA volume index =  40 cc/m2.  Left Ventricle: Moderate segmental left ventricular  systolic dysfunction.  The inferior and  inferolateral  walls are akinetic.  Septal motion consistent with a  conduction defect.   Moderate diastolic dysfunction (stage  II). Mild concentric left ventricular hypertrophy.  Right Heart: Linear structure compatible with Chiari  network identified in the right atrium.  This is a normal  variant. Normal right ventricular size and function. There  is moderate tricuspid regurgitation. There is mild pulmonic  regurgitation.  Pericardium/PleuraNormal pericardium with no pericardial  effusion.  Hemodynamic: RA Pressure is 8 mm Hg. RV systolic pressure  is 29 mm Hg.  ------------------------------------------------------------------------  CONCLUSIONS:  1. Mild mitral regurgitation.  2. Mildly dilated left atrium.  LA volume index = 40 cc/m2.  3. Mild concentric left ventricular hypertrophy.  4. Moderate segmental left ventricular systolic  dysfunction.  The inferior and inferolateral walls are  akinetic.  Septal motion consistent with a conduction  defect.   Moderate diastolic dysfunction (stage II).  5. Linear structure compatible with Chiari network  identified in the right atrium.  This is a normal variant.  6. Normal right ventricular size and function.    ------------------------------------------------------------------------  Confirmed on  7/22/2020 - 17:56:49 by Rosendo Allred MD        CATHETERIZATION  FINDINGS:  < from: Cardiac Cath Lab - Adult (10.09.20 @ 13:16) >  VENTRICLES: No left ventriculogram was performed.  CORONARY VESSELS: The coronary circulation is right dominant.  LM:   --  LM: The vessel was medium sized. Angiography showed moderate  atherosclerosis.  LAD:   --  Ostial LAD: There was a 100 % stenosis. This lesion is a chronic  total occlusion.  CX:   --  Ostial circumflex: There was a 100 % stenosis. This lesion is a  chronic total occlusion.  RCA:   --  Proximal RCA: There was a 100 % stenosis. This lesion is a  chronic total occlusion.  GRAFTS:   --  Graft to the mid LAD: The graft was a medium sized LIMA.  Distal vessel angiography showed a medium sized vessel and mild diffuse  disease.  --  Graft to the 1st diagonal: The graft was a medium sized saphenous vein  graft from the ascending aorta. Graft angiography showed mild  degeneration. Distal vessel angiography showed a small to medium sized  vessel and moderate diffusedisease.  --  Graft to the RPDA: The graft was a medium sized saphenous vein graft  from the ascending aorta. Graft angiography showed mild degeneration.  Distal vessel angiography showed mild diffuse disease. Distal RCA supplies  collaterals to LCx.  COMPLICATIONS: There were no complications.  DIAGNOSTIC IMPRESSIONS: Patent LIMA to LAD  Patent SVG to RPDA(distal RCA supplies god collaterals to LCX)  Patent SVG to D-1  Severely elevated Blood Pressure, Elevated LVEP  DIAGNOSTIC RECOMMENDATIONS: Optimize medical therapy for Heart failure and  Hypertension (patient admits not being compliant with meds)  INTERVENTIONAL IMPRESSIONS: Patent LIMA to LAD  Patent SVG to RPDA(distal RCA supplies god collaterals to LCX)  Patent SVG to D-1  Severely elevated Blood Pressure, Elevated LVEP  INTERVENTIONAL RECOMMENDATIONS: Optimize medical therapy for Heart failure  and Hypertension (patient admits not being compliant with meds)  Prepared and signed by  Martha Pimentel M.D.    < end of copied text >              RECOMMENDATIONS: This is a 55 year old male transferred from [facility] with past medical history of diabetes T2, HTN, HLD, CVA, ILR, CAD s/p CABG/stent and HFrEF (moderate LV systolic dysfunction) where he was admitted with shortness of breath, dizziness and palpitations. Head CT negative.  Found to be in CHF and was diuresed with IV Lasix. On telemetry he was noted to have runs of nonsustained VT and is transferred to [facility] for EP study and possible ICD and ILR removal. Of note, he is noncompliant with medications and dietary discretion. He denies  syncope, fever, chills, cough, falls, LOC, chest pain, abdominal pain, nausea, vomiting, melena, hematochezia, LE edema or dysuria. Patient states his SOB has improved significantly since his hospitalization and is able to lie flat in bed.       PAST MEDICAL & SURGICAL HISTORY:  Cerebrovascular accident (CVA), unspecified mechanism  Congestive heart failure, NYHA class 3, chronic, combined  Cerebrovascular accident (CVA), unspecified mechanism  Hyperlipidemia, unspecified hyperlipidemia type  Coronary artery disease involving native heart without angina pectoris, unspecified vessel or lesion type  Hypertension, unspecified type  Diabetes mellitus of other type without complication    S/P CABG x 5 2005 at [facility]   loop recorder  S/P coronary artery stent placement  History of appendectomy      MEDICATIONS  (STANDING):  aspirin  chewable 81 milliGRAM(s) Oral daily  atorvastatin 20 milliGRAM(s) Oral at bedtime  dextrose 40% Gel 15 Gram(s) Oral once  dextrose 5%. 1000 milliLiter(s) (50 mL/Hr) IV Continuous <Continuous>  dextrose 5%. 1000 milliLiter(s) (100 mL/Hr) IV Continuous <Continuous>  dextrose 50% Injectable 25 Gram(s) IV Push once  dextrose 50% Injectable 12.5 Gram(s) IV Push once  dextrose 50% Injectable 25 Gram(s) IV Push once  furosemide   Injectable 40 milliGRAM(s) IV Push daily  glucagon  Injectable 1 milliGRAM(s) IntraMuscular once  hydrALAZINE 50 milliGRAM(s) Oral every 8 hours  insulin glargine Injectable (LANTUS) 30 Unit(s) SubCutaneous at bedtime  insulin lispro (ADMELOG) corrective regimen sliding scale   SubCutaneous three times a day before meals  insulin lispro (ADMELOG) corrective regimen sliding scale   SubCutaneous at bedtime  insulin lispro Injectable (ADMELOG) 10 Unit(s) SubCutaneous three times a day before meals  isosorbide   mononitrate ER Tablet (IMDUR) 30 milliGRAM(s) Oral every 24 hours  metoprolol tartrate 25 milliGRAM(s) Oral three times a day  potassium chloride    Tablet ER 10 milliEquivalent(s) Oral once    MEDICATIONS  (PRN):  meclizine 25 milliGRAM(s) Oral three times a day PRN Dizziness      FAMILY HISTORY:  FH: CAD (coronary artery disease) (Father)  family        SOCIAL HISTORY:    CIGARETTES: no   DRUGS:  no  ALCOHOL:  no     REVIEW OF SYSTEMS:    CONSTITUTIONAL: No fever, weight loss, chills, shakes, or fatigue  EYES: No eye pain, visual disturbances, or discharge.  Right eye with decreased visual acuity  ENMT:  No difficulty hearing, tinnitus, vertigo; No sinus or throat pain  NECK: No pain or stiffness  BREASTS: No pain, masses, or nipple discharge  RESPIRATORY: No cough, wheezing, hemoptysis, + shortness of breath  CARDIOVASCULAR: No chest pain,  dizziness, syncope, paroxysmal nocturnal dyspnea, orthopnea, or arm or leg swelling  + palpitations  GASTROINTESTINAL: No abdominal  or epigastric pain, nausea, vomiting, hematemesis, diarrhea, constipation, melena or bright red blood.  GENITOURINARY: No dysuria, nocturia, hematuria, or urinary incontinence  NEUROLOGICAL:  + headaches (migraine) No  memory loss, slurred speech, limb weakness, loss of strength, numbness, or tremors  SKIN: No itching, burning, rashes, or lesions   LYMPH NODES: No enlarged glands  ENDOCRINE: No heat or cold intolerance, or hair loss  MUSCULOSKELETAL: No joint pain or swelling, muscle, back, or extremity pain  PSYCHIATRIC: No depression, anxiety, or difficulty sleeping  HEME/LYMPH: No easy bruising or bleeding gums  ALLERGY AND IMMUNOLOGIC: No hives or rash.      Vital Signs Last 24 Hrs  T(C): 36.9 (17 Nov 2020 09:10), Max: 36.9 (17 Nov 2020 09:10)  T(F): 98.4 (17 Nov 2020 09:10), Max: 98.4 (17 Nov 2020 09:10)  HR: 87 (17 Nov 2020 09:10) (73 - 87)  BP: 141/88 (17 Nov 2020 09:10) (129/72 - 146/78)  BP(mean): 103 (17 Nov 2020 09:10) (103 - 103)  RR: 18 (17 Nov 2020 09:10) (18 - 18)  SpO2: 96% (17 Nov 2020 09:10) (91% - 100%)    PHYSICAL EXAM:    GENERAL: In no apparent distress, well nourished, and hydrated.  HEAD:  Atraumatic, Normocephalic  EYES: EOMI, PERRLA, conjunctiva and sclera clear  ENMT: No tonsillar erythema, exudates, or enlargement; Moist mucous membranes, Good dentition, No lesions  NECK: Supple and normal thyroid.  No JVD or carotid bruit.  Carotid pulse is 2+ bilaterally.  HEART: Regular rate and rhythm; No murmurs, rubs, or gallops.  PULMONARY: Clear to auscultation and perfusion.  No rales, wheezing, or rhonchi bilaterally.  ABDOMEN: Soft, Nontender, Nondistended; Bowel sounds present  EXTREMITIES:  2+ Peripheral Pulses, No clubbing, cyanosis, or edema  LYMPH: No lymphadenopathy noted  NEUROLOGICAL: Grossly nonfocal          INTERPRETATION OF TELEMETRY:  Normal sinus rhythm    ECG: NSR w/ RBBB TWI AVR/AVL    LABS:                        14.7   4.04  )-----------( 237      ( 17 Nov 2020 06:00 )             45.7     11-17    140  |  102  |  29<H>  ----------------------------<  180<H>  3.9   |  27  |  1.53<H>    Ca    9.6      17 Nov 2020 06:00  Phos  3.7     11-17  Mg     2.0     11-17    TPro  8.1  /  Alb  3.0<L>  /  TBili  0.4  /  DBili  x   /  AST  19  /  ALT  26  /  AlkPhos  107  11-16        PT/INR - ( 17 Nov 2020 06:00 )   PT: 12.7 SEC;   INR: 1.12          PTT - ( 17 Nov 2020 06:00 )  PTT:28.8 SEC    BNP  2400  I&O's Detail              Daily Height in cm: 183.64 (16 Nov 2020 20:30)    Daily     RADIOLOGY & ADDITIONAL STUDIES:  PREVIOUS DIAGNOSTIC TESTING:      ECHO  FINDINGS:  < from: Transthoracic Echocardiogram (07.22.20 @ 07:26) >  Patient name: MADISON PERRY  YOB: 1965   Age: 55 (M)   MR#: [id number]  Study Date: 7/22/2020  Location: [address] [place], [place] [address]Sonographer: Marshall Jacobs [facility]  Study quality: Technically good  Referring Physician: Nathan Ch MD  Blood Pressure: 146/93 mmHg  Height: 175 cm  Weight: 104 kg  BSA: 2.2 m2  ------------------------------------------------------------------------    PROCEDURE: Transthoracic echocardiogram with 2-D, M-Mode  and complete spectral and color flow Doppler.  INDICATION: Heart failure, unspecified (I50.9)  HISTORY:  ------------------------------------------------------------------------  DIMENSIONS:  Dimensions:     Normal Values:  LA:     4.3 cm    2.0 - 4.0 cm  Ao:     3.2 cm    2.0 - 3.8 cm  SEPTUM: 1.2 cm    0.6 - 1.2 cm  PWT:    1.1 cm    0.6 - 1.1 cm  LVIDd:  5.6 cm    3.0 - 5.6 cm  LVIDs:  4.3 cm    1.8 - 4.0 cm      Derived Variables:  LVMI: 121 g/m2  RWT: 0.39  Ejection Fraction Visual Estimate: 40-45 %    ------------------------------------------------------------------------  OBSERVATIONS:  Mitral Valve: Normal mitral valve. Mild mitral  regurgitation.  Aortic Root: Normal aortic root.  Aortic Valve: Calcified trileaflet aortic valve with normal  opening.  Left Atrium: Mildly dilated left atrium.  LA volume index =  40 cc/m2.  Left Ventricle: Moderate segmental left ventricular  systolic dysfunction.  The inferior and  inferolateral  walls are akinetic.  Septal motion consistent with a  conduction defect.   Moderate diastolic dysfunction (stage  II). Mild concentric left ventricular hypertrophy.  Right Heart: Linear structure compatible with Chiari  network identified in the right atrium.  This is a normal  variant. Normal right ventricular size and function. There  is moderate tricuspid regurgitation. There is mild pulmonic  regurgitation.  Pericardium/PleuraNormal pericardium with no pericardial  effusion.  Hemodynamic: RA Pressure is 8 mm Hg. RV systolic pressure  is 29 mm Hg.  ------------------------------------------------------------------------  CONCLUSIONS:  1. Mild mitral regurgitation.  2. Mildly dilated left atrium.  LA volume index = 40 cc/m2.  3. Mild concentric left ventricular hypertrophy.  4. Moderate segmental left ventricular systolic  dysfunction.  The inferior and inferolateral walls are  akinetic.  Septal motion consistent with a conduction  defect.   Moderate diastolic dysfunction (stage II).  5. Linear structure compatible with Chiari network  identified in the right atrium.  This is a normal variant.  6. Normal right ventricular size and function.    ------------------------------------------------------------------------  Confirmed on  7/22/2020 - 17:56:49 by Rosendo Allred MD        CATHETERIZATION  FINDINGS:  < from: Cardiac Cath Lab - Adult (10.09.20 @ 13:16) >  VENTRICLES: No left ventriculogram was performed.  CORONARY VESSELS: The coronary circulation is right dominant.  LM:   --  LM: The vessel was medium sized. Angiography showed moderate  atherosclerosis.  LAD:   --  Ostial LAD: There was a 100 % stenosis. This lesion is a chronic  total occlusion.  CX:   --  Ostial circumflex: There was a 100 % stenosis. This lesion is a  chronic total occlusion.  RCA:   --  Proximal RCA: There was a 100 % stenosis. This lesion is a  chronic total occlusion.  GRAFTS:   --  Graft to the mid LAD: The graft was a medium sized LIMA.  Distal vessel angiography showed a medium sized vessel and mild diffuse  disease.  --  Graft to the 1st diagonal: The graft was a medium sized saphenous vein  graft from the ascending aorta. Graft angiography showed mild  degeneration. Distal vessel angiography showed a small to medium sized  vessel and moderate diffusedisease.  --  Graft to the RPDA: The graft was a medium sized saphenous vein graft  from the ascending aorta. Graft angiography showed mild degeneration.  Distal vessel angiography showed mild diffuse disease. Distal RCA supplies  collaterals to LCx.  COMPLICATIONS: There were no complications.  DIAGNOSTIC IMPRESSIONS: Patent LIMA to LAD  Patent SVG to RPDA(distal RCA supplies god collaterals to LCX)  Patent SVG to D-1  Severely elevated Blood Pressure, Elevated LVEP  DIAGNOSTIC RECOMMENDATIONS: Optimize medical therapy for Heart failure and  Hypertension (patient admits not being compliant with meds)  INTERVENTIONAL IMPRESSIONS: Patent LIMA to LAD  Patent SVG to RPDA(distal RCA supplies god collaterals to LCX)  Patent SVG to D-1  Severely elevated Blood Pressure, Elevated LVEP  INTERVENTIONAL RECOMMENDATIONS: Optimize medical therapy for Heart failure  and Hypertension (patient admits not being compliant with meds)  Prepared and signed by  Martha Pimentel M.D.    < end of copied text >              RECOMMENDATIONS:

## 2020-11-17 NOTE — PHYSICAL THERAPY INITIAL EVALUATION ADULT - GAIT DISTANCE, PT EVAL
Further ambulation deferred secondary to pt. reporting lightheadedness with ambulation. RN made aware./25 feet

## 2020-11-17 NOTE — PROGRESS NOTE ADULT - SUBJECTIVE AND OBJECTIVE BOX
SUBJECTIVE / OVERNIGHT EVENTS: pt denies chest pain, shortness of breath       MEDICATIONS  (STANDING):  aspirin  chewable 81 milliGRAM(s) Oral daily  atorvastatin 20 milliGRAM(s) Oral at bedtime  dextrose 40% Gel 15 Gram(s) Oral once  dextrose 5%. 1000 milliLiter(s) (50 mL/Hr) IV Continuous <Continuous>  dextrose 5%. 1000 milliLiter(s) (100 mL/Hr) IV Continuous <Continuous>  dextrose 50% Injectable 25 Gram(s) IV Push once  dextrose 50% Injectable 12.5 Gram(s) IV Push once  dextrose 50% Injectable 25 Gram(s) IV Push once  furosemide   Injectable 40 milliGRAM(s) IV Push daily  glucagon  Injectable 1 milliGRAM(s) IntraMuscular once  hydrALAZINE 50 milliGRAM(s) Oral every 8 hours  insulin glargine Injectable (LANTUS) 30 Unit(s) SubCutaneous at bedtime  insulin lispro (ADMELOG) corrective regimen sliding scale   SubCutaneous three times a day before meals  insulin lispro (ADMELOG) corrective regimen sliding scale   SubCutaneous at bedtime  insulin lispro Injectable (ADMELOG) 10 Unit(s) SubCutaneous three times a day before meals  isosorbide   mononitrate ER Tablet (IMDUR) 30 milliGRAM(s) Oral every 24 hours  metoprolol tartrate 25 milliGRAM(s) Oral three times a day    MEDICATIONS  (PRN):  meclizine 25 milliGRAM(s) Oral three times a day PRN Dizziness    Vital Signs Last 24 Hrs  T(C): 36.8 (17 Nov 2020 21:56), Max: 36.9 (17 Nov 2020 09:10)  T(F): 98.3 (17 Nov 2020 21:56), Max: 98.4 (17 Nov 2020 09:10)  HR: 87 (17 Nov 2020 21:56) (81 - 88)  BP: 126/60 (17 Nov 2020 21:56) (126/60 - 141/88)  BP(mean): 111 (17 Nov 2020 13:06) (103 - 111)  RR: 18 (17 Nov 2020 21:56) (18 - 18)  SpO2: 100% (17 Nov 2020 21:56) (96% - 100%)      CAPILLARY BLOOD GLUCOSE      POCT Blood Glucose.: 221 mg/dL (17 Nov 2020 21:54)  POCT Blood Glucose.: 143 mg/dL (17 Nov 2020 17:11)  POCT Blood Glucose.: 165 mg/dL (17 Nov 2020 12:19)  POCT Blood Glucose.: 173 mg/dL (17 Nov 2020 06:14)    I&O's Summary    16 Nov 2020 07:01  -  17 Nov 2020 07:00  --------------------------------------------------------  IN: 0 mL / OUT: 200 mL / NET: -200 mL        Constitutional: No fever, fatigue  Skin: No rash.  Eyes: No recent vision problems or eye pain.  ENT: No congestion, ear pain, or sore throat.  Cardiovascular: No chest pain or palpation.  Respiratory: No cough, shortness of breath, congestion, or wheezing.  Gastrointestinal: No abdominal pain, nausea, vomiting, or diarrhea.  Genitourinary: No dysuria.  Musculoskeletal: No joint swelling.  Neurologic: No headache.    PHYSICAL EXAM:  GENERAL: NAD  EYES: EOMI, PERRLA  NECK: Supple, No JVD  CHEST/LUNG: dec breath sounds rt base  HEART:  S1 , S2 +  ABDOMEN: soft , bs+  EXTREMITIES:  trace edema  NEUROLOGY:alert awake      LABS:                        14.7   4.04  )-----------( 237      ( 17 Nov 2020 06:00 )             45.7     11-17    140  |  102  |  29<H>  ----------------------------<  180<H>  3.9   |  27  |  1.53<H>    Ca    9.6      17 Nov 2020 06:00  Phos  3.7     11-17  Mg     2.0     11-17    TPro  8.1  /  Alb  3.0<L>  /  TBili  0.4  /  DBili  x   /  AST  19  /  ALT  26  /  AlkPhos  107  11-16    PT/INR - ( 17 Nov 2020 06:00 )   PT: 12.7 SEC;   INR: 1.12          PTT - ( 17 Nov 2020 06:00 )  PTT:28.8 SEC          RADIOLOGY & ADDITIONAL TESTS:    Imaging Personally Reviewed:    Consultant(s) Notes Reviewed:      Care Discussed with Consultants/Other Providers:

## 2020-11-17 NOTE — TRANSFER ACCEPTANCE NOTE - PROBLEM SELECTOR PLAN 6
ISS  monitor fingersticks  Patient was on Basaglar 43 units and admelog 15 units outpatient.   cont lantus 30 units QHS and admelog 10 units TID while inpatient Type unknown.   ISS  monitor fingersticks  Patient was on Basaglar 43 units and admelog 15 units outpatient.   cont lantus 30 units QHS and admelog 10 units TID while inpatient

## 2020-11-17 NOTE — PHYSICAL THERAPY INITIAL EVALUATION ADULT - CRITERIA FOR SKILLED THERAPEUTIC INTERVENTIONS
rehab potential/impairments found/anticipated discharge recommendation/predicted duration of therapy intervention/therapy frequency

## 2020-11-17 NOTE — TRANSFER ACCEPTANCE NOTE - RADIOLOGY RESULTS AND INTERPRETATION
< from: Transthoracic Echocardiogram (07.22.20 @ 07:26) >    Mild mitral regurgitation.  2. Mildly dilated left atrium.  LA volume index = 40 cc/m2.  3. Mild concentric left ventricular hypertrophy.  4. Moderate segmental left ventricular systolic  dysfunction.  The inferior andinferolateral walls are  akinetic.  Septal motion consistent with a conduction  defect.   Moderate diastolic dysfunction (stage II).  5. Linear structure compatible with Chiari network  identified in the right atrium.  This is a normal v    < end of copied text >

## 2020-11-17 NOTE — CONSULT NOTE ADULT - ATTENDING COMMENTS
55 year old male transferred from [facility] with past medical history of diabetes T2, HTN, HLD, CVA, ILR, CAD s/p CABG/stent and HFrEF (moderate LV systolic dysfunction) where he was admitted with shortness of breath, dizziness and palpitations. Head CT negative.  Found to be in CHF and diuresed. On telemetry he was noted to have runs of nonsustained VT and is transferred to [facility] for EP study and possible ICD and ILR removal. Of note, he is noncompliant with medications and dietary discretion. He denies  syncope, fever, chills, cough, falls, LOC, chest pain, abdominal pain, nausea, vomiting, melena, hematochezia, LE edema or dysuria. Patient states his SOB has improved significantly since his hospitalization and is able to lie flat in bed. I have discussed in detail the risks, benefits and alternatives to the EPS as well as the ICD and ILR removal . All questions answered. Patient has consented. However, despite being told not to eat or drink anything he had an egg for breakfast at 8:00am. Will attempt to reschedule for later today. Keep patient NPO for EPDS/ICD.

## 2020-11-17 NOTE — PROGRESS NOTE ADULT - PROBLEM SELECTOR PLAN 4
continue home med of metoprolol   Started on hydralazine and imdur  Lasix as above  hold lisinopril, aldactone in setting of MALCOM  monitor BP and adjust meds as needed

## 2020-11-17 NOTE — TRANSFER ACCEPTANCE NOTE - HISTORY OF PRESENT ILLNESS
This is a 55 year old male from home AAO x3, with PMH of DM, HTN, CAD s/p CABG 2005, HLD, CVA and HFrEF, who presented to the ED due to dizziness and shortness of breath, admitted to [facility] for workup of SOB. patient was admitted for CHF and was being diuresed with IV lasix. Patient then noted to have NSVT there and was transferred [facility] for EP study. Patient also complained of dizziness at [facility] and CTH was negative. Patient denies any fever, chills, cough, falls, LOC, chest pain, SOB, abdominal pain, nausea, vomiting, melena, hematochezia, LE edema or dysuria. Patient states his SOB has improved significantly since being admitted to [facility].

## 2020-11-17 NOTE — PHYSICAL THERAPY INITIAL EVALUATION ADULT - PERTINENT HX OF CURRENT PROBLEM, REHAB EVAL
Per documentation, pt. admitted to [place] [facility] for workup of SOB. Patient was admitted for CHF and was being diuresed with IV lasix. Patient then noted to have NSVT there and was transferred [facility] for EP study.

## 2020-11-17 NOTE — CHART NOTE - NSCHARTNOTEFT_GEN_A_CORE
Type of Procedure: EPS  Licensed independent practitioner: Brandi Bañuelos MD  Assistant: None  Description of procedure:   Written informed consent was obtained from the patient after a full explanation of the risks and benefits of  the procedure. The patient was brought to the lab in the fasting state. Continuous electrocardiographic and  hemodynamic monitoring was initiated. The patient was prepped and draped in the usual sterile fashion.  Local anesthesia was used during the case. The presenting rhythm was normal sinus rhythm.  Vascular sites were accessed using the percutaneous modified Seldinger technique and using vascular  ultrasound guidance. A 6F right femoral vein sheath was placed. A quadripolar catheter was  advanced to the right ventricular apex. The quadripolar catheter was repositioned during the procedure to the RV outflow tract.  An EP study was performed due to one or more episodes of NSVT and cardimoypathy. Intracardiac electrograms were recorded  from the right ventricle. Pacing was performed from the right ventricle at 2  sites, RV apex and RVOT. No spontaneous His block is noted.   Programmed ventricular stimulation was performed from the RV apex and RV outflow tract at two pacing  cycle lengths at 600 and 400 msec with up to triple ventricular extra-stimuli down to refractoriness, as well as  using short-long-short sequences. No sustained monomorphic ventricular tachycardia was induced.  Pleomorphic, non-sustained VT was induced from the RV outflow tract at TCL of 200 msec lasting 1-2  seconds and was considered non-specific.  After discussing the results with the patient, a decision was made to not implant an ICD.  The procedure was well tolerated and the  patient left the laboratory alert and in good condition.     Findings of procedure: As above  Estimated blood loss: <5cc  Specimen removed: N/A  Preoperative Dx: Cardiomyopathy  Postoperative Dx: Cardiomyopathy  Complications: None  Anesthesia type: Conscious

## 2020-11-17 NOTE — CHART NOTE - NSCHARTNOTEFT_GEN_A_CORE
Patient s/p EPS via R groin. Dressing clean, dry, intact. Without hematoma. Pulses present. Patient resting comfortable without complaints. Will continue to monitor closely.

## 2020-11-17 NOTE — TRANSFER ACCEPTANCE NOTE - PROBLEM SELECTOR PLAN 5
Patient was complaining of awaking from sleep at [place]. Patient may have ANTONIA. Sleep study outpatient

## 2020-11-17 NOTE — TRANSFER ACCEPTANCE NOTE - PROBLEM SELECTOR PROBLEM 6
Type 2 diabetes mellitus with hyperglycemia, with long-term current use of insulin Diabetes mellitus

## 2020-11-17 NOTE — TRANSFER ACCEPTANCE NOTE - ASSESSMENT
his is a 55 year old male from home AAO x3, with PMH of DM, HTN, CAD s/p CABG 2005, HLD, CVA and HFrEF, who presented to the ED due to dizziness and shortness of breath, admitted to [facility] for workup of SOB. transferred to [facility] for EP eval

## 2020-11-17 NOTE — PROGRESS NOTE ADULT - PROBLEM SELECTOR PLAN 7
patient has history of CABG in 2005  Recent stress test negative, Cath - patent vessels  continue aspirin, plavix , statin

## 2020-11-17 NOTE — PROGRESS NOTE ADULT - PROBLEM SELECTOR PLAN 3
patient states that he awakens from sleep gasping for air for the past 4-5 days   patient may likely have ANTONIA   sleep study as outpatient

## 2020-11-17 NOTE — TRANSFER ACCEPTANCE NOTE - PROBLEM SELECTOR PLAN 1
Admit to tele  check cbc, bmp, PT,PTT, INR  Plan for EP study and possible AICD in AM  repeat COVID PCR sent  Follow EP recommendations

## 2020-11-17 NOTE — PHYSICAL THERAPY INITIAL EVALUATION ADULT - ADDITIONAL COMMENTS
Pt. owns a straight cane and rolling walker.    Pt. was left seated at edge of bed post PT Evaluation, no apparent distress, all lines intact, call bell within reach. Chika BOYKIN made aware of pt. status and participation in PT.

## 2020-11-17 NOTE — TRANSFER ACCEPTANCE NOTE - PSH
History of appendectomy    History of loop recorder    S/P CABG x 5  2005 at [facility]  S/P coronary artery stent placement

## 2020-11-17 NOTE — TRANSFER ACCEPTANCE NOTE - PROBLEM SELECTOR PLAN 4
Monitor Cr  avoid nephrotoxic meds  Patient was on lisinopril and aldactone outpatient. Will hold them  Renal US ordered and UA ordered

## 2020-11-17 NOTE — CONSULT NOTE ADULT - ASSESSMENT
This is a 55 year old male transferred from [facility] with past medical history of diabetes T2, HTN, HLD, CVA, ILR, CAD s/p CABG/stent and HFrEF (moderate LV systolic dysfunction) where he was admitted with shortness of breath, dizziness and palpitations. Head CT negative.  Found to be in CHF and diuresed. On telemetry he was noted to have runs of nonsustained VT and is transferred to [facility] for EP study and possible ICD and ILR removal. Of note, he is noncompliant with medications and dietary discretion. He denies  syncope, fever, chills, cough, falls, LOC, chest pain, abdominal pain, nausea, vomiting, melena, hematochezia, LE edema or dysuria. Patient states his SOB has improved significantly since his hospitalization and is able to lie flat in bed. I have discussed in detail the risks, benefits and alternatives to the EPS as well as the ICD and ILR removal . All questions answered. Patient has consented. However, despite being told not to eat or drink anything he had an egg for breakfast at 8:00am. Will attempt to reschedule for later today.    Plan:  Keep patient NPO for EPDS/ICD.            Keep K+>4.0 and Mg > 2.0           Echocardiogram

## 2020-11-17 NOTE — TRANSFER ACCEPTANCE NOTE - PROBLEM SELECTOR PLAN 2
cont meclizine  CTH at [place]: no Acute changes. consider neuro eval if patient continues to have symptoms.

## 2020-11-18 ENCOUNTER — TRANSCRIPTION ENCOUNTER (OUTPATIENT)
Age: 55
End: 2020-11-18

## 2020-11-18 VITALS
DIASTOLIC BLOOD PRESSURE: 93 MMHG | OXYGEN SATURATION: 98 % | RESPIRATION RATE: 18 BRPM | HEART RATE: 73 BPM | TEMPERATURE: 98 F | SYSTOLIC BLOOD PRESSURE: 138 MMHG

## 2020-11-18 LAB
ANION GAP SERPL CALC-SCNC: 11 MMO/L — SIGNIFICANT CHANGE UP (ref 7–14)
BUN SERPL-MCNC: 39 MG/DL — HIGH (ref 7–23)
CALCIUM SERPL-MCNC: 9.7 MG/DL — SIGNIFICANT CHANGE UP (ref 8.4–10.5)
CHLORIDE SERPL-SCNC: 100 MMOL/L — SIGNIFICANT CHANGE UP (ref 98–107)
CO2 SERPL-SCNC: 25 MMOL/L — SIGNIFICANT CHANGE UP (ref 22–31)
CREAT SERPL-MCNC: 1.93 MG/DL — HIGH (ref 0.5–1.3)
GLUCOSE BLDC GLUCOMTR-MCNC: 227 MG/DL — HIGH (ref 70–99)
GLUCOSE SERPL-MCNC: 260 MG/DL — HIGH (ref 70–99)
HCT VFR BLD CALC: 44.3 % — SIGNIFICANT CHANGE UP (ref 39–50)
HGB BLD-MCNC: 14.5 G/DL — SIGNIFICANT CHANGE UP (ref 13–17)
MAGNESIUM SERPL-MCNC: 1.9 MG/DL — SIGNIFICANT CHANGE UP (ref 1.6–2.6)
MCHC RBC-ENTMCNC: 29.1 PG — SIGNIFICANT CHANGE UP (ref 27–34)
MCHC RBC-ENTMCNC: 32.7 % — SIGNIFICANT CHANGE UP (ref 32–36)
MCV RBC AUTO: 89 FL — SIGNIFICANT CHANGE UP (ref 80–100)
NRBC # FLD: 0 K/UL — SIGNIFICANT CHANGE UP (ref 0–0)
PHOSPHATE SERPL-MCNC: 3.4 MG/DL — SIGNIFICANT CHANGE UP (ref 2.5–4.5)
PLATELET # BLD AUTO: 243 K/UL — SIGNIFICANT CHANGE UP (ref 150–400)
PMV BLD: 9.8 FL — SIGNIFICANT CHANGE UP (ref 7–13)
POTASSIUM SERPL-MCNC: 4.2 MMOL/L — SIGNIFICANT CHANGE UP (ref 3.5–5.3)
POTASSIUM SERPL-SCNC: 4.2 MMOL/L — SIGNIFICANT CHANGE UP (ref 3.5–5.3)
RBC # BLD: 4.98 M/UL — SIGNIFICANT CHANGE UP (ref 4.2–5.8)
RBC # FLD: 14.2 % — SIGNIFICANT CHANGE UP (ref 10.3–14.5)
SODIUM SERPL-SCNC: 136 MMOL/L — SIGNIFICANT CHANGE UP (ref 135–145)
WBC # BLD: 4.63 K/UL — SIGNIFICANT CHANGE UP (ref 3.8–10.5)
WBC # FLD AUTO: 4.63 K/UL — SIGNIFICANT CHANGE UP (ref 3.8–10.5)

## 2020-11-18 PROCEDURE — 99232 SBSQ HOSP IP/OBS MODERATE 35: CPT

## 2020-11-18 RX ORDER — HYDRALAZINE HCL 50 MG
1 TABLET ORAL
Qty: 90 | Refills: 0
Start: 2020-11-18 | End: 2020-12-17

## 2020-11-18 RX ORDER — METOPROLOL TARTRATE 50 MG
1 TABLET ORAL
Qty: 0 | Refills: 0 | DISCHARGE

## 2020-11-18 RX ORDER — SPIRONOLACTONE 25 MG/1
1 TABLET, FILM COATED ORAL
Qty: 0 | Refills: 0 | DISCHARGE

## 2020-11-18 RX ORDER — FUROSEMIDE 40 MG
1 TABLET ORAL
Qty: 0 | Refills: 0 | DISCHARGE

## 2020-11-18 RX ORDER — INSULIN GLARGINE 100 [IU]/ML
30 INJECTION, SOLUTION SUBCUTANEOUS
Qty: 0 | Refills: 0 | DISCHARGE

## 2020-11-18 RX ORDER — ISOSORBIDE MONONITRATE 60 MG/1
1 TABLET, EXTENDED RELEASE ORAL
Qty: 30 | Refills: 0
Start: 2020-11-18 | End: 2020-12-17

## 2020-11-18 RX ORDER — MECLIZINE HCL 12.5 MG
1 TABLET ORAL
Qty: 90 | Refills: 0
Start: 2020-11-18 | End: 2020-12-17

## 2020-11-18 RX ORDER — INSULIN LISPRO 100/ML
15 VIAL (ML) SUBCUTANEOUS
Qty: 0 | Refills: 0 | DISCHARGE

## 2020-11-18 RX ORDER — METOPROLOL TARTRATE 50 MG
1 TABLET ORAL
Qty: 90 | Refills: 0
Start: 2020-11-18 | End: 2020-12-17

## 2020-11-18 RX ORDER — INSULIN LISPRO 100/ML
10 VIAL (ML) SUBCUTANEOUS
Qty: 0 | Refills: 0 | DISCHARGE

## 2020-11-18 RX ORDER — INSULIN GLARGINE 100 [IU]/ML
43 INJECTION, SOLUTION SUBCUTANEOUS
Qty: 0 | Refills: 0 | DISCHARGE

## 2020-11-18 RX ORDER — MAGNESIUM OXIDE 400 MG ORAL TABLET 241.3 MG
400 TABLET ORAL ONCE
Refills: 0 | Status: COMPLETED | OUTPATIENT
Start: 2020-11-18 | End: 2020-11-18

## 2020-11-18 RX ADMIN — Medication 50 MILLIGRAM(S): at 05:54

## 2020-11-18 RX ADMIN — Medication 25 MILLIGRAM(S): at 05:54

## 2020-11-18 RX ADMIN — MAGNESIUM OXIDE 400 MG ORAL TABLET 400 MILLIGRAM(S): 241.3 TABLET ORAL at 10:14

## 2020-11-18 RX ADMIN — Medication 2: at 08:51

## 2020-11-18 RX ADMIN — Medication 40 MILLIGRAM(S): at 05:54

## 2020-11-18 RX ADMIN — Medication 10 UNIT(S): at 08:50

## 2020-11-18 NOTE — DISCHARGE NOTE NURSING/CASE MANAGEMENT/SOCIAL WORK - PATIENT PORTAL LINK FT
You can access the FollowMyHealth Patient Portal offered by [facility] by registering at the following website: [email or web address]. By joining [facility]’s FollowMyHealth portal, you will also be able to view your health information using other applications (apps) compatible with our system.

## 2020-11-18 NOTE — DISCHARGE NOTE PROVIDER - NSDCMRMEDTOKEN_GEN_ALL_CORE_FT
Admelog 100 units/mL injectable solution: 15 unit(s) subcutaneous 3 times a day (with meals)  Aldactone 25 mg oral tablet: 1 tab(s) orally once a day  aspirin 81 mg oral tablet, chewable: 1 tab(s) orally once a day  atorvastatin 20 mg oral tablet: 1 tab(s) orally once a day  Basaglar KwikPen 100 units/mL subcutaneous solution: 43 unit(s) subcutaneous once a day (at bedtime)  furosemide 20 mg oral tablet: 1 tab(s) orally once a day  lisinopril 2.5 mg oral tablet: 1 tab(s) orally once a day  Metoprolol Tartrate 25 mg oral tablet: 1 tab(s) orally 3 times a day   Admelog 100 units/mL injectable solution: 10 unit(s) subcutaneous 3 times a day (with meals)  aspirin 81 mg oral tablet, chewable: 1 tab(s) orally once a day  atorvastatin 20 mg oral tablet: 1 tab(s) orally once a day  Basaglar KwikPen 100 units/mL subcutaneous solution: 30 unit(s) subcutaneous once a day (at bedtime)  hydrALAZINE 50 mg oral tablet: 1 tab(s) orally every 8 hours  isosorbide mononitrate 30 mg oral tablet, extended release: 1 tab(s) orally every 24 hours  meclizine 25 mg oral tablet: 1 tab(s) orally 3 times a day, As needed, Dizziness  Metoprolol Tartrate 25 mg oral tablet: 1 tab(s) orally 3 times a day  physical therapy: 3-5x/week

## 2020-11-18 NOTE — CHART NOTE - NSCHARTNOTEFT_GEN_A_CORE
Called by nurse to evaluate patient who wishes to leave the hospital against medical advice. Patient is A&O x3 and has full capacity to make his or her own medical decisions. Pt was informed of their medical conditions, benefits, and alternatives to treatment as well as the risks of refusing treatment and the seriousness of leaving against medical advice such as the risks of heart attack, stroke, seizure, and even death were fully explained to the patient.  After expressing full understanding, patient then signs out against medical advice witnessed by the nurse.  Attending made aware. AMA form filled out and placed in chart.    Discussed DC meds with Dr. Yao- will hold Lasix, Aldactone, and Lisinopril upon DC. Pt states he will follow up with his PCP this week to get his labs rechecked.

## 2020-11-18 NOTE — DISCHARGE NOTE PROVIDER - HOSPITAL COURSE
55y M PMH DM, HTN, CAD s/p CABG 2005, HLD, CVA and HFrEF p/w dizziness and SOB, admitted to [facility] then transferred to [facility] for EP eval    Hospital course:  Pt was transferred to [facility] for NSVT. EP following and patient underwent EP study on 11/17 that was negative therefore ICD was not placed. Pt will follow up with Dr. Bañuelos 12/3 at 8:45AM. Pts course c/b dizziness. CTH was negative. Pt was on IV lasix for CHF but then Cr increased so Lasix DC'ed. Aldactone and lisinopril also on hold for MALCOM. Renal US was done that showed normal kidneys. No hydronephrosis. Enlarged prostate. Pt aware about this and needs to f/u with PCP. Pts course c/b constant awakening at night, suspect pt may have ANTONIA. Needs sleep study outpatient. FS were being monitored in the hospital. Basaglar was decreased to 30 units and Admelog decreased to 10 units TID.    Called by nurse to evaluate patient who wishes to leave the hospital against medical advice. Patient is A&O x3 and has full capacity to make his or her own medical decisions. Pt was informed of their medical conditions, benefits, and alternatives to treatment as well as the risks of refusing treatment and the seriousness of leaving against medical advice such as the risks of heart attack, stroke, seizure, and even death were fully explained to the patient.  After expressing full understanding, patient then signs out against medical advice witnessed by the nurse.  Attending made aware. AMA form filled out and placed in chart.    Discussed DC meds with Dr. Yao- will hold Lasix, Aldactone, and Lisinopril upon DC. Pt states he will follow up with his PCP this week to get his labs rechecked. 55y M PMH DM, HTN, CAD s/p CABG 2005, HLD, CVA and HFrEF p/w dizziness and SOB, admitted to [place] then transferred to [facility] for EP eval    Hospital course:  Pt was transferred to [facility] for NSVT. EP following and patient underwent EP study on 11/17 that was negative therefore ICD was not placed. Pt will follow up with Dr. Bañuelos 12/3 at 8:45AM. Pts course c/b dizziness. CTH was negative. Pt was on IV lasix for CHF but then Cr increased so Lasix DC'ed. Aldactone and lisinopril also on hold for MALCOM. Renal US was done that showed normal kidneys. No hydronephrosis. Enlarged prostate. Pt aware about this and needs to f/u with PCP. Pts course c/b constant awakening at night, suspect pt may have ANTONIA. Needs sleep study outpatient. FS were being monitored in the hospital. Basaglar was decreased to 30 units and Admelog decreased to 10 units TID.    Called by nurse to evaluate patient who wishes to leave the hospital against medical advice. Patient is A&O x3 and has full capacity to make his or her own medical decisions. Pt was informed of their medical conditions, benefits, and alternatives to treatment as well as the risks of refusing treatment and the seriousness of leaving against medical advice such as the risks of heart attack, stroke, seizure, and even death were fully explained to the patient.  After expressing full understanding, patient then signs out against medical advice witnessed by the nurse.  Attending made aware. AMA form filled out and placed in chart.    Discussed DC meds with Dr. Yao- will hold Lasix, Aldactone, and Lisinopril upon DC. Pt states he will follow up with his PCP this week to get his labs rechecked.

## 2020-11-18 NOTE — PROGRESS NOTE ADULT - ASSESSMENT
This is a 55 year old man with a pmhx of CAD s/p CABG/stent and HFrEF (moderate LV systolic dysfunction), CVA s/p ILR, T2DM, HTN, and HLD who was admitted to [facility] with shortness of breath, dizziness and palpitations found to be in CHF exacerbation s/p diuresis with IV lasix and noted to have runs of NSVT on telemetric and transferred to [facility] for EP study and possible ICD s/p EP study on 11/18/2020. Ep was unable to induce sustained monomorphic ventricular tachycardia. Pleomorphic, non-sustained VT was induced from the RV outflow tract at TCL of 200 msec lasting 1-2 seconds and was considered non-specific. It was decided not implant an ICD and to continue GDMT. Of noted, patient is known to be  noncompliant with medications and dietary discretion. I stressed the important of taking his medication as schedule and spoke to him in detail about a cardiac diet    Plan:  - Continuous telemetric monitoring  - Monitor electrolytes and replete to K>4 and Mg>2  - Continue GDMT for patient HF (ASA, Lipitor, metoprolol imdur)  - Continue care per primary team   - Patient is schedule for an appointment on _______________________  No scrubbing the incision site for 5 days   No lifting more than 5lb or exertional exercising such as jogging, running, bike riding for 5 days  No swimming pool, Jacuzzi, or bath for 5 days. Patient can shower 24 hours after procedure    Pt was instructed to call [phone] if the following occurs:      - fever with temperature > 100.6      - swelling, drainage or bleeding at the site incision     Patient to be staff with attending. Please awaiting attending addendum    This is a 55 year old man with a pmhx of CAD s/p CABG/stent and HFrEF (moderate LV systolic dysfunction), CVA s/p ILR, T2DM, HTN, and HLD who was admitted to [facility] with shortness of breath, dizziness and palpitations found to be in CHF exacerbation s/p diuresis with IV lasix and noted to have runs of NSVT on telemetric and transferred to [facility] for EP study and possible ICD s/p EP study on 11/18/2020. Ep was unable to induce sustained monomorphic ventricular tachycardia. Pleomorphic, non-sustained VT was induced from the RV outflow tract at TCL of 200 msec lasting 1-2 seconds and was considered non-specific. It was decided not implant an ICD and to continue GDMT. Of noted, patient is known to be  noncompliant with medications and dietary discretion. I stressed the important of taking his medication as schedule and spoke to him in detail about a cardiac diet    Plan:  - Continuous telemetric monitoring  - Monitor electrolytes and replete to K>4 and Mg>2  - Continue GDMT for patient HF (ASA, Lipitor, metoprolol imdur)  - Continue care per primary team   - Patient is schedule for an appointment on Dec 7, 2020   - No scrubbing the incision site for 5 days   - No lifting more than 5lb or exertional exercising such as jogging, running, bike riding for 5 days  -No swimming pool, Jacuzzi, or bath for 5 days. Patient can shower 24 hours after procedure    - Pt was instructed to call [phone] if the following occurs:      - fever with temperature > 100.6      - swelling, drainage or bleeding at the site incision     Regina Hinson PA-C  Patient to be staff with attending. Please awaiting attending addendum

## 2020-11-18 NOTE — DISCHARGE NOTE PROVIDER - NSDCCPCAREPLAN_GEN_ALL_CORE_FT
PRINCIPAL DISCHARGE DIAGNOSIS  Diagnosis: NSVT (nonsustained ventricular tachycardia)  Assessment and Plan of Treatment: You had an abnormal heart rhythm on telemetry so you were transferred to [facility] for a conduction study and the conduction in your heart is found to be normal. Therefore it was decided you do not need a defibrillator. You were started on Imdur. Please continue with Metoprolol, aspirin, and Atorvastatin. Please follow up with Dr. Bañuelos 12/3 at 8:45AM.      SECONDARY DISCHARGE DIAGNOSES  Diagnosis: Enlarged prostate  Assessment and Plan of Treatment: You had a kidney ultrasound done that showed normal kidneys but showed that your prostate is enlarged. Please follow up with your PCP for further management.    Diagnosis: Diabetes mellitus  Assessment and Plan of Treatment: Your Basaglar was decreased to 30 units and Admelog decreased to 10 units three times a day for low fingersticks in the hospital.  Monitor blood glucose levels throughout the day before meals and at bedtime. Record blood sugars and bring to outpatient providers appointment in order to be reviewed by your doctor for management modifications. If your sugars are more than 400 or less than 70 you should contact your PCP immediately. Monitor for signs/symptoms of low blood glucose, such as, dizziness, altered mental status, or cool/clammy skin. In addition, monitor for signs/symptoms of high blood glucose, such as, feeling hot, dry, fatigued, or with increased thirst/urination. Make regular podiatry appointments in order to have feet checked for wounds and uncontrolled toe nail growth to prevent infections, as well as, appointments with an ophthalmologist to monitor your vision.    Diagnosis: MALCOM (acute kidney injury)  Assessment and Plan of Treatment: Your creatinine (measurement of your kidney function) was high so your Lasix, Aldactone, and Lisinopril were discontinued since these medications can affect the kidney. Your creatinine was high today so you were recommended to stay for further evaluation but you left against medical advice. Please make sure to follow up with your PCP this week and get your labs rechecked *****.    Diagnosis: Dizziness  Assessment and Plan of Treatment: You were dizzy so you had a CT of your head that was normal. You were started on Meclizine which helped with your dizziness. Please follow up with your PCP.    Diagnosis: ANTONIA (obstructive sleep apnea)  Assessment and Plan of Treatment: You likely have obstructive sleep apnea. Please get a sleep study done outpatient. Please follow up with your PCP.    Diagnosis: Coronary artery disease involving native heart without angina pectoris, unspecified vessel or lesion type  Assessment and Plan of Treatment: You were started on Imdur. Please continue with Metoprolol, aspirin, and Atorvastatin.    Diagnosis: Hypertension, unspecified type  Assessment and Plan of Treatment: You were started on Imdur and Hydralazine for better blood pressure control. Your Lasix, Aldactone, and Lisinopril were discontinued since these medications can affect the kidney. Please follow up with your PCP when to resume these medications. Please get lab work rechecked**** Please maintain a low salt, low cholesterol diet.

## 2020-11-18 NOTE — DISCHARGE NOTE PROVIDER - PROVIDER TOKENS
FREE:[LAST:[Dr. Marin],PHONE:[(   )    -],FAX:[(   )    -],ADDRESS:[PCP]],PROVIDER:[TOKEN:[[id number]]]

## 2020-11-18 NOTE — DISCHARGE NOTE NURSING/CASE MANAGEMENT/SOCIAL WORK - NSDCPEEMAIL_GEN_ALL_CORE
[facility] email [email or web address]

## 2020-11-18 NOTE — DISCHARGE NOTE PROVIDER - CARE PROVIDER_API CALL
Dr. Marin,   PCP  Phone: (   )    -  Fax: (   )    -  Follow Up Time:     Brandi Bañuelos  CARDIAC ELECTROPHYSIOLOGY  [address] [place] [address]  Phone: [phone]  Fax: [phone]  Follow Up Time:

## 2020-11-18 NOTE — PROGRESS NOTE ADULT - ATTENDING COMMENTS
55 year old male transferred from [facility] with past medical history of diabetes T2, HTN, HLD, CVA, ILR, CAD s/p CABG/stent and HFrEF (moderate LV systolic dysfunction) where he was admitted with shortness of breath, dizziness and palpitations. Head CT negative.  Found to be in CHF and diuresed. On telemetry he was noted to have runs of nonsustained VT and is transferred to [facility] for EP study and possible ICD and ILR removal. His EPS was not inducible for VT. Will fu loop as outpt.

## 2020-11-18 NOTE — DISCHARGE NOTE NURSING/CASE MANAGEMENT/SOCIAL WORK - NSDCPEWEB_GEN_ALL_CORE
[facility] for Tobacco Control website --- [email or web address] --- [email or web address]

## 2020-11-18 NOTE — PROGRESS NOTE ADULT - SUBJECTIVE AND OBJECTIVE BOX
Subjective: No new complaints. Denies HA, lightheadedness, dizziness, CP, SOB, abdominal pain, N/V.      Interval events:  - p/w to [facility] with CHF exacerbation s/p diuresed with IV lasix.Of note, patient is non-complicated with medication  - Patient was noted to have NSVT on telemetric at [place] and was transferred [facility] for EP study and possible ICD  - S/P EP study on 2020. Ep was unable to induce sustained monomorphic ventricular tachycardia. Pleomorphic, non-sustained VT was induced from the RV outflow tract at TCL of 200 msec lasting 1-2 seconds and was considered non-specific. It was decided not implant an ICD and to continue GDMT.   - Spoke to patient about the important of taking his medication as schedule and adhering to a cardiac diet    MEDICATIONS  (STANDING):  aspirin  chewable 81 milliGRAM(s) Oral daily  atorvastatin 20 milliGRAM(s) Oral at bedtime  dextrose 40% Gel 15 Gram(s) Oral once  dextrose 5%. 1000 milliLiter(s) (50 mL/Hr) IV Continuous <Continuous>  dextrose 5%. 1000 milliLiter(s) (100 mL/Hr) IV Continuous <Continuous>  dextrose 50% Injectable 25 Gram(s) IV Push once  dextrose 50% Injectable 12.5 Gram(s) IV Push once  dextrose 50% Injectable 25 Gram(s) IV Push once  glucagon  Injectable 1 milliGRAM(s) IntraMuscular once  hydrALAZINE 50 milliGRAM(s) Oral every 8 hours  insulin glargine Injectable (LANTUS) 30 Unit(s) SubCutaneous at bedtime  insulin lispro (ADMELOG) corrective regimen sliding scale   SubCutaneous three times a day before meals  insulin lispro (ADMELOG) corrective regimen sliding scale   SubCutaneous at bedtime  insulin lispro Injectable (ADMELOG) 10 Unit(s) SubCutaneous three times a day before meals  isosorbide   mononitrate ER Tablet (IMDUR) 30 milliGRAM(s) Oral every 24 hours  metoprolol tartrate 25 milliGRAM(s) Oral three times a day    MEDICATIONS  (PRN):  meclizine 25 milliGRAM(s) Oral three times a day PRN Dizziness          Vital Signs Last 24 Hrs  T(C): 36.9 (2020 09:04), Max: 36.9 (2020 09:04)  T(F): 98.4 (2020 09:04), Max: 98.4 (2020 09:04)  HR: 73 (2020 09:04) (73 - 88)  BP: 138/93 (2020 09:04) (111/74 - 140/83)  BP(mean): 111 (2020 13:06) (111 - 111)  RR: 18 (2020 09:04) (18 - 18)  SpO2: 98% (2020 09:04) (97% - 100%)  I&O's Detail    2020 07:01  -  2020 07:00  --------------------------------------------------------  IN:    Oral Fluid: 250 mL  Total IN: 250 mL    OUT:    Voided (mL): 400 mL  Total OUT: 400 mL    Total NET: -150 mL      2020 07:01  -  2020 12:18  --------------------------------------------------------  IN:    Oral Fluid: 100 mL  Total IN: 100 mL    OUT:    Voided (mL): 200 mL  Total OUT: 200 mL    Total NET: -100 mL    Physical Exam:  GENERAL: Lying in bed, well appearing, speaking in full sentence, in NAD  HEART: S1S2 RRR; No murmurs, rubs, or gallops appreciated  PULMONARY: CTABL, normal respiratory effort.  No rales, wheezing, or rhonchi appreciated bilaterally. No use of accessory muscles  ABDOMEN: + Bowel sounds present, soft, NDNT  EXTREMITIES:  Warm, well -perfused, no pedal edema, distal pulses present  NEUROLOGICAL:AOx3     TELEMETERIC: SR with HR 70-80s                            14.5   4.63  )-----------( 243      ( 2020 06:30 )             44.3     PT/INR - ( 2020 06:00 )   PT: 12.7 SEC;   INR: 1.12          PTT - ( 2020 06:00 )  PTT:28.8 SEC      136  |  100  |  39<H>  ----------------------------<  260<H>  4.2   |  25  |  1.93<H>    Ca    9.7      2020 06:30  Phos  3.4     11-18  Mg     1.9     -    TPro  8.1  /  Alb  3.0<L>  /  TBili  0.4  /  DBili  x   /  AST  19  /  ALT  26  /  AlkPhos  107  11-16    < from: Cardiac Cath Lab - Adult (10.09.20 @ 13:16) >  VENTRICLES: No left ventriculogram was performed.  CORONARY VESSELS: The coronary circulation is right dominant.  LM:   --  LM: The vessel was medium sized. Angiography showed moderate  atherosclerosis.  LAD:   --  Ostial LAD: There was a 100 % stenosis. This lesion is a chronic  total occlusion.  CX:   --  Ostial circumflex: There was a 100 % stenosis. This lesion is a  chronic total occlusion.  RCA:   --  Proximal RCA: There was a 100 % stenosis. This lesion is a  chronic total occlusion.  GRAFTS:   --  Graft to the mid LAD: The graft was a medium sized LIMA.  Distal vessel angiography showed a medium sized vessel and mild diffuse  disease.  --  Graft to the 1st diagonal: The graft was a medium sized saphenous vein  graft from the ascending aorta. Graft angiography showed mild  degeneration. Distal vessel angiography showed a small to medium sized  vessel and moderate diffusedisease.  --  Graft to the RPDA: The graft was a medium sized saphenous vein graft  from the ascending aorta. Graft angiography showed mild degeneration.  Distal vessel angiography showed mild diffuse disease. Distal RCA supplies  collaterals to LCx.  COMPLICATIONS: There were no complications.  DIAGNOSTIC IMPRESSIONS: Patent LIMA to LAD  Patent SVG to RPDA(distal RCA supplies god collaterals to LCX)  Patent SVG to D-1  Severely elevated Blood Pressure, Elevated LVEP  DIAGNOSTIC RECOMMENDATIONS: Optimize medical therapy for Heart failure and  Hypertension (patient admits not being compliant with meds)  INTERVENTIONAL IMPRESSIONS: Patent LIMA to LAD  Patent SVG to RPDA(distal RCA supplies god collaterals to LCX)  Patent SVG to D-1  Severely elevated Blood Pressure, Elevated LVEP  INTERVENTIONAL RECOMMENDATIONS: Optimize medical therapy for Heart failure  and Hypertension (patient admits not being compliant with meds)  Prepared and signed by  Martha Pimentel M.D.  Signed 10/12/2020 13:58:52  HEMODYNAMIC TABLES  Pressures:  Baseline  Pressures:  - HR: 77  Pressures:  - Rhythm:  Pressures:  -- Aortic Pressure (S/D/M): 161/88/116  Pressures:  -- Left Ventricle (s/edp): 165/37/--  Pressures:  -- Pulmonary Artery (S/D/M): 67/33/45  Pressures:-- Pulmonary Capillary Wedge: 38/42/32  Pressures:  -- Right Atrium (a/v/M): 18/18/12  Pressures:  Post Nitric Oxide  Pressures:  - HR: 77  Pressures:  - Rhythm:  Pressures:  -- Aortic Pressure (S/D/M): 166/91/115  Pressures:  -- Pulmonary Artery (S/D/M): 34/10/19  Pressures:  -- Pulmonary Capillary Wedge: 15/17/13  Pressures:  -- Right Ventricle (s/edp): 52/9/--  O2 Sats:  Baseline  O2 Sats:  - HR: 77  O2 Sats:  - Rhythm:  O2 Sats:  -- AO: 13.2/97.5/17.5  O2 Sats:  -- PA: 12.6/60.3/10.33  O2 Sats:  Post Nitric Oxide  O2 Sats:  - HR: 77  O2 Sats:  - Rhythm:  O2 Sats:  -- AO: 13.5/96/17.63  O2 Sats:  -- PA: 13.5/64.6/11.86  Outputs:  Baseline  Outputs:  -- OUTPUTS: CO by Desi: 3.95  Outputs:  -- OUTPUTS: Desi cardiac index: 1.74  Outputs:  -- OUTPUTS: O2 consumption: 283.37  Outputs:  -- OUTPUTS: Vo2 Indexed: 125.00  Outputs:  -- RESISTANCES: Left ventricular stroke work: 57.88  Outputs:  -- RESISTANCES: Left Ventricular Stroke Work index: 25.53  Outputs:  -- RESISTANCES: Pulmonary vascular index (dsc): 596.41  Outputs:  -- RESISTANCES: Pulmonary vascular index (Wood Units): 7.46  Outputs:  -- RESISTANCES: Pulmonary vascular resistance (dsc): 263.09  Outputs:  -- RESISTANCES: Pulmonary vascular resistance (Wood Units): 3.29  Outputs:  -- RESISTANCES: PVR_SVR Ratio: 0.13  Outputs:  -- RESISTANCES: Right ventricular stroke work: 22.45  Outputs:  -- RESISTANCES: Right ventricular stroke work index: 9.90  Outputs:  -- RESISTANCES: Systemic vascular index (dsc): 4771.29  Outputs:  -- RESISTANCES: Systemic vascular index (Wood Units): 59.66  Outputs:  -- RESISTANCES: Systemic vascular resistance (dsc): 2104.71  Outputs:  -- RESISTANCES: Systemic vascular resistance (Wood Units): 26.32  Outputs:  -- RESISTANCES: Total pulmonary index (dsc): 2064.50  Outputs:  -- RESISTANCES: Total pulmonary index (Wood Units): 25.81  Outputs:  -- RESISTANCES: Total pulmonary resistance (dsc): 910.69  Outputs:  -- RESISTANCES: Total pulmonary resistance (Wood Units): 11.39  Outputs:  -- RESISTANCES: Total vascular index (Wood Units): 66.54  Outputs:  -- RESISTANCES: Total vascular resistance (dsc): 2347.57  Outputs:  -- RESISTANCES: Total vascular resistance (Wood Units): 29.35  Outputs:  -- RESISTANCES: Total vascular resistance index (dsc): 5321.82  Outputs:  -- RESISTANCES: TPR_TVR Ratio: 0.39  Outputs:  -- SHUNTS: Pulmonary flow: 3.95  Outputs:  -- SHUNTS: Qp Indexed: 1.74  Outputs:  -- SHUNTS: Qs Indexed: 1.74  Outputs:  -- SHUNTS: Systemic flow: 3.95  Outputs:  NO PHASE  Outputs:  -- CALCULATIONS[age]  Outputs:  -- CALCULATIONS: Body Surface Area: 2.27  Outputs:  -- CALCULATIONS: Height in cm: 191.00  Outputs:  -- CALCULATIONS: Sex: Male  Outputs:  -- CALCULATIONS: Weight in k.50  Outputs:  -- OUTPUTS: O2 consumption: 283.37  Outputs:  -- OUTPUTS: Vo2 Indexed: 125.00  Outputs:  Post Nitric Oxide  Outputs:  -- OUTPUTS: CO by Desi: 4.92  Outputs:  -- OUTPUTS: Desi cardiac index: 2.17  Outputs:  -- OUTPUTS: O2 consumption: 283.37  Outputs:  -- OUTPUTS: Vo2 Indexed: 125.00  Outputs:  -- RESISTANCES: Left ventricular stroke work: 85.23  Outputs:  -- RESISTANCES: Left Ventricular Stroke Work index: 37.60  Outputs:  -- RESISTANCES: Pulmonary vascular index (dsc): 221.32  Outputs:  -- RESISTANCES: Pulmonary vascular index (Wood Units): 2.77  Outputs:  -- RESISTANCES: Pulmonary vascular resistance (dsc): 97.63  Outputs:  -- RESISTANCES: Pulmonary vascular resistance (Wood Units): 1.22  Outputs:  -- RESISTANCES: Total pulmonary index (dsc): 700.85  Outputs:-- RESISTANCES: Total pulmonary index (Wood Units): 8.76  Outputs:  -- RESISTANCES: Total pulmonary resistance (dsc): 309.16  Outputs:  -- RESISTANCES: Total pulmonary resistance (Wood Units): 3.87  Outputs:  -- RESISTANCES: Total vascular index (Wood Units): 53.04  Outputs:  -- RESISTANCES: Total vascular resistance (dsc): 1871.24  Outputs:  -- RESISTANCES: Total vascular resistance (Wood Units): 23.40  Outputs:  -- RESISTANCES: Total vascular resistance index (dsc): 4242.01  Outputs:  -- RESISTANCES: TPR_TVR Ratio: 0.17  Outputs:  -- SHUNTS: Pulmonary flow: 4.92  Outputs:  -- SHUNTS: Qp Indexed: 2.17  Outputs:  -- SHUNTS: Qs Indexed: 2.17  Outputs:  -- SHUNTS: Systemic flow: 4.92    < end of copied text >        < from: Nuclear Stress Test-Pharmacologic (20 @ 09:46) >  HISTORY:  CARDIAC HISTORY: 55 years oldmale with  OTHER HISTORY: HTN,,HLD, DM  RISK FACTORS: Diabetes, Elevated Cholesterol, Hypertension  ------------------------------------------------------------------------    BASELINE ELECTROCARDIOGRAM:  Rhythm: Normal Sinus Rhythm with RBBB    ------------------------------------------------------------------------    HEMODYNAMIC PARAMETERS:                                      HR      BP  Baseline  Pre-Injection             78 170/101  00:00     Inject Regadenoson         0  00:30     Post Injection             0  01:00     Post Injection            78 158/104  02:00     Post Injection            85  159/88  05:00     Recovery                  83  152/81  06:00     Recovery                  82  154/88  07:00     Recovery                  81  162/90  10:00     Recovery                  80  158/70  ------------------------------------------------------------------------    Agent: Regadenoson 0.4 mg/5 ml NS. injected over 10 sec.  HR: Baseline HR: 78 bpm   Peak HR: 85 bpm (52% of MPHR)  MPHR: 163 bpm   85% of MPHR: 139 bpm  BP: Baseline BP: 170/101 mmHg   Peak BP: 170/101 mmHg  Peak RPP: 12264 (Rate Pressure Product)  HR Isotope Injected: 78 bpm (Tc 99m Tetrofosmin)  78 bpm (Tc 99m Tetrofosmin)  Last Caffeine intake: 12 hrs  Terminated: Completion of protocol  ------------------------------------------------------------------------    SYMPTOMS/FINDINGS:  Symptoms: Flushing  Chest pain: No chest pain with administration of  Regadenoson  ------------------------------------------------------------------------    ECG ABNORMALITIES DURING/AFTER STRESS:   Abnormalities: None  ------------------------------------------------------------------------    NEW ARRHYTHMIAS DEVELOPED DURING/AFTER STRESS:  None  ------------------------------------------------------------------------    STRESS TEST IMPRESSIONS:  Negative ECG evidence of ischemia after IV of Lexiscan.  ------------------------------------------------------------------------    PROCEDURE:  10.0 mCi of Tc 99m Tetrofosmin were injected intravenously  at rest. Approximately 45 minutes later, tomographic  images were obtained in a 180 degree arc from right  anterior oblique to left anterior oblique with 64 stops.  At a separate time, 30.9 mCi of Tc 99m Tetrofosmin were  injected intravenously during stress protocol.  Approximately 45 minute(s) later, tomographic images were  obtained in a 180 degree arc from right anterior oblique  to left anterior oblique with 64 stops. The tomographic  slices were reconstructed in 3orthogonal planes (short  axis, horizontal long axis and vertical long axis).  Interpretation was performed both by visual and  quantitative analysis.    ------------------------------------------------------------------------    NUCLEAR FINDINGS:  Review of raw data shows: The study is of good technical  quality.  The left ventricle was markely dilated LV at baseline.  There are small, severe defects in apical,  basal  infero-lateral and inferior walls that are reversible  consistent with ischemia.  ------------------------------------------------------------------------      GATED ANALYSIS:  Dilated LV with global hypokinesis EF=24%.  ------------------------------------------------------------------------    IMPRESSIONS:Abnormal Study  * Negative ECG evidence of ischemia after IV of Lexiscan.  * Review of raw data shows: The study is of good technical  quality.  * The left ventricle was markely dilated LV at baseline.  There are small, severe defects in apical, basal  infero-lateral and inferior  walls that are reversible  consistent with ischemia.  * Dilated LV with global hypokinesis EF=24%.    ------------------------------------------------------------------------      ------------------------------------------------------------------------    Confirmed on  2020 - 14:02:42 at  by Kallie Wall MD  --------    < end of copied text >  < from: Transthoracic Echocardiogram (20 @ 07:26) >  Ejection Fraction Visual Estimate: 40-45 %    ------------------------------------------------------------------------  OBSERVATIONS:  Mitral Valve: Normal mitral valve. Mild mitral  regurgitation.  Aortic Root: Normal aortic root.  Aortic Valve: Calcified trileaflet aortic valve with normal  opening.  Left Atrium: Mildly dilated left atrium.  LA volume index =  40 cc/m2.  Left Ventricle: Moderate segmental left ventricular  systolic dysfunction.  The inferior and  inferolateral  walls are akinetic.  Septal motion consistent with a  conduction defect.   Moderate diastolic dysfunction (stage  II). Mild concentric left ventricular hypertrophy.  Right Heart: Linear structure compatible with Chiari  network identified in the right atrium.  This is a normal  variant. Normal right ventricular size and function. There  is moderate tricuspid regurgitation. There is mild pulmonic  regurgitation.  Pericardium/PleuraNormal pericardium with no pericardial  effusion.  Hemodynamic: RA Pressure is 8 mm Hg. RV systolic pressure  is 29 mm Hg.  ------------------------------------------------------------------------  CONCLUSIONS:  1. Mild mitral regurgitation.  2. Mildly dilated left atrium.  LA volume index = 40 cc/m2.  3. Mild concentric left ventricular hypertrophy.  4. Moderate segmental left ventricular systolic  dysfunction.  The inferior andinferolateral walls are  akinetic.  Septal motion consistent with a conduction  defect.   Moderate diastolic dysfunction (stage II).  5. Linear structure compatible with Chiari network  identified in the right atrium.  This is a normal variant.  6. Normal right ventricular size and function.    ------------------------------------------------------------------------  Confirmed on  2020 - 17:56:49 by Rosendo Allred MD  --------    < end of copied text >     Subjective: No new complaints. Denies HA, lightheadedness, dizziness, CP, SOB, abdominal pain, N/V.      Interval events:  - p/w to [facility] with CHF exacerbation s/p diuresed with IV lasix.Of note, patient is non-complicated with medication  - Patient was noted to have NSVT on telemetric at [place] and was transferred [facility] for EP study and possible ICD  - S/P EP study on 2020. Ep was unable to induce sustained monomorphic ventricular tachycardia. Pleomorphic, non-sustained VT was induced from the RV outflow tract at TCL of 200 msec lasting 1-2 seconds and was considered non-specific. It was decided not implant an ICD and to continue GDMT.   - Spoke to patient about the important of taking his medication as schedule and adhering to a cardiac diet    MEDICATIONS  (STANDING):  aspirin  chewable 81 milliGRAM(s) Oral daily  atorvastatin 20 milliGRAM(s) Oral at bedtime  dextrose 40% Gel 15 Gram(s) Oral once  dextrose 5%. 1000 milliLiter(s) (50 mL/Hr) IV Continuous <Continuous>  dextrose 5%. 1000 milliLiter(s) (100 mL/Hr) IV Continuous <Continuous>  dextrose 50% Injectable 25 Gram(s) IV Push once  dextrose 50% Injectable 12.5 Gram(s) IV Push once  dextrose 50% Injectable 25 Gram(s) IV Push once  glucagon  Injectable 1 milliGRAM(s) IntraMuscular once  hydrALAZINE 50 milliGRAM(s) Oral every 8 hours  insulin glargine Injectable (LANTUS) 30 Unit(s) SubCutaneous at bedtime  insulin lispro (ADMELOG) corrective regimen sliding scale   SubCutaneous three times a day before meals  insulin lispro (ADMELOG) corrective regimen sliding scale   SubCutaneous at bedtime  insulin lispro Injectable (ADMELOG) 10 Unit(s) SubCutaneous three times a day before meals  isosorbide   mononitrate ER Tablet (IMDUR) 30 milliGRAM(s) Oral every 24 hours  metoprolol tartrate 25 milliGRAM(s) Oral three times a day    MEDICATIONS  (PRN):  meclizine 25 milliGRAM(s) Oral three times a day PRN Dizziness          Vital Signs Last 24 Hrs  T(C): 36.9 (2020 09:04), Max: 36.9 (2020 09:04)  T(F): 98.4 (2020 09:04), Max: 98.4 (2020 09:04)  HR: 73 (2020 09:04) (73 - 88)  BP: 138/93 (2020 09:04) (111/74 - 140/83)  BP(mean): 111 (2020 13:06) (111 - 111)  RR: 18 (2020 09:04) (18 - 18)  SpO2: 98% (2020 09:04) (97% - 100%)  I&O's Detail    2020 07:01  -  2020 07:00  --------------------------------------------------------  IN:    Oral Fluid: 250 mL  Total IN: 250 mL    OUT:    Voided (mL): 400 mL  Total OUT: 400 mL    Total NET: -150 mL      2020 07:01  -  2020 12:18  --------------------------------------------------------  IN:    Oral Fluid: 100 mL  Total IN: 100 mL    OUT:    Voided (mL): 200 mL  Total OUT: 200 mL    Total NET: -100 mL    Physical Exam:  GENERAL:  well appearing, speaking in full sentence, in NAD  HEART: S1S2 RRR; No murmurs, rubs, or gallops appreciated  PULMONARY: CTABL, normal respiratory effort.  No rales, wheezing, or rhonchi appreciated bilaterally. No use of accessory muscles  ABDOMEN: + Bowel sounds present, soft, NDNT  EXTREMITIES:  Warm, well -perfused, no pedal edema, distal pulses present, groin was soft, no hematoma, ecchymosis, drainage or erythematous    NEUROLOGICAL:AOx3     TELEMETERIC: SR with HR 70-80s                            14.5   4.63  )-----------( 243      ( 2020 06:30 )             44.3     PT/INR - ( 2020 06:00 )   PT: 12.7 SEC;   INR: 1.12          PTT - ( 2020 06:00 )  PTT:28.8 SEC      136  |  100  |  39<H>  ----------------------------<  260<H>  4.2   |  25  |  1.93<H>    Ca    9.7      2020 06:30  Phos  3.4     11-  Mg     1.9         TPro  8.1  /  Alb  3.0<L>  /  TBili  0.4  /  DBili  x   /  AST  19  /  ALT  26  /  AlkPhos  107  11-16    < from: Cardiac Cath Lab - Adult (10.09.20 @ 13:16) >  VENTRICLES: No left ventriculogram was performed.  CORONARY VESSELS: The coronary circulation is right dominant.  LM:   --  LM: The vessel was medium sized. Angiography showed moderate  atherosclerosis.  LAD:   --  Ostial LAD: There was a 100 % stenosis. This lesion is a chronic  total occlusion.  CX:   --  Ostial circumflex: There was a 100 % stenosis. This lesion is a  chronic total occlusion.  RCA:   --  Proximal RCA: There was a 100 % stenosis. This lesion is a  chronic total occlusion.  GRAFTS:   --  Graft to the mid LAD: The graft was a medium sized LIMA.  Distal vessel angiography showed a medium sized vessel and mild diffuse  disease.  --  Graft to the 1st diagonal: The graft was a medium sized saphenous vein  graft from the ascending aorta. Graft angiography showed mild  degeneration. Distal vessel angiography showed a small to medium sized  vessel and moderate diffusedisease.  --  Graft to the RPDA: The graft was a medium sized saphenous vein graft  from the ascending aorta. Graft angiography showed mild degeneration.  Distal vessel angiography showed mild diffuse disease. Distal RCA supplies  collaterals to LCx.  COMPLICATIONS: There were no complications.  DIAGNOSTIC IMPRESSIONS: Patent LIMA to LAD  Patent SVG to RPDA(distal RCA supplies god collaterals to LCX)  Patent SVG to D-1  Severely elevated Blood Pressure, Elevated LVEP  DIAGNOSTIC RECOMMENDATIONS: Optimize medical therapy for Heart failure and  Hypertension (patient admits not being compliant with meds)  INTERVENTIONAL IMPRESSIONS: Patent LIMA to LAD  Patent SVG to RPDA(distal RCA supplies god collaterals to LCX)  Patent SVG to D-1  Severely elevated Blood Pressure, Elevated LVEP  INTERVENTIONAL RECOMMENDATIONS: Optimize medical therapy for Heart failure  and Hypertension (patient admits not being compliant with meds)  Prepared and signed by  Martha Pimentel M.D.  Signed 10/12/2020 13:58:52  HEMODYNAMIC TABLES  Pressures:  Baseline  Pressures:  - HR: 77  Pressures:  - Rhythm:  Pressures:  -- Aortic Pressure (S/D/M): 161/88/116  Pressures:  -- Left Ventricle (s/edp): 165/37/--  Pressures:  -- Pulmonary Artery (S/D/M): 67/33/45  Pressures:-- Pulmonary Capillary Wedge: 38/42/32  Pressures:  -- Right Atrium (a/v/M): 18/18/12  Pressures:  Post Nitric Oxide  Pressures:  - HR: 77  Pressures:  - Rhythm:  Pressures:  -- Aortic Pressure (S/D/M): 166/91/115  Pressures:  -- Pulmonary Artery (S/D/M): 34/10/19  Pressures:  -- Pulmonary Capillary Wedge: 15/17/13  Pressures:  -- Right Ventricle (s/edp): 52/9/--  O2 Sats:  Baseline  O2 Sats:  - HR: 77  O2 Sats:  - Rhythm:  O2 Sats:  -- AO: 13.2/97.5/17.5  O2 Sats:  -- PA: 12.6/60.3/10.33  O2 Sats:  Post Nitric Oxide  O2 Sats:  - HR: 77  O2 Sats:  - Rhythm:  O2 Sats:  -- AO: 13.5/96/17.63  O2 Sats:  -- PA: 13.5/64.6/11.86  Outputs:  Baseline  Outputs:  -- OUTPUTS: CO by Desi: 3.95  Outputs:  -- OUTPUTS: Desi cardiac index: 1.74  Outputs:  -- OUTPUTS: O2 consumption: 283.37  Outputs:  -- OUTPUTS: Vo2 Indexed: 125.00  Outputs:  -- RESISTANCES: Left ventricular stroke work: 57.88  Outputs:  -- RESISTANCES: Left Ventricular Stroke Work index: 25.53  Outputs:  -- RESISTANCES: Pulmonary vascular index (dsc): 596.41  Outputs:  -- RESISTANCES: Pulmonary vascular index (Wood Units): 7.46  Outputs:  -- RESISTANCES: Pulmonary vascular resistance (dsc): 263.09  Outputs:  -- RESISTANCES: Pulmonary vascular resistance (Wood Units): 3.29  Outputs:  -- RESISTANCES: PVR_SVR Ratio: 0.13  Outputs:  -- RESISTANCES: Right ventricular stroke work: 22.45  Outputs:  -- RESISTANCES: Right ventricular stroke work index: 9.90  Outputs:  -- RESISTANCES: Systemic vascular index (dsc): 4771.29  Outputs:  -- RESISTANCES: Systemic vascular index (Wood Units): 59.66  Outputs:  -- RESISTANCES: Systemic vascular resistance (dsc): 2104.71  Outputs:  -- RESISTANCES: Systemic vascular resistance (Wood Units): 26.32  Outputs:  -- RESISTANCES: Total pulmonary index (dsc): 2064.50  Outputs:  -- RESISTANCES: Total pulmonary index (Wood Units): 25.81  Outputs:  -- RESISTANCES: Total pulmonary resistance (dsc): 910.69  Outputs:  -- RESISTANCES: Total pulmonary resistance (Wood Units): 11.39  Outputs:  -- RESISTANCES: Total vascular index (Wood Units): 66.54  Outputs:  -- RESISTANCES: Total vascular resistance (dsc): 2347.57  Outputs:  -- RESISTANCES: Total vascular resistance (Wood Units): 29.35  Outputs:  -- RESISTANCES: Total vascular resistance index (dsc): 5321.82  Outputs:  -- RESISTANCES: TPR_TVR Ratio: 0.39  Outputs:  -- SHUNTS: Pulmonary flow: 3.95  Outputs:  -- SHUNTS: Qp Indexed: 1.74  Outputs:  -- SHUNTS: Qs Indexed: 1.74  Outputs:  -- SHUNTS: Systemic flow: 3.95  Outputs:  NO PHASE  Outputs:  -- CALCULATIONS[age]  Outputs:  -- CALCULATIONS: Body Surface Area: 2.27  Outputs:  -- CALCULATIONS: Height in cm: 191.00  Outputs:  -- CALCULATIONS: Sex: Male  Outputs:  -- CALCULATIONS: Weight in k.50  Outputs:  -- OUTPUTS: O2 consumption: 283.37  Outputs:  -- OUTPUTS: Vo2 Indexed: 125.00  Outputs:  Post Nitric Oxide  Outputs:  -- OUTPUTS: CO by Desi: 4.92  Outputs:  -- OUTPUTS: Desi cardiac index: 2.17  Outputs:  -- OUTPUTS: O2 consumption: 283.37  Outputs:  -- OUTPUTS: Vo2 Indexed: 125.00  Outputs:  -- RESISTANCES: Left ventricular stroke work: 85.23  Outputs:  -- RESISTANCES: Left Ventricular Stroke Work index: 37.60  Outputs:  -- RESISTANCES: Pulmonary vascular index (dsc): 221.32  Outputs:  -- RESISTANCES: Pulmonary vascular index (Wood Units): 2.77  Outputs:  -- RESISTANCES: Pulmonary vascular resistance (dsc): 97.63  Outputs:  -- RESISTANCES: Pulmonary vascular resistance (Wood Units): 1.22  Outputs:  -- RESISTANCES: Total pulmonary index (dsc): 700.85  Outputs:-- RESISTANCES: Total pulmonary index (Wood Units): 8.76  Outputs:  -- RESISTANCES: Total pulmonary resistance (dsc): 309.16  Outputs:  -- RESISTANCES: Total pulmonary resistance (Wood Units): 3.87  Outputs:  -- RESISTANCES: Total vascular index (Wood Units): 53.04  Outputs:  -- RESISTANCES: Total vascular resistance (dsc): 1871.24  Outputs:  -- RESISTANCES: Total vascular resistance (Wood Units): 23.40  Outputs:  -- RESISTANCES: Total vascular resistance index (dsc): 4242.01  Outputs:  -- RESISTANCES: TPR_TVR Ratio: 0.17  Outputs:  -- SHUNTS: Pulmonary flow: 4.92  Outputs:  -- SHUNTS: Qp Indexed: 2.17  Outputs:  -- SHUNTS: Qs Indexed: 2.17  Outputs:  -- SHUNTS: Systemic flow: 4.92    < end of copied text >        < from: Nuclear Stress Test-Pharmacologic (20 @ 09:46) >  HISTORY:  CARDIAC HISTORY: 55 years oldmale with  OTHER HISTORY: HTN,,HLD, DM  RISK FACTORS: Diabetes, Elevated Cholesterol, Hypertension  ------------------------------------------------------------------------    BASELINE ELECTROCARDIOGRAM:  Rhythm: Normal Sinus Rhythm with RBBB    ------------------------------------------------------------------------    HEMODYNAMIC PARAMETERS:                                      HR      BP  Baseline  Pre-Injection             78 170/101  00:00     Inject Regadenoson         0  00:30     Post Injection             0  01:00     Post Injection            78 158/104  02:00     Post Injection            85  159/88  05:00     Recovery                  83  152/81  06:00     Recovery                  82  154/88  07:00     Recovery                  81  162/90  10:00     Recovery                  80  158/70  ------------------------------------------------------------------------    Agent: Regadenoson 0.4 mg/5 ml NS. injected over 10 sec.  HR: Baseline HR: 78 bpm   Peak HR: 85 bpm (52% of MPHR)  MPHR: 163 bpm   85% of MPHR: 139 bpm  BP: Baseline BP: 170/101 mmHg   Peak BP: 170/101 mmHg  Peak RPP: 43657 (Rate Pressure Product)  HR Isotope Injected: 78 bpm (Tc 99m Tetrofosmin)  78 bpm (Tc 99m Tetrofosmin)  Last Caffeine intake: 12 hrs  Terminated: Completion of protocol  ------------------------------------------------------------------------    SYMPTOMS/FINDINGS:  Symptoms: Flushing  Chest pain: No chest pain with administration of  Regadenoson  ------------------------------------------------------------------------    ECG ABNORMALITIES DURING/AFTER STRESS:   Abnormalities: None  ------------------------------------------------------------------------    NEW ARRHYTHMIAS DEVELOPED DURING/AFTER STRESS:  None  ------------------------------------------------------------------------    STRESS TEST IMPRESSIONS:  Negative ECG evidence of ischemia after IV of Lexiscan.  ------------------------------------------------------------------------    PROCEDURE:  10.0 mCi of Tc 99m Tetrofosmin were injected intravenously  at rest. Approximately 45 minutes later, tomographic  images were obtained in a 180 degree arc from right  anterior oblique to left anterior oblique with 64 stops.  At a separate time, 30.9 mCi of Tc 99m Tetrofosmin were  injected intravenously during stress protocol.  Approximately 45 minute(s) later, tomographic images were  obtained in a 180 degree arc from right anterior oblique  to left anterior oblique with 64 stops. The tomographic  slices were reconstructed in 3orthogonal planes (short  axis, horizontal long axis and vertical long axis).  Interpretation was performed both by visual and  quantitative analysis.    ------------------------------------------------------------------------    NUCLEAR FINDINGS:  Review of raw data shows: The study is of good technical  quality.  The left ventricle was markely dilated LV at baseline.  There are small, severe defects in apical,  basal  infero-lateral and inferior walls that are reversible  consistent with ischemia.  ------------------------------------------------------------------------      GATED ANALYSIS:  Dilated LV with global hypokinesis EF=24%.  ------------------------------------------------------------------------    IMPRESSIONS:Abnormal Study  * Negative ECG evidence of ischemia after IV of Lexiscan.  * Review of raw data shows: The study is of good technical  quality.  * The left ventricle was markely dilated LV at baseline.  There are small, severe defects in apical, basal  infero-lateral and inferior  walls that are reversible  consistent with ischemia.  * Dilated LV with global hypokinesis EF=24%.    ------------------------------------------------------------------------      ------------------------------------------------------------------------    Confirmed on  2020 - 14:02:42 at  by Kallie Wall MD  --------    < end of copied text >  < from: Transthoracic Echocardiogram (20 @ 07:26) >  Ejection Fraction Visual Estimate: 40-45 %    ------------------------------------------------------------------------  OBSERVATIONS:  Mitral Valve: Normal mitral valve. Mild mitral  regurgitation.  Aortic Root: Normal aortic root.  Aortic Valve: Calcified trileaflet aortic valve with normal  opening.  Left Atrium: Mildly dilated left atrium.  LA volume index =  40 cc/m2.  Left Ventricle: Moderate segmental left ventricular  systolic dysfunction.  The inferior and  inferolateral  walls are akinetic.  Septal motion consistent with a  conduction defect.   Moderate diastolic dysfunction (stage  II). Mild concentric left ventricular hypertrophy.  Right Heart: Linear structure compatible with Chiari  network identified in the right atrium.  This is a normal  variant. Normal right ventricular size and function. There  is moderate tricuspid regurgitation. There is mild pulmonic  regurgitation.  Pericardium/PleuraNormal pericardium with no pericardial  effusion.  Hemodynamic: RA Pressure is 8 mm Hg. RV systolic pressure  is 29 mm Hg.  ------------------------------------------------------------------------  CONCLUSIONS:  1. Mild mitral regurgitation.  2. Mildly dilated left atrium.  LA volume index = 40 cc/m2.  3. Mild concentric left ventricular hypertrophy.  4. Moderate segmental left ventricular systolic  dysfunction.  The inferior andinferolateral walls are  akinetic.  Septal motion consistent with a conduction  defect.   Moderate diastolic dysfunction (stage II).  5. Linear structure compatible with Chiari network  identified in the right atrium.  This is a normal variant.  6. Normal right ventricular size and function.    ------------------------------------------------------------------------  Confirmed on  2020 - 17:56:49 by Rosendo Allred MD  --------    < end of copied text >     Subjective: No new complaints. Denies HA, lightheadedness, dizziness, CP, SOB, abdominal pain, N/V.      Interval events:  - p/w to [facility] with CHF exacerbation s/p diuresed with IV lasix.Of note, patient is non-complicated with medication  - Patient was noted to have NSVT on telemetric at [place] and was transferred [facility] for EP study and possible ICD  - S/P EP study on 2020. Ep was unable to induce sustained monomorphic ventricular tachycardia. Pleomorphic, non-sustained VT was induced from the RV outflow tract at TCL of 200 msec lasting 1-2 seconds and was considered non-specific. It was decided not implant an ICD and to continue GDMT.   - Spoke to patient about the important of taking his medication as schedule and adhering to a cardiac diet    MEDICATIONS  (STANDING):  aspirin  chewable 81 milliGRAM(s) Oral daily  atorvastatin 20 milliGRAM(s) Oral at bedtime  dextrose 40% Gel 15 Gram(s) Oral once  dextrose 5%. 1000 milliLiter(s) (50 mL/Hr) IV Continuous <Continuous>  dextrose 5%. 1000 milliLiter(s) (100 mL/Hr) IV Continuous <Continuous>  dextrose 50% Injectable 25 Gram(s) IV Push once  dextrose 50% Injectable 12.5 Gram(s) IV Push once  dextrose 50% Injectable 25 Gram(s) IV Push once  glucagon  Injectable 1 milliGRAM(s) IntraMuscular once  hydrALAZINE 50 milliGRAM(s) Oral every 8 hours  insulin glargine Injectable (LANTUS) 30 Unit(s) SubCutaneous at bedtime  insulin lispro (ADMELOG) corrective regimen sliding scale   SubCutaneous three times a day before meals  insulin lispro (ADMELOG) corrective regimen sliding scale   SubCutaneous at bedtime  insulin lispro Injectable (ADMELOG) 10 Unit(s) SubCutaneous three times a day before meals  isosorbide   mononitrate ER Tablet (IMDUR) 30 milliGRAM(s) Oral every 24 hours  metoprolol tartrate 25 milliGRAM(s) Oral three times a day    MEDICATIONS  (PRN):  meclizine 25 milliGRAM(s) Oral three times a day PRN Dizziness          Vital Signs Last 24 Hrs  T(C): 36.9 (2020 09:04), Max: 36.9 (2020 09:04)  T(F): 98.4 (2020 09:04), Max: 98.4 (2020 09:04)  HR: 73 (2020 09:04) (73 - 88)  BP: 138/93 (2020 09:04) (111/74 - 140/83)  BP(mean): 111 (2020 13:06) (111 - 111)  RR: 18 (2020 09:04) (18 - 18)  SpO2: 98% (2020 09:04) (97% - 100%)  I&O's Detail    2020 07:01  -  2020 07:00  --------------------------------------------------------  IN:    Oral Fluid: 250 mL  Total IN: 250 mL    OUT:    Voided (mL): 400 mL  Total OUT: 400 mL    Total NET: -150 mL      2020 07:01  -  2020 12:18  --------------------------------------------------------  IN:    Oral Fluid: 100 mL  Total IN: 100 mL    OUT:    Voided (mL): 200 mL  Total OUT: 200 mL    Total NET: -100 mL    Physical Exam:  GENERAL:  well appearing, speaking in full sentence, in NAD  HEART: S1S2 RRR; No murmurs, rubs, or gallops appreciated  PULMONARY: CTABL, normal respiratory effort.  No rales, wheezing, or rhonchi appreciated bilaterally. No use of accessory muscles  ABDOMEN: + Bowel sounds present, soft, NDNT  EXTREMITIES:  Warm, well -perfused, no pedal edema, distal pulses present, groin was soft, no hematoma, ecchymosis, drainage or erythematous      TELEMETERIC: SR with HR 70-80s                            14.5   4.63  )-----------( 243      ( 2020 06:30 )             44.3     PT/INR - ( 2020 06:00 )   PT: 12.7 SEC;   INR: 1.12          PTT - ( 2020 06:00 )  PTT:28.8 SEC      136  |  100  |  39<H>  ----------------------------<  260<H>  4.2   |  25  |  1.93<H>    Ca    9.7      2020 06:30  Phos  3.4     11-  Mg     1.9         TPro  8.1  /  Alb  3.0<L>  /  TBili  0.4  /  DBili  x   /  AST  19  /  ALT  26  /  AlkPhos  107  11-16    < from: Cardiac Cath Lab - Adult (10.09.20 @ 13:16) >  VENTRICLES: No left ventriculogram was performed.  CORONARY VESSELS: The coronary circulation is right dominant.  LM:   --  LM: The vessel was medium sized. Angiography showed moderate  atherosclerosis.  LAD:   --  Ostial LAD: There was a 100 % stenosis. This lesion is a chronic  total occlusion.  CX:   --  Ostial circumflex: There was a 100 % stenosis. This lesion is a  chronic total occlusion.  RCA:   --  Proximal RCA: There was a 100 % stenosis. This lesion is a  chronic total occlusion.  GRAFTS:   --  Graft to the mid LAD: The graft was a medium sized LIMA.  Distal vessel angiography showed a medium sized vessel and mild diffuse  disease.  --  Graft to the 1st diagonal: The graft was a medium sized saphenous vein  graft from the ascending aorta. Graft angiography showed mild  degeneration. Distal vessel angiography showed a small to medium sized  vessel and moderate diffusedisease.  --  Graft to the RPDA: The graft was a medium sized saphenous vein graft  from the ascending aorta. Graft angiography showed mild degeneration.  Distal vessel angiography showed mild diffuse disease. Distal RCA supplies  collaterals to LCx.  COMPLICATIONS: There were no complications.  DIAGNOSTIC IMPRESSIONS: Patent LIMA to LAD  Patent SVG to RPDA(distal RCA supplies god collaterals to LCX)  Patent SVG to D-1  Severely elevated Blood Pressure, Elevated LVEP  DIAGNOSTIC RECOMMENDATIONS: Optimize medical therapy for Heart failure and  Hypertension (patient admits not being compliant with meds)  INTERVENTIONAL IMPRESSIONS: Patent LIMA to LAD  Patent SVG to RPDA(distal RCA supplies god collaterals to LCX)  Patent SVG to D-1  Severely elevated Blood Pressure, Elevated LVEP  INTERVENTIONAL RECOMMENDATIONS: Optimize medical therapy for Heart failure  and Hypertension (patient admits not being compliant with meds)  Prepared and signed by  Martha Pimentel M.D.  Signed 10/12/2020 13:58:52  HEMODYNAMIC TABLES  Pressures:  Baseline  Pressures:  - HR: 77  Pressures:  - Rhythm:  Pressures:  -- Aortic Pressure (S/D/M): 161/88/116  Pressures:  -- Left Ventricle (s/edp): 165/37/--  Pressures:  -- Pulmonary Artery (S/D/M): 67/33/45  Pressures:-- Pulmonary Capillary Wedge: 38/42/32  Pressures:  -- Right Atrium (a/v/M): 18/18/12  Pressures:  Post Nitric Oxide  Pressures:  - HR: 77  Pressures:  - Rhythm:  Pressures:  -- Aortic Pressure (S/D/M): 166/91/115  Pressures:  -- Pulmonary Artery (S/D/M): 34/10/19  Pressures:  -- Pulmonary Capillary Wedge: 15/17/13  Pressures:  -- Right Ventricle (s/edp): 52/9/--  O2 Sats:  Baseline  O2 Sats:  - HR: 77  O2 Sats:  - Rhythm:  O2 Sats:  -- AO: 13.2/97.5/17.5  O2 Sats:  -- PA: 12.6/60.3/10.33  O2 Sats:  Post Nitric Oxide  O2 Sats:  - HR: 77  O2 Sats:  - Rhythm:  O2 Sats:  -- AO: 13.5/96/17.63  O2 Sats:  -- PA: 13.5/64.6/11.86  Outputs:  Baseline  Outputs:  -- OUTPUTS: CO by Desi: 3.95  Outputs:  -- OUTPUTS: Desi cardiac index: 1.74  Outputs:  -- OUTPUTS: O2 consumption: 283.37  Outputs:  -- OUTPUTS: Vo2 Indexed: 125.00  Outputs:  -- RESISTANCES: Left ventricular stroke work: 57.88  Outputs:  -- RESISTANCES: Left Ventricular Stroke Work index: 25.53  Outputs:  -- RESISTANCES: Pulmonary vascular index (dsc): 596.41  Outputs:  -- RESISTANCES: Pulmonary vascular index (Wood Units): 7.46  Outputs:  -- RESISTANCES: Pulmonary vascular resistance (dsc): 263.09  Outputs:  -- RESISTANCES: Pulmonary vascular resistance (Wood Units): 3.29  Outputs:  -- RESISTANCES: PVR_SVR Ratio: 0.13  Outputs:  -- RESISTANCES: Right ventricular stroke work: 22.45  Outputs:  -- RESISTANCES: Right ventricular stroke work index: 9.90  Outputs:  -- RESISTANCES: Systemic vascular index (dsc): 4771.29  Outputs:  -- RESISTANCES: Systemic vascular index (Wood Units): 59.66  Outputs:  -- RESISTANCES: Systemic vascular resistance (dsc): 2104.71  Outputs:  -- RESISTANCES: Systemic vascular resistance (Wood Units): 26.32  Outputs:  -- RESISTANCES: Total pulmonary index (dsc): 2064.50  Outputs:  -- RESISTANCES: Total pulmonary index (Wood Units): 25.81  Outputs:  -- RESISTANCES: Total pulmonary resistance (dsc): 910.69  Outputs:  -- RESISTANCES: Total pulmonary resistance (Wood Units): 11.39  Outputs:  -- RESISTANCES: Total vascular index (Wood Units): 66.54  Outputs:  -- RESISTANCES: Total vascular resistance (dsc): 2347.57  Outputs:  -- RESISTANCES: Total vascular resistance (Wood Units): 29.35  Outputs:  -- RESISTANCES: Total vascular resistance index (dsc): 5321.82  Outputs:  -- RESISTANCES: TPR_TVR Ratio: 0.39  Outputs:  -- SHUNTS: Pulmonary flow: 3.95  Outputs:  -- SHUNTS: Qp Indexed: 1.74  Outputs:  -- SHUNTS: Qs Indexed: 1.74  Outputs:  -- SHUNTS: Systemic flow: 3.95  Outputs:  NO PHASE  Outputs:  -- CALCULATIONS[age]  Outputs:  -- CALCULATIONS: Body Surface Area: 2.27  Outputs:  -- CALCULATIONS: Height in cm: 191.00  Outputs:  -- CALCULATIONS: Sex: Male  Outputs:  -- CALCULATIONS: Weight in k.50  Outputs:  -- OUTPUTS: O2 consumption: 283.37  Outputs:  -- OUTPUTS: Vo2 Indexed: 125.00  Outputs:  Post Nitric Oxide  Outputs:  -- OUTPUTS: CO by Desi: 4.92  Outputs:  -- OUTPUTS: Desi cardiac index: 2.17  Outputs:  -- OUTPUTS: O2 consumption: 283.37  Outputs:  -- OUTPUTS: Vo2 Indexed: 125.00  Outputs:  -- RESISTANCES: Left ventricular stroke work: 85.23  Outputs:  -- RESISTANCES: Left Ventricular Stroke Work index: 37.60  Outputs:  -- RESISTANCES: Pulmonary vascular index (dsc): 221.32  Outputs:  -- RESISTANCES: Pulmonary vascular index (Wood Units): 2.77  Outputs:  -- RESISTANCES: Pulmonary vascular resistance (dsc): 97.63  Outputs:  -- RESISTANCES: Pulmonary vascular resistance (Wood Units): 1.22  Outputs:  -- RESISTANCES: Total pulmonary index (dsc): 700.85  Outputs:-- RESISTANCES: Total pulmonary index (Wood Units): 8.76  Outputs:  -- RESISTANCES: Total pulmonary resistance (dsc): 309.16  Outputs:  -- RESISTANCES: Total pulmonary resistance (Wood Units): 3.87  Outputs:  -- RESISTANCES: Total vascular index (Wood Units): 53.04  Outputs:  -- RESISTANCES: Total vascular resistance (dsc): 1871.24  Outputs:  -- RESISTANCES: Total vascular resistance (Wood Units): 23.40  Outputs:  -- RESISTANCES: Total vascular resistance index (dsc): 4242.01  Outputs:  -- RESISTANCES: TPR_TVR Ratio: 0.17  Outputs:  -- SHUNTS: Pulmonary flow: 4.92  Outputs:  -- SHUNTS: Qp Indexed: 2.17  Outputs:  -- SHUNTS: Qs Indexed: 2.17  Outputs:  -- SHUNTS: Systemic flow: 4.92    < end of copied text >        < from: Nuclear Stress Test-Pharmacologic (20 @ 09:46) >  HISTORY:  CARDIAC HISTORY: 55 years oldmale with  OTHER HISTORY: HTN,,HLD, DM  RISK FACTORS: Diabetes, Elevated Cholesterol, Hypertension  ------------------------------------------------------------------------    BASELINE ELECTROCARDIOGRAM:  Rhythm: Normal Sinus Rhythm with RBBB    ------------------------------------------------------------------------    HEMODYNAMIC PARAMETERS:                                      HR      BP  Baseline  Pre-Injection             78 170/101  00:00     Inject Regadenoson         0  00:30     Post Injection             0  01:00     Post Injection            78 158/104  02:00     Post Injection            85  159/88  05:00     Recovery                  83  152/81  06:00     Recovery                  82  154/88  07:00     Recovery                  81  162/90  10:00     Recovery                  80  158/70  ------------------------------------------------------------------------    Agent: Regadenoson 0.4 mg/5 ml NS. injected over 10 sec.  HR: Baseline HR: 78 bpm   Peak HR: 85 bpm (52% of MPHR)  MPHR: 163 bpm   85% of MPHR: 139 bpm  BP: Baseline BP: 170/101 mmHg   Peak BP: 170/101 mmHg  Peak RPP: 26428 (Rate Pressure Product)  HR Isotope Injected: 78 bpm (Tc 99m Tetrofosmin)  78 bpm (Tc 99m Tetrofosmin)  Last Caffeine intake: 12 hrs  Terminated: Completion of protocol  ------------------------------------------------------------------------    SYMPTOMS/FINDINGS:  Symptoms: Flushing  Chest pain: No chest pain with administration of  Regadenoson  ------------------------------------------------------------------------    ECG ABNORMALITIES DURING/AFTER STRESS:   Abnormalities: None  ------------------------------------------------------------------------    NEW ARRHYTHMIAS DEVELOPED DURING/AFTER STRESS:  None  ------------------------------------------------------------------------    STRESS TEST IMPRESSIONS:  Negative ECG evidence of ischemia after IV of Lexiscan.  ------------------------------------------------------------------------    PROCEDURE:  10.0 mCi of Tc 99m Tetrofosmin were injected intravenously  at rest. Approximately 45 minutes later, tomographic  images were obtained in a 180 degree arc from right  anterior oblique to left anterior oblique with 64 stops.  At a separate time, 30.9 mCi of Tc 99m Tetrofosmin were  injected intravenously during stress protocol.  Approximately 45 minute(s) later, tomographic images were  obtained in a 180 degree arc from right anterior oblique  to left anterior oblique with 64 stops. The tomographic  slices were reconstructed in 3orthogonal planes (short  axis, horizontal long axis and vertical long axis).  Interpretation was performed both by visual and  quantitative analysis.    ------------------------------------------------------------------------    NUCLEAR FINDINGS:  Review of raw data shows: The study is of good technical  quality.  The left ventricle was markely dilated LV at baseline.  There are small, severe defects in apical,  basal  infero-lateral and inferior walls that are reversible  consistent with ischemia.  ------------------------------------------------------------------------      GATED ANALYSIS:  Dilated LV with global hypokinesis EF=24%.  ------------------------------------------------------------------------    IMPRESSIONS:Abnormal Study  * Negative ECG evidence of ischemia after IV of Lexiscan.  * Review of raw data shows: The study is of good technical  quality.  * The left ventricle was markely dilated LV at baseline.  There are small, severe defects in apical, basal  infero-lateral and inferior  walls that are reversible  consistent with ischemia.  * Dilated LV with global hypokinesis EF=24%.    ------------------------------------------------------------------------      ------------------------------------------------------------------------    Confirmed on  2020 - 14:02:42 at  by Kallie Wall MD  --------    < end of copied text >  < from: Transthoracic Echocardiogram (20 @ 07:26) >  Ejection Fraction Visual Estimate: 40-45 %    ------------------------------------------------------------------------  OBSERVATIONS:  Mitral Valve: Normal mitral valve. Mild mitral  regurgitation.  Aortic Root: Normal aortic root.  Aortic Valve: Calcified trileaflet aortic valve with normal  opening.  Left Atrium: Mildly dilated left atrium.  LA volume index =  40 cc/m2.  Left Ventricle: Moderate segmental left ventricular  systolic dysfunction.  The inferior and  inferolateral  walls are akinetic.  Septal motion consistent with a  conduction defect.   Moderate diastolic dysfunction (stage  II). Mild concentric left ventricular hypertrophy.  Right Heart: Linear structure compatible with Chiari  network identified in the right atrium.  This is a normal  variant. Normal right ventricular size and function. There  is moderate tricuspid regurgitation. There is mild pulmonic  regurgitation.  Pericardium/PleuraNormal pericardium with no pericardial  effusion.  Hemodynamic: RA Pressure is 8 mm Hg. RV systolic pressure  is 29 mm Hg.  ------------------------------------------------------------------------  CONCLUSIONS:  1. Mild mitral regurgitation.  2. Mildly dilated left atrium.  LA volume index = 40 cc/m2.  3. Mild concentric left ventricular hypertrophy.  4. Moderate segmental left ventricular systolic  dysfunction.  The inferior andinferolateral walls are  akinetic.  Septal motion consistent with a conduction  defect.   Moderate diastolic dysfunction (stage II).  5. Linear structure compatible with Chiari network  identified in the right atrium.  This is a normal variant.  6. Normal right ventricular size and function.    ------------------------------------------------------------------------  Confirmed on  2020 - 17:56:49 by Rosendo Allred MD  --------    < end of copied text >

## 2020-12-03 ENCOUNTER — APPOINTMENT (OUTPATIENT)
Dept: ELECTROPHYSIOLOGY | Facility: CLINIC | Age: 55
End: 2020-12-03

## 2021-01-01 NOTE — PATIENT PROFILE ADULT - VISION (WITH CORRECTIVE LENSES IF THE PATIENT USUALLY WEARS THEM):
0 Partially impaired: cannot see medication labels or newsprint, but can see obstacles in path, and the surrounding layout; can count fingers at arm's length

## 2021-02-03 ENCOUNTER — APPOINTMENT (OUTPATIENT)
Dept: ELECTROPHYSIOLOGY | Facility: CLINIC | Age: 56
End: 2021-02-03

## 2021-02-06 NOTE — PHYSICAL THERAPY INITIAL EVALUATION ADULT - PHYSICAL ASSIST/NONPHYSICAL ASSIST: CHAIR TO BED, REHAB EVAL
1 person assist
30
I have personally seen and examined this patient.  I have fully participated in the care of this patient. I have reviewed all pertinent clinical information, including history, physical exam, plan and the Resident’s note and agree except as noted.

## 2021-02-24 NOTE — PROGRESS NOTE ADULT - SUBJECTIVE AND OBJECTIVE BOX
Reason for call: Patient calling regarding still having cough after Covid and wants to know if she should be still on Prednisone or have a follow up xray...    Can we leave a detailed message: Yes     Patient  can be reached at: [phone]    Call taken at 9:26 am on 2/24/2021    Tatiana Jarrell [facility]  2nd Floor  Primary Care       INTERVAL HPI/OVERNIGHT EVENTS:    no events overnight     MEDICATIONS  (STANDING):  aspirin enteric coated 81 milliGRAM(s) Oral daily  atorvastatin 40 milliGRAM(s) Oral at bedtime  enoxaparin Injectable 40 milliGRAM(s) SubCutaneous daily  insulin glargine Injectable (LANTUS) 43 Unit(s) SubCutaneous at bedtime  insulin lispro (HumaLOG) corrective regimen sliding scale   SubCutaneous three times a day before meals  insulin lispro (HumaLOG) corrective regimen sliding scale   SubCutaneous at bedtime  insulin lispro Injectable (HumaLOG) 4 Unit(s) SubCutaneous three times a day before meals  metoprolol tartrate 25 milliGRAM(s) Oral two times a day  pantoprazole    Tablet 40 milliGRAM(s) Oral before breakfast    MEDICATIONS  (PRN):  acetaminophen   Tablet .. 650 milliGRAM(s) Oral every 6 hours PRN Mild Pain (1 - 3)  chlorproMAZINE    Tablet 10 milliGRAM(s) Oral every 8 hours PRN hiccups  oxyCODONE    5 mG/acetaminophen 325 mG 1 Tablet(s) Oral every 8 hours PRN Severe Pain (7 - 10)      Allergies    No Known Allergies    Intolerances        Vital Signs Last 24 Hrs  T(C): 36.6 (13 Nov 2018 11:15), Max: 37.2 (12 Nov 2018 18:26)  T(F): 97.8 (13 Nov 2018 11:15), Max: 99 (12 Nov 2018 18:26)  HR: 85 (13 Nov 2018 11:15) (73 - 92)  BP: 133/78 (13 Nov 2018 11:15) (111/89 - 136/88)  BP(mean): --  RR: 18 (13 Nov 2018 11:15) (17 - 18)  SpO2: 100% (13 Nov 2018 11:15) (98% - 100%)    PHYSICAL EXAM:  GENERAL: NAD, well-groomed, well-developed  HEENT: Supple, No JVD, Normal thyroid  NERVOUS SYSTEM:  Alert & Oriented X3  CHEST/LUNG: Clear to percussion bilaterally; No rales, rhonchi, wheezing, or rubs  HEART: Regular rate and rhythm; No murmurs, rubs, or gallops  ABDOMEN: Soft, Nontender, Nondistended; Bowel sounds present  EXTREMITIES:  2+ Peripheral Pulses, No clubbing, cyanosis, or edema  SKIN:       LABS:                        14.5   6.8   )-----------( 241      ( 13 Nov 2018 07:15 )             42.7     11-13    130<L>  |  95<L>  |  34<H>  ----------------------------<  231<H>  4.8   |  27  |  1.45<H>    Ca    9.5      13 Nov 2018 07:15  Phos  3.7     11-13  Mg     2.1     11-13    TPro  8.1  /  Alb  3.3<L>  /  TBili  0.4  /  DBili  x   /  AST  19  /  ALT  23  /  AlkPhos  105  11-13        CAPILLARY BLOOD GLUCOSE      POCT Blood Glucose.: 214 mg/dL (13 Nov 2018 08:08)  POCT Blood Glucose.: 215 mg/dL (12 Nov 2018 21:24)  POCT Blood Glucose.: 361 mg/dL (12 Nov 2018 16:40)  POCT Blood Glucose.: 190 mg/dL (12 Nov 2018 11:38)      RADIOLOGY & ADDITIONAL TESTS:    Imaging Personally Reviewed:  [ ] YES  [ ] NO    Consultant(s) Notes Reviewed:  [ ] YES  [ ] NO    Care Discussed with Consultants/Other Providers [ ] YES  [ ] NO    Plan of Care discussed with Attending/PGY2 [ ]YES [ ] NO INTERVAL HPI/OVERNIGHT EVENTS:    no events overnight     MEDICATIONS  (STANDING):  aspirin enteric coated 81 milliGRAM(s) Oral daily  atorvastatin 40 milliGRAM(s) Oral at bedtime  enoxaparin Injectable 40 milliGRAM(s) SubCutaneous daily  insulin glargine Injectable (LANTUS) 43 Unit(s) SubCutaneous at bedtime  insulin lispro (HumaLOG) corrective regimen sliding scale   SubCutaneous three times a day before meals  insulin lispro (HumaLOG) corrective regimen sliding scale   SubCutaneous at bedtime  insulin lispro Injectable (HumaLOG) 4 Unit(s) SubCutaneous three times a day before meals  metoprolol tartrate 25 milliGRAM(s) Oral two times a day  pantoprazole    Tablet 40 milliGRAM(s) Oral before breakfast    MEDICATIONS  (PRN):  acetaminophen   Tablet .. 650 milliGRAM(s) Oral every 6 hours PRN Mild Pain (1 - 3)  chlorproMAZINE    Tablet 10 milliGRAM(s) Oral every 8 hours PRN hiccups  oxyCODONE    5 mG/acetaminophen 325 mG 1 Tablet(s) Oral every 8 hours PRN Severe Pain (7 - 10)      Allergies    No Known Allergies    Intolerances        Vital Signs Last 24 Hrs  T(C): 36.6 (13 Nov 2018 11:15), Max: 37.2 (12 Nov 2018 18:26)  T(F): 97.8 (13 Nov 2018 11:15), Max: 99 (12 Nov 2018 18:26)  HR: 85 (13 Nov 2018 11:15) (73 - 92)  BP: 133/78 (13 Nov 2018 11:15) (111/89 - 136/88)  BP(mean): --  RR: 18 (13 Nov 2018 11:15) (17 - 18)  SpO2: 100% (13 Nov 2018 11:15) (98% - 100%)    PHYSICAL EXAM:  GENERAL: NAD, well-groomed, well-developed  HEENT: Supple, No JVD, Normal thyroid  NERVOUS SYSTEM:  Alert & Oriented X3  CHEST/LUNG: Clear to percussion bilaterally; No rales, rhonchi, wheezing, or rubs  HEART: Regular rate and rhythm; No murmurs, rubs, or gallops  ABDOMEN: Soft, Nontender, Nondistended; Bowel sounds present  EXTREMITIES:  2+ Peripheral Pulses, No clubbing, cyanosis, or edema  SKIN:  Right gluteus miguel intramuscular revolving  hematoma    LABS:                        14.5   6.8   )-----------( 241      ( 13 Nov 2018 07:15 )             42.7     11-13    130<L>  |  95<L>  |  34<H>  ----------------------------<  231<H>  4.8   |  27  |  1.45<H>    Ca    9.5      13 Nov 2018 07:15  Phos  3.7     11-13  Mg     2.1     11-13    TPro  8.1  /  Alb  3.3<L>  /  TBili  0.4  /  DBili  x   /  AST  19  /  ALT  23  /  AlkPhos  105  11-13        CAPILLARY BLOOD GLUCOSE      POCT Blood Glucose.: 214 mg/dL (13 Nov 2018 08:08)  POCT Blood Glucose.: 215 mg/dL (12 Nov 2018 21:24)  POCT Blood Glucose.: 361 mg/dL (12 Nov 2018 16:40)  POCT Blood Glucose.: 190 mg/dL (12 Nov 2018 11:38)      RADIOLOGY & ADDITIONAL TESTS:

## 2021-06-01 NOTE — PROGRESS NOTE ADULT - REASON FOR ADMISSION
dizziness, SOB
All other review of systems negative, except as noted in HPI

## 2021-06-02 NOTE — PROGRESS NOTE ADULT - PROBLEM SELECTOR PLAN 2
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

## 2021-12-20 NOTE — DISCHARGE NOTE NURSING/CASE MANAGEMENT/SOCIAL WORK - NSDCPETBCESMAN_GEN_ALL_CORE
The patient is a 23y Female complaining of 
If you are a smoker, it is important for your health to stop smoking. Please be aware that second hand smoke is also harmful.

## 2022-06-23 NOTE — PHYSICAL THERAPY INITIAL EVALUATION ADULT - ASSISTIVE DEVICE, REHAB EVAL
bed rails Retention Suture Text: Retention sutures were placed to support the closure and prevent dehiscence.

## 2022-07-15 NOTE — PATIENT PROFILE ADULT - ..
Curtis is a healthy vaccinated 11 YO male who presents with neck pain s/p fall. Spine fracture unlikely 2/2 neck pain is anterior; no cervical spine tenderness and normal ROM. TBI unlikely 2/2 no headache, vomiting or LOC. Ordered motrin for pain. Plan to observe for 1 hour.  Carmelita Minor, PGY1 Curtis is a healthy vaccinated 9 YO male who presents with neck pain s/p fall. Spine fracture unlikely 2/2 neck pain is anterior; no cervical spine tenderness and normal ROM. TBI unlikely 2/2 no headache, vomiting or LOC. Ordered motrin for pain. Plan to observe for 1 hour.  Carmelita Minor, PGY1    Kevin Abbott DO (PEM Attending): Pt with muscular anterior neck pain s/o injury. He has NO c-spine bony tenderness, has full ORM, with no bony pain. No neuro deficits. Anetriorly, no hematoma, no diff swallowing or stridor.  Rest of exam normal. Abrasion to R and L knee  Motrin, supportive care discussed 09-Nov-2018 18:33:04

## 2022-08-31 NOTE — PROGRESS NOTE ADULT - PROBLEM/PLAN-2
Latex:  Patient denies allergy to latex.  Medications reviewed with patient.  Tobacco use verified.  Allergies verified.    REFERRING MD:  Dr. Robledo  Primary Medical Doctor: Geronimo Robledo 
Orthopedic Post Operative Visit:  Surgery: left distal humerus ORIF  Date of Surgery: 4/25/2022    SUBJECTIVE  Denies pain in the elbow  Has been using the arm   Denies numbness and tingling in the fingers      OBJECTIVE  Physical Exam   Left arm   Incision/dressing: Healed without signs of infection including erythema, edema, open areas and drainage  ROM Fingers and wrist: full fist, full finger extension fingers straight, flexion and extension wrist 60/60; abduction and adduction intact  Pronation/supination 80/80  Elbow extension 0 degrees  Flexion 120 degrees  +SILT all digits   Rapid capillary refill fingers   Rad 2+    Imaging  XR Left elbow: healing humerus fracture with intact hardware; no evidence of hardware complication     ASSESSMENT AND PLAN  S/p Left humerus ORIF    Progress use  Scar massage  RTC 2 months for xr left elbow  At this point there is no indication for hardware removal.  If it becomes symptomatic will discuss.    Details of surgery were reviewed along with prognosis and expected recovery      Cosmo Lovell PA-C  08/31/22    The supervising physician for services performed today is Dr. Isidro Fatima MD.  
DISPLAY PLAN FREE TEXT

## 2022-10-30 NOTE — ED ADULT TRIAGE NOTE - BP NONINVASIVE DIASTOLIC (MM HG)
-patient was sent to the ED by cardiologist, due to frequent long pauses on Holter monitor  -currently asymptomatic.    -denies lightheadedness, dizziness (was having frequent presyncopal episodes for past few weeks, but none in last 3-4 days)  -per Dr. Blackman, to be monitored in ICU overnight    10/29:     conversion from afib to sinus   Oral AC on hold due to possible pacemaker implant early next week   Med surg tele    10/30:    into AFib with rate controlled in 70s --> possible pacemaker placement in a.m. by Cardiology  Currently on heparin drip     92

## 2023-02-20 NOTE — CONSULT NOTE ADULT - ATTENDING COMMENTS
Increase lisinopril to 10 mg po qd.  Start Toprol 25 mg po qd.  Start Lasix 40 mg iv bid.  Awaiting [facility] final report.    Addendum: Pt refused admission. He can f/u with Dr. Stephen.
No
Other

## 2023-03-06 NOTE — CONSULT NOTE ADULT - REASON FOR ADMISSION
[FreeTextEntry8] : Presents with persistent sinus pressure and congestion over the past 3 weeks.  He initially went to an urgent care and was treated with Zithromax.  He feels it never completely resolved.  He has postnasal drip.  No cough fever or chills ADHF with shortness of breath, orthopnea, edema

## 2023-04-14 NOTE — ED ADULT NURSE NOTE - NS ED NURSE TRANSPORT WITH
Incoming refill request for: triazolam  Per PDMP last dispensed on: 3/15/23  Last seen by: Sanford Bowles M.D. on:1/31/23  Future appointment to see PCP on: 6/27/23  Active medication agreement updated on: 7/6/22  Last Drug Screen Collected on: 6/28/22    Script pended, with a start date of: today    PDMP review:    Criteria met. Forwarded to Physician/JESSICA for signature.   Cardiac Monitor/Defib/ACLS/Rescue Kit/O2/BVM

## 2023-04-25 NOTE — ED ADULT NURSE NOTE - NSSUHOSCREENINGYN_ED_ALL_ED
an established patient  4/25/2023    Diagnosis:  Recurrent colon cancer     HPI:  Pauline Price is a 67 year old female with colon cancer.  Pauline past medical history significant for hypertension and hyperlipidemia and diabetes mellitus who had Right hemicolectomy for colon cancer in 2018 followed by 2 cycles of the chemotherapy.  She was following with MOLLY Olvera.  Apparently she had some allergy with the chemotherapy and she refused to have any more treatment.  She has not followed up since that.    She was admitted in [facility] on 10/15/2022 with abdominal pain.  CT scan of the chest abdomen pelvis showed masslike thickening of the proximal descending colon and multiple enlarged mesenteric lymph nodes.  She had surgery done, colon, small bowel, ileocolonic resection.  Colon with adenocarcinoma 4.5 cm with invasion through the muscularis propria.  0/30 lymph node.  Separate segment of small bowel with mesenteric: Adenocarcinoma 1 cm.  Abdominal mass biopsy with metastatic adenocarcinoma.  Liver mass biopsy metastatic adenocarcinoma      Interval Hx:    Pauline here for the second cycle of chemotherapy.  According to her she tolerated first cycle fairly well.  Denies any nausea, vomiting.  No peripheral neuropathy.  No blood in his stool or black stool.  Feeling tiredness and weakness which is according to her not new.  Appetite is good.  She is maintaining her weight.      Cancer Hx:  5/11/2018-s/p right hemicolectomy  10/15/2022--CT scan of the abdomen and pelvis-surgical changes of prior partial colectomy-irregular masslike thickening of the proximal ascending colon and terminal ileum.  Multiple enlarged mesenteric lymphadenopathy.  Multiple liver lesions  10/15/2022-10/21/2022--admitted in hospital with the abdominal pain found to have recurrent colon cancer  10/15/2022--s/p exploratory laparotomy, small bowel/colon resection, second small bowel resection, liver biopsy, biopsy abdominal wall mass,  lysis of additions  10/15/2022--pathology- adenocarcinoma 4.5 cm with invasion through the muscularis propria.  0/30 lymph node.  Separate segment of small bowel with mesenteric: Adenocarcinoma 1 cm.  Abdominal mass biopsy with metastatic adenocarcinoma.  Liver mass biopsy metastatic adenocarcinoma  12/23/2022--TEMPUS test-KRAS [id number] score less than 1%, MMR: Normal, MLH1 present, MSH2 present, MSH6 present, PMS2 present-microsatellite instability status-stable, TMB-64 percentile.  HRD not detected, BRAF, NRAS negative  1/5/2023--CT scan of the chest abdomen and pelvis soft tissue mass originating from anastomotic site, liver metastasis, several prominent mesenteric lymph nodes, soft tissue nodules in the right abdomen concerning for carlitos metastasis and peritoneal disease, bilateral adrenal masses consistent with metastatic disease  4/11/2023-- C1D1 FOLFRI and bevacizumab  4/25/2023--C2 D1 FOLFIRI and bevacizumab    Patient Active Problem List   Diagnosis   • Malignant neoplasm of ascending colon (CMD)   • Malignant neoplasm metastatic to liver (CMD)       Past Medical History:   Diagnosis Date   • Arthritis    • Colon cancer (CMD) 2018   • Diabetes mellitus (CMD) 2003       No family history on file.  Social History:  She  reports that she has quit smoking. Her smoking use included cigarettes. She has been exposed to tobacco smoke. She has never used smokeless tobacco. She reports that she does not drink alcohol and does not use drugs.     Allergies:  ALLERGIES:  Seasonal and Unknown     Current Outpatient Medications   Medication Sig Dispense Refill   • prochlorperazine (COMPAZINE) 10 MG tablet Take 1 tablet by mouth every 6 hours as needed for Nausea. 45 tablet 3   • Ibuprofen-Acetaminophen 125-250 MG Tab Take 2 tablets by mouth every 8 hours as needed (moderate pain).     • Acetaminophen 500 MG Cap Take 1,000 mg by mouth 2 times daily as needed (mild pain).     • glipiZIDE (GLUCOTROL) 10 MG tablet Take  10 mg by mouth in the morning and 10 mg in the evening. Take before meals.     • metformin (GLUCOPHAGE) 1000 MG tablet Take 1 tablet by mouth in the morning and 1 tablet in the evening. Take with meals.       No current facility-administered medications for this visit.         Performance Status: ECOG 1    Distress Screening:  Scale Rating of 0=No Distress and 10=Extreme Distress  Patient self-reported distress rating of: rates depression as 0-1/10    Over the past 2 weeks, has patient felt down, depressed or hopeless? no  Over the past 2 weeks, has patient felt little interest or pleasure in doing things?   no  On the basis of the above screen, the following is initiated:  None     Physical Exam:      Oncology Encounter Vitals [04/25/23 0950]   ONC OP Encounter Vitals Group      /64      Heart Rate 60      Resp 18      Temp 98.8 °F (37.1 °C)      Temp src Tympanic      SpO2 99 %      Weight 146 lb 11.5 oz (66.6 kg)      Height 5' 1\" (1.549 m)      Pain Score 3      Pain Location       Pain Education?       BSA (Calculated - m2) - [employer]ve [employer]ve 1.66      BSA (Calculated - sq m) 1.69      BMI (Calculated) 27.72       General Appearance: Alert and oriented, no acute distress.  HEENT: Head - normocephalic, Sclera - anicteric, EOMI. No pharyngeal erythema or exudates.  Lymphatic System: No palpable nodes in neck axillae or groin.  Neck:  Thyroid symmetric, normal size; trachea midline, no venous distention.  CV: S1 normal, S2 normal; no murmurs, clicks or gallops. No peripheral edema.  Respiratory: Lungs clear to auscultation bilaterally . No wheezing, rhonchi heard.  Back: Symmetric, ROM normal; no CVA tenderness.   GI: Abdomen soft, non-tender, no distended. No masses or organomegaly. Bowel sounds were heard in all 4 quadrants   Neurological:  No focal neuro deficit.  Skin: Skin color, texture, turgor normal. No rashes or lesions.      Labs reviewed:    Component      Latest Ref Rng & Units 4/25/2023   WBC       4.2 - 11.0 K/mcL 6.9   RBC      4.00 - 5.20 mil/mcL 3.53 (L)   HGB      12.0 - 15.5 g/dL 8.8 (L)   HCT      36.0 - 46.5 % 29.1 (L)   MCV      78.0 - 100.0 fl 82.4   MCH      26.0 - 34.0 pg 24.9 (L)   MCHC      32.0 - 36.5 g/dL 30.2 (L)   RDW-CV      11.0 - 15.0 % 18.1 (H)   RDW-SD      39.0 - 50.0 fL 54.9 (H)   PLT      140 - 450 K/mcL 432   Neutrophil      % 54   LYMPH      % 35   MONO      % 10   EOSIN      % 1   BASO      % 0   Immature Granulocytes      % 0   Absolute Neutrophil      1.8 - 7.7 K/mcL 3.7   Absolute Lymph      1.0 - 4.0 K/mcL 2.4   Absolute Mono      0.3 - 0.9 K/mcL 0.7   Absolute Eos      0.0 - 0.5 K/mcL 0.1   Absolute Baso      0.0 - 0.3 K/mcL 0.0   Absolute Immature Granulocytes      0.0 - 0.2 K/mcL 0.0   Sodium      135 - 145 mmol/L 142   Potassium      3.4 - 5.1 mmol/L 3.5   Chloride      97 - 110 mmol/L 107   CO2      21 - 32 mmol/L 21   ANION GAP      7 - 19 mmol/L 18   Glucose      70 - 99 mg/dL 122 (H)   BUN      6 - 20 mg/dL 15   Creatinine      0.51 - 0.95 mg/dL 0.46 (L)   Glomerular Filtration Rate      >=60 >90   BUN/CREATININE RATIO      7 - 25 33 (H)   CALCIUM      8.4 - 10.2 mg/dL 8.7   TOTAL BILIRUBIN      0.2 - 1.0 mg/dL 0.2   AST/SGOT      <=37 Units/L 11   ALT/SGPT      <64 Units/L 21   ALK PHOSPHATASE      45 - 117 Units/L 151 (H)   Albumin      3.6 - 5.1 g/dL 3.1 (L)   TOTAL PROTEIN      6.4 - 8.2 g/dL 6.9   GLOBULIN      2.0 - 4.0 g/dL 3.8   A/G Ratio, Serum      1.0 - 2.4 0.8 (L)   COLOR       Yellow   CLARITY       Clear   GLUCOSE(URINE)      Negative mg/dL Negative   BILIRUBIN      Negative Negative   KETONES      Negative mg/dL Trace (A)   SPECIFIC GRAVITY      1.005 - 1.030 1.020   BLOOD      Negative Negative   pH      5.0 - 7.0 6.0   PROTEIN(URINE)      Negative mg/dL Negative   UROBILINOGEN      0.2, 1.0 mg/dL 0.2   NITRITE      Negative Negative   LEUKOCYTE ESTERASE      Negative Trace (A)     Pathology:        Imaging:    None       Assessment/Plan:  Recurrent  metastatic colon cancer:  67-year-old female with a history of colon cancer diagnosed in May 2018.  S/p right hemicolectomy, 0/13 lymph node.  I do not have the previous pathology managed apparently she received 2 cycles of the chemotherapy by one of the outside oncologist Dr. Siegel.  She did not tolerate chemotherapy at all.  She had not followed up till October 2022 when she was admitted with abdominal pain.  CT scan of the abdomen pelvis shows recurrent disease.  She had .  She had surgery done, colon, small bowel, ileocolonic resection.  Colon with adenocarcinoma 4.5 cm with invasion through the muscularis propria.  0/30 lymph node.  Separate segment of small bowel with mesenteric: Adenocarcinoma 1 cm.  Abdominal mass biopsy with metastatic adenocarcinoma.  Liver mass biopsy metastatic adenocarcinoma  CT of the chest abdominal pelvis showed liver metastasis, adrenal metastasis and abdominal lymphadenopathy and peritoneal involvement.  She was supposed to start the chemotherapy which got delayed because she had some problem at home.  Started on FOLFIRI and bevacizumab in April 2023.  She tolerated first cycle fairly well.  Plan to continue with next cycle on 4/25/2023.  We will plan to get PET scan after 6 cycles.  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  Next cycle of FOLFIRI and bevacizumab on 4/25/2023  2.  Follow-up in 2 weeks for next cycle      Bri Benoit MD    Electronically signed by: Bri Benoit MD  4/25/2023    @This includes pre-charting, chart review and documenting.    This dictation was created using Dragon voice recognition software and thus transcription errors or variance may occur   Yes - the patient is able to be screened

## 2023-06-01 NOTE — SPEECH LANGUAGE PATHOLOGY EVALUATION - ANSWERS YES/NO QUESTIONS
within functional limits 88 yo F with hx of PPM, , CAD, CHF on home O2 PRN, HTN, afibon DOAC , GERD, hyperlipidemia  presents s/p syncope at home.  Patient's son states he found her lying back on the coffee table with head back.  Patient states she felt dizzy and fainted.  Patient has had upset stomach and nausea for about 3 days and has felt like she has had the flu.  She has had decreased PO intake.  Denies chest pain or SOB.  Denies headache.Son came home from work and states he found her lying on top of the coffee table without any injury 3 hours prior to ED Arrival .  He thinks maybe she was not wearing her oxygen like she is supposed to.  In ED found to have leukocytosis, anemia Hb 9.9, afebrile, hemodynamically stable on 2 L NC, with hyponatremia, hypokalemia, - received IV lasix , IVF bolus 500cc, zosyn in ED  < from: CT Abdomen and Pelvis No Cont (05.31.23 @ 23:42) >  Mild presacral edema is new, along the posterior aspect of the rectum.   Cardiomegaly and small pleural effusions again demonstrated. The pleural   effusions have increased slightly in size.Several small calcified gallstones in the gallbladder which   is distended but with no adjacent inflammation.      Renal eval called for hyponatremia   Currently pt states has not eating past 3 days , pt states she did not take her usual doses of Bumex or NaCL tabs  pt denies n, v, sob or cp    Pt followed by Dr Kevin for chronic hyponatremia on NaCl tabs     PAST MEDICAL & SURGICAL HISTORY:  Pacemaker      High cholesterol      HTN (hypertension)      GERD (gastroesophageal reflux disease)      Afib      Cardiac pacemaker      Home Medications:  anastrozole 1 mg oral tablet: 1 tab(s) orally once a day (01 Jun 2023 09:29)  apixaban 2.5 mg oral tablet: 1 tab(s) orally 2 times a day (01 Jun 2023 09:41)  atorvastatin 40 mg oral tablet: 1 tab(s) orally once a day (at bedtime) (01 Jun 2023 09:41)  bumetanide 2 mg oral tablet: 1 tab(s) orally 2 times a day as needed for swelling (01 Jun 2023 09:41)  clotrimazole 10 mg oral lozenge: 1 lozenge orally 5 times a day for 14 days (01 Jun 2023 09:41)  gabapentin 100 mg oral capsule: 2 cap(s) orally once a day (at bedtime) (01 Jun 2023 09:41)  isosorbide mononitrate 30 mg oral tablet, extended release: 1 tab(s) orally once a day (01 Jun 2023 09:41)  Multiple Vitamins oral tablet: 1 tab(s) orally once a day (01 Jun 2023 09:45)  pantoprazole 40 mg oral delayed release tablet: 1 tab(s) orally 2 times a day (01 Jun 2023 09:29)  potassium chloride 10 mEq oral tablet, extended release: 1 tab(s) orally once a day (01 Jun 2023 09:41)  sotalol 80 mg oral tablet: 1 tab(s) orally 2 times a day (01 Jun 2023 09:29)  Trelegy Ellipta 200 mcg-62.5 mcg-25 mcg/inh inhalation powder: 1 puff(s) inhaled 2 times a day (01 Jun 2023 09:41)  Xiidra 5% ophthalmic solution: 1 drop(s) in each eye 2 times a day (01 Jun 2023 09:29)      MEDICATIONS  (STANDING):  anastrozole 1 milliGRAM(s) Oral daily  apixaban 2.5 milliGRAM(s) Oral two times a day  atorvastatin 40 milliGRAM(s) Oral at bedtime  budesonide 160 MICROgram(s)/formoterol 4.5 MICROgram(s) Inhaler 2 Puff(s) Inhalation two times a day  clotrimazole Lozenge 1 Lozenge Oral five times a day  gabapentin 200 milliGRAM(s) Oral at bedtime  isosorbide   mononitrate ER Tablet (IMDUR) 30 milliGRAM(s) Oral daily  multivitamin 1 Tablet(s) Oral daily  pantoprazole    Tablet 40 milliGRAM(s) Oral before breakfast  potassium chloride    Tablet ER 40 milliEquivalent(s) Oral once  sodium chloride 1.5%. 500 milliLiter(s) (50 mL/Hr) IV Continuous <Continuous>  sotalol. 80 milliGRAM(s) Oral every 12 hours  tiotropium 2.5 MICROgram(s) Inhaler 2 Puff(s) Inhalation daily      Allergies    Zoloft (Unknown)  Hyzaar (Unknown)  iodine (Hives)    Intolerances        SOCIAL HISTORY:  Denies ETOh,Smoking,     FAMILY HISTORY:  No pertinent family history in first degree relatives        REVIEW OF SYSTEMS:    CONSTITUTIONAL: No fevers or chills  EYES/ENT: No visual changes;  No vertigo or throat pain   NECK: No pain or stiffness  RESPIRATORY: No cough, wheezing, hemoptysis; No shortness of breath  CARDIOVASCULAR: No chest pain or palpitations  GASTROINTESTINAL: No abdominal or epigastric pain. No nausea, vomiting, or hematemesis; No diarrhea or constipation. No melena or hematochezia.  GENITOURINARY: No dysuria, frequency or hematuria  NEUROLOGICAL: No numbness or weakness  SKIN: No itching, burning, rashes, or lesions   All other review of systems is negative unless indicated above.    VITAL:  Vital Signs Last 24 Hrs  T(C): 36.6 (01 Jun 2023 13:12), Max: 37 (01 Jun 2023 05:45)  T(F): 97.9 (01 Jun 2023 13:12), Max: 98.6 (01 Jun 2023 05:45)  HR: 74 (01 Jun 2023 11:28) (43 - 86)  BP: 123/54 (01 Jun 2023 13:12) (123/54 - 167/82)  BP(mean): 81 (01 Jun 2023 11:28) (81 - 102)  RR: 19 (01 Jun 2023 13:12) (18 - 24)  SpO2: 95% (01 Jun 2023 13:12) (95% - 99%)    Parameters below as of 01 Jun 2023 13:12  Patient On (Oxygen Delivery Method): nasal cannula  O2 Flow (L/min): 2      PHYSICAL EXAM:    Constitutional: frail   HEENT: EOMI,  MM  Neck: No LAD, No JVD  Respiratory: poor AE   Cardiovascular: S1 and S2  Gastrointestinal: BS+, soft, NT/ND  Extremities: No peripheral edema  Neurological: A/O x 3, no focal deficits  : No Luz  Skin: No rashes  Access: Not applicable    LABS:                                   9.3    12.14 )-----------( 191      ( 01 Jun 2023 09:04 )             28.5                         9.9    11.97 )-----------( 204      ( 01 Jun 2023 01:19 )             30.2           124    |  92     |  19     ----------------------------<  105       01 Jun 2023 09:04  3.3     |  22     |  0.97     125    |  91     |  22     ----------------------------<  123       01 Jun 2023 01:19  3.2     |  24     |  1.14     Ca    7.4        01 Jun 2023 09:04  Ca    7.7        01 Jun 2023 01:19      TPro  7.0    /  Alb  2.6    /  TBili  0.7    /        01 Jun 2023 09:04  DBili  x      /  AST  53     /  ALT  27     /  AlkPhos  97       TPro  7.6    /  Alb  2.9    /  TBili  0.5    /        01 Jun 2023 01:19  DBili  x      /  AST  53     /  ALT  27     /  AlkPhos  106        Urine Studies:    RADIOLOGY & ADDITIONAL STUDIES:                   CHIEF COMPLAINT: Fatigue    HPI:  87 year old woman with a history of chronic CHF, sick sinus syndrome s/p pacemaker, paroxysmal atrial fibrillation / flutter, breast cancer, GERD, hyperlipidemia who was brought to the ER by EMS after being found by her son lying over coffee table. Mrs Yost does not recall events prior to admission but says she has felt unwell for several days -- unsettled stomach, poor PO intake, and fatigue; no clear exacerbating or alleviating factors.  She continues to describe fatigue but no dyspnea or chest discomfort.  She describes chronic, stable heart disease - sees Dr Valentin at [facility]. In the ER she was found to hypertensive (157/71), bradycardic (triage HR = 43) and hyponatremic.    PAST MEDICAL & SURGICAL HISTORY:  Pacemaker  High cholesterol  HTN (hypertension)  GERD (gastroesophageal reflux disease)  Afib    SOCIAL HISTORY:   Alcohol: Denied  Smoking: Nonsmoker    FAMILY HISTORY: No pertinent family history in first degree relatives    MEDICATIONS  (STANDING):  piperacillin/tazobactam IVPB.. 3.375 Gram(s) IV Intermittent every 8 hours    MEDICATIONS  (PRN):  acetaminophen     Tablet .. 650 milliGRAM(s) Oral every 6 hours PRN Mild Pain (1 - 3)  aluminum hydroxide/magnesium hydroxide/simethicone Suspension 30 milliLiter(s) Oral every 4 hours PRN Dyspepsia  melatonin 3 milliGRAM(s) Oral at bedtime PRN Insomnia  ondansetron Injectable 4 milliGRAM(s) IV Push every 6 hours PRN Nausea and/or Vomiting    Allergies:   Zoloft (Unknown)  Hyzaar (Unknown)  iodine (Hives)    REVIEW OF SYSTEMS:  CONSTITUTIONAL: + weakness, no fevers or chills  Eyes: No visual changes  NECK: No pain or stiffness  RESPIRATORY: No shortness of breath  CARDIOVASCULAR: No chest pain or palpitations  GASTROINTESTINAL: No abdominal pain.   GENITOURINARY: No dysuria  NEUROLOGICAL: No numbness.  SKIN: No itching or rash  All other review of systems is negative unless indicated above    VITAL SIGNS:   Vital Signs Last 24 Hrs  T(C): 36.9 (01 Jun 2023 07:34), Max: 37 (01 Jun 2023 05:45)  T(F): 98.4 (01 Jun 2023 07:34), Max: 98.6 (01 Jun 2023 05:45)  HR: 69 (01 Jun 2023 07:34) (43 - 86)  BP: 163/69 (01 Jun 2023 07:34) (157/71 - 167/82)  BP(mean): 97 (01 Jun 2023 07:34) (97 - 102)  RR: 18 (01 Jun 2023 07:34) (18 - 24)  SpO2: 98% (01 Jun 2023 07:34) (95% - 98%)  Patient On (Oxygen Delivery Method): nasal cannula O2 Flow (L/min): 2    PHYSICAL EXAM:  Constitutional: NAD, awake and alert  HEENT:  EOMI,  Pupils round, No oral cyanosis.  Pulmonary: Non-labored, breath sounds are clear bilaterally, No wheezing, rales or rhonchi  Cardiovascular: S1 and S2, regular rate and rhythm  Gastrointestinal: Bowel Sounds present, soft, nontender.   Lymph: No peripheral edema. No cervical lymphadenopathy.  Neurological: Alert, no focal deficits  Skin: No rashes.  Psych:  Mood & affect appropriate    LABS:             9.3    12.14 )-----------( 191      ( 01 Jun 2023 09:04 )             28.5              125    |  91     |  22     ----------------------------<  123    3.2     |  24     |  1.14     Ca    7.7        01 Jun 2023 01:19    TPro  7.6    /  Alb  2.9    /  TBili  0.5    /  DBili  x      /  AST  53     /  ALT  27     /  AlkPhos  106    01 Jun 2023 01:19  Pro-Brain Natriuretic Peptide: 07868 pg/mL  CT Abdomen and Pelvis No Cont (05.31.23 @ 23:42):  Mild presacral edema is new, along the posterior aspect of the rectum. This could represent a mild proctitis or noninflammatory third spacing of fluid. No underlying sacral fracture.  Cardiomegaly and small pleural effusions again demonstrated. The pleural effusions have increased slightly in size.    12 Lead ECG (05.31.23 @ 21:56):  Atrial-paced rhythm  Incomplete right bundle branch block  Voltage criteria for left ventricular hypertrophy  ST elevation, consider early repolarization, pericarditis, or injury  Nonspecific ST abnormality  Prolonged QT    Tele: Atrial paced

## 2023-06-07 NOTE — PATIENT PROFILE ADULT - NSTOBACCOCESSATIONEDU1_GEN_A_NUR
This is probably a viral infection.  Call tomorrow to [facility] an appointment [facility] for an examination by the eye doctor.   Recognize danger situations.  For example, stress, drinking alcohol, urges to smoke, smoking cues, cigarette availability

## 2023-07-17 NOTE — ED PROVIDER NOTE - CPE EDP NEURO NORM
Called and left a VM to find out if the BMP lab was done. If they can please give the office back regarding overdue lab.    normal...

## 2023-09-12 NOTE — PATIENT PROFILE ADULT - HAVE YOU EXPERIENCED A TRAUMATIC EVENT?
Patient is here for post allograft transplant nurse check (not currently on an active treatment plan)     Did this patient receive an outpatient autologous transplant? No.    Patient is Day + 322.    Reviewed and verified Advanced Directives: No: Patient declined to create/provide document at this time     Is the patient on an active anti-cancer treatment plan? No, Nursing Assessment:  A comprehensive nursing assessment was performed and the patient reports the following:    Nausea: NO  Vomiting: NO  Fever: NO  Chills: NO  Other signs of infection: NO  Bleeding: NO  Mucositis: NO  Diarrhea: NO  Constipation: NO  Anorexia: NO, poor appetite  Dysuria: NO  Urinary Bleeding: NO  Cough: YES  Shortness of Breath: NO  Fatigue/Weakness: YES  Numbness/Tingling: YES  Other Neuropathies: NO  Edema: NO  Rash: NO  Hand/Foot Syndrome: NO  Anxiety/Depression/Insomnia: NO  Pain: NO        This patient received the following medications: none    Dr Braga is supervising clinician today.    Vitals:    09/12/23 0900   BP: 130/74   BP Location: LUE - Left upper extremity   Patient Position: Sitting   Pulse: (!) 59   Resp: 18   Temp: 98 °F (36.7 °C)   TempSrc: Temporal   SpO2: 98%        Does the Patient have a central line? Yes: See Invasive (Oncology) Lines Flowsheet for CVAD documentation.    Transfusion: Not needed    Education: Patient Education: Pt received general instructions regarding blood tests and dietary    Next appointment scheduled:   Future Appointments   Date Time Provider Department Center   9/13/2023  8:00 AM Renuka Mari MD [address]   9/13/2023  8:30 AM [facility] [address]   9/18/2023  7:15 AM Lesa Bates PT [email or web address]   9/20/2023  7:15 AM Lesa Bates, PT [email or web address]   9/26/2023 10:15 AM Janeth Galaviz PT [email or web address]   9/28/2023  8:45 AM Lesa Bates, PT [email or web address]   10/5/2023  8:00 AM Lesa Bates, PT [email or web address]   10/9/2023  9:30 AM Lesa Bates PT [email or web address]    12/20/2023  1:30 PM Rios Brock MD [facility]     Patient instructed to call the office with any questions or concerns.    Patient Discharged: patient discharged to home per self, ambulatory   no

## 2023-09-22 NOTE — PROGRESS NOTE ADULT - PROBLEM/PLAN-6
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 This will now be sent out for genetic testing to further evaluate if the nodule is benign or cancer. Please also call the lab and make sure the specimen has been sent out for genetic testing. DISPLAY PLAN FREE TEXT

## 2023-10-05 NOTE — ED ADULT NURSE NOTE - DOES PATIENT HAVE ADVANCE DIRECTIVE
No Rhomboid Transposition Flap Text: Because of orientation and full-thickness nature of the defect, a rhombic transposition flap was planned. After prep and anesthesia, the rhombic flap was carefully incised to straddle relaxed skin tension lines and the anticipated Burow's triangle removed. The flap was raised and the wound edges were all undermined in the subcutaneous plane. After hemostasis, the flap was transposed/carried over into the defect and sutured in a layered fashion.

## 2023-12-04 NOTE — ED PROVIDER NOTE - CLINICAL SUMMARY MEDICAL DECISION MAKING FREE TEXT BOX
pt with sob, concern for CHF, also PNA, will get labs, CXR, give meds, admission Billing Type: Third-Party Bill

## 2024-01-23 NOTE — BEHAVIORAL HEALTH ASSESSMENT NOTE - NSBHTIMEBASEDBILLING_PSY_A_CORE
Patient: Milena Contreras    Procedure: Procedure(s):  Phacoemulsification Cataract Extraction Posterior Chamber Lens Left Eye       Anesthesia Type:  MAC    Note:  Disposition: Outpatient   Postop Pain Control: Uneventful            Sign Out: Well controlled pain   PONV: No   Neuro/Psych: Uneventful            Sign Out: Acceptable/Baseline neuro status   Airway/Respiratory: Uneventful            Sign Out: Acceptable/Baseline resp. status   CV/Hemodynamics: Uneventful            Sign Out: Acceptable CV status; No obvious hypovolemia; No obvious fluid overload   Other NRE: NONE   DID A NON-ROUTINE EVENT OCCUR? No       Last vitals:  Vitals Value Taken Time   /78 01/23/24 1219   Temp 97  F (36.1  C) 01/23/24 1219   Pulse 60 01/23/24 1219   Resp 16 01/23/24 1219   SpO2 92 % 01/23/24 1232   Vitals shown include unfiled device data.    Electronically Signed By: RUBY Colbert CRNA  January 23, 2024  12:34 PM  
no

## 2024-04-30 NOTE — PATIENT PROFILE ADULT - SAFE PLACE TO LIVE
Detail Level: Detailed Quality 226: Preventive Care And Screening: Tobacco Use: Screening And Cessation Intervention: Patient screened for tobacco use and is an ex/non-smoker Quality 130: Documentation Of Current Medications In The Medical Record: Current Medications Documented Quality 431: Preventive Care And Screening: Unhealthy Alcohol Use - Screening: Patient not identified as an unhealthy alcohol user when screened for unhealthy alcohol use using a systematic screening method no

## 2024-05-21 NOTE — PATIENT PROFILE ADULT - CENTRAL VENOUS CATHETER/PICC LINE
Peripheral Block    Patient location during procedure: pre-op   Block not for primary anesthetic.  Reason for block: at surgeon's request and post-op pain management   Post-op Pain Location: right shoulder   Start time: 5/21/2024 11:11 AM  Timeout: 5/21/2024 11:10 AM   End time: 5/21/2024 11:16 AM    Staffing  Authorizing Provider: Raquel Kaur MD  Performing Provider: Raquel Kaur MD    Staffing  Performed by: Raquel Kaur MD  Authorized by: Raquel Kaur MD    Preanesthetic Checklist  Completed: patient identified, IV checked, site marked, risks and benefits discussed, surgical consent, monitors and equipment checked, pre-op evaluation and timeout performed  Peripheral Block  Patient position: sitting  Prep: ChloraPrep  Patient monitoring: heart rate, cardiac monitor, continuous pulse ox, continuous capnometry and frequent blood pressure checks  Block type: interscalene  Laterality: right  Injection technique: single shot  Needle  Needle type: Stimuplex   Needle gauge: 22 G  Needle length: 2 in  Needle localization: anatomical landmarks and ultrasound guidance   -ultrasound image captured on disc.  Assessment  Injection assessment: negative aspiration, negative parasthesia and local visualized surrounding nerve  Paresthesia pain: none  Heart rate change: no  Slow fractionated injection: yes  Pain Tolerance: comfortable throughout block and no complaints  Medications:    Medications: bupivacaine (pf) (MARCAINE) injection 0.5% - Perineural   5 mL - 5/21/2024 11:16:00 AM    Additional Notes  VSS.  DOS[facility] RN monitoring vitals throughout procedure.  Patient tolerated procedure well.     Exparel 10mL           no

## 2024-05-29 NOTE — ED ADULT TRIAGE NOTE - NS ED NURSE DIRECT TO ROOM YN
Emailed current criteria for breast reduction and asked to advise when complete or if any questions.   No

## 2024-06-03 NOTE — SWALLOW BEDSIDE ASSESSMENT ADULT - MUCOSAL QUALITY
Lucero called on patients behalf stating they checked his INR and it was 1.6. Patient is inquiring whether this changes any of the medications that he should be taking.    Please advise  
unremarkable
headache

## 2024-06-22 NOTE — ED ADULT TRIAGE NOTE - HEIGHT IN FEET
6
reports pain improved but still nausea. discussed results. ua equivocal. notes some increased frequency/urgency recently so will treat for uti.

## 2024-08-19 NOTE — CONSULT NOTE ADULT - CONSULT REASON
[AUDIT-C Screening administered and reviewed] : AUDIT-C Screening administered and reviewed [Benefits of weight loss discussed] : Benefits of weight loss discussed [None] : None stroke [Good understanding] : Patient has a good understanding of lifestyle changes and steps needed to achieve self management goal

## 2024-10-03 NOTE — PATIENT PROFILE ADULT - NSPROHMDIABETBLDGLCTARGET_GEN_A_NUR
Medication: Losartan passed protocol.   Last office visit date: 8/16/2024  Next appointment scheduled?: Yes   Number of refills given: 3    known

## 2024-10-18 NOTE — BEHAVIORAL HEALTH ASSESSMENT NOTE - JUDGMENT (REGARDING EVERYDAY EVENTS)
Rx Refill Note  Requested Prescriptions     Pending Prescriptions Disp Refills    Semaglutide,0.25 or 0.5MG/DOS, (Ozempic, 0.25 or 0.5 MG/DOSE,) 2 MG/3ML solution pen-injector [Pharmacy Med Name: OZEMPIC 0.25-0.5 MG/DOSE PEN] 3 mL 0     Sig: INJECT 0.5 MG UNDER THE SKIN INTO THE APPROPRIATE AREA AS DIRECTED 1 (ONE) TIME PER WEEK. PLEASE SCHEDULE FOLLOW-UP APPOINTMENT.      Last office visit with prescribing clinician: 4/22/2024   Last telemedicine visit with prescribing clinician: Visit date not found   Next office visit with prescribing clinician: Visit date not found                         Would you like a call back once the refill request has been completed: [] Yes [] No    If the office needs to give you a call back, can they leave a voicemail: [] Yes [] No    Susannah Serrano  10/18/24, 08:00 EDT  
[place]

## 2024-11-25 NOTE — ED PROVIDER NOTE - TOBACCO USE
Dermatology Procedure Note   [facility] CARE, [facility]  [facility] DERM AND SKIN A DEPARTMENT OF [facility]  [address] [facility] [address]  Dept: [phone]  Dept Fax: [phone]      Procedure Date: 11/25/2024  Procedure Time: 10:28 AM    Procedure Practitioner: Nolan Love PA-C    Procedure: Excision    Pre-Procedure Diagnosis: Basal Cell Carcinoma    Post-Procedure Diagnosis: Same as Pre-Procedure Diagnosis    Informed Consent: The procedure and its risks were explained including but not limited to pain, bleeding, infection, permanent scar, permanent pigment alteration and recurrence. Consent to proceed with the procedure was obtained from the patient or the parent by the practitioner    Time Out:  A time out was conducted immediately before starting the procedure that confirmed a final verification of the correct patient, correct procedure, and correct site.    Procedure Details:  Excision and layered closure: The 23 mm lesion on the left upper back was cleaned with chlorhexidine and anesthetized with 1% lidocaine with epinephrine. The lesion was then excised in an elliptical fashion down to subcutaneous fat with a 3 mm margin for a total excised diameter of 29 mm. Hemostasis was then achieved spontaneously. Due to tension on the defect, undermining was performed to allow for closure. The subcutaneous tissues were then brought together with 3-0 Monocryl. The epidermis was approximated with 4-0Ethilon. running sutures. Final layered closure was 78 mm (S-plasty). Vaseline and a bulky wound dressing was applied.     The patient is to wash the area daily, with soap and water, pat dry, and apply Vaseline and a non-stick dressing, x 2 weeks.    Procedure Performed By: Nolan Love PA-C    Estimated Blood Loss: Minimal    Pathologic Specimen: H&E    Procedure Tolerance: Good    Complication(s): None    Electronically signed by Nolan Love PA-C on  Current every day smoker

## 2025-06-19 NOTE — ED PROVIDER NOTE - CARDIAC RHYTHM
Wound Procedure Treatment    Performed by: Sanju Abraham RN  Authorized by: ERNESTO Avendano  Associated wounds:   Wound Pressure Injury Ischium Left  Wound Pressure Injury Sacrum  Wound Pressure Injury Perineum  Wound Pressure Injury Hip Left;Lateral    Wound cleansed with:  NSS and Dakin's 0.25%   Applied secondary dressing:  Gauze and ABD   Dressing secured with:  Tape   Comments:  Cleanse with Dakin's, Wet to dry        IRREGULAR/regular/MS chest scar